# Patient Record
Sex: FEMALE | Race: WHITE | NOT HISPANIC OR LATINO | Employment: FULL TIME | URBAN - METROPOLITAN AREA
[De-identification: names, ages, dates, MRNs, and addresses within clinical notes are randomized per-mention and may not be internally consistent; named-entity substitution may affect disease eponyms.]

---

## 2017-02-01 ENCOUNTER — TRANSFERRED RECORDS (OUTPATIENT)
Dept: HEALTH INFORMATION MANAGEMENT | Facility: CLINIC | Age: 27
End: 2017-02-01

## 2017-02-01 LAB
ALT SERPL-CCNC: 29 U/L (ref 6–29)
CREAT SERPL-MCNC: 0.81 MG/DL (ref 0.5–1.1)
GFR SERPL CREATININE-BSD FRML MDRD: 102 ML/MIN/1.73M2
GLUCOSE SERPL-MCNC: 170 MG/DL (ref 65–99)
HBA1C MFR BLD: 7.3 % (ref 4–6)
POTASSIUM SERPL-SCNC: 4.3 MMOL/L (ref 3.5–5.3)

## 2017-02-13 ENCOUNTER — MYC MEDICAL ADVICE (OUTPATIENT)
Dept: OBGYN | Facility: CLINIC | Age: 27
End: 2017-02-13

## 2017-02-13 NOTE — TELEPHONE ENCOUNTER
Note routed to [facility] to review pt's mychart message below- is this amount enough to possibly transmit HIV?

## 2017-06-21 ENCOUNTER — TRANSFERRED RECORDS (OUTPATIENT)
Dept: HEALTH INFORMATION MANAGEMENT | Facility: CLINIC | Age: 27
End: 2017-06-21

## 2017-06-21 LAB
ALT SERPL-CCNC: 17 U/L (ref 6–29)
CREAT SERPL-MCNC: 0.82 MG/DL (ref 0.5–1.1)
GFR SERPL CREATININE-BSD FRML MDRD: 99 ML/MIN/1.73M2
GLUCOSE SERPL-MCNC: 181 MG/DL (ref 65–99)
HBA1C MFR BLD: 8.1 % (ref 4–6)
POTASSIUM SERPL-SCNC: 4.5 MMOL/L (ref 3.5–5.3)
TSH SERPL-ACNC: 2.81 UIU/ML (ref 0.3–5)

## 2017-07-06 ENCOUNTER — OFFICE VISIT (OUTPATIENT)
Dept: FAMILY MEDICINE | Facility: CLINIC | Age: 27
End: 2017-07-06
Payer: COMMERCIAL

## 2017-07-06 VITALS
HEART RATE: 121 BPM | TEMPERATURE: 98.5 F | DIASTOLIC BLOOD PRESSURE: 64 MMHG | WEIGHT: 152 LBS | BODY MASS INDEX: 24.91 KG/M2 | OXYGEN SATURATION: 98 % | SYSTOLIC BLOOD PRESSURE: 106 MMHG

## 2017-07-06 DIAGNOSIS — H10.33 ACUTE CONJUNCTIVITIS OF BOTH EYES, UNSPECIFIED ACUTE CONJUNCTIVITIS TYPE: Primary | ICD-10-CM

## 2017-07-06 DIAGNOSIS — J06.9 UPPER RESPIRATORY TRACT INFECTION, UNSPECIFIED TYPE: ICD-10-CM

## 2017-07-06 PROCEDURE — 99213 OFFICE O/P EST LOW 20 MIN: CPT | Performed by: PHYSICIAN ASSISTANT

## 2017-07-06 RX ORDER — OFLOXACIN 3 MG/ML
1 SOLUTION/ DROPS OPHTHALMIC 4 TIMES DAILY
Qty: 2 ML | Refills: 0 | Status: SHIPPED | OUTPATIENT
Start: 2017-07-06 | End: 2017-07-13

## 2017-07-06 NOTE — MR AVS SNAPSHOT
After Visit Summary   7/6/2017    Linda Agosto    MRN: [id number]           Patient Information     Date Of Birth          1990        Visit Information        Provider Department      7/6/2017 8:20 AM Alexandra Melo PA-C [facility]        Today's Diagnoses     Acute conjunctivitis of both eyes, unspecified acute conjunctivitis type    -  1      Care Instructions      Conjunctivitis, Bacterial    You have an infection in the membranes covering the white part of the eye. This part of the eye is called the conjunctiva. The infection is called conjunctivitis. The most common symptoms of conjunctivitis include a thick, pus-like discharge from the eye, swollen eyelids, redness, eyelids sticking together upon awakening, and a gritty or scratchy feeling in the eye. Your infection was caused by bacteria. It may be treated with medicine. With treatment, the infection takes about 7 to 10 days to resolve.  Home care    Use prescribed antibiotic eye drops or ointment as directed to treat the infection.    Apply a warm compress (towel soaked in warm water) to the affected eye 3 to 4 times a day. Do this just before applying medicine to the eye.    Use a warm, wet cloth to wipe away crusting of the eyelids in the morning. This is caused by mucus drainage during the night. You may also use saline irrigating solution or artificial tears to rinse away mucus in the eye. Do not put a patch over the eye.    Wash your hands before and after touching the infected eye. This is to prevent spreading the infection to the other eye, and to other people. Do not share your towels or washcloths with others.    You may use acetaminophen or ibuprofen to control pain, unless another medicine was prescribed. (Note: If you have chronic liver or kidney disease or have ever had a stomach ulcer or gastrointestinal bleeding, talk with your doctor before using these medicines.)    Do not wear contact lenses until your  eyes have healed and all symptoms are gone.  Follow-up care  Follow up with your healthcare provider, or as advised.  When to seek medical advice  Call your healthcare provider right away if any of these occur:    Worsening vision    Increasing pain in the eye    Increasing swelling or redness of the eyelid    Redness spreading around the eye  Date Last Reviewed: 6/14/2015 2000-2017 [employer]. [address] [place] [address]. All rights reserved. This information is not intended as a substitute for professional medical care. Always follow your healthcare professional's instructions.                Follow-ups after your visit        Who to contact     If you have questions or need follow up information about today's clinic visit or your schedule please contact [facility] directly at [phone].  Normal or non-critical lab and imaging results will be communicated to you by MyChart, letter or phone within 4 business days after the clinic has received the results. If you do not hear from us within 7 days, please contact the clinic through [employer] or phone. If you have a critical or abnormal lab result, we will notify you by phone as soon as possible.  Submit refill requests through [employer] or call your pharmacy and they will forward the refill request to us. Please allow 3 business days for your refill to be completed.          Additional Information About Your Visit        [employer] Information     [employer] gives you secure access to your electronic health record. If you see a primary care provider, you can also send messages to your care team and make appointments. If you have questions, please call your primary care clinic.  If you do not have a primary care provider, please call [phone] and they will assist you.        Care EveryWhere ID     This is your Care EveryWhere ID. This could be used by other organizations to access your [facility] medical records  [id number]         Your Vitals Were     Pulse Temperature Pulse Oximetry BMI (Body Mass Index)          121 98.5  F (36.9  C) (Oral) 98% 24.91 kg/m2         Blood Pressure from Last 3 Encounters:   07/06/17 106/64   12/16/16 116/72   11/11/16 112/80    Weight from Last 3 Encounters:   07/06/17 152 lb (68.9 kg)   12/16/16 156 lb (70.8 kg)   11/11/16 157 lb (71.2 kg)              Today, you had the following     No orders found for display         Today's Medication Changes          These changes are accurate as of: 7/6/17  8:58 AM.  If you have any questions, ask your nurse or doctor.               Start taking these medicines.        Dose/Directions    ofloxacin 0.3 % ophthalmic solution   Commonly known as:  OCUFLOX   Used for:  Acute conjunctivitis of both eyes, unspecified acute conjunctivitis type   Started by:  Alexandra Melo PA-C        Dose:  1 drop   Place 1 drop into both eyes 4 times daily for 7 days   Quantity:  2 mL   Refills:  0            Where to get your medicines      These medications were sent to [facility] [id number] [place] [address] AT [facility] [address] [address]    Hours:  24-hours Phone:  [phone]     ofloxacin 0.3 % ophthalmic solution                Primary Care Provider Office Phone # Fax #    Nithin Varela [phone]       [address] [address]        Equal Access to Services     [facility] AH: Hadii nader ordaz hadasho Soomaali, waaxda luqadaha, qaybta kaalmada adeegyada, miriam dean. So [facility] [phone].    ATENCIÓN: Si habla español, tiene a rivera disposición servicios gratuitos de asistencia lingüística. Juli al [phone].    We comply with applicable federal civil rights laws and [place] laws. We do not discriminate on the basis of race, color, national origin, age, disability sex, sexual orientation or gender identity.             Thank you!     Thank you for choosing [facility]  [facility] your [facility]. Our goal is always to provide you with excellent care. Hearing back from our patients is one way we can continue to improve our services. Please take a few minutes to complete the written survey that you may receive in the mail after your visit with us. Thank you!             Your Updated Medication List - Protect others around you: Learn how to safely use, store and throw away your medicines at www.disposemymeds.org.          This list is accurate as of: 7/6/17  8:58 AM.  Always use your most recent med list.                   Brand Name Dispense Instructions for use Diagnosis    blood glucose monitoring lancets     2 Box    Patient tests 8 times/day    Type 1 diabetes, HbA1c goal < 7% (H)       blood glucose monitoring test strip    VALE CONTOUR NEXT    250 strip    Patient tests 8 times/day    Type 1 diabetes, HbA1c goal < 7% (H)       insulin aspart 100 UNITS/ML injection    NovoLOG VIAL    30 mL    Uses up to 75 Units/day    Type 1 diabetes, HbA1c goal < 7% (H)       levothyroxine 88 MCG tablet    SYNTHROID/LEVOTHROID    90 tablet    TAKE ONE TABLET BY MOUTH ONCE DAILY    Other specified hypothyroidism       naproxen sodium 550 MG tablet    ANAPROX    30 tablet    Take 1 tablet (550 mg) by mouth 2 times daily (with meals)    Tension headache, Common migraine       norgestimate-ethinyl estradiol 0.25-35 MG-MCG per tablet    ORTHO-CYCLEN, SPRINTEC    84 tablet    Take 1 tablet by mouth daily    DUB (dysfunctional uterine bleeding)       ofloxacin 0.3 % ophthalmic solution    OCUFLOX    2 mL    Place 1 drop into both eyes 4 times daily for 7 days    Acute conjunctivitis of both eyes, unspecified acute conjunctivitis type       simvastatin 40 MG tablet    ZOCOR    90 tablet    Take 1 tablet (40 mg) by mouth At Bedtime    Hyperlipidemia with target LDL less than 70

## 2017-07-06 NOTE — PATIENT INSTRUCTIONS
Conjunctivitis, Bacterial    You have an infection in the membranes covering the white part of the eye. This part of the eye is called the conjunctiva. The infection is called conjunctivitis. The most common symptoms of conjunctivitis include a thick, pus-like discharge from the eye, swollen eyelids, redness, eyelids sticking together upon awakening, and a gritty or scratchy feeling in the eye. Your infection was caused by bacteria. It may be treated with medicine. With treatment, the infection takes about 7 to 10 days to resolve.  Home care    Use prescribed antibiotic eye drops or ointment as directed to treat the infection.    Apply a warm compress (towel soaked in warm water) to the affected eye 3 to 4 times a day. Do this just before applying medicine to the eye.    Use a warm, wet cloth to wipe away crusting of the eyelids in the morning. This is caused by mucus drainage during the night. You may also use saline irrigating solution or artificial tears to rinse away mucus in the eye. Do not put a patch over the eye.    Wash your hands before and after touching the infected eye. This is to prevent spreading the infection to the other eye, and to other people. Do not share your towels or washcloths with others.    You may use acetaminophen or ibuprofen to control pain, unless another medicine was prescribed. (Note: If you have chronic liver or kidney disease or have ever had a stomach ulcer or gastrointestinal bleeding, talk with your doctor before using these medicines.)    Do not wear contact lenses until your eyes have healed and all symptoms are gone.  Follow-up care  Follow up with your healthcare provider, or as advised.  When to seek medical advice  Call your healthcare provider right away if any of these occur:    Worsening vision    Increasing pain in the eye    Increasing swelling or redness of the eyelid    Redness spreading around the eye  Date Last Reviewed: 6/14/2015 2000-2017 [employer]. [address] [place] [address], [place] [address]. All rights reserved. This information is not intended as a substitute for professional medical care. Always follow your healthcare professional's instructions.

## 2017-07-06 NOTE — NURSING NOTE
"Chief Complaint   Patient presents with     Pharyngitis       Initial /64  Pulse 121  Temp 98.5  F (36.9  C) (Oral)  Wt 152 lb (68.9 kg)  SpO2 98%  BMI 24.91 kg/m2 Estimated body mass index is 24.91 kg/(m^2) as calculated from the following:    Height as of 12/16/16: 5' 5.5\" (1.664 m).    Weight as of this encounter: 152 lb (68.9 kg).  Medication Reconciliation: complete     Linda Garcia MA      "

## 2017-07-06 NOTE — PROGRESS NOTES
SUBJECTIVE:      Linda Agosto is a 27 year old female who presents to clinic today for the following health issues:        Acute Illness   Acute illness concerns: sore throat, pink eye  Onset: sore throat few days, pink eye last night    Fever: no    Chills/Sweats: no    Headache (location?): no    Sinus Pressure:no    Conjunctivitis:  YES: mostly left, some right    Ear Pain: YES- pressure    Rhinorrhea: no    Congestion: no    Sore Throat: YES   Cough: YES-productive of yellow sputum    Wheeze: no    Decreased Appetite: YES    Nausea: no    Vomiting: no    Diarrhea:  no    Dysuria/Freq.: no    Fatigue/Achiness: YES - fatigue    Sick/Strep Exposure: YES - people at work sick, colds   Therapies Tried and outcome: ibuprofen     Sore throat since Saturday. Hurts to swallow a little bit.   Cough started shortly after. Worse in the morning and when laying down. Productive in the morning.   Cough seems to come from sore throat.   No noticeable congestion or nasal discharge.   Some PND noticed.   Feels some ear pressure, but no pain or discharge.      Yesterday left eye was goopy and this morning it was crusted and red.   She noticed white discharge yesterday night and this morning.   This morning there was quite a bit of discharge present.   She has had to wipe her eyes a couple times.   She does wear contact lenses.   Eyes are not itchy or sore. No changes in vision.      Taking Ibuprofen for her sore throat. Last dose was yesterday.   Took NyQuil once, but didn't do much.            Problem list and histories reviewed & adjusted, as indicated.  Additional history: as documented     Patient Active Problem List   Diagnosis     Allergic rhinitis     Allergic state     Type 1 diabetes, HbA1c goal < 7% (H)     Other specified hypothyroidism     Anxiety     Hyperlipidemia with target LDL less than 70     Type 1 diabetes mellitus without complication (H)     Common migraine     Past Surgical History:   Procedure Laterality  Date     APPENDECTOMY  8/07    dr deshpande     PE TUBES  1996       Social History   Substance Use Topics     Smoking status: Never Smoker     Smokeless tobacco: Never Used     Alcohol use Yes      Comment: sometimes couple on weekends     Family History   Problem Relation Age of Onset     Family History Negative No family hx of      Genetic Disorder Paternal Grandfather      kidney     Neurologic Disorder Maternal Grandmother      dementia         Current Outpatient Prescriptions   Medication Sig Dispense Refill     ofloxacin (OCUFLOX) 0.3 % ophthalmic solution Place 1 drop into both eyes 4 times daily for 7 days 2 mL 0     norgestimate-ethinyl estradiol (ORTHO-CYCLEN, SPRINTEC) 0.25-35 MG-MCG per tablet Take 1 tablet by mouth daily 84 tablet 8     simvastatin (ZOCOR) 40 MG tablet Take 1 tablet (40 mg) by mouth At Bedtime 90 tablet 1     levothyroxine (SYNTHROID, LEVOTHROID) 88 MCG tablet TAKE ONE TABLET BY MOUTH ONCE DAILY 90 tablet 3     insulin aspart (NOVOLOG VIAL) 100 UNITS/ML VIAL Uses up to 75 Units/day 30 mL 2     naproxen sodium (ANAPROX) 550 MG tablet Take 1 tablet (550 mg) by mouth 2 times daily (with meals) 30 tablet 1     blood glucose (VALE CONTOUR NEXT) test strip Patient tests 8 times/day 250 strip 6     blood glucose monitoring (VALE MICROLET) lancets Patient tests 8 times/day 2 Box 6     Allergies   Allergen Reactions     Penicillins Rash     augmentin & pcn     Sulfa Drugs Hives        Reviewed and updated as needed this visit by clinical staff        Reviewed and updated as needed this visit by Provider           ROS:  Constitutional, HEENT, cardiovascular, pulmonary, gi and gu systems are negative, except as otherwise noted.    OBJECTIVE:     /64  Pulse 121  Temp 98.5  F (36.9  C) (Oral)  Wt 152 lb (68.9 kg)  SpO2 98%  BMI 24.91 kg/m2  Body mass index is 24.91 kg/(m^2).  GENERAL: healthy, alert and no distress  EYES: PERRL, EOMI and conjunctiva/corneas- conjunctival injection OU  and yellow colored discharge present bilateral  HENT: ear canals and TM's normal, nose and mouth without ulcers or lesions  NECK: no adenopathy, no asymmetry, masses, or scars and thyroid normal to palpation  RESP: lungs clear to auscultation - no rales, rhonchi or wheezes  CV: regular rate and rhythm, normal S1 S2, no S3 or S4, no murmur, click or rub, no peripheral edema and peripheral pulses strong    Diagnostic Test Results:  none     ASSESSMENT/PLAN:     1. Acute conjunctivitis of both eyes, unspecified acute conjunctivitis type  White, creamy discharge noted by patient as well as on exam bilaterally. She does typically wear contact lenses. Recommended to avoid wearing contact lenses while treating for conjunctivitis to minimize risk of reinfection. Practice good hand hygiene to prevent spread to others. Contagious until on antibiotics for minimum of 24 hours. Avoid touching tip of eye drop bottle to the eye to minimize reinfection risk.     - ofloxacin (OCUFLOX) 0.3 % ophthalmic solution; Place 1 drop into both eyes 4 times daily for 7 days  Dispense: 2 mL; Refill: 0    2. Upper respiratory tract infection, unspecified type  Cough, sore throat and PND likely due to viral infection. Continue taking OTC medications as needed for symptom control. As symptoms are improving, expect to continue improving over the next week.       Follow up if symptoms worsen or do not improve, or with any vision changes.   See Patient Instructions    Ward GALLO

## 2017-07-06 NOTE — PROGRESS NOTES
"  SUBJECTIVE:                                                    Linda Agosto is a 27 year old female who presents to clinic today for the following health issues:    Acute Illness   Acute illness concerns: sore throat, pink eye  Onset: sore throat few days, pink eye last night    Fever: no    Chills/Sweats: no    Headache (location?): no    Sinus Pressure:no    Conjunctivitis:  YES: mostly left, some right    Ear Pain: YES- pressure    Rhinorrhea: no    Congestion: no    Sore Throat: YES     Cough: YES-productive of yellow sputum    Wheeze: no    Decreased Appetite: YES    Nausea: no    Vomiting: no    Diarrhea:  no    Dysuria/Freq.: no    Fatigue/Achiness: YES - fatigue    Sick/Strep Exposure: YES - people at work sick, colds     Therapies Tried and outcome: ibuprofen    Sore throat for approximately 6 days. Cough came on a little bit later. Sore throat appears to be getting better.  Cough productive in AM only.   No significant nasal congestion. Does feel some postnasal drainage.    Eye symptoms developed yesterday. Left eye was \"goopy.\" Eye crusted shut this morning, and red.  Both eyes affected but left eye worse  Has seen some white thick discharge  Does wear contacts. Wearing glasses today.    Has been taking ibuprofen for her sore throat. Also took a dose of Nyquil.    No recent travel    People at work are sick with cold symptoms.     Problem list and histories reviewed & adjusted, as indicated.  Additional history: as documented    Patient Active Problem List   Diagnosis     Allergic rhinitis     Allergic state     Type 1 diabetes, HbA1c goal < 7% (H)     Other specified hypothyroidism     Anxiety     Hyperlipidemia with target LDL less than 70     Type 1 diabetes mellitus without complication (H)     Common migraine     Past Surgical History:   Procedure Laterality Date     APPENDECTOMY  8/07    dr deshpande     PE TUBES  1996       Social History   Substance Use Topics     Smoking status: Never Smoker "     Smokeless tobacco: Never Used     Alcohol use Yes      Comment: sometimes couple on weekends     Family History   Problem Relation Age of Onset     Family History Negative No family hx of      Genetic Disorder Paternal Grandfather      kidney     Neurologic Disorder Maternal Grandmother      dementia         Current Outpatient Prescriptions   Medication Sig Dispense Refill     ofloxacin (OCUFLOX) 0.3 % ophthalmic solution Place 1 drop into both eyes 4 times daily for 7 days 2 mL 0     norgestimate-ethinyl estradiol (ORTHO-CYCLEN, SPRINTEC) 0.25-35 MG-MCG per tablet Take 1 tablet by mouth daily 84 tablet 8     simvastatin (ZOCOR) 40 MG tablet Take 1 tablet (40 mg) by mouth At Bedtime 90 tablet 1     levothyroxine (SYNTHROID, LEVOTHROID) 88 MCG tablet TAKE ONE TABLET BY MOUTH ONCE DAILY 90 tablet 3     insulin aspart (NOVOLOG VIAL) 100 UNITS/ML VIAL Uses up to 75 Units/day 30 mL 2     naproxen sodium (ANAPROX) 550 MG tablet Take 1 tablet (550 mg) by mouth 2 times daily (with meals) 30 tablet 1     blood glucose (VALE CONTOUR NEXT) test strip Patient tests 8 times/day 250 strip 6     blood glucose monitoring (VALE MICROLET) lancets Patient tests 8 times/day 2 Box 6     Allergies   Allergen Reactions     Penicillins Rash     augmentin & pcn     Sulfa Drugs Hives       Reviewed and updated as needed this visit by clinical staff       Reviewed and updated as needed this visit by Provider         ROS:  Constitutional, HEENT, cardiovascular, pulmonary, gi and gu systems are negative, except as otherwise noted.    OBJECTIVE:     /64  Pulse 121  Temp 98.5  F (36.9  C) (Oral)  Wt 152 lb (68.9 kg)  SpO2 98%  BMI 24.91 kg/m2  Body mass index is 24.91 kg/(m^2).  GENERAL: healthy, alert and no distress  EYES: PERRL, EOMI and conjunctiva/corneas- conjunctival injection OU and white-yellow colored discharge present bilateral  HENT: normal cephalic/atraumatic, ear canals and TM's normal, oral mucous membranes moist  and cobblestoning in oropharynx  NECK: no adenopathy, no asymmetry, masses, or scars and thyroid normal to palpation  RESP: lungs clear to auscultation - no rales, rhonchi or wheezes  CV: regular rate and rhythm, normal S1 S2, no S3 or S4, no murmur, click or rub, no peripheral edema and peripheral pulses strong  SKIN: no suspicious lesions or rashes    Diagnostic Test Results:  none     ASSESSMENT/PLAN:     1. Acute conjunctivitis of both eyes, unspecified acute conjunctivitis type  Is experiencing purulent drainage and is a contact lens wearer, increasing concern for bacterial infection. Will start ofloxacin drops. Follow-up if symptoms worsening despite treatment. Discussed avoidance of contact lenses until treatment completed. Wash hands frequently and avoid touching eyes. Discussed changing cosmetic products.  - ofloxacin (OCUFLOX) 0.3 % ophthalmic solution; Place 1 drop into both eyes 4 times daily for 7 days  Dispense: 2 mL; Refill: 0    2. Upper respiratory tract infection, unspecified type  Consistent with viral URI. Expect improvement over the next week. Can continue to use OTC medications as needed.    See Patient Instructions    I performed a history and physical examination in addition to that performed by the student. The documentation above reflects my personal history and physical findings.    Alexandra Melo PA-C  [facility]

## 2017-08-21 ENCOUNTER — OFFICE VISIT (OUTPATIENT)
Dept: FAMILY MEDICINE | Facility: CLINIC | Age: 27
End: 2017-08-21
Payer: COMMERCIAL

## 2017-08-21 VITALS
WEIGHT: 153 LBS | OXYGEN SATURATION: 100 % | DIASTOLIC BLOOD PRESSURE: 72 MMHG | TEMPERATURE: 98.7 F | BODY MASS INDEX: 24.59 KG/M2 | SYSTOLIC BLOOD PRESSURE: 128 MMHG | HEIGHT: 66 IN | HEART RATE: 98 BPM

## 2017-08-21 DIAGNOSIS — K60.2 ANAL FISSURE: Primary | ICD-10-CM

## 2017-08-21 DIAGNOSIS — F41.8 SITUATIONAL ANXIETY: ICD-10-CM

## 2017-08-21 PROCEDURE — 99213 OFFICE O/P EST LOW 20 MIN: CPT | Performed by: FAMILY MEDICINE

## 2017-08-21 RX ORDER — INSULIN GLARGINE 100 [IU]/ML
INJECTION, SOLUTION SUBCUTANEOUS
Refills: 0 | COMMUNITY
Start: 2017-04-01 | End: 2022-04-12

## 2017-08-21 NOTE — PROGRESS NOTES
"  SUBJECTIVE:   Linda Agosto is a 27 year old female who presents to clinic today for the following health issues:      Concern - Rectal problem   Onset: x2-3 Months     Description:   Patient feels  a tear in rectal area , blood on toilet paper   Has been happening for almost 2-3 months , thought problem was getting better   When stools are hard this is worse and more bleeding occurs - bright red   Small red bump        Intensity: moderate    Progression of Symptoms:  Worsening x1 week     Accompanying Signs & Symptoms: none       Previous history of similar problem:   None     Precipitating factors:   Worsened by: hard stools, constipation     Alleviating factors:  Improved by: none     Therapies Tried and outcome: none   Occasionally has problems with constipation.  Tries to take stool softener and fiber tablets.Havings some pain with BM's. No history of  Diarrhea.  No family history of Crohn's disease.  No  Pain with sitting.    Patient is having some situational anxiety. Has a lot of stressors going on in her life. No depression. Sleeping okay. Does not think she wants to go on medication. NO SI/HI.  Is open to therapy.    Problem list and histories reviewed & adjusted, as indicated.  Additional history: as documented    Reviewed and updated as needed this visit by clinical staffTobacco  Allergies  Meds  Med Hx  Surg Hx  Fam Hx  Soc Hx      Reviewed and updated as needed this visit by Provider         ROS:  Constitutional, HEENT, cardiovascular, pulmonary, gi and gu systems are negative, except as otherwise noted.      OBJECTIVE:   /72  Pulse 98  Temp 98.7  F (37.1  C) (Oral)  Ht 5' 5.5\" (1.664 m)  Wt 153 lb (69.4 kg)  LMP 08/01/2017 (Approximate)  SpO2 100%  BMI 25.07 kg/m2  Body mass index is 25.07 kg/(m^2).  GENERAL: healthy, alert and no distress  EYES: Eyes grossly normal to inspection, PERRL and conjunctivae and sclerae normal  HENT: ear canals and TM's normal, nose and mouth without " ulcers or lesions  NECK: no adenopathy, no asymmetry, masses, or scars and thyroid normal to palpation  RESP: lungs clear to auscultation - no rales, rhonchi or wheezes  CV: regular rate and rhythm, normal S1 S2, no S3 or S4, no murmur, click or rub, no peripheral edema and peripheral pulses strong  ABDOMEN: soft, nontender, no hepatosplenomegaly, no masses and bowel sounds normal  MS: no gross musculoskeletal defects noted, no edema  Rectal-Small fissure noted at 6 o'clock position. No bleeding. Rectal exam-no masses.  Brown stool on glove.      ASSESSMENT/PLAN:     (K60.2) Anal fissure  (primary encounter diagnosis)  Comment: Will try Nifedipine first.   Plan: nifedipine 0.2% in white         petrolatum 0.2 % OINT ointment        If not improving, may consider Colorectal referral.    (F41.8) Situational anxiety  Comment: Will refer for therapy.  Plan: MENTAL HEALTH REFERRAL            Karen Weiler, MD  [facility]

## 2017-08-21 NOTE — MR AVS SNAPSHOT
After Visit Summary   8/21/2017    Linda Agosto    MRN: [id number]           Patient Information     Date Of Birth          1990        Visit Information        Provider Department      8/21/2017 4:00 PM Weiler, Karen, MD [facility] [place]        Today's Diagnoses     Anal fissure    -  1    Situational anxiety           Follow-ups after your visit        Additional Services     MENTAL HEALTH REFERRAL       Your provider has referred you to: [facility]: [facility] - Counseling (Individual/Couples/Family) - [facility] [phone]   [email or web address]   *Patient will be contacted by [facility] scheduling partner[facility]), to schedule an appointment.  Patients may also call [facility] to schedule.    All scheduling is subject to the client's specific insurance plan & benefits, provider/location availability, and provider clinical specialities.  Please arrive 15 minutes early for your first appointment and bring your completed paperwork.    Please be aware that coverage of these services is subject to the terms and limitations of your health insurance plan.  Call member services at your health plan with any benefit or coverage questions.                  Your next 10 appointments already scheduled     Aug 31, 2017  1:30 PM CDT   Office Visit with Sierra Treviño Masters, DO   [facility] ([facility])    [address] [place] [id number] [phone]           Bring a current list of meds and any records pertaining to this visit. For Physicals, please bring immunization records and any forms needing to be filled out. Please arrive 10 minutes early to complete paperwork.              Who to contact     If you have questions or need follow up information about today's clinic visit or your schedule please contact [facility]  "[facility] directly at [phone].  Normal or non-critical lab and imaging results will be communicated to you by MyChart, letter or phone within 4 business days after the clinic has received the results. If you do not hear from us within 7 days, please contact the clinic through Recon Instrumentshart or phone. If you have a critical or abnormal lab result, we will notify you by phone as soon as possible.  Submit refill requests through Anytime DD or call your pharmacy and they will forward the refill request to us. Please allow 3 business days for your refill to be completed.          Additional Information About Your Visit        [employer] Information     Anytime DD gives you secure access to your electronic health record. If you see a primary care provider, you can also send messages to your care team and make appointments. If you have questions, please call your primary care clinic.  If you do not have a primary care provider, please call [phone] and they will assist you.        Care EveryWhere ID     This is your Care EveryWhere ID. This could be used by other organizations to access your [place] medical records  [id number]        Your Vitals Were     Pulse Temperature Height Last Period Pulse Oximetry BMI (Body Mass Index)    98 98.7  F (37.1  C) (Oral) 5' 5.5\" (1.664 m) 08/01/2017 (Approximate) 100% 25.07 kg/m2       Blood Pressure from Last 3 Encounters:   08/21/17 128/72   07/06/17 106/64   12/16/16 116/72    Weight from Last 3 Encounters:   08/21/17 153 lb (69.4 kg)   07/06/17 152 lb (68.9 kg)   12/16/16 156 lb (70.8 kg)              We Performed the Following     MENTAL HEALTH REFERRAL          Today's Medication Changes          These changes are accurate as of: 8/21/17  4:40 PM.  If you have any questions, ask your nurse or doctor.               Start taking these medicines.        Dose/Directions    nifedipine 0.2% in white petrolatum 0.2 % Oint ointment   Used for:  Anal fissure   Started by:  Weiler, Karen, MD     "    Apply topically 2 times daily   Quantity:  100 g   Refills:  0            Where to get your medicines      [facility] [facility] [address] [place] [address] AT [facility] [address] [address], [facility] [address]    Hours:  24-hours Phone:  [phone]     nifedipine 0.2% in white petrolatum 0.2 % Oint ointment                Primary Care Provider Office Phone # Fax #    Nithin Varela [phone]       [address] [facility] [address]        Equal Access to Services     [facility]: Hadii aad ku hadasho Soomaali, waaxda luqadaha, qaybta kaalmada adeegyada, miriam white haytim romo . So [facility] [phone].    ATENCIÓN: Si habla español, tiene a rivera disposición servicios gratuitos de asistencia lingüística. [facility] [phone].    We comply with applicable federal civil rights laws and [place] laws. We do not discriminate on the basis of race, color, national origin, age, disability sex, sexual orientation or gender identity.            Thank you!     Thank you for choosing [facility]  for your care. Our goal is always to provide you with excellent care. Hearing back from our patients is one way we can continue to improve our services. Please take a few minutes to complete the written survey that you may receive in the mail after your visit with us. Thank you!             Your Updated Medication List - Protect others around you: Learn how to safely use, store and throw away your medicines at www.disposemymeds.org.          This list is accurate as of: 8/21/17  4:40 PM.  Always use your most recent med list.                   Brand Name Dispense Instructions for use Diagnosis    blood glucose monitoring lancets     2 Box    Patient tests 8 times/day    Type 1 diabetes, HbA1c goal < 7% (H)       blood glucose monitoring test strip    VALE CONTOUR NEXT    250 strip    Patient tests 8 times/day     Type 1 diabetes, HbA1c goal < 7% (H)       insulin aspart 100 UNITS/ML injection    NovoLOG VIAL    30 mL    Uses up to 75 Units/day    Type 1 diabetes, HbA1c goal < 7% (H)       insulin glargine 100 UNIT/ML injection      INJ 21 UNITS SC D        levothyroxine 88 MCG tablet    SYNTHROID/LEVOTHROID    90 tablet    TAKE ONE TABLET BY MOUTH ONCE DAILY    Other specified hypothyroidism       naproxen sodium 550 MG tablet    ANAPROX    30 tablet    Take 1 tablet (550 mg) by mouth 2 times daily (with meals)    Tension headache, Common migraine       nifedipine 0.2% in white petrolatum 0.2 % Oint ointment     100 g    Apply topically 2 times daily    Anal fissure       norgestimate-ethinyl estradiol 0.25-35 MG-MCG per tablet    ORTHO-CYCLEN, SPRINTEC    84 tablet    Take 1 tablet by mouth daily    DUB (dysfunctional uterine bleeding)       simvastatin 40 MG tablet    ZOCOR    90 tablet    Take 1 tablet (40 mg) by mouth At Bedtime    Hyperlipidemia with target LDL less than 70

## 2017-08-21 NOTE — NURSING NOTE
"Chief Complaint   Patient presents with     Rectal Problem       Initial /72  Pulse 98  Temp 98.7  F (37.1  C) (Oral)  Ht 5' 5.5\" (1.664 m)  Wt 153 lb (69.4 kg)  LMP 08/01/2017 (Approximate)  SpO2 100%  BMI 25.07 kg/m2 Estimated body mass index is 25.07 kg/(m^2) as calculated from the following:    Height as of this encounter: 5' 5.5\" (1.664 m).    Weight as of this encounter: 153 lb (69.4 kg).  Medication Reconciliation: complete   Kaylin Metzger Certified Medical Assistant      "

## 2017-08-22 ENCOUNTER — MYC MEDICAL ADVICE (OUTPATIENT)
Dept: FAMILY MEDICINE | Facility: CLINIC | Age: 27
End: 2017-08-22

## 2017-08-22 NOTE — TELEPHONE ENCOUNTER
Please see octoScope message. Please advise. Thank you.  Silvia Holt RN, BSN  [facility]

## 2017-08-31 ENCOUNTER — OFFICE VISIT (OUTPATIENT)
Dept: OBGYN | Facility: CLINIC | Age: 27
End: 2017-08-31
Payer: COMMERCIAL

## 2017-08-31 VITALS — BODY MASS INDEX: 24.59 KG/M2 | HEIGHT: 66 IN | WEIGHT: 153 LBS

## 2017-08-31 DIAGNOSIS — Z11.8 SCREENING FOR CHLAMYDIAL DISEASE: ICD-10-CM

## 2017-08-31 DIAGNOSIS — Z11.3 SCREEN FOR STD (SEXUALLY TRANSMITTED DISEASE): Primary | ICD-10-CM

## 2017-08-31 PROCEDURE — 86695 HERPES SIMPLEX TYPE 1 TEST: CPT | Performed by: OBSTETRICS & GYNECOLOGY

## 2017-08-31 PROCEDURE — 99213 OFFICE O/P EST LOW 20 MIN: CPT | Performed by: OBSTETRICS & GYNECOLOGY

## 2017-08-31 PROCEDURE — 87591 N.GONORRHOEAE DNA AMP PROB: CPT | Performed by: OBSTETRICS & GYNECOLOGY

## 2017-08-31 PROCEDURE — 86780 TREPONEMA PALLIDUM: CPT | Performed by: OBSTETRICS & GYNECOLOGY

## 2017-08-31 PROCEDURE — 87340 HEPATITIS B SURFACE AG IA: CPT | Performed by: OBSTETRICS & GYNECOLOGY

## 2017-08-31 PROCEDURE — 36415 COLL VENOUS BLD VENIPUNCTURE: CPT | Performed by: OBSTETRICS & GYNECOLOGY

## 2017-08-31 PROCEDURE — 87389 HIV-1 AG W/HIV-1&-2 AB AG IA: CPT | Performed by: OBSTETRICS & GYNECOLOGY

## 2017-08-31 PROCEDURE — 87491 CHLMYD TRACH DNA AMP PROBE: CPT | Performed by: OBSTETRICS & GYNECOLOGY

## 2017-08-31 PROCEDURE — 86803 HEPATITIS C AB TEST: CPT | Performed by: OBSTETRICS & GYNECOLOGY

## 2017-08-31 PROCEDURE — 86696 HERPES SIMPLEX TYPE 2 TEST: CPT | Performed by: OBSTETRICS & GYNECOLOGY

## 2017-08-31 NOTE — PROGRESS NOTES
SUBJECTIVE:                                                   Linda Agosto is a 27 year old female who presents to clinic today for the following health issue(s):  Patient presents with:  STD: Requesting full panel       HPI:  Would like STD testing. No specific concerns or questions.     Is also wondering about breast cancer risk and when screening happens.     Review of PMH, SocHx, SurHx, FHx, medications completed. Epic updated.        Patient's last menstrual period was 2017 (exact date)..   Patient is sexually active, .  Using oral contraceptives for contraception.    reports that she has never smoked. She has never used smokeless tobacco.      STD testing offered?  Accepted    Health maintenance updated:  yes    Today's PHQ-2 Score:   PHQ-2 (  [employer]) 2017   Q1: Little interest or pleasure in doing things 0   Q2: Feeling down, depressed or hopeless 0   PHQ-2 Score 0     Today's PHQ-9 Score:   PHQ-9 SCORE 2016   Total Score -   Total Score 1     Today's TRESA-7 Score:   TRESA-7 SCORE 2016   Total Score -   Total Score 0       Problem list and histories reviewed & adjusted, as indicated.  Additional history: as documented.    Patient Active Problem List   Diagnosis     Allergic rhinitis     Allergic state     Type 1 diabetes, HbA1c goal < 7% (H)     Other specified hypothyroidism     Anxiety     Hyperlipidemia with target LDL less than 70     Type 1 diabetes mellitus without complication (H)     Common migraine     Past Surgical History:   Procedure Laterality Date     APPENDECTOMY      dr deshpande     PE TUBES        Social History   Substance Use Topics     Smoking status: Never Smoker     Smokeless tobacco: Never Used     Alcohol use Yes      Comment: sometimes couple on weekends      Problem (# of Occurrences) Relation (Name,Age of Onset)    Genetic Disorder (1) Paternal Grandfather: kidney    Neurologic Disorder (1) Maternal Grandmother: dementia       Negative  "family history of: Family History Negative            Current Outpatient Prescriptions   Medication Sig     insulin glargine (BASAGLAR KWIKPEN) 100 UNIT/ML injection INJ 21 UNITS SC D     norgestimate-ethinyl estradiol (ORTHO-CYCLEN, SPRINTEC) 0.25-35 MG-MCG per tablet Take 1 tablet by mouth daily     simvastatin (ZOCOR) 40 MG tablet Take 1 tablet (40 mg) by mouth At Bedtime     levothyroxine (SYNTHROID, LEVOTHROID) 88 MCG tablet TAKE ONE TABLET BY MOUTH ONCE DAILY     insulin aspart (NOVOLOG VIAL) 100 UNITS/ML VIAL Uses up to 75 Units/day     blood glucose (VALE CONTOUR NEXT) test strip Patient tests 8 times/day     blood glucose monitoring (VALE MICROLET) lancets Patient tests 8 times/day     naproxen sodium (ANAPROX) 550 MG tablet Take 1 tablet (550 mg) by mouth 2 times daily (with meals) (Patient not taking: Reported on 8/31/2017)     No current facility-administered medications for this visit.      Allergies   Allergen Reactions     Penicillins Rash     augmentin & pcn     Sulfa Drugs Hives       ROS:  12 point review of systems negative other than symptoms noted below.  Psychiatric: Anxiety    OBJECTIVE:     Ht 5' 5.5\" (1.664 m)  Wt 153 lb (69.4 kg)  LMP 08/27/2017 (Exact Date)  BMI 25.07 kg/m2  Body mass index is 25.07 kg/(m^2).    Exam:  Constitutional:  Appearance: Well nourished, well developed alert, in no acute distress  Chest:  Respiratory Effort:  Breathing unlabored  Neurologic/Psychiatric:  Mental Status:  Oriented X3        ASSESSMENT/PLAN:                                                        ICD-10-CM    1. Screen for STD (sexually transmitted disease) Z11.3 NEISSERIA GONORRHOEA PCR     Anti Treponema     Herpes Simplex Virus 1 and 2 IgG     HIV Antigen Antibody Combo     Hepatitis B Surface  Antigen     Hepatitis C Antibody   2. Screening for chlamydial disease Z11.8 CHLAMYDIA TRACHOMATIS PCR       -STI screening ordered. Desires urine screen due to being on period and comfort level. Last " screening Dec. No recent exposure concerns.   -Due for annual in Dec.   -Discussed breast cancer frequency and screening. No risks in her family identified at this time. Discussed lifestyle and diet, sleep.  Questions asnwered.     Sierra Treviño Masters, [facility] [place]

## 2017-08-31 NOTE — MR AVS SNAPSHOT
After Visit Summary   8/31/2017    Linda Agosto    MRN: [id number]           Patient Information     Date Of Birth          1990        Visit Information        Provider Department      8/31/2017 1:30 PM Sierra Santoyo,  [facility]        Today's Diagnoses     Screen for STD (sexually transmitted disease)    -  1    Screening for chlamydial disease           Follow-ups after your visit        Follow-up notes from your care team     Return if symptoms worsen or fail to improve.      Your next 10 appointments already scheduled     Sep 14, 2017 12:00 PM CDT   New Visit with OMEGA Vazquez   [facility] [place] ([facility])    [address]  [facility] [address] [phone]              Who to contact     If you have questions or need follow up information about today's clinic visit or your schedule please contact [facility] directly at [phone].  Normal or non-critical lab and imaging results will be communicated to you by Stack Exchangehart, letter or phone within 4 business days after the clinic has received the results. If you do not hear from us within 7 days, please contact the clinic through PictureMenut or phone. If you have a critical or abnormal lab result, we will notify you by phone as soon as possible.  Submit refill requests through Zadspace or call your pharmacy and they will forward the refill request to us. Please allow 3 business days for your refill to be completed.          Additional Information About Your Visit        MyChart Information     Zadspace gives you secure access to your electronic health record. If you see a primary care provider, you can also send messages to your care team and make appointments. If you have questions, please call your primary care clinic.  If you do not have a primary care provider, please call [phone] and they will assist you.        Care EveryWhere ID   "   This is your Care EveryWhere ID. This could be used by other organizations to access your [place] medical records  [id number]        Your Vitals Were     Height Last Period BMI (Body Mass Index)             5' 5.5\" (1.664 m) 08/27/2017 (Exact Date) 25.07 kg/m2          Blood Pressure from Last 3 Encounters:   08/21/17 128/72   07/06/17 106/64   12/16/16 116/72    Weight from Last 3 Encounters:   08/31/17 153 lb (69.4 kg)   08/21/17 153 lb (69.4 kg)   07/06/17 152 lb (68.9 kg)              We Performed the Following     Anti Treponema     CHLAMYDIA TRACHOMATIS PCR     Hepatitis B Surface  Antigen     Hepatitis C Antibody     Herpes Simplex Virus 1 and 2 IgG     HIV Antigen Antibody Combo     NEISSERIA GONORRHOEA PCR        Primary Care Provider Office Phone # Fax #    Nithin Varela [phone]       [address] [place] [address]        Equal Access to Services     [facility] : Hadii aad ku hadasho Soomaali, waaxda luqadaha, qaybta kaalmada adeegyada, waxay idiin hayluisiton ella rmoo . So [facility] [phone].    ATENCIÓN: Si habla español, tiene a rivera disposición servicios gratuitos de asistencia lingüística. Llame al [phone].    We comply with applicable federal civil rights laws and [place] laws. We do not discriminate on the basis of race, color, national origin, age, disability sex, sexual orientation or gender identity.            Thank you!     Thank you for choosing [facility]  for your care. Our goal is always to provide you with excellent care. Hearing back from our patients is one way we can continue to improve our services. Please take a few minutes to complete the written survey that you may receive in the mail after your visit with us. Thank you!             Your Updated Medication List - Protect others around you: Learn how to safely use, store and throw away your medicines at www.disposemymeds.org.          This list is accurate as of: " 8/31/17  1:52 PM.  Always use your most recent med list.                   Brand Name Dispense Instructions for use Diagnosis    blood glucose monitoring lancets     2 Box    Patient tests 8 times/day    Type 1 diabetes, HbA1c goal < 7% (H)       blood glucose monitoring test strip    VALE CONTOUR NEXT    250 strip    Patient tests 8 times/day    Type 1 diabetes, HbA1c goal < 7% (H)       insulin aspart 100 UNITS/ML injection    NovoLOG VIAL    30 mL    Uses up to 75 Units/day    Type 1 diabetes, HbA1c goal < 7% (H)       insulin glargine 100 UNIT/ML injection      INJ 21 UNITS SC D        levothyroxine 88 MCG tablet    SYNTHROID/LEVOTHROID    90 tablet    TAKE ONE TABLET BY MOUTH ONCE DAILY    Other specified hypothyroidism       naproxen sodium 550 MG tablet    ANAPROX    30 tablet    Take 1 tablet (550 mg) by mouth 2 times daily (with meals)    Tension headache, Common migraine       norgestimate-ethinyl estradiol 0.25-35 MG-MCG per tablet    ORTHO-CYCLEN, SPRINTEC    84 tablet    Take 1 tablet by mouth daily    DUB (dysfunctional uterine bleeding)       simvastatin 40 MG tablet    ZOCOR    90 tablet    Take 1 tablet (40 mg) by mouth At Bedtime    Hyperlipidemia with target LDL less than 70

## 2017-09-01 LAB
C TRACH DNA SPEC QL NAA+PROBE: NEGATIVE
HBV SURFACE AG SERPL QL IA: NONREACTIVE
HCV AB SERPL QL IA: NONREACTIVE
HIV 1+2 AB+HIV1 P24 AG SERPL QL IA: NONREACTIVE
HSV1 IGG SERPL QL IA: <0.2 AI (ref 0–0.8)
HSV2 IGG SERPL QL IA: <0.2 AI (ref 0–0.8)
N GONORRHOEA DNA SPEC QL NAA+PROBE: NEGATIVE
SPECIMEN SOURCE: NORMAL
SPECIMEN SOURCE: NORMAL
T PALLIDUM IGG+IGM SER QL: NEGATIVE

## 2017-09-14 ENCOUNTER — OFFICE VISIT (OUTPATIENT)
Dept: BEHAVIORAL HEALTH | Facility: CLINIC | Age: 27
End: 2017-09-14
Payer: COMMERCIAL

## 2017-09-14 DIAGNOSIS — F41.1 GENERALIZED ANXIETY DISORDER: Primary | ICD-10-CM

## 2017-09-14 PROCEDURE — 90791 PSYCH DIAGNOSTIC EVALUATION: CPT | Performed by: MARRIAGE & FAMILY THERAPIST

## 2017-09-14 ASSESSMENT — ANXIETY QUESTIONNAIRES
GAD7 TOTAL SCORE: 11
7. FEELING AFRAID AS IF SOMETHING AWFUL MIGHT HAPPEN: MORE THAN HALF THE DAYS
2. NOT BEING ABLE TO STOP OR CONTROL WORRYING: MORE THAN HALF THE DAYS
3. WORRYING TOO MUCH ABOUT DIFFERENT THINGS: SEVERAL DAYS
5. BEING SO RESTLESS THAT IT IS HARD TO SIT STILL: SEVERAL DAYS
IF YOU CHECKED OFF ANY PROBLEMS ON THIS QUESTIONNAIRE, HOW DIFFICULT HAVE THESE PROBLEMS MADE IT FOR YOU TO DO YOUR WORK, TAKE CARE OF THINGS AT HOME, OR GET ALONG WITH OTHER PEOPLE: SOMEWHAT DIFFICULT
1. FEELING NERVOUS, ANXIOUS, OR ON EDGE: MORE THAN HALF THE DAYS
6. BECOMING EASILY ANNOYED OR IRRITABLE: MORE THAN HALF THE DAYS

## 2017-09-14 ASSESSMENT — PATIENT HEALTH QUESTIONNAIRE - PHQ9
5. POOR APPETITE OR OVEREATING: SEVERAL DAYS
SUM OF ALL RESPONSES TO PHQ QUESTIONS 1-9: 3

## 2017-09-14 NOTE — Clinical Note
The client was seen for diagnostic assessment with [facility]. A diagnosis of Generalized Anxiety Disorder was given. Client reports questions regarding medication; clinician referred client back to you to discuss. Outpatient therapy recommended and ongoing appointments were scheduled.

## 2017-09-14 NOTE — PROGRESS NOTES
Adult Intake Structured Interview  Standard Diagnostic Assessment      CLIENT'S NAME: Linda Agosto  MRN:   [id number]  :   1990  ACCT. NUMBER: [id number]  DATE OF SERVICE: 17      Identifying Information:  Client is a 27 year old, , single female. Client was referred for counseling by self. Client is currently employed full time. Client attended the session alone.       Client's Statement of Presenting Concern:  Client reports the reason for seeking therapy at this time as support for anxiety. Client reports experiencing worries, as well as catastrophic thinking. Client reports experiencing this a few times a week. Client reports experiencing racing heart when thinking about things often. No history of panic attacks. Client reports an increase in anxiety recently. Client reports stress related to an upcoming move. Client stated that her symptoms have resulted in the following functional impairments: stress related to work, graduate school. Client also reports stress related to being a type I diabetic, and identifies this as anxiety provoking.       History of Presenting Concern:  Client reports that these problem(s) began in childhood (approximately elementary to middle school). Client has attempted to resolve these concerns in the past through distraction, self-talk, previous medication, previous talk-therapy. Client reports that other professional(s) are not involved in providing support / services.       Social History:  Client reported she grew up in [place], [place]. They were the only child. This is an intact family and parents remain . Client reported that her childhood was great. Client described her current relationships with family of origin as very close, speaks with them multiple times a week.    Client reported a  history of zero committed relationships or marriages. Client has been single for a year. Client reported having no children. Client identified extensive stable and meaningful social connections. Client reported that she has not been involved with the legal system. Client's highest education level was graduate school. Client did not identify any learning problems. There are no ethnic, cultural or Hindu factors that may be relevant for therapy. Client identified her preferred language to be English. Client reported she does not need the assistance of an  or other support involved in therapy. Modifications will not be used to assist communication in therapy. Client did not serve in the .     Client reports family history includes Genetic Disorder in her paternal grandfather; Neurologic Disorder in her maternal grandmother. There is no history of Family History Negative.    Mental Health History:  Client reported the following biological family members or relatives with mental health issues: Mother experienced Anxiety and Depression and Maternal Grandmother experienced Anxiety and Depression.  Client previously received the following mental health diagnosis: Anxiety.  Client has received the following mental health services in the past: counseling and psychiatry.  Hospitalizations: None.  Client is not currently receiving any mental health services.      Chemical Health History:  Client reported the following biological family members or relatives with chemical health issues: maternal side significant for alcohol addiction per report of client. . Client has not received chemical dependency treatment in the past. Client is not currently receiving any chemical dependency treatment. Client reports no problems as a result of their drinking / drug use.      Client Reports:  Client reports using alcohol 1 times per week and has 1 mixed drinks at a time. Client first started drinking at age  [age].  Client denies using tobacco.  Client denies using marijuana.  Client reports using caffeine 2 times per day and drinks 1 at a time. Client started using caffeine at age college age.  Client denies using street drugs.  Client denies the non-medical use of prescription or over the counter drugs.    CAGE: None of the patient's responses to the CAGE screening were positive / Negative CAGE score   Based on the negative Cage-Aid score and clinical interview there  are not indications of drug or alcohol abuse.    Discussed the general effects of drugs and alcohol on health and well-being.       Significant Losses / Trauma / Abuse / Neglect Issues:  There are no indications or report of: significant losses, trauma, abuse or neglect.    Issues of possible neglect are not present.      Medical Issues:  Client has had a physical exam to rule out medical causes for current symptoms. Date of last physical exam was within the past year. Client was encouraged to follow up with PCP if symptoms were to develop. The client has a [facility] Provider, who is named Dr. Karen Weiler. The client reports not having a psychiatrist. Client reports the following current medical concerns: type I diabetes, concerns about long term consequences of diabetes. The client denies the presence of chronic or episodic pain. There are not significant nutritional concerns.     Client reports current meds as:   Current Outpatient Prescriptions   Medication Sig     insulin glargine (BASAGLAR KWIKPEN) 100 UNIT/ML injection INJ 21 UNITS SC D     norgestimate-ethinyl estradiol (ORTHO-CYCLEN, SPRINTEC) 0.25-35 MG-MCG per tablet Take 1 tablet by mouth daily     simvastatin (ZOCOR) 40 MG tablet Take 1 tablet (40 mg) by mouth At Bedtime     levothyroxine (SYNTHROID, LEVOTHROID) 88 MCG tablet TAKE ONE TABLET BY MOUTH ONCE DAILY     insulin aspart (NOVOLOG VIAL) 100 UNITS/ML VIAL Uses up to 75 Units/day     naproxen sodium (ANAPROX) 550 MG  tablet Take 1 tablet (550 mg) by mouth 2 times daily (with meals) (Patient not taking: Reported on 8/31/2017)     blood glucose (VALE CONTOUR NEXT) test strip Patient tests 8 times/day     blood glucose monitoring (VALE MICROLET) lancets Patient tests 8 times/day     No current facility-administered medications for this visit.        Client Allergies:  Allergies   Allergen Reactions     Penicillins Rash     augmentin & pcn     Sulfa Drugs Hives     the following allergies to medications: Penicillins, Sulfa Drugs    Medical History:  Past Medical History:   Diagnosis Date     Allergic rhinitis, cause unspecified     h/o AIT     Common migraine     No visual aura.      Hyperlipidemia LDL goal < 70      Hypothyroidism      Infectious mononucleosis 1995     Tuberculosis      Type 1 diabetes, HbA1c goal < 7% (H) 3/04     followed [facility] - peds endocrinology - now Dr Jimenez         Medication Adherence:  N/A - Client does not have prescribed psychiatric medications.    Client was provided recommendation to follow-up with prescribing physician.    Mental Status Assessment:  Appearance:   Appropriate   Eye Contact:   Good   Psychomotor Behavior: Normal   Attitude:   Cooperative   Orientation:   All  Speech   Rate / Production: Normal    Volume:  Normal   Mood:    Anxious  Normal  Affect:    Appropriate   Thought Content:  Clear   Thought Form:  Coherent  Logical   Insight:    Good       Review of Symptoms:  Depression: No symptoms  Bryanna:  No symptoms  Psychosis: No symptoms  Anxiety: Worries Nervousness Usual Describe: catastrophic thinking, worries about physical, ruminations, fear of making mistakes in multiple environments  Triggers: cannot identify   Panic:  Palpitations Shortness of Breath Shakiness  Post Traumatic Stress Disorder: No symptoms  Obsessive Compulsive Disorder: Checking- door locks (two to three times per day), hair tools (making sure they are unplugged); this behavior reportedly occurs a few times a  week  Eating Disorder: No symptoms  Oppositional Defiant Disorder: No symptoms  ADD / ADHD: No symptoms  Conduct Disorder: No symptoms      Safety Assessment:    History of Safety Concerns:   Client denied a history of suicidal ideation.    Client denied a history of suicide attempts.    Client denied a history of homicidal ideation.    Client denied a history of self-injurious ideation and behaviors.    Client denied a history of personal safety concerns.    Client denied a history of assaultive behaviors.        Current Safety Concerns:  Client denies current suicidal ideation.    Client denies current homicidal ideation and behaviors.  Client denies current self-injurious ideation and behaviors.    Client denies current concerns for personal safety.      Client reports there are no firearms in the house.     Plan for Safety and Risk Management:  A safety and risk management plan has not been developed at this time, however client was given the after-hours number / 911 should there be a change in any of these risk factors.    Client's Strengths and Limitations:  Client identified the following strengths or resources that will help her succeed in counseling: care to get better, motivated to decrease anxiety. Client identified the following supports: family and friends. Things that may interfere with the client's success in counseling include: busy schedule; feeling overwhelmed with sessions.      Diagnostic Criteria:  A. Excessive anxiety and worry about a number of events or activities (such as work or school performance).   B. The person finds it difficult to control the worry.  C. Select 3 or more symptoms (required for diagnosis). Only one item is required in children.   - Restlessness or feeling keyed up or on edge.    - Difficulty concentrating or mind going blank.    - Irritability.    - Sleep disturbance (difficulty falling or staying asleep, or restless unsatisfying sleep).   D. The focus of the anxiety  "and worry is not confined to features of an Axis I disorder.  E. The anxiety, worry, or physical symptoms cause clinically significant distress or impairment in social, occupational, or other important areas of functioning.   F. The disturbance is not due to the direct physiological effects of a substance (e.g., a drug of abuse, a medication) or a general medical condition (e.g., hyperthyroidism) and does not occur exclusively during a Mood Disorder, a Psychotic Disorder, or a Pervasive Developmental Disorder.    - The aformentioned symptoms began in childhood; began increasing in college after moving back home ago and occurs 5 days per week and is experienced as moderate.      Functional Status:  Client's symptoms are causing reduced functional status in the following areas: Academics / Education - stress related to completion of homework and school  Occupational / Vocational - increased anxiety related to work; \"not wanting to make mistakes\"  Social / Relational - difficulty explaining/connecting with friends regarding anxiety.      DSM5 Diagnoses: (Sustained by DSM5 Criteria Listed Above)  Diagnoses: 300.02 (F41.1) Generalized Anxiety Disorder  Psychosocial & Contextual Factors: Work/Occupational, Environmental, Peer/Relational, Upcoming move to new apartment  WHODAS 2.0 (12 item)            This questionnaire asks about difficulties due to health conditions. Health conditions  include  disease or illnesses, other health problems that may be short or long lasting,  injuries, mental health or emotional problems, and problems with alcohol or drugs.                     Think back over the past 30 days and answer these questions, thinking about how much  difficulty you had doing the following activities. For each question, please Elem only  one response.    S1 Standing for long periods such as 30 minutes? None =         1   S2 Taking care of household responsibilities? Mild =           2   S3 Learning a new task, " for example, learning how to get to a new place? None =         1   S4 How much of a problem do you have joining community activities (for example, festivals, Catholic or other activities) in the same way as anyone else can? None =         1   S5 How much have you been emotionally affected by your health problems? Moderate =   3     In the past 30 days, how much difficulty did you have in:   S6 Concentrating on doing something for ten minutes? None =         1   S7 Walking a long distance such as a kilometer (or equivalent)? None =         1   S8 Washing your whole body? None =         1   S9 Getting dressed? None =         1   S10 Dealing with people you do not know? None =         1   S11 Maintaining a friendship? None =         1   S12 Your day to day work? Mild =           2     H1 Overall, in the past 30 days, how many days were these difficulties present? Record number of days 0   H2 In the past 30 days, for how many days were you totally unable to carry out your usual activities or work because of any health condition? Record number of days  0   H3 In the past 30 days, not counting the days that you were totally unable, for how many days did you cut back or reduce your usual activities or work because of any health condition? Record number of days 0     Attendance Agreement:  Client has signed Attendance Agreement:Yes      Collaboration:  The client is receiving treatment / structured support from the following professional(s) / service and treatment. Collaboration will be initiated with: primary care physician.      Preliminary Treatment Plan:  The client reports no currently identified Catholic, ethnic or cultural issues relevant to therapy.     services are not indicated.    Modifications to assist communication are not indicated.    The concerns identified by the client will be addressed in therapy.    Initial Treatment will focus on: Anxiety - reduce overall anxiety, identify triggers to  anxiety, increase coping skills related to anxiety, increase distress tolerance..    As a preliminary treatment goal, client will experience a reduction in anxiety, will develop more effective coping skills to manage anxiety symptoms, will develop healthy cognitive patterns and beliefs and will increase ability to function adaptively.    The focus of initial interventions will be to alleviate anxiety, alleviate compulsive behavior(s), teach CBT skills, teach distress tolerance skills, teach emotional regulation, teach mindfulness skills and teach relaxation strategies.    Referral to another professional/service is not indicated at this time.    A Release of Information is not needed at this time.    Report to child / adult protection services was NA.    Client will have access to their [facility]' medical record.    OMEGA Toscano  September 14, 2017

## 2017-09-14 NOTE — MR AVS SNAPSHOT
MRN:[id number]                      After Visit Summary   9/14/2017    Linda Agosto    MRN: [id number]           Visit Information        Provider Department      9/14/2017 12:00 PM Li Durham LMFT [facility] Generic      Your next 10 appointments already scheduled     Oct 04, 2017 12:00 PM CDT   Return Visit with Li Durham FT   [facility] [facility])    [address]  [facility] [address] [phone]            Oct 16, 2017 12:00 PM CDT   Return Visit with OMEGA Vazquez   [facility] ([facility])    [address]  [facility] [address] [phone]              MyChart Information     [employer] gives you secure access to your electronic health record. If you see a primary care provider, you can also send messages to your care team and make appointments. If you have questions, please call your primary care clinic.  If you do not have a primary care provider, please call [phone] and they will assist you.        Care EveryWhere ID     This is your Care EveryWhere ID. This could be used by other organizations to access your [facility] medical records  [id number]        Equal Access to Services     TJ BRAVO : Vance almaguero Socarenali, waaxda luqadaha, qaybta kaalmada adeegyada, miriam dean. So [facility] [phone].    ATENCIÓN: Si habla español, tiene a rivera disposición servicios gratuitos de asistencia lingüística. Llstefanie al [phone].    We comply with applicable federal civil rights laws and [place] laws. We do not discriminate on the basis of race, color, national origin, age, disability sex, sexual orientation or gender identity.

## 2017-09-15 ASSESSMENT — ANXIETY QUESTIONNAIRES: GAD7 TOTAL SCORE: 11

## 2017-09-21 ENCOUNTER — OFFICE VISIT (OUTPATIENT)
Dept: FAMILY MEDICINE | Facility: CLINIC | Age: 27
End: 2017-09-21
Payer: COMMERCIAL

## 2017-09-21 VITALS
HEART RATE: 95 BPM | HEIGHT: 66 IN | BODY MASS INDEX: 24.62 KG/M2 | WEIGHT: 153.2 LBS | SYSTOLIC BLOOD PRESSURE: 125 MMHG | OXYGEN SATURATION: 100 % | DIASTOLIC BLOOD PRESSURE: 82 MMHG | TEMPERATURE: 97.4 F

## 2017-09-21 DIAGNOSIS — F41.9 ANXIETY: Primary | ICD-10-CM

## 2017-09-21 DIAGNOSIS — E10.9 TYPE 1 DIABETES MELLITUS WITHOUT COMPLICATION (H): ICD-10-CM

## 2017-09-21 PROCEDURE — 99213 OFFICE O/P EST LOW 20 MIN: CPT | Performed by: FAMILY MEDICINE

## 2017-09-21 RX ORDER — BUPROPION HYDROCHLORIDE 150 MG/1
150 TABLET ORAL EVERY MORNING
Qty: 30 TABLET | Refills: 1 | Status: SHIPPED | OUTPATIENT
Start: 2017-09-21 | End: 2017-11-13

## 2017-09-21 NOTE — NURSING NOTE
"Chief Complaint   Patient presents with     RECHECK     Located within [facility]      Patient Request     Looking for anti - anxiety medication        Initial /82 (BP Location: Right arm, Patient Position: Chair, Cuff Size: Adult Regular)  Pulse 95  Temp 97.4  F (36.3  C) (Tympanic)  Ht 5' 5.5\" (1.664 m)  Wt 153 lb 3.2 oz (69.5 kg)  LMP 09/14/2017 (Approximate)  SpO2 100%  BMI 25.11 kg/m2 Estimated body mass index is 25.11 kg/(m^2) as calculated from the following:    Height as of this encounter: 5' 5.5\" (1.664 m).    Weight as of this encounter: 153 lb 3.2 oz (69.5 kg).  Medication Reconciliation: complete     Beth Jasso MA     "

## 2017-09-21 NOTE — MR AVS SNAPSHOT
After Visit Summary   9/21/2017    Linda Agosto    MRN: [id number]           Patient Information     Date Of Birth          1990        Visit Information        Provider Department      9/21/2017 11:20 AM Weiler, Karen, MD [facility]        Today's Diagnoses     Anxiety    -  1    Type 1 diabetes mellitus without complication (H)           Follow-ups after your visit        Your next 10 appointments already scheduled     Oct 04, 2017 12:00 PM CDT   Return Visit with Li Durham [facility]    [address] [place]  [facility] [id number] [phone]            Oct 16, 2017 12:00 PM CDT   Return Visit with OMEGA Vazquez   [facility]    [address]  [facility] [id number] [phone]              Who to contact     If you have questions or need follow up information about today's clinic visit or your schedule please contact [facility] directly at [phone].  Normal or non-critical lab and imaging results will be communicated to you by Baydinhart, letter or phone within 4 business days after the clinic has received the results. If you do not hear from us within 7 days, please contact the clinic through Baydinhart or phone. If you have a critical or abnormal lab result, we will notify you by phone as soon as possible.  Submit refill requests through Mindframe or call your pharmacy and they will forward the refill request to us. Please allow 3 business days for your refill to be completed.          Additional Information About Your Visit        Baydinhart Information     Mindframe gives you secure access to your electronic health record. If you see a primary care provider, you can also send messages to your care team and make appointments. If you have questions, please call your primary care clinic.  If you do not have a primary care provider,  "please call [phone] and they will assist you.        Care EveryWhere ID     This is your Care EveryWhere ID. This could be used by other organizations to access your [facility] medical records  [id number]        Your Vitals Were     Pulse Temperature Height Last Period Pulse Oximetry BMI (Body Mass Index)    95 97.4  F (36.3  C) (Tympanic) 5' 5.5\" (1.664 m) 09/14/2017 (Approximate) 100% 25.11 kg/m2       Blood Pressure from Last 3 Encounters:   09/21/17 125/82   08/21/17 128/72   07/06/17 106/64    Weight from Last 3 Encounters:   09/21/17 153 lb 3.2 oz (69.5 kg)   08/31/17 153 lb (69.4 kg)   08/21/17 153 lb (69.4 kg)              Today, you had the following     No orders found for display         Today's Medication Changes          These changes are accurate as of: 9/21/17 11:59 PM.  If you have any questions, ask your nurse or doctor.               Start taking these medicines.        Dose/Directions    buPROPion 150 MG 24 hr tablet   Commonly known as:  WELLBUTRIN XL   Used for:  Anxiety   Started by:  Weiler, Karen, MD        Dose:  150 mg   Take 1 tablet (150 mg) by mouth every morning   Quantity:  30 tablet   Refills:  1            Where to get your medicines      These medications were sent to [facility] [id number] [place] [address] AT [facility] [address] [address]    Hours:  24-hours Phone:  [phone]     buPROPion 150 MG 24 hr tablet                Primary Care Provider Office Phone # Fax #    Nithin Varela [phone]       [address] [place] [id number]        Equal Access to Services     TJ BRAVO AH: Vance Kelly, chelsey morrison, qasmithta kaalgaurav flores, miriam dean. So [facility] [phone].    ATENCIÓN: Si habla español, tiene a rivera disposición servicios gratuitos de asistencia lingüística. Juli tolentino [phone].    We comply with applicable federal " civil rights laws and [place] laws. We do not discriminate on the basis of race, color, national origin, age, disability, sex, sexual orientation, or gender identity.            Thank you!     Thank you for choosing [facility]  for your care. Our goal is always to provide you with excellent care. Hearing back from our patients is one way we can continue to improve our services. Please take a few minutes to complete the written survey that you may receive in the mail after your visit with us. Thank you!             Your Updated Medication List - Protect others around you: Learn how to safely use, store and throw away your medicines at www.disposemymeds.org.          This list is accurate as of: 9/21/17 11:59 PM.  Always use your most recent med list.                   Brand Name Dispense Instructions for use Diagnosis    BASAGLAR 100 UNIT/ML injection      INJ 21 UNITS SC D        blood glucose monitoring lancets     2 Box    Patient tests 8 times/day    Type 1 diabetes, HbA1c goal < 7% (H)       blood glucose monitoring test strip    VALE CONTOUR NEXT    250 strip    Patient tests 8 times/day    Type 1 diabetes, HbA1c goal < 7% (H)       buPROPion 150 MG 24 hr tablet    WELLBUTRIN XL    30 tablet    Take 1 tablet (150 mg) by mouth every morning    Anxiety       insulin aspart 100 UNITS/ML injection    NovoLOG VIAL    30 mL    Uses up to 75 Units/day    Type 1 diabetes, HbA1c goal < 7% (H)       levothyroxine 88 MCG tablet    SYNTHROID/LEVOTHROID    90 tablet    TAKE ONE TABLET BY MOUTH ONCE DAILY    Other specified hypothyroidism       naproxen sodium 550 MG tablet    ANAPROX    30 tablet    Take 1 tablet (550 mg) by mouth 2 times daily (with meals)    Tension headache, Common migraine       norgestimate-ethinyl estradiol 0.25-35 MG-MCG per tablet    ORTHO-CYCLEN, SPRINTEC    84 tablet    Take 1 tablet by mouth daily    DUB (dysfunctional uterine bleeding)       simvastatin 40 MG tablet    ZOCOR     90 tablet    Take 1 tablet (40 mg) by mouth At Bedtime    Hyperlipidemia with target LDL less than 70

## 2017-09-21 NOTE — PROGRESS NOTES
"  SUBJECTIVE:   Linda Agosto is a 27 year old female who presents to clinic today for the following health issues:      Recheck : [facility]   Patient request : Looking for anti - anxiety medication     Anxiety- Pt went to [facility] recently, was recommended to see PCP to get anti-depressant medication, along with continued counseling. Pt is going to do every other week with counseling. She occasionally struggles to follow asleep. 2 years ago pt was on Citalopram, discontinued it after 3 days, made her feel \"flat\". No suicidal/homicidal ideations. She has family hx of anxiety. No hx of seizures.    Type 1 diabetes- Pt sees endocrinologist every 3 months for her type 1 diabetes. No swelling in her feet/legs.      Problem list and histories reviewed & adjusted, as indicated.  Additional history: as documented    Reviewed and updated as needed this visit by clinical staff  Tobacco  Allergies  Meds  Med Hx  Surg Hx  Fam Hx  Soc Hx      Reviewed and updated as needed this visit by Provider       ROS:  Constitutional, HEENT, cardiovascular, pulmonary, gi and gu systems are negative, except as otherwise noted.    This document serves as a record of the services and decisions personally performed and made by Karen Weiler, MD. It was created on her behalf by Trent Melo, a trained medical scribe. The creation of this document is based on the provider's statements to the medical scribe.  Trent Melo 11:52 AM September 21, 2017    OBJECTIVE:   /82 (BP Location: Right arm, Patient Position: Chair, Cuff Size: Adult Regular)  Pulse 95  Temp 97.4  F (36.3  C) (Tympanic)  Ht 5' 5.5\" (1.664 m)  Wt 153 lb 3.2 oz (69.5 kg)  LMP 09/14/2017 (Approximate)  SpO2 100%  BMI 25.11 kg/m2  Body mass index is 25.11 kg/(m^2).  GENERAL: healthy, alert and no distress  EYES: Eyes grossly normal to inspection, PERRL and conjunctivae and sclerae normal  HENT: ear canals and TM's normal, nose " and mouth without ulcers or lesions  NECK: no adenopathy, no asymmetry, masses, or scars and thyroid normal to palpation  RESP: lungs clear to auscultation - no rales, rhonchi or wheezes  CV: regular rate and rhythm, normal S1 S2, no S3 or S4, no murmur, click or rub, no peripheral edema and peripheral pulses strong  ABDOMEN: soft, nontender, no hepatosplenomegaly, no masses and bowel sounds normal  MS: no gross musculoskeletal defects noted, no edema  SKIN: no suspicious lesions or rashes  NEURO: Normal strength and tone, mentation intact and speech normal  PSYCH: mentation appears normal, affect normal/bright    Diagnostic Test Results:  none     ASSESSMENT/PLAN:     (F41.9) Anxiety  (primary encounter diagnosis)  Comment: Pt went to [facility], was told to continue with counseling and follow up with PCP to be prescribed anti-depressant. Pt was on Citalopram in 2015 for her anxiety, did not do well with it, discontinued it after 3 days. Discussed other anti-depressant alternatives, will put pt on Wellbutrin, advised her to take it in the mornings, follow up if she starts having suicidal ideations or sleeplessness, advised her to continue with counseling.  Plan: buPROPion (WELLBUTRIN XL) 150 MG 24 hr tablet        Follow up if symptoms worsen or do not improve with Wellbutrin, can adjust as needed. Follow up in 3-4 weeks    (E10.9) Type 1 diabetes mellitus without complication (H)  Comment: Pt sees endocrinologist every 3 months, will have her continue.  Plan: Follow up if needed.    The information in this document, created by the medical scribe for me, accurately reflects the services I personally performed and the decisions made by me. I have reviewed and approved this document for accuracy prior to leaving the patient care area.  September 21, 2017 11:52 AM    Karen Weiler, MD  [facility]

## 2017-10-04 ENCOUNTER — OFFICE VISIT (OUTPATIENT)
Dept: BEHAVIORAL HEALTH | Facility: CLINIC | Age: 27
End: 2017-10-04
Payer: COMMERCIAL

## 2017-10-04 DIAGNOSIS — F41.1 GENERALIZED ANXIETY DISORDER: Primary | ICD-10-CM

## 2017-10-04 PROCEDURE — 90834 PSYTX W PT 45 MINUTES: CPT | Performed by: MARRIAGE & FAMILY THERAPIST

## 2017-10-04 NOTE — MR AVS SNAPSHOT
MRN:[id number]                      After Visit Summary   10/4/2017    Linda Agosto    MRN: [id number]           Visit Information        Provider Department      10/4/2017 12:00 PM Li Durham LMFT [facility] Generic      Your next 10 appointments already scheduled     Oct 16, 2017 12:00 PM CDT   Return Visit with Li Durham FT   [facility])    [address]  [facility] [address]   [phone]            Nov 08, 2017 12:00 PM CST   Return Visit with OMEGA Vazquez   [facility] [place] [facility])    [address]  [facility] [address] [phone]            Nov 22, 2017 12:00 PM CST   Return Visit with OMEGA Vazquez   [facility] [place] ([facility])    [address]  [facility] [address] [phone]              MyChart Information     [employer] gives you secure access to your electronic health record. If you see a primary care provider, you can also send messages to your care team and make appointments. If you have questions, please call your primary care clinic.  If you do not have a primary care provider, please call [phone] and they will assist you.        Care EveryWhere ID     This is your Care EveryWhere ID. This could be used by other organizations to access your [place] medical records  [id number]        Equal Access to Services     TJ BRAVO : Hadii aad ku hadasho Soomaali, waaxda luqadaha, qaybta kaalmada adeegyada, waxkristal christopher king gailgrant romo . So [facility] [phone].    ATENCIÓN: Si habla español, tiene a rivera disposición servicios gratuitos de asistencia lingüística. Llame al [phone].    We comply with applicable federal civil rights laws and [place] laws. We do not discriminate on the basis of race, color, national origin, age,  disability, sex, sexual orientation, or gender identity.

## 2017-10-05 NOTE — PROGRESS NOTES
"                                             Progress Note    Client Name: Linda Agosto  Date: 10/04/2017         Service Type: Individual      Session Start Time: 12:00pm  Session End Time: 12:45pm      Session Length: 38-52 mins     Session #: 2     Attendees: Client    Treatment Plan Last Reviewed: Began at current session; will continue the following session   PHQ-9 / TRESA-7 : See EPIC for updated scores     DATA      Progress Since Last Session (Related to Symptoms / Goals / Homework):   Symptoms: Stable    Homework: No homework given      Episode of Care Goals: Episode of care goals began at current session      Current / Ongoing Stressors and Concerns:   Client reports ongoing stress related to work/occupational functioning, as well as school. Client reports stress related to managing symptoms of anxiety when \"not keeping myself busy\"; identifies stress related to moving to a new home. Client identifies stress related to medical diagnosis.      Treatment Objective(s) Addressed in This Session:   Treatment objective creation began at current session; will continue at the following session      Intervention:   Clinician utilized systemic interventions to process with client stressors, as well as explore treatment goals.         ASSESSMENT: Current Emotional / Mental Status (status of significant symptoms):   Risk status (Self / Other harm or suicidal ideation)   Client denies current fears or concerns for personal safety.   Client denies current or recent suicidal ideation or behaviors.   Client denies current or recent homicidal ideation or behaviors.   Client denies current or recent self injurious behavior or ideation.   Client denies other safety concerns.   A safety and risk management plan has not been developed at this time, however client was given the after-hours number / 911 should there be a change in any of these risk factors.     Appearance:   Appropriate    Eye Contact:   Good    Psychomotor " Behavior: Normal    Attitude:   Cooperative    Orientation:   All   Speech    Rate / Production: Normal     Volume:  Normal    Mood:    Anxious  Normal   Affect:    Appropriate    Thought Content:  Clear    Thought Form:  Coherent  Logical    Insight:    Good      Medication Review:   No changes to current psychiatric medication(s)     Medication Compliance:   Yes     Changes in Health Issues:   None reported     Chemical Use Review:   Substance Use: Chemical use reviewed, no active concerns identified      Tobacco Use: No current tobacco use.       Collateral Reports Completed:   Not Applicable    PLAN: (Client Tasks / Therapist Tasks / Other)  None        Li Durham, LMFT                                                         ________________________________________________________________________    Treatment Plan    Client's Name: Linda Agosto  YOB: 1990    Date: 10/04/2017    DSM-V Diagnoses: 300.02 (F41.1) Generalized Anxiety Disorder  Psychosocial / Contextual Factors: Environmental Stressors, Work/Occupational Functioning, Complex Medical Diagnoses   WHODAS: See EPIC for updated score    Referral / Collaboration:  Referral to another professional/service is not indicated at this time..    Anticipated number of session or this episode of care: 6-12 months       MeasurableTreatment Goal(s) related to diagnosis / functional impairment(s)  Goal 1: Client will decrease in symptoms of anxiety.       Objective #A (Client Action)    Client will identify at least four triggers for anxiety.  Status: New - Date: 10/04/2017     Intervention(s)  Therapist will utilize systemic interventions, CBT based skills, Mindfulness and relaxation skills.     Objective #B  Client will use cognitive strategies identified in therapy to challenge anxious thoughts.  Status: New - Date: 10/04/2017     Intervention(s)  Therapist will utilize systemic interventions, CBT based skills, Mindfulness and relaxation  skills.     Client has reviewed and agreed to the above plan. Treatment planning will continue at the following session.       Li Durham, LMFT  October 5, 2017

## 2017-10-10 ENCOUNTER — TRANSFERRED RECORDS (OUTPATIENT)
Dept: HEALTH INFORMATION MANAGEMENT | Facility: CLINIC | Age: 27
End: 2017-10-10

## 2017-10-10 LAB
ALT SERPL-CCNC: 15 U/L (ref 6–29)
CREAT SERPL-MCNC: 0.81 MG/DL (ref 0.5–1.1)
GFR SERPL CREATININE-BSD FRML MDRD: 101 ML/MIN/1.73M2
GLUCOSE SERPL-MCNC: 248 MG/DL (ref 65–99)
HBA1C MFR BLD: 7.8 % (ref 4–6)
POTASSIUM SERPL-SCNC: 4.6 MMOL/L (ref 3.5–5.3)
TSH SERPL-ACNC: 1.87 UIU/ML (ref 0.3–5)

## 2017-10-16 ENCOUNTER — MYC MEDICAL ADVICE (OUTPATIENT)
Dept: FAMILY MEDICINE | Facility: CLINIC | Age: 27
End: 2017-10-16

## 2017-10-16 ENCOUNTER — OFFICE VISIT (OUTPATIENT)
Dept: BEHAVIORAL HEALTH | Facility: CLINIC | Age: 27
End: 2017-10-16
Payer: COMMERCIAL

## 2017-10-16 DIAGNOSIS — F41.1 GENERALIZED ANXIETY DISORDER: Primary | ICD-10-CM

## 2017-10-16 PROCEDURE — 90834 PSYTX W PT 45 MINUTES: CPT | Performed by: MARRIAGE & FAMILY THERAPIST

## 2017-10-16 ASSESSMENT — ANXIETY QUESTIONNAIRES
7. FEELING AFRAID AS IF SOMETHING AWFUL MIGHT HAPPEN: MORE THAN HALF THE DAYS
2. NOT BEING ABLE TO STOP OR CONTROL WORRYING: MORE THAN HALF THE DAYS
IF YOU CHECKED OFF ANY PROBLEMS ON THIS QUESTIONNAIRE, HOW DIFFICULT HAVE THESE PROBLEMS MADE IT FOR YOU TO DO YOUR WORK, TAKE CARE OF THINGS AT HOME, OR GET ALONG WITH OTHER PEOPLE: SOMEWHAT DIFFICULT
5. BEING SO RESTLESS THAT IT IS HARD TO SIT STILL: SEVERAL DAYS
1. FEELING NERVOUS, ANXIOUS, OR ON EDGE: MORE THAN HALF THE DAYS
3. WORRYING TOO MUCH ABOUT DIFFERENT THINGS: MORE THAN HALF THE DAYS
GAD7 TOTAL SCORE: 10
6. BECOMING EASILY ANNOYED OR IRRITABLE: NOT AT ALL

## 2017-10-16 ASSESSMENT — PATIENT HEALTH QUESTIONNAIRE - PHQ9: 5. POOR APPETITE OR OVEREATING: SEVERAL DAYS

## 2017-10-16 NOTE — PROGRESS NOTES
"                                             Progress Note    Client Name: Linda Agosto  Date: 10/16/2017         Service Type: Individual      Session Start Time: 12:00pm  Session End Time: 12:45pm      Session Length: 38-52 mins     Session #: 3     Attendees: Client    Treatment Plan Last Reviewed: 10/04/2017  PHQ-9 / TRESA-7 : See EPIC for updated information     DATA      Progress Since Last Session (Related to Symptoms / Goals / Homework):   Symptoms: Stable    Homework: No homework assigned      Episode of Care Goals: Satisfactory progress - PREPARATION (Decided to change - considering how); Intervened by negotiating a change plan and determining options / strategies for behavior change, identifying triggers, exploring social supports, and working towards setting a date to begin behavior change     Current / Ongoing Stressors and Concerns:   Client reports continued stressors related to work and school. Client reports ongoing stress related to anxiety; reports experiencing ruminating and racing thoughts in the afternoons and evenings. Client reports \"wanting to learn how to stop\" feeling anxious.      Treatment Objective(s) Addressed in This Session:   Client will identify at least four triggers for anxiety.  Client will use cognitive strategies identified in therapy to challenge anxious thoughts.     Intervention:   Clinician utilized systemic interventions to process client's experience with stressors; clinician explored with client triggers to anxiety, identified several triggers to anxiety. Client and clinician practiced CBT based skills to manage anxious thoughts; clinician encouraged client to consider the role of shame/guilt in anxiety, as well as challenge anxious thoughts.         ASSESSMENT: Current Emotional / Mental Status (status of significant symptoms):   Risk status (Self / Other harm or suicidal ideation)   Client denies current fears or concerns for personal safety.   Client denies current or " "recent suicidal ideation or behaviors.   Client denies current or recent homicidal ideation or behaviors.   Client denies current or recent self injurious behavior or ideation.   Client denies other safety concerns.   A safety and risk management plan has not been developed at this time, however client was given the after-hours number / 911 should there be a change in any of these risk factors.     Appearance:   Appropriate    Eye Contact:   Good    Psychomotor Behavior: Normal    Attitude:   Cooperative    Orientation:   All   Speech    Rate / Production: Normal     Volume:  Normal    Mood:    Anxious    Affect:    Appropriate    Thought Content:  Clear    Thought Form:  Coherent  Logical    Insight:    Good      Medication Review:   No changes to current psychiatric medication(s)     Medication Compliance:   Yes ; Client reports \"not feeling any difference\" with current medication. Clinicain encouraged client to follow up with her PCP regarding this.      Changes in Health Issues:   None reported     Chemical Use Review:   Substance Use: Chemical use reviewed, no active concerns identified      Tobacco Use: No current tobacco use.       Collateral Reports Completed:   Routed note to PCP    PLAN: (Client Tasks / Therapist Tasks / Other)  Begin to practice CBT based skills to challenge anxious thoughts.         OMEGA Toscano                                                         ________________________________________________________________________    Treatment Plan    Client's Name: Linda Agosto                                           YOB: 1990     Date: 10/04/2017     DSM-V Diagnoses: 300.02 (F41.1) Generalized Anxiety Disorder  Psychosocial / Contextual Factors: Environmental Stressors, Work/Occupational Functioning, Complex Medical Diagnoses   WHODAS: See EPIC for updated score     Referral / Collaboration:  Referral to another professional/service is not indicated at this " time..     Anticipated number of session or this episode of care: 6-12 months         MeasurableTreatment Goal(s) related to diagnosis / functional impairment(s)  Goal 1: Client will decrease in symptoms of anxiety.         Objective #A (Client Action)                                    Client will identify at least four triggers for anxiety.  Status: New - Date: 10/04/2017      Intervention(s)  Therapist will utilize systemic interventions, CBT based skills, Mindfulness and relaxation skills.      Objective #B  Client will use cognitive strategies identified in therapy to challenge anxious thoughts.  Status: New - Date: 10/04/2017      Intervention(s)  Therapist will utilize systemic interventions, CBT based skills, Mindfulness and relaxation skills.      Client has reviewed and agreed to the above plan.       Li Durham, LMFT  October 16, 2017

## 2017-10-16 NOTE — Clinical Note
"Client reports not \"feeling different with medication\"; reports continued anxiety. I encouraged client to follow up regarding this. "

## 2017-10-16 NOTE — MR AVS SNAPSHOT
MRN:[id number]                      After Visit Summary   10/16/2017    Linda Agosto    MRN: [id number]           Visit Information        Provider Department      10/16/2017 12:00 PM Li Durham LMFT [facility] [facility] Generic      Your next 10 appointments already scheduled     Nov 08, 2017 12:00 PM CST   Return Visit with Li Durham FT   [facility])    [address]  [facility] [address]   [phone]            Nov 22, 2017 12:00 PM CST   Return Visit with Li Durham FT   [facility] [place] ([facility])    [address]  [facility] [address] [phone]            Dec 04, 2017 12:00 PM CST   Return Visit with OMEGA Vazquez   [facility])    [address]  [facility] [address] [phone]              MyChart Information     [employer] gives you secure access to your electronic health record. If you see a primary care provider, you can also send messages to your care team and make appointments. If you have questions, please call your primary care clinic.  If you do not have a primary care provider, please call [phone] and they will assist you.        Care EveryWhere ID     This is your Care EveryWhere ID. This could be used by other organizations to access your [place] medical records  [id number]        Equal Access to Services     TJ BRAVO : Hadii aad ku hadasho Soomaali, waaxda luqadaha, qaybta kaalmada adeegyada, waxkristal christopher king gailgrant romo . So [facility] [phone].    ATENCIÓN: Si habla español, tiene a rivera disposición servicios gratuitos de asistencia lingüística. Llame al [phone].    We comply with applicable federal civil rights laws and [place] laws. We do not discriminate on the basis of race, color, national origin, age,  disability, sex, sexual orientation, or gender identity.

## 2017-10-17 ENCOUNTER — MYC MEDICAL ADVICE (OUTPATIENT)
Dept: FAMILY MEDICINE | Facility: CLINIC | Age: 27
End: 2017-10-17

## 2017-10-17 DIAGNOSIS — E78.5 HYPERLIPIDEMIA WITH TARGET LDL LESS THAN 70: ICD-10-CM

## 2017-10-17 DIAGNOSIS — F41.9 ANXIETY: Primary | ICD-10-CM

## 2017-10-17 ASSESSMENT — ANXIETY QUESTIONNAIRES: GAD7 TOTAL SCORE: 10

## 2017-10-17 NOTE — TELEPHONE ENCOUNTER
Please see patient's MyChart response. Please advise. Thank you.  Silvia Holt RN, BSN  [facility]

## 2017-10-18 RX ORDER — BUSPIRONE HYDROCHLORIDE 10 MG/1
10 TABLET ORAL 2 TIMES DAILY
Qty: 60 TABLET | Refills: 0 | Status: SHIPPED | OUTPATIENT
Start: 2017-10-18 | End: 2017-11-21

## 2017-10-19 RX ORDER — SIMVASTATIN 40 MG
TABLET ORAL
Qty: 30 TABLET | Refills: 0 | Status: SHIPPED | OUTPATIENT
Start: 2017-10-19 | End: 2018-02-06

## 2017-10-19 NOTE — TELEPHONE ENCOUNTER
simvastatin     Last Written Prescription Date: 11/18/2016  Last Fill Quantity: 90, # refills: 1  [facility] Office Visit with [facility] or [facility] prescribing provider: 9/21/2017  Next 5 appointments (look out 90 days)     Nov 08, 2017 12:00 PM CST   Return Visit with Li Durham [facility])    [address]  [facility] [address]   [phone]            Nov 22, 2017 12:00 PM CST   Return Visit with Li Durham [facility] [place] ([facility])    [address]  [facility] [address]   [phone]            Dec 04, 2017 12:00 PM CST   Return Visit with Li Durham [facility] [place] ([facility])    [address]  [facility] [address]   [phone]                   Lab Results   Component Value Date    CHOL 215 11/16/2016     Lab Results   Component Value Date    HDL 72 11/16/2016     Lab Results   Component Value Date     11/16/2016     Lab Results   Component Value Date    TRIG 77 11/16/2016     Lab Results   Component Value Date    CHOLHDLRATIO 3.2 06/03/2014     Medication is being filled for 1 time refill only due to:  Patient needs labs fasting.   Shirley Peters, RN  [facility]

## 2017-11-08 ENCOUNTER — OFFICE VISIT (OUTPATIENT)
Dept: BEHAVIORAL HEALTH | Facility: CLINIC | Age: 27
End: 2017-11-08
Payer: COMMERCIAL

## 2017-11-08 DIAGNOSIS — F41.1 GENERALIZED ANXIETY DISORDER: Primary | ICD-10-CM

## 2017-11-08 PROCEDURE — 90834 PSYTX W PT 45 MINUTES: CPT | Performed by: MARRIAGE & FAMILY THERAPIST

## 2017-11-08 NOTE — MR AVS SNAPSHOT
MRN:[id number]                      After Visit Summary   11/8/2017    Linda Agosto    MRN: [id number]           Visit Information        Provider Department      11/8/2017 12:00 PM Li Durham [facility] Generic      Your next 10 appointments already scheduled     Nov 22, 2017 12:00 PM CST   Return Visit with Li Durham [facility])    [address]  [facility] [address]   [phone]            Dec 07, 2017 12:00 PM CST   Return Visit with Li Durham [facility] [place] ([facility])    [address]  [facility] [address]   [phone]            Dec 21, 2017 12:00 PM CST   Return Visit with Li Durham [facility] [place] ([facility])    [address]  [facility] [address]   [phone]            Jan 04, 2018 12:00 PM CST   Return Visit with Li Durham [facility] [place] ([facility])    [address]  [facility] [address]   [phone]              myseekithart Information     [employer] gives you secure access to your electronic health record. If you see a primary care provider, you can also send messages to your care team and make appointments. If you have questions, please call your primary care clinic.  If you do not have a primary care provider, please call [phone] and they will assist you.        Care EveryWhere ID     This is your Care EveryWhere ID. This could be used by other organizations to access your [place] medical records  [id number]        Equal Access to Services     TJ BRAVO : Vance Kelly, waaxda luqadaha, qaybta kaalmada mark, miriam dean. So [facility] [phone].    ATENCIÓN: Si habla español, tiene a rivera disposición servicios gratuitos  de asistencia lingüística. Andreinastefanie al [phone].    We comply with applicable federal civil rights laws and [place] laws. We do not discriminate on the basis of race, color, national origin, age, disability, sex, sexual orientation, or gender identity.

## 2017-11-08 NOTE — PROGRESS NOTES
Progress Note    Client Name: Linda Agosto  Date: 11/08/2017         Service Type: Individual      Session Start Time: 12:00pm  Session End Time: 12:45pm      Session Length: 38-52 mins     Session #: 4     Attendees: Client    Treatment Plan Last Reviewed: 10/04/2017  PHQ-9 / TRESA-7 : See EPIC for updated scores     DATA      Progress Since Last Session (Related to Symptoms / Goals / Homework):   Symptoms: Improved    Homework: Partially completed      Episode of Care Goals: Minimal progress - ACTION (Actively working towards change); Intervened by reinforcing change plan / affirming steps taken     Current / Ongoing Stressors and Concerns:   Client reports increased anxiety related to moving. Client reports anxiety surrounding the apartment she moved into (e.g. Dirty, too many bugs). Client reports a history significant for secondary trauma; identifies this as a trigger for anxiety related to themes of death/loss.     Treatment Objective(s) Addressed in This Session:   Client will identify at least four triggers for anxiety.     Intervention:   Clinician utilized systemic interventions to process client's experience with triggers to anxiety. Clinician explored with client secondary trauma history; validated client's experience with increased fear. Clinician and client explored CBT based skills and grounding techniques to utilize when feeling anxious.         ASSESSMENT: Current Emotional / Mental Status (status of significant symptoms):   Risk status (Self / Other harm or suicidal ideation)   Client denies current fears or concerns for personal safety.   Client denies current or recent suicidal ideation or behaviors.   Client denies current or recent homicidal ideation or behaviors.   Client denies current or recent self injurious behavior or ideation.   Client denies other safety concerns.   A safety and risk management plan has not been developed at this time, however  client was given the after-hours number / 911 should there be a change in any of these risk factors.     Appearance:   Appropriate    Eye Contact:   Good    Psychomotor Behavior: Normal  Restless    Attitude:   Cooperative    Orientation:   All   Speech    Rate / Production: Normal     Volume:  Normal    Mood:    Anxious  Normal   Affect:    Appropriate    Thought Content:  Clear    Thought Form:  Coherent  Logical    Insight:    Good      Medication Review:   Changes to psychiatric medications, see updated Medication List in EPIC.      Medication Compliance:   Yes     Changes in Health Issues:   None reported     Chemical Use Review:   Substance Use: Chemical use reviewed, no active concerns identified      Tobacco Use: No current tobacco use.       Collateral Reports Completed:   Not Applicable    PLAN: (Client Tasks / Therapist Tasks / Other)  Utilize CBT based skills and grounding techniques when feeling anxious. clinician reviewed transition plan with client for upcoming maternity leave.         Li Durham [facility]                                                         ________________________________________________________________________    Treatment Plan    Client's Name: Linda Agosto                                           YOB: 1990      Date: 10/04/2017      DSM-V Diagnoses: 300.02 (F41.1) Generalized Anxiety Disorder  Psychosocial / Contextual Factors: Environmental Stressors, Work/Occupational Functioning, Complex Medical Diagnoses   WHODAS: See EPIC for updated score      Referral / Collaboration:  Referral to another professional/service is not indicated at this time..      Anticipated number of session or this episode of care: 6-12 months           MeasurableTreatment Goal(s) related to diagnosis / functional impairment(s)  Goal 1: Client will decrease in symptoms of anxiety.           Objective #A (Client Action)                                    Client will identify  at least four triggers for anxiety.  Status: New - Date: 10/04/2017       Intervention(s)  Therapist will utilize systemic interventions, CBT based skills, Mindfulness and relaxation skills.       Objective #B  Client will use cognitive strategies identified in therapy to challenge anxious thoughts.  Status: New - Date: 10/04/2017       Intervention(s)  Therapist will utilize systemic interventions, CBT based skills, Mindfulness and relaxation skills.       Client has reviewed and agreed to the above plan.       Li Durham, LMFT  November 8, 2017

## 2017-11-13 DIAGNOSIS — F41.9 ANXIETY: ICD-10-CM

## 2017-11-13 RX ORDER — BUPROPION HYDROCHLORIDE 150 MG/1
TABLET ORAL
Qty: 30 TABLET | Refills: 0 | Status: SHIPPED | OUTPATIENT
Start: 2017-11-13 | End: 2017-11-27

## 2017-11-13 NOTE — TELEPHONE ENCOUNTER
Medication is being filled for 1 time refill only due to:  Patient needs to be seen because was due for follow up in 3 to 4 weeks after starting medication per last OV note from MD SORAYA. Please call patient to assist with scheduling. Thank you.  Silvia Holt RN, BSN  [facility]

## 2017-11-13 NOTE — TELEPHONE ENCOUNTER
Requested Prescriptions   Pending Prescriptions Disp Refills     buPROPion (WELLBUTRIN XL) 150 MG 24 hr tablet [Pharmacy Med Name: BUPROPION XL 150MG TABLETS (24 H)]  Last Written Prescription Date:  9/21/2017  Last Fill Quantity: 30 tablet,  # refills: 1   Last Office Visit with FMCLARITA, [facility] or [facility] prescribing provider:  9/21/2017   Future Office Visit:    Next 5 appointments (look out 90 days)     Nov 22, 2017 12:00 PM CST   [place] [facility]    [address] [facility] [address]   [phone]            Dec 07, 2017 12:00 PM CST   Return Visit with Li Durham LMFT [facility] [place] [place] [address] [place] [address]   [phone]            Dec 21, 2017 12:00 PM CST   Return Visit with Li Durham LMFT [facility] [place] [address] [facility] [address]   [phone]            Jan 04, 2018 12:00 PM CST   [place] Visit [place] Li [place] [facility]    [address] [place] [address]   [phone]                  30 tablet 0     Sig: TAKE 1 TABLET(150 MG) BY MOUTH EVERY MORNING    SSRIs Protocol Failed    11/13/2017  9:11 AM  PHQ-9 SCORE 5/18/2015 12/16/2016 9/14/2017   Total Score 3 - -   Total Score - 1 3     TRESA-7 SCORE 12/16/2016 9/14/2017 10/16/2017   Total Score - - -   Total Score 0 11 10            Failed - Medication is NOT Bupropion    If the medication is Bupropion (Wellbutrin), and the patient is taking for smoking cessation; OK to refill.         Passed - Patient is age 18 or older       Passed - No active pregnancy on record       Passed - No positive pregnancy test in last 12 months

## 2017-11-21 DIAGNOSIS — F41.9 ANXIETY: ICD-10-CM

## 2017-11-21 RX ORDER — BUSPIRONE HYDROCHLORIDE 10 MG/1
TABLET ORAL
Qty: 60 TABLET | Refills: 0 | Status: SHIPPED | OUTPATIENT
Start: 2017-11-21 | End: 2017-11-27

## 2017-11-21 NOTE — TELEPHONE ENCOUNTER
Requested Prescriptions   Pending Prescriptions Disp Refills     busPIRone (BUSPAR) 10 MG tablet [Pharmacy Med Name: BUSPIRONE 10MG TABLETS]  Last Written Prescription Date:  10/18/2017  Last Fill Quantity: 60 tablet,  # refills: 0   Last Office Visit with FMCLARITA, [facility] or [facility] prescribing provider:  9/21/2017   Future Office Visit:    Next 5 appointments (look out 90 days)     Nov 22, 2017 12:00 PM CST   Return Visit with Li Durham [facility])    [address]  [facility] [address]   [phone]            Nov 27, 2017  4:00 PM CST   Office Visit with Yvette Amador PA-C   [facility] ([facility]    [address] [facility] [address] [phone]            Dec 07, 2017 12:00 PM CST   Return Visit with Li Durham [facility]    [address]  [facility] [address]   [phone]            Dec 21, 2017 12:00 PM CST   Return Visit with Li Durham [facility]    [address]  [facility] [address] [phone]            Jan 04, 2018 12:00 PM CST   Return Visit with Li Durham [facility]    [address]  [facility] [address] [phone]                  60 tablet 0     Sig: TAKE 1 TABLET(10 MG) BY MOUTH TWICE DAILY    Atypical Antidepressants Protocol Passed    11/21/2017  4:14 AM       Passed - Patient is age 18 or older       Passed - No active pregnancy on record       Passed - No positive pregnancy test in past 12 mos

## 2017-11-21 NOTE — TELEPHONE ENCOUNTER
Medication is being filled for 1 time refill only due to:  Efraín has upcoming appt Carissa Sands RN- Triage [employer]

## 2017-11-22 ENCOUNTER — OFFICE VISIT (OUTPATIENT)
Dept: BEHAVIORAL HEALTH | Facility: CLINIC | Age: 27
End: 2017-11-22
Payer: COMMERCIAL

## 2017-11-22 DIAGNOSIS — F41.1 GENERALIZED ANXIETY DISORDER: Primary | ICD-10-CM

## 2017-11-22 PROCEDURE — 90834 PSYTX W PT 45 MINUTES: CPT | Performed by: MARRIAGE & FAMILY THERAPIST

## 2017-11-24 NOTE — PROGRESS NOTES
Progress Note    Client Name: Linda Agosto  Date: 11/22/2017         Service Type: Individual      Session Start Time: 12:00pm  Session End Time: 12:45pm      Session Length: 38-52 mins     Session #: 5     Attendees: Client    Treatment Plan Last Reviewed: 10/04/2017  PHQ-9 / TRESA-7 : See EPIC for updated scores     DATA      Progress Since Last Session (Related to Symptoms / Goals / Homework):   Symptoms: Stable    Homework: Achieved / completed to satisfaction      Episode of Care Goals: Satisfactory progress - ACTION (Actively working towards change); Intervened by reinforcing change plan / affirming steps taken     Current / Ongoing Stressors and Concerns:   Client reports ongoing stress related to family relationships. Client identifies experiencing anxiety related to interpersonal conflict with her family; reports increased stress related to managing anxiety and communication skills.      Treatment Objective(s) Addressed in This Session:   Client will identify at least four triggers for anxiety    Clinician utilized systemic interventions to explore triggers to client's anxiety in conflict; processed with client communication skills to address concerns related to family relationships. Client and clinician discussed transition plan for client's upcoming maternity leave.         ASSESSMENT: Current Emotional / Mental Status (status of significant symptoms):   Risk status (Self / Other harm or suicidal ideation)   Client denies current fears or concerns for personal safety.   Client denies current or recent suicidal ideation or behaviors.   Client denies current or recent homicidal ideation or behaviors.   Client denies current or recent self injurious behavior or ideation.   Client denies other safety concerns.   A safety and risk management plan has not been developed at this time, however client was given the after-hours number / 911 should there be a change in any  of these risk factors.     Appearance:   Appropriate    Eye Contact:   Good    Psychomotor Behavior: Normal  Restless    Attitude:   Cooperative    Orientation:   All   Speech    Rate / Production: Normal     Volume:  Normal    Mood:    Anxious    Affect:    Appropriate    Thought Content:  Clear    Thought Form:  Coherent  Logical    Insight:    Good      Medication Review:   No changes to current psychiatric medication(s)     Medication Compliance:   Yes     Changes in Health Issues:   None reported     Chemical Use Review:   Substance Use: Chemical use reviewed, no active concerns identified      Tobacco Use: No current tobacco use.       Collateral Reports Completed:   Not Applicable    PLAN: (Client Tasks / Therapist Tasks / Other)  None Given        Li Durham, LMFT                                                         ________________________________________________________________________    Treatment Plan    Client's Name: Linda Agosto                                           YOB: 1990      Date: 10/04/2017      DSM-V Diagnoses: 300.02 (F41.1) Generalized Anxiety Disorder  Psychosocial / Contextual Factors: Environmental Stressors, Work/Occupational Functioning, Complex Medical Diagnoses   WHODAS: See EPIC for updated score      Referral / Collaboration:  Referral to another professional/service is not indicated at this time..      Anticipated number of session or this episode of care: 6-12 months           MeasurableTreatment Goal(s) related to diagnosis / functional impairment(s)  Goal 1: Client will decrease in symptoms of anxiety.           Objective #A (Client Action)                                    Client will identify at least four triggers for anxiety.  Status: New - Date: 10/04/2017       Intervention(s)  Therapist will utilize systemic interventions, CBT based skills, Mindfulness and relaxation skills.       Objective #B  Client will use cognitive strategies  identified in therapy to challenge anxious thoughts.  Status: New - Date: 10/04/2017       Intervention(s)  Therapist will utilize systemic interventions, CBT based skills, Mindfulness and relaxation skills.       Client has reviewed and agreed to the above plan.       Li Durham, LMFT  November 23, 2017

## 2017-11-27 ENCOUNTER — OFFICE VISIT (OUTPATIENT)
Dept: FAMILY MEDICINE | Facility: CLINIC | Age: 27
End: 2017-11-27
Payer: COMMERCIAL

## 2017-11-27 VITALS
WEIGHT: 152 LBS | HEART RATE: 96 BPM | DIASTOLIC BLOOD PRESSURE: 76 MMHG | SYSTOLIC BLOOD PRESSURE: 110 MMHG | OXYGEN SATURATION: 100 % | HEIGHT: 66 IN | TEMPERATURE: 98.4 F | BODY MASS INDEX: 24.43 KG/M2

## 2017-11-27 DIAGNOSIS — F41.9 ANXIETY: ICD-10-CM

## 2017-11-27 PROCEDURE — 99213 OFFICE O/P EST LOW 20 MIN: CPT | Performed by: PHYSICIAN ASSISTANT

## 2017-11-27 RX ORDER — BUSPIRONE HYDROCHLORIDE 10 MG/1
TABLET ORAL
Qty: 180 TABLET | Refills: 1 | Status: SHIPPED | OUTPATIENT
Start: 2017-11-27 | End: 2018-09-04

## 2017-11-27 RX ORDER — BUPROPION HYDROCHLORIDE 150 MG/1
TABLET ORAL
Qty: 90 TABLET | Refills: 1 | Status: SHIPPED | OUTPATIENT
Start: 2017-11-27 | End: 2018-07-08

## 2017-11-27 ASSESSMENT — ANXIETY QUESTIONNAIRES
7. FEELING AFRAID AS IF SOMETHING AWFUL MIGHT HAPPEN: NOT AT ALL
5. BEING SO RESTLESS THAT IT IS HARD TO SIT STILL: NOT AT ALL
1. FEELING NERVOUS, ANXIOUS, OR ON EDGE: SEVERAL DAYS
GAD7 TOTAL SCORE: 3
2. NOT BEING ABLE TO STOP OR CONTROL WORRYING: SEVERAL DAYS
6. BECOMING EASILY ANNOYED OR IRRITABLE: NOT AT ALL
3. WORRYING TOO MUCH ABOUT DIFFERENT THINGS: SEVERAL DAYS

## 2017-11-27 ASSESSMENT — PATIENT HEALTH QUESTIONNAIRE - PHQ9
SUM OF ALL RESPONSES TO PHQ QUESTIONS 1-9: 0
5. POOR APPETITE OR OVEREATING: NOT AT ALL

## 2017-11-27 NOTE — MR AVS SNAPSHOT
After Visit Summary   11/27/2017    Linda Agosto    MRN: [id number]           Patient Information     Date Of Birth          1990        Visit Information        Provider Department      11/27/2017 4:00 PM Yvette Amador PA-C [facility]        Today's Diagnoses     Anxiety          Care Instructions    So glad you're doing better with addition of buspar.  Continue meds without changes.  Continue care with counseling as planned.  Re-check in 6 months.    Electronically Signed By: Yvette Amador PA-C            Follow-ups after your visit        Your next 10 appointments already scheduled     Dec 07, 2017 12:00 PM CST   Return Visit with Li Durham [facility]    [address]  [facility] [address]   [phone]            Dec 21, 2017 12:00 PM CST   Return Visit with OMEGA Vazquez   [facility])    [address]  [facility] [address]   [phone]            Jan 04, 2018 12:00 PM CST   Return Visit with Li Durham [facility]    [address]  [facility] [address]   [phone]              Who to contact     If you have questions or need follow up information about today's clinic visit or your schedule please contact [facility] directly at [phone].  Normal or non-critical lab and imaging results will be communicated to you by MyChart, letter or phone within 4 business days after the clinic has received the results. If you do not hear from us within 7 days, please contact the clinic through [employer] or phone. If you have a critical or abnormal lab result, we will notify you by phone as soon as possible.  Submit refill requests through [employer] or call your pharmacy and they will forward the refill request to us. Please allow 3  "business days for your refill to be completed.          Additional Information About Your Visit        [employer] Information     [employer] gives you secure access to your electronic health record. If you see a primary care provider, you can also send messages to your care team and make appointments. If you have questions, please call your primary care clinic.  If you do not have a primary care provider, please call [phone] and they will assist you.        Care EveryWhere ID     This is your Care EveryWhere ID. This could be used by other organizations to access your [place] medical records  [id number]        Your Vitals Were     Pulse Temperature Height Pulse Oximetry BMI (Body Mass Index)       96 98.4  F (36.9  C) (Oral) 5' 5.5\" (1.664 m) 100% 24.91 kg/m2        Blood Pressure from Last 3 Encounters:   11/27/17 110/76   09/21/17 125/82   08/21/17 128/72    Weight from Last 3 Encounters:   11/27/17 152 lb (68.9 kg)   09/21/17 153 lb 3.2 oz (69.5 kg)   08/31/17 153 lb (69.4 kg)              Today, you had the following     No orders found for display         Today's Medication Changes          These changes are accurate as of: 11/27/17  4:38 PM.  If you have any questions, ask your nurse or doctor.               These medicines have changed or have updated prescriptions.        Dose/Directions    buPROPion 150 MG 24 hr tablet   Commonly known as:  WELLBUTRIN XL   This may have changed:  See the new instructions.   Used for:  Anxiety   Changed by:  Yvette Amador PA-C        TAKE 1 TABLET(150 MG) BY MOUTH EVERY MORNING   Quantity:  90 tablet   Refills:  1       busPIRone 10 MG tablet   Commonly known as:  BUSPAR   This may have changed:  See the new instructions.   Used for:  Anxiety   Changed by:  Yvette Amador PA-C        TAKE 1 TABLET(10 MG) BY MOUTH TWICE DAILY   Quantity:  180 tablet   Refills:  1            Where to get your medicines      These medications were sent to " [facility] [address] [place] [address] [address] AT [address] [place] [address] & [facility]  [address] [address]    Hours:  24-hours Phone:  [phone]     buPROPion 150 MG 24 hr tablet    busPIRone 10 MG tablet                Primary Care Provider Office Phone # Fax #    Nithin Varela [phone]       [address] [place] [address]        Equal Access to Services     TJ BRAVO : Hadii aad ku hadasho Soomaali, waaxda luqadaha, qaybta kaalmada adeegyada, waxay idiin hayaan adeeg kharash la'tim . So [facility] [phone].    ATENCIÓN: Si habla español, tiene a rivera disposición servicios gratuitos de asistencia lingüística. [facility] [phone].    We comply with applicable federal civil rights laws and [place] laws. We do not discriminate on the basis of race, color, national origin, age, disability, sex, sexual orientation, or gender identity.            Thank you!     Thank you for choosing [facility]  for your care. Our goal is always to provide you with excellent care. Hearing back from our patients is one way we can continue to improve our services. Please take a few minutes to complete the written survey that you may receive in the mail after your visit with us. Thank you!             Your Updated Medication List - Protect others around you: Learn how to safely use, store and throw away your medicines at www.disposemymeds.org.          This list is accurate as of: 11/27/17  4:38 PM.  Always use your most recent med list.                   Brand Name Dispense Instructions for use Diagnosis    BASAGLAR 100 UNIT/ML injection      INJ 21 UNITS SC D        blood glucose monitoring lancets     2 Box    Patient tests 8 times/day    Type 1 diabetes, HbA1c goal < 7% (H)       blood glucose monitoring test strip    VALE CONTOUR NEXT    250 strip    Patient tests 8 times/day    Type 1 diabetes, HbA1c goal < 7% (H)       buPROPion 150 MG 24 hr tablet    WELLBUTRIN XL     90 tablet    TAKE 1 TABLET(150 MG) BY MOUTH EVERY MORNING    Anxiety       busPIRone 10 MG tablet    BUSPAR    180 tablet    TAKE 1 TABLET(10 MG) BY MOUTH TWICE DAILY    Anxiety       insulin aspart 100 UNITS/ML injection    NovoLOG VIAL    30 mL    Uses up to 75 Units/day    Type 1 diabetes, HbA1c goal < 7% (H)       levothyroxine 88 MCG tablet    SYNTHROID/LEVOTHROID    90 tablet    TAKE ONE TABLET BY MOUTH ONCE DAILY    Other specified hypothyroidism       naproxen sodium 550 MG tablet    ANAPROX    30 tablet    Take 1 tablet (550 mg) by mouth 2 times daily (with meals)    Tension headache, Common migraine       norgestimate-ethinyl estradiol 0.25-35 MG-MCG per tablet    ORTHO-CYCLEN, SPRINTEC    84 tablet    Take 1 tablet by mouth daily    DUB (dysfunctional uterine bleeding)       simvastatin 40 MG tablet    ZOCOR    30 tablet    TAKE 1 TABLET BY MOUTH EVERY NIGHT AT BEDTIME    Hyperlipidemia with target LDL less than 70

## 2017-11-27 NOTE — NURSING NOTE
"Chief Complaint   Patient presents with     Anxiety       Initial /76  Pulse 96  Temp 98.4  F (36.9  C) (Oral)  Ht 5' 5.5\" (1.664 m)  Wt 152 lb (68.9 kg)  SpO2 100%  BMI 24.91 kg/m2 Estimated body mass index is 24.91 kg/(m^2) as calculated from the following:    Height as of this encounter: 5' 5.5\" (1.664 m).    Weight as of this encounter: 152 lb (68.9 kg).  Medication Reconciliation: complete    "

## 2017-11-27 NOTE — PATIENT INSTRUCTIONS
So glad you're doing better with addition of buspar.  Continue meds without changes.  Continue care with counseling as planned.  Re-check in 6 months.    Electronically Signed By: Yvette Amador PA-C

## 2017-11-27 NOTE — PROGRESS NOTES
"  SUBJECTIVE:   Linda Agosto is a 27 year old female who presents to clinic today for the following health issues:      Anxiety Follow-Up; previously followed by Dr. Weiler for anxiety.   Started on wellbutrin, but buspar was added as an augment at least visit `1 month ago.  Feels that overall panic symptoms seem better and she is overall \"more calm.\"  States she's always been \"kind of anxious\", but then had worsened with age due to more responsibilities.  Had tried another med in the past which she didn't like because she was \"totally flat.\" EPIC review shows this could be celexa.  Currently enrolled in counseling and is finding this helpful as well.   Hx of type 1 DM followed by endocrinology.      Status since last visit: states that she feels like she is doing much better with the two medications together instead of the just the bupropion on its own    Other associated symptoms:None    Complicating factors:   Significant life event: No   Current substance abuse: None  Depression symptoms: No  TRESA-7 SCORE 12/16/2016 9/14/2017 10/16/2017   Total Score - - -   Total Score 0 11 10       GAD7      Amount of exercise or physical activity:     Problems taking medications regularly: No    Medication side effects: none    Diet: regular (no restrictions)    Problem list and histories reviewed & adjusted, as indicated.  Additional history: as documented    Patient Active Problem List   Diagnosis     Allergic rhinitis     Allergic state     Type 1 diabetes, HbA1c goal < 7% (H)     Other specified hypothyroidism     Anxiety     Hyperlipidemia with target LDL less than 70     Type 1 diabetes mellitus without complication (H)     Common migraine     Past Surgical History:   Procedure Laterality Date     APPENDECTOMY  8/07    dr deshpande     PE TUBES  1996       Social History   Substance Use Topics     Smoking status: Never Smoker     Smokeless tobacco: Never Used     Alcohol use Yes      Comment: sometimes couple on " "weekends     Family History   Problem Relation Age of Onset     Neurologic Disorder Maternal Grandmother      dementia     Genetic Disorder Paternal Grandfather      kidney     Family History Negative No family hx of          Current Outpatient Prescriptions   Medication Sig Dispense Refill     busPIRone (BUSPAR) 10 MG tablet TAKE 1 TABLET(10 MG) BY MOUTH TWICE DAILY 60 tablet 0     buPROPion (WELLBUTRIN XL) 150 MG 24 hr tablet TAKE 1 TABLET(150 MG) BY MOUTH EVERY MORNING 30 tablet 0     simvastatin (ZOCOR) 40 MG tablet TAKE 1 TABLET BY MOUTH EVERY NIGHT AT BEDTIME 30 tablet 0     insulin glargine (BASAGLAR KWIKPEN) 100 UNIT/ML injection INJ 21 UNITS SC D  0     norgestimate-ethinyl estradiol (ORTHO-CYCLEN, SPRINTEC) 0.25-35 MG-MCG per tablet Take 1 tablet by mouth daily 84 tablet 8     levothyroxine (SYNTHROID, LEVOTHROID) 88 MCG tablet TAKE ONE TABLET BY MOUTH ONCE DAILY 90 tablet 3     insulin aspart (NOVOLOG VIAL) 100 UNITS/ML VIAL Uses up to 75 Units/day 30 mL 2     naproxen sodium (ANAPROX) 550 MG tablet Take 1 tablet (550 mg) by mouth 2 times daily (with meals) 30 tablet 1     blood glucose (VALE CONTOUR NEXT) test strip Patient tests 8 times/day 250 strip 6     blood glucose monitoring (VALE MICROLET) lancets Patient tests 8 times/day 2 Box 6     Allergies   Allergen Reactions     Penicillins Rash     augmentin & pcn     Sulfa Drugs Hives       Review[facility] and [facility] as [facility]  M[facility]       Reviewed and updated as needed this visit by Provider         ROS:  Constitutional,  psych systems are negative, except as otherwise noted.      OBJECTIVE:   /76  Pulse 96  Temp 98.4  F (36.9  C) (Oral)  Ht 5' 5.5\" (1.664 m)  Wt 152 lb (68.9 kg)  SpO2 100%  BMI 24.91 kg/m2  Body mass index is 24.91 kg/(m^2).  GENERAL: healthy, alert and no distress  PSYCH: mentation appears normal, affect normal/bright    Diagnostic Test Results:  See Flowsheets for PHQ/TRESA " scores    ASSESSMENT/PLAN:       ICD-10-CM    1. Anxiety F41.9 busPIRone (BUSPAR) 10 MG tablet     buPROPion (WELLBUTRIN XL) 150 MG 24 hr tablet   Pt reports significant improvement after augment with buspar in addition to wellbutrin for anxiety.  meds re-filled.  See Patient Instructions  Patient Instructions   So glad you're doing better with addition of buspar.  Continue meds without changes.  Continue care with counseling as planned.  Re-check in 6 months.    Electronically Signed By: Yvette Amador PA-C

## 2017-11-28 ASSESSMENT — ANXIETY QUESTIONNAIRES: GAD7 TOTAL SCORE: 3

## 2017-12-07 ENCOUNTER — OFFICE VISIT (OUTPATIENT)
Dept: BEHAVIORAL HEALTH | Facility: CLINIC | Age: 27
End: 2017-12-07
Payer: COMMERCIAL

## 2017-12-07 DIAGNOSIS — F41.1 GENERALIZED ANXIETY DISORDER: Primary | ICD-10-CM

## 2017-12-07 PROCEDURE — 90834 PSYTX W PT 45 MINUTES: CPT | Performed by: MARRIAGE & FAMILY THERAPIST

## 2017-12-07 ASSESSMENT — ANXIETY QUESTIONNAIRES
7. FEELING AFRAID AS IF SOMETHING AWFUL MIGHT HAPPEN: SEVERAL DAYS
IF YOU CHECKED OFF ANY PROBLEMS ON THIS QUESTIONNAIRE, HOW DIFFICULT HAVE THESE PROBLEMS MADE IT FOR YOU TO DO YOUR WORK, TAKE CARE OF THINGS AT HOME, OR GET ALONG WITH OTHER PEOPLE: SOMEWHAT DIFFICULT
GAD7 TOTAL SCORE: 6
3. WORRYING TOO MUCH ABOUT DIFFERENT THINGS: SEVERAL DAYS
2. NOT BEING ABLE TO STOP OR CONTROL WORRYING: SEVERAL DAYS
6. BECOMING EASILY ANNOYED OR IRRITABLE: NOT AT ALL
1. FEELING NERVOUS, ANXIOUS, OR ON EDGE: SEVERAL DAYS
5. BEING SO RESTLESS THAT IT IS HARD TO SIT STILL: SEVERAL DAYS

## 2017-12-07 ASSESSMENT — PATIENT HEALTH QUESTIONNAIRE - PHQ9
SUM OF ALL RESPONSES TO PHQ QUESTIONS 1-9: 3
5. POOR APPETITE OR OVEREATING: SEVERAL DAYS

## 2017-12-07 NOTE — MR AVS SNAPSHOT
MRN:[id number]                      After Visit Summary   12/7/2017    Linda Agosto    MRN: [id number]           Visit Information        Provider Department      12/7/2017 12:00 PM Li Durham LMFT [facility] Generic      Your next 10 appointments already scheduled     Dec 18, 2017  4:10 PM CST   MyChart OB-GYN Annual Physical with Sierra Treviño Masters, DO   [facility] ([facility])    [address]  [facility] [id number] [phone]            Dec 21, 2017 12:00 PM CST   Return Visit with OMEGA Vazquez   [facility] [place] ([facility])    [address]  [facility] [address] [phone]              MyChart Information     [employer] gives you secure access to your electronic health record. If you see a primary care provider, you can also send messages to your care team and make appointments. If you have questions, please call your primary care clinic.  If you do not have a primary care provider, please call [phone] and they will assist you.        Care EveryWhere ID     This is your Care EveryWhere ID. This could be used by other organizations to access your [facility] medical records  [id number]        Equal Access to Services     TJ BRAVO AH: Hadii nader almaguero Socarenali, waaxda luqadaha, qaybta kaalmada adeegyada, miriam dean. So [facility] [phone].    ATENCIÓN: Si habla español, tiene a rivera disposición servicios gratuitos de asistencia lingüística. Llame al [phone].    We comply with applicable federal civil rights laws and [place] laws. We do not discriminate on the basis of race, color, national origin, age, disability, sex, sexual orientation, or gender identity.

## 2017-12-07 NOTE — PROGRESS NOTES
"                                             Progress Note    Client Name: Linda Agosto  Date: 12/07/2017         Service Type: Individual      Session Start Time: 12:00pm  Session End Time: 12:40 pm      Session Length: 38-52 mins     Session #: 6     Attendees: Client    Treatment Plan Last Reviewed: 10/04/2017  PHQ-9 / TRESA-7 : See EPIC for updated scores     DATA      Progress Since Last Session (Related to Symptoms / Goals / Homework):   Symptoms: Stable; client reports increased fatigue due to \"alot going on\". Client reports experiencing reduced anxiety, however identifies experiencing racing thoughts when experiencing anxiety.    Homework: Partially completed      Episode of Care Goals: Satisfactory progress - ACTION (Actively working towards change); Intervened by reinforcing change plan / affirming steps taken     Current / Ongoing Stressors and Concerns:   Client reports ongoing stress related to work/occupational functioning, as well as school stress. Client reports continuing to experiencing racing thoughts when anxious; identifies difficulty \"getting out of it\" until she \"finds and answer for the anxiety\".     Treatment Objective(s) Addressed in This Session:   Client will identify at least four triggers for anxiety.  Client will use cognitive strategies identified in therapy to challenge anxious thoughts.     Intervention:  Clinician utilized systemic interventions to process client's experience with increased stressors. Client and clinician explored CBT based skills to address racing thoughts; explored utilizing distraction and mindfulness to regulate when feeling anxious.         ASSESSMENT: Current Emotional / Mental Status (status of significant symptoms):   Risk status (Self / Other harm or suicidal ideation)   Client denies current fears or concerns for personal safety.   Client denies current or recent suicidal ideation or behaviors.   Client denies current or recent homicidal ideation or " behaviors.   Client denies current or recent self injurious behavior or ideation.   Client denies other safety concerns.   A safety and risk management plan has not been developed at this time, however client was given the after-hours number / 911 should there be a change in any of these risk factors.     Appearance:   Appropriate    Eye Contact:   Good    Psychomotor Behavior: Restless    Attitude:   Cooperative    Orientation:   All   Speech    Rate / Production: Normal     Volume:  Normal    Mood:    Anxious    Affect:    Appropriate    Thought Content:  Clear    Thought Form:  Coherent  Flight of Ideas    Insight:    Fair      Medication Review:   No changes to current psychiatric medication(s)     Medication Compliance:   Yes     Changes in Health Issues:   None reported     Chemical Use Review:   Substance Use: Chemical use reviewed, no active concerns identified      Tobacco Use: No current tobacco use.       Collateral Reports Completed:   Not Applicable    PLAN: (Client Tasks / Therapist Tasks / Other)  Client and clinician discussed the following transition plan for clinician's upcoming maternity leave. Client decided to discharge effective upon clinician's maternity leave, and was given instructions to contact [facility]'s intake line for ongoing scheduling if needed if symptoms were to increase.         Li Durham, [facility]                                                         ________________________________________________________________________    Treatment Plan  Client's Name: Linda Agosto                                           YOB: 1990      Date: 10/04/2017      DSM-V Diagnoses: 300.02 (F41.1) Generalized Anxiety Disorder  Psychosocial / Contextual Factors: Environmental Stressors, Work/Occupational Functioning, Complex Medical Diagnoses   WHODAS: See EPIC for updated score      Referral / Collaboration:  Referral to another professional/service is not indicated at this  time..      Anticipated number of session or this episode of care: 6-12 months           MeasurableTreatment Goal(s) related to diagnosis / functional impairment(s)  Goal 1: Client will decrease in symptoms of anxiety.           Objective #A (Client Action)                                    Client will identify at least four triggers for anxiety.  Status: New - Date: 10/04/2017       Intervention(s)  Therapist will utilize systemic interventions, CBT based skills, Mindfulness and relaxation skills.       Objective #B  Client will use cognitive strategies identified in therapy to challenge anxious thoughts.  Status: New - Date: 10/04/2017       Intervention(s)  Therapist will utilize systemic interventions, CBT based skills, Mindfulness and relaxation skills.       Client has reviewed and agreed to the above plan.       Li Durham, LMFT  December 7, 2017

## 2017-12-08 ASSESSMENT — ANXIETY QUESTIONNAIRES: GAD7 TOTAL SCORE: 6

## 2017-12-18 ENCOUNTER — OFFICE VISIT (OUTPATIENT)
Dept: OBGYN | Facility: CLINIC | Age: 27
End: 2017-12-18
Payer: COMMERCIAL

## 2017-12-18 VITALS
HEART RATE: 68 BPM | BODY MASS INDEX: 24.43 KG/M2 | DIASTOLIC BLOOD PRESSURE: 70 MMHG | WEIGHT: 152 LBS | HEIGHT: 66 IN | SYSTOLIC BLOOD PRESSURE: 114 MMHG

## 2017-12-18 DIAGNOSIS — Z11.8 SCREENING FOR CHLAMYDIAL DISEASE: ICD-10-CM

## 2017-12-18 DIAGNOSIS — F41.9 ANXIETY: ICD-10-CM

## 2017-12-18 DIAGNOSIS — Z01.419 ENCOUNTER FOR GYNECOLOGICAL EXAMINATION WITHOUT ABNORMAL FINDING: Primary | ICD-10-CM

## 2017-12-18 DIAGNOSIS — Z11.3 SCREEN FOR STD (SEXUALLY TRANSMITTED DISEASE): ICD-10-CM

## 2017-12-18 DIAGNOSIS — N93.8 DUB (DYSFUNCTIONAL UTERINE BLEEDING): ICD-10-CM

## 2017-12-18 PROCEDURE — 87591 N.GONORRHOEAE DNA AMP PROB: CPT | Performed by: OBSTETRICS & GYNECOLOGY

## 2017-12-18 PROCEDURE — G0145 SCR C/V CYTO,THINLAYER,RESCR: HCPCS | Performed by: OBSTETRICS & GYNECOLOGY

## 2017-12-18 PROCEDURE — 99395 PREV VISIT EST AGE 18-39: CPT | Performed by: OBSTETRICS & GYNECOLOGY

## 2017-12-18 PROCEDURE — 87491 CHLMYD TRACH DNA AMP PROBE: CPT | Performed by: OBSTETRICS & GYNECOLOGY

## 2017-12-18 RX ORDER — NORGESTIMATE AND ETHINYL ESTRADIOL 0.25-0.035
1 KIT ORAL DAILY
Qty: 84 TABLET | Refills: 4 | Status: SHIPPED | OUTPATIENT
Start: 2017-12-18 | End: 2018-09-13

## 2017-12-18 ASSESSMENT — PATIENT HEALTH QUESTIONNAIRE - PHQ9
5. POOR APPETITE OR OVEREATING: NOT AT ALL
SUM OF ALL RESPONSES TO PHQ QUESTIONS 1-9: 0

## 2017-12-18 ASSESSMENT — ANXIETY QUESTIONNAIRES
GAD7 TOTAL SCORE: 3
3. WORRYING TOO MUCH ABOUT DIFFERENT THINGS: SEVERAL DAYS
7. FEELING AFRAID AS IF SOMETHING AWFUL MIGHT HAPPEN: NOT AT ALL
6. BECOMING EASILY ANNOYED OR IRRITABLE: NOT AT ALL
2. NOT BEING ABLE TO STOP OR CONTROL WORRYING: SEVERAL DAYS
1. FEELING NERVOUS, ANXIOUS, OR ON EDGE: SEVERAL DAYS
5. BEING SO RESTLESS THAT IT IS HARD TO SIT STILL: NOT AT ALL
IF YOU CHECKED OFF ANY PROBLEMS ON THIS QUESTIONNAIRE, HOW DIFFICULT HAVE THESE PROBLEMS MADE IT FOR YOU TO DO YOUR WORK, TAKE CARE OF THINGS AT HOME, OR GET ALONG WITH OTHER PEOPLE: SOMEWHAT DIFFICULT

## 2017-12-18 NOTE — PROGRESS NOTES
Linda is a 27 year old  female who presents for annual exam.     Besides routine health maintenance, she has no other health concerns today .    HPI:  The patient's PCP is Nithin Varela MD.  Follows with PCP for statin and cholesterol.   Follows with Endo for DM care and thyroid.     Doing well with OCPs, cycling monthly. Refill.     Is sure had some of the HPV vacc series in HS with Dr. Noe at old office.     Exercising regular. [employer].     Desires STI screening.      GYNECOLOGIC HISTORY:    Patient's last menstrual period was 2017 (approximate).  Her current contraception method is: oral contraceptives.  She  reports that she has never smoked. She has never used smokeless tobacco.      Patient is not sexually active.  STD testing offered?  Accepted  Last PHQ-9 score on record =   PHQ-9 SCORE 2017   Total Score -   Total Score 0     Last GAD7 score on record =   TRESA-7 SCORE 2017   Total Score -   Total Score 3     Alcohol Score = 3    HEALTH MAINTENANCE:  Cholesterol: 16   Total= 215, Triglycerides=77, HDL=72, KKN=057, DUK=526 (17), TSH=10/10/17: 1.87  Mammo: Never, Result: not applicable  Pap:   Lab Results   Component Value Date    PAP NIL 2014      Colonoscopy:  Never, Result: not applicable  Dexa:  Never    Health maintenance updated:  yes    HISTORY:  Obstetric History       T0      L0     SAB0   TAB0   Ectopic0   Multiple0   Live Births0           Patient Active Problem List   Diagnosis     Allergic rhinitis     Allergic state     Type 1 diabetes, HbA1c goal < 7% (H)     Other specified hypothyroidism     Anxiety     Hyperlipidemia with target LDL less than 70     Type 1 diabetes mellitus without complication (H)     Common migraine     Past Surgical History:   Procedure Laterality Date     APPENDECTOMY      dr deshpande     PE TUBES        Social History   Substance Use Topics     Smoking status: Never Smoker     Smokeless tobacco: Never Used  "    Alcohol use Yes      Comment: sometimes couple on weekends      Problem (# of Occurrences) Relation (Name,Age of Onset)    Genetic Disorder (1) Paternal Grandfather: kidney    Neurologic Disorder (1) Maternal Grandmother: dementia       Negative family history of: Family History Negative            Current Outpatient Prescriptions   Medication Sig     norgestimate-ethinyl estradiol (ORTHO-CYCLEN, SPRINTEC) 0.25-35 MG-MCG per tablet Take 1 tablet by mouth daily     busPIRone (BUSPAR) 10 MG tablet TAKE 1 TABLET(10 MG) BY MOUTH TWICE DAILY     buPROPion (WELLBUTRIN XL) 150 MG 24 hr tablet TAKE 1 TABLET(150 MG) BY MOUTH EVERY MORNING     simvastatin (ZOCOR) 40 MG tablet TAKE 1 TABLET BY MOUTH EVERY NIGHT AT BEDTIME     insulin glargine (BASAGLAR KWIKPEN) 100 UNIT/ML injection INJ 21 UNITS SC D     levothyroxine (SYNTHROID, LEVOTHROID) 88 MCG tablet TAKE ONE TABLET BY MOUTH ONCE DAILY     insulin aspart (NOVOLOG VIAL) 100 UNITS/ML VIAL Uses up to 75 Units/day     naproxen sodium (ANAPROX) 550 MG tablet Take 1 tablet (550 mg) by mouth 2 times daily (with meals)     blood glucose (VALE CONTOUR NEXT) test strip Patient tests 8 times/day     blood glucose monitoring (VALE MICROLET) lancets Patient tests 8 times/day     [DISCONTINUED] norgestimate-ethinyl estradiol (ORTHO-CYCLEN, SPRINTEC) 0.25-35 MG-MCG per tablet Take 1 tablet by mouth daily     No current facility-administered medications for this visit.      Allergies   Allergen Reactions     Penicillins Rash     augmentin & pcn     Sulfa Drugs Hives       Past medical, surgical, social and family histories were reviewed and updated in EPIC.    ROS:   12 point review of systems negative other than symptoms noted below.    EXAM:  /70  Pulse 68  Ht 5' 5.5\" (1.664 m)  Wt 152 lb (68.9 kg)  LMP 11/27/2017 (Approximate)  BMI 24.91 kg/m2   BMI: Body mass index is 24.91 kg/(m^2).    PHYSICAL EXAM:  Constitutional:  Appearance: Well nourished, well developed, " alert, in no acute distress  Neck:  Lymph Nodes:  No lymphadenopathy present    Thyroid:  Gland size normal, nontender, no nodules or masses present  on palpation  Chest:  Respiratory Effort:  Breathing unlabored  Cardiovascular:    Heart: Auscultation:  Regular rate, normal rhythm, no murmurs present  Breasts: Inspection of Breasts:  No lymphadenopathy present., Palpation of Breasts and Axillae:  No masses present on palpation, no breast tenderness., Axillary Lymph Nodes:  No lymphadenopathy present. and No nodularity, asymmetry or nipple discharge bilaterally.  Gastrointestinal:   Abdominal Examination:  Abdomen nontender to palpation, tone normal without rigidity or guarding, no masses present, umbilicus without lesions   Liver and Spleen:  No hepatomegaly present, liver nontender to palpation    Hernias:  No hernias present  Lymphatic: Lymph Nodes:  No other lymphadenopathy present  Skin:  General Inspection:  No rashes present, no lesions present, no areas of  discoloration    Genitalia and Groin:  No rashes present, no lesions present, no areas of  discoloration, no masses present  Neurologic/Psychiatric:    Mental Status:  Oriented X3     Pelvic Exam:  External Genitalia:     Normal appearance for age, no discharge present, no tenderness present, no inflammatory lesions present, color normal  Vagina:     Normal vaginal vault without central or paravaginal defects, no discharge present, no inflammatory lesions present, no masses present  Bladder:     Nontender to palpation  Urethra:   Urethral Body:  Urethra palpation normal, urethra structural support normal   Urethral Meatus:  No erythema or lesions present  Cervix:     Appearance healthy, no lesions present, nontender to palpation, no bleeding present  Uterus:     Uterus: firm, normal sized and nontender, midplane in position.   Adnexa:     No adnexal tenderness present, no adnexal masses present  Perineum:     Perineum within normal limits, no evidence of  trauma, no rashes or skin lesions present  Anus:     Anus within normal limits, no hemorrhoids present  Inguinal Lymph Nodes:     No lymphadenopathy present  Pubic Hair:     Normal pubic hair distribution for age  Genitalia and Groin:     No rashes present, no lesions present, no areas of discoloration, no masses present      COUNSELING:   Reviewed preventive health counseling, as reflected in patient instructions       Contraception       Osteoporosis Prevention/Bone Health      BMI: Body mass index is 24.91 kg/(m^2).        ASSESSMENT:  27 year old female with satisfactory annual exam.    ICD-10-CM    1. Encounter for gynecological examination without abnormal finding Z01.419 Pap imaged thin layer screen reflex to HPV if ASCUS - recommended age 25 - 29 years   2. Screen for STD (sexually transmitted disease) Z11.3 NEISSERIA GONORRHOEA PCR   3. Screening for chlamydial disease Z11.8 CHLAMYDIA TRACHOMATIS PCR   4. DUB (dysfunctional uterine bleeding) N93.8 norgestimate-ethinyl estradiol (ORTHO-CYCLEN, SPRINTEC) 0.25-35 MG-MCG per tablet       PLAN:  -Pap obtained for cervical cancer screening. Reviewed guidelines-pap q 3yrs until age 30 when co-testing q 5 years.  -Breast self awareness discussed.   -Osteoporosis prevention discussed.  -PCP and Endo manage comorbidities and labs  -OCP refilled, doing well.  -STI testing desired  -Return one year for next annual exam      Sierra Higignss, DO

## 2017-12-18 NOTE — MR AVS SNAPSHOT
After Visit Summary   12/18/2017    Linda Agosto    MRN: [id number]           Patient Information     Date Of Birth          1990        Visit Information        Provider Department      12/18/2017 4:10 PM Sierra Santoyo DO [facility]        Today's Diagnoses     Encounter for gynecological examination without abnormal finding    -  1    Screen for STD (sexually transmitted disease)        Screening for chlamydial disease        DUB (dysfunctional uterine bleeding)           Follow-ups after your visit        Follow-up notes from your care team     Return in about 1 year (around 12/18/2018).      Your next 10 appointments already scheduled     Dec 21, 2017 12:00 PM CST   Return Visit with Li [facility] ([facility])    [address]  [facility] [address] [phone]              Who to contact     If you have questions or need follow up information about today's clinic visit or your schedule please contact [facility] directly at [phone].  Normal or non-critical lab and imaging results will be communicated to you by [employer], letter or phone within 4 business days after the clinic has received the results. If you do not hear from us within 7 days, please contact the clinic through [employer] or phone. If you have a critical or abnormal lab result, we will notify you by phone as soon as possible.  Submit refill requests through [employer] or call your pharmacy and they will forward the refill request to us. Please allow 3 business days for your refill to be completed.          Additional Information About Your Visit        MyChart Information     [employer] gives you secure access to your electronic health record. If you see a primary care provider, you can also send messages to your care team and make appointments. If you have questions, please call your primary care clinic.  If  "you do not have a primary care provider, please call [phone] and they will assist you.        Care EveryWhere ID     This is your Care EveryWhere ID. This could be used by other organizations to access your [place] medical records  [id number]        Your Vitals Were     Pulse Height Last Period BMI (Body Mass Index)          68 5' 5.5\" (1.664 m) 11/27/2017 (Approximate) 24.91 kg/m2         Blood Pressure from Last 3 Encounters:   12/18/17 114/70   11/27/17 110/76   09/21/17 125/82    Weight from Last 3 Encounters:   12/18/17 152 lb (68.9 kg)   11/27/17 152 lb (68.9 kg)   09/21/17 153 lb 3.2 oz (69.5 kg)              We Performed the Following     CHLAMYDIA TRACHOMATIS PCR     NEISSERIA GONORRHOEA PCR     Pap imaged thin layer screen reflex to HPV if ASCUS - recommended age 25 - 29 years          Where to get your medicines      These medications were sent to [facility] [address] [place] [address] [address] [place] [address] [address] & [address] [address]    Hours:  24-hours Phone:  [phone]     norgestimate-ethinyl estradiol 0.25-35 MG-MCG per tablet          Primary Care Provider Office Phone # Fax #    Nithin Varela [phone]       [address] [place] [address]        Equal Access to Services     MIC BRAVO AH: Vance almaguero Sobrennan, waaxda luqadaha, qaybta kaalmamiriam chamberlain. So [facility] [phone].    ATENCIÓN: Si habla español, tiene a rivera disposición servicios gratuitos de asistencia lingüística. Llstefanie al [phone].    We comply with applicable federal civil rights laws and [place] laws. We do not discriminate on the basis of race, color, national origin, age, disability, sex, sexual orientation, or gender identity.            Thank you!     Thank you for choosing [facility]  for your care. Our goal is always to provide you with excellent care. Hearing back from our " patients is one way we can continue to improve our services. Please take a few minutes to complete the written survey that you may receive in the mail after your visit with us. Thank you!             Your Updated Medication List - Protect others around you: Learn how to safely use, store and throw away your medicines at www.disposemymeds.org.          This list is accurate as of: 12/18/17  5:17 PM.  Always use your most recent med list.                   Brand Name Dispense Instructions for use Diagnosis    BASAGLAR 100 UNIT/ML injection      INJ 21 UNITS SC D        blood glucose monitoring lancets     2 Box    Patient tests 8 times/day    Type 1 diabetes, HbA1c goal < 7% (H)       blood glucose monitoring test strip    VALE CONTOUR NEXT    250 strip    Patient tests 8 times/day    Type 1 diabetes, HbA1c goal < 7% (H)       buPROPion 150 MG 24 hr tablet    WELLBUTRIN XL    90 tablet    TAKE 1 TABLET(150 MG) BY MOUTH EVERY MORNING    Anxiety       busPIRone 10 MG tablet    BUSPAR    180 tablet    TAKE 1 TABLET(10 MG) BY MOUTH TWICE DAILY    Anxiety       insulin aspart 100 UNITS/ML injection    NovoLOG VIAL    30 mL    Uses up to 75 Units/day    Type 1 diabetes, HbA1c goal < 7% (H)       levothyroxine 88 MCG tablet    SYNTHROID/LEVOTHROID    90 tablet    TAKE ONE TABLET BY MOUTH ONCE DAILY    Other specified hypothyroidism       naproxen sodium 550 MG tablet    ANAPROX    30 tablet    Take 1 tablet (550 mg) by mouth 2 times daily (with meals)    Tension headache, Common migraine       norgestimate-ethinyl estradiol 0.25-35 MG-MCG per tablet    ORTHO-CYCLEN, SPRINTEC    84 tablet    Take 1 tablet by mouth daily    DUB (dysfunctional uterine bleeding)       simvastatin 40 MG tablet    ZOCOR    30 tablet    TAKE 1 TABLET BY MOUTH EVERY NIGHT AT BEDTIME    Hyperlipidemia with target LDL less than 70

## 2017-12-19 RX ORDER — BUPROPION HYDROCHLORIDE 150 MG/1
TABLET ORAL
Qty: 30 TABLET | Refills: 0 | OUTPATIENT
Start: 2017-12-19

## 2017-12-19 ASSESSMENT — ANXIETY QUESTIONNAIRES: GAD7 TOTAL SCORE: 3

## 2017-12-19 NOTE — TELEPHONE ENCOUNTER
Patient has refills available through pharmacy.  Silvia Holt RN, BSN  [facility]

## 2017-12-19 NOTE — TELEPHONE ENCOUNTER
Requested Prescriptions   Pending Prescriptions Disp Refills     buPROPion (WELLBUTRIN XL) 150 MG 24 hr tablet [Pharmacy Med Name: BUPROPION XL 150MG TABLETS (24 H)]  Medication may not be due for a refill.  Last Written Prescription Date:  11/27/2017  Last Fill Quantity: 90 tablet,  # refills: 1   Last Office Visit with [facility], P or [facility] prescribing provider:  11/27/2017   Future Office Visit:    Next 5 appointments (look out 90 days)     Dec 21, 2017 12:00 PM CST   Return Visit with Li [facility] ([facility])    [address]  [facility] [id number] [phone]                30 tablet 0     Sig: TAKE 1 TABLET(150 MG) BY MOUTH EVERY MORNING    SSRIs Protocol Failed    12/18/2017  3:07 PM  PHQ-9 SCORE 11/27/2017 12/7/2017 12/18/2017   Total Score - - -   Total Score 0 3 0     TRESA-7 SCORE 11/27/2017 12/7/2017 12/18/2017   Total Score - - -   Total Score 3 6 3          Failed - Medication is NOT Bupropion    If the medication is Bupropion (Wellbutrin), and the patient is taking for smoking cessation; OK to refill.         Passed - Recent or future visit with authorizing provider    Patient had office visit in the last year or has a visit in the next 30 days with authorizing provider.  See chart review.          Passed - Patient is age 18 or older       Passed - No active pregnancy on record       Passed - No positive pregnancy test in last 12 months

## 2017-12-20 LAB
C TRACH DNA SPEC QL NAA+PROBE: NEGATIVE
N GONORRHOEA DNA SPEC QL NAA+PROBE: NEGATIVE
SPECIMEN SOURCE: NORMAL
SPECIMEN SOURCE: NORMAL

## 2017-12-21 ENCOUNTER — OFFICE VISIT (OUTPATIENT)
Dept: BEHAVIORAL HEALTH | Facility: CLINIC | Age: 27
End: 2017-12-21
Payer: COMMERCIAL

## 2017-12-21 DIAGNOSIS — F41.1 GENERALIZED ANXIETY DISORDER: Primary | ICD-10-CM

## 2017-12-21 LAB
COPATH REPORT: NORMAL
PAP: NORMAL

## 2017-12-21 PROCEDURE — 90834 PSYTX W PT 45 MINUTES: CPT | Performed by: MARRIAGE & FAMILY THERAPIST

## 2017-12-21 ASSESSMENT — ANXIETY QUESTIONNAIRES
1. FEELING NERVOUS, ANXIOUS, OR ON EDGE: SEVERAL DAYS
5. BEING SO RESTLESS THAT IT IS HARD TO SIT STILL: NOT AT ALL
7. FEELING AFRAID AS IF SOMETHING AWFUL MIGHT HAPPEN: SEVERAL DAYS
6. BECOMING EASILY ANNOYED OR IRRITABLE: SEVERAL DAYS
3. WORRYING TOO MUCH ABOUT DIFFERENT THINGS: SEVERAL DAYS
GAD7 TOTAL SCORE: 5
IF YOU CHECKED OFF ANY PROBLEMS ON THIS QUESTIONNAIRE, HOW DIFFICULT HAVE THESE PROBLEMS MADE IT FOR YOU TO DO YOUR WORK, TAKE CARE OF THINGS AT HOME, OR GET ALONG WITH OTHER PEOPLE: SOMEWHAT DIFFICULT
2. NOT BEING ABLE TO STOP OR CONTROL WORRYING: SEVERAL DAYS

## 2017-12-21 ASSESSMENT — PATIENT HEALTH QUESTIONNAIRE - PHQ9
5. POOR APPETITE OR OVEREATING: NOT AT ALL
SUM OF ALL RESPONSES TO PHQ QUESTIONS 1-9: 0

## 2017-12-21 NOTE — PROGRESS NOTES
Discharge Summary  Multiple Sessions    Client Name: Linda Agosto MRN#: [id number] YOB: 1990    Discharge Date:   December 21, 2017      Service Type: Individual      Session Start Time: 12:00pm  Session End Time: 12:45pm      Session Length: 45 - 50     Session #: 7     Attendees: Client    Focus of Treatment Objective(s):  Client's presenting concerns included: Identify triggers to anxiety and ruminations; practice cognitive skills to manage symptoms of anxiety.   Stage of Change at time of Discharge: MAINTENANCE (Working to maintain change, with risk of relapse)    Medication Adherence:  Yes    Chemical Use:  No    Assessment: Current Emotional / Mental Status (status of significant symptoms):    Risk status (Self / Other harm or suicidal ideation)  Client denies current fears or concerns for personal safety.  Client denies current or recent suicidal ideation or behaviors.  Client denies current or recent homicidal ideation or behaviors.  Client denies current or recent self injurious behavior or ideation.  Client denies other safety concerns.  A safety and risk management plan has not been developed at this time, however client was given the after-hours number should there be a change in any of these risk factors.    Appearance:   Appropriate   Eye Contact:   Good   Psychomotor Behavior: Normal   Attitude:   Cooperative   Orientation:   All  Speech   Rate / Production: Normal    Volume:  Normal   Mood:    Normal  Affect:    Appropriate   Thought Content:  Clear   Thought Form:  Coherent  Logical   Insight:   Good     DSM5 Diagnoses: (Sustained by DSM5 Criteria Listed Above)  Diagnoses: 300.02 (F41.1) Generalized Anxiety Disorder  Psychosocial & Contextual Factors: Environmental Stressors, Work/Occupational Functioning, Complex Medical Diagnoses   WHODAS 2.0 (12 item) Score: WHODAS 2.0  World Health Organization  Disability Assessment Schedule 2.0                  12-Item  version, self-administered             This questionnaire asks about difficulties due to health conditions. Health conditions include disease or                illnesses, other health problems that may be short or long lasting, injuries, mental health or emotional                   problems, and problems with alcohol or drugs.                     Think back over the past 30 days and answer these questions, thinking about how much difficulty you had doing the following activities. For each question, please [employer] only one response.    S1 Standing for long periods such as 30 minutes? None =         1   S2 Taking care of household responsibilities? None =         1   S3 Learning a new task, for example, learning how to get to a new place? None =         1   S4 How much of a problem do you have joining community activities (for example, festivals, [employer] or other activities) in the same way as anyone else can? Mild =           2   S5 How much have you been emotionally affected by your health problems? Moderate =   3   In the past 30 days, how much difficulty did you have in:   S6 Concentrating on doing something for ten minutes? None =         1   S7 Walking a long distance such as a kilometer (or equivalent)? None =         1   S8 Washing your whole body? None =         1   S9 Getting dressed? None =         1   S10 Dealing with people you do not know? None =         1   S11 Maintaining a friendship? None =         1   S12 Your day to day work? None =         1   Simple Scoring Total: 15     H1 Overall, in the past 30 days, how many days were these difficulties present? Record number of days 5   H2 In the past 30 days, for how many days were you totally unable to carry out your usual activities or work because of any health condition? Record number of days  0   H3 In the past 30 days, not counting the days that you were totally unable, for how many days did you cut back or reduce your usual activities or work  because of any health condition? Record number of days 0       Reason for Discharge:  Client is satisfied with progress and Goals completed. Client requested discharge due to meeting treatment goals and clinician's upcoming maternity leave. Client was referred to contact [facility]'s intake line in the future if ongoing services are ever needed.       Aftercare Plan:  Client may resume counseling services at any time in the future by calling the [facility] Intake Office, [phone].  Therapist coordinated discharge plan with primary care team    OMEGA Toscano

## 2017-12-22 DIAGNOSIS — F41.9 ANXIETY: ICD-10-CM

## 2017-12-22 ASSESSMENT — ANXIETY QUESTIONNAIRES: GAD7 TOTAL SCORE: 5

## 2017-12-22 NOTE — TELEPHONE ENCOUNTER
Requested Prescriptions   Pending Prescriptions Disp Refills     busPIRone (BUSPAR) 10 MG tablet [Pharmacy Med Name: BUSPIRONE 10MG TABLETS]  Medication may not be due for a refill.  Last Written Prescription Date:  11/27/2017  Last Fill Quantity: 180 tablet,  # refills: 1   Last Office Visit with [facility], P or [facility] prescribing provider:  11/27/2017   Future Office Visit:      60 tablet 0     Sig: TAKE 1 TABLET(10 MG) BY MOUTH TWICE DAILY    Atypical Antidepressants Protocol Passed    12/22/2017  3:08 AM       Passed - Recent or future visit with authorizing provider's specialty    Patient had office visit in the last year or has a visit in the next 30 days with authorizing provider.  See chart review.          Passed - Patient is age 18 or older       Passed - No active pregnancy on record       Passed - No positive pregnancy test in past 12 mos

## 2017-12-26 RX ORDER — BUSPIRONE HYDROCHLORIDE 10 MG/1
TABLET ORAL
Qty: 60 TABLET | Refills: 0 | OUTPATIENT
Start: 2017-12-26

## 2017-12-30 DIAGNOSIS — N93.8 DUB (DYSFUNCTIONAL UTERINE BLEEDING): ICD-10-CM

## 2018-01-02 RX ORDER — NORGESTIMATE AND ETHINYL ESTRADIOL
KIT
Qty: 84 TABLET | Refills: 0 | OUTPATIENT
Start: 2018-01-02

## 2018-01-02 NOTE — TELEPHONE ENCOUNTER
MONONESSA 0.25-35      Last Written Prescription Date:  12/18/17  Last Fill Quantity: 84,   # refills: 4  Last Office Visit: 12/18/17  Future Office visit:   NONE    Refill request too soon, Rx denied.

## 2018-01-02 NOTE — TELEPHONE ENCOUNTER
Another fax received requesting refill.   Called pharmacy. They stated they have refills for pt and to disregard the fax. Rx denied.

## 2018-01-10 ENCOUNTER — TELEPHONE (OUTPATIENT)
Dept: FAMILY MEDICINE | Facility: CLINIC | Age: 28
End: 2018-01-10

## 2018-01-10 DIAGNOSIS — E78.5 HYPERLIPIDEMIA WITH TARGET LDL LESS THAN 70: Primary | ICD-10-CM

## 2018-01-10 DIAGNOSIS — E10.9 TYPE 1 DIABETES MELLITUS WITHOUT COMPLICATION (H): ICD-10-CM

## 2018-01-10 NOTE — TELEPHONE ENCOUNTER
Orders signed. Is due for microalbumin so this was ordered as well. Please notify.  Electronically Signed By: Yvette Amador PA-C

## 2018-01-10 NOTE — TELEPHONE ENCOUNTER
Pt scheduled a lab only to have her cholesterol rechecked for renewal of her cholesterol medication.  The note states Dr. Varela prescribes this medication but she has not seen him since 2015.  She last saw SANG MEJIA for depression med check on 11/27/2017.  Had an annual ell exam with OBGYN on 12/18/2017.  Can we enter the lab order or will she need to be seen?  Routing to SANG MEJIA as she was the last  provider to see the patient.  Katherine Ahn

## 2018-02-01 DIAGNOSIS — E10.9 TYPE 1 DIABETES MELLITUS WITHOUT COMPLICATION (H): ICD-10-CM

## 2018-02-01 DIAGNOSIS — E78.5 HYPERLIPIDEMIA WITH TARGET LDL LESS THAN 70: ICD-10-CM

## 2018-02-01 LAB
ALT SERPL W P-5'-P-CCNC: 23 U/L (ref 0–50)
CHOLEST SERPL-MCNC: 223 MG/DL
CREAT UR-MCNC: 158 MG/DL
HDLC SERPL-MCNC: 84 MG/DL
LDLC SERPL CALC-MCNC: 125 MG/DL
MICROALBUMIN UR-MCNC: 18 MG/L
MICROALBUMIN/CREAT UR: 11.27 MG/G CR (ref 0–25)
NONHDLC SERPL-MCNC: 139 MG/DL
TRIGL SERPL-MCNC: 71 MG/DL

## 2018-02-01 PROCEDURE — 84460 ALANINE AMINO (ALT) (SGPT): CPT | Performed by: PHYSICIAN ASSISTANT

## 2018-02-01 PROCEDURE — 36415 COLL VENOUS BLD VENIPUNCTURE: CPT | Performed by: PHYSICIAN ASSISTANT

## 2018-02-01 PROCEDURE — 82043 UR ALBUMIN QUANTITATIVE: CPT | Performed by: PHYSICIAN ASSISTANT

## 2018-02-01 PROCEDURE — 80061 LIPID PANEL: CPT | Performed by: PHYSICIAN ASSISTANT

## 2018-02-01 NOTE — LETTER
February 5, 2018      Linda Agosto  [address]  [facility] [address]        Dear ,    We are writing to inform you of your test results.    -Cholesterol levels are stable from when checked 1 year ago. ADVISE: Continuing your medication, a regular exercise program with at least 30 minutes of aerobic exercise 3-4 days/week ( 45 minutes 4-6 days/week if weight loss needed), and a low saturated fat,/low carbohydrate diet are helpful to maintain this.  Rechecking your fasting cholesterol panel in 12 months is recommended (Lipid w/ LDL reflex).   -Liver test (ALT) is normal.   -Microalbumin (urine protein) test is normal.  ADVISE: recheck annually     Resulted Orders   Lipid panel reflex to direct LDL Fasting   Result Value Ref Range    Cholesterol 223 (H) <200 mg/dL      Comment:      Desirable:       <200 mg/dl    Triglycerides 71 <150 mg/dL    HDL Cholesterol 84 >49 mg/dL    LDL Cholesterol Calculated 125 (H) <100 mg/dL      Comment:      Above desirable:  100-129 mg/dl  Borderline High:  130-159 mg/dL  High:             160-189 mg/dL  Very high:       >189 mg/dl      Non HDL Cholesterol 139 (H) <130 mg/dL      Comment:      Above Desirable:  130-159 mg/dl  Borderline high:  160-189 mg/dl  High:             190-219 mg/dl  Very high:       >219 mg/dl     **ALT FUTURE 2mo   Result Value Ref Range    ALT 23 0 - 50 U/L   Albumin Random Urine Quantitative with Creat Ratio   Result Value Ref Range    Creatinine Urine 158 mg/dL    Albumin Urine mg/L 18 mg/L    Albumin Urine mg/g Cr 11.27 0 - 25 mg/g Cr       If you have any questions or concerns, please call the clinic at the number listed above.       Sincerely,        Yvette Amador PA-C

## 2018-02-03 NOTE — PROGRESS NOTES
Please call or write patient with the following results:    -Cholesterol levels are stable from when checked 1 year ago. ADVISE: Continuing your medication, a regular exercise program with at least 30 minutes of aerobic exercise 3-4 days/week ( 45 minutes 4-6 days/week if weight loss needed), and a low saturated fat,/low carbohydrate diet are helpful to maintain this.  Rechecking your fasting cholesterol panel in 12 months is recommended (Lipid w/ LDL reflex).  -Liver test (ALT) is normal.  -Microalbumin (urine protein) test is normal.  ADVISE: recheck annually    Electronically Signed By: Yvette Amador PA-C

## 2018-02-05 ENCOUNTER — TRANSFERRED RECORDS (OUTPATIENT)
Dept: HEALTH INFORMATION MANAGEMENT | Facility: CLINIC | Age: 28
End: 2018-02-05

## 2018-02-05 LAB
HBA1C MFR BLD: 7.4 % (ref 4–6)
TSH SERPL-ACNC: 2.5 UIU/ML (ref 0.3–5)

## 2018-02-06 ENCOUNTER — MYC MEDICAL ADVICE (OUTPATIENT)
Dept: FAMILY MEDICINE | Facility: CLINIC | Age: 28
End: 2018-02-06

## 2018-02-06 DIAGNOSIS — E78.5 HYPERLIPIDEMIA WITH TARGET LDL LESS THAN 70: ICD-10-CM

## 2018-02-06 RX ORDER — SIMVASTATIN 40 MG
40 TABLET ORAL AT BEDTIME
Qty: 90 TABLET | Refills: 3 | Status: SHIPPED | OUTPATIENT
Start: 2018-02-06 | End: 2021-11-11

## 2018-06-27 ENCOUNTER — TRANSFERRED RECORDS (OUTPATIENT)
Dept: HEALTH INFORMATION MANAGEMENT | Facility: CLINIC | Age: 28
End: 2018-06-27

## 2018-07-08 DIAGNOSIS — F41.9 ANXIETY: ICD-10-CM

## 2018-07-09 NOTE — TELEPHONE ENCOUNTER
"Requested Prescriptions   Pending Prescriptions Disp Refills     buPROPion (WELLBUTRIN XL) 150 MG 24 hr tablet [Pharmacy Med Name: BUPROPION XL 150MG TABLETS (24 H)]  Last Written Prescription Date:  11/27/2017  Last Fill Quantity: 90 tablet,  # refills: 1   Last office visit: 11/27/2017 with prescribing provider:  Marjorie   Future Office Visit:       90 tablet 0     Sig: TAKE 1 TABLET(150 MG) BY MOUTH EVERY MORNING    SSRIs Protocol Passed    7/8/2018 10:58 AM    PHQ-9 SCORE 12/7/2017 12/18/2017 12/21/2017   Total Score - - -   Total Score 3 0 0     TRESA-7 SCORE 12/7/2017 12/18/2017 12/21/2017   Total Score - - -   Total Score 6 3 5            Passed - Recent (12 mo) or future (30 days) visit within the authorizing provider's specialty    Patient had office visit in the last 12 months or has a visit in the next 30 days with authorizing provider or within the authorizing provider's specialty.  See \"Patient Info\" tab in inbasket, or \"Choose Columns\" in Meds & Orders section of the refill encounter.           Passed - Medication is Bupropion    If the medication is Bupropion (Wellbutrin), and the patient is taking for smoking cessation; OK to refill.         Passed - Patient is age 18 or older       Passed - No active pregnancy on record       Passed - No positive pregnancy test in last 12 months          "

## 2018-07-10 RX ORDER — BUPROPION HYDROCHLORIDE 150 MG/1
TABLET ORAL
Qty: 30 TABLET | Refills: 0 | Status: SHIPPED | OUTPATIENT
Start: 2018-07-10 | End: 2018-08-22

## 2018-07-10 NOTE — TELEPHONE ENCOUNTER
Medication is being filled for 1 time refill only due to:  Patient needs to be seen because due for 6 month follow up per last office visit note.   Silvia Holt RN, BSN  [facility]

## 2018-08-22 ENCOUNTER — TELEPHONE (OUTPATIENT)
Dept: FAMILY MEDICINE | Facility: CLINIC | Age: 28
End: 2018-08-22

## 2018-08-22 DIAGNOSIS — F41.9 ANXIETY: ICD-10-CM

## 2018-08-23 NOTE — TELEPHONE ENCOUNTER
Routing refill request to provider for review/approval because:  Aura given x1 and patient did not follow up, please advise  Patient needs to be seen because:  Due for anxiety follow up  Silvia Holt RN, BSN  [facility]

## 2018-08-23 NOTE — TELEPHONE ENCOUNTER
"Requested Prescriptions   Pending Prescriptions Disp Refills     buPROPion (WELLBUTRIN XL) 150 MG 24 hr tablet [Pharmacy Med Name: BUPROPION XL 150MG TABLETS (24 H)]  Last Written Prescription Date:  7/10/2018  Last Fill Quantity: 30 tablet,  # refills: 0   Last office visit: 11/27/2017 with prescribing provider:  Adrianna   Future Office Visit:       30 tablet 0     Sig: TAKE 1 TABLET BY MOUTH EVERY MORNING. MUST BE SEEN FOR FURTHER REFILLS    SSRIs Protocol Passed    8/22/2018  6:45 PM    PHQ-9 SCORE 12/7/2017 12/18/2017 12/21/2017   Total Score - - -   Total Score 3 0 0     TRESA-7 SCORE 12/7/2017 12/18/2017 12/21/2017   Total Score - - -   Total Score 6 3 5            Passed - Recent (12 mo) or future (30 days) visit within the authorizing provider's specialty    Patient had office visit in the last 12 months or has a visit in the next 30 days with authorizing provider or within the authorizing provider's specialty.  See \"Patient Info\" tab in inbasket, or \"Choose Columns\" in Meds & Orders section of the refill encounter.           Passed - Medication is Bupropion    If the medication is Bupropion (Wellbutrin), and the patient is taking for smoking cessation; OK to refill.         Passed - Patient is age 18 or older       Passed - No active pregnancy on record       Passed - No positive pregnancy test in last 12 months          "

## 2018-08-24 RX ORDER — BUPROPION HYDROCHLORIDE 150 MG/1
TABLET ORAL
Qty: 30 TABLET | Refills: 0 | Status: SHIPPED | OUTPATIENT
Start: 2018-08-24 | End: 2018-09-04

## 2018-08-24 NOTE — TELEPHONE ENCOUNTER
Please call pt and notify of temp refill. Ok to do a phone visit if she isn't able to come to clinic.  Electronically Signed By: Yvette Amador PA-C

## 2018-08-24 NOTE — TELEPHONE ENCOUNTER
--Please call Linda with below information and set up appointment as below. Thank you, Listete Day R.N.

## 2018-09-04 ENCOUNTER — VIRTUAL VISIT (OUTPATIENT)
Dept: FAMILY MEDICINE | Facility: CLINIC | Age: 28
End: 2018-09-04
Payer: COMMERCIAL

## 2018-09-04 DIAGNOSIS — F41.9 ANXIETY: ICD-10-CM

## 2018-09-04 PROCEDURE — 98966 PH1 ASSMT&MGMT NQHP 5-10: CPT | Performed by: PHYSICIAN ASSISTANT

## 2018-09-04 RX ORDER — BUPROPION HYDROCHLORIDE 150 MG/1
TABLET ORAL
Qty: 90 TABLET | Refills: 1 | Status: SHIPPED | OUTPATIENT
Start: 2018-09-04 | End: 2019-02-08

## 2018-09-04 RX ORDER — BUSPIRONE HYDROCHLORIDE 10 MG/1
TABLET ORAL
Qty: 180 TABLET | Refills: 1 | Status: SHIPPED | OUTPATIENT
Start: 2018-09-04 | End: 2019-02-08

## 2018-09-04 ASSESSMENT — ANXIETY QUESTIONNAIRES
IF YOU CHECKED OFF ANY PROBLEMS ON THIS QUESTIONNAIRE, HOW DIFFICULT HAVE THESE PROBLEMS MADE IT FOR YOU TO DO YOUR WORK, TAKE CARE OF THINGS AT HOME, OR GET ALONG WITH OTHER PEOPLE: NOT DIFFICULT AT ALL
7. FEELING AFRAID AS IF SOMETHING AWFUL MIGHT HAPPEN: SEVERAL DAYS
1. FEELING NERVOUS, ANXIOUS, OR ON EDGE: SEVERAL DAYS
5. BEING SO RESTLESS THAT IT IS HARD TO SIT STILL: NOT AT ALL
GAD7 TOTAL SCORE: 4
3. WORRYING TOO MUCH ABOUT DIFFERENT THINGS: SEVERAL DAYS
2. NOT BEING ABLE TO STOP OR CONTROL WORRYING: SEVERAL DAYS
6. BECOMING EASILY ANNOYED OR IRRITABLE: NOT AT ALL

## 2018-09-04 ASSESSMENT — PATIENT HEALTH QUESTIONNAIRE - PHQ9: 5. POOR APPETITE OR OVEREATING: NOT AT ALL

## 2018-09-04 NOTE — PROGRESS NOTES
"Linda Agosto is a 28 year old female who is being evaluated via a telephone visit.      The patient has been notified of following:     \"This telephone visit will be conducted via a call between you and your physician/provider. We have found that certain health care needs can be provided without the need for a physical exam.  This service lets us provide the care you need with a short phone conversation.  If a prescription is necessary we can send it directly to your pharmacy.  If lab work is needed we can place an order for that and you can then stop by our lab to have the test done at a later time.    We will bill your insurance company for this service.  Please check with your medical insurance if this type of visit is covered. You may be responsible for the cost of this type of visit if insurance coverage is denied.  The typical cost is $30 (10min), $59 (11-20min) and $85 (21-30min).  Most often these visits are shorter than 10 minutes.    If during the course of the call the physician/provider feels a telephone visit is not appropriate, you will not be charged for this service.\"       Consent has been obtained for this service by care team member: yes.   See the scanned image in the medical record.    Linda Agosto complains of  No chief complaint on file.      I have reviewed and updated the patient's Past Medical History, Social History, Family History and Medication List.    ALLERGIES  Penicillins and Sulfa drugs    Medication check for Wellbutrin - uses it for Anxiety - feels like it is effective - no trouble sleeping.    Ayanna Gayle MA        Additional provider notes:   Continues to do well on wellbutrin and buspar.  Remembers to take consistently.  No negative side effects from meds that she can tell.  No specific concerns currently.   Completed therapy for 4 months and taking a break from that for now as therapist is out on maternity leave.  Was given option to see someone else, but wished to wait " until she returned. Now is not coming back from maternity leave, but does have phone number to schedule should she feel she needs it.     Continues care with endocrinology for type 1 DM - has follow up next week on Tues for routine care.   Encouraged to have records sent from visits and of recent eye exam.  Pt also encouraged to have pneumonia and flu vaccines updated.    Assessment/Plan:    ICD-10-CM    1. Anxiety F41.9 buPROPion (WELLBUTRIN XL) 150 MG 24 hr tablet     busPIRone (BUSPAR) 10 MG tablet   stable and doing well.  Refilled for 6 months.  Advised to alternate visits every 6 months with chronic med visit and routine physical.  Ok to do by phone as stable and has multiple other appt with seeing endocrinology for her diabetes care.    I have reviewed the note as documented above.  This accurately captures the substance of my conversation with the patient,    Total time of call between patient and provider was 10 minutes

## 2018-09-04 NOTE — MR AVS SNAPSHOT
After Visit Summary   9/4/2018    Linda Agosto    MRN: [id number]           Patient Information     Date Of Birth          1990        Visit Information        Provider Department      9/4/2018 4:20 PM Yvette Rodas [facility]-C [facility]        Today's Diagnoses     Anxiety           Follow-ups after your visit        Follow-up notes from your care team     Return in about 6 months (around 3/4/2019) for anxiety.      Your next 10 appointments already scheduled     Dec 03, 2018  4:00 PM CST   Nia OB-GYN Annual Physical with Sierra Treviño Masters, DO   [facility] ([facility])    [address] [place] [id number] [phone]              Who to contact     If you have questions or need follow up information about today's clinic visit or your schedule please contact [facility] [place] directly at [phone].  Normal or non-critical lab and imaging results will be communicated to you by [employer], letter or phone within 4 business days after the clinic has received the results. If you do not hear from us within 7 days, please contact the clinic through [employer] or phone. If you have a critical or abnormal lab result, we will notify you by phone as soon as possible.  Submit refill requests through [employer] or call your pharmacy and they will forward the refill request to us. Please allow 3 business days for your refill to be completed.          Additional Information About Your Visit        [employer] Information     [employer] gives you secure access to your electronic health record. If you see a primary care provider, you can also send messages to your care team and make appointments. If you have questions, please call your primary care clinic.  If you do not have a primary care provider, please call [phone] and they will assist you.        Care EveryWhere ID     This is your Care EveryWhere ID. This could  be used by other organizations to access your [place] medical records  [id number]         Blood Pressure from Last 3 Encounters:   12/18/17 114/70   11/27/17 110/76   09/21/17 125/82    Weight from Last 3 Encounters:   12/18/17 152 lb (68.9 kg)   11/27/17 152 lb (68.9 kg)   09/21/17 153 lb 3.2 oz (69.5 kg)              Today, you had the following     No orders found for display         Today's Medication Changes          These changes are accurate as of 9/4/18  4:40 PM.  If you have any questions, ask your nurse or doctor.               These medicines have changed or have updated prescriptions.        Dose/Directions    buPROPion 150 MG 24 hr tablet   Commonly known as:  WELLBUTRIN XL   This may have changed:  See the new instructions.   Used for:  Anxiety   Changed by:  Yvette Rodas PA-C        TAKE 1 TABLET BY MOUTH EVERY MORNING.   Quantity:  90 tablet   Refills:  1         Stop taking these medicines if you haven't already. Please contact your care team if you have questions.     naproxen sodium 550 MG tablet   Commonly known as:  ANAPROX   Stopped by:  Yvette Rodas PA-C                Where to get your medicines      These medications were sent to [facility] [id number] [place] [address] [address] AT [facility] [address]  [address] [facility] [address]     Phone:  [phone]     buPROPion 150 MG 24 hr tablet    busPIRone 10 MG tablet                Primary Care Provider Office Phone # Fax #    Yvette Rodas PA-C [phone] [address]  [place] [address]        Equal Access to Services     MIC BRAVO AH: Hadii nader almaguero Sobrenann, waaxda luqadaha, qaybta kaalmada adeegyada, miriam dean. So [facility] [phone].    ATENCIÓN: Si habla español, tiene a rivera disposición servicios gratuitos de asistencia lingüística. Llame al [phone].    We comply with applicable federal civil rights laws  and [place] laws. We do not discriminate on the basis of race, color, national origin, age, disability, sex, sexual orientation, or gender identity.            Thank you!     Thank you for choosing [facility] [place]  for your care. Our goal is always to provide you with excellent care. Hearing back from our patients is one way we can continue to improve our services. Please take a few minutes to complete the written survey that you may receive in the mail after your visit with us. Thank you!             Your Updated Medication List - Protect others around you: Learn how to safely use, store and throw away your medicines at www.disposemymeds.org.          This list is accurate as of 9/4/18  4:40 PM.  Always use your most recent med list.                   Brand Name Dispense Instructions for use Diagnosis    BASAGLAR 100 UNIT/ML injection      INJ 21 UNITS SC D        blood glucose monitoring lancets     2 Box    Patient tests 8 times/day    Type 1 diabetes, HbA1c goal < 7% (H)       blood glucose monitoring test strip    VALE CONTOUR NEXT    250 strip    Patient tests 8 times/day    Type 1 diabetes, HbA1c goal < 7% (H)       buPROPion 150 MG 24 hr tablet    WELLBUTRIN XL    90 tablet    TAKE 1 TABLET BY MOUTH EVERY MORNING.    Anxiety       busPIRone 10 MG tablet    BUSPAR    180 tablet    TAKE 1 TABLET(10 MG) BY MOUTH TWICE DAILY    Anxiety       insulin aspart 100 UNITS/ML injection    NovoLOG VIAL    30 mL    Uses up to 75 Units/day    Type 1 diabetes, HbA1c goal < 7% (H)       levothyroxine 88 MCG tablet    SYNTHROID/LEVOTHROID    90 tablet    TAKE ONE TABLET BY MOUTH ONCE DAILY    Other specified hypothyroidism       norgestimate-ethinyl estradiol 0.25-35 MG-MCG per tablet    ORTHO-CYCLEN, SPRINTEC    84 tablet    Take 1 tablet by mouth daily    DUB (dysfunctional uterine bleeding)       simvastatin 40 MG tablet    ZOCOR    90 tablet    Take 1 tablet (40 mg) by mouth At Bedtime    Hyperlipidemia with  target LDL less than 70

## 2018-09-06 ASSESSMENT — ANXIETY QUESTIONNAIRES: GAD7 TOTAL SCORE: 4

## 2018-09-06 ASSESSMENT — PATIENT HEALTH QUESTIONNAIRE - PHQ9: SUM OF ALL RESPONSES TO PHQ QUESTIONS 1-9: 1

## 2018-09-13 ENCOUNTER — OFFICE VISIT (OUTPATIENT)
Dept: OBGYN | Facility: CLINIC | Age: 28
End: 2018-09-13
Payer: COMMERCIAL

## 2018-09-13 VITALS
HEIGHT: 66 IN | HEART RATE: 70 BPM | WEIGHT: 155 LBS | BODY MASS INDEX: 24.91 KG/M2 | DIASTOLIC BLOOD PRESSURE: 70 MMHG | SYSTOLIC BLOOD PRESSURE: 110 MMHG

## 2018-09-13 DIAGNOSIS — Z30.011 VISIT FOR ORAL CONTRACEPTIVE PRESCRIPTION: ICD-10-CM

## 2018-09-13 DIAGNOSIS — N93.8 DUB (DYSFUNCTIONAL UTERINE BLEEDING): Primary | ICD-10-CM

## 2018-09-13 LAB — BETA HCG QUAL IFA URINE: NEGATIVE

## 2018-09-13 PROCEDURE — 84703 CHORIONIC GONADOTROPIN ASSAY: CPT | Performed by: OBSTETRICS & GYNECOLOGY

## 2018-09-13 PROCEDURE — 99214 OFFICE O/P EST MOD 30 MIN: CPT | Performed by: OBSTETRICS & GYNECOLOGY

## 2018-09-13 RX ORDER — NORGESTIMATE AND ETHINYL ESTRADIOL
KIT
Qty: 28 TABLET | Refills: 0 | Status: SHIPPED | OUTPATIENT
Start: 2018-09-13 | End: 2018-10-03

## 2018-09-13 RX ORDER — NORGESTIMATE AND ETHINYL ESTRADIOL 0.25-0.035
1 KIT ORAL DAILY
Qty: 84 TABLET | Refills: 3 | Status: SHIPPED | OUTPATIENT
Start: 2018-09-13 | End: 2019-10-24

## 2018-09-13 NOTE — MR AVS SNAPSHOT
After Visit Summary   9/13/2018    Linda Agosto    MRN: [id number]           Patient Information     Date Of Birth          1990        Visit Information        Provider Department      9/13/2018 9:30 AM Sierra Santoyo DO [facility]        Today's Diagnoses     DUB (dysfunctional uterine bleeding)    -  1    Visit for oral contraceptive prescription           Follow-ups after your visit        Follow-up notes from your care team     Return in about 3 months (around 12/13/2018), or if symptoms worsen or fail to improve.      Your next 10 appointments already scheduled     Dec 03, 2018  4:00 PM CST   Nia OB-GYN Annual Physical with Sierra Santoyo DO   [facility] ([facility])    [address] [place] [id number] [phone]              Who to contact     If you have questions or need follow up information about today's clinic visit or your schedule please contact [facility] directly at [phone].  Normal or non-critical lab and imaging results will be communicated to you by [employer], letter or phone within 4 business days after the clinic has received the results. If you do not hear from us within 7 days, please contact the clinic through [employer] or phone. If you have a critical or abnormal lab result, we will notify you by phone as soon as possible.  Submit refill requests through [employer] or call your pharmacy and they will forward the refill request to us. Please allow 3 business days for your refill to be completed.          Additional Information About Your Visit        [employer] Information     [employer] gives you secure access to your electronic health record. If you see a primary care provider, you can also send messages to your care team and make appointments. If you have questions, please call your primary care clinic.  If you do not have a primary care provider,  "please call [phone] and they will assist you.        Care EveryWhere ID     This is your Care EveryWhere ID. This could be used by other organizations to access your [place] medical records  [id number]        Your Vitals Were     Pulse Height Last Period BMI (Body Mass Index)          70 5' 5.5\" (1.664 m) 09/02/2018 (Exact Date) 25.4 kg/m2         Blood Pressure from Last 3 Encounters:   09/13/18 110/70   12/18/17 114/70   11/27/17 110/76    Weight from Last 3 Encounters:   09/13/18 155 lb (70.3 kg)   12/18/17 152 lb (68.9 kg)   11/27/17 152 lb (68.9 kg)              We Performed the Following     Beta HCG Qual, Urine - [facility] ([id number])          Today's Medication Changes          These changes are accurate as of 9/13/18 10:12 PM.  If you have any questions, ask your nurse or doctor.               Start taking these medicines.        Dose/Directions    * MONONESSA 0.25-35 MG-MCG per tablet   Used for:  DUB (dysfunctional uterine bleeding)   Generic drug:  norgestimate-ethinyl estradiol   Replaces:  norgestimate-ethinyl estradiol 0.25-35 MG-MCG per tablet   Started by:  Sierra Santoyo DO        Take 3 pills by mouth daily for 3 days, then 2 pills daily for 2 days, then one pill per day for remainder of pack   Quantity:  28 tablet   Refills:  0       * norgestimate-ethinyl estradiol 0.25-35 MG-MCG per tablet   Commonly known as:  MONONESSA   Used for:  Visit for oral contraceptive prescription   Started by:  Sierra Santoyo DO        Dose:  1 tablet   Take 1 tablet by mouth daily   Quantity:  84 tablet   Refills:  3       * Notice:  This list has 2 medication(s) that are the same as other medications prescribed for you. Read the directions carefully, and ask your doctor or other care provider to review them with you.      Stop taking these medicines if you haven't already. Please contact your care team if you have questions.     norgestimate-ethinyl estradiol 0.25-35 MG-MCG per " tablet   Commonly known as:  ORTHO-CYCLEN, SPRINTEC   Replaced by:  MONONESSA 0.25-35 MG-MCG per tablet   Stopped by:  sSierra, DO                Where to get your medicines      These medications were sent to [facility] - [place], [place] - [address] AT [facility] [address]  [address], [facility] [address]     Phone:  [phone]     MONONESSA 0.25-35 MG-MCG per tablet    norgestimate-ethinyl estradiol 0.25-35 MG-MCG per tablet                Primary Care Provider Office Phone # Fax #    Yvette Rodas PA-C [phone] [address]  [place] [address]        Equal Access to Services     TJ BRAVO : Hadii aad ku hadasho Soomaali, waaxda luqadaha, qaybta kaalmada adeegyada, miriam white haytim rmoo . So [facility] [phone].    ATENCIÓN: Si habla español, tiene a rivera disposición servicios gratuitos de asistencia lingüística. [facility] [phone].    We comply with applicable federal civil rights laws and [place] laws. We do not discriminate on the basis of race, color, national origin, age, disability, sex, sexual orientation, or gender identity.            Thank you!     Thank you for choosing [facility]  for your care. Our goal is always to provide you with excellent care. Hearing back from our patients is one way we can continue to improve our services. Please take a few minutes to complete the written survey that you may receive in the mail after your visit with us. Thank you!             Your Updated Medication List - Protect others around you: Learn how to safely use, store and throw away your medicines at www.disposemymeds.org.          This list is accurate as of 9/13/18 10:12 PM.  Always use your most recent med list.                   Brand Name Dispense Instructions for use Diagnosis    BASAGLAR 100 UNIT/ML injection      INJ 21 UNITS SC D        blood glucose monitoring lancets     2 Box     Patient tests 8 times/day    Type 1 diabetes, HbA1c goal < 7% (H)       blood glucose monitoring test strip    VALE CONTOUR NEXT    250 strip    Patient tests 8 times/day    Type 1 diabetes, HbA1c goal < 7% (H)       buPROPion 150 MG 24 hr tablet    WELLBUTRIN XL    90 tablet    TAKE 1 TABLET BY MOUTH EVERY MORNING.    Anxiety       busPIRone 10 MG tablet    BUSPAR    180 tablet    TAKE 1 TABLET(10 MG) BY MOUTH TWICE DAILY    Anxiety       insulin aspart 100 UNITS/ML injection    NovoLOG VIAL    30 mL    Uses up to 75 Units/day    Type 1 diabetes, HbA1c goal < 7% (H)       levothyroxine 88 MCG tablet    SYNTHROID/LEVOTHROID    90 tablet    TAKE ONE TABLET BY MOUTH ONCE DAILY    Other specified hypothyroidism       * MONONESSA 0.25-35 MG-MCG per tablet   Generic drug:  norgestimate-ethinyl estradiol     28 tablet    Take 3 pills by mouth daily for 3 days, then 2 pills daily for 2 days, then one pill per day for remainder of pack    DUB (dysfunctional uterine bleeding)       * norgestimate-ethinyl estradiol 0.25-35 MG-MCG per tablet    MONONESSA    84 tablet    Take 1 tablet by mouth daily    Visit for oral contraceptive prescription       simvastatin 40 MG tablet    ZOCOR    90 tablet    Take 1 tablet (40 mg) by mouth At Bedtime    Hyperlipidemia with target LDL less than 70       * Notice:  This list has 2 medication(s) that are the same as other medications prescribed for you. Read the directions carefully, and ask your doctor or other care provider to review them with you.

## 2018-09-13 NOTE — PROGRESS NOTES
SUBJECTIVE:                                                   Linda Agosto is a 28 year old female who presents to clinic today for the following health issue(s):  Patient presents with:  Abnormal Bleeding Problem: c/o recent period coming a week early and has been ongoing since ; inbetween heavy to light spotting      HPI:  Was taking OCPs regularly, not missing, getting periods regularly. Then one day took pill in afternoon instead of the morning. Couple days later had period week early and continued for the last 13days. Bleeding was like full period, now the bleeding is not heavy but needs tampon. Has continued to take OCP throughout  No cramping/pain/n/v.     Pharmacy has been changing manufacterures of her OCPs,about every 2 refills will get different pills.    No changes in DM. No med changes.  Reviewed EPIC, last TSH 2.5 in February.     USes condom in addition to her OCPs.     Review of PMH, SocHx, SurHx, FHx, medications completed. Epic updated.      Patient's last menstrual period was 2018 (exact date)..   Patient is sexually active, .  Using oral contraceptives for contraception.    reports that she has never smoked. She has never used smokeless tobacco.    STD testing offered?  Declined    Health maintenance updated:  yes      Problem list and histories reviewed & adjusted, as indicated.  Additional history: as documented.    Patient Active Problem List   Diagnosis     Allergic rhinitis     Allergic state     Type 1 diabetes, HbA1c goal < 7% (H)     Other specified hypothyroidism     Anxiety     Hyperlipidemia with target LDL less than 70     Type 1 diabetes mellitus without complication (H) - \[facility] [place] [facility]\"" [facility], Dr. Taylor     Common migraine     Past Surgical History:   Procedure Laterality Date     APPENDECTOMY      dr deshpande     PE TUBES        Social History   Substance Use Topics     Smoking status: Never Smoker     Smokeless tobacco: Never Used      "Alcohol use Yes      Comment: sometimes couple on weekends      Problem (# of Occurrences) Relation (Name,Age of Onset)    Genetic Disorder (1) Paternal Grandfather: kidney    Neurologic Disorder (1) Maternal Grandmother: dementia       Negative family history of: Family History Negative            Current Outpatient Prescriptions   Medication Sig     blood glucose (VALE CONTOUR NEXT) test strip Patient tests 8 times/day     blood glucose monitoring (VALE MICROLET) lancets Patient tests 8 times/day     buPROPion (WELLBUTRIN XL) 150 MG 24 hr tablet TAKE 1 TABLET BY MOUTH EVERY MORNING.     busPIRone (BUSPAR) 10 MG tablet TAKE 1 TABLET(10 MG) BY MOUTH TWICE DAILY     insulin aspart (NOVOLOG VIAL) 100 UNITS/ML VIAL Uses up to 75 Units/day     insulin glargine (BASAGLAR KWIKPEN) 100 UNIT/ML injection INJ 21 UNITS SC D     levothyroxine (SYNTHROID, LEVOTHROID) 88 MCG tablet TAKE ONE TABLET BY MOUTH ONCE DAILY     MONONESSA 0.25-35 MG-MCG per tablet Take 3 pills by mouth daily for 3 days, then 2 pills daily for 2 days, then one pill per day for remainder of pack     norgestimate-ethinyl estradiol (MONONESSA) 0.25-35 MG-MCG per tablet Take 1 tablet by mouth daily     simvastatin (ZOCOR) 40 MG tablet Take 1 tablet (40 mg) by mouth At Bedtime     [DISCONTINUED] norgestimate-ethinyl estradiol (ORTHO-CYCLEN, SPRINTEC) 0.25-35 MG-MCG per tablet Take 1 tablet by mouth daily     No current facility-administered medications for this visit.      Allergies   Allergen Reactions     Penicillins Rash     augmentin & pcn     Sulfa Drugs Hives       ROS:  12 point review of systems negative other than symptoms noted below.  Genitourinary: Irregular Menses and Spotting    OBJECTIVE:     /70  Pulse 70  Ht 5' 5.5\" (1.664 m)  Wt 155 lb (70.3 kg)  LMP 09/02/2018 (Exact Date)  BMI 25.4 kg/m2  Body mass index is 25.4 kg/(m^2).    Exam:  Constitutional:  Appearance: Well nourished, well developed alert, in no acute distress  Chest:  " Respiratory Effort:  Breathing unlabored  Neurologic/Psychiatric:  Mental Status:  Oriented X3      In-Clinic Test Results:  Results for orders placed or performed in visit on 09/13/18 (from the past 24 hour(s))   Beta HCG Qual, Urine - [facility] ([id number])   Result Value Ref Range    Beta HCG Qual IFA Urine Negative NEG^Negative          ASSESSMENT/PLAN:                                                        ICD-10-CM    1. DUB (dysfunctional uterine bleeding) N93.8 MONONESSA 0.25-35 MG-MCG per tablet     Beta HCG Qual, Urine - [facility] ([id number])   2. Visit for oral contraceptive prescription Z30.011 norgestimate-ethinyl estradiol (MONONESSA) 0.25-35 MG-MCG per tablet       Will r/o pregnancy, though suspicions low.   Start OCP taper-3pills/d x 3d, 2pills/d x 2d, then 1pill/d for rest of pack. Discussed potential SE of increased OCP use including VTE. Switch to brand that she previously was on and did well.   Questions answered    Sierra Higginss,   [facility] [place]

## 2018-10-03 DIAGNOSIS — N93.8 DUB (DYSFUNCTIONAL UTERINE BLEEDING): ICD-10-CM

## 2018-10-03 RX ORDER — NORGESTIMATE AND ETHINYL ESTRADIOL
1 KIT DAILY
Qty: 28 TABLET | Refills: 0 | OUTPATIENT
Start: 2018-10-03

## 2018-10-03 NOTE — TELEPHONE ENCOUNTER
Pt seen in clinic with AM for DUB and Rx was sent for refll for a full year. Refill request refused and noted sent to pharmacy that an additional rx was sent 9/13/18 for full year.

## 2018-10-03 NOTE — TELEPHONE ENCOUNTER
"Requested Prescriptions   Pending Prescriptions Disp Refills     MONONESSA 0.25-35 MG-MCG per tablet [Pharmacy Med Name: MONONESSA TABLETS 28S] 28 tablet 0     Sig: TAKE 3 PILLS BY MOUTH DAILY FOR 3 DAYS, THEN 2 PILLS DAILY FOR 2 DAYS, THEN 1 PILL PER DAY FOR REMAINDER OF PACK    Contraceptives Protocol Passed    10/3/2018  8:39 AM       Passed - Patient is not a current smoker if age is 35 or older       Passed - Recent (12 mo) or future (30 days) visit within the authorizing provider's specialty    Patient had office visit in the last 12 months or has a visit in the next 30 days with authorizing provider or within the authorizing provider's specialty.  See \"Patient Info\" tab in inbasket, or \"Choose Columns\" in Meds & Orders section of the refill encounter.           Passed - No active pregnancy on record       Passed - No positive pregnancy test in past 12 months        Last Written Prescription Date:  9/13/2018  Last Fill Quantity: 28,  # refills: 0   Last office visit: 9/13/2018 with prescribing provider:  Natanael   Future Office Visit:  None    "

## 2018-10-08 DIAGNOSIS — N93.8 DUB (DYSFUNCTIONAL UTERINE BLEEDING): ICD-10-CM

## 2018-10-08 RX ORDER — NORGESTIMATE AND ETHINYL ESTRADIOL
KIT
Qty: 84 TABLET | Refills: 0 | OUTPATIENT
Start: 2018-10-08

## 2018-10-08 NOTE — TELEPHONE ENCOUNTER
"Refused Refill request as too early and already spoke with pharmacist and refill was being prepared.    Refill request for  Requested Prescriptions   Refused Prescriptions Disp Refills     MONONESSA 0.25-35 MG-MCG per tablet [Pharmacy Med Name: MONONESSA TABLETS 28S] 84 tablet 0     Sig: TAKE 1 TABLET BY MOUTH DAILY    Contraceptives Protocol Passed    10/8/2018 11:48 AM       Passed - Patient is not a current smoker if age is 35 or older       Passed - Recent (12 mo) or future (30 days) visit within the authorizing provider's specialty    Patient had office visit in the last 12 months or has a visit in the next 30 days with authorizing provider or within the authorizing provider's specialty.  See \"Patient Info\" tab in inbasket, or \"Choose Columns\" in Meds & Orders section of the refill encounter.           Passed - No active pregnancy on record       Passed - No positive pregnancy test in past 12 months        Last Written Prescription Date:  9/13/18  Last Fill Quantity: 84,  # refills: 3  Last office visit: 9/13/2018 with prescribing provider:  Natanael   Future Office Visit:      "

## 2018-11-08 ENCOUNTER — ALLIED HEALTH/NURSE VISIT (OUTPATIENT)
Dept: NURSING | Facility: CLINIC | Age: 28
End: 2018-11-08
Payer: COMMERCIAL

## 2018-11-08 DIAGNOSIS — Z23 NEED FOR PROPHYLACTIC VACCINATION AND INOCULATION AGAINST INFLUENZA: Primary | ICD-10-CM

## 2018-11-08 PROCEDURE — 90471 IMMUNIZATION ADMIN: CPT

## 2018-11-08 PROCEDURE — 90686 IIV4 VACC NO PRSV 0.5 ML IM: CPT

## 2018-11-08 NOTE — PROGRESS NOTES

## 2018-11-08 NOTE — MR AVS SNAPSHOT
After Visit Summary   11/8/2018    Linda Agosto    MRN: [id number]           Patient Information     Date Of Birth          1990        Visit Information        Provider Department      11/8/2018 11:30 AM [facility]PSON[facility]        Today's Diagnoses     Need for prophylactic vaccination and inoculation against influenza    -  1       Follow-ups after your visit        Your next 10 appointments already scheduled     Dec 03, 2018  4:00 PM RACHEL Kramer OB-GYN Annual Physical with Sierra Treviño Masters, DO   [facility] ([facility])    [address] [place] [id number] [phone]              Who to contact     If you have questions or need follow up information about today's clinic visit or your schedule please contact [facility] [place] directly at [phone].  Normal or non-critical lab and imaging results will be communicated to you by TidalScalehart, letter or phone within 4 business days after the clinic has received the results. If you do not hear from us within 7 days, please contact the clinic through TidalScalehart or phone. If you have a critical or abnormal lab result, we will notify you by phone as soon as possible.  Submit refill requests through Beatpacking or call your pharmacy and they will forward the refill request to us. Please allow 3 business days for your refill to be completed.          Additional Information About Your Visit        MyChart Information     Beat[employer]ing gives you secure access to your electronic health record. If you see a primary care provider, you can also send messages to your care team and make appointments. If you have questions, please call your primary care clinic.  If you do not have a primary care provider, please call [phone] and they will assist you.        Care EveryWhere ID     This is your Care EveryWhere ID. This could be used by other organizations to access your [place]  medical records  [id number]         Blood Pressure from Last 3 Encounters:   09/13/18 110/70   12/18/17 114/70   11/27/17 110/76    Weight from Last 3 Encounters:   09/13/18 155 lb (70.3 kg)   12/18/17 152 lb (68.9 kg)   11/27/17 152 lb (68.9 kg)              We Performed the Following     FLU VACCINE, SPLIT VIRUS, IM (QUADRIVALENT) [25700]- >3 YRS     Vaccine Administration, Initial [59939]        Primary Care Provider Office Phone # Fax #    Yvette Marjorie Rodas PA-C [phone] [address]  [place] [address]        Equal Access to Services     MIC BRAVO : Hadii nader ku hadasho Soomaali, waaxda luqadaha, qaybta kaalmada adeegyada, miriam romo . So [facility] [phone].    ATENCIÓN: Si habla español, tiene a rivera disposición servicios gratuitos de asistencia lingüística. [facility] [place] [phone].    We comply with applicable federal civil rights laws and [place] laws. We do not discriminate on the basis of race, color, national origin, age, disability, sex, sexual orientation, or gender identity.            Thank you!     Thank you for choosing [facility]  for your care. Our goal is always to provide you with excellent care. Hearing back from our patients is one way we can continue to improve our services. Please take a few minutes to complete the written survey that you may receive in the mail after your visit with us. Thank you!             Your Updated Medication List - Protect others around you: Learn how to safely use, store and throw away your medicines at www.disposemymeds.org.          This list is accurate as of 11/8/18 11:36 AM.  Always use your most recent med list.                   Brand Name Dispense Instructions for use Diagnosis    BASAGLAR 100 UNIT/ML injection      INJ 21 UNITS SC D        blood glucose monitoring lancets     2 Box    Patient tests 8 times/day    Type 1 diabetes, HbA1c goal < 7% (H)       blood glucose monitoring  test strip    VALE CONTOUR NEXT    250 strip    Patient tests 8 times/day    Type 1 diabetes, HbA1c goal < 7% (H)       buPROPion 150 MG 24 hr tablet    WELLBUTRIN XL    90 tablet    TAKE 1 TABLET BY MOUTH EVERY MORNING.    Anxiety       busPIRone 10 MG tablet    BUSPAR    180 tablet    TAKE 1 TABLET(10 MG) BY MOUTH TWICE DAILY    Anxiety       insulin aspart 100 UNITS/ML injection    NovoLOG VIAL    30 mL    Uses up to 75 Units/day    Type 1 diabetes, HbA1c goal < 7% (H)       levothyroxine 88 MCG tablet    SYNTHROID/LEVOTHROID    90 tablet    TAKE ONE TABLET BY MOUTH ONCE DAILY    Other specified hypothyroidism       norgestimate-ethinyl estradiol 0.25-35 MG-MCG per tablet    MONONESSA    84 tablet    Take 1 tablet by mouth daily    Visit for oral contraceptive prescription       simvastatin 40 MG tablet    ZOCOR    90 tablet    Take 1 tablet (40 mg) by mouth At Bedtime    Hyperlipidemia with target LDL less than 70

## 2018-12-18 ENCOUNTER — OFFICE VISIT (OUTPATIENT)
Dept: MIDWIFE SERVICES | Facility: CLINIC | Age: 28
End: 2018-12-18
Payer: COMMERCIAL

## 2018-12-18 VITALS — BODY MASS INDEX: 25.56 KG/M2 | WEIGHT: 156 LBS | DIASTOLIC BLOOD PRESSURE: 80 MMHG | SYSTOLIC BLOOD PRESSURE: 132 MMHG

## 2018-12-18 DIAGNOSIS — R30.0 DYSURIA: Primary | ICD-10-CM

## 2018-12-18 DIAGNOSIS — N76.0 BACTERIAL VAGINOSIS: ICD-10-CM

## 2018-12-18 DIAGNOSIS — B96.89 BACTERIAL VAGINOSIS: ICD-10-CM

## 2018-12-18 LAB
ALBUMIN UR-MCNC: NEGATIVE MG/DL
APPEARANCE UR: CLEAR
BILIRUB UR QL STRIP: NEGATIVE
COLOR UR AUTO: YELLOW
GLUCOSE UR STRIP-MCNC: NEGATIVE MG/DL
HGB UR QL STRIP: NEGATIVE
KETONES UR STRIP-MCNC: NEGATIVE MG/DL
LEUKOCYTE ESTERASE UR QL STRIP: ABNORMAL
NITRATE UR QL: NEGATIVE
NON-SQ EPI CELLS #/AREA URNS LPF: ABNORMAL /LPF
PH UR STRIP: 7.5 PH (ref 5–7)
RBC #/AREA URNS AUTO: ABNORMAL /HPF
SOURCE: ABNORMAL
SP GR UR STRIP: 1.01 (ref 1–1.03)
SPECIMEN SOURCE: ABNORMAL
UROBILINOGEN UR STRIP-ACNC: 0.2 EU/DL (ref 0.2–1)
WBC #/AREA URNS AUTO: ABNORMAL /HPF
WET PREP SPEC: ABNORMAL

## 2018-12-18 PROCEDURE — 81001 URINALYSIS AUTO W/SCOPE: CPT | Performed by: ADVANCED PRACTICE MIDWIFE

## 2018-12-18 PROCEDURE — 87591 N.GONORRHOEAE DNA AMP PROB: CPT | Performed by: ADVANCED PRACTICE MIDWIFE

## 2018-12-18 PROCEDURE — 87086 URINE CULTURE/COLONY COUNT: CPT | Performed by: ADVANCED PRACTICE MIDWIFE

## 2018-12-18 PROCEDURE — 87210 SMEAR WET MOUNT SALINE/INK: CPT | Performed by: ADVANCED PRACTICE MIDWIFE

## 2018-12-18 PROCEDURE — 99213 OFFICE O/P EST LOW 20 MIN: CPT | Performed by: ADVANCED PRACTICE MIDWIFE

## 2018-12-18 PROCEDURE — 87491 CHLMYD TRACH DNA AMP PROBE: CPT | Performed by: ADVANCED PRACTICE MIDWIFE

## 2018-12-18 RX ORDER — NITROFURANTOIN 25; 75 MG/1; MG/1
100 CAPSULE ORAL 2 TIMES DAILY
Qty: 10 CAPSULE | Refills: 0 | Status: CANCELLED | OUTPATIENT
Start: 2018-12-18 | End: 2018-12-23

## 2018-12-18 RX ORDER — METRONIDAZOLE 500 MG/1
500 TABLET ORAL 2 TIMES DAILY
Qty: 14 TABLET | Refills: 0 | Status: SHIPPED | OUTPATIENT
Start: 2018-12-18 | End: 2019-02-08

## 2018-12-18 NOTE — PATIENT INSTRUCTIONS
Vaginitis (Vaginal Irritation/Infection)    Vaginitis is very common!  The most common vaginal infections are bacterial vaginosis or yeast. These infections are not sexually transmitted but can be incredibly uncomfortable. Seek care from your midwife if signs or symptoms arise.     Normal vaginal discharge:      Is white, clear, thick or thin (it may change depending on where you are in your cycle)    Does not have a foul odor    The amount of discharge varies    Abnormal discharge/symptoms:       Itching in and around the vagina    Redness, pain or swelling    Discharge that is foamy, greenish, curd like, or bloody    Foul smelling odor    Pain when urinating or having sex    Fever    Causes of vaginal infections:      Good bacteria from the vagina have been destroyed by bad bacteria    Reaction to something in the vagina such as a tampon or scented/perfumed soaps or bubble bath    STI's    Sensitivities to soaps/detergents/dryer sheets, lubricants, etc.    Hormonal changes    Recent use of antibiotics     Infections can also occur after you've had intercourse with a new partner or if you have had frequent intercourse         Here is a list of suggestions that may help prevent/treat vaginal infections and will help maintain a healthy vaginal environment:      1.  Boosting your immune system so you can heal faster      Make sure you are getting adequate sleep    Drink 2-3 quarts of fluids per day, Cranberries or cranberry juice (unsweetened)    Eat more nuts, grains, raw veggies, yogurt, mikki, grapefruit    Decrease intake of refined sugar, red meat and alcohol    Echinacea - 3 times a day for chronic problem or every 2 hours for acute symptoms; use as directed on bottle          2.  Changing the vaginal environment to a more acid state       Soak in a warm bath tub with one cup of vinegar or lemon juice. Do not use scented soap, bubble bath, or oils.       3.  Increasing the good healthy bacteria      At each  meal drink 1 tsp apple cider vinegar and 1 tsp honey in   cup warm water    Eat garlic daily, capsules or fresh.      Take probiotics 4-8 billion units/day      4.  Preventive measures      Wear cotton underwear, no thongs.  Do not wear tight clothes or pantyhose    Shower soon after working or change out of sweaty clothing     Do not wear underwear to bed.  The vaginal environment needs to breathe    Never douche or use vaginal , the vagina is self-cleaning!    Use white, unscented toilet paper.  Do not use baby wipes.  Wipe from front to back    Use only unscented tampons and pads, buy organic products if desired    Do not use perfumes/oils/lotions near your vagina or take bubble baths    Use only mild, unscented soaps around your vaginal area     Do not use fabric softeners/dryer sheets    Use gentle, unscented detergent, consider buying non-petroleum based detergents    Use only water based lubricants during sexual contact    Abstain from intercourse during times of infection      If your symptoms do not resolve or if you have questions please call:     [facility]   [phone]

## 2018-12-18 NOTE — PROGRESS NOTES
SUBJECTIVE: Linda Agosto is a 28 year old female who presents today with UTI symptoms: Feels like she is getting UTI's after sex. Was treated on the 12/4, felt like symptoms went away, but have now returned. Reports UTI in October, November and December. All visits were by an E-visit     URINARY TRACT SYMPTOMS     Onset: started today    Description:   Painful urination (Dysuria): Yes  Blood in urine (Hematuria): No  Urgency/Frequency: Yes    Progression of Symptoms:  worsening    Accompanying Signs & Symptoms:  Fever/chills: No  Flank pain: No  Nausea and vomiting: No  Any vaginal symptoms: No  Abdominal/Pelvic Pain: No   History:   History of frequent UTI's: Yes  History of kidney stones: No  Sexually Active: Yes One new sexual partner since April.   Possibility of pregnancy: No  Contraceptive type:  condoms and oral contraceptive    Precipitating factors:            Therapies Tried and outcome: Was treated with Macrobid X 5 days on 12/4.   Macrobid x5 days.     Patient Active Problem List   Diagnosis     Allergic rhinitis     Allergic state     Type 1 diabetes, HbA1c goal < 7% (H)     Other specified hypothyroidism     Anxiety     Hyperlipidemia with target LDL less than 70     Type 1 diabetes mellitus without complication (H) - [facility], Dr. Taylor     Common migraine     Past Medical History:   Diagnosis Date     Allergic rhinitis, cause unspecified     h/o AIT     Common migraine     No visual aura.      Hyperlipidemia LDL goal < 70      Hypothyroidism      Infectious mononucleosis 1995     Tuberculosis      Type 1 diabetes, HbA1c goal < 7% (H) 3/04     followed [facility] endocrinology - now Dr Jimenez     Past Surgical History:   Procedure Laterality Date     APPENDECTOMY  8/07    dr deshpande     PE TUBES  1996     Current Outpatient Medications   Medication Sig Dispense Refill     blood glucose (VALE CONTOUR NEXT) test strip Patient tests 8 times/day 250 strip 6     blood glucose  monitoring (VALE MICROLET) lancets Patient tests 8 times/day 2 Box 6     buPROPion (WELLBUTRIN XL) 150 MG 24 hr tablet TAKE 1 TABLET BY MOUTH EVERY MORNING. 90 tablet 1     busPIRone (BUSPAR) 10 MG tablet TAKE 1 TABLET(10 MG) BY MOUTH TWICE DAILY 180 tablet 1     insulin aspart (NOVOLOG VIAL) 100 UNITS/ML VIAL Uses up to 75 Units/day 30 mL 2     insulin glargine (BASAGLAR KWIKPEN) 100 UNIT/ML injection INJ 21 UNITS SC D  0     levothyroxine (SYNTHROID, LEVOTHROID) 88 MCG tablet TAKE ONE TABLET BY MOUTH ONCE DAILY 90 tablet 3     norgestimate-ethinyl estradiol (MONONESSA) 0.25-35 MG-MCG per tablet Take 1 tablet by mouth daily 84 tablet 3     simvastatin (ZOCOR) 40 MG tablet Take 1 tablet (40 mg) by mouth At Bedtime 90 tablet 3     Allergies   Allergen Reactions     Penicillins Rash     augmentin & pcn     Sulfa Drugs Hives       Health maintenance updated:  yes    ROS:  12 point review of systems negative other than symptoms noted below.  Genitourinary: Painful Urination and Urgency    PHYSICAL EXAM:  Vitals: /80   Wt 70.8 kg (156 lb)   LMP 12/01/2018 (Approximate)   Breastfeeding? No   BMI 25.56 kg/m    BMI= Body mass index is 25.56 kg/m .  Patient appears well, afebrile.   ABD: Soft without supra pubic pain or tenderness  BACK: Neg CVAT.     No components found for: URINE        ASSESSMENT/PLAN:      ICD-10-CM    1. Dysuria R30.0 UA with Microscopic     Wet  Prep     Neisseria gonorrhoeae PCR     Chlamydia trachomatis PCR     Urine Culture Aerobic Bacterial       COUNSELING:  Discussed screening for STDs today since having a new sexual partner.   Will call with lab results when available. Discussed UA results in clinic with patient present. Will send for a urine culture.  Briefly discussed post coital treatment in the future. Will await urine cx results to see if she has a Macrobid resistant strain of bacteria.     Push fluids    May use Uristat or other OTC med for dysuria  Handout provided regarding  UTI prevention  RTC with continued symptoms or concerns.    Ellen SERRATO CNM      15 minutes was spent face to face with the patient today discussing her history, diagnosis, and follow-up plan as noted above. Over 50% of the visit was spent in counseling and coordination of care.    Total Visit Time: 15 minutes.

## 2018-12-19 LAB
BACTERIA SPEC CULT: NO GROWTH
C TRACH DNA SPEC QL NAA+PROBE: NEGATIVE
Lab: NORMAL
N GONORRHOEA DNA SPEC QL NAA+PROBE: NEGATIVE
SPECIMEN SOURCE: NORMAL

## 2018-12-23 DIAGNOSIS — N93.8 DUB (DYSFUNCTIONAL UTERINE BLEEDING): ICD-10-CM

## 2018-12-24 RX ORDER — NORGESTIMATE AND ETHINYL ESTRADIOL
KIT
Qty: 84 TABLET | Refills: 0 | Status: SHIPPED | OUTPATIENT
Start: 2018-12-24 | End: 2019-01-17

## 2018-12-24 NOTE — TELEPHONE ENCOUNTER
"Requested Prescriptions   Pending Prescriptions Disp Refills     MONONESSA 0.25-35 MG-MCG tablet [Pharmacy Med Name: MONONESSA TABLETS 28S] 84 tablet 0     Sig: TAKE 1 TABLET BY MOUTH DAILY    Contraceptives Protocol Passed - 12/23/2018  1:55 PM       Passed - Patient is not a current smoker if age is 35 or older       Passed - Recent (12 mo) or future (30 days) visit within the authorizing provider's specialty    Patient had office visit in the last 12 months or has a visit in the next 30 days with authorizing provider or within the authorizing provider's specialty.  See \"Patient Info\" tab in inbasket, or \"Choose Columns\" in Meds & Orders section of the refill encounter.             Passed - No active pregnancy on record       Passed - No positive pregnancy test in past 12 months        Last Written Prescription Date:  9/13/18  Last Fill Quantity: 84,  # refills: 3   Last office visit: 12/17/2018 with prescribing provider:  Sierra Santoyo   Future Office Visit: None    "

## 2019-01-02 ENCOUNTER — TRANSFERRED RECORDS (OUTPATIENT)
Dept: HEALTH INFORMATION MANAGEMENT | Facility: CLINIC | Age: 29
End: 2019-01-02

## 2019-01-04 ENCOUNTER — OFFICE VISIT (OUTPATIENT)
Dept: MIDWIFE SERVICES | Facility: CLINIC | Age: 29
End: 2019-01-04
Payer: COMMERCIAL

## 2019-01-04 VITALS
HEART RATE: 72 BPM | HEIGHT: 66 IN | SYSTOLIC BLOOD PRESSURE: 114 MMHG | WEIGHT: 156 LBS | DIASTOLIC BLOOD PRESSURE: 70 MMHG | BODY MASS INDEX: 25.07 KG/M2

## 2019-01-04 DIAGNOSIS — R82.90 NONSPECIFIC FINDING ON EXAMINATION OF URINE: ICD-10-CM

## 2019-01-04 DIAGNOSIS — B96.89 BACTERIAL VAGINOSIS: ICD-10-CM

## 2019-01-04 DIAGNOSIS — R30.0 DYSURIA: Primary | ICD-10-CM

## 2019-01-04 DIAGNOSIS — N76.0 BACTERIAL VAGINOSIS: ICD-10-CM

## 2019-01-04 LAB
ALBUMIN UR-MCNC: NEGATIVE MG/DL
APPEARANCE UR: CLEAR
BACTERIA #/AREA URNS HPF: ABNORMAL /HPF
BILIRUB UR QL STRIP: NEGATIVE
COLOR UR AUTO: YELLOW
GLUCOSE UR STRIP-MCNC: ABNORMAL MG/DL
HGB UR QL STRIP: ABNORMAL
KETONES UR STRIP-MCNC: NEGATIVE MG/DL
LEUKOCYTE ESTERASE UR QL STRIP: ABNORMAL
NITRATE UR QL: NEGATIVE
NON-SQ EPI CELLS #/AREA URNS LPF: ABNORMAL /LPF
PH UR STRIP: 5.5 PH (ref 5–7)
RBC #/AREA URNS AUTO: ABNORMAL /HPF
SOURCE: ABNORMAL
SP GR UR STRIP: 1.02 (ref 1–1.03)
SPECIMEN SOURCE: ABNORMAL
UROBILINOGEN UR STRIP-ACNC: 0.2 EU/DL (ref 0.2–1)
WBC #/AREA URNS AUTO: ABNORMAL /HPF
WET PREP SPEC: ABNORMAL

## 2019-01-04 PROCEDURE — 87653 STREP B DNA AMP PROBE: CPT | Performed by: ADVANCED PRACTICE MIDWIFE

## 2019-01-04 PROCEDURE — 87210 SMEAR WET MOUNT SALINE/INK: CPT | Performed by: ADVANCED PRACTICE MIDWIFE

## 2019-01-04 PROCEDURE — 87088 URINE BACTERIA CULTURE: CPT | Performed by: ADVANCED PRACTICE MIDWIFE

## 2019-01-04 PROCEDURE — 87186 SC STD MICRODIL/AGAR DIL: CPT | Performed by: ADVANCED PRACTICE MIDWIFE

## 2019-01-04 PROCEDURE — 99214 OFFICE O/P EST MOD 30 MIN: CPT | Performed by: ADVANCED PRACTICE MIDWIFE

## 2019-01-04 PROCEDURE — 87109 MYCOPLASMA: CPT | Performed by: ADVANCED PRACTICE MIDWIFE

## 2019-01-04 PROCEDURE — 81001 URINALYSIS AUTO W/SCOPE: CPT | Performed by: ADVANCED PRACTICE MIDWIFE

## 2019-01-04 PROCEDURE — 87086 URINE CULTURE/COLONY COUNT: CPT | Performed by: ADVANCED PRACTICE MIDWIFE

## 2019-01-04 RX ORDER — TINIDAZOLE 500 MG/1
1000 TABLET ORAL
Qty: 10 TABLET | Refills: 0 | Status: SHIPPED | OUTPATIENT
Start: 2019-01-04 | End: 2019-02-08

## 2019-01-04 ASSESSMENT — MIFFLIN-ST. JEOR: SCORE: 1456.42

## 2019-01-04 NOTE — PROGRESS NOTES
SUBJECTIVE: Linda Agosto is a 28 year old female who presents today with UTI symptoms: dysuria and urgency    URINARY TRACT SYMPTOMS     Onset: Ongoing for a while,worse today, noticed a few days ago, but wasn't very strong, thought symptoms would go away on their own. Was treated for BV on 12/18/18. Feels those symptoms resolved.     Description:   Painful urination (Dysuria): Yes  Blood in urine (Hematuria): No  Urgency/Frequency: Yes    Progression of Symptoms:  same    Accompanying Signs & Symptoms:  Fever/chills: No  Flank pain: No  Nausea and vomiting: No  Any vaginal symptoms: No  Abdominal/Pelvic Pain: No   History:   History of frequent UTI's: Yes  History of kidney stones: No  Sexually Active: Yes  Possibility of pregnancy: No  Contraceptive type:  oral contraceptive    Precipitating factors:            Therapies Tried and outcome: Cranberry pill, just finished an antibiotic for BV 1 week prior. Tries to take AZO regularly, but hasn't recently, wanted to wait until after this appt.         Patient Active Problem List   Diagnosis     Allergic rhinitis     Allergic state     Type 1 diabetes, HbA1c goal < 7% (H)     Other specified hypothyroidism     Anxiety     Hyperlipidemia with target LDL less than 70     Type 1 diabetes mellitus without complication (H) - [facility], Dr. Taylor     Common migraine     Past Medical History:   Diagnosis Date     Allergic rhinitis, cause unspecified     h/o AIT     Common migraine     No visual aura.      Hyperlipidemia LDL goal < 70      Hypothyroidism      Infectious mononucleosis 1995     Tuberculosis      Type 1 diabetes, HbA1c goal < 7% (H) 3/04     followed [facility] endocrinology - now Dr Jimenez     Past Surgical History:   Procedure Laterality Date     APPENDECTOMY  8/07    dr deshpande     PE TUBES  1996     Current Outpatient Medications   Medication Sig Dispense Refill     blood glucose (VALE CONTOUR NEXT) test strip Patient tests 8  "times/day 250 strip 6     blood glucose monitoring (VALE MICROLET) lancets Patient tests 8 times/day 2 Box 6     buPROPion (WELLBUTRIN XL) 150 MG 24 hr tablet TAKE 1 TABLET BY MOUTH EVERY MORNING. 90 tablet 1     busPIRone (BUSPAR) 10 MG tablet TAKE 1 TABLET(10 MG) BY MOUTH TWICE DAILY 180 tablet 1     insulin aspart (NOVOLOG VIAL) 100 UNITS/ML VIAL Uses up to 75 Units/day 30 mL 2     insulin glargine (BASAGLAR KWIKPEN) 100 UNIT/ML injection INJ 21 UNITS SC D  0     levothyroxine (SYNTHROID, LEVOTHROID) 88 MCG tablet TAKE ONE TABLET BY MOUTH ONCE DAILY 90 tablet 3     MONONESSA 0.25-35 MG-MCG tablet TAKE 1 TABLET BY MOUTH DAILY 84 tablet 0     norgestimate-ethinyl estradiol (MONONESSA) 0.25-35 MG-MCG per tablet Take 1 tablet by mouth daily 84 tablet 3     simvastatin (ZOCOR) 40 MG tablet Take 1 tablet (40 mg) by mouth At Bedtime 90 tablet 3     Allergies   Allergen Reactions     Penicillins Rash     augmentin & pcn     Sulfa Drugs Hives       Health maintenance updated:  yes    ROS:  12 point review of systems negative other than symptoms noted below.  Genitourinary: Painful Urination and Urgency  Psychiatric: Anxiety    PHYSICAL EXAM:  Vitals: /70   Pulse 72   Ht 1.664 m (5' 5.5\")   Wt 70.8 kg (156 lb)   LMP 12/25/2018 (Exact Date)   BMI 25.56 kg/m    BMI= Body mass index is 25.56 kg/m .  Patient appears well, afebrile.   ABD: Soft without supra pubic pain or tenderness  BACK: Neg CVAT.     No components found for: URINE      ASSESSMENT/PLAN:      ICD-10-CM    1. Dysuria R30.0 UA with Microscopic     Urine Culture Aerobic Bacterial     Mycoplasma large colony culture     Ureaplasma culture     Group B strep PCR     Wet prep   2. Nonspecific finding on examination of urine R82.90 Urine Culture Aerobic Bacterial     Results for orders placed or performed in visit on 01/04/19   UA with Microscopic   Result Value Ref Range    Color Urine Yellow     Appearance Urine Clear     Glucose Urine 1+ (A) " NEG^Negative mg/dL    Bilirubin Urine Negative NEG^Negative    Ketones Urine Negative NEG^Negative mg/dL    Specific Gravity Urine 1.020 1.003 - 1.035    pH Urine 5.5 5.0 - 7.0 pH    Protein Albumin Urine Negative NEG^Negative mg/dL    Urobilinogen Urine 0.2 0.2 - 1.0 EU/dL    Nitrite Urine Negative NEG^Negative    Blood Urine 3+ (A) NEG^Negative    Leukocyte Esterase Urine 2+ (A) NEG^Negative    Source Midstream Urine     WBC Urine 25-50 (A) OTO5^0 - 5 /HPF    RBC Urine 5-10 (A) OTO2^O - 2 /HPF    Squamous Epithelial /LPF Urine Few FEW^Few /LPF    Bacteria Urine Few (A) NEG^Negative /HPF          Urine was sent for culture.     COUNSELING:  Discussed possibility of recurrent BV. May need alternative medication for treatment.   Suggested referral to Urology or with a MD here at [facility]. Linda is open to that option.   Push fluids    May use Uristat or other OTC med for dysuria  Reinforced the importance of completing this course of antibiotics   Use a probiotic when taking antibiotics  Handout provided regarding UTI prevention  RTC with continued symptoms or concerns.    Ellen SERRATO CNM    20 minutes was spent face to face with the patient today discussing her history, diagnosis, and follow-up plan as noted above. Over 50% of the visit was spent in counseling and coordination of care.    Total Visit Time: 20 minutes.

## 2019-01-04 NOTE — RESULT ENCOUNTER NOTE
Call placed to discuss wet prep results positive for BV. Prescription sent for Tinidazole 1000 mg, PO daily X 5 days. Recommended coming back 2 weeks after treatment to rescreen. May need to discuss alternative options or treatment. Encouraged to abstain from sexual intercourse while being treated for any vaginal infections. Will call when remaining lab results are available.     Ellen SERRATO CNM

## 2019-01-05 LAB
GP B STREP DNA SPEC QL NAA+PROBE: POSITIVE
SPECIMEN SOURCE: ABNORMAL

## 2019-01-07 ENCOUNTER — MYC MEDICAL ADVICE (OUTPATIENT)
Dept: MIDWIFE SERVICES | Facility: CLINIC | Age: 29
End: 2019-01-07

## 2019-01-07 LAB
BACTERIA SPEC CULT: ABNORMAL
BACTERIA SPEC CULT: NORMAL
Lab: ABNORMAL
SPECIMEN SOURCE: ABNORMAL
SPECIMEN SOURCE: NORMAL

## 2019-01-07 NOTE — RESULT ENCOUNTER NOTE
Will await final culture and sensitivity reports before calling Linda to discuss treatment.     Ellen SERRATO CNM

## 2019-01-07 NOTE — TELEPHONE ENCOUNTER
Discussed GBS positive results with client. GBS is normally not problematic in women. There is no reason to treat at this time. She was dx with BV and has been on medication since Friday. We reviewed that the medications should be completed fully. If symptoms do not resolve in 1-2 weeks, then return for follow-up care.     She stated that she talked with CARLOS Hughes about coming in after 2 weeks for a reswab to ensure that BV is gone (since they are treating it with another medication). I told her she can do that if she is comfortable with it, but it symptoms have resolved, I am not concerned with retesting.     Catia Wheatley, KEITH, APRMARICEL, CARLOS

## 2019-01-07 NOTE — RESULT ENCOUNTER NOTE
Pls see note for phone call on 1/7/19. Linda called to discuss GBS positive status.    Catia Wheatley, DNP, APRN, CNM

## 2019-01-08 LAB
BACTERIA SPEC CULT: ABNORMAL
SPECIMEN SOURCE: ABNORMAL

## 2019-01-09 ENCOUNTER — MYC MEDICAL ADVICE (OUTPATIENT)
Dept: MIDWIFE SERVICES | Facility: CLINIC | Age: 29
End: 2019-01-09

## 2019-01-11 LAB
BACTERIA SPEC CULT: NORMAL
Lab: NORMAL
SPECIMEN SOURCE: NORMAL

## 2019-01-15 NOTE — PROGRESS NOTES
SUBJECTIVE:                                                   Linda Agosto is a 28 year old female who presents to clinic today for the following health issue(s):  Patient presents with:  Vaginal Problem: patient was seen on 19, tested positive for BV. states a little over a week ago she started having light brown discharge. no odor or irritation, no urinary issues      HPI:  No missed pills. Having BTB this week. No vaginitis symptoms. No UTI symptoms. Treated for BV and had pos DNA for strep.  PMH: Diabetes    Taking probiotic    Patient's last menstrual period was 2018 (exact date)..   Patient is sexually active, .  Using oral contraceptives for contraception.    reports that  has never smoked. she has never used smokeless tobacco.    STD testing offered?  Declined    Health maintenance updated:  yes    Today's PHQ-2 Score:   PHQ-2 (  [employer]) 2017   Q1: Little interest or pleasure in doing things 0   Q2: Feeling down, depressed or hopeless 0   PHQ-2 Score 0     Today's PHQ-9 Score:   PHQ-9 SCORE 2018   PHQ-9 Total Score -   PHQ-9 Total Score 1     Today's TRESA-7 Score:   TRESA-7 SCORE 2018   Total Score -   Total Score 4       Problem list and histories reviewed & adjusted, as indicated.  Additional history: as documented.    Patient Active Problem List   Diagnosis     Allergic rhinitis     Allergic state     Type 1 diabetes, HbA1c goal < 7% (H)     Other specified hypothyroidism     Anxiety     Hyperlipidemia with target LDL less than 70     Type 1 diabetes mellitus without complication (H) - [facility], Dr. Taylor     Common migraine     Past Surgical History:   Procedure Laterality Date     APPENDECTOMY      dr deshpande     PE TUBES        Social History     Tobacco Use     Smoking status: Never Smoker     Smokeless tobacco: Never Used   Substance Use Topics     Alcohol use: Yes     Comment: sometimes couple on weekends      Problem (# of  "Occurrences) Relation (Name,Age of Onset)    Genetic Disorder (1) Paternal Grandfather: kidney    Neurologic Disorder (1) Maternal Grandmother: dementia       Negative family history of: Family History Negative            Current Outpatient Medications   Medication Sig     blood glucose (VALE CONTOUR NEXT) test strip Patient tests 8 times/day     blood glucose monitoring (VALE MICROLET) lancets Patient tests 8 times/day     buPROPion (WELLBUTRIN XL) 150 MG 24 hr tablet TAKE 1 TABLET BY MOUTH EVERY MORNING.     busPIRone (BUSPAR) 10 MG tablet TAKE 1 TABLET(10 MG) BY MOUTH TWICE DAILY     insulin aspart (NOVOLOG VIAL) 100 UNITS/ML VIAL Uses up to 75 Units/day     insulin glargine (BASAGLAR KWIKPEN) 100 UNIT/ML injection INJ 21 UNITS SC D     levothyroxine (SYNTHROID, LEVOTHROID) 88 MCG tablet TAKE ONE TABLET BY MOUTH ONCE DAILY     norgestimate-ethinyl estradiol (MONONESSA) 0.25-35 MG-MCG per tablet Take 1 tablet by mouth daily     simvastatin (ZOCOR) 40 MG tablet Take 1 tablet (40 mg) by mouth At Bedtime     No current facility-administered medications for this visit.      Allergies   Allergen Reactions     Penicillins Rash     augmentin & pcn     Sulfa Drugs Hives       ROS:  12 point review of systems negative other than symptoms noted below.  Genitourinary: Cramps and Vaginal Discharge    OBJECTIVE:     /70   Ht 1.664 m (5' 5.5\")   Wt 71.8 kg (158 lb 6.4 oz)   LMP 12/25/2018 (Exact Date)   BMI 25.96 kg/m    Body mass index is 25.96 kg/m .    Exam:  Constitutional:  Appearance: Well nourished, well developed alert, in no acute distress  Neurologic/Psychiatric:  Mental Status:  Oriented X3   Pelvic Exam:  External Genitalia:     Normal appearance for age, no discharge present, no tenderness present, no inflammatory lesions present, color normal  Vagina:     Normal vaginal vault without central or paravaginal defects, no discharge present, no inflammatory lesions present, no masses present, OLD " BLOOD  Bladder:     Nontender to palpation  Urethra:   Urethral Body:  Urethra palpation normal, urethra structural support normal   Urethral Meatus:  No erythema or lesions present  Cervix:     Appearance healthy, no lesions present, nontender to palpation, no bleeding present    Perineum:     Perineum within normal limits, no evidence of trauma, no rashes or skin lesions present  Anus:     Anus within normal limits, no hemorrhoids present    Pubic Hair:     Normal pubic hair distribution for age  Genitalia and Groin:     No rashes present, no lesions present, no areas of discoloration, no masses present       In-Clinic Test Results:  WET PREP: NO CLUE, NO TRICH, NO YEAST    ASSESSMENT/PLAN:                                                        ICD-10-CM    1. Vaginal discharge N89.8 Wet prep   2. DUB (dysfunctional uterine bleeding) N93.8        Normal wet prep. Good lactobacillus in background.   Discussed BTB on OCPs.   Will have withdrawal bleed and see if DUB stops. If not will call to Change OCP.    Donovan Ratliff MD  [facility]

## 2019-01-17 ENCOUNTER — OFFICE VISIT (OUTPATIENT)
Dept: OBGYN | Facility: CLINIC | Age: 29
End: 2019-01-17
Payer: COMMERCIAL

## 2019-01-17 VITALS
BODY MASS INDEX: 25.46 KG/M2 | HEIGHT: 66 IN | DIASTOLIC BLOOD PRESSURE: 70 MMHG | WEIGHT: 158.4 LBS | SYSTOLIC BLOOD PRESSURE: 118 MMHG

## 2019-01-17 DIAGNOSIS — N93.8 DUB (DYSFUNCTIONAL UTERINE BLEEDING): ICD-10-CM

## 2019-01-17 DIAGNOSIS — N89.8 VAGINAL DISCHARGE: Primary | ICD-10-CM

## 2019-01-17 LAB
SPECIMEN SOURCE: NORMAL
WET PREP SPEC: NORMAL

## 2019-01-17 PROCEDURE — 99213 OFFICE O/P EST LOW 20 MIN: CPT | Performed by: OBSTETRICS & GYNECOLOGY

## 2019-01-17 PROCEDURE — 87210 SMEAR WET MOUNT SALINE/INK: CPT | Performed by: OBSTETRICS & GYNECOLOGY

## 2019-01-17 ASSESSMENT — MIFFLIN-ST. JEOR: SCORE: 1467.31

## 2019-01-17 NOTE — PROGRESS NOTES
"    SUBJECTIVE:                                                   Linda Agosto is a 28 year old female who presents to clinic today for the following health issue(s):  Patient presents with:  Vaginal Problem      Additional information: ***    HPI:  ***    Patient's last menstrual period was 2018 (exact date)..   Patient {is/is not:748869::\"is\"} sexually active, .  Using {[facility] CONTRACEPTION:081689} for contraception.    reports that  has never smoked. she has never used smokeless tobacco.  {Tobacco Cessation -- Delete if patient is a non-smoker:261071}  STD testing offered?  {PLC GC/CHLAMYDIA:294701}    Health maintenance updated:  {yes no:647705}    Today's PHQ-2 Score:   PHQ-2 (  [employer]) 2017   Q1: Little interest or pleasure in doing things 0   Q2: Feeling down, depressed or hopeless 0   PHQ-2 Score 0     Today's PHQ-9 Score:   PHQ-9 SCORE 2018   PHQ-9 Total Score -   PHQ-9 Total Score 1     Today's TRESA-7 Score:   TRESA-7 SCORE 2018   Total Score -   Total Score 4       Problem list and histories reviewed & adjusted, as indicated.  Additional history: as documented.    Patient Active Problem List   Diagnosis     Allergic rhinitis     Allergic state     Type 1 diabetes, HbA1c goal < 7% (H)     Other specified hypothyroidism     Anxiety     Hyperlipidemia with target LDL less than 70     Type 1 diabetes mellitus without complication (H) - [facility], Dr. Taylor     Common migraine     Past Surgical History:   Procedure Laterality Date     APPENDECTOMY      dr deshpande     PE TUBES        Social History     Tobacco Use     Smoking status: Never Smoker     Smokeless tobacco: Never Used   Substance Use Topics     Alcohol use: Yes     Comment: sometimes couple on weekends      Problem (# of Occurrences) Relation (Name,Age of Onset)    Genetic Disorder (1) Paternal Grandfather: kidney    Neurologic Disorder (1) Maternal Grandmother: dementia       Negative family " "history of: Family History Negative            Current Outpatient Medications   Medication Sig     blood glucose (VALE CONTOUR NEXT) test strip Patient tests 8 times/day     blood glucose monitoring (VALE MICROLET) lancets Patient tests 8 times/day     buPROPion (WELLBUTRIN XL) 150 MG 24 hr tablet TAKE 1 TABLET BY MOUTH EVERY MORNING.     busPIRone (BUSPAR) 10 MG tablet TAKE 1 TABLET(10 MG) BY MOUTH TWICE DAILY     insulin aspart (NOVOLOG VIAL) 100 UNITS/ML VIAL Uses up to 75 Units/day     insulin glargine (BASAGLAR KWIKPEN) 100 UNIT/ML injection INJ 21 UNITS SC D     levothyroxine (SYNTHROID, LEVOTHROID) 88 MCG tablet TAKE ONE TABLET BY MOUTH ONCE DAILY     MONONESSA 0.25-35 MG-MCG tablet TAKE 1 TABLET BY MOUTH DAILY     norgestimate-ethinyl estradiol (MONONESSA) 0.25-35 MG-MCG per tablet Take 1 tablet by mouth daily     simvastatin (ZOCOR) 40 MG tablet Take 1 tablet (40 mg) by mouth At Bedtime     No current facility-administered medications for this visit.      Allergies   Allergen Reactions     Penicillins Rash     augmentin & pcn     Sulfa Drugs Hives       ROS:  {[facility] ROSGYN:932818::\"12 point review of systems negative other than symptoms noted below.\"}    OBJECTIVE:     LMP 12/25/2018 (Exact Date)   There is no height or weight on file to calculate BMI.    Exam:  {[facility] EXAM CHOICES:368669}     In-Clinic Test Results:  No results found for this or any previous visit (from the past 24 hour(s)).    ASSESSMENT/PLAN:                                                      No diagnosis found.    There are no Patient Instructions on file for this visit.    ***    Donovan Ratliff MD  [facility]  "

## 2019-02-08 ENCOUNTER — OFFICE VISIT (OUTPATIENT)
Dept: FAMILY MEDICINE | Facility: CLINIC | Age: 29
End: 2019-02-08
Payer: COMMERCIAL

## 2019-02-08 VITALS
HEART RATE: 105 BPM | BODY MASS INDEX: 25.89 KG/M2 | OXYGEN SATURATION: 100 % | WEIGHT: 158 LBS | TEMPERATURE: 98.2 F | DIASTOLIC BLOOD PRESSURE: 74 MMHG | SYSTOLIC BLOOD PRESSURE: 128 MMHG

## 2019-02-08 DIAGNOSIS — F41.9 ANXIETY: Primary | ICD-10-CM

## 2019-02-08 PROCEDURE — 99213 OFFICE O/P EST LOW 20 MIN: CPT | Performed by: PHYSICIAN ASSISTANT

## 2019-02-08 RX ORDER — FLASH GLUCOSE SENSOR
KIT MISCELLANEOUS
Refills: 11 | COMMUNITY
Start: 2019-01-10 | End: 2020-02-25

## 2019-02-08 ASSESSMENT — ANXIETY QUESTIONNAIRES
GAD7 TOTAL SCORE: 7
IF YOU CHECKED OFF ANY PROBLEMS ON THIS QUESTIONNAIRE, HOW DIFFICULT HAVE THESE PROBLEMS MADE IT FOR YOU TO DO YOUR WORK, TAKE CARE OF THINGS AT HOME, OR GET ALONG WITH OTHER PEOPLE: SOMEWHAT DIFFICULT
6. BECOMING EASILY ANNOYED OR IRRITABLE: SEVERAL DAYS
1. FEELING NERVOUS, ANXIOUS, OR ON EDGE: SEVERAL DAYS
5. BEING SO RESTLESS THAT IT IS HARD TO SIT STILL: SEVERAL DAYS
7. FEELING AFRAID AS IF SOMETHING AWFUL MIGHT HAPPEN: SEVERAL DAYS
3. WORRYING TOO MUCH ABOUT DIFFERENT THINGS: SEVERAL DAYS
2. NOT BEING ABLE TO STOP OR CONTROL WORRYING: SEVERAL DAYS

## 2019-02-08 ASSESSMENT — PATIENT HEALTH QUESTIONNAIRE - PHQ9
5. POOR APPETITE OR OVEREATING: SEVERAL DAYS
SUM OF ALL RESPONSES TO PHQ QUESTIONS 1-9: 3

## 2019-02-08 NOTE — PROGRESS NOTES
"  SUBJECTIVE:                                                    Linda Agosto is a 28 year old female who presents to clinic today for the following health issues:      Anxiety Follow-Up; has dealt with anxiety since childhood and reports it comes \"in waves.\"   Has never had a medication where she really felt it helped that much.  Tried wellbutrin and buspar combo - didn't feel like it really did a whole lot. Felt like she \"tried more in my head to like it.\"  Would like to try something new.  Reports she tried a med a few years back and isn't sure what it was at first, but able to find through Epic review this was celexa and only took for 4 days because just \"made me flat.\"  Went back to school getting her JANAY - graduating in May.  Felt like she couldn't move up in her career without it. Has to stay with current company as  at [employer].  Dating boyfriend - feels things are going well, but feels she has been more irritable towards him since anxiety has worsened.   Therapy completed last a couple of years ago.   Tries to exercise 1-2x per week.       Status since last visit: Worsened - stopped taking medications - didn't feel that meds she was on were working well     Other associated symptoms:None    Complicating factors:   Significant life event: No   Current substance abuse: None  Depression symptoms: No    TRESA-7 SCORE 12/21/2017 9/4/2018 2/8/2019   Total Score - - -   Total Score 5 4 7       TRESA-7    Amount of exercise or physical activity: 2-3 days/week for an average of 30-45 minutes    Problems taking medications regularly: Yes,  Stopped medications - they were not working well     Medication side effects: none    Diet: regular (no restrictions)        Problem list and histories reviewed & adjusted, as indicated.  Additional history: as documented    Patient Active Problem List   Diagnosis     Allergic rhinitis     Allergic state     Type 1 diabetes, HbA1c goal < 7% (H)     Other specified " hypothyroidism     Anxiety     Hyperlipidemia with target LDL less than 70     Type 1 diabetes mellitus without complication (H) - [facility], Dr. Taylor     Common migraine     Past Surgical History:   Procedure Laterality Date     APPENDECTOMY  8/07    dr deshpande     PE TUBES  1996       Social History     Tobacco Use     Smoking status: Never Smoker     Smokeless tobacco: Never Used   Substance Use Topics     Alcohol use: Yes     Comment: sometimes couple on weekends     Family History   Problem Relation Age of Onset     Neurologic Disorder Maternal Grandmother         dementia     Genetic Disorder Paternal Grandfather         kidney     Family History Negative No family hx of          Current Outpatient Medications   Medication Sig Dispense Refill     blood glucose (VALE CONTOUR NEXT) test strip Patient tests 8 times/day 250 strip 6     blood glucose monitoring (VALE MICROLET) lancets Patient tests 8 times/day 2 Box 6     insulin aspart (NOVOLOG VIAL) 100 UNITS/ML VIAL Uses up to 75 Units/day 30 mL 2     insulin glargine (BASAGLAR KWIKPEN) 100 UNIT/ML injection INJ 21 UNITS SC D  0     levothyroxine (SYNTHROID, LEVOTHROID) 88 MCG tablet TAKE ONE TABLET BY MOUTH ONCE DAILY 90 tablet 3     norgestimate-ethinyl estradiol (MONONESSA) 0.25-35 MG-MCG per tablet Take 1 tablet by mouth daily 84 tablet 3     sertraline (ZOLOFT) 50 MG tablet Take 1/2 tab for 1-2 weeks then can increase to full 50mg tab thereafter 90 tablet 0     simvastatin (ZOCOR) 40 MG tablet Take 1 tablet (40 mg) by mouth At Bedtime 90 tablet 3     Continuous Blood Gluc Sensor (FREESTYLE BEKAH 14 DAY SENSOR) MISC USE AS DIRECTED FOR 28 DAYS  11     Allergies   Allergen Reactions     Penicillins Rash     augmentin & pcn     Sulfa Drugs Hives       ROS:  Constitutional, psych, neuro, endocrine systems are negative, except as otherwise noted.    OBJECTIVE:     /74 (BP Location: Right arm, Patient Position: Chair, Cuff Size:  Adult Regular)   Pulse 105   Temp 98.2  F (36.8  C) (Oral)   Wt 71.7 kg (158 lb)   SpO2 100%   BMI 25.89 kg/m    Body mass index is 25.89 kg/m .  GENERAL: healthy, alert and no distress  NEURO: Normal strength and tone, mentation intact and speech normal  PSYCH: mentation appears normal, affect normal/bright    Diagnostic Test Results:  See Flowsheets for PHQ/TRESA scores    ASSESSMENT/PLAN:       ICD-10-CM    1. Anxiety F41.9 sertraline (ZOLOFT) 50 MG tablet     MENTAL HEALTH REFERRAL  - Adult; Outpatient Treatment; Individual/Couples/Family/Group Therapy/Health Psychology; [facility]: [facility] [phone]; We will contact you to schedule the appointment or please call with any questions   Pt never felt that wellbutrin or buspar significantly helped her so would like to try something else.  Only took celexa for 4 days previously and felt it made her feel flat. Reviewed it can take 4-6 weeks to notice maximal improvement on a medication. Discussed sertraline or fluoxetine as potential other options that are good for anxiety. Will try zoloft first as anecdotally I seem to have some really good success with this. Also encouraged to follow-up with counseling as well. Risks of SSRIs reviewed and encouraged to follow-up sooner if experiencing any untoward symptoms. Pt overdue for DM labs via health maintenance, but this was deferred to her endocrinology follow-up with Dr. Jimenez on 2/15 who she used to see at [facility] who is now transitioned to [place] and pt happy that all records will be in 1 health system now.  See Patient Instructions  Pt in agreement with plan.     Patient Instructions   Let's try zoloft instead.  Get set back up with counseling.  Short interval recheck in 4-6 weeks.       Yvette Rodas PA-C  [facility]

## 2019-02-08 NOTE — PATIENT INSTRUCTIONS
Let's try zoloft instead.  Get set back up with counseling.  Short interval recheck in 4-6 weeks.

## 2019-02-09 ASSESSMENT — ANXIETY QUESTIONNAIRES: GAD7 TOTAL SCORE: 7

## 2019-02-15 ENCOUNTER — OFFICE VISIT (OUTPATIENT)
Dept: ENDOCRINOLOGY | Facility: CLINIC | Age: 29
End: 2019-02-15
Payer: COMMERCIAL

## 2019-02-15 VITALS
HEIGHT: 66 IN | HEART RATE: 74 BPM | BODY MASS INDEX: 25.49 KG/M2 | DIASTOLIC BLOOD PRESSURE: 85 MMHG | SYSTOLIC BLOOD PRESSURE: 127 MMHG | WEIGHT: 158.6 LBS

## 2019-02-15 DIAGNOSIS — E03.9 HYPOTHYROIDISM, UNSPECIFIED TYPE: ICD-10-CM

## 2019-02-15 DIAGNOSIS — E10.9 TYPE 1 DIABETES MELLITUS WITHOUT COMPLICATION (H): Primary | ICD-10-CM

## 2019-02-15 DIAGNOSIS — Z96.41 INSULIN PUMP STATUS: ICD-10-CM

## 2019-02-15 PROCEDURE — 99215 OFFICE O/P EST HI 40 MIN: CPT | Performed by: INTERNAL MEDICINE

## 2019-02-15 ASSESSMENT — MIFFLIN-ST. JEOR: SCORE: 1468.21

## 2019-02-15 NOTE — PROGRESS NOTES
Name: Linda Agosto is a 28 year old woman, self-referred for evaluation of diabetes mellitus.  Previously seen by me at my former clinic ([facility]), here to establish care with [facility].    Chief Complaint   Patient presents with     Diabetes       HPI:  Recent issues:  Here for evaluation of diabetes.  Feeling well overall, no new health issues reported  Reviewed medical history from patient and Epic chart record  Vacationing in [place] later this month        2003. Diagnosis of diabetes mellitus, age 13, living in [facility]  Had acute illness requiring hospitalization at [facility]  Began insulin injections, using Novolog and Lantus insulin  [place] carb counting method, gram estimates  On MDI treatment plan for approx 2 years, then changed to pump use  Previous medical evaluations with Dr. Yash Barcenas/[facility] Andreia Callahan  2005. Began use of [place] pump  2012. Changed to Medtronic pump  Subsequently using Medtronic 723 Paradigm pump  2012. Tried wearing CGMS sensor, but uncomfortable    12/8/14. Initial consult with me at my former clinic ([facility])  Continued pump management  Was not interested in CGMS use  Previous [facility] hgbA1c levels include:  Recent Labs   Lab Test 02/05/18 10/10/17 06/21/17 02/01/17 11/16/16  0815   A1C 7.4* 7.8* 8.1* 7.3* 8.2*     1/2018. Upgraded to Medtronic 670G pump   Novolog in pump  Tried Medtronic Guardian sensor, recalls frequent low glucose alarms   Wore sensor in manual mode   Didn't like it, discontinued use    ~11/2018. Wore diagnostic LibrePro CGMS  Subsequently obtained LibrePersonal CGMS   Recent Zhou   Noticing higher pm trends  Current pump settings:      Recent pump Carelink data:    Recent LibrePersonal CGMS data:  None available for progress note record    Recent [facility] labs include:  Lab Results   Component Value Date    A1C 7.4 (A) 02/05/2018     11/16/2016    POTASSIUM 4.6 10/10/2017    CHLORIDE 103  11/16/2016    CO2 28 11/16/2016    ANIONGAP 9 11/16/2016     (A) 10/10/2017    BUN 13 11/16/2016    CR 0.81 10/10/2017    GFRESTIMATED 101 10/10/2017    GFRESTBLACK 117 10/10/2017    MARCIA 9.7 11/16/2016    CHOL 223 (H) 02/01/2018    TRIG 71 02/01/2018    HDL 84 02/01/2018     (H) 02/01/2018    NHDL 139 (H) 02/01/2018    UCRR 158 02/01/2018    MICROL 18 02/01/2018    UMALCR 11.27 02/01/2018    TSH 2.50 02/05/2018    T4 1.46 05/18/2015     Hyperlipidemia:  Takes simvastatin medication  DM Complications:  None known      Thyroid:  2013. Diagnosis of hypothyroidism  Previous [facility] thyroid labs include:    Treatment with levothyroxine medication  Previously on stable dose as levothyroxine 0.088 mg daily  Recent [facility] labs include:  Lab Results   Component Value Date    TSH 2.50 02/05/2018    T4 1.46 05/18/2015     (H) 12/19/2013     Current dose:  Levothyroxine 0.088 mg daily      Single, works at [employer] in [place]  Finishing her JANAY at [employer]  Has previously seen Dr. Ellen Burdick/ [place]  Sees for general medicine evaluations.    PMH/PSH:  Past Medical History:   Diagnosis Date     Allergic rhinitis, cause unspecified     h/o AIT     Common migraine     No visual aura.      Hyperlipidemia LDL goal < 70      Hypothyroidism      Infectious mononucleosis 1995     Tuberculosis      Type 1 diabetes, HbA1c goal < 7% (H) 3/04     followed [facility] end[facility]ology - now Dr Jimenez     Past Surgical History:   Procedure Laterality Date     APPENDECTOMY  8/07    dr deshpande     PE TUBES  1996       Family Hx:  Family History   Problem Relation Age of Onset     Neurologic Disorder Maternal Grandmother         dementia     Genetic Disorder Paternal Grandfather         kidney     Family History Negative No family hx of          Social Hx:  Social History     Socioeconomic History     Marital status: Single     Spouse name: Not on file     Number of children: Not on file     Years of  education: Not on file     Highest education level: Not on file   Social Needs     Financial resource strain: Not on file     Food insecurity - worry: Not on file     Food insecurity - inability: Not on file     Transportation needs - medical: Not on file     Transportation needs - non-medical: Not on file   Occupational History     Not on file   Tobacco Use     Smoking status: Never Smoker     Smokeless tobacco: Never Used   Substance and Sexual Activity     Alcohol use: Yes     Comment: sometimes couple on weekends     Drug use: No     Sexual activity: Yes     Partners: Male     Birth control/protection: Pill   Other Topics Concern      Service Not Asked     Blood Transfusions Not Asked     Caffeine Concern Yes     Comment: rare     Occupational Exposure Not Asked     Hobby Hazards Not Asked     Sleep Concern No     Stress Concern No     Weight Concern Not Asked     Special Diet Not Asked     Back Care Not Asked     Exercise Yes     Bike Helmet Not Asked     Seat Belt Yes     Self-Exams No     Comment: encouraged     Parent/sibling w/ CABG, MI or angioplasty before 65F 55M? No   Social History Narrative    -Working -            MEDICATIONS:  has a current medication list which includes the following prescription(s): freestyle rayne 14 day sensor, insulin aspart, insulin glargine, levothyroxine, norgestimate-ethinyl estradiol, sertraline, simvastatin, blood glucose, and blood glucose monitoring.    ROS:     ROS: 10 point ROS neg other than the symptoms noted above in the HPI.    GENERAL: mild fatigue, wt stable; denies fevers, chills, night sweats.   HEENT: no dysphagia, odonophagia, diplopia, neck pain  THYROID:  no apparent hyper or hypothyroid symptoms  CV: no chest pain, pressure, palpitations  LUNGS: no SOB, LANG, cough, wheezing   ABDOMEN: no diarrhea, constipation, abdominal pain  EXTREMITIES: no rashes, ulcers, edema  NEUROLOGY: no headaches, denies changes in vision, tingling, extremitiy numbness  "  MSK: no muscle aches or pains, weakness  SKIN: no rashes or lesions  : menses regular  PSYCH:  stable mood, no significant anxiety or depression  ENDOCRINE: no heat or cold intolerance    Physical Exam   VS: /85   Pulse 74   Ht 1.664 m (5' 5.5\")   Wt 71.9 kg (158 lb 9.6 oz)   BMI 25.99 kg/m    GENERAL: AXOX3, NAD, well dressed, answering questions appropriately, appears stated age.  THYROID:  normal gland, no apparent nodules or goiter  HEENT: neck non-tender, no exopthalmous, no proptosis, EOMI  CV: RRR, no rubs, gallops, no murmurs  LUNGS: CTAB, no wheezes, rales, or ronchi  ABDOMEN: soft, nontender, nondistended  EXTREMITIES: no edema, no lesions  NEUROLOGY: CN grossly intact, no tremors  MSK: grossly intact  SKIN: no rashes, no lesions    LABS:    All pertinent notes, labs, and images personally reviewed by me.     A/P:  Encounter Diagnoses   Name Primary?     Type 1 diabetes mellitus without complication (H) Yes     Insulin pump status      Hypothyroidism, unspecified type        Comments:  Reviewed health history and diabetes issues.  Recent glucose trends with a lot of high-low-high fluctuations    Plan:  Reviewed the overall T1DM management and insulin pump use.  Discussed optimal BG testing to assess glucose trends.  We reviewed insulin pump settings, basal rate and bolus dosing  Use of automated pump bolus dosing for meal/snack carb & correction dosing  Reviewed the [employer] Carelink report data today  Discussed recent LibrePersonal CGMS trends from her smartphone junior    Recommend:  Continue insulin pump and diabetes management plan.  Pump setting changes:   Basals:  MN 0.675 to 0.625 unit(s)/hr   Bolus:  ISF MN 70 to 80, 7a 50 to 60, 9p 70 to 80  Goal target premeal BG  mg/dl  Continue use of CGMS sensor   Agree with use of the LibrePersonal CGMS   Consider acquiring the Sonavationan3 Enhanced transmitter, restarting use... Manual vs auto mode  No labs ordered for " today  Discussed plan to have backup insulin supplies (or switch to MDI) on trip to [place]   Plan Novolog and Tresiba pen use... Tresiba equivalent 20 units daily, I:C 1:8 ratio  Continue use of the levothyroxine, simvastatin daily dosing  Arrange annual dilated eye exam, fasting lipid panel testing.    Addressed patient's questions today.    Labs ordered today: No orders of the defined types were placed in this encounter.    Radiology/Consults ordered today: None    More than 50% of the time spent with Ms. Agosto on counseling / coordinating her care.  Total appointment time was 45 minutes.    Follow-up:  3 mo.    Santy Jimenez MD  Endocrinology  [place] [facility]  CC: Yvette Rodas

## 2019-02-15 NOTE — Clinical Note
I met Linda again today... Had seen her previously at my old clinic in [place]... Now seeing me at [facility].  ROSCOE Jimenez [facility]

## 2019-02-24 ENCOUNTER — OFFICE VISIT (OUTPATIENT)
Dept: URGENT CARE | Facility: URGENT CARE | Age: 29
End: 2019-02-24
Payer: COMMERCIAL

## 2019-02-24 VITALS
SYSTOLIC BLOOD PRESSURE: 146 MMHG | DIASTOLIC BLOOD PRESSURE: 86 MMHG | BODY MASS INDEX: 25.28 KG/M2 | OXYGEN SATURATION: 97 % | TEMPERATURE: 98.6 F | HEART RATE: 120 BPM | WEIGHT: 154.25 LBS

## 2019-02-24 DIAGNOSIS — N39.0 URINARY TRACT INFECTION WITHOUT HEMATURIA, SITE UNSPECIFIED: Primary | ICD-10-CM

## 2019-02-24 DIAGNOSIS — R30.0 DYSURIA: ICD-10-CM

## 2019-02-24 DIAGNOSIS — R82.90 NONSPECIFIC FINDING ON EXAMINATION OF URINE: ICD-10-CM

## 2019-02-24 LAB
ALBUMIN UR-MCNC: 100 MG/DL
APPEARANCE UR: ABNORMAL
BACTERIA #/AREA URNS HPF: ABNORMAL /HPF
BILIRUB UR QL STRIP: ABNORMAL
COLOR UR AUTO: ABNORMAL
GLUCOSE UR STRIP-MCNC: NEGATIVE MG/DL
HGB UR QL STRIP: ABNORMAL
KETONES UR STRIP-MCNC: NEGATIVE MG/DL
LEUKOCYTE ESTERASE UR QL STRIP: ABNORMAL
NITRATE UR QL: POSITIVE
PH UR STRIP: 6.5 PH (ref 5–7)
RBC #/AREA URNS AUTO: >100 /HPF
SOURCE: ABNORMAL
SP GR UR STRIP: 1.01 (ref 1–1.03)
UROBILINOGEN UR STRIP-ACNC: 1 EU/DL (ref 0.2–1)
WBC #/AREA URNS AUTO: ABNORMAL /HPF

## 2019-02-24 PROCEDURE — 87086 URINE CULTURE/COLONY COUNT: CPT | Performed by: PHYSICIAN ASSISTANT

## 2019-02-24 PROCEDURE — 81001 URINALYSIS AUTO W/SCOPE: CPT | Performed by: PHYSICIAN ASSISTANT

## 2019-02-24 PROCEDURE — 99213 OFFICE O/P EST LOW 20 MIN: CPT | Performed by: PHYSICIAN ASSISTANT

## 2019-02-24 RX ORDER — NITROFURANTOIN 25; 75 MG/1; MG/1
100 CAPSULE ORAL 2 TIMES DAILY
Qty: 10 CAPSULE | Refills: 0 | Status: SHIPPED | OUTPATIENT
Start: 2019-02-24 | End: 2019-02-25

## 2019-02-24 NOTE — PROGRESS NOTES
S: 28-year-old female presents for dysuria, urinary frequency, urgency for 3 days.  Hematuria this morning.  No flank pain.  Mild bladder pressure.  No fever or nausea or vomiting LMP ended 1 week ago and was completely normal.  She is on OCPs.  Normally with urinary tract infection she gets Macrobid.  She has type 1 diabetes under good control.  She states she has never had any decreased kidney function.      Allergies   Allergen Reactions     Penicillins Rash     augmentin & pcn     Sulfa Drugs Hives       Past Medical History:   Diagnosis Date     Allergic rhinitis, cause unspecified     h/o AIT     Common migraine     No visual aura.      Hyperlipidemia LDL goal < 70      Hypothyroidism      Infectious mononucleosis 1995     Tuberculosis      Type 1 diabetes, HbA1c goal < 7% (H) 3/04     followed [facility] - peds endocrinology - now Dr Jimenez         Current Outpatient Medications on File Prior to Visit:  blood glucose (VALE CONTOUR NEXT) test strip Patient tests 8 times/day   blood glucose monitoring (VALE MICROLET) lancets Patient tests 8 times/day   Continuous Blood Gluc Sensor ([employer] Healthcare[employer] BEKAH 14 DAY SENSOR) MISC USE AS DIRECTED FOR 28 DAYS   insulin aspart (NOVOLOG PEN) 100 UNIT/ML pen Inject 3 to 10 units subcutaneous at mealtimes as directed, total daily dose approx 30 units   insulin aspart (NOVOLOG VIAL) 100 UNITS/ML VIAL Uses up to 75 Units/day   insulin glargine (BASAGLAR KWIKPEN) 100 UNIT/ML injection INJ 21 UNITS SC D   insulin pen needle (BD CLEOPATRA U/F) 32G X 4 MM miscellaneous Use 4 pen needles daily or as directed.   levothyroxine (SYNTHROID, LEVOTHROID) 88 MCG tablet TAKE ONE TABLET BY MOUTH ONCE DAILY   norgestimate-ethinyl estradiol (MONONESSA) 0.25-35 MG-MCG per tablet Take 1 tablet by mouth daily   sertraline (ZOLOFT) 50 MG tablet Take 1/2 tab for 1-2 weeks then can increase to full 50mg tab thereafter   simvastatin (ZOCOR) 40 MG tablet Take 1 tablet (40 mg) by mouth At Bedtime     No current  facility-administered medications on file prior to visit.     Social History     Tobacco Use     Smoking status: Never Smoker     Smokeless tobacco: Never Used   Substance Use Topics     Alcohol use: Yes     Comment: sometimes couple on weekends     Drug use: No       ROS:  General: negative for fever  ABD: Denies abd pain  : as above    OBJECTIVE:  /86 (BP Location: Left arm, Patient Position: Chair, Cuff Size: Adult Regular)   Pulse 120   Temp 98.6  F (37  C) (Oral)   Wt 70 kg (154 lb 4 oz)   SpO2 97%   Breastfeeding? No   BMI 25.28 kg/m       General:   awake, alert, and cooperative.  NAD.   Head: Normocephalic, atraumatic.  Eyes: Conjunctiva clear, non icteric.   ABD: soft, no tenderness to palpation , no rigidity, guarding or rebound . No CVAT  Neuro: Alert and oriented - normal speech.   Results for orders placed or performed in visit on 02/24/19   *UA reflex to Microscopic and Culture ([place] [facility] (except [place] and [place])   Result Value Ref [place]    Color Urine Red     Appearance Urine Cloudy     Glucose Urine Negative NEG^Negative mg/dL    Bilirubin Urine Small (A) NEG^Negative    Ketones Urine Negative NEG^Negative mg/dL    Specific Gravity Urine 1.015 1.003 - 1.035    Blood Urine Large (A) NEG^Negative    pH Urine 6.5 5.0 - 7.0 pH    Protein Albumin Urine 100 (A) NEG^Negative mg/dL    Urobilinogen Urine 1.0 0.2 - 1.0 EU/dL    Nitrite Urine Positive (A) NEG^Negative    Leukocyte Esterase Urine Large (A) NEG^Negative    Source Midstream Urine    Urine Microscopic   Result Value Ref [place]    WBC Urine 25-50 (A) OTO5^0 - 5 /HPF    RBC Urine >100 (A) OTO2^O - 2 /HPF    Bacteria Urine Moderate (A) NEG^Negative /HPF       ASSESSMENT:      ICD-10-CM    1. Urinary tract infection without hematuria, site unspecified N39.0 nitroFURantoin macrocrystal-monohydrate (MACROBID) 100 MG capsule   2. Dysuria R30.0 *UA reflex to Microscopic and Culture ([place] [facility] (except  [place] and [place])     Urine Microscopic     nitroFURantoin macrocrystal-monohydrate (MACROBID) 100 MG capsule   3. Nonspecific finding on examination of urine R82.90 Urine Culture Aerobic Bacterial             PLAN:   As per ordered above.  Drink plenty of fluids.  Prevention and treatment of UTI's discussed. Follow up with primary care physician if not improving.  Advised about symptoms which might herald more serious problems.      Lucia Dong PA-C

## 2019-02-25 DIAGNOSIS — R30.0 DYSURIA: ICD-10-CM

## 2019-02-25 DIAGNOSIS — N39.0 URINARY TRACT INFECTION WITHOUT HEMATURIA, SITE UNSPECIFIED: ICD-10-CM

## 2019-02-25 RX ORDER — NITROFURANTOIN 25; 75 MG/1; MG/1
100 CAPSULE ORAL 2 TIMES DAILY
Qty: 10 CAPSULE | Refills: 0 | Status: CANCELLED | OUTPATIENT
Start: 2019-02-25

## 2019-02-25 RX ORDER — FLUCONAZOLE 150 MG/1
150 TABLET ORAL ONCE
Qty: 2 TABLET | Refills: 0 | Status: CANCELLED | OUTPATIENT
Start: 2019-02-25 | End: 2019-02-25

## 2019-02-27 LAB
BACTERIA SPEC CULT: NO GROWTH
SPECIMEN SOURCE: NORMAL

## 2019-03-11 DIAGNOSIS — N93.8 DUB (DYSFUNCTIONAL UTERINE BLEEDING): ICD-10-CM

## 2019-03-11 RX ORDER — NORGESTIMATE AND ETHINYL ESTRADIOL
KIT
Qty: 84 TABLET | Refills: 0 | Status: SHIPPED | OUTPATIENT
Start: 2019-03-11 | End: 2019-05-30

## 2019-03-11 NOTE — TELEPHONE ENCOUNTER
"Requested Prescriptions   Pending Prescriptions Disp Refills     MONONESSA 0.25-35 MG-MCG tablet [Pharmacy Med Name: MONONESSA TABLETS 28S] 84 tablet 0     Sig: TAKE 1 TABLET BY MOUTH DAILY    Contraceptives Protocol Passed - 3/11/2019  2:56 PM       Passed - Patient is not a current smoker if age is 35 or older       Passed - Recent (12 mo) or future (30 days) visit within the authorizing provider's specialty    Patient had office visit in the last 12 months or has a visit in the next 30 days with authorizing provider or within the authorizing provider's specialty.  See \"Patient Info\" tab in inbasket, or \"Choose Columns\" in Meds & Orders section of the refill encounter.             Passed - Medication is active on med list       Passed - No active pregnancy on record       Passed - No positive pregnancy test in past 12 months      Last Written Prescription Date:  9/13/18  Last Fill Quantity: 84,  # refills: 3   Last office visit: 1/17/2019 with prescribing provider:  12/17/18 Annual Masters   Future Office Visit:   Next 5 appointments (look out 90 days)    Mar 13, 2019  4:40 PM CDT  Telephone [facility] Yvette [facility]-C  [facility] [address] [facility] [address] [phone]   May 24, 2019  8:30 AM CDT  [facility] Visit with [facility]y [facility] [address] [address] [phone]       Refill sent.     Start OCP taper-3pills/d x 3d, 2pills/d x 2d, then 1pill/d for rest of pack. Discussed potential SE of increased OCP use including VTE. Switch to brand that she previously was on and did well.   "

## 2019-03-13 ENCOUNTER — VIRTUAL VISIT (OUTPATIENT)
Dept: FAMILY MEDICINE | Facility: CLINIC | Age: 29
End: 2019-03-13
Payer: COMMERCIAL

## 2019-03-13 DIAGNOSIS — F41.9 ANXIETY: ICD-10-CM

## 2019-03-13 PROCEDURE — 99441 ZZC PHYSICIAN TELEPHONE EVALUATION 5-10 MIN: CPT | Performed by: PHYSICIAN ASSISTANT

## 2019-03-13 ASSESSMENT — ANXIETY QUESTIONNAIRES
1. FEELING NERVOUS, ANXIOUS, OR ON EDGE: SEVERAL DAYS
3. WORRYING TOO MUCH ABOUT DIFFERENT THINGS: SEVERAL DAYS
2. NOT BEING ABLE TO STOP OR CONTROL WORRYING: NOT AT ALL
GAD7 TOTAL SCORE: 3
7. FEELING AFRAID AS IF SOMETHING AWFUL MIGHT HAPPEN: SEVERAL DAYS
5. BEING SO RESTLESS THAT IT IS HARD TO SIT STILL: NOT AT ALL
IF YOU CHECKED OFF ANY PROBLEMS ON THIS QUESTIONNAIRE, HOW DIFFICULT HAVE THESE PROBLEMS MADE IT FOR YOU TO DO YOUR WORK, TAKE CARE OF THINGS AT HOME, OR GET ALONG WITH OTHER PEOPLE: SOMEWHAT DIFFICULT
6. BECOMING EASILY ANNOYED OR IRRITABLE: NOT AT ALL

## 2019-03-13 ASSESSMENT — PATIENT HEALTH QUESTIONNAIRE - PHQ9
5. POOR APPETITE OR OVEREATING: NOT AT ALL
SUM OF ALL RESPONSES TO PHQ QUESTIONS 1-9: 0

## 2019-03-13 NOTE — PROGRESS NOTES
"Linda Agosto is a 28 year old female who is being evaluated via a billable telephone visit.      The patient has been notified of following:     \"This telephone visit will be conducted via a call between you and your physician/provider. We have found that certain health care needs can be provided without the need for a physical exam.  This service lets us provide the care you need with a short phone conversation.  If a prescription is necessary we can send it directly to your pharmacy.  If lab work is needed we can place an order for that and you can then stop by our lab to have the test done at a later time.    If during the course of the call the physician/provider feels a telephone visit is not appropriate, you will not be charged for this service.\"     Consent has been obtained for this service by 1 care team member: yes. See the scanned image in the medical record.    Linda Agosto complains of  No chief complaint on file.      I have reviewed and updated the patient's Past Medical History, Social History, Family History and Medication List.    ALLERGIES  Penicillins and Sulfa drugs    Ayanna Gayle MA      Anxiety Follow-Up; reports since starting zoloft \"I like it.\" Did titrate up all the way to 50mg daily.   Not 100%, but overall feels better. Overall anxiety has improved.   Does not make her feel flat as she had in the past like she did with celexa.  Mentions it has always taken her a little bit to fall asleep so not sure if this is worse since being on zoloft. Sometimes will feel she has more night waking and not sure if it's related either.  Taking med in the morning.   Admits when compared to before starting medication that things are not significantly different than baseline regarding her sleep.  Tolerating well otherwise.       Status since last visit: definitely helping - feeling better    Other associated symptoms: none    Complicating factors:   Significant life event: No   Current substance " abuse: None  Depression symptoms: No  TRESA-7 SCORE 12/21/2017 9/4/2018 2/8/2019   Total Score - - -   Total Score 5 4 7       TRESA-7    Assessment/Plan:    ICD-10-CM    1. Anxiety F41.9 sertraline (ZOLOFT) 50 MG tablet   Pt reports improvement and does feel that med is helping.  Does notice some sleep disturbance, but doesn't feel this is different than baseline prior to taking medication. She will continue to monitor this and follow-up if she notices any worsening or new concerns.  Otherwise, given she's doing well she will continue without changes and recheck in 6 months.    I have reviewed the note as documented above.  This accurately captures the substance of my conversation with the patient.      Total time of call between patient and provider was 7 minutes     Yvette Rodas PA-C

## 2019-03-14 ASSESSMENT — ANXIETY QUESTIONNAIRES: GAD7 TOTAL SCORE: 3

## 2019-05-30 ENCOUNTER — MYC MEDICAL ADVICE (OUTPATIENT)
Dept: ENDOCRINOLOGY | Facility: CLINIC | Age: 29
End: 2019-05-30

## 2019-05-30 ENCOUNTER — OFFICE VISIT (OUTPATIENT)
Dept: INTERNAL MEDICINE | Facility: CLINIC | Age: 29
End: 2019-05-30
Payer: COMMERCIAL

## 2019-05-30 VITALS
HEART RATE: 104 BPM | SYSTOLIC BLOOD PRESSURE: 114 MMHG | DIASTOLIC BLOOD PRESSURE: 74 MMHG | TEMPERATURE: 99.7 F | OXYGEN SATURATION: 97 % | BODY MASS INDEX: 26.52 KG/M2 | HEIGHT: 66 IN | RESPIRATION RATE: 14 BRPM | WEIGHT: 165 LBS

## 2019-05-30 DIAGNOSIS — E10.9 TYPE 1 DIABETES MELLITUS WITHOUT COMPLICATION (H): Primary | ICD-10-CM

## 2019-05-30 DIAGNOSIS — Z96.41 INSULIN PUMP STATUS: ICD-10-CM

## 2019-05-30 DIAGNOSIS — E10.9 TYPE 1 DIABETES, HBA1C GOAL < 7% (H): ICD-10-CM

## 2019-05-30 DIAGNOSIS — H57.89 EYE IRRITATION: Primary | ICD-10-CM

## 2019-05-30 PROCEDURE — 99212 OFFICE O/P EST SF 10 MIN: CPT | Performed by: NURSE PRACTITIONER

## 2019-05-30 ASSESSMENT — MIFFLIN-ST. JEOR: SCORE: 1492.25

## 2019-05-30 NOTE — PROGRESS NOTES
".Subjective     Linda Agosto is a 29 year old female who presents to clinic today for the following health issues:  Chief Complaint   Patient presents with     Conjunctivitis     pt c/o rt eye felt scratchy last noc and was swollen this am and now pt states feels fine, just wants to make sure she does not have pink eye     HPI     pt c/o rt eye felt scratchy last noc and was swollen this am and now pt states feels fine, just wants to make sure she does not have pink eye  She has no scratchy feeling anymore and had no discharge from eye and eye was not stuck together in am         Patient Active Problem List   Diagnosis     Allergic rhinitis     Allergic state     Type 1 diabetes, HbA1c goal < 7% (H)     Other specified hypothyroidism     Anxiety     Hyperlipidemia with target LDL less than 70     Type 1 diabetes mellitus without complication (H) - [facility], Dr. Taylor     Common migraine     Past Surgical History:   Procedure Laterality Date     APPENDECTOMY  8/07    dr deshpande     PE TUBES  1996       Social History     Tobacco Use     Smoking status: Never Smoker     Smokeless tobacco: Never Used   Substance Use Topics     Alcohol use: Yes     Comment: sometimes couple on weekends     Family History   Problem Relation Age of Onset     Neurologic Disorder Maternal Grandmother         dementia     Genetic Disorder Paternal Grandfather         kidney     Family History Negative No family hx of              Reviewed and updated as needed this visit by Provider         Review of Systems   ROS COMP: Constitutional, HEENT, cardiovascular, pulmonary, gi and gu systems are negative, except as otherwise noted.      Objective    Ht 1.664 m (5' 5.5\")   BMI 25.28 kg/m    Body mass index is 25.28 kg/m .  Physical Exam   GENERAL: alert and no distress  EYES: Eyes grossly normal to inspection, and conjunctivae and sclerae normal  No discharge   RESP: lungs clear  CV: regular rate and rhythm  PSYCH: " "mentation appears normal, affect normal/bright    Diagnostic Test Results:  Labs reviewed in Epic        Assessment & Plan     1. Eye irritation  Improved with time and saline eye wash   No pink eye        BMI:   Estimated body mass index is 27.04 kg/m  as calculated from the following:    Height as of this encounter: 1.664 m (5' 5.5\").    Weight as of this encounter: 74.8 kg (165 lb).           Patient Instructions   Follow up if any changes, discharge or pain to eye        No follow-ups on file.    AMA [facility]        "

## 2019-05-30 NOTE — NURSING NOTE
"Chief Complaint   Patient presents with     Conjunctivitis     pt c/o rt eye felt scratchy last noc and was swollen this am and now pt states feels fine, just wants to make sure she does not have pink eye     initial /74   Pulse 104   Temp 99.7  F (37.6  C) (Oral)   Resp 14   Ht 1.664 m (5' 5.5\")   Wt 74.8 kg (165 lb)   LMP 05/16/2019 (Approximate)   SpO2 97%   Breastfeeding? No   BMI 27.04 kg/m   Estimated body mass index is 27.04 kg/m  as calculated from the following:    Height as of this encounter: 1.664 m (5' 5.5\").    Weight as of this encounter: 74.8 kg (165 lb)..  bp completed using cuff size regular  DEBRA DE LA GARZA LPN  "

## 2019-06-13 DIAGNOSIS — N93.8 DUB (DYSFUNCTIONAL UTERINE BLEEDING): ICD-10-CM

## 2019-06-13 RX ORDER — NORGESTIMATE AND ETHINYL ESTRADIOL
KIT
Qty: 84 TABLET | Refills: 0 | Status: SHIPPED | OUTPATIENT
Start: 2019-06-13 | End: 2019-07-22

## 2019-06-13 NOTE — TELEPHONE ENCOUNTER
"Requested Prescriptions   Pending Prescriptions Disp Refills     MONONESSA 0.25-35 MG-MCG tablet [Pharmacy Med Name: MONONESSA TABLETS 28S] 84 tablet 0     Sig: TAKE 1 TABLET BY MOUTH DAILY       Contraceptives Protocol Passed - 6/13/2019  9:19 AM        Passed - Patient is not a current smoker if age is 35 or older        Passed - Recent (12 mo) or future (30 days) visit within the authorizing provider's specialty     Patient had office visit in the last 12 months or has a visit in the next 30 days with authorizing provider or within the authorizing provider's specialty.  See \"Patient Info\" tab in inbasket, or \"Choose Columns\" in Meds & Orders section of the refill encounter.              Passed - Medication is active on med list        Passed - No active pregnancy on record        Passed - No positive pregnancy test in past 12 months        Prescription approved per [facility] Refill Protocol.  Jessenia Quintanilla RN on 6/13/2019 at 9:29 AM    "

## 2019-06-18 ENCOUNTER — MYC MEDICAL ADVICE (OUTPATIENT)
Dept: ENDOCRINOLOGY | Facility: CLINIC | Age: 29
End: 2019-06-18

## 2019-06-19 NOTE — TELEPHONE ENCOUNTER
Message noted, called patient back and reviewed her insulin medication questions.  She thanked me for the callback.    GOLDEN Jimenez MD  Endocrinology  [facility]         PROCEDURE:  Right Knee Radiographs.



HISTORY:

Fall, Patella Injury



COMPARISON:

None.



FINDINGS:



BONES:

Normal. No fracture. 



JOINTS:

Small anterior osteophytes are seen arising from the patella. There 

is some soft tissue thickening anterior to the patellar tendon which 

may represent a prepatellar bursa. 



JOINT EFFUSION:

None. 



OTHER FINDINGS:

None.



IMPRESSION:

No evidence of fracture

## 2019-06-19 NOTE — TELEPHONE ENCOUNTER
Message noted, called patient this morning to review her Novolog insulin and Rx issues.    GOLDEN Jimenez MD  Endocrinology   [facility]

## 2019-06-19 NOTE — TELEPHONE ENCOUNTER
Dr. Jimenez,    Pt appears to have more questions. Please call back to address concerns    Thank you,  Grady ROTHMAN RN

## 2019-07-09 ENCOUNTER — TELEPHONE (OUTPATIENT)
Dept: ENDOCRINOLOGY | Facility: CLINIC | Age: 29
End: 2019-07-09

## 2019-07-09 ENCOUNTER — MYC MEDICAL ADVICE (OUTPATIENT)
Dept: ENDOCRINOLOGY | Facility: CLINIC | Age: 29
End: 2019-07-09

## 2019-07-09 DIAGNOSIS — Z96.41 INSULIN PUMP STATUS: ICD-10-CM

## 2019-07-09 DIAGNOSIS — E10.9 TYPE 1 DIABETES MELLITUS WITHOUT COMPLICATION (H): ICD-10-CM

## 2019-07-09 NOTE — TELEPHONE ENCOUNTER
Prior Authorization Retail Medication Request    Medication/Dose: Contour next test strips  ICD code (if different than what is on RX):  E10.9, Z96.41  Previously Tried and Failed:  None  Rationale:  Patient uses Medtronic pump which syncs with Contour next test strips    Insurance Name:  [employer]  Insurance ID:  [id number]      Pharmacy Information (if different than what is on RX)  Name:  [id number]  Phone:  [phone]

## 2019-07-10 ENCOUNTER — TELEPHONE (OUTPATIENT)
Dept: FAMILY MEDICINE | Facility: CLINIC | Age: 29
End: 2019-07-10

## 2019-07-10 NOTE — TELEPHONE ENCOUNTER
Needs of attention regarding:  -Diabetes    Health Maintenance Topics with due status: Overdue       Topic Date Due    DIABETIC FOOT EXAM 1992    MIGRAINE ACTION PLAN 1992    BMP 11/16/2017    A1C 05/05/2018    LIPID 02/01/2019    MICROALBUMIN 02/01/2019    EYE EXAM 06/27/2019       Communication:  See Letter

## 2019-07-10 NOTE — LETTER
[facility]  [address]  [place] [id number] [phone]  July 10, 2019    Linda Agosto  [address]  [facility] [address]    Dear Linda,    I care about your health and have reviewed your health plan. I have reviewed your medical conditions, medication list, and lab results and am making recommendations based on this review, to better manage your health.    You are in particular need of attention regarding:  -Diabetes    I am recommending that you:  -schedule a FOLLOWUP OFFICE APPOINTMENT with me.         Here is a list of Health Maintenance topics that are due now or due soon:  Health Maintenance Due   Topic Date Due     DIABETIC FOOT EXAM  1992     MIGRAINE ACTION PLAN  1992     BMP  11/16/2017     A1C  05/05/2018     LIPID  02/01/2019     MICROALBUMIN  02/01/2019     EYE EXAM  06/27/2019       Please call us at [phone] (or use [employer]) to address the above recommendations.     Thank you for trusting [facility] and we appreciate the opportunity to serve you.  We look forward to supporting your healthcare needs in the future.    Healthy Regards,    Yvette Rodas PA-C

## 2019-07-17 NOTE — TELEPHONE ENCOUNTER
Prior Authorization Approval    Authorization Effective Date: 7/17/2019  Authorization Expiration Date: 7/17/2020  Medication: Contour next test strips-APPROVED  Approved Dose/Quantity:   Reference #:     Insurance Company: [employer] - Phone [phone] Fax [phone]  Expected CoPay:       CoPay Card Available:      Foundation Assistance Needed:    Which Pharmacy is filling the prescription (Not needed for infusion/clinic administered): [facility] [address] [place] [address] [address] AT [facility] OF [address] [facility]  Pharmacy Notified: Yes  Patient Notified: No    Pharmacy will notify patient when medication is ready.

## 2019-07-17 NOTE — TELEPHONE ENCOUNTER
Central Prior Authorization Team   Phone: [phone]      PA Initiation    Medication: Contour next test strips-Initiated  Insurance Company: [employer] - Phone [phone] Fax [phone]  Pharmacy Filling the Rx: [facility] [id number] [place] [address] - [address] AT [facility] OF [address]  Filling Pharmacy Phone: [phone]  Filling Pharmacy Fax:    Start Date: 7/17/2019

## 2019-07-22 ENCOUNTER — MYC REFILL (OUTPATIENT)
Dept: OBGYN | Facility: CLINIC | Age: 29
End: 2019-07-22

## 2019-07-22 DIAGNOSIS — N93.8 DUB (DYSFUNCTIONAL UTERINE BLEEDING): ICD-10-CM

## 2019-07-22 RX ORDER — NORGESTIMATE AND ETHINYL ESTRADIOL 0.25-0.035
1 KIT ORAL DAILY
Qty: 84 TABLET | Refills: 0 | Status: SHIPPED | OUTPATIENT
Start: 2019-07-22 | End: 2019-10-24

## 2019-07-22 NOTE — TELEPHONE ENCOUNTER
"Requested Prescriptions   Pending Prescriptions Disp Refills     norgestimate-ethinyl estradiol (MONONESSA) 0.25-35 MG-MCG tablet 84 tablet 0     Sig: Take 1 tablet by mouth daily       Contraceptives Protocol Passed - 7/22/2019 11:55 AM        Passed - Patient is not a current smoker if age is 35 or older        Passed - Recent (12 mo) or future (30 days) visit within the authorizing provider's specialty     Patient had office visit in the last 12 months or has a visit in the next 30 days with authorizing provider or within the authorizing provider's specialty.  See \"Patient Info\" tab in inbasket, or \"Choose Columns\" in Meds & Orders section of the refill encounter.              Passed - Medication is active on med list        Passed - No active pregnancy on record        Passed - No positive pregnancy test in past 12 months        Last Written Prescription Date:  6/13/19  Last Fill Quantity: 84,  # refills: 0   Last office visit: 9/13//2018 with prescribing provider:  Natanael   Future Office Visit:   Next 5 appointments (look out 90 days)    Aug 13, 2019  8:30 AM CDT  Return Visit with Santy Jimenez MD  [facility] [address] [place] [address] [phone]         Medication is being filled for 1 time refill only due to:  Will require an annual exam for further refills.      "

## 2019-08-05 ENCOUNTER — MYC MEDICAL ADVICE (OUTPATIENT)
Dept: ENDOCRINOLOGY | Facility: CLINIC | Age: 29
End: 2019-08-05

## 2019-08-06 ENCOUNTER — TELEPHONE (OUTPATIENT)
Dept: FAMILY MEDICINE | Facility: CLINIC | Age: 29
End: 2019-08-06

## 2019-08-06 DIAGNOSIS — E10.9 TYPE 1 DIABETES MELLITUS WITHOUT COMPLICATION (H): ICD-10-CM

## 2019-08-06 DIAGNOSIS — E78.5 HYPERLIPIDEMIA WITH TARGET LDL LESS THAN 70: Primary | ICD-10-CM

## 2019-08-06 DIAGNOSIS — E03.8 OTHER SPECIFIED HYPOTHYROIDISM: ICD-10-CM

## 2019-08-06 NOTE — TELEPHONE ENCOUNTER
Pt sent a message via [employer] asking to have labs done.  She is due for her DM follow up and has an appt with Dr. Jimenez on 8/13/2019, but her insurance has changed and she will need to find a new Endo to see as she can no longer see him.  She is asking if we can order her lab tests in the meantime while she finds a new Endo to see.  Of note, she is also scheduled for physical with OBGYN provider.    Diabetic Foot Exam   Bmp   A1c   Lipid   Microalbumin     Can we order labs knowing she will be finding a new Endo to see?  Please advise.  Katherine Ahn

## 2019-08-06 NOTE — TELEPHONE ENCOUNTER
She will be due within the next month to see me for an anxiety recheck. I can order her labs, but then the diabetic foot exam would need to be done in the clinic so I would recommend making an office visit and we can complete all at that time. Also looks like she is overdue for TSH so this was added to her labs if she's ok with that too.  Electronically Signed By: Yvette Rodas PA-C

## 2019-09-17 ENCOUNTER — MYC MEDICAL ADVICE (OUTPATIENT)
Dept: FAMILY MEDICINE | Facility: CLINIC | Age: 29
End: 2019-09-17

## 2019-09-17 ENCOUNTER — TELEPHONE (OUTPATIENT)
Dept: FAMILY MEDICINE | Facility: CLINIC | Age: 29
End: 2019-09-17

## 2019-09-17 NOTE — TELEPHONE ENCOUNTER
Needs of attention regarding:  -Diabetes    Health Maintenance Topics with due status: Overdue       Topic Date Due    DIABETIC FOOT EXAM 1992    MIGRAINE ACTION PLAN 1992    BMP 11/16/2017    A1C 05/05/2018    LIPID 02/01/2019    MICROALBUMIN 02/01/2019    EYE EXAM 06/27/2019    INFLUENZA VACCINE 09/01/2019       Communication:  See Letter

## 2019-09-17 NOTE — LETTER
[facility]  [address]  [place] [id number] [phone]  September 17, 2019    Linda Agosto  [address]  [facility] [address]    Dear Linda,    I care about your health and have reviewed your health plan. I have reviewed your medical conditions, medication list, and lab results and am making recommendations based on this review, to better manage your health.    You are in particular need of attention regarding:  -Diabetes    I am recommending that you:  -schedule a LAB ONLY APPOINTMENT to recheck your: A1c, Lipids (fasting cholesterol - nothing to eat except water and/or meds for 8+ hours), CMP(complete metabolic panel), Microablumin and TSH (thyroid test) tests within the next 1-2 weeks.      Please call us at [phone] (or use [employer]) to address the above recommendations.     Thank you for trusting [facility] and we appreciate the opportunity to serve you.  We look forward to supporting your healthcare needs in the future.    Healthy Regards,    Yvette Rodas PA-C

## 2019-10-02 ENCOUNTER — TRANSFERRED RECORDS (OUTPATIENT)
Dept: HEALTH INFORMATION MANAGEMENT | Facility: CLINIC | Age: 29
End: 2019-10-02

## 2019-10-03 ENCOUNTER — MYC MEDICAL ADVICE (OUTPATIENT)
Dept: FAMILY MEDICINE | Facility: CLINIC | Age: 29
End: 2019-10-03

## 2019-10-03 ENCOUNTER — HEALTH MAINTENANCE LETTER (OUTPATIENT)
Age: 29
End: 2019-10-03

## 2019-10-03 NOTE — TELEPHONE ENCOUNTER
Routing to TC/MA pool to see if health maintenance can be updated. Please see MyChart message from patient. Thank you.  SKYLAR DominguezN, RN  [facility]

## 2019-10-14 ENCOUNTER — OFFICE VISIT (OUTPATIENT)
Dept: FAMILY MEDICINE | Facility: CLINIC | Age: 29
End: 2019-10-14
Payer: COMMERCIAL

## 2019-10-14 VITALS — SYSTOLIC BLOOD PRESSURE: 122 MMHG | DIASTOLIC BLOOD PRESSURE: 78 MMHG

## 2019-10-14 DIAGNOSIS — L30.1 DYSHIDROTIC ECZEMA: Primary | ICD-10-CM

## 2019-10-14 PROCEDURE — 99213 OFFICE O/P EST LOW 20 MIN: CPT | Performed by: FAMILY MEDICINE

## 2019-10-14 RX ORDER — TRIAMCINOLONE ACETONIDE 1 MG/G
CREAM TOPICAL 2 TIMES DAILY
Qty: 80 G | Refills: 1 | Status: SHIPPED | OUTPATIENT
Start: 2019-10-14

## 2019-10-14 NOTE — PROGRESS NOTES
[facility] - PRIMARY CARE SKIN    CC: Eczema  SUBJECTIVE:   Linda Agosto is a(n) 29 year old female who presents to clinic today because of eczema that started 5 years ago but flared in the past few months. She notes itchy, fluid-filled blisters that develop on the fingers.    Areas of involvement: hands (between the fingers).    Symptoms appear to be: intermittent, waxing and waning, worsening over the last 5 months.    Previous history of a similar rash: YES - a chronic issue.    Products used: Eucerin eczema relief used on the hands a couple times a day.    Therapies tried: OTC.    Personal Medical History    Eczema Psoriasis Autoimmune   ?YES NO NO     Family Medical History    Eczema Psoriasis Autoimmune   NO NO NO     Occupation: office work (indoor).    Patient Active Problem List   Diagnosis     Allergic rhinitis     Allergic state     Type 1 diabetes, HbA1c goal < 7% (H)     Other specified hypothyroidism     Anxiety     Hyperlipidemia with target LDL less than 70     Type 1 diabetes mellitus without complication (H) - [facility], Dr. Taylor     Common migraine       Past Medical History:   Diagnosis Date     Allergic rhinitis, cause unspecified     h/o AIT     Common migraine     No visual aura.      Hyperlipidemia LDL goal < 70      Hypothyroidism      Infectious mononucleosis 1995     Tuberculosis      Type 1 diabetes, HbA1c goal < 7% (H) 3/04     followed [facility] endocrinology - now Dr Jimenez    Past Surgical History:   Procedure Laterality Date     APPENDECTOMY  8/07    dr deshpande     PE TUBES  1996      Social History     Tobacco Use     Smoking status: Never Smoker     Smokeless tobacco: Never Used   Substance Use Topics     Alcohol use: Yes     Comment: sometimes couple on weekends     Drug use: No    Family History     Problem (# of Occurrences) Relation (Name,Age of Onset)    Genetic Disorder (1) Paternal Grandfather: kidney    Neurologic Disorder (1) Maternal  Grandmother: dementia       Negative family history of: Family History Negative           Current Outpatient Medications   Medication Sig Dispense Refill     blood glucose (VALE CONTOUR NEXT) test strip Patient tests 5 times/day, Contour Next test strips DAW1 500 strip 3     blood glucose monitoring (VALE MICROLET) lancets Patient tests 8 times/day 2 Box 6     Continuous Blood Gluc Sensor (FREESTYLE BEKAH 14 DAY SENSOR) [facility] USE AS DIRECTED FOR 28 DAYS  11     insulin aspart (NOVOLOG PEN) 100 UNIT/ML pen Inject 3 to 10 units subcutaneous at mealtimes as directed, total daily dose approx 30 units 15 mL 1     insulin aspart (NOVOLOG VIAL) 100 UNITS/ML VIAL Uses up to 75 Units/day 30 mL 2     insulin glargine (BASAGLAR KWIKPEN) 100 UNIT/ML injection INJ 21 UNITS SC D  0     insulin pen needle (BD CLEOPATRA U/F) 32G X 4 MM miscellaneous Use 4 pen needles daily or as directed. 100 each 1     levothyroxine (SYNTHROID, LEVOTHROID) 88 MCG tablet TAKE ONE TABLET BY MOUTH ONCE DAILY 90 tablet 3     norgestimate-ethinyl estradiol (MONONESSA) 0.25-35 MG-MCG per tablet Take 1 tablet by mouth daily 84 tablet 3     norgestimate-ethinyl estradiol (MONONESSA) 0.25-35 MG-MCG tablet Take 1 tablet by mouth daily 84 tablet 0     sertraline (ZOLOFT) 50 MG tablet Take 1 tablet (50 mg) by mouth daily 90 tablet 1     simvastatin (ZOCOR) 40 MG tablet Take 1 tablet (40 mg) by mouth At Bedtime 90 tablet 3     triamcinolone (KENALOG) 0.1 % external cream Apply topically 2 times daily to affected areas on fingers for 10-14 days. Not for use on face nor groin. 80 g 1         Allergies   Allergen Reactions     Penicillins Rash     augmentin & pcn     Sulfa Drugs Hives        INTEGUMENTARY/SKIN: POSITIVE for pruritus and rash  ROS: 14 point review of systems was negative except the symptoms listed above in the HPI.    This document serves as a record of the services and decisions personally performed and made by Lisa Cordova MD and was created  by Caden Peter, a trained medical scribe, based on personal observations and provider statements to the medical scribe.  October 14, 2019 4:18 PM   Caden Peter    OBJECTIVE:   GENERAL: healthy, alert and no distress.  SKIN: Damon Skin Type - II.  Hands and Fingers examined. The dermatoscope was used to help evaluate pigmented lesions.  Skin Pertinent Findings:  Both hands: scattered erythema and vesicular lesions on the sides of the fingers and extending onto the dorsa and palmar surfaces of fingers. Palms are clear.    Diagnostic Test Results:  none     ASSESSMENT:     Encounter Diagnosis   Name Primary?     Dyshidrotic eczema Yes       PLAN:   Patient Instructions   FUTURE APPOINTMENTS  Follow up as needed if symptoms are not controlled with infrequent use of topical steroid.    TOPICAL STEROID INSTRUCTIONS  Triamcinolone 0.1% cream.  Apply two times per day for 10-14 days. Then, use only when needed.  1. Wash hands before applying topical steroid.  2. Apply sparingly (just enough to rub in) onto affected areas of the hands.    This higher strength steroid should never be used on face nor groin.    After the initial treatment, topical steroid may be used as needed for flare-ups but only for short-term treatment. If you are using this for prolonged periods of time to control flare-ups, return to clinic for re-evaluation of treatment.    Keep in mind to also regularly use moisturizer, as this preventative measure can help maintain your skin's natural protective moisture barrier.    DRY SKIN MANAGEMENT INSTRUCTIONS  Routine use of moisturizer is important for healthy, resilient skin not just for soft skin.     Sealing in moisture    Twice daily use of a moisturizer such as over-the-counter (OTC) CeraVe moisturizer cream (in the jar). CeraVe products contain ceramides and filaggrin proteins that can help to maintain the body's moisture layer.    After cleansing or washing, always apply moisturizer immediately  "after drying off (pat dry only) for best effect.    Protection while hydrating    Do not overuse soap. Unless you have been sweating extensively, just apply soap to groin and armpits.    Recommended products for body include: OTC unscented Dove for sensitive skin or OTC Vanicream cleansing bar.    Recommended facial cleansers include: OTC CeraVe hydrating facial cleanser or OTC Cetaphil daily facial cleanser.    Always try to wear rubber gloves when washing dishes or cleaning.    Avoid use of    Scented/perfumed products    Irritating clothing (wool, new jeans, new/unwashed clothing, scratchy synthetics)    Neosporin or triple antibiotic topical products    Products containing aloe, herbs, Vitamin E, or other \"natural ingredients\".    Dryer sheets or fabric softeners (while symptoms are present)    If a topical medication is prescribed, apply topical prescription first, followed by use of moisturizing product.      The patient was counseled to use products free of fragrance, dyes, and plants. The importance of using bland cleansers and the regular use of heavy bland emollient creams was impressed upon the patient.    TT: 20 minutes.  CT: 15 minutes.    The information in this document, created by the medical scribe for me, accurately reflects the services I personally performed and the decisions made by me. I have reviewed and approved this document for accuracy prior to leaving the patient care area.  October 14, 2019 4:18 PM  Lisa Cordova MD  [facility]    "

## 2019-10-14 NOTE — TELEPHONE ENCOUNTER
Needs of attention regarding:  -Diabetes/ Eye Exam    Health Maintenance Topics with due status: Overdue       Topic Date Due    DIABETIC FOOT EXAM 1992    MIGRAINE ACTION PLAN 1992    PNEUMOCOCCAL IMMUNIZATION 19-64 MEDIUM RISK 05/21/2011    BMP 11/16/2017    A1C 05/05/2018    LIPID 02/01/2019    MICROALBUMIN 02/01/2019    EYE EXAM 06/27/2019    INFLUENZA VACCINE 09/01/2019       Communication:  Please abstract the following data from this visit with this patient into the appropriate field in Epic:    Tests that can be patient reported without a hard copy:    Eye exam with ophthalmology on this date: 10/2/19 [facility]    Other Tests found in the patient's chart through Chart Review/Care Everywhere:        Note to Abstraction: If this section is blank, no results were found via Chart Review/Care Everywhere.

## 2019-10-14 NOTE — LETTER
10/14/2019         RE: Linda Agosto  [id number]  [place] [address]        Dear Colleague,    [facility]. Please see a copy of my visit note below.    [facility] - PRIMARY CARE SKIN    CC: Eczema  SUBJECTIVE:   Linda Agosto is a(n) 29 year old female who presents to clinic today because of eczema that started 5 years ago but flared in the past few months. She notes itchy, fluid-filled blisters that develop on the fingers.    Areas of involvement: hands (between the fingers).    Symptoms appear to be: intermittent, waxing and waning, worsening over the last 5 months.    Previous history of a similar rash: YES - a chronic issue.    Products used: Eucerin eczema relief used on the hands a couple times a day.    Therapies tried: OTC.    Personal Medical History    Eczema Psoriasis Autoimmune   ?YES NO NO     Family Medical History    Eczema Psoriasis Autoimmune   NO NO NO     Occupation: office work (indoor).    Patient Active Problem List   Diagnosis     Allergic rhinitis     Allergic state     Type 1 diabetes, HbA1c goal < 7% (H)     Other specified hypothyroidism     Anxiety     Hyperlipidemia with target LDL less than 70     Type 1 diabetes mellitus without complication (H) - [facility], Dr. Taylor     Common migraine       Past Medical History:   Diagnosis Date     Allergic rhinitis, cause unspecified     h/o AIT     Common migraine     No visual aura.      Hyperlipidemia LDL goal < 70      Hypothyroidism      Infectious mononucleosis 1995     Tuberculosis      Type 1 diabetes, HbA1c goal < 7% (H) 3/04     followed [facility] endocrinology - now Dr Jimenez    Past Surgical History:   Procedure Laterality Date     APPENDECTOMY  8/07    dr deshpande     PE TUBES  1996      Social History     Tobacco Use     Smoking status: Never Smoker     Smokeless tobacco: Never Used   Substance Use Topics      Alcohol use: Yes     Comment: sometimes couple on weekends     Drug use: No    Family History     Problem (# of Occurrences) Relation (Name,Age of Onset)    Genetic Disorder (1) Paternal Grandfather: kidney    Neurologic Disorder (1) Maternal Grandmother: dementia       Negative family history of: Family History Negative           Current Outpatient Medications   Medication Sig Dispense Refill     blood glucose (VALE CONTOUR NEXT) test strip Patient tests 5 times/day, Contour Next test strips DAW1 500 strip 3     blood glucose monitoring (VALE MICROLET) lancets Patient tests 8 times/day 2 Box 6     Continuous Blood Gluc Sensor (FREESTYLE BEKAH 14 DAY SENSOR) MISC USE AS DIRECTED FOR 28 DAYS  11     insulin aspart (NOVOLOG PEN) 100 UNIT/ML pen Inject 3 to 10 units subcutaneous at mealtimes as directed, total daily dose approx 30 units 15 mL 1     insulin aspart (NOVOLOG VIAL) 100 UNITS/ML VIAL Uses up to 75 Units/day 30 mL 2     insulin glargine (BASAGLAR KWIKPEN) 100 UNIT/ML injection INJ 21 UNITS SC D  0     insulin pen needle (BD CLEOPATRA U/F) 32G X 4 MM miscellaneous Use 4 pen needles daily or as directed. 100 each 1     levothyroxine (SYNTHROID, LEVOTHROID) 88 MCG tablet TAKE ONE TABLET BY MOUTH ONCE DAILY 90 tablet 3     norgestimate-ethinyl estradiol (MONONESSA) 0.25-35 MG-MCG per tablet Take 1 tablet by mouth daily 84 tablet 3     norgestimate-ethinyl estradiol (MONONESSA) 0.25-35 MG-MCG tablet Take 1 tablet by mouth daily 84 tablet 0     sertraline (ZOLOFT) 50 MG tablet Take 1 tablet (50 mg) by mouth daily 90 tablet 1     simvastatin (ZOCOR) 40 MG tablet Take 1 tablet (40 mg) by mouth At Bedtime 90 tablet 3     triamcinolone (KENALOG) 0.1 % external cream Apply topically 2 times daily to affected areas on fingers for 10-14 days. Not for use on face nor groin. 80 g 1         Allergies   Allergen Reactions     Penicillins Rash     augmentin & pcn     Sulfa Drugs Hives        INTEGUMENTARY/SKIN: POSITIVE for  pruritus and rash  ROS: 14 point review of systems was negative except the symptoms listed above in the HPI.    This document serves as a record of the services and decisions personally performed and made by Lisa Cordova MD and was created by Caden Peter, a trained medical scribe, based on personal observations and provider statements to the medical scribe.  October 14, 2019 4:18 PM   Caden Peter    OBJECTIVE:   GENERAL: healthy, alert and no distress.  SKIN: Damon Skin Type - II.  Hands and Fingers examined. The dermatoscope was used to help evaluate pigmented lesions.  Skin Pertinent Findings:  Both hands: scattered erythema and vesicular lesions on the sides of the fingers and extending onto the dorsa and palmar surfaces of fingers. Palms are clear.    Diagnostic Test Results:  none     ASSESSMENT:     Encounter Diagnosis   Name Primary?     Dyshidrotic eczema Yes       PLAN:   Patient Instructions   FUTURE APPOINTMENTS  Follow up as needed if symptoms are not controlled with infrequent use of topical steroid.    TOPICAL STEROID INSTRUCTIONS  Triamcinolone 0.1% cream.  Apply two times per day for 10-14 days. Then, use only when needed.  1. Wash hands before applying topical steroid.  2. Apply sparingly (just enough to rub in) onto affected areas of the hands.    This higher strength steroid should never be used on face nor groin.    After the initial treatment, topical steroid may be used as needed for flare-ups but only for short-term treatment. If you are using this for prolonged periods of time to control flare-ups, return to clinic for re-evaluation of treatment.    Keep in mind to also regularly use moisturizer, as this preventative measure can help maintain your skin's natural protective moisture barrier.    DRY SKIN MANAGEMENT INSTRUCTIONS  Routine use of moisturizer is important for healthy, resilient skin not just for soft skin.     Sealing in moisture    Twice daily use of a moisturizer such  "as over-the-counter (OTC) CeraVe moisturizer cream (in the jar). CeraVe products contain ceramides and filaggrin proteins that can help to maintain the body's moisture layer.    After cleansing or washing, always apply moisturizer immediately after drying off (pat dry only) for best effect.    Protection while hydrating    Do not overuse soap. Unless you have been sweating extensively, just apply soap to groin and armpits.    Recommended products for body include: OTC unscented Dove for sensitive skin or OTC Vanicream cleansing bar.    Recommended facial cleansers include: OTC CeraVe hydrating facial cleanser or OTC Cetaphil daily facial cleanser.    Always try to wear rubber gloves when washing dishes or cleaning.    Avoid use of    Scented/perfumed products    Irritating clothing (wool, new jeans, new/unwashed clothing, scratchy synthetics)    Neosporin or triple antibiotic topical products    Products containing aloe, herbs, Vitamin E, or other \"natural ingredients\".    Dryer sheets or fabric softeners (while symptoms are present)    If a topical medication is prescribed, apply topical prescription first, followed by use of moisturizing product.      The patient was counseled to use products free of fragrance, dyes, and plants. The importance of using bland cleansers and the regular use of heavy bland emollient creams was impressed upon the patient.    TT: 20 minutes.  CT: 15 minutes.    The information in this document, created by the medical scribe for me, accurately reflects the services I personally performed and the decisions made by me. I have reviewed and approved this document for accuracy prior to leaving the patient care area.  October 14, 2019 4:18 PM  Lisa Cordova MD  [facility]      Again, thank you for allowing me to participate in the care of your patient.        Sincerely,        Lisa Cordova MD    "

## 2019-10-14 NOTE — PATIENT INSTRUCTIONS
FUTURE APPOINTMENTS  Follow up as needed if symptoms are not controlled with infrequent use of topical steroid.    TOPICAL STEROID INSTRUCTIONS  Triamcinolone 0.1% cream.  Apply two times per day for 10-14 days. Then, use only when needed.  1. Wash hands before applying topical steroid.  2. Apply sparingly (just enough to rub in) onto affected areas of the hands.    This higher strength steroid should never be used on face nor groin.    After the initial treatment, topical steroid may be used as needed for flare-ups but only for short-term treatment. If you are using this for prolonged periods of time to control flare-ups, return to clinic for re-evaluation of treatment.    Keep in mind to also regularly use moisturizer, as this preventative measure can help maintain your skin's natural protective moisture barrier.    DRY SKIN MANAGEMENT INSTRUCTIONS  Routine use of moisturizer is important for healthy, resilient skin not just for soft skin.     Sealing in moisture    Twice daily use of a moisturizer such as over-the-counter (OTC) CeraVe moisturizer cream (in the jar). CeraVe products contain ceramides and filaggrin proteins that can help to maintain the body's moisture layer.    After cleansing or washing, always apply moisturizer immediately after drying off (pat dry only) for best effect.    Protection while hydrating    Do not overuse soap. Unless you have been sweating extensively, just apply soap to groin and armpits.    Recommended products for body include: OTC unscented Dove for sensitive skin or OTC Vanicream cleansing bar.    Recommended facial cleansers include: OTC CeraVe hydrating facial cleanser or OTC Cetaphil daily facial cleanser.    Always try to wear rubber gloves when washing dishes or cleaning.    Avoid use of    Scented/perfumed products    Irritating clothing (wool, new jeans, new/unwashed clothing, scratchy synthetics)    Neosporin or triple antibiotic topical products    Products containing  "aloe, herbs, Vitamin E, or other \"natural ingredients\".    Dryer sheets or fabric softeners (while symptoms are present)    If a topical medication is prescribed, apply topical prescription first, followed by use of moisturizing product.    "

## 2019-10-24 DIAGNOSIS — N93.8 DUB (DYSFUNCTIONAL UTERINE BLEEDING): ICD-10-CM

## 2019-10-24 RX ORDER — NORGESTIMATE AND ETHINYL ESTRADIOL 0.25-0.035
1 KIT ORAL DAILY
Qty: 28 TABLET | Refills: 0 | Status: SHIPPED | OUTPATIENT
Start: 2019-10-24 | End: 2019-11-15

## 2019-10-24 NOTE — TELEPHONE ENCOUNTER
"Requested Prescriptions   Pending Prescriptions Disp Refills     norgestimate-ethinyl estradiol (MONONESSA) 0.25-35 MG-MCG tablet 84 tablet 0     Sig: Take 1 tablet by mouth daily       Contraceptives Protocol Passed - 10/24/2019  1:46 PM        Passed - Patient is not a current smoker if age is 35 or older        Passed - Recent (12 mo) or future (30 days) visit within the authorizing provider's specialty     Patient has had an office visit with the authorizing provider or a provider within the authorizing providers department within the previous 12 mos or has a future within next 30 days. See \"Patient Info\" tab in inbasket, or \"Choose Columns\" in Meds & Orders section of the refill encounter.              Passed - Medication is active on med list        Passed - No active pregnancy on record        Passed - No positive pregnancy test in past 12 months        Last Written Prescription Date:  7/22/19  Last Fill Quantity: 84,  # refills: 0   Last office visit: 1/17/2019 with prescribing provider:  Dr. Santoyo     Future Office Visit:  none    "

## 2019-10-24 NOTE — TELEPHONE ENCOUNTER
Last OV with MI 1/17/19 for vaginal issue  Scheduled Annual visit with AM: 11/15/19  Has cancelled last annual 12/17/19 with Masters in past    Medication is being filled for 1 time refill only due to:  Patient needs to be seen because it has been more than one year since last visit. Has annual scheduled.  Shania Schulz RN on 10/24/2019 at 1:56 PM

## 2019-10-25 ENCOUNTER — MYC MEDICAL ADVICE (OUTPATIENT)
Dept: ENDOCRINOLOGY | Facility: CLINIC | Age: 29
End: 2019-10-25

## 2019-10-25 DIAGNOSIS — E10.9 TYPE 1 DIABETES, HBA1C GOAL < 7% (H): ICD-10-CM

## 2019-11-13 ENCOUNTER — TELEPHONE (OUTPATIENT)
Dept: FAMILY MEDICINE | Facility: CLINIC | Age: 29
End: 2019-11-13

## 2019-11-13 DIAGNOSIS — E78.5 HYPERLIPIDEMIA WITH TARGET LDL LESS THAN 70: ICD-10-CM

## 2019-11-13 DIAGNOSIS — E10.9 TYPE 1 DIABETES MELLITUS WITHOUT COMPLICATION (H): Primary | ICD-10-CM

## 2019-11-13 NOTE — TELEPHONE ENCOUNTER
"Message noted.  I agree with the plan to do these lab tests prior to her next appt, but would also check a cholesterol (lipid) panel.  Please relay message to her, and ask her to be fasting when these tests are done with the [place] \"lab appointment\".  Thanks.    ANTHONY Jimenez MD, MS  Endocrinology  [facility]        "

## 2019-11-13 NOTE — TELEPHONE ENCOUNTER
Sent Pt message informing her that orders are placed along with  A lipid profile. Advised Pt she will need to be fasting.

## 2019-11-13 NOTE — TELEPHONE ENCOUNTER
Reason for Call: Request for an order or referral:    Order or referral being requested:   Bmp   A1c   Microalbumin   Preventive Care Visit   Tsh W/Free T4 Reflex       Date needed: at your convenience    Has the patient been seen by the PCP for this problem? YES    Additional comments: Pt is scheduled with Dr. Jimenez in February   And would like to have her labs drawn prior to appointment    Phone number Patient can be reached at:  Home number on file [phone] (home)    Best Time:      Can we leave a detailed message on this number?  Not Applicable    Call taken on 11/13/2019 at 11:16 AM by Korin Medina

## 2019-11-14 NOTE — PROGRESS NOTES
Linda is a 29 year old  female who presents for annual exam.     Besides routine health maintenance, she has no other health concerns today .    HPI:  The patient's PCP is  Yvette Rodas PA-C.      Lately has occ had off brand OCP not the mononessa. Does prefer mononessa.  Otherwise no issues.    Exercising 4x/wk viedos at home. [employer].        GYNECOLOGIC HISTORY:    Patient's last menstrual period was 2019.    Regular menses? yes  Menses every 28 days.  Length of menses: 4-5 days    Her current contraception method is: oral contraceptives.  She  reports that she has never smoked. She has never used smokeless tobacco.    Patient is sexually active.  STD testing offered?  Declined  Last PHQ-9 score on record =   PHQ-9 SCORE 11/15/2019   PHQ-9 Total Score -   PHQ-9 Total Score 0     Last GAD7 score on record =   TRESA-7 SCORE 11/15/2019   Total Score -   Total Score 0     Alcohol Score = 3    HEALTH MAINTENANCE:  Cholesterol: with PCP  Cholesterol   Date Value Ref Range Status   2018 223 (H) <200 mg/dL Final     Comment:     Desirable:       <200 mg/dl   2016 215 (H) <200 mg/dL Final     Comment:     Desirable:       <200 mg/dl      Last Mammo: Not applicable, Result: Not applicable, Next Mammo: Due at age 40   Pap:   Lab Results   Component Value Date    PAP NIL 2017    PAP NIL 2014      Colonoscopy:  never, Result: Not applicable, Next Colonoscopy: age 52.  Dexa:  never    Health maintenance updated:  yes    HISTORY:  OB History    Para Term  AB Living   0 0 0 0 0 0   SAB TAB Ectopic Multiple Live Births   0 0 0 0 0       Patient Active Problem List   Diagnosis     Allergic rhinitis     Allergic state     Type 1 diabetes, HbA1c goal < 7% (H)     Other specified hypothyroidism     Anxiety     Hyperlipidemia with target LDL less than 70     Type 1 diabetes mellitus without complication (H) - [facility], Dr. Taylor     Common migraine     Past  Surgical History:   Procedure Laterality Date     APPENDECTOMY  8/07    dr deshpande     PE TUBES  1996      Social History     Tobacco Use     Smoking status: Never Smoker     Smokeless tobacco: Never Used   Substance Use Topics     Alcohol use: Yes     Comment: sometimes couple on weekends      Problem (# of Occurrences) Relation (Name,Age of Onset)    Genetic Disorder (1) Paternal Grandfather: kidney    Neurologic Disorder (1) Maternal Grandmother: dementia       Negative family history of: Family History Negative            Current Outpatient Medications   Medication Sig     blood glucose (VALE CONTOUR NEXT) test strip Patient tests 5 times/day, Contour Next test strips DAW1     blood glucose monitoring (VALE MICROLET) lancets Patient tests 8 times/day     Continuous Blood Gluc Sensor (FREESTYLE BEKAH 14 DAY SENSOR) MIS USE AS DIRECTED FOR 28 DAYS     insulin aspart (NOVOLOG PEN) 100 UNIT/ML pen Inject 3 to 10 units subcutaneous at mealtimes as directed, total daily dose approx 30 units     insulin aspart (NOVOLOG VIAL) 100 UNITS/ML vial Use with insulin pump management, total daily dose approx 75 units.     insulin glargine (BASAGLAR KWIKPEN) 100 UNIT/ML injection INJ 21 UNITS SC D     insulin pen needle (BD CLEOPATRA U/F) 32G X 4 MM miscellaneous Use 4 pen needles daily or as directed.     levothyroxine (SYNTHROID, LEVOTHROID) 88 MCG tablet TAKE ONE TABLET BY MOUTH ONCE DAILY     MONONESSA 0.25-35 MG-MCG tablet Take 1 tablet by mouth daily     sertraline (ZOLOFT) 50 MG tablet Take 1 tablet (50 mg) by mouth daily     simvastatin (ZOCOR) 40 MG tablet Take 1 tablet (40 mg) by mouth At Bedtime     triamcinolone (KENALOG) 0.1 % external cream Apply topically 2 times daily to affected areas on fingers for 10-14 days. Not for use on face nor groin.     No current facility-administered medications for this visit.      Allergies   Allergen Reactions     Penicillins Rash     augmentin & pcn     Sulfa Drugs Hives  "      Past medical, surgical, social and family histories were reviewed and updated in EPIC.    ROS:   12 point review of systems negative other than symptoms noted below.    EXAM:  /76   Pulse 72   Ht 1.651 m (5' 5\")   Wt 82.1 kg (181 lb)   LMP 11/02/2019   BMI 30.12 kg/m     BMI: Body mass index is 30.12 kg/m .    PHYSICAL EXAM:  Constitutional:   Appearance: Well nourished, well developed, alert, in no acute distress  Neck:  Lymph Nodes:  No lymphadenopathy present    Thyroid:  Gland size normal, nontender, no nodules or masses present  on palpation  Chest:  Respiratory Effort:  Breathing unlabored  Cardiovascular:    Heart: Auscultation:  Regular rate, normal rhythm, no murmurs present  Breasts: Inspection of Breasts:  No lymphadenopathy present., Palpation of Breasts and Axillae:  No masses present on palpation, no breast tenderness., Axillary Lymph Nodes:  No lymphadenopathy present. and No nodularity, asymmetry or nipple discharge bilaterally.  Gastrointestinal:   Abdominal Examination:  Abdomen nontender to palpation, tone normal without rigidity or guarding, no masses present, umbilicus without lesions   Liver and Spleen:  No hepatomegaly present, liver nontender to palpation    Hernias:  No hernias present  Lymphatic: Lymph Nodes:  No other lymphadenopathy present  Skin:  General Inspection:  No rashes present, no lesions present, no areas of  discoloration  Neurologic:    Mental Status:  Oriented X3.  Normal strength and tone, sensory exam                grossly normal, mentation intact and speech normal.    Psychiatric:   Mentation appears normal and affect normal/bright.         Pelvic Exam:  External Genitalia:     Normal appearance for age, no discharge present, no tenderness present, no inflammatory lesions present, color normal  Vagina:     Normal vaginal vault without central or paravaginal defects, no discharge present, no inflammatory lesions present, no masses present  Bladder:  "    Nontender to palpation  Urethra:   Urethral Body:  Urethra palpation normal, urethra structural support normal   Urethral Meatus:  No erythema or lesions present  Cervix:     Appearance healthy, no lesions present, nontender to palpation, no bleeding present  Uterus:     Uterus: firm, normal sized and nontender, retroverted in position.   Adnexa:     No adnexal tenderness present, no adnexal masses present  Perineum:     Perineum within normal limits, no evidence of trauma, no rashes or skin lesions present  Anus:     Anus within normal limits, no hemorrhoids present  Inguinal Lymph Nodes:     No lymphadenopathy present  Pubic Hair:     Normal pubic hair distribution for age  Genitalia and Groin:     No rashes present, no lesions present, no areas of discoloration, no masses present      COUNSELING:   Reviewed preventive health counseling, as reflected in patient instructions       Osteoporosis Prevention/Bone Health    BMI: Body mass index is 30.12 kg/m .  Weight management plan: Discussed healthy diet and exercise guidelines    ASSESSMENT:  29 year old female with satisfactory annual exam.    ICD-10-CM    1. Encounter for gynecological examination without abnormal finding Z01.419    2. DUB (dysfunctional uterine bleeding) N93.8 MONONESSA 0.25-35 MG-MCG tablet       PLAN:  -UTD for cervical cancer screening. Reviewed guidelines-pap q 3yrs until age 30 when co-testing q 5 years.  -Breast self awareness discussed. Age 40 for mammogram.  -Osteoporosis prevention discussed.  -PCP manages labs, follows simvistatin rx  -Refill ocp, doing well. Name brand mononessa ordered.  -Return one year for next annual exam          Sierra Treviño Masters, DO

## 2019-11-15 ENCOUNTER — OFFICE VISIT (OUTPATIENT)
Dept: OBGYN | Facility: CLINIC | Age: 29
End: 2019-11-15
Payer: COMMERCIAL

## 2019-11-15 VITALS
SYSTOLIC BLOOD PRESSURE: 124 MMHG | DIASTOLIC BLOOD PRESSURE: 76 MMHG | BODY MASS INDEX: 30.16 KG/M2 | HEART RATE: 72 BPM | WEIGHT: 181 LBS | HEIGHT: 65 IN

## 2019-11-15 DIAGNOSIS — Z01.419 ENCOUNTER FOR GYNECOLOGICAL EXAMINATION WITHOUT ABNORMAL FINDING: Primary | ICD-10-CM

## 2019-11-15 DIAGNOSIS — N93.8 DUB (DYSFUNCTIONAL UTERINE BLEEDING): ICD-10-CM

## 2019-11-15 PROCEDURE — 99395 PREV VISIT EST AGE 18-39: CPT | Performed by: OBSTETRICS & GYNECOLOGY

## 2019-11-15 RX ORDER — NORGESTIMATE AND ETHINYL ESTRADIOL
1 KIT DAILY
Qty: 84 TABLET | Refills: 6 | Status: SHIPPED | OUTPATIENT
Start: 2019-11-15 | End: 2021-01-25

## 2019-11-15 ASSESSMENT — ANXIETY QUESTIONNAIRES
7. FEELING AFRAID AS IF SOMETHING AWFUL MIGHT HAPPEN: NOT AT ALL
2. NOT BEING ABLE TO STOP OR CONTROL WORRYING: NOT AT ALL
1. FEELING NERVOUS, ANXIOUS, OR ON EDGE: NOT AT ALL
GAD7 TOTAL SCORE: 0
3. WORRYING TOO MUCH ABOUT DIFFERENT THINGS: NOT AT ALL
5. BEING SO RESTLESS THAT IT IS HARD TO SIT STILL: NOT AT ALL
6. BECOMING EASILY ANNOYED OR IRRITABLE: NOT AT ALL

## 2019-11-15 ASSESSMENT — MIFFLIN-ST. JEOR: SCORE: 1556.89

## 2019-11-15 ASSESSMENT — PATIENT HEALTH QUESTIONNAIRE - PHQ9
5. POOR APPETITE OR OVEREATING: NOT AT ALL
SUM OF ALL RESPONSES TO PHQ QUESTIONS 1-9: 0

## 2019-11-15 NOTE — PATIENT INSTRUCTIONS
-Daily total calcium intake (between food/supplements) should be 1000mg which equates to 3-4 servings calcium containing food per day; VItamin D 1000IU.   Foods rich in calcium are: milk, cheese, yogurt, seafood, sardines and canned salmon, leafy green vegetables such as maurisio greens, spinach and kale, beans and lentils, almonds, seeds (poppy, sesame, celery, aubrey), rhubarb, dried fruit such as figs, whey protein, tofu and edamame, amaranth, other foods with added calcium such as orange juice and some cereals.   If adequate amount not taken in diet, then a supplement may be needed.     -I also recommend increasing your dietary fiber by starting Metamucil (powder mixed in glass of water) twice daily

## 2019-11-16 ASSESSMENT — ANXIETY QUESTIONNAIRES: GAD7 TOTAL SCORE: 0

## 2019-11-25 DIAGNOSIS — F41.9 ANXIETY: ICD-10-CM

## 2019-11-25 NOTE — TELEPHONE ENCOUNTER
"Requested Prescriptions   Pending Prescriptions Disp Refills     sertraline (ZOLOFT) 50 MG tablet [Pharmacy Med Name: SERTRALINE 50MG TABLETS]  Last Written Prescription Date:  3/13/2019  Last Fill Quantity: 90 tablet,  # refills: 1   Last office visit: 2/8/2019 with prescribing provider:  Adrianna     Future Office Visit:   Next 5 appointments (look out 90 days)    Dec 05, 2019  7:30 AM CST  Lab visit with CS LAB  [facility] [address] [address]  [phone]   Feb 14, 2020  8:00 AM CST  Return Visit with Santy Jimenez MD  [address] [address]  [phone]            90 tablet 0     Sig: TAKE 1 TABLET(50 MG) BY MOUTH DAILY       SSRIs Protocol Passed - 11/25/2019 10:21 AM    PHQ-9 SCORE 2/8/2019 3/13/2019 11/15/2019   PHQ-9 Total Score - - -   PHQ-9 Total Score 3 0 0     TRESA-7 SCORE 2/8/2019 3/13/2019 11/15/2019   Total Score - - -   Total Score 7 3 0             Passed - Recent (12 mo) or future (30 days) visit within the authorizing provider's specialty     Patient has had an office visit with the authorizing provider or a provider within the authorizing providers department within the previous 12 mos or has a future within next 30 days. See \"Patient Info\" tab in inbasket, or \"Choose Columns\" in Meds & Orders section of the refill encounter.              Passed - Medication is active on med list        Passed - Patient is age 18 or older        Passed - No active pregnancy on record        Passed - No positive pregnancy test in last 12 months        "

## 2019-11-26 NOTE — TELEPHONE ENCOUNTER
Medication is being filled for 1 time refill only due to:  Patient needs to be seen because due for mood follow up.   SKYLAR DominguezN, RN  [facility]

## 2019-12-05 DIAGNOSIS — E10.9 TYPE 1 DIABETES MELLITUS WITHOUT COMPLICATION (H): ICD-10-CM

## 2019-12-05 DIAGNOSIS — E78.5 HYPERLIPIDEMIA WITH TARGET LDL LESS THAN 70: ICD-10-CM

## 2019-12-05 LAB
ANION GAP SERPL CALCULATED.3IONS-SCNC: 7 MMOL/L (ref 3–14)
BUN SERPL-MCNC: 15 MG/DL (ref 7–30)
CALCIUM SERPL-MCNC: 9.1 MG/DL (ref 8.5–10.1)
CHLORIDE SERPL-SCNC: 105 MMOL/L (ref 94–109)
CO2 SERPL-SCNC: 25 MMOL/L (ref 20–32)
CREAT SERPL-MCNC: 0.72 MG/DL (ref 0.52–1.04)
CREAT UR-MCNC: 36 MG/DL
GFR SERPL CREATININE-BSD FRML MDRD: >90 ML/MIN/{1.73_M2}
GLUCOSE SERPL-MCNC: 172 MG/DL (ref 70–99)
HBA1C MFR BLD: 7.9 % (ref 0–5.6)
MICROALBUMIN UR-MCNC: <5 MG/L
MICROALBUMIN/CREAT UR: NORMAL MG/G CR (ref 0–25)
POTASSIUM SERPL-SCNC: 4.3 MMOL/L (ref 3.4–5.3)
SODIUM SERPL-SCNC: 137 MMOL/L (ref 133–144)
TSH SERPL DL<=0.005 MIU/L-ACNC: 6.48 MU/L (ref 0.4–4)

## 2019-12-05 PROCEDURE — 84443 ASSAY THYROID STIM HORMONE: CPT | Performed by: INTERNAL MEDICINE

## 2019-12-05 PROCEDURE — 82043 UR ALBUMIN QUANTITATIVE: CPT | Performed by: INTERNAL MEDICINE

## 2019-12-05 PROCEDURE — 80048 BASIC METABOLIC PNL TOTAL CA: CPT | Performed by: INTERNAL MEDICINE

## 2019-12-05 PROCEDURE — 83036 HEMOGLOBIN GLYCOSYLATED A1C: CPT | Performed by: INTERNAL MEDICINE

## 2019-12-05 PROCEDURE — 36415 COLL VENOUS BLD VENIPUNCTURE: CPT | Performed by: INTERNAL MEDICINE

## 2019-12-08 ENCOUNTER — MYC MEDICAL ADVICE (OUTPATIENT)
Dept: ENDOCRINOLOGY | Facility: CLINIC | Age: 29
End: 2019-12-08

## 2019-12-08 DIAGNOSIS — E03.9 HYPOTHYROIDISM, UNSPECIFIED TYPE: Primary | ICD-10-CM

## 2019-12-08 DIAGNOSIS — E03.8 OTHER SPECIFIED HYPOTHYROIDISM: ICD-10-CM

## 2019-12-09 RX ORDER — LEVOTHYROXINE SODIUM 100 UG/1
100 TABLET ORAL DAILY
Qty: 90 TABLET | Refills: 1 | Status: SHIPPED | OUTPATIENT
Start: 2019-12-09 | End: 2020-02-14

## 2020-01-08 ENCOUNTER — MYC MEDICAL ADVICE (OUTPATIENT)
Dept: FAMILY MEDICINE | Facility: CLINIC | Age: 30
End: 2020-01-08

## 2020-01-08 NOTE — TELEPHONE ENCOUNTER
PHQ-9 SCORE 2/8/2019 3/13/2019 11/15/2019   PHQ-9 Total Score - - -   PHQ-9 Total Score 3 0 0   generalized anxiety disorder  TRESA-7 SCORE 2/8/2019 3/13/2019 11/15/2019   Total Score - - -   Total Score 7 3 0       Centicet message sent to Linda, prescription has been refilled by Yvette Rodas PA-C. .  Lisette Day R.N.

## 2020-02-06 DIAGNOSIS — E10.9 TYPE 1 DIABETES, HBA1C GOAL < 7% (H): ICD-10-CM

## 2020-02-06 NOTE — TELEPHONE ENCOUNTER
Filled per [facility] protocol. Patient has appointment scheduled with endocrinology on 2/14/2020.    SKYLAR PedersonN, RN  [facility] [employer] [facility]

## 2020-02-06 NOTE — TELEPHONE ENCOUNTER
Last Written Prescription Date:  10/25/19  Last Fill Quantity: 30 mL,  # refills: 1   Last office visit: 2/15/2019 with prescribing provider:  Barbara   Future Office Visit:   Next 5 appointments (look out 90 days)    Feb 14, 2020  8:00 AM CST  [facility] [address] [address] [phone]         Requested Prescriptions   Pending Prescriptions Disp Refills     insulin aspart (NOVOLOG VIAL) 100 UNITS/ML vial [Pharmacy Med Name: NOVOLOG U-100 INSULIN VL10ML(ORANG)] 30 mL 1     Sig: USE WITH INSULIN PUMP FOR TOTAL DAILY DOSE OF 75 UNITS       Short Acting Insulin Protocol Failed - 2/6/2020 12:41 PM        Failed - LDL on file in past 12 months     Recent Labs   Lab Test 02/01/18  0820   *             Passed - Blood pressure less than 140/90 in past 6 months     BP Readings from Last 3 Encounters:   11/15/19 124/76   10/14/19 122/78   05/30/19 114/74                 Passed - Microalbumin on file in past 12 months     Recent Labs   Lab Test 12/05/19  0730  12/08/14   MICROALB  --   --  0.2   MICROL <5   < >  --    UMALCR Unable to calculate due to low value   < > 2    < > = values in this interval not displayed.             Passed - Serum creatinine on file in past 12 months     Recent Labs   Lab Test 12/05/19  0730   CR 0.72             Passed - HgbA1C in past 3 or 6 months     If HgbA1C is 8 or greater, it needs to be on file within the past 3 months.  If less than 8, must be on file within the past 6 months.     Recent Labs   Lab Test 12/05/19  0730   A1C 7.9*             Passed - Medication is active on med list        Passed - Patient is age 18 or older        Passed - Recent (6 mo) or future (30 days) visit within the authorizing provider's specialty     Patient had office visit in the last 6 months or has a visit in the next 30 days with authorizing provider or within the authorizing provider's  "specialty.  See \"Patient Info\" tab in inbasket, or \"Choose Columns\" in Meds & Orders section of the refill encounter.              "

## 2020-02-14 ENCOUNTER — OFFICE VISIT (OUTPATIENT)
Dept: ENDOCRINOLOGY | Facility: CLINIC | Age: 30
End: 2020-02-14
Payer: COMMERCIAL

## 2020-02-14 VITALS
DIASTOLIC BLOOD PRESSURE: 79 MMHG | SYSTOLIC BLOOD PRESSURE: 126 MMHG | WEIGHT: 182 LBS | HEART RATE: 90 BPM | BODY MASS INDEX: 30.32 KG/M2 | HEIGHT: 65 IN

## 2020-02-14 DIAGNOSIS — E10.9 TYPE 1 DIABETES MELLITUS WITHOUT COMPLICATION (H): Primary | ICD-10-CM

## 2020-02-14 DIAGNOSIS — E03.9 HYPOTHYROIDISM, UNSPECIFIED TYPE: ICD-10-CM

## 2020-02-14 DIAGNOSIS — Z96.41 INSULIN PUMP STATUS: ICD-10-CM

## 2020-02-14 LAB
HBA1C MFR BLD: 8.2 % (ref 0–5.6)
T4 FREE SERPL-MCNC: 1.12 NG/DL (ref 0.76–1.46)
TSH SERPL DL<=0.005 MIU/L-ACNC: 6.01 MU/L (ref 0.4–4)

## 2020-02-14 PROCEDURE — 84443 ASSAY THYROID STIM HORMONE: CPT | Performed by: INTERNAL MEDICINE

## 2020-02-14 PROCEDURE — 36415 COLL VENOUS BLD VENIPUNCTURE: CPT | Performed by: INTERNAL MEDICINE

## 2020-02-14 PROCEDURE — 99215 OFFICE O/P EST HI 40 MIN: CPT | Performed by: INTERNAL MEDICINE

## 2020-02-14 PROCEDURE — 83036 HEMOGLOBIN GLYCOSYLATED A1C: CPT | Performed by: INTERNAL MEDICINE

## 2020-02-14 PROCEDURE — 84439 ASSAY OF FREE THYROXINE: CPT | Performed by: INTERNAL MEDICINE

## 2020-02-14 RX ORDER — PROCHLORPERAZINE 25 MG/1
1 SUPPOSITORY RECTAL CONTINUOUS PRN
Qty: 3 EACH | Refills: 4 | Status: SHIPPED | OUTPATIENT
Start: 2020-02-14 | End: 2020-02-25

## 2020-02-14 RX ORDER — LEVOTHYROXINE SODIUM 112 UG/1
112 TABLET ORAL DAILY
Qty: 90 TABLET | Refills: 3 | Status: SHIPPED | OUTPATIENT
Start: 2020-02-14 | End: 2020-06-22

## 2020-02-14 RX ORDER — PROCHLORPERAZINE 25 MG/1
1 SUPPOSITORY RECTAL CONTINUOUS PRN
Qty: 1 EACH | Refills: 4 | Status: SHIPPED | OUTPATIENT
Start: 2020-02-14 | End: 2020-02-25

## 2020-02-14 ASSESSMENT — MIFFLIN-ST. JEOR: SCORE: 1561.43

## 2020-02-14 NOTE — PROGRESS NOTES
Name: Linda Agosto      Chief Complaint   Patient presents with     Diabetes       HPI:  Recent issues:  Here f/u diabetes evaluation  Now works for [employer] in , had insurance change  Thyroid med dose increase in 12/2020.  Feeling well overall, no other new health issues reported          2003. Diagnosis of diabetes mellitus, age 13, living in [facility]  Had acute illness requiring hospitalization at [facility]  Began insulin injections, using Novolog and Lantus insulin  [place] carb counting method, gram estimates  On MDI treatment plan for approx 2 years, then changed to pump use  Previous medical evaluations with Dr. Yash Barcenas/[facility] Andreia Callahan  2005. Began use of Eugene pump  2012. Changed to Medtronic pump  Subsequently using Medtronic 723 Paradigm pump  2012. Tried wearing CGMS sensor, but uncomfortable    12/8/14. Initial consult with me at my former clinic ([facility])  Continued pump management  Was not interested in CGMS use    Previous [facility] hgbA1c levels include:     Lab Test 02/05/18 10/10/17 06/21/17 02/01/17 11/16/16  0815   A1C 7.4* 7.8* 8.1* 7.3* 8.2*     1/2018. Upgraded to Medtronic 670G pump   Novolog in pump    Tried Medtronic Guardian sensor, recalls frequent low glucose alarms   Wore sensor in manual mode   Didn't like it, discontinued use    ~11/2018. Wore diagnostic LibrePro CGMS  Subsequently obtained LibrePersonal CGMS, not wearing past year    Current pump settings:      Recent pump Carelink data:       Has [employer] phone junior, but not using Zhou past year    Recent [facility] labs include:  Lab Results   Component Value Date    A1C 7.9 (H) 12/05/2019     12/05/2019    POTASSIUM 4.3 12/05/2019    CHLORIDE 105 12/05/2019    CO2 25 12/05/2019    ANIONGAP 7 12/05/2019     (H) 12/05/2019    BUN 15 12/05/2019    CR 0.72 12/05/2019    GFRESTIMATED >90 12/05/2019    GFRESTBLACK >90 12/05/2019    MARCIA 9.1 12/05/2019    CHOL 223 (H) 02/01/2018    TRIG 71 02/01/2018    HDL 84 02/01/2018      (H) 02/01/2018    NHDL 139 (H) 02/01/2018    UCRR 36 12/05/2019    MICROL <5 12/05/2019    UMALCR Unable to calculate due to low value 12/05/2019    TSH 6.48 (H) 12/05/2019    T4 1.46 05/18/2015     Hyperlipidemia:  Takes simvastatin medication  DM Complications:  None known      Thyroid:  2013. Diagnosis of hypothyroidism  Previous [facility] thyroid labs include:    Treatment with levothyroxine medication  Previously on stable dose as levothyroxine 0.088 mg daily  Recent [facility] labs include:  Lab Results   Component Value Date    TSH 6.48 (H) 12/05/2019    T4 1.46 05/18/2015     (H) 12/19/2013     Current dose:  Levothyroxine 0.1 mg daily      Single, works at [employer] with , in [place]  Finishing her JANAY at [employer]  Has previously seen Dr. Ellen Burdick/ [facility]  Sees for general medicine evaluations.    PMH/PSH:  Past Medical History:   Diagnosis Date     Allergic rhinitis, cause unspecified     h/o AIT     Common migraine     No visual aura.      Hyperlipidemia LDL goal < 70      Hypothyroidism      Infectious mononucleosis 1995     Tuberculosis      Type 1 diabetes, HbA1c goal < 7% (H) 3/04     followed [facility] - peds endocrinology - now Dr Jimenez     Past Surgical History:   Procedure Laterality Date     APPENDECTOMY  8/07    dr deshpande     PE TUBES  1996       Family Hx:  Family History   Problem Relation Age of Onset     Neurologic Disorder Maternal Grandmother         dementia     Genetic Disorder Paternal Grandfather         kidney     Family History Negative No family hx of          Social Hx:  Social History     Socioeconomic History     Marital status: Single     Spouse name: Not on file     Number of children: Not on file     Years of education: Not on file     Highest education level: Not on file   Occupational History     Not on file   Social Needs     Financial resource strain: Not on file     Food insecurity:     Worry: Not on file     Inability: Not on file      Transportation needs:     Medical: Not on file     Non-medical: Not on file   Tobacco Use     Smoking status: Never Smoker     Smokeless tobacco: Never Used   Substance and Sexual Activity     Alcohol use: Yes     Comment: sometimes couple on weekends     Drug use: No     Sexual activity: Yes     Partners: Male     Birth control/protection: Pill   Lifestyle     Physical activity:     Days per week: Not on file     Minutes per session: Not on file     Stress: Not on file   Relationships     Social connections:     Talks on phone: Not on file     Gets together: Not on file     Attends Congregational service: Not on file     Active member of club or organization: Not on file     Attends meetings of clubs or organizations: Not on file     Relationship status: Not on file     Intimate partner violence:     Fear of current or ex partner: Not on file     Emotionally abused: Not on file     Physically abused: Not on file     Forced sexual activity: Not on file   Other Topics Concern      Service Not Asked     Blood Transfusions Not Asked     Caffeine Concern Yes     Comment: rare     Occupational Exposure Not Asked     Hobby Hazards Not Asked     Sleep Concern No     Stress Concern No     Weight Concern Not Asked     Special Diet Not Asked     Back Care Not Asked     Exercise Yes     Bike Helmet Not Asked     Seat Belt Yes     Self-Exams No     Comment: encouraged     Parent/sibling w/ CABG, MI or angioplasty before 65F 55M? No   Social History Narrative    -Working -            MEDICATIONS:  has a current medication list which includes the following prescription(s): blood glucose, blood glucose monitoring, dexcom g6 sensor, freestyle rayne 14 day sensor, dexcom g6 transmitter, insulin aspart, insulin aspart, insulin glargine, insulin pen needle, levothyroxine, mononessa, sertraline, simvastatin, and triamcinolone.    ROS:     ROS: 10 point ROS neg other than the symptoms noted above in the HPI.    GENERAL: mild fatigue,  "wt gain; denies fevers, chills, night sweats.   HEENT: no dysphagia, odonophagia, diplopia, neck pain  THYROID:  no apparent hyper or hypothyroid symptoms  CV: no chest pain, pressure, palpitations  LUNGS: no SOB, LANG, cough, wheezing   ABDOMEN: no diarrhea, constipation, abdominal pain  EXTREMITIES: no rashes, ulcers, edema  NEUROLOGY: no headaches, denies changes in vision, tingling, extremitiy numbness   MSK: no muscle aches or pains, weakness  SKIN: no rashes or lesions  : menses regular  PSYCH:  stable mood, no significant anxiety or depression  ENDOCRINE: no heat or cold intolerance    Physical Exam   VS: /79 (BP Location: Right arm, Cuff Size: Adult Large)   Pulse 90   Ht 1.651 m (5' 5\")   Wt 82.6 kg (182 lb)   Breastfeeding No   BMI 30.29 kg/m    GENERAL: AXOX3, NAD, well dressed, answering questions appropriately, appears stated age.  THYROID:  normal gland, no apparent nodules or goiter  HEENT: neck non-tender, no exopthalmous, no proptosis, EOMI  CV: RRR, no rubs, gallops, no murmurs  LUNGS: CTAB, no wheezes, rales, or ronchi  ABDOMEN: mildly obese, soft, nontender, nondistended  EXTREMITIES: no edema  NEUROLOGY: CN grossly intact, no tremors  MSK: grossly intact  SKIN: pale skin complexion; no rashes, no lesions    LABS:    All pertinent notes, labs, and images personally reviewed by me.     A/P:  Encounter Diagnoses   Name Primary?     Type 1 diabetes mellitus without complication (H) Yes     Insulin pump status      Hypothyroidism, unspecified type        Comments:  Reviewed health history and diabetes issues.  Not doing much mild hyperglycemia correction bolusing, needs to restart a CGMS sensor device    Plan:  Reviewed the overall T1DM management and insulin pump use.  Discussed optimal BG testing to assess glucose trends.  We reviewed insulin pump settings, basal rate and bolus dosing  Use of automated pump bolus dosing for meal/snack carb & correction dosing  Reviewed the [employer] " Carelink report data today    Recommend:  Continue insulin pump and diabetes management plan.  Pump setting changes:   Basals:  MN 0.75 to 0.8 and 9p 0.975 to 1.0 unit(s)/hr  Check labs today  Goal target premeal BG  mg/dl  Discussed CGMS options and questions   Gave her free sample Zhou sensor   Reviewed DexcomG6 option, pamphlet given   She wishes to proceed with Dexcom sensor use, new Rx sent to  Specialty Pharmacy  Continue use of the levothyroxine, simvastatin daily dosing  Discussed general issues with T1DM management if pregnancy, also pregnancy planning/avoidance  Arrange annual dilated eye exam, fasting lipid panel testing.  Contact me if questions/concerns about diabetes and thyroid management    Addressed patient's questions today.    Labs ordered today:   Orders Placed This Encounter   Procedures     TSH     T4 free     Hemoglobin A1c     Radiology/Consults ordered today: None    More than 50% of the time spent with Ms. Agosto on counseling / coordinating her care.  Total appointment time was 40 minutes.    Follow-up:  3 mo.    ANTHONY Jimenez MD, MS  Endocrinology  [facility]    CC: Yvette Rodas

## 2020-02-24 ENCOUNTER — MYC MEDICAL ADVICE (OUTPATIENT)
Dept: ENDOCRINOLOGY | Facility: CLINIC | Age: 30
End: 2020-02-24

## 2020-02-25 ENCOUNTER — MYC MEDICAL ADVICE (OUTPATIENT)
Dept: ENDOCRINOLOGY | Facility: CLINIC | Age: 30
End: 2020-02-25

## 2020-02-25 DIAGNOSIS — E10.9 TYPE 1 DIABETES MELLITUS WITHOUT COMPLICATION (H): Primary | ICD-10-CM

## 2020-02-25 RX ORDER — FLASH GLUCOSE SENSOR
KIT MISCELLANEOUS
Qty: 2 EACH | Refills: 11 | Status: SHIPPED | OUTPATIENT
Start: 2020-02-25 | End: 2021-04-02

## 2020-05-12 ENCOUNTER — MYC MEDICAL ADVICE (OUTPATIENT)
Dept: ENDOCRINOLOGY | Facility: CLINIC | Age: 30
End: 2020-05-12

## 2020-05-18 NOTE — TELEPHONE ENCOUNTER
Patient scheduled new appointment for June to follow up with Dr. Jimenez.    Taras Espinosa CMA on 5/18/2020 at 10:48 AM

## 2020-05-21 ENCOUNTER — VIRTUAL VISIT (OUTPATIENT)
Dept: FAMILY MEDICINE | Facility: CLINIC | Age: 30
End: 2020-05-21
Payer: COMMERCIAL

## 2020-05-21 DIAGNOSIS — F41.9 ANXIETY: Primary | ICD-10-CM

## 2020-05-21 DIAGNOSIS — G43.009 MIGRAINE WITHOUT AURA AND WITHOUT STATUS MIGRAINOSUS, NOT INTRACTABLE: ICD-10-CM

## 2020-05-21 PROCEDURE — 96127 BRIEF EMOTIONAL/BEHAV ASSMT: CPT | Mod: TEL | Performed by: NURSE PRACTITIONER

## 2020-05-21 PROCEDURE — 99214 OFFICE O/P EST MOD 30 MIN: CPT | Mod: TEL | Performed by: NURSE PRACTITIONER

## 2020-05-21 RX ORDER — SUMATRIPTAN 50 MG/1
50 TABLET, FILM COATED ORAL
Qty: 10 TABLET | Refills: 2 | Status: SHIPPED | OUTPATIENT
Start: 2020-05-21 | End: 2021-10-19

## 2020-05-21 ASSESSMENT — ANXIETY QUESTIONNAIRES
1. FEELING NERVOUS, ANXIOUS, OR ON EDGE: NOT AT ALL
6. BECOMING EASILY ANNOYED OR IRRITABLE: NOT AT ALL
3. WORRYING TOO MUCH ABOUT DIFFERENT THINGS: NOT AT ALL
IF YOU CHECKED OFF ANY PROBLEMS ON THIS QUESTIONNAIRE, HOW DIFFICULT HAVE THESE PROBLEMS MADE IT FOR YOU TO DO YOUR WORK, TAKE CARE OF THINGS AT HOME, OR GET ALONG WITH OTHER PEOPLE: NOT DIFFICULT AT ALL
2. NOT BEING ABLE TO STOP OR CONTROL WORRYING: NOT AT ALL
GAD7 TOTAL SCORE: 0
5. BEING SO RESTLESS THAT IT IS HARD TO SIT STILL: NOT AT ALL
7. FEELING AFRAID AS IF SOMETHING AWFUL MIGHT HAPPEN: NOT AT ALL

## 2020-05-21 ASSESSMENT — PATIENT HEALTH QUESTIONNAIRE - PHQ9: 5. POOR APPETITE OR OVEREATING: NOT AT ALL

## 2020-05-21 NOTE — PROGRESS NOTES
"Linda Agosto is a 30 year old female who is being evaluated via a billable telephone visit.      The patient has been notified of following:     \"This telephone visit will be conducted via a call between you and your physician/provider. We have found that certain health care needs can be provided without the need for a physical exam.  This service lets us provide the care you need with a short phone conversation.  If a prescription is necessary we can send it directly to your pharmacy.  If lab work is needed we can place an order for that and you can then stop by our lab to have the test done at a later time.    Telephone visits are billed at different rates depending on your insurance coverage. During this emergency period, for some insurers they may be billed the same as an in-person visit.  Please reach out to your insurance provider with any questions.    If during the course of the call the physician/provider feels a telephone visit is not appropriate, you will not be charged for this service.\"    Patient has given verbal consent for Telephone visit?  Yes    What phone number would you like to be contacted at?     How would you like to obtain your AVS? Nia Chatman     Linda Agosto is a 30 year old female who presents via phone visit today for the following health issues:    HPI  Anxiety Follow-Up      How are you doing with your anxiety since your last visit? Improved Sertraline has helped    Are you having other symptoms that might be associated with anxiety? No    Have you had a significant life event? No     Are you feeling depressed? No    Do you have any concerns with your use of alcohol or other drugs? No    Social History     Tobacco Use     Smoking status: Never Smoker     Smokeless tobacco: Never Used   Substance Use Topics     Alcohol use: Yes     Comment: sometimes couple on weekends     Drug use: No     TRESA-7 SCORE 3/13/2019 11/15/2019 5/21/2020   Total Score - - -   Total Score 3 0 0 "     PHQ 2/8/2019 3/13/2019 11/15/2019   PHQ-9 Total Score 3 0 0   Q9: Thoughts of better off dead/self-harm past 2 weeks Not at all Not at all Not at all     Has been taking things day by day and is doing ok.    Is currently furloughed.  Was previously working from home.       Migraine   Onset of migraines during college years.    Will get yawns before migraines. No visual auras.  History of 1 optical migraine.    Left eye region. Light and noise sensitivity.  Typically headaches will last until falling asleep.  Will linger into the next day.    +Nausea.      Since your last clinic visit, how have your headaches changed?  Worsened    How often are you getting headaches or migraines? Once or twice a month- seems to be more frequent than before      Are you able to do normal daily activities when you have a migraine? No    Are you taking rescue/relief medications? (Select all that apply) ibuprofen (Advil, Motrin) and Excedrin - she says she was prescribed a medication in the past but its been years since she's had it and can't remember what it is.     How helpful is your rescue/relief medication?  I get only a small amount of relief    Are you taking any medications to prevent migraines? (Select all that apply)  No    In the past 4 weeks, how often have you gone to urgent care or the emergency room because of your headaches?  0      How many servings of fruits and vegetables do you eat daily?  2-3    On average, how many sweetened beverages do you drink each day (Examples: soda, juice, sweet tea, etc.  Do NOT count diet or artificially sweetened beverages)?   0    How many days per week do you exercise enough to make your heart beat faster? 5    How many minutes a day do you exercise enough to make your heart beat faster? 30 - 60    How many days per week do you miss taking your medication? 0      Patient Active Problem List   Diagnosis     Allergic rhinitis     Allergic state     Type 1 diabetes, HbA1c goal < 7% (H)      Other specified hypothyroidism     Anxiety     Hyperlipidemia with target LDL less than 70     Type 1 diabetes mellitus without complication (H) - [facility], Dr. Taylor     Common migraine     Past Surgical History:   Procedure Laterality Date     APPENDECTOMY  8/07    dr deshpande     PE TUBES  1996       Social History     Tobacco Use     Smoking status: Never Smoker     Smokeless tobacco: Never Used   Substance Use Topics     Alcohol use: Yes     Comment: sometimes couple on weekends     Family History   Problem Relation Age of Onset     Neurologic Disorder Maternal Grandmother         dementia     Genetic Disorder Paternal Grandfather         kidney     Family History Negative No family hx of          Current Outpatient Medications   Medication Sig Dispense Refill     sertraline (ZOLOFT) 50 MG tablet Take 1 tablet (50 mg) by mouth daily 90 tablet 1     SUMAtriptan (IMITREX) 50 MG tablet Take 1 tablet (50 mg) by mouth at onset of headache for migraine May repeat in 2 hours. Max 4 tablets/24 hours. 10 tablet 2     blood glucose (VALE CONTOUR NEXT) test strip Patient tests 5 times/day, Contour Next test strips DAW1 500 strip 3     blood glucose monitoring (VALE MICROLET) lancets Patient tests 8 times/day 2 Box 6     continuous blood glucose monitoring (FREESTYLE BEKAH) sensor For use with Freestyle Bekah Flash  for continuous monitioring of blood glucose levels. Replace sensor every 14 days. 2 each 11     insulin aspart (NOVOLOG PEN) 100 UNIT/ML pen Inject 3 to 10 units subcutaneous at mealtimes as directed, total daily dose approx 30 units 15 mL 1     insulin aspart (NOVOLOG VIAL) 100 UNITS/ML vial USE WITH INSULIN PUMP FOR TOTAL DAILY DOSE OF 75 UNITS 30 mL 11     insulin glargine (BASAGLAR KWIKPEN) 100 UNIT/ML injection INJ 21 UNITS SC D  0     insulin pen needle (BD CLEOPATRA U/F) 32G X 4 MM miscellaneous Use 4 pen needles daily or as directed. 100 each 1     levothyroxine  (SYNTHROID/LEVOTHROID) 112 MCG tablet Take 1 tablet (112 mcg) by mouth daily 90 tablet 3     MONONESSA 0.25-35 MG-MCG tablet Take 1 tablet by mouth daily 84 tablet 6     simvastatin (ZOCOR) 40 MG tablet Take 1 tablet (40 mg) by mouth At Bedtime 90 tablet 3     triamcinolone (KENALOG) 0.1 % external cream Apply topically 2 times daily to affected areas on fingers for 10-14 days. Not for use on face nor groin. 80 g 1     Allergies   Allergen Reactions     Penicillins Rash     augmentin & pcn     Sulfa Drugs Hives       Reviewed and updated as needed this visit by Provider         Review of Systems   Constitutional, HEENT, cardiovascular, pulmonary, gi and gu systems are negative, except as otherwise noted.       Objective   Reported vitals:  There were no vitals taken for this visit.   General:  healthy, alert and no distress  PSYCH: Alert and oriented times 3; coherent speech, normal   rate and volume, able to articulate logical thoughts, able   to abstract reason, no tangential thoughts, no hallucinations   or delusions  Her affect is normal  RESP: No cough, no audible wheezing, able to talk in full sentences  Remainder of exam unable to be completed due to telephone visits            Assessment/Plan:    Linda was seen today for anxiety and headache.    Diagnoses and all orders for this visit:    Anxiety  TRESA-7 SCORE 3/13/2019 11/15/2019 5/21/2020   Total Score - - -   Total Score 3 0 0     PHQ 2/8/2019 3/13/2019 11/15/2019   PHQ-9 Total Score 3 0 0   Q9: Thoughts of better off dead/self-harm past 2 weeks Not at all Not at all Not at all     Stable, currently well controlled on Sertraline 50 mg daily.  Refill completed.      -     sertraline (ZOLOFT) 50 MG tablet; Take 1 tablet (50 mg) by mouth daily    Migraine without aura and without status migrainosus, not intractable  Recent worsening of migraines without improvement with OTC medications.  Will start triptan for migraine relief.    Discussed preventative  strategies for migraines.  May start trial of magnesium or Riboflavin suppmentation.    -     SUMAtriptan (IMITREX) 50 MG tablet; Take 1 tablet (50 mg) by mouth at onset of headache for migraine May repeat in 2 hours. Max 4 tablets/24 hours.          Return in about 1 month (around 6/21/2020) for No improvement with migraines or sooner with worsening symptoms.      Phone call duration:  9 minutes  9:35 a.m.  To 9:44 p.m.     AMA Sims CNP

## 2020-05-22 ASSESSMENT — ANXIETY QUESTIONNAIRES: GAD7 TOTAL SCORE: 0

## 2020-06-10 ENCOUNTER — MEDICAL CORRESPONDENCE (OUTPATIENT)
Dept: HEALTH INFORMATION MANAGEMENT | Facility: CLINIC | Age: 30
End: 2020-06-10

## 2020-06-22 ENCOUNTER — VIRTUAL VISIT (OUTPATIENT)
Dept: ENDOCRINOLOGY | Facility: CLINIC | Age: 30
End: 2020-06-22
Payer: COMMERCIAL

## 2020-06-22 VITALS — WEIGHT: 178 LBS | BODY MASS INDEX: 28.61 KG/M2 | HEIGHT: 66 IN

## 2020-06-22 DIAGNOSIS — Z96.41 INSULIN PUMP STATUS: ICD-10-CM

## 2020-06-22 DIAGNOSIS — E10.9 TYPE 1 DIABETES MELLITUS WITHOUT COMPLICATION (H): Primary | ICD-10-CM

## 2020-06-22 DIAGNOSIS — E06.3 HYPOTHYROIDISM DUE TO HASHIMOTO'S THYROIDITIS: ICD-10-CM

## 2020-06-22 PROCEDURE — 95251 CONT GLUC MNTR ANALYSIS I&R: CPT | Performed by: INTERNAL MEDICINE

## 2020-06-22 PROCEDURE — 99214 OFFICE O/P EST MOD 30 MIN: CPT | Mod: GT | Performed by: INTERNAL MEDICINE

## 2020-06-22 RX ORDER — LEVOTHYROXINE SODIUM 112 UG/1
112 TABLET ORAL DAILY
Qty: 90 TABLET | Refills: 3 | Status: SHIPPED | OUTPATIENT
Start: 2020-06-22 | End: 2021-07-14

## 2020-06-22 ASSESSMENT — MIFFLIN-ST. JEOR: SCORE: 1546.21

## 2020-06-22 NOTE — LETTER
"    6/22/2020         RE: Linda Agosto  [address]  [facility] [address]        Dear Colleague,    Thank you for referring your patient, Linda Agosto, to the [facility]. Please see a copy of my visit note below.    Linda Agosto is a 30 year old female who is being evaluated via a billable video visit.      The patient has been notified of following:     \"This video visit will be conducted via a call between you and your physician/provider. We have found that certain health care needs can be provided without the need for an in-person physical exam.  This service lets us provide the care you need with a video conversation.  If a prescription is necessary we can send it directly to your pharmacy.  If lab work is needed we can place an order for that and you can then stop by our lab to have the test done at a later time.    Video visits are billed at different rates depending on your insurance coverage.  Please reach out to your insurance provider with any questions.    If during the course of the call the physician/provider feels a video visit is not appropriate, you will not be charged for this service.\"    Patient has given verbal consent for Video visit? Yes    Will anyone else be joining your video visit? No      Recent issues:  Diabetes followup  Currently on furlough from job [employer] in , had insurance change  She did not increase the levothyroxine dose (to 0.112 mg), states her [facility] pharmacy did not receive our eRx  Feeling well overall, no other new health issues reported           2003. Diagnosis of diabetes mellitus, age 13, living in [facility]  Had acute illness requiring hospitalization at [facility]  Began insulin injections, using Novolog and Lantus insulin  [place] carb counting method, gram estimates  On MDI treatment plan for approx 2 years, then changed to pump use  Previous medical evaluations with Dr. Yash Barcenas/[facility]  2005. Began use of [place] " pump  2012. Changed to Medtronic pump  Subsequently using Medtronic 723 Paradigm pump  2012. Tried wearing CGMS sensor, but uncomfortable     12/8/14. Initial consult with me at my former clinic ([facility])  Continued pump management  Was not interested in CGMS use     Previous [facility] hgbA1c levels include:              Lab Test 02/05/18 10/10/17 06/21/17 02/01/17 11/16/16  0815   A1C 7.4* 7.8* 8.1* 7.3* 8.2*      1/2018. Upgraded to Medtronic 670G pump              Novolog in pump     Tried Medtronic Guardian sensor, recalls frequent low glucose alarms              Wore sensor in manual mode              Didn't like it, discontinued use     ~11/2018. Wore diagnostic LibrePro CGMS  Subsequently obtained LibrePersonal CGMS, not wearing past year     Previous pump settings:       Recent pump Carelink data:  Info not available today    Recent Zhou CGMS data:           Recent [facility] labs include:  Lab Results   Component Value Date    A1C 8.2 (H) 02/14/2020     12/05/2019    POTASSIUM 4.3 12/05/2019    CHLORIDE 105 12/05/2019    CO2 25 12/05/2019    ANIONGAP 7 12/05/2019     (H) 12/05/2019    BUN 15 12/05/2019    CR 0.72 12/05/2019    GFRESTIMATED >90 12/05/2019    GFRESTBLACK >90 12/05/2019    MARCIA 9.1 12/05/2019    CHOL 223 (H) 02/01/2018    TRIG 71 02/01/2018    HDL 84 02/01/2018     (H) 02/01/2018    NHDL 139 (H) 02/01/2018    UCRR 36 12/05/2019    MICROL <5 12/05/2019    UMALCR Unable to calculate due to low value 12/05/2019     Hyperlipidemia:  Takes simvastatin medication  DM Complications:  None known        Thyroid:  2013. Diagnosis of hypothyroidism  Previous [facility] thyroid labs include:    Treatment with levothyroxine medication  Previously on stable dose as levothyroxine 0.088 mg daily  Recent [facility] labs include:  Lab Results   Component Value Date    TSH 6.01 (H) 02/14/2020    T4 1.12 02/14/2020     (H) 12/19/2013     Current dose:  Levothyroxine 0.1 mg daily        Single, works at [employer] with  , in [place]  Finishing her JANAY at [facility]  Has previously seen Dr. Ellen Burdick/[facility]      ROS: 10 point ROS neg other than the symptoms noted above in the HPI.     GENERAL: mild fatigue, wt gain; denies fevers, chills, night sweats.   HEENT: no dysphagia, odonophagia, diplopia, neck pain  THYROID:  no apparent hyper or hypothyroid symptoms  CV: no chest pain, pressure, palpitations  LUNGS: no SOB, LNAG, cough, wheezing   ABDOMEN: no diarrhea, constipation, abdominal pain  EXTREMITIES: no rashes, ulcers, edema  NEUROLOGY: no headaches, denies changes in vision, tingling, extremitiy numbness   MSK: no muscle aches or pains, weakness  SKIN: no rashes or lesions  : menses regular  PSYCH:  stable mood, no significant anxiety or depression  ENDOCRINE: no heat or cold intolerance     Physical Exam (visual exam)  VS:  no vital signs taken for video visit  GENERAL: healthy, alert and NAD, well dressed, answering questions appropriately  EYES: eyes grossly normal to inspection, conjunctivae and sclerae normal, no exophthalmos or proptosis  THYROID:  no apparent nodules or goiter  LUNGS: no audible wheeze, cough or visible cyanosis, no visible retractions or increased work of breathing  ABDOMEN: abdomen not evaluated  EXTREMITIES: no hand tremors, limited exam  NEUROLOGY: CN grossly intact, mentation intact and speech normal   PSYCH: mentation appears normal, affect normal/bright, judgement and insight intact, normal speech and appearance well groomed       LABS:     All pertinent notes, labs, and images personally reviewed by me.      A/P:       Encounter Diagnoses   Name      Type 1 diabetes mellitus without complication (H)      Insulin pump status      Hypothyroidism due to Hashimoto's thyroiditis         Comments:  Reviewed health history, diabetes and thyroid issues.       Plan:  Reviewed the overall T1DM management and insulin pump use.  Discussed optimal BG testing to assess  glucose trends.  We reviewed insulin pump settings, basal rate and bolus dosing  Use of automated pump bolus dosing for meal/snack carb & correction dosing  Reviewed option of uploading pump to [employer] website  Reviewed recent Freestyle Zhou CGMS glucose trends, in detail     Recommend:  Continue insulin pump and diabetes management plan.  Pump setting changes:               Basals:  MN 0.8 to 0.75 unit(s)/hr    Bolus ICR 11a 7 to 6.3  No labs ordered at this time  Goal target premeal BG  mg/dl  Discussed CGMS options.  She wishes to continue use of the Freestyle Zhou since Dexcom too expensive    Plan to see one of our ENOCH Dyer CDE's, referral placed   Review pump settings, see if able to upload her Medtronic pump   Access the GI Track CGMS trends   Advised she call [phone] to make the Diab Ed appt soon.    Increased thyroid med as levothyroxine 0.112 mg daily, as previously planned  Continue current simvastatin daily dosing  Arrange annual dilated eye exam, fasting lipid panel testing.  Contact me if questions/concerns about diabetes and thyroid management     Discussed her questions about returning to work, COVID-19 virus precautions  Addressed patient's questions today.       More than 50% of the time spent with Ms. Agosto on counseling / coordinating her care.  Total appointment time was 25 minutes.     Follow-up:  9/2020.     ANTHONY Jimenez MD, MS  Endocrinology  [facility]      Video-Visit Details    Type of service:  Video Visit    Video Start Time: 1:15 pm  Video End Time: 2:35 pm    Originating Location (pt. Location): [place]    Distant Location (provider location):  [facility]/[place]     Platform used for Video Visit: Scooby            Again, thank you for allowing me to participate in the care of your patient.        Sincerely,        Santy Jimenez MD

## 2020-06-22 NOTE — PROGRESS NOTES
"Linda Agosto is a 30 year old female who is being evaluated via a billable video visit.      The patient has been notified of following:     \"This video visit will be conducted via a call between you and your physician/provider. We have found that certain health care needs can be provided without the need for an in-person physical exam.  This service lets us provide the care you need with a video conversation.  If a prescription is necessary we can send it directly to your pharmacy.  If lab work is needed we can place an order for that and you can then stop by our lab to have the test done at a later time.    Video visits are billed at different rates depending on your insurance coverage.  Please reach out to your insurance provider with any questions.    If during the course of the call the physician/provider feels a video visit is not appropriate, you will not be charged for this service.\"    Patient has given verbal consent for Video visit? Yes    Will anyone else be joining your video visit? No      Recent issues:  Diabetes followup  Currently on furlough from job [employer] in , had insurance change  She did not increase the levothyroxine dose (to 0.112 mg), states her  pharmacy did not receive our eRx  Feeling well overall, no other new health issues reported           2003. Diagnosis of diabetes mellitus, age 13, living in [facility]  Had acute illness requiring hospitalization at [facility]  Began insulin injections, using Novolog and Lantus insulin  [place] carb counting method, gram estimates  On MDI treatment plan for approx 2 years, then changed to pump use  Previous medical evaluations with Dr. Yash Barcenas/[facility] Andreia Callahan  2005. Began use of [place] pump  2012. Changed to Medtronic pump  Subsequently using Medtronic 723 Paradigm pump  2012. Tried wearing CGMS sensor, but uncomfortable     12/8/14. Initial consult with me at my former clinic ([facility])  Continued pump management  Was not interested in " CGMS use     Previous [facility] hgbA1c levels include:              Lab Test 02/05/18 10/10/17 06/21/17 02/01/17 11/16/16  0815   A1C 7.4* 7.8* 8.1* 7.3* 8.2*      1/2018. Upgraded to Medtronic 670G pump              Novolog in pump     Tried Medtronic Guardian sensor, recalls frequent low glucose alarms              Wore sensor in manual mode              Didn't like it, discontinued use     ~11/2018. Wore diagnostic LibrePro CGMS  Subsequently obtained LibrePersonal CGMS, not wearing past year     Previous pump settings:       Recent pump Carelink data:  Info not available today    Recent Zhou CGMS data:           Recent [facility] labs include:  Lab Results   Component Value Date    A1C 8.2 (H) 02/14/2020     12/05/2019    POTASSIUM 4.3 12/05/2019    CHLORIDE 105 12/05/2019    CO2 25 12/05/2019    ANIONGAP 7 12/05/2019     (H) 12/05/2019    BUN 15 12/05/2019    CR 0.72 12/05/2019    GFRESTIMATED >90 12/05/2019    GFRESTBLACK >90 12/05/2019    MARCIA 9.1 12/05/2019    CHOL 223 (H) 02/01/2018    TRIG 71 02/01/2018    HDL 84 02/01/2018     (H) 02/01/2018    NHDL 139 (H) 02/01/2018    UCRR 36 12/05/2019    MICROL <5 12/05/2019    UMALCR Unable to calculate due to low value 12/05/2019     Hyperlipidemia:  Takes simvastatin medication  DM Complications:  None known        Thyroid:  2013. Diagnosis of hypothyroidism  Previous [facility] thyroid labs include:    Treatment with levothyroxine medication  Previously on stable dose as levothyroxine 0.088 mg daily  Recent [facility] labs include:  Lab Results   Component Value Date    TSH 6.01 (H) 02/14/2020    T4 1.12 02/14/2020     (H) 12/19/2013     Current dose:  Levothyroxine 0.1 mg daily        Single, works at [employer] with , in [place]  Finishing her JANAY at [employer]  Has previously seen Dr. Ellen Burdick/ [place]      ROS: 10 point ROS neg other than the symptoms noted above in the HPI.     GENERAL: mild fatigue, wt gain; denies fevers, chills,  night sweats.   HEENT: no dysphagia, odonophagia, diplopia, neck pain  THYROID:  no apparent hyper or hypothyroid symptoms  CV: no chest pain, pressure, palpitations  LUNGS: no SOB, LANG, cough, wheezing   ABDOMEN: no diarrhea, constipation, abdominal pain  EXTREMITIES: no rashes, ulcers, edema  NEUROLOGY: no headaches, denies changes in vision, tingling, extremitiy numbness   MSK: no muscle aches or pains, weakness  SKIN: no rashes or lesions  : menses regular  PSYCH:  stable mood, no significant anxiety or depression  ENDOCRINE: no heat or cold intolerance     Physical Exam (visual exam)  VS:  no vital signs taken for video visit  GENERAL: healthy, alert and NAD, well dressed, answering questions appropriately  EYES: eyes grossly normal to inspection, conjunctivae and sclerae normal, no exophthalmos or proptosis  THYROID:  no apparent nodules or goiter  LUNGS: no audible wheeze, cough or visible cyanosis, no visible retractions or increased work of breathing  ABDOMEN: abdomen not evaluated  EXTREMITIES: no hand tremors, limited exam  NEUROLOGY: CN grossly intact, mentation intact and speech normal   PSYCH: mentation appears normal, affect normal/bright, judgement and insight intact, normal speech and appearance well groomed       LABS:     All pertinent notes, labs, and images personally reviewed by me.      A/P:       Encounter Diagnoses   Name      Type 1 diabetes mellitus without complication (H)      Insulin pump status      Hypothyroidism due to Hashimoto's thyroiditis         Comments:  Reviewed health history, diabetes and thyroid issues.       Plan:  Reviewed the overall T1DM management and insulin pump use.  Discussed optimal BG testing to assess glucose trends.  We reviewed insulin pump settings, basal rate and bolus dosing  Use of automated pump bolus dosing for meal/snack carb & correction dosing  Reviewed option of uploading pump to [employer] website  Reviewed recent Freestyle Zhou CGMS  glucose trends, in detail     Recommend:  Continue insulin pump and diabetes management plan.  Pump setting changes:               Basals:  MN 0.8 to 0.75 unit(s)/hr    Bolus ICR 11a 7 to 6.3  No labs ordered at this time  Goal target premeal BG  mg/dl  Discussed CGMS options.  She wishes to continue use of the Freestyle Zhou since Dexcom too expensive    Plan to see one of our ENOCH Dyer CDE's, referral placed   Review pump settings, see if able to upload her Medtronic pump   Access the [place] CGMS trends   Advised she call [phone] to make the Diab Ed appt soon.    Increased thyroid med as levothyroxine 0.112 mg daily, as previously planned  Continue current simvastatin daily dosing  Arrange annual dilated eye exam, fasting lipid panel testing.  Contact me if questions/concerns about diabetes and thyroid management     Discussed her questions about returning to work, COVID-19 virus precautions  Addressed patient's questions today.       More than 50% of the time spent with Ms. Agosto on counseling / coordinating her care.  Total appointment time was 25 minutes.     Follow-up:  9/2020.     ANTHONY Jimenez MD, MS  Endocrinology  [facility]      Video-Visit Details    Type of service:  Video Visit    Video Start Time: 1:15 pm  Video End Time: 2:35 pm    Originating Location (pt. Location): home    Distant Location (provider location):  [facility]/[place]     Platform used for Video Visit: WinWeb

## 2020-08-04 ENCOUNTER — PATIENT OUTREACH (OUTPATIENT)
Dept: EDUCATION SERVICES | Facility: CLINIC | Age: 30
End: 2020-08-04
Payer: COMMERCIAL

## 2020-08-27 ENCOUNTER — MYC MEDICAL ADVICE (OUTPATIENT)
Dept: EDUCATION SERVICES | Facility: CLINIC | Age: 30
End: 2020-08-27

## 2020-08-27 DIAGNOSIS — E10.9 TYPE 1 DIABETES, HBA1C GOAL < 7% (H): Primary | ICD-10-CM

## 2020-08-27 DIAGNOSIS — E10.9 TYPE 1 DIABETES MELLITUS WITHOUT COMPLICATION (H): ICD-10-CM

## 2020-09-16 NOTE — TELEPHONE ENCOUNTER
Linda Mcarthur is interested in switching to the Freestyle Zhou 2 system. Rx's have been pended if you agree with plan.    Thanks so much!    Virgen Bailey RN, [facility]

## 2020-09-17 ENCOUNTER — TRANSFERRED RECORDS (OUTPATIENT)
Dept: HEALTH INFORMATION MANAGEMENT | Facility: CLINIC | Age: 30
End: 2020-09-17

## 2020-09-17 LAB — RETINOPATHY: POSITIVE

## 2020-09-28 ENCOUNTER — HOSPITAL ENCOUNTER (EMERGENCY)
Facility: CLINIC | Age: 30
Discharge: HOME | End: 2020-09-28
Attending: EMERGENCY MEDICINE | Admitting: EMERGENCY MEDICINE
Payer: COMMERCIAL

## 2020-09-28 ENCOUNTER — APPOINTMENT (OUTPATIENT)
Dept: GENERAL RADIOLOGY | Facility: CLINIC | Age: 30
End: 2020-09-28
Attending: EMERGENCY MEDICINE
Payer: COMMERCIAL

## 2020-09-28 ENCOUNTER — APPOINTMENT (OUTPATIENT)
Dept: CT IMAGING | Facility: CLINIC | Age: 30
End: 2020-09-28
Attending: EMERGENCY MEDICINE
Payer: COMMERCIAL

## 2020-09-28 ENCOUNTER — VIRTUAL VISIT (OUTPATIENT)
Dept: PSYCHOLOGY | Facility: CLINIC | Age: 30
End: 2020-09-28
Payer: COMMERCIAL

## 2020-09-28 VITALS
WEIGHT: 180 LBS | OXYGEN SATURATION: 97 % | RESPIRATION RATE: 18 BRPM | SYSTOLIC BLOOD PRESSURE: 120 MMHG | HEART RATE: 86 BPM | BODY MASS INDEX: 29.5 KG/M2 | TEMPERATURE: 98.5 F | DIASTOLIC BLOOD PRESSURE: 70 MMHG

## 2020-09-28 DIAGNOSIS — F43.22 ADJUSTMENT DISORDER WITH ANXIETY: Primary | ICD-10-CM

## 2020-09-28 DIAGNOSIS — I45.6 WOLFF-PARKINSON-WHITE (WPW) PATTERN: ICD-10-CM

## 2020-09-28 DIAGNOSIS — R07.9 CHEST PAIN, UNSPECIFIED TYPE: ICD-10-CM

## 2020-09-28 DIAGNOSIS — E10.9 TYPE 1 DIABETES MELLITUS WITHOUT COMPLICATION (H): ICD-10-CM

## 2020-09-28 LAB
ALBUMIN SERPL-MCNC: 3.2 G/DL (ref 3.4–5)
ALP SERPL-CCNC: 106 U/L (ref 40–150)
ALT SERPL W P-5'-P-CCNC: 27 U/L (ref 0–50)
ANION GAP SERPL CALCULATED.3IONS-SCNC: 4 MMOL/L (ref 3–14)
AST SERPL W P-5'-P-CCNC: 19 U/L (ref 0–45)
BASE EXCESS BLDV CALC-SCNC: 2 MMOL/L
BASOPHILS # BLD AUTO: 0 10E9/L (ref 0–0.2)
BASOPHILS NFR BLD AUTO: 0.3 %
BILIRUB SERPL-MCNC: 0.3 MG/DL (ref 0.2–1.3)
BUN SERPL-MCNC: 16 MG/DL (ref 7–30)
CALCIUM SERPL-MCNC: 8.6 MG/DL (ref 8.5–10.1)
CHLORIDE SERPL-SCNC: 104 MMOL/L (ref 94–109)
CO2 SERPL-SCNC: 28 MMOL/L (ref 20–32)
CREAT SERPL-MCNC: 0.72 MG/DL (ref 0.52–1.04)
D DIMER PPP FEU-MCNC: 1.3 UG/ML FEU (ref 0–0.5)
DIFFERENTIAL METHOD BLD: NORMAL
EOSINOPHIL # BLD AUTO: 0.3 10E9/L (ref 0–0.7)
EOSINOPHIL NFR BLD AUTO: 4 %
ERYTHROCYTE [DISTWIDTH] IN BLOOD BY AUTOMATED COUNT: 12.3 % (ref 10–15)
GFR SERPL CREATININE-BSD FRML MDRD: >90 ML/MIN/{1.73_M2}
GLUCOSE SERPL-MCNC: 230 MG/DL (ref 70–99)
HCG SERPL QL: NEGATIVE
HCO3 BLDV-SCNC: 29 MMOL/L (ref 21–28)
HCT VFR BLD AUTO: 42.8 % (ref 35–47)
HGB BLD-MCNC: 14.6 G/DL (ref 11.7–15.7)
IMM GRANULOCYTES # BLD: 0 10E9/L (ref 0–0.4)
IMM GRANULOCYTES NFR BLD: 0.4 %
INTERPRETATION ECG - MUSE: NORMAL
INTERPRETATION ECG - MUSE: NORMAL
KETONES BLD-SCNC: 0.2 MMOL/L (ref 0–0.6)
LIPASE SERPL-CCNC: 79 U/L (ref 73–393)
LYMPHOCYTES # BLD AUTO: 2.9 10E9/L (ref 0.8–5.3)
LYMPHOCYTES NFR BLD AUTO: 39.8 %
MAGNESIUM SERPL-MCNC: 2 MG/DL (ref 1.6–2.3)
MCH RBC QN AUTO: 31.9 PG (ref 26.5–33)
MCHC RBC AUTO-ENTMCNC: 34.1 G/DL (ref 31.5–36.5)
MCV RBC AUTO: 94 FL (ref 78–100)
MONOCYTES # BLD AUTO: 0.9 10E9/L (ref 0–1.3)
MONOCYTES NFR BLD AUTO: 12 %
NEUTROPHILS # BLD AUTO: 3.2 10E9/L (ref 1.6–8.3)
NEUTROPHILS NFR BLD AUTO: 43.5 %
NRBC # BLD AUTO: 0 10*3/UL
NRBC BLD AUTO-RTO: 0 /100
O2/TOTAL GAS SETTING VFR VENT: ABNORMAL %
OXYHGB MFR BLDV: 52 %
PCO2 BLDV: 50 MM HG (ref 40–50)
PH BLDV: 7.36 PH (ref 7.32–7.43)
PLATELET # BLD AUTO: 245 10E9/L (ref 150–450)
PO2 BLDV: 29 MM HG (ref 25–47)
POTASSIUM SERPL-SCNC: 4.1 MMOL/L (ref 3.4–5.3)
PROT SERPL-MCNC: 7.3 G/DL (ref 6.8–8.8)
RBC # BLD AUTO: 4.57 10E12/L (ref 3.8–5.2)
SODIUM SERPL-SCNC: 136 MMOL/L (ref 133–144)
TROPONIN I SERPL-MCNC: <0.015 UG/L (ref 0–0.04)
TROPONIN I SERPL-MCNC: <0.015 UG/L (ref 0–0.04)
WBC # BLD AUTO: 7.3 10E9/L (ref 4–11)

## 2020-09-28 PROCEDURE — 82010 KETONE BODYS QUAN: CPT | Performed by: EMERGENCY MEDICINE

## 2020-09-28 PROCEDURE — 93005 ELECTROCARDIOGRAM TRACING: CPT

## 2020-09-28 PROCEDURE — 25000132 ZZH RX MED GY IP 250 OP 250 PS 637: Performed by: EMERGENCY MEDICINE

## 2020-09-28 PROCEDURE — 84703 CHORIONIC GONADOTROPIN ASSAY: CPT | Performed by: EMERGENCY MEDICINE

## 2020-09-28 PROCEDURE — 83690 ASSAY OF LIPASE: CPT | Performed by: EMERGENCY MEDICINE

## 2020-09-28 PROCEDURE — 93005 ELECTROCARDIOGRAM TRACING: CPT | Mod: 76

## 2020-09-28 PROCEDURE — 90791 PSYCH DIAGNOSTIC EVALUATION: CPT | Mod: 95 | Performed by: SOCIAL WORKER

## 2020-09-28 PROCEDURE — 84484 ASSAY OF TROPONIN QUANT: CPT | Performed by: EMERGENCY MEDICINE

## 2020-09-28 PROCEDURE — 80053 COMPREHEN METABOLIC PANEL: CPT | Performed by: EMERGENCY MEDICINE

## 2020-09-28 PROCEDURE — 83735 ASSAY OF MAGNESIUM: CPT | Performed by: EMERGENCY MEDICINE

## 2020-09-28 PROCEDURE — 82805 BLOOD GASES W/O2 SATURATION: CPT | Performed by: EMERGENCY MEDICINE

## 2020-09-28 PROCEDURE — 71275 CT ANGIOGRAPHY CHEST: CPT

## 2020-09-28 PROCEDURE — 25000128 H RX IP 250 OP 636: Performed by: EMERGENCY MEDICINE

## 2020-09-28 PROCEDURE — 99285 EMERGENCY DEPT VISIT HI MDM: CPT | Mod: 25

## 2020-09-28 PROCEDURE — 71046 X-RAY EXAM CHEST 2 VIEWS: CPT

## 2020-09-28 PROCEDURE — 25000125 ZZHC RX 250: Performed by: EMERGENCY MEDICINE

## 2020-09-28 PROCEDURE — 85379 FIBRIN DEGRADATION QUANT: CPT | Performed by: EMERGENCY MEDICINE

## 2020-09-28 PROCEDURE — 85025 COMPLETE CBC W/AUTO DIFF WBC: CPT | Performed by: EMERGENCY MEDICINE

## 2020-09-28 RX ORDER — ASPIRIN 81 MG/1
324 TABLET, CHEWABLE ORAL ONCE
Status: COMPLETED | OUTPATIENT
Start: 2020-09-28 | End: 2020-09-28

## 2020-09-28 RX ORDER — IOPAMIDOL 755 MG/ML
68 INJECTION, SOLUTION INTRAVASCULAR ONCE
Status: COMPLETED | OUTPATIENT
Start: 2020-09-28 | End: 2020-09-28

## 2020-09-28 RX ADMIN — SODIUM CHLORIDE 92 ML: 9 INJECTION, SOLUTION INTRAVENOUS at 06:07

## 2020-09-28 RX ADMIN — IOPAMIDOL 68 ML: 755 INJECTION, SOLUTION INTRAVENOUS at 06:07

## 2020-09-28 RX ADMIN — ASPIRIN 324 MG: 81 TABLET, CHEWABLE ORAL at 04:52

## 2020-09-28 ASSESSMENT — ANXIETY QUESTIONNAIRES
GAD7 TOTAL SCORE: 4
3. WORRYING TOO MUCH ABOUT DIFFERENT THINGS: SEVERAL DAYS
7. FEELING AFRAID AS IF SOMETHING AWFUL MIGHT HAPPEN: SEVERAL DAYS
1. FEELING NERVOUS, ANXIOUS, OR ON EDGE: SEVERAL DAYS
2. NOT BEING ABLE TO STOP OR CONTROL WORRYING: SEVERAL DAYS
6. BECOMING EASILY ANNOYED OR IRRITABLE: NOT AT ALL
5. BEING SO RESTLESS THAT IT IS HARD TO SIT STILL: NOT AT ALL
GAD7 TOTAL SCORE: 4
GAD7 TOTAL SCORE: 4
7. FEELING AFRAID AS IF SOMETHING AWFUL MIGHT HAPPEN: SEVERAL DAYS
4. TROUBLE RELAXING: NOT AT ALL

## 2020-09-28 ASSESSMENT — COLUMBIA-SUICIDE SEVERITY RATING SCALE - C-SSRS
5. HAVE YOU STARTED TO WORK OUT OR WORKED OUT THE DETAILS OF HOW TO KILL YOURSELF? DO YOU INTEND TO CARRY OUT THIS PLAN?: NO
2. HAVE YOU ACTUALLY HAD ANY THOUGHTS OF KILLING YOURSELF?: NO
6. HAVE YOU EVER DONE ANYTHING, STARTED TO DO ANYTHING, OR PREPARED TO DO ANYTHING TO END YOUR LIFE?: NO
TOTAL  NUMBER OF ABORTED OR SELF INTERRUPTED ATTEMPTS PAST 3 MONTHS: NO
TOTAL  NUMBER OF INTERRUPTED ATTEMPTS PAST 3 MONTHS: NO
3. HAVE YOU BEEN THINKING ABOUT HOW YOU MIGHT KILL YOURSELF?: NO
6. HAVE YOU EVER DONE ANYTHING, STARTED TO DO ANYTHING, OR PREPARED TO DO ANYTHING TO END YOUR LIFE?: NO
4. HAVE YOU HAD THESE THOUGHTS AND HAD SOME INTENTION OF ACTING ON THEM?: NO
4. HAVE YOU HAD THESE THOUGHTS AND HAD SOME INTENTION OF ACTING ON THEM?: NO
ATTEMPT LIFETIME: NO
TOTAL  NUMBER OF INTERRUPTED ATTEMPTS LIFETIME: NO
TOTAL  NUMBER OF ABORTED OR SELF INTERRUPTED ATTEMPTS PAST LIFETIME: NO
1. IN THE PAST MONTH, HAVE YOU WISHED YOU WERE DEAD OR WISHED YOU COULD GO TO SLEEP AND NOT WAKE UP?: NO
5. HAVE YOU STARTED TO WORK OUT OR WORKED OUT THE DETAILS OF HOW TO KILL YOURSELF? DO YOU INTEND TO CARRY OUT THIS PLAN?: NO
ATTEMPT PAST THREE MONTHS: NO
1. IN THE PAST MONTH, HAVE YOU WISHED YOU WERE DEAD OR WISHED YOU COULD GO TO SLEEP AND NOT WAKE UP?: NO
2. HAVE YOU ACTUALLY HAD ANY THOUGHTS OF KILLING YOURSELF LIFETIME?: NO

## 2020-09-28 ASSESSMENT — PATIENT HEALTH QUESTIONNAIRE - PHQ9
10. IF YOU CHECKED OFF ANY PROBLEMS, HOW DIFFICULT HAVE THESE PROBLEMS MADE IT FOR YOU TO DO YOUR WORK, TAKE CARE OF THINGS AT HOME, OR GET ALONG WITH OTHER PEOPLE: SOMEWHAT DIFFICULT
SUM OF ALL RESPONSES TO PHQ QUESTIONS 1-9: 2
SUM OF ALL RESPONSES TO PHQ QUESTIONS 1-9: 2

## 2020-09-28 NOTE — ED AVS SNAPSHOT
Emergency Department  [address] [facility] [address]  Phone:  [phone]  Fax:  [phone]                                    Linda Agosto   MRN: [id number]    Department:   Emergency Department   Date of Visit:  9/28/2020           After Visit Summary Signature Page    I have received my discharge instructions, and my questions have been answered. I have discussed any challenges I see with this plan with the nurse or doctor.    ..........................................................................................................................................  Patient/Patient Representative Signature      ..........................................................................................................................................  Patient Representative Print Name and Relationship to Patient    ..................................................               ................................................  Date                                   Time    ..........................................................................................................................................  Reviewed by Signature/Title    ...................................................              ..............................................  Date                                               Time          22EPIC Rev 08/18

## 2020-09-28 NOTE — ED TRIAGE NOTES
Patient here with chest pain with pain radiating to her right shoulder since yesterday. She is type 1 diabetic

## 2020-09-28 NOTE — ED PROVIDER NOTES
This patient was signed out by Dr. Greenberg pending CT pulmonary angiogram and delta troponin.  Both of the studies are unremarkable.  Per the prior physicians extensive work-up, there is no clear evidence of a life-threatening cause of her chest pain today and she is safe for discharge home.  She will follow-up per the recommendations of the prior physician.     Trierweiler, Chad A, MD  09/28/20 0812

## 2020-09-28 NOTE — PROGRESS NOTES
"[facility] Answers for HPI/ROS submitted by the patient on 2020   If you checked off any problems, how difficult have these problems made it for you to do your work, take care of things at home, or get along with other people?: Somewhat difficult  PHQ9 TOTAL SCORE: 2  TERSA 7 TOTAL SCORE: 4    Evaluator Name:  Rosi Loazno     Credentials:  [facility]    PATIENT'S NAME: Linda Agosto  PREFERRED NAME: Linda  PRONOUNS:     she/herself/her  MRN:   [id number]  :   1990   ACCT. NUMBER: [id number]  DATE OF SERVICE: 20  START TIME: 1235  END TIME: 1330  PREFERRED PHONE: [phone]  May we leave a program related message: Yes  SERVICE MODALITY:  Video Visit:    Telemedicine Visit: The patient's condition can be safely assessed and treated via synchronous audio and visual telemedicine encounter.      Reason for Telemedicine Visit: Services only offered telehealth    Originating Site (Patient Location): Patient's home    Distant Site (Provider Location): Provider Remote Setting    Consent:  The patient/guardian has verbally consented to: the potential risks and benefits of telemedicine (video visit) versus in person care; bill my insurance or make self-payment for services provided; and responsibility for payment of non-covered services.     Patient would like the video invitation sent by: Send to e-mail at: [email or web address]    Mode of Communication:  Video Conference via [facility]    As the provider I attest to compliance with applicable laws and regulations related to telemedicine.    UNIVERSAL ADULT DIAGNOSTIC ASSESSMENT      Identifying Information:  Patient is a 30 year old, .  The pronoun use throughout this assessment reflects the patient's chosen pronoun.  Patient was referred for an assessment by self.  Patient attended the session alone.     Chief Complaint:   The reason for seeking services at this time is: \" increase in anxiety \"   The problem(s) began started tx at 24 years of " age, but noticed always feeling anxious. Patient has attempted to resolve these concerns in the past through therapy and medication managment .    Social/Family History:  Patient reported they grew up in [place], [place].  They were raised by biological parents.  Parents stayed . Patient reported that their childhood was good and stable.  Patient described their current relationships with family of origin as good would like to discuss family in future appointments.      The patient describes their cultural background as 30 year old  with no cultural bias.  Patient identified their preferred language to be English. Patient reported they does not need the assistance of an  or other support involved in therapy.     Patient reported had no significant delays in developmental tasks.   Patient's highest education level was college graduate. Patient identified the following learning problems: none reported.  Modifications will not be used to assist communication in therapy.   Patient reports they are  able to understand written materials.    Patient reported the following relationship history.  Patient's current relationship status is in a relationship  for 2 1/2 years.   Patient identified their sexual orientation as heterosexual.  Patient reported having zero child(indra). Patient identified partner, parents and friends as part of their support system.  Patient identified the quality of these relationships as good.      Patient's current living/housing situation involves staying in own home/apartment.  They live with partner and they report that housing is stable.     Patient is currently employed full time and reports they are able to function appropriately at work..  Patient reports their finances are obtained through employment and partner.  Patient does not identify finances as a current stressor.      Patient reported that they have not been involved with the legal system.   Patient denies  being on probation / parole / under the jurisdiction of the court.    Patient's Strengths and Limitations:  Patient identified the following strengths or resources that will help them succeed in treatment: friends / good social support, family support, insight and intelligence. Things that may interfere with the patient's success in treatment include: none identified.     Personal and Family Medical History:   Patient does report a family history of mental health concerns.  Patient reports family history includes Genetic Disorder in her paternal grandfather; Neurologic Disorder in her maternal grandmother..     Patient does report Mental Health Diagnosis and/or Treatment.  Patient Patient reported the following previous diagnoses which include(s): an Anxiety Disorder.  Patient reported symptoms began 22's.   Patient has received mental health services in the past: therapy with two other providers.  Psychiatric Hospitalizations: None.  Patient denies a history of civil commitment.  Currently, patient is not receiving other mental health services.  These include none.           Patient has had a physical exam to rule out medical causes for current symptoms.  Date of last physical exam was within the past year. Client was encouraged to follow up with PCP if symptoms were to develop. The patient has a [place] Primary Care Provider, who is named Yvette Rodas..  Patient reports no current medical concerns.  There are not significant appetite / nutritional concerns / weight changes.   Patient does not report a history of head injury / trauma / cognitive impairment.      Patient reports current meds as: sertraline       Medication Adherence:  Patient reports taking prescribed medications as prescribed.    Patient Allergies:    Allergies   Allergen Reactions     Penicillins Rash     augmentin & pcn     Sulfa Drugs Hives       Medical History:    Past Medical History:   Diagnosis Date     Allergic rhinitis,  cause unspecified     h/o AIT     Common migraine     No visual aura.      Hyperlipidemia LDL goal < 70      Hypothyroidism      Infectious mononucleosis 1995     Tuberculosis      Type 1 diabetes, HbA1c goal < 7% (H) 3/04     followed [facility] - now Dr Jimenez         Current Mental Status Exam:   Appearance:  Appropriate    Eye Contact:  Fair   Psychomotor:  Restless       Gait / station:  no problem  Attitude / Demeanor: Interested Friendly Pleasant  Speech      Rate / Production: Emotional Talkative      Volume:  Normal  volume      Language:  intact  Mood:   Anxious   Affect:   Worrisome    Thought Content: Clear   Thought Process: Coherent       Associations: No loosening of associations  Insight:   Fair   Judgment:  Intact   Orientation:  All  Attention/concentration: Fair    Rating Scales:    PHQ9:    PHQ-9 SCORE 3/13/2019 11/15/2019 9/28/2020   PHQ-9 Total Score - - -   PHQ-9 Total Score MyChart - - 2 (Minimal depression)   PHQ-9 Total Score 0 0 2   ;    GAD7:    TRESA-7 SCORE 11/15/2019 5/21/2020 9/28/2020   Total Score - - -   Total Score - - 4 (minimal anxiety)   Total Score 0 0 4     CGI:     First:Considering your total clinical experience with this particular patient population, how severe are the patient's symptoms at this time?: 4 (9/28/2020  1:00 PM)  ;    Most recentCompared to the patient's condition at the START of treatment, this patient's condition is: 4 (9/28/2020  1:00 PM)      Substance Use:  Patient did not report a family history of substance use concerns; see medical history section for details.  Patient has not received chemical dependency treatment in the past.  Patient has not ever been to detox.      Patient is not currently receiving any chemical dependency treatment. Patient reported the following problems as a result of their substance use: none.    Patient reports using alcohol 1 times per week and has 3 glasses of wine and mixed drinks at a time. Patient first  started drinking at age 22.  Patient reported date of last use was last week.  Patient reports heaviest use was have not.  Patient denies using tobacco.  Patient denies using marijuana.  Patient reports using caffeine 2 times per day and drinks 2 at a time. Patient started using caffeine at age 22.  Patient reports using/abusing the following substance(s). Patient reported no other substance use.     CAGE- AID:    CAGE-AID Total Score 9/28/2020   Total Score 1       Substance Use: No symptoms    Based on the negative CAGE score and clinical interview there  are not indications of drug or alcohol abuse.      Significant Losses / Trauma / Abuse / Neglect Issues:   Patient did not serve in the .  There are indications or report of significant loss, trauma, abuse or neglect issues related to: are no indications and client denies any losses, trauma, abuse, or neglect concerns.  Concerns for possible neglect are not present.     Safety Assessment:   Current Safety Concerns:  [place] Suicide Severity Rating Scale (Lifetime/Recent)  [place] Suicide Severity Rating (Lifetime/Recent) 9/28/2020   1. Wish to be Dead (Lifetime) No   1. Wish to be Dead (Recent) No   2. Non-Specific Active Suicidal Thoughts (Lifetime) No   2. Non-Specific Active Suicidal Thoughts (Recent) No   3. Active Suicidal Ideation with any Methods (Not Plan) Without Intent to Act (Lifetime) No   3. Active Sucidal Ideation with any Methods (Not Plan) Without Intent to Act (Recent) No   4. Active Suicidal Ideation with Some Intent to Act, Without Specific Plan (Lifetime) No   4. Active Suicidal Ideation with Some Intent to Act, Without Specific Plan (Recent) No   5. Active Suicidal Ideation with Specific Plan and Intent (Lifetime) No   5. Active Suicidal Ideation with Specific Plan and Intent (Recent) No   Most Severe Ideation Rating (Past Month) NA   Most Severe Ideation Description (Past Month) n   Frequency (Past Month) NA   Duration (Past Month)  NA   Controllability (Past Month) NA   Protective Factors (Past Month) NA   Reasons for Ideation (Past Month) NA   Actual Attempt (Lifetime) No   Actual Attempt (Past 3 Months) No   Has subject engaged in non-suicidal self-injurious behavior? (Lifetime) No   Has subject engaged in non-suicidal self-injurious behavior? (Past 3 Months) No   Interrupted Attempts (Lifetime) No   Interrupted Attempts (Past 3 Months) No   Aborted or Self-Interrupted Attempt (Lifetime) No   Aborted or Self-Interrupted Attempt (Past 3 Months) No   Preparatory Acts or Behavior (Lifetime) No   Preparatory Acts or Behavior (Past 3 Months) No     Patient denies current homicidal ideation and behaviors.  Patient denies current self-injurious ideation and behaviors.    Patient denied risk behaviors associated with substance use.  Patient denies any high risk behaviors associated with mental health symptoms.  Patient reports the following current concerns for their personal safety: None.  Patient reports there are not  firearms in the house. no firearms.     History of Safety Concerns:  Patient denied a history of homicidal ideation.     Patient denied a history of personal safety concerns.    Patient denied a history of assaultive behaviors.    Patient denied a history of sexual assault behaviors.     Patient denied a history of risk behaviors associated with substance use.  Patient denies any history of high risk behaviors associated with mental health symptoms.  Patient reports the following protective factors: positive relationships positive social network and positive family connections and forward/future oriented thinking    Risk Plan:  See Recommendations for Safety and Risk Management Plan    Review of Symptoms per patient report:  Depression: Change in sleep and Lack of interest  Bryanna:  No Symptoms  Psychosis: No Symptoms  Anxiety: Excessive worry, Nervousness, Physical complaints, such as headaches, stomachaches, muscle tension and  Sleep disturbance  Panic:  No symptoms  Post Traumatic Stress Disorder:  No Symptoms   Eating Disorder: No Symptoms  ADD / ADHD:  No symptoms  Conduct Disorder: No symptoms  Autism Spectrum Disorder: No symptoms  Obsessive Compulsive Disorder: No Symptoms    Patient reports the following compulsive behaviors and treatment history: none.      Diagnostic Criteria:   A. The development of emotional or behavioral symptoms in response to an identifiable stressor(s) occurring within 3 months of the onset of the stressor(s)  B. These symptoms or behaviors are clinically significant, as evidenced by one or both of the following:       - Marked distress that is out of proportion to the severity/intensity of the stressor (with consideration for external context & culture)       - Significant impairment in social, occupational, or other important areas of functioning  C. The stress-related disturbance does not meet criteria for another disorder & is not not an exacerbation of another mental disorder  D. The symptoms do not represent normal bereavement  E. Once the stressor or its consequences have terminated, the symptoms do not persist for more than an additional 6 months       * Adjustment Disorder with Anxiety: The predominant manfestations are symptoms such as nervousness, worry, or jitteriness, or, in children separation anxiety from major attachment figures    Functional Status:  Patient reports the following functional impairments: organization, self-care and work / vocational responsibilities.     WHODAS:   WHODAS 2.0 Total Score 9/28/2020   Total Score 17       Clinical Summary:  1. Reason for assessment: increase in anxiety  .  2. Psychosocial, Cultural and Contextual Factors: COVID-19, furloughed, difficult unknown  .  3. Principal DSM5 Diagnoses  (Sustained by DSM5 Criteria Listed Above):   Adjustment Disorders  309.24 (F43.22) With anxiety.    6. Prognosis: Return to Normal Functioning, Expect Improvement, Relieve  Acute Symptoms and Maintain Current Status / Prevent Deterioration.  7. Likely consequences of symptoms if not treated: increase in symptoms.  8. Client strengths include:  caring, creative, educated, employed, goal-focused, good listener and has a previous history of therapy .     Recommendations:     1. Plan for Safety and Risk Management:   Recommended that patient call 911 or go to the local ED should there be a change in any of these risk factors.         Report to child / adult protection services was NA.     2. Patient's identified patient reported none.     3. Initial Treatment will focus on:    Anxiety - managing anxiety in the moment.     4. Resources/Service Plan:    services are not indicated.   Modifications to assist communication are not indicated.   Additional disability accommodations are not indicated.      5. Collaboration:   Collaboration / coordination of treatment will be initiated with the following  support professionals: primary care physician.      6.  Referrals:   The following referral(s) will be initiated: Outpatient Mental Billy Therapy. Next Scheduled Appointment: 10/2020.     A Release of Information has been obtained for the following: none.    7. EFFIE:    EFFIE:  Discussed the general effects of drugs and alcohol on health and well-being.   8. Records:    were reviewed at time of assessment.   Information in this assessment was obtained from the medical record and  provided by patient who is a good historian.    Patient will have open access to their mental health medical record.      Eval type:  Mental Health    Provider Name/ Credentials:  JAIME Gustafson  September 28, 2020

## 2020-09-28 NOTE — ED PROVIDER NOTES
History     Chief Complaint:  Chest Pain     HPI  Linda Agosto is a 30 year old female with a history of diabetes mellitus type 1 who presents for evaluation of chest pain described as discomfort rather than pain.  Started last night sometime after dinner but did not seem related to eating.  Thought it was possibly heartburn.  Then woke up with discomfort in the right shoulder as well.  No pain in the neck, back, or abdomen.  No associated diaphoresis, nausea, shortness of breath.  Notes an issue with her insulin pump last night where it malfunctioned.  Had a blood sugar of 590 which normalized at home.  Is not on estrogen.  LMP ~3 weeks ago and normal.  No PMHx or FHx DVT or PE.    Allergies:  Penicillins  Sulfa Drugs      Medications:   Novolog   Insulin glargine  Synthroid   Zoloft   Zocor   Imitrex  Mononessa      Medical History:   Migraine   Hypothyroidism   diabetes mellitus type 1   Allergic rhinitis   Hyperlipidemia    Anxiety     Surgical History   Appendectomy   PE tubes    Family History:   Family history reviewed. No pertinent family history.     Social History:  Patient was not accompanied to the ED.  Smoking Status: Negative  Smokeless Tobacco: Negative   Alcohol Use: Positive   Drug Use: Negative   Primary Physician: Yvette Rodas     Review of Systems  Ten system ROS reviewed and is negative except as above    Physical Exam     Patient Vitals for the past 24 hrs:   BP Temp Temp src Pulse Resp SpO2 Weight   09/28/20 0422 135/74 98.5  F (36.9  C) Oral 98 20 100 % 81.6 kg (180 lb)          Physical Exam  Eyes:  Sclera white; Pupils are equal and round  ENT:    External ears and nares normal  CV:  Rate as above with regular rhythm , no BLE edema or calf tenderness  Resp:  Breath sounds clear and equal bilaterally    Non-labored, no retractions or accessory muscle use  GI:  Abdomen is soft, non-tender, non-distended, negative Singletary's    No rebound tenderness or peritoneal  features  MS:  Moves all extremities.  Full ROM, no tenderness or swelling R shoulder  Skin:  Warm and dry  Neuro:  Speech is normal and fluent. No apparent deficit.        Emergency Department Course     ECG:  ECG taken at 0422, ECG read at 0431  sinus rhythm with fusion complexes  Nonspecific ST and T wave abnormality   ST depression + TWI new vs 09/23/14  Previous   Prolonged QT  Abnormal ECG  Rate 100 bpm. CA interval 112 ms. QRS duration 110 ms. QT/QTc 374/482 ms. P-R-T axes 40 1 77.     Repeat ECG:  ECG taken at 0549, ECG read at 0550  Normal sinus rhythm  Nadia-Parkinson-White  Fusion complexes resolved from earlier ECG  Abnormal ECG  Rate 81 bpm. CA interval 120. QRS duration 110. QT/QTc 420/487. P-R-T axis 47 -4 54.     Imaging:  Radiology results were communicated with the patient who voiced understanding of the findings.    XR Chest 2 Views   Final Result   IMPRESSION: Negative chest.      CT Chest Pulmonary Embolism w Contrast    (Results Pending)     Reading per radiology     Laboratory:  Laboratory findings were communicated with the patient who voiced understanding of the findings.    Labs Ordered and Resulted from Time of ED Arrival Up to the Time of Departure from the ED   COMPREHENSIVE METABOLIC PANEL - Abnormal; Notable for the following components:       Result Value    Glucose 230 (*)     Albumin 3.2 (*)     All other components within normal limits   BLOOD GAS VENOUS AND OXYHGB - Abnormal; Notable for the following components:    Bicarbonate Venous 29 (*)     All other components within normal limits   D DIMER QUANTITATIVE - Abnormal; Notable for the following components:    D Dimer 1.3 (*)     All other components within normal limits   CBC WITH PLATELETS DIFFERENTIAL   LIPASE   TROPONIN I   MAGNESIUM   KETONE BETA-HYDROXYBUTYRATE QUANTITATIVE   HCG QUALITATIVE   PERIPHERAL IV CATHETER     Interventions:   Medications   aspirin (ASA) chewable tablet 324 mg (324 mg Oral Given 9/28/20  0452)   iopamidol (ISOVUE-370) solution 68 mL (68 mLs Intravenous Given 9/28/20 0607)   CT scan flush (92 mLs Intravenous Given 9/28/20 0607)     Emergency Department Course:    0426 Nursing notes and vitals reviewed.    0430 I performed an exam of the patient as documented above.     IV was inserted and blood was drawn for laboratory testing, results above.    Lab results including elevated d-dimer discussed.  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    Impression & Plan     Medical Decision Making:  Linda Agosto presents for chest pain with radiation to the right shoulder.  This distribution is classic for referred pain from biliary pathology but there is no tenderness in the RUQ and no elevation in LFTs or lipase.  Biliary pathology no longer suspected.  Initial vitals and HR in room normal.  PERC negative.  EKG w/ST depression and TWI that was not present on comparison from 2014.  Unclear if this is an acute change as no interval EKGs are present.  Given the possibility of acute changes but few risk factors, second troponin is appropriate 3 hours after the first.  She has received aspirin.  EKG  which is a positive criteria for PERC.  D-dimer added on.  No signs of DKA associated with her high blood sugar at home.  Second EKG obtained given that first was so different than prior but not specifically diagnostic of a cardiac process.  This looks different from the first and has WPW without the same ST depression and TWI.  No longer w/fusion beats and variable morphology.  Discussed need for close cardiology f/u given the potential for extremely fast tachy-dysrhythmias and frequent use of ablation for accessory pathway.  Signed out w/CT PE and 2nd troponin outstanding.    Diagnosis:     ICD-10-CM    1. Chest pain, unspecified type  R07.9    2. Type 1 diabetes mellitus without complication (H)  E10.9         Scribe Disclosure:  IMaged, am serving as a scribe at 4:17 AM on  9/28/2020 to document services personally performed by Citlali Greenberg MD based on my observations and the provider's statements to me.     [place] Citlali Jackson MD  09/28/20 0637

## 2020-09-29 ASSESSMENT — PATIENT HEALTH QUESTIONNAIRE - PHQ9: SUM OF ALL RESPONSES TO PHQ QUESTIONS 1-9: 2

## 2020-09-29 ASSESSMENT — ANXIETY QUESTIONNAIRES: GAD7 TOTAL SCORE: 4

## 2020-10-19 ENCOUNTER — VIRTUAL VISIT (OUTPATIENT)
Dept: PSYCHOLOGY | Facility: CLINIC | Age: 30
End: 2020-10-19
Payer: COMMERCIAL

## 2020-10-19 DIAGNOSIS — F43.22 ADJUSTMENT DISORDER WITH ANXIETY: Primary | ICD-10-CM

## 2020-10-19 PROCEDURE — 90834 PSYTX W PT 45 MINUTES: CPT | Mod: 95 | Performed by: SOCIAL WORKER

## 2020-10-19 ASSESSMENT — ANXIETY QUESTIONNAIRES
4. TROUBLE RELAXING: SEVERAL DAYS
1. FEELING NERVOUS, ANXIOUS, OR ON EDGE: MORE THAN HALF THE DAYS
3. WORRYING TOO MUCH ABOUT DIFFERENT THINGS: SEVERAL DAYS
5. BEING SO RESTLESS THAT IT IS HARD TO SIT STILL: NOT AT ALL
2. NOT BEING ABLE TO STOP OR CONTROL WORRYING: SEVERAL DAYS
7. FEELING AFRAID AS IF SOMETHING AWFUL MIGHT HAPPEN: SEVERAL DAYS
6. BECOMING EASILY ANNOYED OR IRRITABLE: NOT AT ALL
GAD7 TOTAL SCORE: 6

## 2020-10-19 ASSESSMENT — PATIENT HEALTH QUESTIONNAIRE - PHQ9: SUM OF ALL RESPONSES TO PHQ QUESTIONS 1-9: 3

## 2020-10-19 NOTE — PROGRESS NOTES
Progress Note    Patient Name: Linda Agosto  Date: 10/19/2020         Service Type: Individual      Session Start Time: 1335  Session End Time: 1421     Session Length: 46  Session #: 1    Attendees: Client    Service Modality:  Video Visit:    Telemedicine Visit: The patient's condition can be safely assessed and treated via synchronous audio and visual telemedicine encounter.      Reason for Telemedicine Visit: Services only offered telehealth    Originating Site (Patient Location): Patient's home    Distant Site (Provider Location): Provider Remote Setting    Consent:  The patient/guardian has verbally consented to: the potential risks and benefits of telemedicine (video visit) versus in person care; bill my insurance or make self-payment for services provided; and responsibility for payment of non-covered services.     Patient would like the video invitation sent by: Send to e-mail at: [email or web address]    Mode of Communication:  Video Conference via [employer]    As the provider I attest to compliance with applicable laws and regulations related to telemedicine.     Treatment Plan Last Reviewed: 10/19/2020  PHQ-9 / TRESA-7 : 3/6    DATA  Interactive Complexity: No  Crisis: No       Progress Since Last Session (Related to Symptoms / Goals / Homework):   Symptoms: Worsening increase TRESA-7    Homework: Completed in session      Episode of Care Goals: No improvement - CONTEMPLATION (Considering change and yet undecided); Intervened by assessing the negative and positive thinking (ambivalence) about behavior change     Current / Ongoing Stressors and Concerns:   COVID-19, furloughed April to August, difficult unknown, family conflict/communications     Treatment Objective(s) Addressed in This Session:     Patient will demonstrate/report improved family dynamics that can be safely managed in a lower level of care. Absence of persistent conflict in family relationships and  reduction in family conflict to level of comfort and satisfaction of family members as measured by client report for a period of at least 30 days within 6 months as clinically observed and by patient self-report.  Patient will demonstrate and report a level of anxiety that can be managed at a lower level of care.  Absence of persistent anxious mood and report of reduced frequency and intensity of worry and absence of persistent anxious mood to acceptable levels, no panic attacks, report subjective comfort with rumination for a period of 90 days, within 6 months as clinically observed and by patient self-report.     Intervention:   Motivational Interviewing    MI Intervention: Co-Developed Goal: anxiety and family conflict/communications, Expressed Empathy/Understanding, Supported Autonomy, Collaboration, Evocation, Permission to raise concern or advise, Open-ended questions and Reflections: simple and complex     Change Talk Expressed by the Patient: Desire to change Ability to change Reasons to change Need to change    Provider Response to Change Talk: E - Evoked more info from patient about behavior change, A - Affirmed patient's thoughts, decisions, or attempts at behavior change, R - Reflected patient's change talk and S - Summarized patient's change talk statements          ASSESSMENT: Current Emotional / Mental Status (status of significant symptoms):   Risk status (Self / Other harm or suicidal ideation)   Patient denies current fears or concerns for personal safety.   Patient denies current or recent suicidal ideation or behaviors.   Patient denies current or recent homicidal ideation or behaviors.   Patient denies current or recent self injurious behavior or ideation.   Patient denies other safety concerns.   Patient reports there has been no change in risk factors since their last session.     Patient reports there has been no change in protective factors since their last session.     Recommended that  patient call 911 or go to the local ED should there be a change in any of these risk factors.     Appearance:   Appropriate    Eye Contact:   Fair    Psychomotor Behavior: Restless    Attitude:   Cooperative  Interested Friendly Pleasant   Orientation:   All   Speech    Rate / Production: Emotional Talkative Normal     Volume:  Normal    Mood:    Anxious  Sad    Affect:    Worrisome    Thought Content:  Clear    Thought Form:  Coherent    Insight:    Fair      Medication Review:   No changes to current psychiatric medication(s)     Medication Compliance:   Yes     Changes in Health Issues:   None reported     Chemical Use Review:   Substance Use: Chemical use reviewed, no active concerns identified      Tobacco Use: No current tobacco use.      Diagnosis:  1. Adjustment disorder with anxiety        Collateral Reports Completed:   Not Applicable    PLAN: (Patient Tasks / Therapist Tasks / Other)  Manage emotions   Set boundaries with mom        Rosi ROMERO Marta, [facility]  10/19/2020                                                         ______________________________________________________________________    Treatment Plan    Patient's Name: Linda Agosot  YOB: 1990    Date: 10/19/2020    DSM5 Diagnoses: Adjustment Disorders  309.24 (F43.22) With anxiety  Psychosocial / Contextual Factors: COVID-19, furloughed, difficult unknown, family conflict/communications    WHODAS: 17    Referral / Collaboration:  Referral to another professional/service is not indicated at this time.    Anticipated number of session or this episode of care: 20      MeasurableTreatment Goal(s) related to diagnosis / functional impairment(s)  Goal 1: Patient will absence of persistent anxiety mood and report of reduced frequency and intensity of worry and absence of persistent anxious mood to acceptable levels, no panic attacks, report subjective comfort with rumination for a period of 90 days. Within 6 months as clinically observed  and by patient self-report.    I will know I've met my goal when I'm faced with triggers I have the proper goals to manage those feelings.      Objective #A (Patient Action)    Patient will demonstrate and report a level of anxiety that can be managed at a lower level of care.  Absence of persistent anxious mood and report of reduced frequency and intensity of worry and absence of persistent anxious mood to acceptable levels, no panic attacks, report subjective comfort with rumination for a period of 90 days, within 6 months as clinically observed and by patient self-report.  Status: New - Date: 10/19/2020     Intervention(s)  Therapist will provide individual therapy to identify triggers to anxiety, gain feedback on helpful coping tools and thought-reframing techniques, and identify preferred way of being. Tx to include discuss current stressors and interpersonal conflicts and how to cope with these, coaching, diagnostic testing , referral for medication as indicated, use prescribed medication, cognitive restructuring, interpersonal,   family therapy, supportive therapy services.        Goal 2: Patient will absence of persistent conflict in family relationships and reduction in family conflict to level of comfort and satisfaction of family members as measured by client report for a period of at least 30 days within 6 months as clinically observed and by patient self-report    I will know I've met my goal when can communicate my boundary without worrying about their feelings.      Objective #A (Patient Action)    Status: New - Date: 10/19/2020     Patient will demonstrate/report improved family dynamics that can be safely managed in a lower level of care. Absence of persistent conflict in family relationships and reduction in family conflict to level of comfort and satisfaction of family members as measured by client report for a period of at least 30 days within 6 months as clinically observed and by patient  self-report.    Intervention(s)  Therapist will provide individual therapy to process stressors within family dynamics, identify areas within her control, and identify preferred way of being within family relationships.  Tx to discuss current stressors and interpersonal conflicts and how to cope with these, coaching, diagnostic testing, referral for medication as indicated, use prescribed medication, cognitive restructuring, interpersonal family therapy, supportive therapy services.          Patient has reviewed and agreed to the above plan.      Rosi Lozano, [facility]  October 19, 2020

## 2020-10-20 ENCOUNTER — OFFICE VISIT (OUTPATIENT)
Dept: CARDIOLOGY | Facility: CLINIC | Age: 30
End: 2020-10-20
Attending: EMERGENCY MEDICINE
Payer: COMMERCIAL

## 2020-10-20 VITALS
DIASTOLIC BLOOD PRESSURE: 76 MMHG | HEART RATE: 78 BPM | HEIGHT: 65 IN | SYSTOLIC BLOOD PRESSURE: 115 MMHG | WEIGHT: 196 LBS | BODY MASS INDEX: 32.65 KG/M2

## 2020-10-20 DIAGNOSIS — I45.6 WOLFF-PARKINSON-WHITE (WPW) PATTERN: ICD-10-CM

## 2020-10-20 PROCEDURE — 99203 OFFICE O/P NEW LOW 30 MIN: CPT | Performed by: INTERNAL MEDICINE

## 2020-10-20 ASSESSMENT — MIFFLIN-ST. JEOR: SCORE: 1619.93

## 2020-10-20 ASSESSMENT — ANXIETY QUESTIONNAIRES: GAD7 TOTAL SCORE: 6

## 2020-10-20 NOTE — LETTER
10/20/2020    Yvette Rodas PA-C  [address]  [place] [address]    RE: Linda Agosto       Dear Colleague,    I had the pleasure of seeing Linda Agosto in the [facility].    HPI and Plan:   Today I had the pleasure of seeing Linda Agosto at [facility]. She is a pleasant 30 year old patient with a past medical history of type 1 diabetes, hypothyroidism, migraines and depression presents to the clinic in consultation for abnormal EKG.  The patient recently presented to the emergency department with 1 episode of nonexertional, sharp, right shoulder pain.  She was ruled out for ACS and an EKG performed showed WPW pattern for which she was asked to see cardiology.  She is in here for that consult.  She reports feeling well and does not have any complaints other than one episode of right shoulder pain that she presented to the ED for.  She denies palpitations, breathlessness, syncope could be a presyncope.  She has never had racing heartbeat in the past.  She has no family history of premature heart disease in first-degree relatives.    Assessment and plan  1.  WPW  2.  Single episode of right shoulder pain-noncardiac  3.  Type 1 diabetes  4.  Hypothyroidism  5.  Migraine  6.  Depression/adjustment disorder    EKG revealed WPW pattern.  Patient has no history of SVTs or A. fib in the past.  We will perform a stress treadmill test to see if this is a pathway conducts at higher heart rates.  She comes in mentally prepared to undergo definitive therapy and says she has a lot of other health issues and does not want any additional medical condition to worry about for rest of her life.   We will also have her see electrophysiology for possible ablation.    1.  Stress treadmill EKG  2.  Transthoracic echocardiogram  3.  Follow-up with electrophysiology for possible ablation of WPW pathway    Thank you for allowing me to participate in the care of Linda Agosto    This  note was completed in part using Dragon voice recognition software. Although reviewed after completion, some word and grammatical errors may occur.    Anam Hutchinson MD  Cardiology    Orders Placed This Encounter   Procedures     Follow-Up with Electrophysiologist     Exercise Stress Test - Adult     Echocardiogram Complete       No orders of the defined types were placed in this encounter.      There are no discontinued medications.    Encounter Diagnosis   Name Primary?     Nadia-Parkinson-White (WPW) pattern        CURRENT MEDICATIONS:  Current Outpatient Medications   Medication Sig Dispense Refill     insulin aspart (NOVOLOG VIAL) 100 UNITS/ML vial USE WITH INSULIN PUMP FOR TOTAL DAILY DOSE OF 75 UNITS 30 mL 11     insulin glargine (BASAGLAR KWIKPEN) 100 UNIT/ML injection INJ 21 UNITS SC D  0     levothyroxine (SYNTHROID/LEVOTHROID) 112 MCG tablet Take 1 tablet (112 mcg) by mouth daily 90 tablet 3     sertraline (ZOLOFT) 50 MG tablet Take 1 tablet (50 mg) by mouth daily 90 tablet 1     simvastatin (ZOCOR) 40 MG tablet Take 1 tablet (40 mg) by mouth At Bedtime 90 tablet 3     SUMAtriptan (IMITREX) 50 MG tablet Take 1 tablet (50 mg) by mouth at onset of headache for migraine May repeat in 2 hours. Max 4 tablets/24 hours. 10 tablet 2     triamcinolone (KENALOG) 0.1 % external cream Apply topically 2 times daily to affected areas on fingers for 10-14 days. Not for use on face nor groin. 80 g 1     blood glucose (VALE CONTOUR NEXT) test strip Patient tests 5 times/day, Contour Next test strips DAW1 500 strip 3     blood glucose monitoring (VALE MICROLET) lancets Patient tests 8 times/day 2 Box 6     Continuous Blood Gluc  (FREESTYLE ZHOU 2 READER SYSTM) CICI 1 each once for 1 dose Use with Freestyle Zhou 2 sensors to monitor blood sugars. 1 each 1     Continuous Blood Gluc Sensor (FREESTYLE ZHOU 2 SENSOR SYSTM) MISC 1 each every 14 days Use with Freestyle Zhou 2 reader to monitor blood sugars. 2 each  11     continuous blood glucose monitoring (FREESTYLE BEKAH) sensor For use with Freestyle Bekah Flash  for continuous monitioring of blood glucose levels. Replace sensor every 14 days. 2 each 11     insulin aspart (NOVOLOG PEN) 100 UNIT/ML pen Inject 3 to 10 units subcutaneous at mealtimes as directed, total daily dose approx 30 units 15 mL 1     insulin pen needle (BD CLEOPATRA U/F) 32G X 4 MM miscellaneous Use 4 pen needles daily or as directed. 100 each 1     MONONESSA 0.25-35 MG-MCG tablet Take 1 tablet by mouth daily (Patient not taking: Reported on 10/20/2020) 84 tablet 6       ALLERGIES     Allergies   Allergen Reactions     Penicillins Rash     augmentin & pcn     Sulfa Drugs Hives       PAST MEDICAL HISTORY:  Past Medical History:   Diagnosis Date     Adjustment disorder with anxious mood      Allergic rhinitis, cause unspecified     h/o AIT     Chest discomfort      Common migraine     No visual aura.      Hyperlipidemia LDL goal < 70      Hypothyroidism      Infectious mononucleosis 01/01/1995     Tuberculosis      Type 1 diabetes, HbA1c goal < 7% (H) 03/01/2004     followed [facility] endocrinology - now Dr Jimenez       PAST SURGICAL HISTORY:  Past Surgical History:   Procedure Laterality Date     APPENDECTOMY  8/07    dr deshpande     PE TUBES  1996       FAMILY HISTORY:  Family History   Problem Relation Age of Onset     Neurologic Disorder Maternal Grandmother         dementia     Genetic Disorder Paternal Grandfather         kidney     Family History Negative No family hx of        SOCIAL HISTORY:  Social History     Socioeconomic History     Marital status: Single     Spouse name: None     Number of children: None     Years of education: None     Highest education level: None   Occupational History     None   Social Needs     Financial resource strain: None     Food insecurity     Worry: None     Inability: None     Transportation needs     Medical: None     Non-medical: None   Tobacco Use      "Smoking status: Never Smoker     Smokeless tobacco: Never Used   Substance and Sexual Activity     Alcohol use: Yes     Comment: sometimes couple on weekends     Drug use: No     Sexual activity: Yes     Partners: Male     Birth control/protection: Pill   Lifestyle     Physical activity     Days per week: None     Minutes per session: None     Stress: None   Relationships     Social connections     Talks on phone: None     Gets together: None     Attends Gnosticism service: None     Active member of club or organization: None     Attends meetings of clubs or organizations: None     Relationship status: None     Intimate partner violence     Fear of current or ex partner: None     Emotionally abused: None     Physically abused: None     Forced sexual activity: None   Other Topics Concern      Service Not Asked     Blood Transfusions Not Asked     Caffeine Concern Yes     Comment: rare     Occupational Exposure Not Asked     Hobby Hazards Not Asked     Sleep Concern No     Stress Concern No     Weight Concern Not Asked     Special Diet Not Asked     Back Care Not Asked     Exercise Yes     Bike Helmet Not Asked     Seat Belt Yes     Self-Exams No     Comment: encouraged     Parent/sibling w/ CABG, MI or angioplasty before 65F 55M? No   Social History Narrative    -Working -         Review of Systems:  Skin:  Negative       Eyes:  Negative      ENT:  Negative      Respiratory:  Negative       Cardiovascular:    Positive for;dizziness    Gastroenterology: Negative      Genitourinary:  not assessed      Musculoskeletal:  Negative      Neurologic:  Positive for migraine headaches maybe once a month  Psychiatric:  Negative      Heme/Lymph/Imm:  Negative      Endocrine:  Positive for thyroid disorder      Physical Exam:  Vitals: /76   Pulse 78   Ht 1.651 m (5' 5\")   Wt 88.9 kg (196 lb)   BMI 32.62 kg/m    Constitutional: awake, alert, no distress, obese  Skin: Warm and dry to touch  Head: Normocephalic, " atraumatic  Eyes: Conjunctivae and lids unremarkable, sclera white  ENT: No pallor or cyanosis  Respiratory: Normal breath sounds, clear to auscultation  Cardiac: Regular rate and rhythm, S1-S2 normal.  No murmurs gallops or rubs.   No pedal edema.   Extremities and musculoskeletal: No gross motor deficit  Neurological.  Affect normal  Psych: Alert and oriented x3    Recent Lab Results:  LIPID RESULTS:  Lab Results   Component Value Date    CHOL 223 (H) 02/01/2018    HDL 84 02/01/2018     (H) 02/01/2018    TRIG 71 02/01/2018    CHOLHDLRATIO 3.2 06/03/2014       LIVER ENZYME RESULTS:  Lab Results   Component Value Date    AST 19 09/28/2020    ALT 27 09/28/2020       CBC RESULTS:  Lab Results   Component Value Date    WBC 7.3 09/28/2020    RBC 4.57 09/28/2020    HGB 14.6 09/28/2020    HCT 42.8 09/28/2020    MCV 94 09/28/2020    MCH 31.9 09/28/2020    MCHC 34.1 09/28/2020    RDW 12.3 09/28/2020     09/28/2020       BMP RESULTS:  Lab Results   Component Value Date     09/28/2020    POTASSIUM 4.1 09/28/2020    CHLORIDE 104 09/28/2020    CO2 28 09/28/2020    ANIONGAP 4 09/28/2020     (H) 09/28/2020    BUN 16 09/28/2020    CR 0.72 09/28/2020    GFRESTIMATED >90 09/28/2020    GFRESTBLACK >90 09/28/2020    MARCIA 8.6 09/28/2020        A1C RESULTS:  Lab Results   Component Value Date    A1C 8.2 (H) 02/14/2020       INR RESULTS:  No results found for: INR      All medical records were reviewed in detail and discussed with the patient. Greater than 30 mins were spent with the patient, 50% of this time was spent on counseling and coordination of care.  After visit summary was printed and given to the patient.      Thank you for allowing me to participate in the care of your patient.    Sincerely,     Anam Hutchinson MD     [facility]    cc:   Citlali Greenberg MD  [facility]  [address] [place] [address]

## 2020-10-20 NOTE — LETTER
10/20/2020    Yvette Rodas PA-C  [address]  [place] [address]    RE: Linda Agosto       Dear Colleague,    I had the pleasure of seeing Linda Agosto in the [facility].    HPI and Plan:   Today I had the pleasure of seeing Linda Agosto at [facility]. She is a pleasant 30 year old patient with a past medical history of type 1 diabetes, hypothyroidism, migraines and depression presents to the clinic in consultation for abnormal EKG.  The patient recently presented to the emergency department with 1 episode of nonexertional, sharp, right shoulder pain.  She was ruled out for ACS and an EKG performed showed WPW pattern for which she was asked to see cardiology.  She is in here for that consult.  She reports feeling well and does not have any complaints other than one episode of right shoulder pain that she presented to the ED for.  She denies palpitations, breathlessness, syncope could be a presyncope.  She has never had racing heartbeat in the past.  She has no family history of premature heart disease in first-degree relatives.    Assessment and plan  1.  WPW  2.  Single episode of right shoulder pain-noncardiac  3.  Type 1 diabetes  4.  Hypothyroidism  5.  Migraine  6.  Depression/adjustment disorder    EKG revealed WPW pattern.  Patient has no history of SVTs or A. fib in the past.  We will perform a stress treadmill test to see if this is a pathway conducts at higher heart rates.  She comes in mentally prepared to undergo definitive therapy and says she has a lot of other health issues and does not want any additional medical condition to worry about for rest of her life.   We will also have her see electrophysiology for possible ablation.    1.  Stress treadmill EKG  2.  Transthoracic echocardiogram  3.  Follow-up with electrophysiology for possible ablation of WPW pathway    Thank you for allowing me to participate in the care of Linda Agosto    This  note was completed in part using Dragon voice recognition software. Although reviewed after completion, some word and grammatical errors may occur.    Anam Hutchinson MD  Cardiology    Orders Placed This Encounter   Procedures     Follow-Up with Electrophysiologist     Exercise Stress Test - Adult     Echocardiogram Complete       No orders of the defined types were placed in this encounter.      There are no discontinued medications.    Encounter Diagnosis   Name Primary?     Nadia-Parkinson-White (WPW) pattern        CURRENT MEDICATIONS:  Current Outpatient Medications   Medication Sig Dispense Refill     insulin aspart (NOVOLOG VIAL) 100 UNITS/ML vial USE WITH INSULIN PUMP FOR TOTAL DAILY DOSE OF 75 UNITS 30 mL 11     insulin glargine (BASAGLAR KWIKPEN) 100 UNIT/ML injection INJ 21 UNITS SC D  0     levothyroxine (SYNTHROID/LEVOTHROID) 112 MCG tablet Take 1 tablet (112 mcg) by mouth daily 90 tablet 3     sertraline (ZOLOFT) 50 MG tablet Take 1 tablet (50 mg) by mouth daily 90 tablet 1     simvastatin (ZOCOR) 40 MG tablet Take 1 tablet (40 mg) by mouth At Bedtime 90 tablet 3     SUMAtriptan (IMITREX) 50 MG tablet Take 1 tablet (50 mg) by mouth at onset of headache for migraine May repeat in 2 hours. Max 4 tablets/24 hours. 10 tablet 2     triamcinolone (KENALOG) 0.1 % external cream Apply topically 2 times daily to affected areas on fingers for 10-14 days. Not for use on face nor groin. 80 g 1     blood glucose (VALE CONTOUR NEXT) test strip Patient tests 5 times/day, Contour Next test strips DAW1 500 strip 3     blood glucose monitoring (VALE MICROLET) lancets Patient tests 8 times/day 2 Box 6     Continuous Blood Gluc  (FREESTYLE ZHOU 2 READER SYSTM) CICI 1 each once for 1 dose Use with Freestyle Zhou 2 sensors to monitor blood sugars. 1 each 1     Continuous Blood Gluc Sensor (FREESTYLE ZHOU 2 SENSOR SYSTM) MISC 1 each every 14 days Use with Freestyle Zhou 2 reader to monitor blood sugars. 2 each  11     continuous blood glucose monitoring (FREESTYLE BEKAH) sensor For use with Freestyle Bekah Flash  for continuous monitioring of blood glucose levels. Replace sensor every 14 days. 2 each 11     insulin aspart (NOVOLOG PEN) 100 UNIT/ML pen Inject 3 to 10 units subcutaneous at mealtimes as directed, total daily dose approx 30 units 15 mL 1     insulin pen needle (BD CLEOPATRA U/F) 32G X 4 MM miscellaneous Use 4 pen needles daily or as directed. 100 each 1     MONONESSA 0.25-35 MG-MCG tablet Take 1 tablet by mouth daily (Patient not taking: Reported on 10/20/2020) 84 tablet 6       ALLERGIES     Allergies   Allergen Reactions     Penicillins Rash     augmentin & pcn     Sulfa Drugs Hives       PAST MEDICAL HISTORY:  Past Medical History:   Diagnosis Date     Adjustment disorder with anxious mood      Allergic rhinitis, cause unspecified     h/o AIT     Chest discomfort      Common migraine     No visual aura.      Hyperlipidemia LDL goal < 70      Hypothyroidism      Infectious mononucleosis 01/01/1995     Tuberculosis      Type 1 diabetes, HbA1c goal < 7% (H) 03/01/2004     followed [facility] endocrinology - now Dr Jimenez       PAST SURGICAL HISTORY:  Past Surgical History:   Procedure Laterality Date     APPENDECTOMY  8/07    dr deshpande     PE TUBES  1996       FAMILY HISTORY:  Family History   Problem Relation Age of Onset     Neurologic Disorder Maternal Grandmother         dementia     Genetic Disorder Paternal Grandfather         kidney     Family History Negative No family hx of        SOCIAL HISTORY:  Social History     Socioeconomic History     Marital status: Single     Spouse name: None     Number of children: None     Years of education: None     Highest education level: None   Occupational History     None   Social Needs     Financial resource strain: None     Food insecurity     Worry: None     Inability: None     Transportation needs     Medical: None     Non-medical: None   Tobacco Use      "Smoking status: Never Smoker     Smokeless tobacco: Never Used   Substance and Sexual Activity     Alcohol use: Yes     Comment: sometimes couple on weekends     Drug use: No     Sexual activity: Yes     Partners: Male     Birth control/protection: Pill   Lifestyle     Physical activity     Days per week: None     Minutes per session: None     Stress: None   Relationships     Social connections     Talks on phone: None     Gets together: None     Attends Baptism service: None     Active member of club or organization: None     Attends meetings of clubs or organizations: None     Relationship status: None     Intimate partner violence     Fear of current or ex partner: None     Emotionally abused: None     Physically abused: None     Forced sexual activity: None   Other Topics Concern      Service Not Asked     Blood Transfusions Not Asked     Caffeine Concern Yes     Comment: rare     Occupational Exposure Not Asked     Hobby Hazards Not Asked     Sleep Concern No     Stress Concern No     Weight Concern Not Asked     Special Diet Not Asked     Back Care Not Asked     Exercise Yes     Bike Helmet Not Asked     Seat Belt Yes     Self-Exams No     Comment: encouraged     Parent/sibling w/ CABG, MI or angioplasty before 65F 55M? No   Social History Narrative    -Working -         Review of Systems:  Skin:  Negative       Eyes:  Negative      ENT:  Negative      Respiratory:  Negative       Cardiovascular:    Positive for;dizziness    Gastroenterology: Negative      Genitourinary:  not assessed      Musculoskeletal:  Negative      Neurologic:  Positive for migraine headaches maybe once a month  Psychiatric:  Negative      Heme/Lymph/Imm:  Negative      Endocrine:  Positive for thyroid disorder      Physical Exam:  Vitals: /76   Pulse 78   Ht 1.651 m (5' 5\")   Wt 88.9 kg (196 lb)   BMI 32.62 kg/m    Constitutional: awake, alert, no distress, obese  Skin: Warm and dry to touch  Head: Normocephalic, " atraumatic  Eyes: Conjunctivae and lids unremarkable, sclera white  ENT: No pallor or cyanosis  Respiratory: Normal breath sounds, clear to auscultation  Cardiac: Regular rate and rhythm, S1-S2 normal.  No murmurs gallops or rubs.   No pedal edema.   Extremities and musculoskeletal: No gross motor deficit  Neurological.  Affect normal  Psych: Alert and oriented x3    Recent Lab Results:  LIPID RESULTS:  Lab Results   Component Value Date    CHOL 223 (H) 02/01/2018    HDL 84 02/01/2018     (H) 02/01/2018    TRIG 71 02/01/2018    CHOLHDLRATIO 3.2 06/03/2014       LIVER ENZYME RESULTS:  Lab Results   Component Value Date    AST 19 09/28/2020    ALT 27 09/28/2020       CBC RESULTS:  Lab Results   Component Value Date    WBC 7.3 09/28/2020    RBC 4.57 09/28/2020    HGB 14.6 09/28/2020    HCT 42.8 09/28/2020    MCV 94 09/28/2020    MCH 31.9 09/28/2020    MCHC 34.1 09/28/2020    RDW 12.3 09/28/2020     09/28/2020       BMP RESULTS:  Lab Results   Component Value Date     09/28/2020    POTASSIUM 4.1 09/28/2020    CHLORIDE 104 09/28/2020    CO2 28 09/28/2020    ANIONGAP 4 09/28/2020     (H) 09/28/2020    BUN 16 09/28/2020    CR 0.72 09/28/2020    GFRESTIMATED >90 09/28/2020    GFRESTBLACK >90 09/28/2020    MARCIA 8.6 09/28/2020        A1C RESULTS:  Lab Results   Component Value Date    A1C 8.2 (H) 02/14/2020       INR RESULTS:  No results found for: INR    CC  Citlali Greenberg MD  [facility]  [address] [place] [address]    All medical records were reviewed in detail and discussed with the patient. Greater than 30 mins were spent with the patient, 50% of this time was spent on counseling and coordination of care.  After visit summary was printed and given to the patient.        Thank you for allowing me to participate in the care of your patient.      Sincerely,     Anam Hutchinson MD     [facility]    cc:   Citlali Greenberg MD  [facility]  [address] [place] [address]

## 2020-10-20 NOTE — PROGRESS NOTES
HPI and Plan:   Today I had the pleasure of seeing Linad Agosto at [facility]. She is a pleasant 30 year old patient with a past medical history of type 1 diabetes, hypothyroidism, migraines and depression presents to the clinic in consultation for abnormal EKG.  The patient recently presented to the emergency department with 1 episode of nonexertional, sharp, right shoulder pain.  She was ruled out for ACS and an EKG performed showed WPW pattern for which she was asked to see cardiology.  She is in here for that consult.  She reports feeling well and does not have any complaints other than one episode of right shoulder pain that she presented to the ED for.  She denies palpitations, breathlessness, syncope could be a presyncope.  She has never had racing heartbeat in the past.  She has no family history of premature heart disease in first-degree relatives.    Assessment and plan  1.  WPW  2.  Single episode of right shoulder pain-noncardiac  3.  Type 1 diabetes  4.  Hypothyroidism  5.  Migraine  6.  Depression/adjustment disorder    EKG revealed WPW pattern.  Patient has no history of SVTs or A. fib in the past.  We will perform a stress treadmill test to see if this is a pathway conducts at higher heart rates.  She comes in mentally prepared to undergo definitive therapy and says she has a lot of other health issues and does not want any additional medical condition to worry about for rest of her life.   We will also have her see electrophysiology for possible ablation.    1.  Stress treadmill EKG  2.  Transthoracic echocardiogram  3.  Follow-up with electrophysiology for possible ablation of WPW pathway    Thank you for allowing me to participate in the care of Linda Agosto    This note was completed in part using Dragon voice recognition software. Although reviewed after completion, some word and grammatical errors may occur.    Anam Hutchinson MD  Cardiology    Orders Placed This  Encounter   Procedures     Follow-Up with Electrophysiologist     Exercise Stress Test - Adult     Echocardiogram Complete       No orders of the defined types were placed in this encounter.      There are no discontinued medications.    Encounter Diagnosis   Name Primary?     Nadia-Parkinson-White (WPW) pattern        CURRENT MEDICATIONS:  Current Outpatient Medications   Medication Sig Dispense Refill     insulin aspart (NOVOLOG VIAL) 100 UNITS/ML vial USE WITH INSULIN PUMP FOR TOTAL DAILY DOSE OF 75 UNITS 30 mL 11     insulin glargine (BASAGLAR KWIKPEN) 100 UNIT/ML injection INJ 21 UNITS SC D  0     levothyroxine (SYNTHROID/LEVOTHROID) 112 MCG tablet Take 1 tablet (112 mcg) by mouth daily 90 tablet 3     sertraline (ZOLOFT) 50 MG tablet Take 1 tablet (50 mg) by mouth daily 90 tablet 1     simvastatin (ZOCOR) 40 MG tablet Take 1 tablet (40 mg) by mouth At Bedtime 90 tablet 3     SUMAtriptan (IMITREX) 50 MG tablet Take 1 tablet (50 mg) by mouth at onset of headache for migraine May repeat in 2 hours. Max 4 tablets/24 hours. 10 tablet 2     triamcinolone (KENALOG) 0.1 % external cream Apply topically 2 times daily to affected areas on fingers for 10-14 days. Not for use on face nor groin. 80 g 1     blood glucose (VALE CONTOUR NEXT) test strip Patient tests 5 times/day, Contour Next test strips DAW1 500 strip 3     blood glucose monitoring (VALE MICROLET) lancets Patient tests 8 times/day 2 Box 6     Continuous Blood Gluc  (FREESTYLE ZHOU 2 READER SYSTM) CICI 1 each once for 1 dose Use with Freestyle Zhou 2 sensors to monitor blood sugars. 1 each 1     Continuous Blood Gluc Sensor (FREESTYLE ZHOU 2 SENSOR SYSTM) MISC 1 each every 14 days Use with Freestyle Zhou 2 reader to monitor blood sugars. 2 each 11     continuous blood glucose monitoring (FREESTYLE ZHOU) sensor For use with Freestyle Zhou Flash  for continuous monitioring of blood glucose levels. Replace sensor every 14 days. 2 each 11      insulin aspart (NOVOLOG PEN) 100 UNIT/ML pen Inject 3 to 10 units subcutaneous at mealtimes as directed, total daily dose approx 30 units 15 mL 1     insulin pen needle (BD CLEOPATRA U/F) 32G X 4 MM miscellaneous Use 4 pen needles daily or as directed. 100 each 1     MONONESSA 0.25-35 MG-MCG tablet Take 1 tablet by mouth daily (Patient not taking: Reported on 10/20/2020) 84 tablet 6       ALLERGIES     Allergies   Allergen Reactions     Penicillins Rash     augmentin & pcn     Sulfa Drugs Hives       PAST MEDICAL HISTORY:  Past Medical History:   Diagnosis Date     Adjustment disorder with anxious mood      Allergic rhinitis, cause unspecified     h/o AIT     Chest discomfort      Common migraine     No visual aura.      Hyperlipidemia LDL goal < 70      Hypothyroidism      Infectious mononucleosis 01/01/1995     Tuberculosis      Type 1 diabetes, HbA1c goal < 7% (H) 03/01/2004     followed [facility] - peds endocrinology - now Dr Jimenez       PAST SURGICAL HISTORY:  Past Surgical History:   Procedure Laterality Date     APPENDECTOMY  8/07    dr deshpande     PE TUBES  1996       FAMILY HISTORY:  Family History   Problem Relation Age of Onset     Neurologic Disorder Maternal Grandmother         dementia     Genetic Disorder Paternal Grandfather         kidney     Family History Negative No family hx of        SOCIAL HISTORY:  Social History     Socioeconomic History     Marital status: Single     Spouse name: None     Number of children: None     Years of education: None     Highest education level: None   Occupational History     None   Social Needs     Financial resource strain: None     Food insecurity     Worry: None     Inability: None     Transportation needs     Medical: None     Non-medical: None   Tobacco Use     Smoking status: Never Smoker     Smokeless tobacco: Never Used   Substance and Sexual Activity     Alcohol use: Yes     Comment: sometimes couple on weekends     Drug use: No     Sexual activity: Yes      "Partners: Male     Birth control/protection: Pill   Lifestyle     Physical activity     Days per week: None     Minutes per session: None     Stress: None   Relationships     Social connections     Talks on phone: None     Gets together: None     Attends Taoism service: None     Active member of club or organization: None     Attends meetings of clubs or organizations: None     Relationship status: None     Intimate partner violence     Fear of current or ex partner: None     Emotionally abused: None     Physically abused: None     Forced sexual activity: None   Other Topics Concern      Service Not Asked     Blood Transfusions Not Asked     Caffeine Concern Yes     Comment: rare     Occupational Exposure Not Asked     Hobby Hazards Not Asked     Sleep Concern No     Stress Concern No     Weight Concern Not Asked     Special Diet Not Asked     Back Care Not Asked     Exercise Yes     Bike Helmet Not Asked     Seat Belt Yes     Self-Exams No     Comment: encouraged     Parent/sibling w/ CABG, MI or angioplasty before 65F 55M? No   Social History Narrative    -Working -         Review of Systems:  Skin:  Negative       Eyes:  Negative      ENT:  Negative      Respiratory:  Negative       Cardiovascular:    Positive for;dizziness    Gastroenterology: Negative      Genitourinary:  not assessed      Musculoskeletal:  Negative      Neurologic:  Positive for migraine headaches maybe once a month  Psychiatric:  Negative      Heme/Lymph/Imm:  Negative      Endocrine:  Positive for thyroid disorder      Physical Exam:  Vitals: /76   Pulse 78   Ht 1.651 m (5' 5\")   Wt 88.9 kg (196 lb)   BMI 32.62 kg/m    Constitutional: awake, alert, no distress, obese  Skin: Warm and dry to touch  Head: Normocephalic, atraumatic  Eyes: Conjunctivae and lids unremarkable, sclera white  ENT: No pallor or cyanosis  Respiratory: Normal breath sounds, clear to auscultation  Cardiac: Regular rate and rhythm, S1-S2 normal.  No " murmurs gallops or rubs.   No pedal edema.   Extremities and musculoskeletal: No gross motor deficit  Neurological.  Affect normal  Psych: Alert and oriented x3    Recent Lab Results:  LIPID RESULTS:  Lab Results   Component Value Date    CHOL 223 (H) 02/01/2018    HDL 84 02/01/2018     (H) 02/01/2018    TRIG 71 02/01/2018    CHOLHDLRATIO 3.2 06/03/2014       LIVER ENZYME RESULTS:  Lab Results   Component Value Date    AST 19 09/28/2020    ALT 27 09/28/2020       CBC RESULTS:  Lab Results   Component Value Date    WBC 7.3 09/28/2020    RBC 4.57 09/28/2020    HGB 14.6 09/28/2020    HCT 42.8 09/28/2020    MCV 94 09/28/2020    MCH 31.9 09/28/2020    MCHC 34.1 09/28/2020    RDW 12.3 09/28/2020     09/28/2020       BMP RESULTS:  Lab Results   Component Value Date     09/28/2020    POTASSIUM 4.1 09/28/2020    CHLORIDE 104 09/28/2020    CO2 28 09/28/2020    ANIONGAP 4 09/28/2020     (H) 09/28/2020    BUN 16 09/28/2020    CR 0.72 09/28/2020    GFRESTIMATED >90 09/28/2020    GFRESTBLACK >90 09/28/2020    MARCIA 8.6 09/28/2020        A1C RESULTS:  Lab Results   Component Value Date    A1C 8.2 (H) 02/14/2020       INR RESULTS:  No results found for: INR    CC  Citlali Greenberg MD  [facility]  [address] [place] [address]    All medical records were reviewed in detail and discussed with the patient. Greater than 30 mins were spent with the patient, 50% of this time was spent on counseling and coordination of care.  After visit summary was printed and given to the patient.

## 2020-11-02 ENCOUNTER — VIRTUAL VISIT (OUTPATIENT)
Dept: PSYCHOLOGY | Facility: CLINIC | Age: 30
End: 2020-11-02
Payer: COMMERCIAL

## 2020-11-02 DIAGNOSIS — F43.22 ADJUSTMENT DISORDER WITH ANXIETY: Primary | ICD-10-CM

## 2020-11-02 PROCEDURE — 90834 PSYTX W PT 45 MINUTES: CPT | Mod: 95 | Performed by: SOCIAL WORKER

## 2020-11-02 ASSESSMENT — ANXIETY QUESTIONNAIRES
7. FEELING AFRAID AS IF SOMETHING AWFUL MIGHT HAPPEN: SEVERAL DAYS
GAD7 TOTAL SCORE: 5
3. WORRYING TOO MUCH ABOUT DIFFERENT THINGS: SEVERAL DAYS
4. TROUBLE RELAXING: NOT AT ALL
5. BEING SO RESTLESS THAT IT IS HARD TO SIT STILL: NOT AT ALL
2. NOT BEING ABLE TO STOP OR CONTROL WORRYING: SEVERAL DAYS
6. BECOMING EASILY ANNOYED OR IRRITABLE: SEVERAL DAYS
1. FEELING NERVOUS, ANXIOUS, OR ON EDGE: SEVERAL DAYS

## 2020-11-02 ASSESSMENT — PATIENT HEALTH QUESTIONNAIRE - PHQ9: SUM OF ALL RESPONSES TO PHQ QUESTIONS 1-9: 3

## 2020-11-02 NOTE — PROGRESS NOTES
Progress Note    Patient Name: Linda Agosto  Date: 11/2/2020         Service Type: Individual      Session Start Time: 1536  Session End Time: 1617     Session Length: 41  Session #: 3    Attendees: Client    Service Modality:  Video Visit:    Telemedicine Visit: The patient's condition can be safely assessed and treated via synchronous audio and visual telemedicine encounter.      Reason for Telemedicine Visit: Services only offered telehealth    Originating Site (Patient Location): Patient's home    Distant Site (Provider Location): Provider Remote Setting    Consent:  The patient/guardian has verbally consented to: the potential risks and benefits of telemedicine (video visit) versus in person care; bill my insurance or make self-payment for services provided; and responsibility for payment of non-covered services.     Patient would like the video invitation sent by: Send to e-mail at: [email or web address]    Mode of Communication:  Video Conference via [employer]    As the provider I attest to compliance with applicable laws and regulations related to telemedicine.     Treatment Plan Last Reviewed: 10/19/2020 due 1/19/2021  PHQ-9 / TRESA-7 : 3/5     DATA  Interactive Complexity: No  Crisis: No       Progress Since Last Session (Related to Symptoms / Goals / Homework):   Symptoms: Improving decrease TRESA-7    Homework: Partially completed      Episode of Care Goals: Minimal progress - PREPARATION (Decided to change - considering how); Intervened by negotiating a change plan and determining options / strategies for behavior change, identifying triggers, exploring social supports, and working towards setting a date to begin behavior change     Current / Ongoing Stressors and Concerns:   COVID-19, furloughed April to August, difficult unknown, family conflict/communications     Treatment Objective(s) Addressed in This Session:     Patient will demonstrate/report improved family  dynamics that can be safely managed in a lower level of care. Absence of persistent conflict in family relationships and reduction in family conflict to level of comfort and satisfaction of family members as measured by client report for a period of at least 30 days within 6 months as clinically observed and by patient self-report.  Patient will demonstrate and report a level of anxiety that can be managed at a lower level of care.  Absence of persistent anxious mood and report of reduced frequency and intensity of worry and absence of persistent anxious mood to acceptable levels, no panic attacks, report subjective comfort with rumination for a period of 90 days, within 6 months as clinically observed and by patient self-report.     Intervention:  Therapist met with patient to review goals and interventions. Therapist utilized reflected listening as patient gave brief reflection of week. Patient processed a situation with her partner. Therapist supported patient as she processed and validated patient. Therapist coached patient in effective communication skills and encouraged patient to sit and communicate her feelings with her partner, establishing healthy boundaries.   Patient presented with an anxious affect. Patient was engaged in session and open to feedback. Patient reported no safety concerns.           ASSESSMENT: Current Emotional / Mental Status (status of significant symptoms):   Risk status (Self / Other harm or suicidal ideation)   Patient denies current fears or concerns for personal safety.   Patient denies current or recent suicidal ideation or behaviors.   Patient denies current or recent homicidal ideation or behaviors.   Patient denies current or recent self injurious behavior or ideation.   Patient denies other safety concerns.   Patient reports there has been no change in risk factors since their last session.     Patient reports there has been no change in protective factors since their last  session.     Recommended that patient call 911 or go to the local ED should there be a change in any of these risk factors.     Appearance:   Appropriate    Eye Contact:   Fair    Psychomotor Behavior: Restless    Attitude:   Cooperative  Interested Friendly Pleasant   Orientation:   All   Speech    Rate / Production: Emotional Talkative    Volume:  Normal    Mood:    Anxious  Sad    Affect:    Worrisome    Thought Content:  Clear    Thought Form:  Coherent    Insight:    Fair      Medication Review:   No changes to current psychiatric medication(s)     Medication Compliance:   Yes     Changes in Health Issues:   None reported     Chemical Use Review:   Substance Use: Chemical use reviewed, no active concerns identified      Tobacco Use: No current tobacco use.      Diagnosis:  1. Adjustment disorder with anxiety        Collateral Reports Completed:   Not Applicable    PLAN: (Patient Tasks / Therapist Tasks / Other)  Manage emotions   Set boundaries with mom  Communicate emotions to partner      Rosi Lozano, [facility]  11/2/2020                                                         ______________________________________________________________________    Treatment Plan    Patient's Name: Linda Agosto  YOB: 1990    Date: 10/19/2020    DSM5 Diagnoses: Adjustment Disorders  309.24 (F43.22) With anxiety  Psychosocial / Contextual Factors: COVID-19, furloughed, difficult unknown, family conflict/communications    WHODAS: 17    Referral / Collaboration:  Referral to another professional/service is not indicated at this time.    Anticipated number of session or this episode of care: 20      MeasurableTreatment Goal(s) related to diagnosis / functional impairment(s)  Goal 1: Patient will absence of persistent anxiety mood and report of reduced frequency and intensity of worry and absence of persistent anxious mood to acceptable levels, no panic attacks, report subjective comfort with rumination for a  period of 90 days. Within 6 months as clinically observed and by patient self-report.    I will know I've met my goal when I'm faced with triggers I have the proper goals to manage those feelings.      Objective #A (Patient Action)    Patient will demonstrate and report a level of anxiety that can be managed at a lower level of care.  Absence of persistent anxious mood and report of reduced frequency and intensity of worry and absence of persistent anxious mood to acceptable levels, no panic attacks, report subjective comfort with rumination for a period of 90 days, within 6 months as clinically observed and by patient self-report.  Status: New - Date: 10/19/2020     Intervention(s)  Therapist will provide individual therapy to identify triggers to anxiety, gain feedback on helpful coping tools and thought-reframing techniques, and identify preferred way of being. Tx to include discuss current stressors and interpersonal conflicts and how to cope with these, coaching, diagnostic testing , referral for medication as indicated, use prescribed medication, cognitive restructuring, interpersonal,   family therapy, supportive therapy services.        Goal 2: Patient will absence of persistent conflict in family relationships and reduction in family conflict to level of comfort and satisfaction of family members as measured by client report for a period of at least 30 days within 6 months as clinically observed and by patient self-report    I will know I've met my goal when can communicate my boundary without worrying about their feelings.      Objective #A (Patient Action)    Status: New - Date: 10/19/2020     Patient will demonstrate/report improved family dynamics that can be safely managed in a lower level of care. Absence of persistent conflict in family relationships and reduction in family conflict to level of comfort and satisfaction of family members as measured by client report for a period of at least 30 days  within 6 months as clinically observed and by patient self-report.    Intervention(s)  Therapist will provide individual therapy to process stressors within family dynamics, identify areas within her control, and identify preferred way of being within family relationships.  Tx to discuss current stressors and interpersonal conflicts and how to cope with these, coaching, diagnostic testing, referral for medication as indicated, use prescribed medication, cognitive restructuring, interpersonal family therapy, supportive therapy services.          Patient has reviewed and agreed to the above plan.      Rosi Lozano, [facility]  October 19, 2020

## 2020-11-03 ASSESSMENT — ANXIETY QUESTIONNAIRES: GAD7 TOTAL SCORE: 5

## 2020-11-06 ENCOUNTER — HOSPITAL ENCOUNTER (OUTPATIENT)
Dept: CARDIOLOGY | Facility: CLINIC | Age: 30
Discharge: HOME | End: 2020-11-06
Attending: INTERNAL MEDICINE | Admitting: INTERNAL MEDICINE
Payer: COMMERCIAL

## 2020-11-06 DIAGNOSIS — I45.6 WOLFF-PARKINSON-WHITE (WPW) PATTERN: ICD-10-CM

## 2020-11-06 PROCEDURE — 93306 TTE W/DOPPLER COMPLETE: CPT

## 2020-11-06 PROCEDURE — 93018 CV STRESS TEST I&R ONLY: CPT | Performed by: INTERNAL MEDICINE

## 2020-11-06 PROCEDURE — 93017 CV STRESS TEST TRACING ONLY: CPT

## 2020-11-06 PROCEDURE — 93016 CV STRESS TEST SUPVJ ONLY: CPT | Performed by: INTERNAL MEDICINE

## 2020-11-06 PROCEDURE — 255N000002 HC RX 255 OP 636: Performed by: INTERNAL MEDICINE

## 2020-11-06 PROCEDURE — 93306 TTE W/DOPPLER COMPLETE: CPT | Mod: 26 | Performed by: INTERNAL MEDICINE

## 2020-11-06 RX ADMIN — HUMAN ALBUMIN MICROSPHERES AND PERFLUTREN 9 ML: 10; .22 INJECTION, SOLUTION INTRAVENOUS at 08:30

## 2020-11-07 ENCOUNTER — HEALTH MAINTENANCE LETTER (OUTPATIENT)
Age: 30
End: 2020-11-07

## 2020-11-10 ENCOUNTER — OFFICE VISIT (OUTPATIENT)
Dept: CARDIOLOGY | Facility: CLINIC | Age: 30
End: 2020-11-10
Attending: INTERNAL MEDICINE
Payer: COMMERCIAL

## 2020-11-10 VITALS
SYSTOLIC BLOOD PRESSURE: 121 MMHG | HEIGHT: 65 IN | BODY MASS INDEX: 32.65 KG/M2 | WEIGHT: 196 LBS | DIASTOLIC BLOOD PRESSURE: 85 MMHG | HEART RATE: 94 BPM

## 2020-11-10 DIAGNOSIS — I45.6 WOLFF-PARKINSON-WHITE (WPW) PATTERN: Primary | ICD-10-CM

## 2020-11-10 PROCEDURE — 93000 ELECTROCARDIOGRAM COMPLETE: CPT | Performed by: INTERNAL MEDICINE

## 2020-11-10 PROCEDURE — 99205 OFFICE O/P NEW HI 60 MIN: CPT | Mod: 25 | Performed by: INTERNAL MEDICINE

## 2020-11-10 ASSESSMENT — MIFFLIN-ST. JEOR: SCORE: 1619.93

## 2020-11-10 NOTE — LETTER
11/10/2020    Yvette Rodas PA-C  [address]  [facility] [address]    RE: Linda FELICITAS Agosto       Dear Colleague,    I had the pleasure of seeing Linda Martina in the [facility].    Electrophysiology/ Clinic Note         H&P and Plan:     REASON FOR VISIT: Electrophysiology evaluation.      HISTORY OF PRESENT ILLNESS: Patient is a pleasant 30-year-old lady with a history of diabetes, hypothyroidism, migraine and anxiety, who is here for evaluation of abnormal EKG.    Patient presented to the ED on 9/28/2020 with chest discomfort.  At the time, ACS was ruled out and EKG showed WPW pattern.  She was later evaluated by Dr. Murillo and underwent went an extensive cardiovascular work-up (details below).  She was referred here for evaluation.    At the moment, she is doing well.  She denies any symptoms such as chest pain, shortness of breath, lightheadedness, near-syncope or syncope.  She denies any sustained episode of tachycardia.    Baseline EKG showed normal sinus rhythm with discrete signs of preexcitation.  Based on EKG she most likely has a septal pathway.  Stress test EKG showed poor antegrade conduction through the pathway.  Pathway disappear when the heart rate reached around 136 bpm.    Previous studies:  -Echocardiogram (11/6/2020): Normal LV function.  EF estimated 60-65%.  There was no significant valve disease.  -Stress EKG (11/6/2020): Negative for ischemia. Stress test EKG showed poor antegrade conduction through the pathway.  Pathway disappear when the heart rate reached around 136 bpm.        ASSESSMENT AND PLAN:   1.    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  Patient does not have any symptoms suggestive of WPW  syndrome.  Stress test confirmed poor antegrade conduction through the pathway which suggest a benign accessory pathway.    Today, we discussed options including continue conservative approach or pursue catheter ablation.  We also discussed possibly obtaining a Zio patch  "monitor to reevaluate SVT burden.  Patient is asymptomatic and would like to continue conservative approach.    She will follow-up advise on as-needed basis.     Jr Rivera MD    Physical Exam:  Vitals: /85   Pulse 94   Ht 1.651 m (5' 5\")   Wt 88.9 kg (196 lb)   BMI 32.62 kg/m      Constitutional:  AAO x3.  Pt is in NAD.  HEAD: normocephalic.  SKIN: Skin normal color, texture and turgor with no lesions or eruptions.  Eyes: PERRL, EOMI.  ENT:  Supple, normal JVP. No lymphadenopathy or thyroid enlargement.  Chest:  CTAB.  Cardiac:   RRR, normal  S1 and S2.  No murmurs rubs or gallop.    Abdomen:  Normal BS.  Soft, non-tender and non-distended.  No rebound or guarding.    Extremities:  Pedious pulses palpable B/L.  No LE edema noticed.   Neurological: Strength and sensation grossly symmetric and intact throughout.       CURRENT MEDICATIONS:  Current Outpatient Medications   Medication Sig Dispense Refill     blood glucose ([employer] CONTOUR NEXT) test strip Patient tests 5 times/day, Contour Next test strips DAW1 500 strip 3     blood glucose monitoring (VALE MICROLET) lancets Patient tests 8 times/day 2 Box 6     Continuous Blood Gluc Sensor (FREESTYLE ZHOU 2 SENSOR SYSTM) MISC 1 each every 14 days Use with Freestyle Zhou 2 reader to monitor blood sugars. 2 each 11     continuous blood glucose monitoring (FREESTYLE ZHOU) sensor For use with Freestyle Zhou Flash  for continuous monitioring of blood glucose levels. Replace sensor every 14 days. 2 each 11     insulin aspart (NOVOLOG PEN) 100 UNIT/ML pen Inject 3 to 10 units subcutaneous at mealtimes as directed, total daily dose approx 30 units 15 mL 1     insulin aspart (NOVOLOG VIAL) 100 UNITS/ML vial USE WITH INSULIN PUMP FOR TOTAL DAILY DOSE OF 75 UNITS 30 mL 11     insulin glargine (BASAGLAR KWIKPEN) 100 UNIT/ML injection INJ 21 UNITS SC D  0     insulin pen needle (BD CLEOPATRA U/F) 32G X 4 MM miscellaneous Use 4 pen needles daily or as directed. " 100 each 1     levothyroxine (SYNTHROID/LEVOTHROID) 112 MCG tablet Take 1 tablet (112 mcg) by mouth daily 90 tablet 3     MONONESSA 0.25-35 MG-MCG tablet Take 1 tablet by mouth daily 84 tablet 6     sertraline (ZOLOFT) 50 MG tablet Take 1 tablet (50 mg) by mouth daily 90 tablet 1     simvastatin (ZOCOR) 40 MG tablet Take 1 tablet (40 mg) by mouth At Bedtime 90 tablet 3     SUMAtriptan (IMITREX) 50 MG tablet Take 1 tablet (50 mg) by mouth at onset of headache for migraine May repeat in 2 hours. Max 4 tablets/24 hours. 10 tablet 2     triamcinolone (KENALOG) 0.1 % external cream Apply topically 2 times daily to affected areas on fingers for 10-14 days. Not for use on face nor groin. 80 g 1     Continuous Blood Gluc  (DanceOnSTYLE BEKAH 2 READER SYSTM) CICI 1 each once for 1 dose Use with Freestyle Bekah 2 sensors to monitor blood sugars. 1 each 1       ALLERGIES     Allergies   Allergen Reactions     Penicillins Rash     augmentin & pcn     Sulfa Drugs Hives       PAST MEDICAL HISTORY:  Past Medical History:   Diagnosis Date     Adjustment disorder with anxious mood      Allergic rhinitis, cause unspecified     h/o AIT     Chest discomfort      Common migraine     No visual aura.      Hyperlipidemia LDL goal < 70      Hypothyroidism      Infectious mononucleosis 01/01/1995     Tuberculosis      Type 1 diabetes, HbA1c goal < 7% (H) 03/01/2004     followed [facility] - peds endocrinology - now Dr Jimenez       PAST SURGICAL HISTORY:  Past Surgical History:   Procedure Laterality Date     APPENDECTOMY  8/07    dr deshpande     PE TUBES  1996       FAMILY HISTORY:  Family History   Problem Relation Age of Onset     Neurologic Disorder Maternal Grandmother         dementia     Genetic Disorder Paternal Grandfather         kidney     Family History Negative No family hx of        SOCIAL HISTORY:  Social History     Socioeconomic History     Marital status: Single     Spouse name: None     Number of children: None     Years  of education: None     Highest education level: None   Occupational History     None   Social Needs     Financial resource strain: None     Food insecurity     Worry: None     Inability: None     Transportation needs     Medical: None     Non-medical: None   Tobacco Use     Smoking status: Never Smoker     Smokeless tobacco: Never Used   Substance and Sexual Activity     Alcohol use: Yes     Comment: sometimes couple on weekends     Drug use: No     Sexual activity: Yes     Partners: Male     Birth control/protection: Pill   Lifestyle     Physical activity     Days per week: None     Minutes per session: None     Stress: None   Relationships     Social connections     Talks on phone: None     Gets together: None     Attends Caodaism service: None     Active member of club or organization: None     Attends meetings of clubs or organizations: None     Relationship status: None     Intimate partner violence     Fear of current or ex partner: None     Emotionally abused: None     Physically abused: None     Forced sexual activity: None   Other Topics Concern      Service Not Asked     Blood Transfusions Not Asked     Caffeine Concern Yes     Comment: rare     Occupational Exposure Not Asked     Hobby Hazards Not Asked     Sleep Concern No     Stress Concern No     Weight Concern Not Asked     Special Diet Not Asked     Back Care Not Asked     Exercise Yes     Bike Helmet Not Asked     Seat Belt Yes     Self-Exams No     Comment: encouraged     Parent/sibling w/ CABG, MI or angioplasty before 65F 55M? No   Social History Narrative    -Working -         Review of Systems:  Skin:  Negative     Eyes:  Negative    ENT:  Negative    Respiratory:  Negative    Cardiovascular:    Positive for;dizziness  Gastroenterology: Negative    Genitourinary:  not assessed    Musculoskeletal:  Negative    Neurologic:  Positive for migraine headaches  Psychiatric:  Negative    Heme/Lymph/Imm:  Negative    Endocrine:  Positive for  thyroid disorder      Recent Lab Results:  LIPID RESULTS:  Lab Results   Component Value Date    CHOL 223 (H) 02/01/2018    HDL 84 02/01/2018     (H) 02/01/2018    TRIG 71 02/01/2018    CHOLHDLRATIO 3.2 06/03/2014       LIVER ENZYME RESULTS:  Lab Results   Component Value Date    AST 19 09/28/2020    ALT 27 09/28/2020       CBC RESULTS:  Lab Results   Component Value Date    WBC 7.3 09/28/2020    RBC 4.57 09/28/2020    HGB 14.6 09/28/2020    HCT 42.8 09/28/2020    MCV 94 09/28/2020    MCH 31.9 09/28/2020    MCHC 34.1 09/28/2020    RDW 12.3 09/28/2020     09/28/2020       BMP RESULTS:  Lab Results   Component Value Date     09/28/2020    POTASSIUM 4.1 09/28/2020    CHLORIDE 104 09/28/2020    CO2 28 09/28/2020    ANIONGAP 4 09/28/2020     (H) 09/28/2020    BUN 16 09/28/2020    CR 0.72 09/28/2020    GFRESTIMATED >90 09/28/2020    GFRESTBLACK >90 09/28/2020    MARCIA 8.6 09/28/2020        A1C RESULTS:  Lab Results   Component Value Date    A1C 8.2 (H) 02/14/2020       INR RESULTS:  No results found for: INR      ECHOCARDIOGRAM  No results found for this or any previous visit (from the past 8760 hour(s)).      Orders Placed This Encounter   Procedures     EKG 12-lead complete w/read - Clinics (future- to be scheduled)     No orders of the defined types were placed in this encounter.    There are no discontinued medications.      Encounter Diagnosis   Name Primary?     Nadia-Parkinson-White (WPW) pattern Yes       Thank you for allowing me to participate in the care of your patient.    Sincerely,     Jr Rivera MD     [facility]

## 2020-11-10 NOTE — PROGRESS NOTES
Electrophysiology/ Clinic Note         H&P and Plan:     REASON FOR VISIT: Electrophysiology evaluation.      HISTORY OF PRESENT ILLNESS: Patient is a pleasant 30-year-old lady with a history of diabetes, hypothyroidism, migraine and anxiety, who is here for evaluation of abnormal EKG.    Patient presented to the ED on 9/28/2020 with chest discomfort.  At the time, ACS was ruled out and EKG showed WPW pattern.  She was later evaluated by Dr. Murillo and underwent went an extensive cardiovascular work-up (details below).  She was referred here for evaluation.    At the moment, she is doing well.  She denies any symptoms such as chest pain, shortness of breath, lightheadedness, near-syncope or syncope.  She denies any sustained episode of tachycardia.    Baseline EKG showed normal sinus rhythm with discrete signs of preexcitation.  Based on EKG she most likely has a septal pathway.  Stress test EKG showed poor antegrade conduction through the pathway.  Pathway disappear when the heart rate reached around 136 bpm.    Previous studies:  -Echocardiogram (11/6/2020): Normal LV function.  EF estimated 60-65%.  There was no significant valve disease.  -Stress EKG (11/6/2020): Negative for ischemia. Stress test EKG showed poor antegrade conduction through the pathway.  Pathway disappear when the heart rate reached around 136 bpm.        ASSESSMENT AND PLAN:   1.    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  Patient does not have any symptoms suggestive of WPW  syndrome.  Stress test confirmed poor antegrade conduction through the pathway which suggest a benign accessory pathway.    Today, we discussed options including continue conservative approach or pursue catheter ablation.  We also discussed possibly obtaining a Zio patch monitor to reevaluate SVT burden.  Patient is asymptomatic and would like to continue conservative approach.    She will follow-up advise on as-needed basis.     Jr Rivera MD    Physical Exam:  Vitals: BP  "121/85   Pulse 94   Ht 1.651 m (5' 5\")   Wt 88.9 kg (196 lb)   BMI 32.62 kg/m      Constitutional:  AAO x3.  Pt is in NAD.  HEAD: normocephalic.  SKIN: Skin normal color, texture and turgor with no lesions or eruptions.  Eyes: PERRL, EOMI.  ENT:  Supple, normal JVP. No lymphadenopathy or thyroid enlargement.  Chest:  CTAB.  Cardiac:   RRR, normal  S1 and S2.  No murmurs rubs or gallop.    Abdomen:  Normal BS.  Soft, non-tender and non-distended.  No rebound or guarding.    Extremities:  Pedious pulses palpable B/L.  No LE edema noticed.   Neurological: Strength and sensation grossly symmetric and intact throughout.       CURRENT MEDICATIONS:  Current Outpatient Medications   Medication Sig Dispense Refill     blood glucose ([employer] CONTOUR NEXT) test strip Patient tests 5 times/day, Contour Next test strips DAW1 500 strip 3     blood glucose monitoring ([employer] MICROLET) lancets Patient tests 8 times/day 2 Box 6     Continuous Blood Gluc Sensor (FREESTYLE BEKAH 2 SENSOR SYSTM) MISC 1 each every 14 days Use with Freestyle Bekah 2 reader to monitor blood sugars. 2 each 11     continuous blood glucose monitoring (FREESTYLE BEKAH) sensor For use with Freestyle Bekah Flash  for continuous monitioring of blood glucose levels. Replace sensor every 14 days. 2 each 11     insulin aspart (NOVOLOG PEN) 100 UNIT/ML pen Inject 3 to 10 units subcutaneous at mealtimes as directed, total daily dose approx 30 units 15 mL 1     insulin aspart (NOVOLOG VIAL) 100 UNITS/ML vial USE WITH INSULIN PUMP FOR TOTAL DAILY DOSE OF 75 UNITS 30 mL 11     insulin glargine (BASAGLAR KWIKPEN) 100 UNIT/ML injection INJ 21 UNITS SC D  0     insulin pen needle (BD CLEOPATRA U/F) 32G X 4 MM miscellaneous Use 4 pen needles daily or as directed. 100 each 1     levothyroxine (SYNTHROID/LEVOTHROID) 112 MCG tablet Take 1 tablet (112 mcg) by mouth daily 90 tablet 3     MONONESSA 0.25-35 MG-MCG tablet Take 1 tablet by mouth daily 84 tablet 6     sertraline " (ZOLOFT) 50 MG tablet Take 1 tablet (50 mg) by mouth daily 90 tablet 1     simvastatin (ZOCOR) 40 MG tablet Take 1 tablet (40 mg) by mouth At Bedtime 90 tablet 3     SUMAtriptan (IMITREX) 50 MG tablet Take 1 tablet (50 mg) by mouth at onset of headache for migraine May repeat in 2 hours. Max 4 tablets/24 hours. 10 tablet 2     triamcinolone (KENALOG) 0.1 % external cream Apply topically 2 times daily to affected areas on fingers for 10-14 days. Not for use on face nor groin. 80 g 1     Continuous Blood Gluc  (Common CurriculumSTYLE BEKAH 2 READER SYSTM) CICI 1 each once for 1 dose Use with Freestyle Bekah 2 sensors to monitor blood sugars. 1 each 1       ALLERGIES     Allergies   Allergen Reactions     Penicillins Rash     augmentin & pcn     Sulfa Drugs Hives       PAST MEDICAL HISTORY:  Past Medical History:   Diagnosis Date     Adjustment disorder with anxious mood      Allergic rhinitis, cause unspecified     h/o AIT     Chest discomfort      Common migraine     No visual aura.      Hyperlipidemia LDL goal < 70      Hypothyroidism      Infectious mononucleosis 01/01/1995     Tuberculosis      Type 1 diabetes, HbA1c goal < 7% (H) 03/01/2004     followed N - peds endocrinology - now Dr Jimenez       PAST SURGICAL HISTORY:  Past Surgical History:   Procedure Laterality Date     APPENDECTOMY  8/07    dr deshpande     PE TUBES  1996       FAMILY HISTORY:  Family History   Problem Relation Age of Onset     Neurologic Disorder Maternal Grandmother         dementia     Genetic Disorder Paternal Grandfather         kidney     Family History Negative No family hx of        SOCIAL HISTORY:  Social History     Socioeconomic History     Marital status: Single     Spouse name: None     Number of children: None     Years of education: None     Highest education level: None   Occupational History     None   Social Needs     Financial resource strain: None     Food insecurity     Worry: None     Inability: None     Transportation  needs     Medical: None     Non-medical: None   Tobacco Use     Smoking status: Never Smoker     Smokeless tobacco: Never Used   Substance and Sexual Activity     Alcohol use: Yes     Comment: sometimes couple on weekends     Drug use: No     Sexual activity: Yes     Partners: Male     Birth control/protection: Pill   Lifestyle     Physical activity     Days per week: None     Minutes per session: None     Stress: None   Relationships     Social connections     Talks on phone: None     Gets together: None     Attends Worship service: None     Active member of club or organization: None     Attends meetings of clubs or organizations: None     Relationship status: None     Intimate partner violence     Fear of current or ex partner: None     Emotionally abused: None     Physically abused: None     Forced sexual activity: None   Other Topics Concern      Service Not Asked     Blood Transfusions Not Asked     Caffeine Concern Yes     Comment: rare     Occupational Exposure Not Asked     Hobby Hazards Not Asked     Sleep Concern No     Stress Concern No     Weight Concern Not Asked     Special Diet Not Asked     Back Care Not Asked     Exercise Yes     Bike Helmet Not Asked     Seat Belt Yes     Self-Exams No     Comment: encouraged     Parent/sibling w/ CABG, MI or angioplasty before 65F 55M? No   Social History Narrative    -Working -         Review of Systems:  Skin:  Negative     Eyes:  Negative    ENT:  Negative    Respiratory:  Negative    Cardiovascular:    Positive for;dizziness  Gastroenterology: Negative    Genitourinary:  not assessed    Musculoskeletal:  Negative    Neurologic:  Positive for migraine headaches  Psychiatric:  Negative    Heme/Lymph/Imm:  Negative    Endocrine:  Positive for thyroid disorder      Recent Lab Results:  LIPID RESULTS:  Lab Results   Component Value Date    CHOL 223 (H) 02/01/2018    HDL 84 02/01/2018     (H) 02/01/2018    TRIG 71 02/01/2018    CHOLHDLRATIO 3.2  06/03/2014       LIVER ENZYME RESULTS:  Lab Results   Component Value Date    AST 19 09/28/2020    ALT 27 09/28/2020       CBC RESULTS:  Lab Results   Component Value Date    WBC 7.3 09/28/2020    RBC 4.57 09/28/2020    HGB 14.6 09/28/2020    HCT 42.8 09/28/2020    MCV 94 09/28/2020    MCH 31.9 09/28/2020    MCHC 34.1 09/28/2020    RDW 12.3 09/28/2020     09/28/2020       BMP RESULTS:  Lab Results   Component Value Date     09/28/2020    POTASSIUM 4.1 09/28/2020    CHLORIDE 104 09/28/2020    CO2 28 09/28/2020    ANIONGAP 4 09/28/2020     (H) 09/28/2020    BUN 16 09/28/2020    CR 0.72 09/28/2020    GFRESTIMATED >90 09/28/2020    GFRESTBLACK >90 09/28/2020    MARCIA 8.6 09/28/2020        A1C RESULTS:  Lab Results   Component Value Date    A1C 8.2 (H) 02/14/2020       INR RESULTS:  No results found for: INR      ECHOCARDIOGRAM  No results found for this or any previous visit (from the past 8760 hour(s)).      Orders Placed This Encounter   Procedures     EKG 12-lead complete w/read - Clinics (future- to be scheduled)     No orders of the defined types were placed in this encounter.    There are no discontinued medications.      Encounter Diagnosis   Name Primary?     Nadia-Parkinson-White (WPW) pattern Yes         CC  Anam Hutchinson MD  [id number]  [place] [id number]

## 2020-11-12 ENCOUNTER — VIRTUAL VISIT (OUTPATIENT)
Dept: PSYCHOLOGY | Facility: CLINIC | Age: 30
End: 2020-11-12
Payer: COMMERCIAL

## 2020-11-12 DIAGNOSIS — F43.22 ADJUSTMENT DISORDER WITH ANXIETY: Primary | ICD-10-CM

## 2020-11-12 PROCEDURE — 90834 PSYTX W PT 45 MINUTES: CPT | Mod: 95 | Performed by: SOCIAL WORKER

## 2020-11-12 ASSESSMENT — ANXIETY QUESTIONNAIRES
6. BECOMING EASILY ANNOYED OR IRRITABLE: NOT AT ALL
2. NOT BEING ABLE TO STOP OR CONTROL WORRYING: SEVERAL DAYS
3. WORRYING TOO MUCH ABOUT DIFFERENT THINGS: SEVERAL DAYS
7. FEELING AFRAID AS IF SOMETHING AWFUL MIGHT HAPPEN: SEVERAL DAYS
5. BEING SO RESTLESS THAT IT IS HARD TO SIT STILL: NOT AT ALL
GAD7 TOTAL SCORE: 4
4. TROUBLE RELAXING: NOT AT ALL
1. FEELING NERVOUS, ANXIOUS, OR ON EDGE: SEVERAL DAYS

## 2020-11-12 ASSESSMENT — PATIENT HEALTH QUESTIONNAIRE - PHQ9: SUM OF ALL RESPONSES TO PHQ QUESTIONS 1-9: 1

## 2020-11-12 NOTE — PROGRESS NOTES
Progress Note    Patient Name: Linda Agosto  Date: 11/12/2020         Service Type: Individual      Session Start Time: 0809  Session End Time: 0859     Session Length: 50  Session #: 4    Attendees: Client    Service Modality:  Video Visit:    Telemedicine Visit: The patient's condition can be safely assessed and treated via synchronous audio and visual telemedicine encounter.      Reason for Telemedicine Visit: Services only offered telehealth    Originating Site (Patient Location): Patient's home    Distant Site (Provider Location): Provider Remote Setting    Consent:  The patient/guardian has verbally consented to: the potential risks and benefits of telemedicine (video visit) versus in person care; bill my insurance or make self-payment for services provided; and responsibility for payment of non-covered services.     Patient would like the video invitation sent by: Send to e-mail at: [email or web address]    Mode of Communication:  Video Conference via [employer]    As the provider I attest to compliance with applicable laws and regulations related to telemedicine.     Treatment Plan Last Reviewed: 10/19/2020 due 1/19/2021  PHQ-9 / TRESA-7 : 1/4     DATA  Interactive Complexity: No  Crisis: No       Progress Since Last Session (Related to Symptoms / Goals / Homework):   Symptoms: Improving decrease TRESA-7 PHQ-9    Homework: Achieved / completed to satisfaction      Episode of Care Goals: Minimal progress - ACTION (Actively working towards change); Intervened by reinforcing change plan / affirming steps taken     Current / Ongoing Stressors and Concerns:   COVID-19, furloughed April to August, difficult unknown, family conflict/communications     Treatment Objective(s) Addressed in This Session:     Patient will demonstrate/report improved family dynamics that can be safely managed in a lower level of care. Absence of persistent conflict in family relationships and reduction in  family conflict to level of comfort and satisfaction of family members as measured by client report for a period of at least 30 days within 6 months as clinically observed and by patient self-report.  Patient will demonstrate and report a level of anxiety that can be managed at a lower level of care.  Absence of persistent anxious mood and report of reduced frequency and intensity of worry and absence of persistent anxious mood to acceptable levels, no panic attacks, report subjective comfort with rumination for a period of 90 days, within 6 months as clinically observed and by patient self-report.     Intervention:  Therapist met with patient to review goals and interventions. Therapist utilized reflected listening as patient gave brief reflection of week. Patient processed feeling less anxious and speaking to her partner about her emotions. Patient processed aging and goals in relationship. Therapist supported patient as she processed and validated patient. Therapist educated patient on anxiety brain (emotional brain) thoughts and the importance of balancing logic with emotion. Therapist coached patient.   Patient presented with an anxious affect. Patient was engaged in session and open to feedback. Patient reported no safety concerns.           ASSESSMENT: Current Emotional / Mental Status (status of significant symptoms):   Risk status (Self / Other harm or suicidal ideation)   Patient denies current fears or concerns for personal safety.   Patient denies current or recent suicidal ideation or behaviors.   Patient denies current or recent homicidal ideation or behaviors.   Patient denies current or recent self injurious behavior or ideation.   Patient denies other safety concerns.   Patient reports there has been no change in risk factors since their last session.     Patient reports there has been no change in protective factors since their last session.     Recommended that patient call 911 or go to the local  ED should there be a change in any of these risk factors.     Appearance:   Appropriate    Eye Contact:   Fair    Psychomotor Behavior: Restless    Attitude:   Cooperative  Interested Friendly Pleasant   Orientation:   All   Speech    Rate / Production: Emotional Talkative    Volume:  Normal    Mood:    Anxious  Sad    Affect:    Worrisome    Thought Content:  Clear    Thought Form:  Coherent    Insight:    Fair      Medication Review:   No changes to current psychiatric medication(s)     Medication Compliance:   Yes     Changes in Health Issues:   None reported     Chemical Use Review:   Substance Use: Chemical use reviewed, no active concerns identified      Tobacco Use: No current tobacco use.      Diagnosis:  1. Adjustment disorder with anxiety        Collateral Reports Completed:   Not Applicable    PLAN: (Patient Tasks / Therapist Tasks / Other)  Manage emotions   Set boundaries with mom  Balance emotion with logic    Rosi Lozano, [facility]  11/12/2020                                                         ______________________________________________________________________    Treatment Plan    Patient's Name: Linda Agosto  YOB: 1990    Date: 10/19/2020    DSM5 Diagnoses: Adjustment Disorders  309.24 (F43.22) With anxiety  Psychosocial / Contextual Factors: COVID-19, furloughed, difficult unknown, family conflict/communications    WHODAS: 17    Referral / Collaboration:  Referral to another professional/service is not indicated at this time.    Anticipated number of session or this episode of care: 20      MeasurableTreatment Goal(s) related to diagnosis / functional impairment(s)  Goal 1: Patient will absence of persistent anxiety mood and report of reduced frequency and intensity of worry and absence of persistent anxious mood to acceptable levels, no panic attacks, report subjective comfort with rumination for a period of 90 days. Within 6 months as clinically observed and by patient  self-report.    I will know I've met my goal when I'm faced with triggers I have the proper goals to manage those feelings.      Objective #A (Patient Action)    Patient will demonstrate and report a level of anxiety that can be managed at a lower level of care.  Absence of persistent anxious mood and report of reduced frequency and intensity of worry and absence of persistent anxious mood to acceptable levels, no panic attacks, report subjective comfort with rumination for a period of 90 days, within 6 months as clinically observed and by patient self-report.  Status: New - Date: 10/19/2020     Intervention(s)  Therapist will provide individual therapy to identify triggers to anxiety, gain feedback on helpful coping tools and thought-reframing techniques, and identify preferred way of being. Tx to include discuss current stressors and interpersonal conflicts and how to cope with these, coaching, diagnostic testing , referral for medication as indicated, use prescribed medication, cognitive restructuring, interpersonal,   family therapy, supportive therapy services.        Goal 2: Patient will absence of persistent conflict in family relationships and reduction in family conflict to level of comfort and satisfaction of family members as measured by client report for a period of at least 30 days within 6 months as clinically observed and by patient self-report    I will know I've met my goal when can communicate my boundary without worrying about their feelings.      Objective #A (Patient Action)    Status: New - Date: 10/19/2020     Patient will demonstrate/report improved family dynamics that can be safely managed in a lower level of care. Absence of persistent conflict in family relationships and reduction in family conflict to level of comfort and satisfaction of family members as measured by client report for a period of at least 30 days within 6 months as clinically observed and by patient  self-report.    Intervention(s)  Therapist will provide individual therapy to process stressors within family dynamics, identify areas within her control, and identify preferred way of being within family relationships.  Tx to discuss current stressors and interpersonal conflicts and how to cope with these, coaching, diagnostic testing, referral for medication as indicated, use prescribed medication, cognitive restructuring, interpersonal family therapy, supportive therapy services.          Patient has reviewed and agreed to the above plan.      Rosi Lozano, [facility]  October 19, 2020

## 2020-11-13 ASSESSMENT — ANXIETY QUESTIONNAIRES: GAD7 TOTAL SCORE: 4

## 2020-12-23 ENCOUNTER — VIRTUAL VISIT (OUTPATIENT)
Dept: PSYCHOLOGY | Facility: CLINIC | Age: 30
End: 2020-12-23
Payer: COMMERCIAL

## 2020-12-23 DIAGNOSIS — F43.22 ADJUSTMENT DISORDER WITH ANXIETY: Primary | ICD-10-CM

## 2020-12-23 PROCEDURE — 90834 PSYTX W PT 45 MINUTES: CPT | Mod: 95 | Performed by: SOCIAL WORKER

## 2020-12-23 ASSESSMENT — ANXIETY QUESTIONNAIRES
5. BEING SO RESTLESS THAT IT IS HARD TO SIT STILL: NOT AT ALL
7. FEELING AFRAID AS IF SOMETHING AWFUL MIGHT HAPPEN: SEVERAL DAYS
6. BECOMING EASILY ANNOYED OR IRRITABLE: NOT AT ALL
1. FEELING NERVOUS, ANXIOUS, OR ON EDGE: SEVERAL DAYS
GAD7 TOTAL SCORE: 4
4. TROUBLE RELAXING: SEVERAL DAYS
3. WORRYING TOO MUCH ABOUT DIFFERENT THINGS: NOT AT ALL
2. NOT BEING ABLE TO STOP OR CONTROL WORRYING: SEVERAL DAYS

## 2020-12-23 ASSESSMENT — PATIENT HEALTH QUESTIONNAIRE - PHQ9: SUM OF ALL RESPONSES TO PHQ QUESTIONS 1-9: 2

## 2020-12-23 NOTE — PROGRESS NOTES
Progress Note    Patient Name: Linda Agosto  Date: 12/23/2020         Service Type: Individual      Session Start Time: 0903  Session End Time: 0953     Session Length: 50  Session #: 5    Attendees: Client    Service Modality:  Video Visit:    Telemedicine Visit: The patient's condition can be safely assessed and treated via synchronous audio and visual telemedicine encounter.      Reason for Telemedicine Visit: Services only offered telehealth    Originating Site (Patient Location): Patient's home    Distant Site (Provider Location): Provider Remote Setting    Consent:  The patient/guardian has verbally consented to: the potential risks and benefits of telemedicine (video visit) versus in person care; bill my insurance or make self-payment for services provided; and responsibility for payment of non-covered services.     Patient would like the video invitation sent by: Send to e-mail at: [email or web address]    Mode of Communication:  Video Conference via [employer]    As the provider I attest to compliance with applicable laws and regulations related to telemedicine.     Treatment Plan Last Reviewed: 10/19/2020 due 1/19/2021  PHQ-9 / TRESA-7 : 4/4     DATA  Interactive Complexity: No  Crisis: No       Progress Since Last Session (Related to Symptoms / Goals / Homework):   Symptoms: Worsening increase PHQ-9    Homework: Achieved / completed to satisfaction      Episode of Care Goals: Minimal progress - ACTION (Actively working towards change); Intervened by reinforcing change plan / affirming steps taken     Current / Ongoing Stressors and Concerns:   COVID-19, furloughed April to August, difficult unknown, family conflict/communications     Treatment Objective(s) Addressed in This Session:     Patient will demonstrate/report improved family dynamics that can be safely managed in a lower level of care. Absence of persistent conflict in family relationships and reduction in family  conflict to level of comfort and satisfaction of family members as measured by client report for a period of at least 30 days within 6 months as clinically observed and by patient self-report.  Patient will demonstrate and report a level of anxiety that can be managed at a lower level of care.  Absence of persistent anxious mood and report of reduced frequency and intensity of worry and absence of persistent anxious mood to acceptable levels, no panic attacks, report subjective comfort with rumination for a period of 90 days, within 6 months as clinically observed and by patient self-report.     Intervention:  Therapist met with patient to review goals and interventions. Therapist utilized reflected listening as patient gave brief reflection of week. Patient processed trauma in March. Therapist supported patient as she processed. Therapist educated patient on trauma and reviewed different techniques of working through trauma. Therapist encouraged patient to write letter to parents on her experience. Patient presented with an anxious affect. Patient was engaged in session and open to feedback. Patient reported no safety concerns.           ASSESSMENT: Current Emotional / Mental Status (status of significant symptoms):   Risk status (Self / Other harm or suicidal ideation)   Patient denies current fears or concerns for personal safety.   Patient denies current or recent suicidal ideation or behaviors.   Patient denies current or recent homicidal ideation or behaviors.   Patient denies current or recent self injurious behavior or ideation.   Patient denies other safety concerns.   Patient reports there has been no change in risk factors since their last session.     Patient reports there has been no change in protective factors since their last session.     Recommended that patient call 911 or go to the local ED should there be a change in any of these risk factors.     Appearance:   Appropriate    Eye Contact:   Fair     Psychomotor Behavior: Restless    Attitude:   Cooperative  Interested Friendly Pleasant   Orientation:   All   Speech    Rate / Production: Emotional Talkative    Volume:  Normal    Mood:    Anxious  Sad    Affect:    Worrisome    Thought Content:  Clear    Thought Form:  Coherent    Insight:    Fair      Medication Review:   No changes to current psychiatric medication(s)     Medication Compliance:   Yes     Changes in Health Issues:   None reported     Chemical Use Review:   Substance Use: Chemical use reviewed, no active concerns identified      Tobacco Use: No current tobacco use.      Diagnosis:  1. Adjustment disorder with anxiety        Collateral Reports Completed:   Not Applicable    PLAN: (Patient Tasks / Therapist Tasks / Other)   write letter to parents on her experience.      Rosi Lozano, [facility]  12/23/2020                                                         ______________________________________________________________________    Treatment Plan    Patient's Name: Linda Agosto  YOB: 1990    Date: 10/19/2020    DSM5 Diagnoses: Adjustment Disorders  309.24 (F43.22) With anxiety  Psychosocial / Contextual Factors: COVID-19, furloughed, difficult unknown, family conflict/communications    WHODAS: 17    Referral / Collaboration:  Referral to another professional/service is not indicated at this time.    Anticipated number of session or this episode of care: 20      MeasurableTreatment Goal(s) related to diagnosis / functional impairment(s)  Goal 1: Patient will absence of persistent anxiety mood and report of reduced frequency and intensity of worry and absence of persistent anxious mood to acceptable levels, no panic attacks, report subjective comfort with rumination for a period of 90 days. Within 6 months as clinically observed and by patient self-report.    I will know I've met my goal when I'm faced with triggers I have the proper goals to manage those feelings.      Objective  #A (Patient Action)    Patient will demonstrate and report a level of anxiety that can be managed at a lower level of care.  Absence of persistent anxious mood and report of reduced frequency and intensity of worry and absence of persistent anxious mood to acceptable levels, no panic attacks, report subjective comfort with rumination for a period of 90 days, within 6 months as clinically observed and by patient self-report.  Status: New - Date: 10/19/2020     Intervention(s)  Therapist will provide individual therapy to identify triggers to anxiety, gain feedback on helpful coping tools and thought-reframing techniques, and identify preferred way of being. Tx to include discuss current stressors and interpersonal conflicts and how to cope with these, coaching, diagnostic testing , referral for medication as indicated, use prescribed medication, cognitive restructuring, interpersonal,   family therapy, supportive therapy services.        Goal 2: Patient will absence of persistent conflict in family relationships and reduction in family conflict to level of comfort and satisfaction of family members as measured by client report for a period of at least 30 days within 6 months as clinically observed and by patient self-report    I will know I've met my goal when can communicate my boundary without worrying about their feelings.      Objective #A (Patient Action)    Status: New - Date: 10/19/2020     Patient will demonstrate/report improved family dynamics that can be safely managed in a lower level of care. Absence of persistent conflict in family relationships and reduction in family conflict to level of comfort and satisfaction of family members as measured by client report for a period of at least 30 days within 6 months as clinically observed and by patient self-report.    Intervention(s)  Therapist will provide individual therapy to process stressors within family dynamics, identify areas within her control, and  identify preferred way of being within family relationships.  Tx to discuss current stressors and interpersonal conflicts and how to cope with these, coaching, diagnostic testing, referral for medication as indicated, use prescribed medication, cognitive restructuring, interpersonal family therapy, supportive therapy services.          Patient has reviewed and agreed to the above plan.      Rosi Lozano, [facility]  October 19, 2020

## 2020-12-24 ASSESSMENT — ANXIETY QUESTIONNAIRES: GAD7 TOTAL SCORE: 4

## 2021-01-04 DIAGNOSIS — F41.9 ANXIETY: ICD-10-CM

## 2021-01-15 ENCOUNTER — HEALTH MAINTENANCE LETTER (OUTPATIENT)
Age: 31
End: 2021-01-15

## 2021-01-18 NOTE — PROGRESS NOTES
Linda is a 30 year old  female who presents for annual exam.     Besides routine health maintenance, she has no other health concerns today .    HPI:  The patient's PCP is Yvette Rodas PA-C.      Off OCPs x 6mo. Doing well overall.     Has recently developed rashes on b/l breasts. Had moved and water changed Hx of eczema. Using aquaphor      Considering plans for family. Not ready. Has BF. Nervous about type 1 DM. Working with Endo        GYNECOLOGIC HISTORY:    Patient's last menstrual period was 2021 (exact date).    Regular menses? yes  Menses every 30 days.  Length of menses: 4 days    Her current contraception method is: condoms.  She  reports that she has never smoked. She has never used smokeless tobacco.    Patient is sexually active.  STD testing offered?  Declined  Last PHQ-9 score on record =   PHQ-9 SCORE 2021   PHQ-9 Total Score -   PHQ-9 Total Score MyChart -   PHQ-9 Total Score 1     Last GAD7 score on record =   TRESA-7 SCORE 2021   Total Score -   Total Score -   Total Score 3     Alcohol Score = 3    HEALTH MAINTENANCE:  Cholesterol:    Recent Labs   Lab Test 18  0820 16  0815 14  1445 14  1445 13  1009   CHOL 223* 215*   < > 154 149   HDL 84 72   < > 49* 59   * 128*   < > 96 76   TRIG 71 77   < > 44 70   CHOLHDLRATIO  --   --   --  3.2 3.0    < > = values in this interval not displayed.     Last Mammo: Not applicable, Result: Not applicable, Next Mammo: Due at age 42   Pap:   Lab Results   Component Value Date    PAP NIL 2017    PAP NIL 2014     Colonoscopy:  NA, Result: Not applicable, Next Colonoscopy: Due age 47  Dexa:  NA    Health maintenance updated:  yes    HISTORY:  OB History    Para Term  AB Living   0 0 0 0 0 0   SAB TAB Ectopic Multiple Live Births   0 0 0 0 0       Patient Active Problem List   Diagnosis     Allergic rhinitis     Allergic state     Type 1 diabetes, HbA1c goal < 7% (H)      Other specified hypothyroidism     Anxiety     Hyperlipidemia with target LDL less than 70     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 (H)     Common migraine     Past Surgical History:   Procedure Laterality Date     APPENDECTOMY  8/07    dr deshpande     PE TUBES  1996      Social History     Tobacco Use     Smoking status: Never Smoker     Smokeless tobacco: Never Used   Substance Use Topics     Alcohol use: Yes     Comment: sometimes couple on weekends      Problem (# of Occurrences) Relation (Name,Age of Onset)    Genetic Disorder (1) Paternal Grandfather: kidney    Neurologic Disorder (1) Maternal Grandmother: dementia       Negative family history of: Family History Negative            Current Outpatient Medications   Medication Sig     blood glucose (VALE CONTOUR NEXT) test strip Patient tests 5 times/day, Contour Next test strips DAW1     blood glucose monitoring (VALE MICROLET) lancets Patient tests 8 times/day     Continuous Blood Gluc Sensor (FREESTYLE BEKAH 2 SENSOR SYSTM) MISC 1 each every 14 days Use with Freestyle Bekah 2 reader to monitor blood sugars.     continuous blood glucose monitoring (Xenon ArcSTYLE BEKAH) sensor For use with Freestyle Bekah Flash  for continuous monitioring of blood glucose levels. Replace sensor every 14 days.     insulin aspart (NOVOLOG PEN) 100 UNIT/ML pen Inject 3 to 10 units subcutaneous at mealtimes as directed, total daily dose approx 30 units     insulin aspart (NOVOLOG VIAL) 100 UNITS/ML vial USE WITH INSULIN PUMP FOR TOTAL DAILY DOSE OF 75 UNITS     insulin glargine (BASAGLAR KWIKPEN) 100 UNIT/ML injection INJ 21 UNITS SC D     insulin pen needle (BD CLEOPATRA U/F) 32G X 4 MM miscellaneous Use 4 pen needles daily or as directed.     levothyroxine (SYNTHROID/LEVOTHROID) 112 MCG tablet Take 1 tablet (112 mcg) by mouth daily     sertraline (ZOLOFT) 50 MG tablet Take 1 tablet (50 mg) by mouth daily      "simvastatin (ZOCOR) 40 MG tablet Take 1 tablet (40 mg) by mouth At Bedtime     SUMAtriptan (IMITREX) 50 MG tablet Take 1 tablet (50 mg) by mouth at onset of headache for migraine May repeat in 2 hours. Max 4 tablets/24 hours.     triamcinolone (KENALOG) 0.1 % external cream Apply topically 2 times daily to affected areas on fingers for 10-14 days. Not for use on face nor groin.     No current facility-administered medications for this visit.      Allergies   Allergen Reactions     Penicillins Rash     augmentin & pcn     Sulfa Drugs Hives       Past medical, surgical, social and family histories were reviewed and updated in EPIC.    ROS:   12 point review of systems negative other than symptoms noted below or in the HPI.  No urinary frequency or dysuria, bladder or kidney problems    EXAM:  /78   Pulse 80   Ht 1.676 m (5' 6\")   Wt 90.8 kg (200 lb 3.2 oz)   LMP 01/20/2021 (Exact Date)   BMI 32.31 kg/m     BMI: Body mass index is 32.31 kg/m .    PHYSICAL EXAM:  Constitutional:   Appearance: Well nourished, well developed, alert, in no acute distress  Neck:  Lymph Nodes:  No lymphadenopathy present    Thyroid:  Gland size normal, nontender, no nodules or masses present  on palpation  Chest:  Respiratory Effort:  Breathing unlabored  Cardiovascular:    Heart: Auscultation:  Regular rate, normal rhythm, no murmurs present  Breasts: Inspection of Breasts:  No lymphadenopathy present., Palpation of Breasts and Axillae:  No masses present on palpation, no breast tenderness., Axillary Lymph Nodes:  No lymphadenopathy present., No nodularity, asymmetry or nipple discharge bilaterally. and flaky slighty erythematous flat rash of b/l breasts, R>L  Gastrointestinal:   Abdominal Examination:  Abdomen nontender to palpation, tone normal without rigidity or guarding, no masses present, umbilicus without lesions   Liver and Spleen:  No hepatomegaly present, liver nontender to palpation    Hernias:  No hernias " present  Lymphatic: Lymph Nodes:  No other lymphadenopathy present  Skin:  General Inspection:  No rashes present, no lesions present, no areas of  discoloration  Neurologic:    Mental Status:  Oriented X3.  Normal strength and tone, sensory exam                grossly normal, mentation intact and speech normal.    Psychiatric:   Mentation appears normal and affect normal/bright.         Pelvic Exam:  External Genitalia:     Normal appearance for age, no discharge present, no tenderness present, no inflammatory lesions present, color normal  Vagina:     Normal vaginal vault without central or paravaginal defects, no discharge present, no inflammatory lesions present, no masses present  Bladder:     Nontender to palpation  Urethra:   Urethral Body:  Urethra palpation normal, urethra structural support normal   Urethral Meatus:  No erythema or lesions present  Cervix:     Appearance healthy, no lesions present, nontender to palpation, no bleeding present  Uterus:     Uterus: firm, normal sized and nontender, midplane in position.   Adnexa:     No adnexal tenderness present, no adnexal masses present  Perineum:     Perineum within normal limits, no evidence of trauma, no rashes or skin lesions present  Anus:     Anus within normal limits, no hemorrhoids present  Inguinal Lymph Nodes:     No lymphadenopathy present  Pubic Hair:     Normal pubic hair distribution for age  Genitalia and Groin:     No rashes present, no lesions present, no areas of discoloration, no masses present      COUNSELING:   Reviewed preventive health counseling, as reflected in patient instructions    BMI: Body mass index is 32.31 kg/m .  Weight management plan: Discussed healthy diet and exercise guidelines    ASSESSMENT:  30 year old female with satisfactory annual exam.    ICD-10-CM    1. Encounter for gynecological examination without abnormal finding  Z01.419 Pap imaged thin layer screen reflex to HPV if ASCUS - recommended age 25 - 29 years    2. 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 Reviewed guidelines-pap q 3yrs until age 30 when co-testing q 5 years.  -Breast self awareness discussed. Age 40 for mammogram.  -Discussed exercise-making plan, strength training. Nutrition encouraged.  -Colonoscopy age 45  -Osteoporosis prevention discussed.  Rec PNV  -Discussed pregnancy planning-namely risks of Type 1 Dm in pregnancy and need to ensure low/controlled A1c and how risks in pregnancy managed.   Questions answered.   -Breast rash. Discussed topical hydrocortisone bid, aquaphor. Discussed lowering environmental factors/irritants. F/U if not improving. May need stronger steriod vs biopsy.  -Return one year for next annual exam          Sierra Treviño Masters, DO

## 2021-01-25 ENCOUNTER — OFFICE VISIT (OUTPATIENT)
Dept: OBGYN | Facility: CLINIC | Age: 31
End: 2021-01-25
Payer: COMMERCIAL

## 2021-01-25 VITALS
HEIGHT: 66 IN | DIASTOLIC BLOOD PRESSURE: 78 MMHG | SYSTOLIC BLOOD PRESSURE: 118 MMHG | WEIGHT: 200.2 LBS | BODY MASS INDEX: 32.17 KG/M2 | HEART RATE: 80 BPM

## 2021-01-25 DIAGNOSIS — E10.3292 TYPE 1 DIABETES MELLITUS WITH MILD NONPROLIFERATIVE RETINOPATHY OF LEFT EYE WITHOUT MACULAR EDEMA (H): ICD-10-CM

## 2021-01-25 DIAGNOSIS — Z01.419 ENCOUNTER FOR GYNECOLOGICAL EXAMINATION WITHOUT ABNORMAL FINDING: Primary | ICD-10-CM

## 2021-01-25 PROCEDURE — 99395 PREV VISIT EST AGE 18-39: CPT | Performed by: OBSTETRICS & GYNECOLOGY

## 2021-01-25 PROCEDURE — G0145 SCR C/V CYTO,THINLAYER,RESCR: HCPCS | Performed by: OBSTETRICS & GYNECOLOGY

## 2021-01-25 ASSESSMENT — ANXIETY QUESTIONNAIRES
2. NOT BEING ABLE TO STOP OR CONTROL WORRYING: NOT AT ALL
3. WORRYING TOO MUCH ABOUT DIFFERENT THINGS: SEVERAL DAYS
5. BEING SO RESTLESS THAT IT IS HARD TO SIT STILL: NOT AT ALL
GAD7 TOTAL SCORE: 3
1. FEELING NERVOUS, ANXIOUS, OR ON EDGE: SEVERAL DAYS
7. FEELING AFRAID AS IF SOMETHING AWFUL MIGHT HAPPEN: SEVERAL DAYS
IF YOU CHECKED OFF ANY PROBLEMS ON THIS QUESTIONNAIRE, HOW DIFFICULT HAVE THESE PROBLEMS MADE IT FOR YOU TO DO YOUR WORK, TAKE CARE OF THINGS AT HOME, OR GET ALONG WITH OTHER PEOPLE: NOT DIFFICULT AT ALL
6. BECOMING EASILY ANNOYED OR IRRITABLE: NOT AT ALL

## 2021-01-25 ASSESSMENT — PATIENT HEALTH QUESTIONNAIRE - PHQ9
SUM OF ALL RESPONSES TO PHQ QUESTIONS 1-9: 1
5. POOR APPETITE OR OVEREATING: NOT AT ALL

## 2021-01-25 ASSESSMENT — MIFFLIN-ST. JEOR: SCORE: 1654.85

## 2021-01-25 NOTE — PATIENT INSTRUCTIONS
Folate or folic acid 400mcg (minimum) daily    -Daily total calcium intake (between food/supplements) should be 1000mg which equates to 3-4 servings calcium containing food per day; VItamin D 1000IU.   Foods rich in calcium are: milk, cheese, yogurt, seafood, sardines and canned salmon, leafy green vegetables such as maurisio greens, spinach and kale, beans and lentils, almonds, seeds (poppy, sesame, celery, aubrey), rhubarb, dried fruit such as figs, whey protein, tofu and edamame, amaranth, other foods with added calcium such as orange juice and some cereals.   If adequate amount not taken in diet, then a supplement may be needed.     -I also recommend increasing your dietary fiber by starting Metamucil (powder mixed in glass of water) twice daily

## 2021-01-26 ASSESSMENT — ANXIETY QUESTIONNAIRES: GAD7 TOTAL SCORE: 3

## 2021-02-01 LAB
COPATH REPORT: NORMAL
PAP: NORMAL

## 2021-02-17 ENCOUNTER — MYC MEDICAL ADVICE (OUTPATIENT)
Dept: ENDOCRINOLOGY | Facility: CLINIC | Age: 31
End: 2021-02-17

## 2021-02-17 DIAGNOSIS — E10.9 TYPE 1 DIABETES MELLITUS WITHOUT COMPLICATION (H): Primary | ICD-10-CM

## 2021-02-17 RX ORDER — PROCHLORPERAZINE 25 MG/1
1 SUPPOSITORY RECTAL CONTINUOUS PRN
Qty: 3 EACH | Refills: 5 | Status: SHIPPED | OUTPATIENT
Start: 2021-02-17 | End: 2022-03-10

## 2021-02-17 RX ORDER — PROCHLORPERAZINE 25 MG/1
1 SUPPOSITORY RECTAL CONTINUOUS PRN
Qty: 1 EACH | Refills: 3 | Status: SHIPPED | OUTPATIENT
Start: 2021-02-17 | End: 2021-05-18

## 2021-02-22 ENCOUNTER — MYC MEDICAL ADVICE (OUTPATIENT)
Dept: ENDOCRINOLOGY | Facility: CLINIC | Age: 31
End: 2021-02-22

## 2021-03-21 ENCOUNTER — HEALTH MAINTENANCE LETTER (OUTPATIENT)
Age: 31
End: 2021-03-21

## 2021-04-02 ENCOUNTER — VIRTUAL VISIT (OUTPATIENT)
Dept: ENDOCRINOLOGY | Facility: CLINIC | Age: 31
End: 2021-04-02
Payer: COMMERCIAL

## 2021-04-02 DIAGNOSIS — E06.3 HYPOTHYROIDISM DUE TO HASHIMOTO'S THYROIDITIS: ICD-10-CM

## 2021-04-02 DIAGNOSIS — E10.9 TYPE 1 DIABETES MELLITUS WITHOUT COMPLICATION (H): Primary | ICD-10-CM

## 2021-04-02 DIAGNOSIS — E10.9 TYPE 1 DIABETES MELLITUS WITHOUT COMPLICATION (H): ICD-10-CM

## 2021-04-02 DIAGNOSIS — Z96.41 INSULIN PUMP STATUS: ICD-10-CM

## 2021-04-02 PROCEDURE — 95251 CONT GLUC MNTR ANALYSIS I&R: CPT | Performed by: INTERNAL MEDICINE

## 2021-04-02 PROCEDURE — 99214 OFFICE O/P EST MOD 30 MIN: CPT | Mod: 95 | Performed by: INTERNAL MEDICINE

## 2021-04-02 NOTE — LETTER
4/2/2021         RE: Linda Agosto  [address] [address]        Dear Colleague,    Thank you for referring your patient, Linda Agosto, to the [facility]. Please see a copy of my visit note below.    Linda is a 30 year old who is being evaluated via a billable video visit.      How would you like to obtain your AVS? Verbally Reviewed  If the video visit is dropped, the invitation should be resent by: Cellphone  Will anyone else be joining your video visit? No      Video Start Time: 1:35 pm      Recent issues:  Diabetes follow-up evaluation  Now using the DexcomG6 sensor since 2/2021, likes it  Noticed higher nighttime SG, changed her basal rates ~2 weeks ago  Feeling well overall, no other new health issues reported           2003. Diagnosis of diabetes mellitus, age 13, living in [facility]  Had acute illness requiring hospitalization at [facility]  Began insulin injections, using Novolog and Lantus insulin  [place] carb counting method, gram estimates  On MDI treatment plan for approx 2 years, then changed to pump use  Previous medical evaluations with Dr. Yash Barcenas/[facility] Andreia Callahan  2005. Began use of [place] pump  2012. Changed to Medtronic pump  Subsequently using Medtronic 723 Paradigm pump  2012. Tried wearing CGMS sensor, but uncomfortable     12/8/14. Initial consult with me at my former clinic ([facility])  Continued pump management  Was not interested in CGMS use     Previous FV hgbA1c levels include:              Lab Test 02/05/18 10/10/17 06/21/17 02/01/17 11/16/16  0815   A1C 7.4* 7.8* 8.1* 7.3* 8.2*      1/2018. Upgraded to Medtronic 670G pump              Novolog in pump     Tried Medtronic Guardian sensor, recalls frequent low glucose alarms              Wore sensor in manual mode              Didn't like it, discontinued use     ~11/2018. Wore diagnostic LibrePro CGMS  Subsequently obtained LibrePersonal CGMS, not wearing past year     Previous pump settings:       Recent  pump Carelink data:  Info not available today    Recent Zhou CGMS data:         Recent FV labs include:  Lab Results   Component Value Date    A1C 8.2 (H) 02/14/2020     09/28/2020    POTASSIUM 4.1 09/28/2020    CHLORIDE 104 09/28/2020    CO2 28 09/28/2020    ANIONGAP 4 09/28/2020     (H) 09/28/2020    BUN 16 09/28/2020    CR 0.72 09/28/2020    GFRESTIMATED >90 09/28/2020    GFRESTBLACK >90 09/28/2020    MARCIA 8.6 09/28/2020    CHOL 223 (H) 02/01/2018    TRIG 71 02/01/2018    HDL 84 02/01/2018     (H) 02/01/2018    NHDL 139 (H) 02/01/2018    UCRR 36 12/05/2019    MICROL <5 12/05/2019    UMALCR Unable to calculate due to low value 12/05/2019     Hyperlipidemia:  Takes simvastatin medication  DM Complications:  None known        Thyroid:  2013. Diagnosis of hypothyroidism  Previous FV thyroid labs include:    Treatment with levothyroxine medication  Previously on stable dose as levothyroxine 0.088 mg daily  Recent FV labs include:  Lab Results   Component Value Date    TSH 6.01 (H) 02/14/2020    T4 1.12 02/14/2020     (H) 12/19/2013     Current dose:  Levothyroxine 0.112 mg daily        Single, works at [employer] with , in [place]  Finishing her JANAY at [employer]  Has previously seen Dr. Ellen Burdick/ [place]      ROS: 10 point ROS neg other than the symptoms noted above in the HPI.     GENERAL: mild fatigue, wt gain; denies fevers, chills, night sweats.   HEENT: no dysphagia, odonophagia, diplopia, neck pain  THYROID:  no apparent hyper or hypothyroid symptoms  CV: no chest pain, pressure, palpitations  LUNGS: no SOB, LANG, cough, wheezing   ABDOMEN: no diarrhea, constipation, abdominal pain  EXTREMITIES: no rashes, ulcers, edema  NEUROLOGY: no headaches, denies changes in vision, tingling, extremitiy numbness   MSK: no muscle aches or pains, weakness  SKIN: no rashes or lesions  : menses regular  PSYCH:  stable mood, no significant anxiety or  depression  ENDOCRINE: no heat or cold intolerance     Physical Exam (visual exam)  VS:  no vital signs taken for video visit  CONSTITUTIONAL: healthy, alert and NAD, well dressed, answering questions appropriately  ENT: no nose swelling or nasal discharge, mouth redness or gum changes.  EYES: eyes grossly normal to inspection, conjunctivae and sclerae normal, no exophthalmos or proptosis  THYROID:  no apparent nodules or goiter  LUNGS: no audible wheeze, cough or visible cyanosis, no visible retractions or increased work of breathing  ABDOMEN: abdomen not evaluated  EXTREMITIES: no hand tremors, limited exam  NEUROLOGY: CN grossly intact, mentation intact and speech normal   SKIN:  no apparent skin lesions, rash, or edema with visualized skin appearance  PSYCH: mentation appears normal, affect normal/bright, judgement and insight intact,   normal speech and appearance well groomed     LABS:     All pertinent notes, labs, and images personally reviewed by me.      A/P:       Encounter Diagnoses   Name      Type 1 diabetes mellitus without complication (H)      Insulin pump status      Hypothyroidism due to Hashimoto's thyroiditis         Comments:  Reviewed health history, diabetes and thyroid issues  Reviewed and interpreted tests that I previously ordered.   Ordered appropriate tests for the endocrinology disease management.    Management options discussed and implemented after shared medical decision making with the patient.       Plan:  Reviewed the overall T1DM management and insulin pump use.  Discussed optimal BG testing to assess glucose trends.  We reviewed insulin pump settings, basal rate and bolus dosing  Use of automated pump bolus dosing for meal/snack carb & correction dosing  Reviewed option of uploading pump to [employer] website  Reviewed recent DexcomG6 CGMS glucose trends, in detail     Recommend:  Continue insulin pump and diabetes management plan.  Pump setting changes:   Bolus ICR: 6a  6.5 to 6, 5p 5.5 to 5/2    Continue use of the DexcomG6 CGM sensor   Use SG value with the Medtronic pump bolus wizard dosing   Will update her DexcomG6 Rx's to the  Specialty Pharmacy   Created a PA letter today, to see if her insurance will now cover this device  Goal target premeal BG  mg/dl  Discussed future option of changing from the Medtronic pump to the Tandem TSlim with Control IQ   She has a friend using the Tandem system   Will review the Tandem pump application process when her Medtronic warranty expires... 12/2021?  Continue the current  levothyroxine 0.112 mg daily  Continue current simvastatin daily dosing  Arrange annual dilated eye exam, fasting lipid panel testing.  Contact me if questions/concerns about diabetes and thyroid management     Addressed patient's questions today.    Total time spent in with the patient evaluation:  25 min  Additional time spent reviewing pertinent lab tests and chart notes, and documentation:  5 min    Follow-up:  7/2021    ANTHONY Jimenez MD, MS  Endocrinology  [facility]          Video-Visit Details    Type of service:  Video Visit    Video End Time:  2:00 pm    Originating Location (pt. Location): home    Distant Location (provider location):  [facility]/[place]     Platform used for Video Visit: Well                Again, thank you for allowing me to participate in the care of your patient.        Sincerely,        Santy Jimenez MD

## 2021-04-02 NOTE — LETTER
2021      Linda Agosto  [address] [address]        To Whom It May Concern,     I have previously seen Ms Linda Agosto ( 89) for evaluation of Type 1 diabetes mellitus.  She uses a Medtronic insulin pump and recently added the DexcomG6 continuous glucose sensor to her management plan.    The DexcomG6 sensor and transmitter system measures the sensor glucose level every 5 minutes and provides glucose levels and also trend analysis.  I has high and low glucose alarms to alert her to problems with extreme glucose levels.  The DexcomG6 device is extremely helpful and helps her achieve more ideal glucose control and minimize risk or hyper/hypoglycemia, also diabetes complications.    The DexcomG6 system is medically necessary.  Please approve its use with her insurance plan.  Contact me if questions.      ANTHONY Jimenez MD, MS  [facility]

## 2021-04-02 NOTE — PROGRESS NOTES
Linda is a 30 year old who is being evaluated via a billable video visit.      How would you like to obtain your AVS? Verbally Reviewed  If the video visit is dropped, the invitation should be resent by: Cellphone  Will anyone else be joining your video visit? No      Video Start Time: 1:35 pm      Recent issues:  Diabetes follow-up evaluation  Now using the DexcomG6 sensor since 2/2021, likes it  Noticed higher nighttime SG, changed her basal rates ~2 weeks ago  Feeling well overall, no other new health issues reported           2003. Diagnosis of diabetes mellitus, age 13, living in [facility]  Had acute illness requiring hospitalization at [facility]  Began insulin injections, using Novolog and Lantus insulin  [place] carb counting method, gram estimates  On MDI treatment plan for approx 2 years, then changed to pump use  Previous medical evaluations with Dr. Yash Barcenas/[facility] Andreia Callahan  2005. Began use of [place] pump  2012. Changed to Medtronic pump  Subsequently using Medtronic 723 Paradigm pump  2012. Tried wearing CGMS sensor, but uncomfortable     12/8/14. Initial consult with me at my former clinic ([facility])  Continued pump management  Was not interested in CGMS use     Previous FV hgbA1c levels include:              Lab Test 02/05/18 10/10/17 06/21/17 02/01/17 11/16/16  0815   A1C 7.4* 7.8* 8.1* 7.3* 8.2*      1/2018. Upgraded to Medtronic 670G pump              Novolog in pump     Tried Medtronic Guardian sensor, recalls frequent low glucose alarms              Wore sensor in manual mode              Didn't like it, discontinued use     ~11/2018. Wore diagnostic LibrePro CGMS  Subsequently obtained LibrePersonal CGMS, not wearing past year     Previous pump settings:       Recent pump Carelink data:  Info not available today    Recent Zhou CGMS data:         Recent FV labs include:  Lab Results   Component Value Date    A1C 8.2 (H) 02/14/2020     09/28/2020    POTASSIUM 4.1 09/28/2020    CHLORIDE 104  09/28/2020    CO2 28 09/28/2020    ANIONGAP 4 09/28/2020     (H) 09/28/2020    BUN 16 09/28/2020    CR 0.72 09/28/2020    GFRESTIMATED >90 09/28/2020    GFRESTBLACK >90 09/28/2020    MARCIA 8.6 09/28/2020    CHOL 223 (H) 02/01/2018    TRIG 71 02/01/2018    HDL 84 02/01/2018     (H) 02/01/2018    NHDL 139 (H) 02/01/2018    UCRR 36 12/05/2019    MICROL <5 12/05/2019    UMALCR Unable to calculate due to low value 12/05/2019     Hyperlipidemia:  Takes simvastatin medication  DM Complications:  None known        Thyroid:  2013. Diagnosis of hypothyroidism  Previous FV thyroid labs include:    Treatment with levothyroxine medication  Previously on stable dose as levothyroxine 0.088 mg daily  Recent FV labs include:  Lab Results   Component Value Date    TSH 6.01 (H) 02/14/2020    T4 1.12 02/14/2020     (H) 12/19/2013     Current dose:  Levothyroxine 0.112 mg daily        Single, works at [employer] with , in [place]  Finishing her JANAY at [employer]  [place] [facility] [place] Ellen Burdick[facility] [place]      ROS: 10 point ROS neg other than the symptoms noted above in the HPI.     GENERAL: mild fatigue, wt gain; denies fevers, chills, night sweats.   HEENT: no dysphagia, odonophagia, diplopia, neck pain  THYROID:  no apparent hyper or hypothyroid symptoms  CV: no chest pain, pressure, palpitations  LUNGS: no SOB, LANG, cough, wheezing   ABDOMEN: no diarrhea, constipation, abdominal pain  EXTREMITIES: no rashes, ulcers, edema  NEUROLOGY: no headaches, denies changes in vision, tingling, extremitiy numbness   MSK: no muscle aches or pains, weakness  SKIN: no rashes or lesions  : menses regular  PSYCH:  stable mood, no significant anxiety or depression  ENDOCRINE: no heat or cold intolerance     Physical Exam (visual exam)  VS:  no vital signs taken for video visit  CONSTITUTIONAL: healthy, alert and NAD, well dressed, answering questions appropriately  ENT: no nose swelling or nasal  discharge, mouth redness or gum changes.  EYES: eyes grossly normal to inspection, conjunctivae and sclerae normal, no exophthalmos or proptosis  THYROID:  no apparent nodules or goiter  LUNGS: no audible wheeze, cough or visible cyanosis, no visible retractions or increased work of breathing  ABDOMEN: abdomen not evaluated  EXTREMITIES: no hand tremors, limited exam  NEUROLOGY: CN grossly intact, mentation intact and speech normal   SKIN:  no apparent skin lesions, rash, or edema with visualized skin appearance  PSYCH: mentation appears normal, affect normal/bright, judgement and insight intact,   normal speech and appearance well groomed     LABS:     All pertinent notes, labs, and images personally reviewed by me.      A/P:       Encounter Diagnoses   Name      Type 1 diabetes mellitus without complication (H)      Insulin pump status      Hypothyroidism due to Hashimoto's thyroiditis         Comments:  Reviewed health history, diabetes and thyroid issues  Reviewed and interpreted tests that I previously ordered.   Ordered appropriate tests for the endocrinology disease management.    Management options discussed and implemented after shared medical decision making with the patient.       Plan:  Reviewed the overall T1DM management and insulin pump use.  Discussed optimal BG testing to assess glucose trends.  We reviewed insulin pump settings, basal rate and bolus dosing  Use of automated pump bolus dosing for meal/snack carb & correction dosing  Reviewed option of uploading pump to [employer] website  Reviewed recent DexcomG6 CGMS glucose trends, in detail     Recommend:  Continue insulin pump and diabetes management plan.  Pump setting changes:   Bolus ICR: 6a 6.5 to 6, 5p 5.5 to 5/2    Continue use of the DexcomG6 CGM sensor   Use SG value with the Medtronic pump bolus wizard dosing   Will update her DexcomG6 Rx's to the  Specialty Pharmacy   Created a PA letter today, to see if her insurance will  now cover this device  Goal target premeal BG  mg/dl  Discussed future option of changing from the Medtronic pump to the Tandem TSlim with Control IQ   She has a friend using the Tandem system   Will review the Tandem pump application process when her Medtronic warranty expires... 12/2021?  Continue the current  levothyroxine 0.112 mg daily  Continue current simvastatin daily dosing  Arrange annual dilated eye exam, fasting lipid panel testing.  Contact me if questions/concerns about diabetes and thyroid management     Addressed patient's questions today.    Total time spent in with the patient evaluation:  25 min  Additional time spent reviewing pertinent lab tests and chart notes, and documentation:  5 min    Follow-up:  7/2021    ANTHONY Jimenez MD, MS  Endocrinology  [facility]          Video-Visit Details    Type of service:  Video Visit    Video End Time:  2:00 pm    Originating Location (pt. Location): home    Distant Location (provider location):  [facility]/[place]     Platform used for Video Visit: [employer]

## 2021-04-05 ENCOUNTER — DOCUMENTATION ONLY (OUTPATIENT)
Dept: LAB | Facility: CLINIC | Age: 31
End: 2021-04-05

## 2021-04-05 DIAGNOSIS — E10.9 TYPE 1 DIABETES, HBA1C GOAL < 7% (H): Primary | ICD-10-CM

## 2021-04-05 DIAGNOSIS — E78.5 HYPERLIPIDEMIA LDL GOAL <100: ICD-10-CM

## 2021-04-05 NOTE — PROGRESS NOTES
Linda Agosto has an upcoming lab appointment:    Future Appointments   Date Time Provider Department Center   4/6/2021 10:30 AM Rosi Lozano [facility]IC[facility] [place]   4/7/2021  3:50 PM EA LAB [facility] EA   4/20/2021 10:30 AM Rosi Lozano [facility]ICSW [facility] [place]   5/4/2021  4:30 [facility]      Please review Health Maintenance and sign order: Review of Health Maintenance Protocol Orders (HMPO) to authorize patient's due Health Maintenance labs to be drawn.    Health Maintenance Due   Topic     ANNUAL REVIEW OF HM ORDERS      LIPID      A1C      MICROALBUMIN      Hui Forrester

## 2021-04-05 NOTE — LETTER
[facility] [place] [facility]  [address]  [address]  [place] [address] [phone]       April 12, 2021    Linda Agosto  [address]  [place] [address]    Linda:    We have been calling you regarding a recent refill request we received for lab work.  Unfortunately, we were unable to reach you.  Yvette Rodas is recommending a follow up with her as she has not seen you since 2019.  You can schedule this appointment via [employer] or by calling the clinic at [phone].  Also, the [place] [facility] has closed and the providers, including Yvette, have all moved to the [facility] at [address] [place] [address] in [place].         Sincerely,  Yvette Rodas PA-C  [facility]

## 2021-04-05 NOTE — PROGRESS NOTES
Additional orders added for lipid and ALT.  orders updated. Already has other orders from routine management with endocrinology for her DM. Would advise follow-up with me though as I haven't seen her since 2019.  Electronically Signed By: Yvette Rodas PA-C

## 2021-04-06 ENCOUNTER — VIRTUAL VISIT (OUTPATIENT)
Dept: PSYCHOLOGY | Facility: CLINIC | Age: 31
End: 2021-04-06
Payer: COMMERCIAL

## 2021-04-06 ENCOUNTER — MYC MEDICAL ADVICE (OUTPATIENT)
Dept: ENDOCRINOLOGY | Facility: CLINIC | Age: 31
End: 2021-04-06

## 2021-04-06 DIAGNOSIS — F43.22 ADJUSTMENT DISORDER WITH ANXIETY: Primary | ICD-10-CM

## 2021-04-06 PROCEDURE — 90834 PSYTX W PT 45 MINUTES: CPT | Mod: 95 | Performed by: SOCIAL WORKER

## 2021-04-06 ASSESSMENT — ANXIETY QUESTIONNAIRES
GAD7 TOTAL SCORE: 2
2. NOT BEING ABLE TO STOP OR CONTROL WORRYING: NOT AT ALL
7. FEELING AFRAID AS IF SOMETHING AWFUL MIGHT HAPPEN: NOT AT ALL
5. BEING SO RESTLESS THAT IT IS HARD TO SIT STILL: NOT AT ALL
6. BECOMING EASILY ANNOYED OR IRRITABLE: NOT AT ALL
1. FEELING NERVOUS, ANXIOUS, OR ON EDGE: SEVERAL DAYS
3. WORRYING TOO MUCH ABOUT DIFFERENT THINGS: SEVERAL DAYS
4. TROUBLE RELAXING: NOT AT ALL

## 2021-04-06 ASSESSMENT — PATIENT HEALTH QUESTIONNAIRE - PHQ9: SUM OF ALL RESPONSES TO PHQ QUESTIONS 1-9: 2

## 2021-04-06 NOTE — PROGRESS NOTES
Called patient and left a non-detailed message to schedule follow up appointment with provider.      Treva SHELDON

## 2021-04-06 NOTE — PROGRESS NOTES
Progress Note    Patient Name: Linda Agosto  Date: 4/6/2021         Service Type: Individual      Session Start Time: 1035  Session End Time: 1113     Session Length: 38  Session #: 6    Attendees: Client    Service Modality:  Video Visit:    Telemedicine Visit: The patient's condition can be safely assessed and treated via synchronous audio and visual telemedicine encounter.      Reason for Telemedicine Visit: Services only offered telehealth    Originating Site (Patient Location): Patient's home    Distant Site (Provider Location): Provider Remote Setting    Consent:  The patient/guardian has verbally consented to: the potential risks and benefits of telemedicine (video visit) versus in person care; bill my insurance or make self-payment for services provided; and responsibility for payment of non-covered services.     Patient would like the video invitation sent by: Send to e-mail at: [email or web address]    Mode of Communication:  Video Conference via Amwell    As the provider I attest to compliance with applicable laws and regulations related to telemedicine.     Treatment Plan Last Reviewed: 4/6/2021 due 7/6/2021  PHQ-9 / TRESA-7 : 2/2     DATA  Interactive Complexity: No  Crisis: No       Progress Since Last Session (Related to Symptoms / Goals / Homework):   Symptoms: Improving phq-9 tresa-7    Homework: Achieved / completed to satisfaction      Episode of Care Goals: Minimal progress - ACTION (Actively working towards change); Intervened by reinforcing change plan / affirming steps taken     Current / Ongoing Stressors and Concerns:   COVID-19, furloughed April to August, difficult unknown, family conflict/communications     Treatment Objective(s) Addressed in This Session:     Patient will demonstrate/report improved family dynamics that can be safely managed in a lower level of care. Absence of persistent conflict in family relationships and reduction in family  conflict to level of comfort and satisfaction of family members as measured by client report for a period of at least 30 days within 6 months as clinically observed and by patient self-report.  Patient will demonstrate and report a level of anxiety that can be managed at a lower level of care.  Absence of persistent anxious mood and report of reduced frequency and intensity of worry and absence of persistent anxious mood to acceptable levels, no panic attacks, report subjective comfort with rumination for a period of 90 days, within 6 months as clinically observed and by patient self-report.     Intervention:  Therapist met with patient to review goals and interventions. Therapist utilized reflected listening as patient gave brief reflection of week. Patient reported doing well and processed the last couple of months. Patient reported she hadn't written letter to parents and therapist encouraged patient to name reasoning. Therapist supported patient and educated patient on the purpose of unpacking without filter and encouraged aptient to follow through. Patient processed self-esteem and therapist encouraged patient to see nutritionist. Patient presented with an anxious affect. Patient was engaged in session and open to feedback. Patient reported no safety concerns.           ASSESSMENT: Current Emotional / Mental Status (status of significant symptoms):   Risk status (Self / Other harm or suicidal ideation)   Patient denies current fears or concerns for personal safety.   Patient denies current or recent suicidal ideation or behaviors.   Patient denies current or recent homicidal ideation or behaviors.   Patient denies current or recent self injurious behavior or ideation.   Patient denies other safety concerns.   Patient reports there has been no change in risk factors since their last session.     Patient reports there has been no change in protective factors since their last session.     Recommended that patient  call 911 or go to the local ED should there be a change in any of these risk factors.     Appearance:   Appropriate    Eye Contact:   Fair    Psychomotor Behavior: Restless    Attitude:   Cooperative  Interested Friendly Pleasant   Orientation:   All   Speech    Rate / Production: Emotional Talkative    Volume:  Normal    Mood:    Anxious    Affect:    Worrisome    Thought Content:  Clear    Thought Form:  Coherent    Insight:    Fair      Medication Review:   No changes to current psychiatric medication(s)     Medication Compliance:   Yes     Changes in Health Issues:   None reported     Chemical Use Review:   Substance Use: Chemical use reviewed, no active concerns identified      Tobacco Use: No current tobacco use.      Diagnosis:  1. Adjustment disorder with anxiety        Collateral Reports Completed:   Not Applicable    PLAN: (Patient Tasks / Therapist Tasks / Other)   write letter to parents on her experience.    See nutritionist   Rosi Lozano, LICSW  4/6/2021                                                         ______________________________________________________________________    Treatment Plan    Patient's Name: Linda Agosto  YOB: 1990    Date: 10/19/2020    DSM5 Diagnoses: Adjustment Disorders  309.24 (F43.22) With anxiety  Psychosocial / Contextual Factors: COVID-19, furloughed, difficult unknown, family conflict/communications    WHODAS: 17    Referral / Collaboration:  Referral to another professional/service is not indicated at this time.    Anticipated number of session or this episode of care: 20      MeasurableTreatment Goal(s) related to diagnosis / functional impairment(s)  Goal 1: Patient will absence of persistent anxiety mood and report of reduced frequency and intensity of worry and absence of persistent anxious mood to acceptable levels, no panic attacks, report subjective comfort with rumination for a period of 90 days. Within 6 months as clinically observed and  by patient self-report.    I will know I've met my goal when I'm faced with triggers I have the proper goals to manage those feelings.      Objective #A (Patient Action)    Patient will demonstrate and report a level of anxiety that can be managed at a lower level of care.  Absence of persistent anxious mood and report of reduced frequency and intensity of worry and absence of persistent anxious mood to acceptable levels, no panic attacks, report subjective comfort with rumination for a period of 90 days, within 6 months as clinically observed and by patient self-report.  Status: New - Date: 10/19/2020     Intervention(s)  Therapist will provide individual therapy to identify triggers to anxiety, gain feedback on helpful coping tools and thought-reframing techniques, and identify preferred way of being. Tx to include discuss current stressors and interpersonal conflicts and how to cope with these, coaching, diagnostic testing , referral for medication as indicated, use prescribed medication, cognitive restructuring, interpersonal,   family therapy, supportive therapy services.        Goal 2: Patient will absence of persistent conflict in family relationships and reduction in family conflict to level of comfort and satisfaction of family members as measured by client report for a period of at least 30 days within 6 months as clinically observed and by patient self-report    I will know I've met my goal when can communicate my boundary without worrying about their feelings.      Objective #A (Patient Action)    Status: New - Date: 10/19/2020     Patient will demonstrate/report improved family dynamics that can be safely managed in a lower level of care. Absence of persistent conflict in family relationships and reduction in family conflict to level of comfort and satisfaction of family members as measured by client report for a period of at least 30 days within 6 months as clinically observed and by patient  self-report.    Intervention(s)  Therapist will provide individual therapy to process stressors within family dynamics, identify areas within her control, and identify preferred way of being within family relationships.  Tx to discuss current stressors and interpersonal conflicts and how to cope with these, coaching, diagnostic testing, referral for medication as indicated, use prescribed medication, cognitive restructuring, interpersonal family therapy, supportive therapy services.          Patient has reviewed and agreed to the above plan.      Rosi Lozano, [facility] East Community HospitalSW  October 19, 2020

## 2021-04-07 DIAGNOSIS — E10.9 TYPE 1 DIABETES MELLITUS WITHOUT COMPLICATION (H): ICD-10-CM

## 2021-04-07 DIAGNOSIS — E78.5 HYPERLIPIDEMIA LDL GOAL <100: ICD-10-CM

## 2021-04-07 DIAGNOSIS — E10.9 TYPE 1 DIABETES, HBA1C GOAL < 7% (H): ICD-10-CM

## 2021-04-07 DIAGNOSIS — E06.3 HYPOTHYROIDISM DUE TO HASHIMOTO'S THYROIDITIS: ICD-10-CM

## 2021-04-07 LAB — HBA1C MFR BLD: 8.3 % (ref 0–5.6)

## 2021-04-07 PROCEDURE — 36415 COLL VENOUS BLD VENIPUNCTURE: CPT | Performed by: PHYSICIAN ASSISTANT

## 2021-04-07 PROCEDURE — 84443 ASSAY THYROID STIM HORMONE: CPT | Performed by: PHYSICIAN ASSISTANT

## 2021-04-07 PROCEDURE — 83036 HEMOGLOBIN GLYCOSYLATED A1C: CPT | Performed by: PHYSICIAN ASSISTANT

## 2021-04-07 PROCEDURE — 80061 LIPID PANEL: CPT | Performed by: PHYSICIAN ASSISTANT

## 2021-04-07 PROCEDURE — 80048 BASIC METABOLIC PNL TOTAL CA: CPT | Performed by: PHYSICIAN ASSISTANT

## 2021-04-07 PROCEDURE — 84460 ALANINE AMINO (ALT) (SGPT): CPT | Performed by: PHYSICIAN ASSISTANT

## 2021-04-07 PROCEDURE — 84439 ASSAY OF FREE THYROXINE: CPT | Performed by: PHYSICIAN ASSISTANT

## 2021-04-07 PROCEDURE — 82043 UR ALBUMIN QUANTITATIVE: CPT | Performed by: INTERNAL MEDICINE

## 2021-04-07 ASSESSMENT — ANXIETY QUESTIONNAIRES: GAD7 TOTAL SCORE: 2

## 2021-04-08 DIAGNOSIS — E10.9 TYPE 1 DIABETES MELLITUS WITHOUT COMPLICATION (H): Primary | ICD-10-CM

## 2021-04-08 LAB
ALT SERPL W P-5'-P-CCNC: 23 U/L (ref 0–50)
ANION GAP SERPL CALCULATED.3IONS-SCNC: 7 MMOL/L (ref 3–14)
BUN SERPL-MCNC: 18 MG/DL (ref 7–30)
CALCIUM SERPL-MCNC: 9.2 MG/DL (ref 8.5–10.1)
CHLORIDE SERPL-SCNC: 103 MMOL/L (ref 94–109)
CHOLEST SERPL-MCNC: 218 MG/DL
CO2 SERPL-SCNC: 26 MMOL/L (ref 20–32)
CREAT SERPL-MCNC: 0.76 MG/DL (ref 0.52–1.04)
CREAT UR-MCNC: 83 MG/DL
GFR SERPL CREATININE-BSD FRML MDRD: >90 ML/MIN/{1.73_M2}
GLUCOSE SERPL-MCNC: 242 MG/DL (ref 70–99)
HDLC SERPL-MCNC: 59 MG/DL
LDLC SERPL CALC-MCNC: 131 MG/DL
MICROALBUMIN UR-MCNC: 8 MG/L
MICROALBUMIN/CREAT UR: 9.04 MG/G CR (ref 0–25)
NONHDLC SERPL-MCNC: 158 MG/DL
POTASSIUM SERPL-SCNC: 4.2 MMOL/L (ref 3.4–5.3)
SODIUM SERPL-SCNC: 136 MMOL/L (ref 133–144)
T4 FREE SERPL-MCNC: 1.11 NG/DL (ref 0.76–1.46)
TRIGL SERPL-MCNC: 135 MG/DL
TSH SERPL DL<=0.005 MIU/L-ACNC: 3.92 MU/L (ref 0.4–4)

## 2021-04-12 NOTE — PROGRESS NOTES
Left non-detailed message for patient to call back.  Please schedule follow up when patient calls back.  (see previous notes for details)      I have been unable to reach this patient by phone.  A letter is being sent to the last known home address.    Thanks Raquel

## 2021-04-12 NOTE — RESULT ENCOUNTER NOTE
Dear Linda,      Your recent test results are noted below:    -LDL(bad) cholesterol level is elevated which can increase your heart disease risk.  A diet high in fat and simple carbohydrates, genetics and being overweight can contribute to this. ADVISE: exercising 150 minutes of aerobic exercise per week (30 minutes for 5 days per week or 50 minutes for 3 days per week are options) and eating a low saturated fat/low carbohydrate diet are helpful to improve this. Are you still taking your cholesterol medication, simvastatin? It doesn't look like we have refilled this since 2018. Is endocrinology prescribing this now? If you could let us know, that would be great!  -Liver test (ALT) is normal.    For additional lab test information, [email or web address] is an excellent reference. Please contact the clinic at [phone] with any further questions or concerns.    Sincerely,      Yvette Rodas PA-C  [facility]

## 2021-04-13 ENCOUNTER — NURSE TRIAGE (OUTPATIENT)
Dept: FAMILY MEDICINE | Facility: CLINIC | Age: 31
End: 2021-04-13

## 2021-04-13 ENCOUNTER — HOSPITAL ENCOUNTER (EMERGENCY)
Facility: CLINIC | Age: 31
Discharge: HOME | End: 2021-04-13
Attending: EMERGENCY MEDICINE | Admitting: EMERGENCY MEDICINE
Payer: COMMERCIAL

## 2021-04-13 ENCOUNTER — APPOINTMENT (OUTPATIENT)
Dept: GENERAL RADIOLOGY | Facility: CLINIC | Age: 31
End: 2021-04-13
Attending: EMERGENCY MEDICINE
Payer: COMMERCIAL

## 2021-04-13 VITALS
SYSTOLIC BLOOD PRESSURE: 124 MMHG | WEIGHT: 190 LBS | BODY MASS INDEX: 30.53 KG/M2 | HEIGHT: 66 IN | HEART RATE: 99 BPM | DIASTOLIC BLOOD PRESSURE: 81 MMHG | TEMPERATURE: 97.5 F | OXYGEN SATURATION: 99 % | RESPIRATION RATE: 16 BRPM

## 2021-04-13 DIAGNOSIS — R73.9 HYPERGLYCEMIA: ICD-10-CM

## 2021-04-13 DIAGNOSIS — J06.9 VIRAL URI: ICD-10-CM

## 2021-04-13 LAB
ALBUMIN UR-MCNC: NEGATIVE MG/DL
ANION GAP SERPL CALCULATED.3IONS-SCNC: 7 MMOL/L (ref 3–14)
APPEARANCE UR: CLEAR
BACTERIA #/AREA URNS HPF: ABNORMAL /HPF
BASE EXCESS BLDV CALC-SCNC: 2.3 MMOL/L
BASOPHILS # BLD AUTO: 0.1 10E9/L (ref 0–0.2)
BASOPHILS NFR BLD AUTO: 0.3 %
BILIRUB UR QL STRIP: NEGATIVE
BUN SERPL-MCNC: 11 MG/DL (ref 7–30)
CALCIUM SERPL-MCNC: 9.3 MG/DL (ref 8.5–10.1)
CHLORIDE SERPL-SCNC: 102 MMOL/L (ref 94–109)
CO2 SERPL-SCNC: 25 MMOL/L (ref 20–32)
COLOR UR AUTO: ABNORMAL
CREAT SERPL-MCNC: 0.6 MG/DL (ref 0.52–1.04)
DIFFERENTIAL METHOD BLD: ABNORMAL
EOSINOPHIL # BLD AUTO: 0.2 10E9/L (ref 0–0.7)
EOSINOPHIL NFR BLD AUTO: 1.5 %
ERYTHROCYTE [DISTWIDTH] IN BLOOD BY AUTOMATED COUNT: 12.3 % (ref 10–15)
FLUAV RNA RESP QL NAA+PROBE: NEGATIVE
FLUBV RNA RESP QL NAA+PROBE: NEGATIVE
GFR SERPL CREATININE-BSD FRML MDRD: >90 ML/MIN/{1.73_M2}
GLUCOSE BLDC GLUCOMTR-MCNC: 313 MG/DL (ref 70–99)
GLUCOSE SERPL-MCNC: 317 MG/DL (ref 70–99)
GLUCOSE UR STRIP-MCNC: >1000 MG/DL
HCO3 BLDV-SCNC: 28 MMOL/L (ref 21–28)
HCT VFR BLD AUTO: 44.6 % (ref 35–47)
HGB BLD-MCNC: 14.7 G/DL (ref 11.7–15.7)
HGB UR QL STRIP: NEGATIVE
IMM GRANULOCYTES # BLD: 0.1 10E9/L (ref 0–0.4)
IMM GRANULOCYTES NFR BLD: 0.3 %
KETONES BLD-SCNC: 0.2 MMOL/L (ref 0–0.6)
KETONES UR STRIP-MCNC: 10 MG/DL
LABORATORY COMMENT REPORT: NORMAL
LEUKOCYTE ESTERASE UR QL STRIP: NEGATIVE
LYMPHOCYTES # BLD AUTO: 1.8 10E9/L (ref 0.8–5.3)
LYMPHOCYTES NFR BLD AUTO: 11.3 %
MCH RBC QN AUTO: 30.9 PG (ref 26.5–33)
MCHC RBC AUTO-ENTMCNC: 33 G/DL (ref 31.5–36.5)
MCV RBC AUTO: 94 FL (ref 78–100)
MONOCYTES # BLD AUTO: 1.2 10E9/L (ref 0–1.3)
MONOCYTES NFR BLD AUTO: 7.9 %
NEUTROPHILS # BLD AUTO: 12.2 10E9/L (ref 1.6–8.3)
NEUTROPHILS NFR BLD AUTO: 78.7 %
NITRATE UR QL: NEGATIVE
NRBC # BLD AUTO: 0 10*3/UL
NRBC BLD AUTO-RTO: 0 /100
O2/TOTAL GAS SETTING VFR VENT: NORMAL %
OXYHGB MFR BLDV: 61 %
PCO2 BLDV: 47 MM HG (ref 40–50)
PH BLDV: 7.39 PH (ref 7.32–7.43)
PH UR STRIP: 6 PH (ref 5–7)
PLATELET # BLD AUTO: 232 10E9/L (ref 150–450)
PO2 BLDV: 32 MM HG (ref 25–47)
POTASSIUM SERPL-SCNC: 4.2 MMOL/L (ref 3.4–5.3)
RBC # BLD AUTO: 4.75 10E12/L (ref 3.8–5.2)
RBC #/AREA URNS AUTO: 1 /HPF (ref 0–2)
RSV RNA SPEC QL NAA+PROBE: NORMAL
SARS-COV-2 RNA RESP QL NAA+PROBE: NEGATIVE
SODIUM SERPL-SCNC: 134 MMOL/L (ref 133–144)
SOURCE: ABNORMAL
SP GR UR STRIP: 1.02 (ref 1–1.03)
SPECIMEN SOURCE: NORMAL
SQUAMOUS #/AREA URNS AUTO: <1 /HPF (ref 0–1)
UROBILINOGEN UR STRIP-MCNC: NORMAL MG/DL (ref 0–2)
WBC # BLD AUTO: 15.5 10E9/L (ref 4–11)
WBC #/AREA URNS AUTO: <1 /HPF (ref 0–5)

## 2021-04-13 PROCEDURE — C9803 HOPD COVID-19 SPEC COLLECT: HCPCS

## 2021-04-13 PROCEDURE — 85025 COMPLETE CBC W/AUTO DIFF WBC: CPT | Performed by: EMERGENCY MEDICINE

## 2021-04-13 PROCEDURE — 80048 BASIC METABOLIC PNL TOTAL CA: CPT | Performed by: EMERGENCY MEDICINE

## 2021-04-13 PROCEDURE — 96374 THER/PROPH/DIAG INJ IV PUSH: CPT

## 2021-04-13 PROCEDURE — 96361 HYDRATE IV INFUSION ADD-ON: CPT

## 2021-04-13 PROCEDURE — 250N000011 HC RX IP 250 OP 636: Performed by: EMERGENCY MEDICINE

## 2021-04-13 PROCEDURE — 999N001017 HC STATISTIC GLUCOSE BY METER IP

## 2021-04-13 PROCEDURE — 82805 BLOOD GASES W/O2 SATURATION: CPT | Performed by: EMERGENCY MEDICINE

## 2021-04-13 PROCEDURE — 82010 KETONE BODYS QUAN: CPT | Performed by: EMERGENCY MEDICINE

## 2021-04-13 PROCEDURE — 258N000003 HC RX IP 258 OP 636: Performed by: EMERGENCY MEDICINE

## 2021-04-13 PROCEDURE — 87636 SARSCOV2 & INF A&B AMP PRB: CPT | Performed by: EMERGENCY MEDICINE

## 2021-04-13 PROCEDURE — 71045 X-RAY EXAM CHEST 1 VIEW: CPT

## 2021-04-13 PROCEDURE — 99285 EMERGENCY DEPT VISIT HI MDM: CPT | Mod: 25

## 2021-04-13 PROCEDURE — 81001 URINALYSIS AUTO W/SCOPE: CPT | Performed by: EMERGENCY MEDICINE

## 2021-04-13 RX ORDER — LORAZEPAM 2 MG/ML
0.5 INJECTION INTRAMUSCULAR ONCE
Status: COMPLETED | OUTPATIENT
Start: 2021-04-13 | End: 2021-04-13

## 2021-04-13 RX ORDER — ONDANSETRON 4 MG/1
4 TABLET, ORALLY DISINTEGRATING ORAL EVERY 6 HOURS PRN
Qty: 12 TABLET | Refills: 0 | Status: SHIPPED | OUTPATIENT
Start: 2021-04-13 | End: 2021-04-16

## 2021-04-13 RX ADMIN — SODIUM CHLORIDE 1000 ML: 9 INJECTION, SOLUTION INTRAVENOUS at 13:40

## 2021-04-13 RX ADMIN — SODIUM CHLORIDE 1000 ML: 9 INJECTION, SOLUTION INTRAVENOUS at 14:16

## 2021-04-13 RX ADMIN — LORAZEPAM 0.5 MG: 2 INJECTION INTRAMUSCULAR; INTRAVENOUS at 14:16

## 2021-04-13 ASSESSMENT — MIFFLIN-ST. JEOR: SCORE: 1608.58

## 2021-04-13 ASSESSMENT — ENCOUNTER SYMPTOMS
VOMITING: 1
FEVER: 0
NAUSEA: 1
COUGH: 1

## 2021-04-13 NOTE — DISCHARGE INSTRUCTIONS

## 2021-04-13 NOTE — TELEPHONE ENCOUNTER
Triage message and plan noted, agreed.    ANTHONY Jimenez MD, MS  Endocrinology  [facility]

## 2021-04-13 NOTE — LETTER
April 13, 2021      To Whom It May Concern:      Linda Agosto was seen in our Emergency Department today, 04/13/21.  I expect her condition to improve over the next 1-3 days.  She may return to work when improved.    Sincerely,        Russell Thompson MD

## 2021-04-13 NOTE — ED TRIAGE NOTES
Pt arrives from home with cold symptoms starting yesterday and vomiting last night. Type 1 DM and BG in 300s. Urine ketone test was moderate at home so came in.

## 2021-04-13 NOTE — ED PROVIDER NOTES
"  History   Chief Complaint:  Hyperglycemia     HPI   iLnda Agosto is a 30 year old female with history of type 1 DM who presents with hyperglycemia. She reports the onset of cold symptoms including cough, congestion, nausea, and vomiting last night. Today her BG was in the 300s and her urine ketone test at home returned moderate so she contacted her nurse line who recommended she present to the emergency department for evaluation for suspected DKA.  Patient notes that she has been giving herself correction doses, but her blood sugar remains above 300.  She also notes that she changed her pump site to ensure that it was working.    Review of Systems   Constitutional: Negative for fever.   HENT: Positive for congestion.    Respiratory: Positive for cough.    Gastrointestinal: Positive for nausea and vomiting.   All other systems reviewed and are negative.      Allergies:  Penicillins  Sulfa Drugs    Medications:  Novolog and Basaglar pen  Synthroid  Zoloft  Zocor  Imitrex     Past Medical History:    Adjustment disorder and anxious mood  Migraine  Hyperlipidemia  Hypothyroidism  TB  Type 2 DM  Anxiety     Past Surgical History:    Appendectomy  PE tubes     Social History:  Patient presents alone.  Followed by Dr. Jimenez, Endocrinology    Physical Exam     Patient Vitals for the past 24 hrs:   BP Temp Temp src Pulse Resp SpO2 Height Weight   04/13/21 1500 129/85 -- -- 107 16 98 % -- --   04/13/21 1445 115/79 -- -- 111 19 98 % -- --   04/13/21 1430 122/63 -- -- 105 11 99 % -- --   04/13/21 1415 127/76 -- -- 100 19 98 % -- --   04/13/21 1400 -- -- -- 103 17 97 % -- --   04/13/21 1345 114/79 -- -- 97 16 97 % -- --   04/13/21 1330 (!) 139/95 -- -- 113 17 100 % -- --   04/13/21 1315 132/80 -- -- 96 15 99 % -- --   04/13/21 1300 129/87 -- -- -- -- -- -- --   04/13/21 1156 -- -- -- -- 22 -- -- --   04/13/21 1155 (!) 151/92 97.5  F (36.4  C) Temporal 120 -- 93 % 1.676 m (5' 6\") 86.2 kg (190 lb)       Physical Exam  Nursing " note and vitals reviewed.  HENT:   Mouth/Throat: Moist mucous membranes.   Eyes: EOMI, nonicteric sclera  Cardiovascular: mild tachycardia, regular rhythm, no murmurs, rubs, or gallops  Pulmonary/Chest: Effort normal and breath sounds normal. No respiratory distress. No wheezes. No rales.   Abdominal: Soft. Nontender, nondistended, no guarding or rigidity.   Musculoskeletal: Normal range of motion.   Neurological: Alert. Moves all extremities spontaneously.   Skin: Skin is warm and dry. No rash noted.   Psychiatric: Normal mood and affect.     Emergency Department Course     Imaging:  XR Chest Port 1 View:  No acute disease, as per radiology.   Imaging independently reviewed and agree with radiologist interpretation.     Laboratory:    CBC: WBC: 15.5 (H), HGB: 14.7, PLT: 232  BMP: Glucose 317 (H), o/w WNL (Creatinine: 0.60)    VBG and oxyhgb: WNL  Glucose by meter (1208): 313 (H)  Ketone Beta-Hydroxybutyrate: 0.2    UA:  GLC >1000, Urineketon 10, Few bacteria, o/w WNL     Symptomatic Influenza A/B & COVID PCR: Negative     Emergency Department Course:    Reviewed:  I reviewed the patient's nursing notes, vitals, past medical records.     Assessments:  1331    I evaluated the patient and performed an exam as above.     Interventions:  1340 NS, 1 L, IV  1416 NS, 1 L, IV  1416 Ativan, 0.5 mg, IV     Disposition:  The patient was discharged to home.      Impression & Plan         Medical Decision Making:  Patient presents with nausea, vomiting, urine ketones in the context of type 1 diabetes.  Fortunately, there is no sign of DKA at this time.  Patient does present with hyperglycemia that did return to 194 with IV fluids only.  Her nausea was improved after dose of Ativan.  We elected to use Ativan as patient appeared quite anxious here and Ativan was used to treat both her anxiety as well as her nausea with good effectiveness.  Patient appears to have a viral upper respiratory infection.  Suspect vomiting could be due  to copious mucus production versus hyperglycemia.  Will prescribe Zofran for home.  Covid test x2 has returned negative.  Would not initiate antibiotics at this time given data available.  Discussed with patient that symptoms should resolve in the coming days, but that she should follow-up closely with her primary care physician/endocrinologist for ongoing high blood sugars as they may wish to make adjustments to her regimen.  Work note provided.  Patient discharged in stable condition.  All questions answered and she is in agreement with the plan.  Reasons to return discussed.    Covid-19  Linda Agosto was evaluated during a global COVID-19 pandemic, which necessitated consideration that the patient might be at risk for infection with the SARS-CoV-2 virus that causes COVID-19.   Applicable protocols for evaluation were followed during the patient's care.   COVID-19 was considered as part of the patient's evaluation. The plan for testing is:  a test was obtained during this visit.    Diagnosis:    ICD-10-CM    1. Hyperglycemia  R73.9    2. Viral URI  J06.9        Scribe Disclosure:  An LAMBERT, am serving as a scribe at 1:11 PM on 4/13/2021 to document services personally performed by Russell Thompson MD based on my observations and the provider's statements to me.      Russell Thompson MD  04/13/21 0363

## 2021-04-13 NOTE — TELEPHONE ENCOUNTER
Dr. Jimenez,  Routing as LETHA, Just sent pt to ER:     Pt called re Elevated blood glucose symptoms:  Sugars running 300+ cannot get them down.  Reports Keytones are spilling at Moderate Level   Reports vomiting, nausea.     Has a pump, switched pump location last night, does have insulin at home, has given several correction doses, and has not come down.   Unable to get details of correction doses, or insulin on hand at this point because pt sounds very shaken up.     Has also been sick over the past week with more sinus concerns.  Discussed this can cause elevated glucose, however even if that is reason for elevation, with keytones, vomiting on top of elevated glucose after corrections needs ER.    Pt in agreement. Mom is with her and will drive to ER.     Anabela Bermudez RN  [facility] RN Triage Team        Reason for Disposition    Vomiting and signs of dehydration (e.g., very dry mouth, lightheaded, dark urine)    Additional Information    Blood glucose > 240 mg/dL (13.3 mmol/L) AND urine ketones moderate-large (or more than 1+)    Protocols used: DIABETES - HIGH BLOOD SUGAR-A-OH

## 2021-04-14 ENCOUNTER — MYC MEDICAL ADVICE (OUTPATIENT)
Dept: FAMILY MEDICINE | Facility: CLINIC | Age: 31
End: 2021-04-14

## 2021-04-14 NOTE — TELEPHONE ENCOUNTER
My chart message sent     Estee Willoughby RN, BSN  [facility]

## 2021-04-20 DIAGNOSIS — E10.9 TYPE 1 DIABETES, HBA1C GOAL < 7% (H): ICD-10-CM

## 2021-04-20 NOTE — TELEPHONE ENCOUNTER
LOV 4-2-2021 TL    Next 5 appointments (look out 90 days)    Apr 30, 2021  1:00 PM  MyChart Physical Adult with Yvette Rodas PA-C  [facility] ([facility] ) [address] [place] [id number]  [phone]   Jul 19, 2021  9:30 AM  Return Visit with Santy Jimenez MD  [facility] ([facility] ) [address] [place] [id number]  [phone]          Sharmila Gant RT (R)

## 2021-05-04 ENCOUNTER — VIRTUAL VISIT (OUTPATIENT)
Dept: PSYCHOLOGY | Facility: CLINIC | Age: 31
End: 2021-05-04
Payer: COMMERCIAL

## 2021-05-04 DIAGNOSIS — F43.22 ADJUSTMENT DISORDER WITH ANXIETY: Primary | ICD-10-CM

## 2021-05-04 PROCEDURE — 90834 PSYTX W PT 45 MINUTES: CPT | Mod: 95 | Performed by: SOCIAL WORKER

## 2021-05-04 ASSESSMENT — ANXIETY QUESTIONNAIRES
6. BECOMING EASILY ANNOYED OR IRRITABLE: NOT AT ALL
3. WORRYING TOO MUCH ABOUT DIFFERENT THINGS: SEVERAL DAYS
4. TROUBLE RELAXING: NOT AT ALL
GAD7 TOTAL SCORE: 3
1. FEELING NERVOUS, ANXIOUS, OR ON EDGE: SEVERAL DAYS
7. FEELING AFRAID AS IF SOMETHING AWFUL MIGHT HAPPEN: SEVERAL DAYS
2. NOT BEING ABLE TO STOP OR CONTROL WORRYING: NOT AT ALL
5. BEING SO RESTLESS THAT IT IS HARD TO SIT STILL: NOT AT ALL

## 2021-05-04 ASSESSMENT — PATIENT HEALTH QUESTIONNAIRE - PHQ9: SUM OF ALL RESPONSES TO PHQ QUESTIONS 1-9: 2

## 2021-05-04 NOTE — PROGRESS NOTES
Progress Note    Patient Name: Linda Agosto  Date: 5/4/2021         Service Type: Individual      Session Start Time: 1636  Session End Time: 1719     Session Length: 38-52    Session #: 7    Attendees: Client    Service Modality:  Video Visit:    Telemedicine Visit: The patient's condition can be safely assessed and treated via synchronous audio and visual telemedicine encounter.      Reason for Telemedicine Visit: Services only offered telehealth    Originating Site (Patient Location): Patient's home    Distant Site (Provider Location): Provider Remote Setting    Consent:  The patient/guardian has verbally consented to: the potential risks and benefits of telemedicine (video visit) versus in person care; bill my insurance or make self-payment for services provided; and responsibility for payment of non-covered services.     Patient would like the video invitation sent by: Send to e-mail at: [email or web address]    Mode of Communication:  Video Conference via [employer]    As the provider I attest to compliance with applicable laws and regulations related to telemedicine.     Treatment Plan Last Reviewed: 4/6/2021 due 7/6/2021  PHQ-9 / TRESA-7 : 2/3    DATA  Interactive Complexity: No  Crisis: No       Progress Since Last Session (Related to Symptoms / Goals / Homework):   Symptoms: Worsening tresa-7    Homework: Achieved / completed to satisfaction      Episode of Care Goals: Minimal progress - ACTION (Actively working towards change); Intervened by reinforcing change plan / affirming steps taken     Current / Ongoing Stressors and Concerns:   COVID-19, furloughed April to August, difficult unknown, family conflict/communications     Treatment Objective(s) Addressed in This Session:     Patient will demonstrate/report improved family dynamics that can be safely managed in a lower level of care. Absence of persistent conflict in family relationships and reduction in family conflict  to level of comfort and satisfaction of family members as measured by client report for a period of at least 30 days within 6 months as clinically observed and by patient self-report.  Patient will demonstrate and report a level of anxiety that can be managed at a lower level of care.  Absence of persistent anxious mood and report of reduced frequency and intensity of worry and absence of persistent anxious mood to acceptable levels, no panic attacks, report subjective comfort with rumination for a period of 90 days, within 6 months as clinically observed and by patient self-report.     Intervention:  Therapist met with patient to review goals and interventions. Therapist utilized reflected listening as patient gave brief reflection of week. Patient processed starting her letters and emotions.Therapist supported patient, validated patient and asked patient what she would like to do next with it. Patient processed burning it or shredding it. Patient processed potential of buying a house and weight gain. Therapist coached patient through solution focused therapy. Patient presented with an anxious affect. Patient was engaged in session and open to feedback. Patient reported no safety concerns.           ASSESSMENT: Current Emotional / Mental Status (status of significant symptoms):   Risk status (Self / Other harm or suicidal ideation)   Patient denies current fears or concerns for personal safety.   Patient denies current or recent suicidal ideation or behaviors.   Patient denies current or recent homicidal ideation or behaviors.   Patient denies current or recent self injurious behavior or ideation.   Patient denies other safety concerns.   Patient reports there has been no change in risk factors since their last session.     Patient reports there has been no change in protective factors since their last session.     Recommended that patient call 911 or go to the local ED should there be a change in any of these risk  factors.     Appearance:   Appropriate    Eye Contact:   Fair    Psychomotor Behavior: Restless    Attitude:   Cooperative  Interested Friendly Pleasant   Orientation:   All   Speech    Rate / Production: Emotional Talkative    Volume:  Normal    Mood:    Anxious    Affect:    Worrisome    Thought Content:  Clear    Thought Form:  Coherent    Insight:    Fair      Medication Review:   No changes to current psychiatric medication(s)     Medication Compliance:   Yes     Changes in Health Issues:   None reported     Chemical Use Review:   Substance Use: Chemical use reviewed, no active concerns identified      Tobacco Use: No current tobacco use.      Diagnosis:  1. Adjustment disorder with anxiety        Collateral Reports Completed:   Not Applicable    PLAN: (Patient Tasks / Therapist Tasks / Other)   write letter to parents on her experience.    See nutritionist     Make solution focused plan for routine     Rosi Lozano, [facility]  5/4/2021                                                         ______________________________________________________________________    Treatment Plan    Patient's Name: Linda Agosto  YOB: 1990    Date:  4/6/2021    DSM5 Diagnoses: Adjustment Disorders  309.24 (F43.22) With anxiety  Psychosocial / Contextual Factors: COVID-19, furloughed, difficult unknown, family conflict/communications    WHODAS: 17    Referral / Collaboration:  Referral to another professional/service is not indicated at this time.    Anticipated number of session or this episode of care: 20      MeasurableTreatment Goal(s) related to diagnosis / functional impairment(s)  Goal 1: Patient will absence of persistent anxiety mood and report of reduced frequency and intensity of worry and absence of persistent anxious mood to acceptable levels, no panic attacks, report subjective comfort with rumination for a period of 90 days. Within 6 months as clinically observed and by patient self-report.    I will  know I've met my goal when I'm faced with triggers I have the proper goals to manage those feelings.      Objective #A (Patient Action)    Patient will demonstrate and report a level of anxiety that can be managed at a lower level of care.  Absence of persistent anxious mood and report of reduced frequency and intensity of worry and absence of persistent anxious mood to acceptable levels, no panic attacks, report subjective comfort with rumination for a period of 90 days, within 6 months as clinically observed and by patient self-report.  Status: Continued - Date(s):  4/6/2021     Intervention(s)  Therapist will provide individual therapy to identify triggers to anxiety, gain feedback on helpful coping tools and thought-reframing techniques, and identify preferred way of being. Tx to include discuss current stressors and interpersonal conflicts and how to cope with these, coaching, diagnostic testing , referral for medication as indicated, use prescribed medication, cognitive restructuring, interpersonal,   family therapy, supportive therapy services.        Goal 2: Patient will absence of persistent conflict in family relationships and reduction in family conflict to level of comfort and satisfaction of family members as measured by client report for a period of at least 30 days within 6 months as clinically observed and by patient self-report    I will know I've met my goal when can communicate my boundary without worrying about their feelings.      Objective #A (Patient Action)    Status: Continued - Date(s):  4/6/2021     Patient will demonstrate/report improved family dynamics that can be safely managed in a lower level of care. Absence of persistent conflict in family relationships and reduction in family conflict to level of comfort and satisfaction of family members as measured by client report for a period of at least 30 days within 6 months as clinically observed and by patient  self-report.    Intervention(s)  Therapist will provide individual therapy to process stressors within family dynamics, identify areas within her control, and identify preferred way of being within family relationships.  Tx to discuss current stressors and interpersonal conflicts and how to cope with these, coaching, diagnostic testing, referral for medication as indicated, use prescribed medication, cognitive restructuring, interpersonal family therapy, supportive therapy services.          Patient has reviewed and agreed to the above plan.      Rosi Lozano, [facility]   4/6/2021

## 2021-05-05 ASSESSMENT — ANXIETY QUESTIONNAIRES: GAD7 TOTAL SCORE: 3

## 2021-06-21 ENCOUNTER — VIRTUAL VISIT (OUTPATIENT)
Dept: PSYCHOLOGY | Facility: CLINIC | Age: 31
End: 2021-06-21
Payer: COMMERCIAL

## 2021-06-21 DIAGNOSIS — F43.22 ADJUSTMENT DISORDER WITH ANXIETY: Primary | ICD-10-CM

## 2021-06-21 PROCEDURE — 90834 PSYTX W PT 45 MINUTES: CPT | Mod: GT | Performed by: SOCIAL WORKER

## 2021-06-21 ASSESSMENT — ANXIETY QUESTIONNAIRES
GAD7 TOTAL SCORE: 2
1. FEELING NERVOUS, ANXIOUS, OR ON EDGE: SEVERAL DAYS
3. WORRYING TOO MUCH ABOUT DIFFERENT THINGS: SEVERAL DAYS
4. TROUBLE RELAXING: NOT AT ALL
7. FEELING AFRAID AS IF SOMETHING AWFUL MIGHT HAPPEN: NOT AT ALL
6. BECOMING EASILY ANNOYED OR IRRITABLE: NOT AT ALL
2. NOT BEING ABLE TO STOP OR CONTROL WORRYING: NOT AT ALL
5. BEING SO RESTLESS THAT IT IS HARD TO SIT STILL: NOT AT ALL

## 2021-06-21 ASSESSMENT — PATIENT HEALTH QUESTIONNAIRE - PHQ9: SUM OF ALL RESPONSES TO PHQ QUESTIONS 1-9: 1

## 2021-06-21 NOTE — PROGRESS NOTES
Progress Note    Patient Name: Linda Agosto  Date: 6/21/2021         Service Type: Individual      Session Start Time: 1504  Session End Time: 1548     Session Length: 38-52    Session #: 8    Attendees: Client    Service Modality:  Video Visit:    Telemedicine Visit: The patient's condition can be safely assessed and treated via synchronous audio and visual telemedicine encounter.      Reason for Telemedicine Visit: Services only offered telehealth    Originating Site (Patient Location): Patient's home    Distant Site (Provider Location): Provider Remote Setting    Consent:  The patient/guardian has verbally consented to: the potential risks and benefits of telemedicine (video visit) versus in person care; bill my insurance or make self-payment for services provided; and responsibility for payment of non-covered services.     Patient would like the video invitation sent by: Send to e-mail at: [email or web address]    Mode of Communication:  Video Conference via [employer]well    As the provider I attest to compliance with applicable laws and regulations related to telemedicine.     Treatment Plan Last Reviewed: 4/6/2021 due 7/6/2021  PHQ-9 / TRESA-7 : 1/2    DATA  Interactive Complexity: No  Crisis: No       Progress Since Last Session (Related to Symptoms / Goals / Homework):   Symptoms: Improving tresa-7 phq-9    Homework: Achieved / completed to satisfaction      Episode of Care Goals: Minimal progress - ACTION (Actively working towards change); Intervened by reinforcing change plan / affirming steps taken     Current / Ongoing Stressors and Concerns:   Moving, engagement,  Difficult with the unknown, family conflict/communications     Treatment Objective(s) Addressed in This Session:     Patient will demonstrate/report improved family dynamics that can be safely managed in a lower level of care. Absence of persistent conflict in family relationships and reduction in family conflict  to level of comfort and satisfaction of family members as measured by client report for a period of at least 30 days within 6 months as clinically observed and by patient self-report.  Patient will demonstrate and report a level of anxiety that can be managed at a lower level of care.  Absence of persistent anxious mood and report of reduced frequency and intensity of worry and absence of persistent anxious mood to acceptable levels, no panic attacks, report subjective comfort with rumination for a period of 90 days, within 6 months as clinically observed and by patient self-report.     Intervention:  Therapist met with patient to review goals and interventions. Therapist utilized reflected listening as patient gave brief reflection of week. Patient processed lots of transitions and her mother's accident. Therapist supported patient as she processed and assessed for trauma/triggers. Therapist coached patient in effective communication, I statements and communicating her expectations.  Patient presented with an anxious affect. Patient was engaged in session and open to feedback. Patient reported no safety concerns.           ASSESSMENT: Current Emotional / Mental Status (status of significant symptoms):   Risk status (Self / Other harm or suicidal ideation)   Patient denies current fears or concerns for personal safety.   Patient denies current or recent suicidal ideation or behaviors.   Patient denies current or recent homicidal ideation or behaviors.   Patient denies current or recent self injurious behavior or ideation.   Patient denies other safety concerns.   Patient reports there has been no change in risk factors since their last session.     Patient reports there has been no change in protective factors since their last session.     Recommended that patient call 911 or go to the local ED should there be a change in any of these risk factors.     Appearance:   Appropriate    Eye Contact:   Fair    Psychomotor  Behavior: Restless    Attitude:   Cooperative  Interested Friendly Pleasant   Orientation:   All   Speech    Rate / Production: Emotional Talkative    Volume:  Normal    Mood:    Anxious    Affect:    Worrisome    Thought Content:  Clear    Thought Form:  Coherent    Insight:    Fair      Medication Review:   No changes to current psychiatric medication(s)     Medication Compliance:   Yes     Changes in Health Issues:   None reported     Chemical Use Review:   Substance Use: Chemical use reviewed, no active concerns identified      Tobacco Use: No current tobacco use.      Diagnosis:  1. Adjustment disorder with anxiety        Collateral Reports Completed:   Not Applicable    PLAN: (Patient Tasks / Therapist Tasks / Other)   write letter to parents on her experience.    See nutritionist     Therapist coached patient in effective communication, I statements and communicating her expectations.     Rosi Lozano, [facility] 6/21/2021                                                         ______________________________________________________________________    Treatment Plan    Patient's Name: Linda Agosto  YOB: 1990    Date:  4/6/2021    DSM5 Diagnoses: Adjustment Disorders  309.24 (F43.22) With anxiety  Psychosocial / Contextual Factors: Moving, engagement,  Difficult with the unknown, family conflict/communications  WHODAS: 17    Referral / Collaboration:  Referral to another professional/service is not indicated at this time.    Anticipated number of session or this episode of care: 20      MeasurableTreatment Goal(s) related to diagnosis / functional impairment(s)  Goal 1: Patient will absence of persistent anxiety mood and report of reduced frequency and intensity of worry and absence of persistent anxious mood to acceptable levels, no panic attacks, report subjective comfort with rumination for a period of 90 days. Within 6 months as clinically observed and by patient self-report.    I will know  I've met my goal when I'm faced with triggers I have the proper goals to manage those feelings.      Objective #A (Patient Action)    Patient will demonstrate and report a level of anxiety that can be managed at a lower level of care.  Absence of persistent anxious mood and report of reduced frequency and intensity of worry and absence of persistent anxious mood to acceptable levels, no panic attacks, report subjective comfort with rumination for a period of 90 days, within 6 months as clinically observed and by patient self-report.  Status: Continued - Date(s):  4/6/2021     Intervention(s)  Therapist will provide individual therapy to identify triggers to anxiety, gain feedback on helpful coping tools and thought-reframing techniques, and identify preferred way of being. Tx to include discuss current stressors and interpersonal conflicts and how to cope with these, coaching, diagnostic testing , referral for medication as indicated, use prescribed medication, cognitive restructuring, interpersonal,   family therapy, supportive therapy services.        Goal 2: Patient will absence of persistent conflict in family relationships and reduction in family conflict to level of comfort and satisfaction of family members as measured by client report for a period of at least 30 days within 6 months as clinically observed and by patient self-report    I will know I've met my goal when can communicate my boundary without worrying about their feelings.      Objective #A (Patient Action)    Status: Continued - Date(s):  4/6/2021     Patient will demonstrate/report improved family dynamics that can be safely managed in a lower level of care. Absence of persistent conflict in family relationships and reduction in family conflict to level of comfort and satisfaction of family members as measured by client report for a period of at least 30 days within 6 months as clinically observed and by patient  self-report.    Intervention(s)  Therapist will provide individual therapy to process stressors within family dynamics, identify areas within her control, and identify preferred way of being within family relationships.  Tx to discuss current stressors and interpersonal conflicts and how to cope with these, coaching, diagnostic testing, referral for medication as indicated, use prescribed medication, cognitive restructuring, interpersonal family therapy, supportive therapy services.          Patient has reviewed and agreed to the above plan.      Rosi Lozano, [facility]   4/6/2021

## 2021-06-22 ASSESSMENT — ANXIETY QUESTIONNAIRES: GAD7 TOTAL SCORE: 2

## 2021-07-11 ENCOUNTER — HEALTH MAINTENANCE LETTER (OUTPATIENT)
Age: 31
End: 2021-07-11

## 2021-07-13 DIAGNOSIS — F41.9 ANXIETY: ICD-10-CM

## 2021-07-13 NOTE — TELEPHONE ENCOUNTER
Routing refill request to provider for review/approval because:  Patient needs to be seen because:  Due for OV     Estee Willoughby RN, BSN  [facility]

## 2021-07-14 DIAGNOSIS — E06.3 HYPOTHYROIDISM DUE TO HASHIMOTO'S THYROIDITIS: ICD-10-CM

## 2021-07-14 RX ORDER — LEVOTHYROXINE SODIUM 112 UG/1
112 TABLET ORAL DAILY
Qty: 90 TABLET | Refills: 0 | Status: SHIPPED | OUTPATIENT
Start: 2021-07-14 | End: 2021-11-10

## 2021-07-14 NOTE — TELEPHONE ENCOUNTER
Pt has not been seen in office for >1 yr. Needs appt. Temp refill only. Please notify and assist in scheduling.  Electronically Signed By: Yvette Rodas PA-C

## 2021-07-14 NOTE — TELEPHONE ENCOUNTER
Refill sent for levothyroxine for 90 day supply, no refills. Follow-up visit scheduled 10/19/21 w/ Dr. Jimenez. (was due 7/2021).   Nara RN, BSN  07/14/21 10:38 AM

## 2021-07-14 NOTE — TELEPHONE ENCOUNTER
Last Written Prescription Date:  6/22/2020  Last Fill Quantity: 90 Tablets,  # refills: 3   Last office visit: Visit date not found with prescribing provider:  04/02/2021   Future Office Visit:      Requested Prescriptions   Pending Prescriptions Disp Refills     levothyroxine (SYNTHROID/LEVOTHROID) 112 MCG tablet 90 tablet 3     Sig: Take 1 tablet (112 mcg) by mouth daily       There is no refill protocol information for this order

## 2021-09-05 ENCOUNTER — HEALTH MAINTENANCE LETTER (OUTPATIENT)
Age: 31
End: 2021-09-05

## 2021-10-19 ENCOUNTER — OFFICE VISIT (OUTPATIENT)
Dept: ENDOCRINOLOGY | Facility: CLINIC | Age: 31
End: 2021-10-19
Payer: COMMERCIAL

## 2021-10-19 VITALS
HEART RATE: 97 BPM | WEIGHT: 203.6 LBS | BODY MASS INDEX: 32.86 KG/M2 | SYSTOLIC BLOOD PRESSURE: 127 MMHG | DIASTOLIC BLOOD PRESSURE: 89 MMHG

## 2021-10-19 DIAGNOSIS — Z96.41 INSULIN PUMP STATUS: ICD-10-CM

## 2021-10-19 DIAGNOSIS — E06.3 HYPOTHYROIDISM DUE TO HASHIMOTO'S THYROIDITIS: ICD-10-CM

## 2021-10-19 DIAGNOSIS — E10.9 TYPE 1 DIABETES MELLITUS WITHOUT COMPLICATION (H): Primary | ICD-10-CM

## 2021-10-19 PROCEDURE — 95251 CONT GLUC MNTR ANALYSIS I&R: CPT | Performed by: INTERNAL MEDICINE

## 2021-10-19 PROCEDURE — 99214 OFFICE O/P EST MOD 30 MIN: CPT | Performed by: INTERNAL MEDICINE

## 2021-10-19 NOTE — LETTER
10/19/2021         RE: Linda Agosto  [address]  [facility] [address]        Dear Colleague,    Thank you for referring your patient, Linda Agosto, to the [facility] [place]. Please see a copy of my visit note below.      Recent issues:  Diabetes follow-up evaluation  Changed from Freestyle Zhou to the DexcomG6 sensor  Feeling well overall, no other new health issues reported           2003. Diagnosis of diabetes mellitus, age 13, living in [facility]  Had acute illness requiring hospitalization at [facility]  Began insulin injections, using Novolog and Lantus insulin  [place] carb counting method, gram estimates  On MDI treatment plan for approx 2 years, then changed to pump use  Previous medical evaluations with Dr. Yash Barcenas/[facility] Andreia Callahan  2005. Began use of [place] pump  2012. Changed to Medtronic pump  Subsequently using Medtronic 723 Paradigm pump  2012. Tried wearing CGMS sensor, but uncomfortable     12/8/14. Initial consult with me at my former clinic ([facility])  Continued pump management  Was not interested in CGMS use     Previous FV hgbA1c levels include:              Lab Test 02/05/18 10/10/17 06/21/17 02/01/17 11/16/16  0815   A1C 7.4* 7.8* 8.1* 7.3* 8.2*      ~12/2017. Upgraded to Medtronic 670G pump              Novolog in pump     Tried Medtronic Guardian sensor, recalls frequent low glucose alarms              Wore sensor in manual mode              Didn't like it, discontinued use     ~11/2018. Wore diagnostic LibrePro CGMS  Subsequently obtained LibrePersonal CGMS, not wearing past year     Current pump settings:       Recent pump Carelink data:        Recent DexcomG6 data:         Recent FV labs include:  Lab Results   Component Value Date    A1C 8.3 (H) 04/07/2021     04/13/2021    POTASSIUM 4.2 04/13/2021    CHLORIDE 102 04/13/2021    CO2 25 04/13/2021    ANIONGAP 7 04/13/2021     (H) 04/13/2021    BUN 11 04/13/2021    CR 0.60 04/13/2021    GFRESTIMATED  >90 04/13/2021    GFRESTBLACK >90 04/13/2021    MARCIA 9.3 04/13/2021    CHOL 218 (H) 04/07/2021    TRIG 135 04/07/2021    HDL 59 04/07/2021     (H) 04/07/2021    NHDL 158 (H) 04/07/2021    UCRR 83 04/07/2021    MICROL 8 04/07/2021    UMALCR 9.04 04/07/2021     Hyperlipidemia:  Takes simvastatin medication  DM Complications:  None known        Thyroid:  2013. Diagnosis of hypothyroidism  Previous FV thyroid labs include:    Treatment with levothyroxine medication  Previously on stable dose as levothyroxine 0.088 mg daily  Recent FV labs include:  Lab Results   Component Value Date    TSH 3.92 04/07/2021    T4 1.11 04/07/2021     (H) 12/19/2013     Current dose:  Levothyroxine 0.112 mg daily        Single, works at [employer] with , in [place]  Finishing her JANAY at [employer]  Has previously seen Dr. Ellen Burdick/ [place]      ROS: 10 point ROS neg other than the symptoms noted above in the HPI.     GENERAL: mild fatigue, wt gain; denies fevers, chills, night sweats.   HEENT: no dysphagia, odonophagia, diplopia, neck pain  THYROID:  no apparent hyper or hypothyroid symptoms  CV: no chest pain, pressure, palpitations  LUNGS: no SOB, LANG, cough, wheezing   ABDOMEN: no diarrhea, constipation, abdominal pain  EXTREMITIES: no rashes, ulcers, edema  NEUROLOGY: no headaches, denies changes in vision, tingling, extremitiy numbness   MSK: no muscle aches or pains, weakness  SKIN: no rashes or lesions  : menses regular  PSYCH:  stable mood, no significant anxiety or depression  ENDOCRINE: no heat or cold intolerance       Physical Exam   VS: /89   Pulse 97   Wt 92.4 kg (203 lb 9.6 oz)   BMI 32.86 kg/m    GENERAL: AXOX3, NAD, well dressed, answering questions appropriately, appears stated age.  ENT: no nose swelling or nasal discharge, mouth redness or gum changes.  EYES: eyes grossly normal to inspection, conjunctivae and sclerae normal, no exophthalmos or proptosis  THYROID:   no apparent nodules or goiter  LUNGS: no audible wheeze, cough or visible cyanosis, or increased work of breathing  ABDOMEN: abdomen obese size  EXTREMITIES: no edema noted  NEUROLOGY: CN grossly intact, no tremors  MSK: grossly intact  SKIN:  no apparent skin lesions, rash, or edema with visualized skin appearance  PSYCH: mentation appears normal, affect normal/bright, judgement and insight intact,   normal speech and appearance well groomed       LABS:     All pertinent notes, labs, and images personally reviewed by me.      A/P:  Encounter Diagnoses   Name Primary?     Type 1 diabetes mellitus without complication (H) Yes     Insulin pump status      Hypothyroidism due to Hashimoto's thyroiditis      Comments:  Reviewed health history, diabetes and thyroid issues  Reviewed and interpreted tests that I previously ordered.   Ordered appropriate tests for the endocrinology disease management.    Management options discussed and implemented after shared medical decision making with the patient.  Diabetes problem is chronic with exacerbation progression, hyperglycemia     Plan:  Reviewed the overall T1DM management and insulin pump use.  Discussed optimal BG testing to assess glucose trends.  We reviewed insulin pump settings, basal rate and bolus dosing  Use of automated pump bolus dosing for meal/snack carb & correction dosing  Reviewed option of uploading pump to [employer] website  Reviewed recent DexcomG6 CGM glucose trends, in detail     Recommend:  Continue insulin pump and diabetes management plan.  Pump setting changes:    Basals:  [place] 0.95 to 0.9 unit(s)/hr    Bolus ICR: 11a 6.3 to 6.1, 5p 5.2 to 5.0    Continue use of the DexcomG6 CGM sensor    Use SG value with the Medtronic pump bolus wizard dosing  Goal target premeal BG  mg/dl  Completed her MNDMV form, copy given to her today    Discussed future option of changing from the Medtronic pump to the Tandem TSlim with Control IQ    I agree with  plan to change to Tandem pump    She has a friend using the Tandem system    Plan to see one of our [facility] CDE's, Diab Ed Referral placed  Continue the current  levothyroxine 0.112 mg daily  Continue current simvastatin daily dosing  Arrange annual dilated eye exam, fasting lipid panel testing.  Contact me if questions/concerns about diabetes and thyroid management     Addressed patient's questions today.    Patient Instructions     Diabetes treatment plan:  Continue use of the Medtronic pump and DexcomG6 sensor  Look at smartphone Dexcom junior before meals and bedtime   Use sensor glucose value for imput to pump bolus wizard   Use pump for mealtime (carb) and correction bolusing    Plan fasting lab appointment soon at [facility] clinci   Call [phone] for appointment   Lab orders done  See one of our [facility] Diabetes Educators (Cora or Kat)   Call [phone] to make the Diab Ed appt soon.    Continue current levothyroxine daily dose   Arrange a follow-up clinic appointment with me in 1/2022 or 2/2022  My office located at the [facility]  Address:  [address] [place] [address], [address], [place], [place]  Endocrinology appointments on Mon, Tues, Wed, and Thursdays.  Appointment types:  Face-to-face clinic or virtual visit appointments- mornings,   Virtual appointments- afternoons     Office phone:    [phone]  Scheduling number:   [phone]      Future labs ordered today:   Orders Placed This Encounter   Procedures     GLUCOSE MONITOR, 72 HOUR, PHYS INTERP     TSH     T4 free     Basic metabolic panel     Hemoglobin A1c     Lipid panel reflex to direct LDL Fasting     AMB Adult Diabetes Educator Referral     Radiology/Consults ordered today: AMBULATORY ADULT DIABETES EDUCATOR REFERRAL    Total time spent in with the patient evaluation:  25 min  Additional time spent reviewing pertinent lab tests and chart notes, and documentation:  5 min    Follow-up:  2/2022    ANTHONY Jimenez MD,  MS  Endocrinology  [facility]                          Again, thank you for allowing me to participate in the care of your patient.        Sincerely,        Santy Jimenez MD

## 2021-10-19 NOTE — PROGRESS NOTES
Recent issues:  Diabetes follow-up evaluation  Changed from Freestyle Zhou to the DexcomG6 sensor  Feeling well overall, no other new health issues reported           2003. Diagnosis of diabetes mellitus, age 13, living in [facility]  Had acute illness requiring hospitalization at [facility]  Began insulin injections, using Novolog and Lantus insulin  [place] carb counting method, gram estimates  On MDI treatment plan for approx 2 years, then changed to pump use  Previous medical evaluations with Dr. Yash Barcenas/[facility] Andreia Callahan  2005. Began use of [place] pump  2012. Changed to Medtronic pump  Subsequently using Medtronic 723 Paradigm pump  2012. Tried wearing CGMS sensor, but uncomfortable     12/8/14. Initial consult with me at my former clinic ([facility])  Continued pump management  Was not interested in CGMS use     Previous FV hgbA1c levels include:              Lab Test 02/05/18 10/10/17 06/21/17 02/01/17 11/16/16  0815   A1C 7.4* 7.8* 8.1* 7.3* 8.2*      ~12/2017. Upgraded to Medtronic 670G pump              Novolog in pump     Tried Medtronic Guardian sensor, recalls frequent low glucose alarms              Wore sensor in manual mode              Didn't like it, discontinued use     ~11/2018. Wore diagnostic LibrePro CGMS  Subsequently obtained LibrePersonal CGMS, not wearing past year     Current pump settings:       Recent pump Carelink data:        Recent DexcomG6 data:         Recent FV labs include:  Lab Results   Component Value Date    A1C 8.3 (H) 04/07/2021     04/13/2021    POTASSIUM 4.2 04/13/2021    CHLORIDE 102 04/13/2021    CO2 25 04/13/2021    ANIONGAP 7 04/13/2021     (H) 04/13/2021    BUN 11 04/13/2021    CR 0.60 04/13/2021    GFRESTIMATED >90 04/13/2021    GFRESTBLACK >90 04/13/2021    MARCIA 9.3 04/13/2021    CHOL 218 (H) 04/07/2021    TRIG 135 04/07/2021    HDL 59 04/07/2021     (H) 04/07/2021    NHDL 158 (H) 04/07/2021    UCRR 83 04/07/2021    MICROL 8 04/07/2021    [facility] 04/[facility]     Hyperlipidemia:  Takes simvastatin medication  DM Complications:  None known        Thyroid:  2013. Diagnosis of hypothyroidism  Previous FV thyroid labs include:    Treatment with levothyroxine medication  Previously on stable dose as levothyroxine 0.088 mg daily  Recent FV labs include:  Lab Results   Component Value Date    TSH 3.92 04/07/2021    T4 1.11 04/07/2021     (H) 12/19/2013     Current dose:  Levothyroxine 0.112 mg daily        Single, works at [employer] with , in [place]  Finishing her JANAY at [employer]  Has previously seen Dr. Ellen Burdick/[facility]      ROS: 10 point ROS neg other than the symptoms noted above in the HPI.     GENERAL: mild fatigue, wt gain; denies fevers, chills, night sweats.   HEENT: no dysphagia, odonophagia, diplopia, neck pain  THYROID:  no apparent hyper or hypothyroid symptoms  CV: no chest pain, pressure, palpitations  LUNGS: no SOB, LANG, cough, wheezing   ABDOMEN: no diarrhea, constipation, abdominal pain  EXTREMITIES: no rashes, ulcers, edema  NEUROLOGY: no headaches, denies changes in vision, tingling, extremitiy numbness   MSK: no muscle aches or pains, weakness  SKIN: no rashes or lesions  : menses regular  PSYCH:  stable mood, no significant anxiety or depression  ENDOCRINE: no heat or cold intolerance       Physical Exam   VS: /89   Pulse 97   Wt 92.4 kg (203 lb 9.6 oz)   BMI 32.86 kg/m    GENERAL: AXOX3, NAD, well dressed, answering questions appropriately, appears stated age.  ENT: no nose swelling or nasal discharge, mouth redness or gum changes.  EYES: eyes grossly normal to inspection, conjunctivae and sclerae normal, no exophthalmos or proptosis  THYROID:  no apparent nodules or goiter  LUNGS: no audible wheeze, cough or visible cyanosis, or increased work of breathing  ABDOMEN: abdomen obese size  EXTREMITIES: no edema noted  NEUROLOGY: CN grossly intact, no tremors  MSK: grossly intact  SKIN:  no  apparent skin lesions, rash, or edema with visualized skin appearance  PSYCH: mentation appears normal, affect normal/bright, judgement and insight intact,   normal speech and appearance well groomed       LABS:     All pertinent notes, labs, and images personally reviewed by me.      A/P:  Encounter Diagnoses   Name Primary?     Type 1 diabetes mellitus without complication (H) Yes     Insulin pump status      Hypothyroidism due to Hashimoto's thyroiditis      Comments:  Reviewed health history, diabetes and thyroid issues  Reviewed and interpreted tests that I previously ordered.   Ordered appropriate tests for the endocrinology disease management.    Management options discussed and implemented after shared medical decision making with the patient.  Diabetes problem is chronic with exacerbation progression, hyperglycemia     Plan:  Reviewed the overall T1DM management and insulin pump use.  Discussed optimal BG testing to assess glucose trends.  We reviewed insulin pump settings, basal rate and bolus dosing  Use of automated pump bolus dosing for meal/snack carb & correction dosing  Reviewed option of uploading pump to [employer] website  Reviewed recent DexcomG6 CGM glucose trends, in detail     Recommend:  Continue insulin pump and diabetes management plan.  Pump setting changes:    Basals:  [place] 0.95 to 0.9 unit(s)/hr    Bolus ICR: 11a 6.3 to 6.1, 5p 5.2 to 5.0    Continue use of the DexcomG6 CGM sensor    Use SG value with the Medtronic pump bolus wizard dosing  Goal target premeal BG  mg/dl  Completed her MNDMV form, copy given to her today    Discussed future option of changing from the Medtronic pump to the Tandem TSlim with Control IQ    I agree with plan to change to Tandem pump    She has a friend using the Tandem system    Plan to see one of our [facility] CDE's, Diab Ed Referral placed  Continue the current  levothyroxine 0.112 mg daily  Continue current simvastatin daily dosing  Arrange annual  dilated eye exam, fasting lipid panel testing.  Contact me if questions/concerns about diabetes and thyroid management     Addressed patient's questions today.    Patient Instructions     Diabetes treatment plan:  Continue use of the Medtronic pump and DexcomG6 sensor  Look at smartphone Dexcom junior before meals and bedtime   Use sensor glucose value for imput to pump bolus wizard   Use pump for mealtime (carb) and correction bolusing    Plan fasting lab appointment soon at [facility] cl[facility]i   Call [phone] for appointment   Lab orders done  See one of our [facility] Diabetes Educators (Cora or Kat)   Call [phone] to make the Diab Ed appt soon.    Continue current levothyroxine daily dose   Arrange a follow-up clinic appointment with me in 1/2022 or 2/2022  [facility]  Address:  [address] [place] [address], [address], [place], [place]  Endocrinology appointments on Mon, Tues, Wed, and Thursdays.  Appointment types:  Face-to-face clinic or virtual visit appointments- mornings,   Virtual appointments- afternoons     Office phone:    [phone]  Scheduling number:   [phone]      Future labs ordered today:   Orders Placed This Encounter   Procedures     GLUCOSE MONITOR, 72 HOUR, PHYS INTERP     TSH     T4 free     Basic metabolic panel     Hemoglobin A1c     Lipid panel reflex to direct LDL Fasting     AMB Adult Diabetes Educator Referral     Radiology/Consults ordered today: AMBULATORY ADULT DIABETES EDUCATOR REFERRAL    Total time spent in with the patient evaluation:  25 min  Additional time spent reviewing pertinent lab tests and chart notes, and documentation:  5 min    Follow-up:  2/2022    ANTHONY Jimenez MD, MS  Endocrinology  [facility]

## 2021-10-19 NOTE — PATIENT INSTRUCTIONS
Diabetes treatment plan:  Continue use of the Medtronic pump and DexcomG6 sensor  Look at smartphone Dexcom junior before meals and bedtime   Use sensor glucose value for imput to pump bolus wizard   Use pump for mealtime (carb) and correction bolusing    Plan fasting lab appointment soon at [facility] cl[facility]i   Call [phone] for appointment   Lab orders done  See one of our [facility] Diabetes Educators (Cora or Kat)   Call [phone] to make the Diab Ed appt soon.    Continue current levothyroxine daily dose   Arrange a follow-up clinic appointment with me in 1/2022 or 2/2022  My office located at the [facility]  Address:  [address], [address], [place], [place]  Endocrinology appointments on Mon, Tues, Wed, and Thursdays.  Appointment types:  Face-to-face clinic or virtual visit appointments- mornings,   Virtual appointments- afternoons     Office phone:    [phone]  Scheduling number:   [phone]

## 2021-10-22 DIAGNOSIS — E10.9 TYPE 1 DIABETES, HBA1C GOAL < 7% (H): ICD-10-CM

## 2021-10-22 NOTE — TELEPHONE ENCOUNTER
LOV 10- TL, needs fasting lab work no appointment scheduled    Next OV:  12-2-2021 diabetic educator  1- TL    #30mL R0 pended  SIG/pharm note - fasting lab only visit needed  MyChart sent - fasting lab only visit needed    Sharmila Gant RT (R)

## 2021-10-25 NOTE — TELEPHONE ENCOUNTER
Prescription approved per [facility] Refill Protocol. Will await to see if fasting appt made.Karla Parsons RN

## 2021-10-28 ENCOUNTER — TRANSFERRED RECORDS (OUTPATIENT)
Dept: HEALTH INFORMATION MANAGEMENT | Facility: CLINIC | Age: 31
End: 2021-10-28

## 2021-10-28 LAB — RETINOPATHY: NEGATIVE

## 2021-10-31 ENCOUNTER — HEALTH MAINTENANCE LETTER (OUTPATIENT)
Age: 31
End: 2021-10-31

## 2021-11-05 ENCOUNTER — LAB (OUTPATIENT)
Dept: LAB | Facility: CLINIC | Age: 31
End: 2021-11-05
Payer: COMMERCIAL

## 2021-11-05 DIAGNOSIS — E10.9 TYPE 1 DIABETES MELLITUS WITHOUT COMPLICATION (H): ICD-10-CM

## 2021-11-05 DIAGNOSIS — E06.3 HYPOTHYROIDISM DUE TO HASHIMOTO'S THYROIDITIS: ICD-10-CM

## 2021-11-05 LAB
ANION GAP SERPL CALCULATED.3IONS-SCNC: 6 MMOL/L (ref 3–14)
BUN SERPL-MCNC: 16 MG/DL (ref 7–30)
CALCIUM SERPL-MCNC: 8.9 MG/DL (ref 8.5–10.1)
CHLORIDE BLD-SCNC: 104 MMOL/L (ref 94–109)
CHOLEST SERPL-MCNC: 236 MG/DL
CO2 SERPL-SCNC: 25 MMOL/L (ref 20–32)
CREAT SERPL-MCNC: 0.78 MG/DL (ref 0.52–1.04)
FASTING STATUS PATIENT QL REPORTED: YES
GFR SERPL CREATININE-BSD FRML MDRD: >90 ML/MIN/1.73M2
GLUCOSE BLD-MCNC: 150 MG/DL (ref 70–99)
HBA1C MFR BLD: 8.8 % (ref 0–5.6)
HDLC SERPL-MCNC: 68 MG/DL
LDLC SERPL CALC-MCNC: 154 MG/DL
NONHDLC SERPL-MCNC: 168 MG/DL
POTASSIUM BLD-SCNC: 4.4 MMOL/L (ref 3.4–5.3)
SODIUM SERPL-SCNC: 135 MMOL/L (ref 133–144)
T4 FREE SERPL-MCNC: 0.93 NG/DL (ref 0.76–1.46)
TRIGL SERPL-MCNC: 72 MG/DL
TSH SERPL DL<=0.005 MIU/L-ACNC: 6.37 MU/L (ref 0.4–4)

## 2021-11-05 PROCEDURE — 84443 ASSAY THYROID STIM HORMONE: CPT

## 2021-11-05 PROCEDURE — 83036 HEMOGLOBIN GLYCOSYLATED A1C: CPT

## 2021-11-05 PROCEDURE — 84439 ASSAY OF FREE THYROXINE: CPT

## 2021-11-05 PROCEDURE — 80048 BASIC METABOLIC PNL TOTAL CA: CPT

## 2021-11-05 PROCEDURE — 36415 COLL VENOUS BLD VENIPUNCTURE: CPT

## 2021-11-05 PROCEDURE — 80061 LIPID PANEL: CPT

## 2021-11-10 DIAGNOSIS — E06.3 HYPOTHYROIDISM DUE TO HASHIMOTO'S THYROIDITIS: ICD-10-CM

## 2021-11-10 RX ORDER — LEVOTHYROXINE SODIUM 125 UG/1
125 TABLET ORAL DAILY
Qty: 90 TABLET | Refills: 3 | Status: SHIPPED | OUTPATIENT
Start: 2021-11-10 | End: 2022-11-11

## 2021-11-11 ENCOUNTER — MYC MEDICAL ADVICE (OUTPATIENT)
Dept: ENDOCRINOLOGY | Facility: CLINIC | Age: 31
End: 2021-11-11
Payer: COMMERCIAL

## 2021-11-11 DIAGNOSIS — E78.5 HYPERLIPIDEMIA WITH TARGET LDL LESS THAN 70: ICD-10-CM

## 2021-11-11 RX ORDER — SIMVASTATIN 40 MG
40 TABLET ORAL AT BEDTIME
Qty: 90 TABLET | Refills: 3 | Status: SHIPPED | OUTPATIENT
Start: 2021-11-11 | End: 2023-03-07

## 2021-11-24 ENCOUNTER — VIRTUAL VISIT (OUTPATIENT)
Dept: PSYCHOLOGY | Facility: CLINIC | Age: 31
End: 2021-11-24
Payer: COMMERCIAL

## 2021-11-24 DIAGNOSIS — F43.22 ADJUSTMENT DISORDER WITH ANXIETY: Primary | ICD-10-CM

## 2021-11-24 PROCEDURE — 90834 PSYTX W PT 45 MINUTES: CPT | Mod: GT | Performed by: SOCIAL WORKER

## 2021-11-24 ASSESSMENT — ANXIETY QUESTIONNAIRES
5. BEING SO RESTLESS THAT IT IS HARD TO SIT STILL: NOT AT ALL
GAD7 TOTAL SCORE: 8
2. NOT BEING ABLE TO STOP OR CONTROL WORRYING: MORE THAN HALF THE DAYS
4. TROUBLE RELAXING: SEVERAL DAYS
7. FEELING AFRAID AS IF SOMETHING AWFUL MIGHT HAPPEN: MORE THAN HALF THE DAYS
3. WORRYING TOO MUCH ABOUT DIFFERENT THINGS: SEVERAL DAYS
6. BECOMING EASILY ANNOYED OR IRRITABLE: NOT AT ALL
1. FEELING NERVOUS, ANXIOUS, OR ON EDGE: MORE THAN HALF THE DAYS

## 2021-11-24 NOTE — PROGRESS NOTES
Progress Note    Patient Name: Linda Agosto  Date: 11/24/2021         Service Type: Individual      Session Start Time: 1405  Session End Time: 1445     Session Length: 38-52    Session #: 9    Attendees: Client    Service Modality:  Video Visit:    Telemedicine Visit: The patient's condition can be safely assessed and treated via synchronous audio and visual telemedicine encounter.      Reason for Telemedicine Visit: Services only offered telehealth    Originating Site (Patient Location): Patient's home    Distant Site (Provider Location): Provider Remote Setting    Consent:  The patient/guardian has verbally consented to: the potential risks and benefits of telemedicine (video visit) versus in person care; bill my insurance or make self-payment for services provided; and responsibility for payment of non-covered services.     Patient would like the video invitation sent by: Send to e-mail at: [email or web address]    Mode of Communication:  Video Conference via [employer]well    As the provider I attest to compliance with applicable laws and regulations related to telemedicine.     Treatment Plan Last Reviewed: 11/24/2021 due 2/24/2022  PHQ-9 / TRESA-7 : 5/8    DATA  Interactive Complexity: No  Crisis: No       Progress Since Last Session (Related to Symptoms / Goals / Homework):   Symptoms: Worsening phq-9 tresa-7    Homework: Achieved / completed to satisfaction      Episode of Care Goals: Satisfactory progress - ACTION (Actively working towards change); Intervened by reinforcing change plan / affirming steps taken     Current / Ongoing Stressors and Concerns:    engagement,  Difficult with the unknown, family conflict/communications     Treatment Objective(s) Addressed in This Session:     Patient will demonstrate/report improved family dynamics that can be safely managed in a lower level of care. Absence of persistent conflict in family relationships and reduction in family conflict  to level of comfort and satisfaction of family members as measured by client report for a period of at least 30 days within 6 months as clinically observed and by patient self-report.  Patient will demonstrate and report a level of anxiety that can be managed at a lower level of care.  Absence of persistent anxious mood and report of reduced frequency and intensity of worry and absence of persistent anxious mood to acceptable levels, no panic attacks, report subjective comfort with rumination for a period of 90 days, within 6 months as clinically observed and by patient self-report.     Intervention:  Therapist met with patient to review goals and interventions. Therapist utilized reflected listening as patient gave brief reflection of week. Patient processed trauma response to her mothers mood changing. Therapist supported patient as she processed and validated patient. Therapist educated patient in trauma response and coached patient through disengaging, shelving/unpacking, grounding/managing and then returning with balance and logic.   Patient presented with an anxious affect. Patient was engaged in session and open to feedback. Patient reported no safety concerns.           ASSESSMENT: Current Emotional / Mental Status (status of significant symptoms):   Risk status (Self / Other harm or suicidal ideation)   Patient denies current fears or concerns for personal safety.   Patient denies current or recent suicidal ideation or behaviors.   Patient denies current or recent homicidal ideation or behaviors.   Patient denies current or recent self injurious behavior or ideation.   Patient denies other safety concerns.   Patient reports there has been no change in risk factors since their last session.     Patient reports there has been no change in protective factors since their last session.     Recommended that patient call 911 or go to the local ED should there be a change in any of these risk  factors.     Appearance:   Appropriate    Eye Contact:   Fair    Psychomotor Behavior: Restless    Attitude:   Cooperative  Interested Friendly Pleasant   Orientation:   All   Speech    Rate / Production: Emotional Talkative    Volume:  Normal    Mood:    Anxious    Affect:    Worrisome    Thought Content:  Clear    Thought Form:  Coherent    Insight:    Fair      Medication Review:   No changes to current psychiatric medication(s)     Medication Compliance:   Yes     Changes in Health Issues:   None reported     Chemical Use Review:   Substance Use: Chemical use reviewed, no active concerns identified      Tobacco Use: No current tobacco use.      Diagnosis:  1. Adjustment disorder with anxiety        Collateral Reports Completed:   Not Applicable    PLAN: (Patient Tasks / Therapist Tasks / Other)          Therapist educated patient in trauma response and coached patient through disengaging, shelving/unpacking, grounding/managing and then returning with balance and logic.       Rosi Lozano, [facility] 11/24/2021                                                         ______________________________________________________________________    Treatment Plan    Patient's Name: Linda Agosto  YOB: 1990    Date:  11/24/2021    DSM5 Diagnoses: Adjustment Disorders  309.24 (F43.22) With anxiety  Psychosocial / Contextual Factors: Moving, engagement,  Difficult with the unknown, family conflict/communications  WHODAS: 15    Referral / Collaboration:  Referral to another professional/service is not indicated at this time.    Anticipated number of session or this episode of care: 4      MeasurableTreatment Goal(s) related to diagnosis / functional impairment(s)  Goal 1: Patient will absence of persistent anxiety mood and report of reduced frequency and intensity of worry and absence of persistent anxious mood to acceptable levels, no panic attacks, report subjective comfort with rumination for a period of 90  days. Within 6 months as clinically observed and by patient self-report.    I will know I've met my goal when I'm faced with triggers I have the proper goals to manage those feelings.      Objective #A (Patient Action)    Patient will demonstrate and report a level of anxiety that can be managed at a lower level of care.  Absence of persistent anxious mood and report of reduced frequency and intensity of worry and absence of persistent anxious mood to acceptable levels, no panic attacks, report subjective comfort with rumination for a period of 90 days, within 6 months as clinically observed and by patient self-report.  Status: Continued - Date(s): 11/24/2021    Intervention(s)  Therapist will provide individual therapy to identify triggers to anxiety, gain feedback on helpful coping tools and thought-reframing techniques, and identify preferred way of being. Tx to include discuss current stressors and interpersonal conflicts and how to cope with these, coaching, diagnostic testing , referral for medication as indicated, use prescribed medication, cognitive restructuring, interpersonal,   family therapy, supportive therapy services.        Goal 2: Patient will absence of persistent conflict in family relationships and reduction in family conflict to level of comfort and satisfaction of family members as measured by client report for a period of at least 30 days within 6 months as clinically observed and by patient self-report    I will know I've met my goal when can communicate my boundary without worrying about their feelings.      Objective #A (Patient Action)    Status: Continued - Date(s):  11/24/2021    Patient will demonstrate/report improved family dynamics that can be safely managed in a lower level of care. Absence of persistent conflict in family relationships and reduction in family conflict to level of comfort and satisfaction of family members as measured by client report for a period of at least 30  days within 6 months as clinically observed and by patient self-report.    Intervention(s)  Therapist will provide individual therapy to process stressors within family dynamics, identify areas within her control, and identify preferred way of being within family relationships.  Tx to discuss current stressors and interpersonal conflicts and how to cope with these, coaching, diagnostic testing, referral for medication as indicated, use prescribed medication, cognitive restructuring, interpersonal family therapy, supportive therapy services.          Patient has reviewed and agreed to the above plan.      Rosi Lozano, [facility]   11/24/2021

## 2021-11-25 ASSESSMENT — PATIENT HEALTH QUESTIONNAIRE - PHQ9: SUM OF ALL RESPONSES TO PHQ QUESTIONS 1-9: 5

## 2021-11-25 ASSESSMENT — ANXIETY QUESTIONNAIRES: GAD7 TOTAL SCORE: 8

## 2021-12-11 DIAGNOSIS — E10.9 TYPE 1 DIABETES, HBA1C GOAL < 7% (H): ICD-10-CM

## 2021-12-20 DIAGNOSIS — E10.9 TYPE 1 DIABETES, HBA1C GOAL < 7% (H): Primary | ICD-10-CM

## 2021-12-20 RX ORDER — PROCHLORPERAZINE 25 MG/1
SUPPOSITORY RECTAL
Qty: 1 EACH | Refills: 3 | Status: SHIPPED | OUTPATIENT
Start: 2021-12-20 | End: 2022-01-03

## 2021-12-20 NOTE — TELEPHONE ENCOUNTER
Prescription approved per [facility] Refill Protocol. Pt has sensors so must now need a transmitter .Karla Parsons RN

## 2022-01-20 ENCOUNTER — TELEPHONE (OUTPATIENT)
Dept: ENDOCRINOLOGY | Facility: CLINIC | Age: 32
End: 2022-01-20
Payer: COMMERCIAL

## 2022-01-20 NOTE — TELEPHONE ENCOUNTER
[facility] Call Center    Phone Message    May a detailed message be left on voicemail: no     Reason for Call: Other: Per Noé with [employer] they have faxed to the clinic a request for refill of pt's diabetic supplies multiple times, but not heard anything back. Writer unable to confirm if clinic received faxes, but writer did confirm that [employer] had been using the right fax number. Please review, and call [employer] back. Phone: [phone] and select option 2     Action Taken: Message routed to:  Other: endocrine    Travel Screening: Not Applicable

## 2022-01-20 NOTE — TELEPHONE ENCOUNTER
Message noted.  I haven't seen any forms and wonder which office (or which fax number) [employer] has sent forms to.  Please call them to confirm, thanks.    ANTOHNY Jimenez MD, MS  Endocrinology  [facility]

## 2022-01-20 NOTE — PROGRESS NOTES
Linda is a 31 year old  female who presents for annual exam.     Besides routine health maintenance, she has no other health concerns today .    HPI:  The patient's PCP is  Yvette Rodas PA-C.      Getting  in April. Wants to discuss pregnancy planning. Knows she needs to get her A1c down, was 8.8 in Nov.  Also TSH 6.3   Sees Dr. Azevedo, next appt next mo. Has insulin pump.   Gets Dm eye exam yearly.           GYNECOLOGIC HISTORY:    Patient's last menstrual period was 2022.    Regular menses? yes  Menses every 30 days.  Length of menses: 5 days    Her current contraception method is: none.  She  reports that she has never smoked. She has never used smokeless tobacco.    Patient is sexually active.  STD testing offered?  Declined  Last PHQ-9 score on record =   PHQ-9 SCORE 2022   PHQ-9 Total Score -   PHQ-9 Total Score MyChart -   PHQ-9 Total Score 0     Last GAD7 score on record =   TRESA-7 SCORE 2022   Total Score -   Total Score -   Total Score 4     Alcohol Score = 2    HEALTH MAINTENANCE:  Cholesterol:   Recent Labs   Lab Test 21  0901 21  1553 04/09/15  0000 14  1445 13  1009   CHOL 236* 218*   < > 154 149   HDL 68 59   < > 49* 59   * 131*   < > 96 76   TRIG 72 135   < > 44 70   CHOLHDLRATIO  --   --   --  3.2 3.0        Last Mammo: Not applicable, Result: Not applicable, Next Mammo: Due at age 42   Pap: (  Lab Results   Component Value Date    PAP NIL  2021    PAP NIL 2017    PAP NIL 2014      Colonoscopy:  never, Result: Not applicable, Next Colonoscopy: due at age 45-50 years.  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 (H)     Common migraine     Past Surgical History:   Procedure Laterality Date     APPENDECTOMY  8/07    dr deshpande     PE TUBES  1996      Social History     Tobacco Use     Smoking status: Never Smoker     Smokeless tobacco: Never Used   Substance Use Topics     Alcohol use: Yes     Alcohol/week: 2.0 standard drinks     Types: 2 Standard drinks or equivalent per week     Comment: sometimes couple on weekends      Problem (# of Occurrences) Relation (Name,Age of Onset)    Genetic Disorder (1) Paternal Grandfather: kidney    Neurologic Disorder (1) Maternal Grandmother: dementia       Negative family history of: Family History Negative            Current Outpatient Medications   Medication Sig     blood glucose (VALE CONTOUR NEXT) test strip Patient tests 5 times/day, Contour Next test strips DAW1     blood glucose monitoring (VALE MICROLET) lancets Patient tests 8 times/day     Continuous Blood Gluc Transmit (DEXCOM G6 TRANSMITTER) MISC CHANGE EVERY 90 DAYS     insulin aspart (NOVOLOG PEN) 100 UNIT/ML pen Inject 3 to 10 units subcutaneous at mealtimes as directed, total daily dose approx 30 units     insulin aspart (NOVOLOG VIAL) 100 UNITS/ML vial USE WITH INSULIN PUMP FOR A TOTAL OF 75 UNITS DAILY     insulin glargine (BASAGLAR KWIKPEN) 100 UNIT/ML injection INJ 21 UNITS SC D     insulin pen needle (BD CLEOPATRA U/F) 32G X 4 MM miscellaneous Use 4 pen needles daily or as directed.     levothyroxine (SYNTHROID/LEVOTHROID) 125 MCG tablet Take 1 tablet (125 mcg) by mouth daily     sertraline (ZOLOFT) 50 MG tablet Take 1 tablet (50 mg) by mouth daily     simvastatin (ZOCOR) 40 MG tablet Take 1 tablet (40 mg) by mouth At Bedtime     triamcinolone (KENALOG) 0.1 % external cream Apply topically 2 times daily to affected areas on fingers for 10-14 days. Not for use on face nor groin.      "Continuous Blood Gluc Sensor (DEXCOM G6 SENSOR) MISC 1 Device continuous prn (Use for continuous glucose testing, change every 10-days) Dispense 3 box (of 3 sensors) per 3 months     No current facility-administered medications for this visit.     Allergies   Allergen Reactions     Penicillins Rash     augmentin & pcn     Sulfa Drugs Hives       Past medical, surgical, social and family histories were reviewed and updated in EPIC.    ROS:   12 point review of systems negative other than symptoms noted below or in the HPI.  No urinary frequency or dysuria, bladder or kidney problems    EXAM:  /66   Pulse 64   Ht 1.676 m (5' 6\")   Wt 89.8 kg (198 lb)   LMP 01/13/2022   BMI 31.96 kg/m     BMI: Body mass index is 31.96 kg/m .    PHYSICAL EXAM:  Constitutional:   Appearance: Well nourished, well developed, alert, in no acute distress  Neck:  Lymph Nodes:  No lymphadenopathy present    Thyroid:  Gland size normal, nontender, no nodules or masses present  on palpation  Chest:  Respiratory Effort:  Breathing unlabored  Cardiovascular:    Heart: Auscultation:  Regular rate, normal rhythm, no murmurs present  Breasts: Inspection of Breasts:  No lymphadenopathy present., Palpation of Breasts and Axillae:  No masses present on palpation, no breast tenderness., Axillary Lymph Nodes:  No lymphadenopathy present. and No nodularity, asymmetry or nipple discharge bilaterally.  Gastrointestinal:   Abdominal Examination:  Abdomen nontender to palpation, tone normal without rigidity or guarding, no masses present, umbilicus without lesions   Liver and Spleen:  No hepatomegaly present, liver nontender to palpation    Hernias:  No hernias present  Lymphatic: Lymph Nodes:  No other lymphadenopathy present  Skin:  General Inspection:  No rashes present, no lesions present, no areas of  discoloration  Neurologic:    Mental Status:  Oriented X3.  Normal strength and tone, sensory exam                grossly normal, mentation " intact and speech normal.    Psychiatric:   Mentation appears normal and affect normal/bright.         Pelvic Exam:  External Genitalia:     Normal appearance for age, no discharge present, no tenderness present, no inflammatory lesions present, color normal  Vagina:     Normal vaginal vault without central or paravaginal defects, no discharge present, no inflammatory lesions present, no masses present  Bladder:     Nontender to palpation  Urethra:   Urethral Body:  Urethra palpation normal, urethra structural support normal   Urethral Meatus:  No erythema or lesions present  Cervix:     Appearance healthy, no lesions present, nontender to palpation, no bleeding present  Uterus:     Uterus: firm, normal sized and nontender, anteverted in position.   Adnexa:     No adnexal tenderness present, no adnexal masses present  Perineum:     Perineum within normal limits, no evidence of trauma, no rashes or skin lesions present  Anus:     Anus within normal limits, no hemorrhoids present  Inguinal Lymph Nodes:     No lymphadenopathy present  Pubic Hair:     Normal pubic hair distribution for age  Genitalia and Groin:     No rashes present, no lesions present, no areas of discoloration, no masses present      COUNSELING:   Reviewed preventive health counseling, as reflected in patient instructions       Folic Acid    BMI: Body mass index is 31.96 kg/m .  Weight management plan: Discussed healthy diet and exercise guidelines    ASSESSMENT:  31 year old female with satisfactory annual exam.    ICD-10-CM    1. Encounter for gynecological examination without abnormal finding  Z01.419    2. Type 1 diabetes, HbA1c goal < 7% (H)  E10.9 Mat Fetal [facility] Referral - Preconception       PLAN:  -UTD for cervical cancer screening. Reviewed guidelines-pap q 3yrs until age 30 when co-testing q 5 years.  -Breast self awareness discussed. Age 40 for mammogram.  -Discussed exercise-making plan, strength training. Nutrition encouraged.  -Rec  start PNV  -Plan MFM preconceptual counseling for Type 1 Dm in pregnancy  -F/U with Endo regarding Ty1DM and subclinical hypothyroidism. Goal TSH 2.5 or less. Discussed importance of normal A1c for proper fetal development.   -Return one year for next annual exam          Sierra Treviño Masters, DO

## 2022-01-28 ENCOUNTER — OFFICE VISIT (OUTPATIENT)
Dept: OBGYN | Facility: CLINIC | Age: 32
End: 2022-01-28
Payer: COMMERCIAL

## 2022-01-28 VITALS
WEIGHT: 198 LBS | DIASTOLIC BLOOD PRESSURE: 66 MMHG | HEIGHT: 66 IN | HEART RATE: 64 BPM | BODY MASS INDEX: 31.82 KG/M2 | SYSTOLIC BLOOD PRESSURE: 112 MMHG

## 2022-01-28 DIAGNOSIS — E10.9 TYPE 1 DIABETES, HBA1C GOAL < 7% (H): ICD-10-CM

## 2022-01-28 DIAGNOSIS — Z01.419 ENCOUNTER FOR GYNECOLOGICAL EXAMINATION WITHOUT ABNORMAL FINDING: Primary | ICD-10-CM

## 2022-01-28 PROCEDURE — 99395 PREV VISIT EST AGE 18-39: CPT | Performed by: OBSTETRICS & GYNECOLOGY

## 2022-01-28 ASSESSMENT — ANXIETY QUESTIONNAIRES
2. NOT BEING ABLE TO STOP OR CONTROL WORRYING: SEVERAL DAYS
5. BEING SO RESTLESS THAT IT IS HARD TO SIT STILL: NOT AT ALL
IF YOU CHECKED OFF ANY PROBLEMS ON THIS QUESTIONNAIRE, HOW DIFFICULT HAVE THESE PROBLEMS MADE IT FOR YOU TO DO YOUR WORK, TAKE CARE OF THINGS AT HOME, OR GET ALONG WITH OTHER PEOPLE: SOMEWHAT DIFFICULT
3. WORRYING TOO MUCH ABOUT DIFFERENT THINGS: SEVERAL DAYS
1. FEELING NERVOUS, ANXIOUS, OR ON EDGE: SEVERAL DAYS
GAD7 TOTAL SCORE: 4
6. BECOMING EASILY ANNOYED OR IRRITABLE: NOT AT ALL
7. FEELING AFRAID AS IF SOMETHING AWFUL MIGHT HAPPEN: SEVERAL DAYS

## 2022-01-28 ASSESSMENT — MIFFLIN-ST. JEOR: SCORE: 1639.87

## 2022-01-28 ASSESSMENT — PATIENT HEALTH QUESTIONNAIRE - PHQ9
5. POOR APPETITE OR OVEREATING: NOT AT ALL
SUM OF ALL RESPONSES TO PHQ QUESTIONS 1-9: 0

## 2022-01-28 NOTE — TELEPHONE ENCOUNTER
Received form(s) from [employer] - Rx pump supplies  Placed form(s) in/on TL's incoming basket.  Forms need to be filled out and signed and faxed to number listed on form.    AWAITING COMPLETION      Copy needs to be sent for scanning after completion: Yes     Kim Carrion MA

## 2022-01-29 ASSESSMENT — ANXIETY QUESTIONNAIRES: GAD7 TOTAL SCORE: 4

## 2022-01-31 ENCOUNTER — TRANSCRIBE ORDERS (OUTPATIENT)
Dept: MATERNAL FETAL MEDICINE | Facility: CLINIC | Age: 32
End: 2022-01-31

## 2022-01-31 DIAGNOSIS — O26.90 PREGNANCY RELATED CONDITION, ANTEPARTUM: Primary | ICD-10-CM

## 2022-01-31 NOTE — TELEPHONE ENCOUNTER
Form(s) have been faxed.  Faxed or mailed to: number listed on form.  Form(s) in accordion file for 1 month then to scan.    Kim Carrion MA

## 2022-02-07 DIAGNOSIS — E10.9 TYPE 1 DIABETES, HBA1C GOAL < 7% (H): ICD-10-CM

## 2022-02-07 NOTE — TELEPHONE ENCOUNTER
Medication is being filled for 1 time refill only due to:  Patient needs labs and an appt which is scheduled in March .

## 2022-02-08 ENCOUNTER — VIRTUAL VISIT (OUTPATIENT)
Dept: EDUCATION SERVICES | Facility: CLINIC | Age: 32
End: 2022-02-08
Payer: COMMERCIAL

## 2022-02-08 DIAGNOSIS — E10.9 TYPE 1 DIABETES, HBA1C GOAL < 7% (H): Primary | ICD-10-CM

## 2022-02-08 DIAGNOSIS — E10.9 TYPE 1 DIABETES MELLITUS WITHOUT COMPLICATION (H): ICD-10-CM

## 2022-02-08 PROCEDURE — G0108 DIAB MANAGE TRN  PER INDIV: HCPCS | Mod: 95 | Performed by: DIETITIAN, REGISTERED

## 2022-02-08 RX ORDER — GLUCAGON 3 MG/1
1 POWDER NASAL
Qty: 1 EACH | Refills: 0 | Status: SHIPPED | OUTPATIENT
Start: 2022-02-08

## 2022-02-08 NOTE — PROGRESS NOTES
"    Type of service:  Video Visit    If the video visit is dropped, the video visit invitation should be resent by: Send to e-mail at: [email or web address]    Originating Location (pt. Location): Home  Distant Location (provider location): Home  Mode of Communication:  Video Conference via [employer]    Video Start Time: 8:05am  Video End Time (time video stopped): 8:50am    How would patient like to obtain AVS? [facility] [place]     Diabetes Self-Management Education & Support    Presents for: Insulin Pump and CGM Review    SUBJECTIVE/OBJECTIVE:  Presents for: Insulin Pump and CGM Review  Accompanied by: Self  Diabetes education in the past 24mo: No  Focus of Visit: Insulin Pump,CGM  Type of Pump visit: Pump Review  Diabetes type: Type 1  Disease course: Stable  How confident are you filling out medical forms by yourself:: Extremely  Diabetes management related comments/concerns: Interested in the Tandem pump. OOW with Medtronic.  Friend has Tandem pump. Knows it links to Dexcom.   Difficulty affording diabetes medication?: No  Difficulty affording diabetes testing supplies?: No  Other concerns:: None  Cultural Influences/Ethnic Background:  Not  or     Diabetes Symptoms & Complications:  Weight trend: Stable  Complications assessed today?: Yes  Autonomic neuropathy: No  CVA: No  Heart disease: No  Nephropathy: No  Peripheral neuropathy: No  Peripheral Vascular Disease: No  Retinopathy: No    Patient Problem List and Family Medical History reviewed for relevant medical history, current medical status, and diabetes risk factors.    Vitals:  [facility] 01/13/2022   Estimated body mass index is 31.96 kg/m  as calculated from the following:    Height as of 1/28/22: 1.676 m (5' 6\").    Weight as of 1/28/22: 89.8 kg (198 lb).   Last 3 BP:   BP Readings from Last 3 Encounters:   01/28/22 112/66   10/19/21 127/89   04/13/21 124/81       History   Smoking Status     Never Smoker   Smokeless Tobacco     Never Used "       Labs:  Lab Results   Component Value Date    A1C 8.8 11/05/2021    A1C 8.3 04/07/2021     Lab Results   Component Value Date     11/05/2021     04/13/2021     Lab Results   Component Value Date     11/05/2021     04/07/2021     HDL Cholesterol   Date Value Ref Range Status   04/07/2021 59 >49 mg/dL Final     Direct Measure HDL   Date Value Ref Range Status   11/05/2021 68 >=50 mg/dL Final   ]  GFR Estimate   Date Value Ref Range Status   11/05/2021 >90 >60 mL/min/1.73m2 Final     Comment:     As of July 11, 2021, eGFR is calculated by the CKD-EPI creatinine equation, without race adjustment. eGFR can be influenced by muscle mass, exercise, and diet. The reported eGFR is an estimation only and is only applicable if the renal function is stable.   04/13/2021 >90 >60 mL/min/[1.73_m2] Final     Comment:     Non  GFR Calc  Starting 12/18/2018, serum creatinine based estimated GFR (eGFR) will be   calculated using the Chronic Kidney Disease Epidemiology Collaboration   (CKD-EPI) equation.       GFR Estimate If Black   Date Value Ref Range Status   04/13/2021 >90 >60 mL/min/[1.73_m2] Final     Comment:      GFR Calc  Starting 12/18/2018, serum creatinine based estimated GFR (eGFR) will be   calculated using the Chronic Kidney Disease Epidemiology Collaboration   (CKD-EPI) equation.       Lab Results   Component Value Date    CR 0.78 11/05/2021    CR 0.60 04/13/2021     No results found for: MICROALBUMIN    Healthy Eating:  Healthy Eating Assessed Today: No    Being Active:  Being Active Assessed Today: No    Monitoring:  Monitoring Assessed Today: Yes  Blood Glucose Meter: CGM  Blood glucose trend: Fluctuating    Taking Medications:  Diabetes Medication(s)     Diabetic Other       Glucagon (BAQSIMI TWO PACK) 3 MG/DOSE POWD    Spray 1 Device in nostril once as needed (for severe hypoglycemia)    Insulin       insulin aspart (NOVOLOG PEN) 100 UNIT/ML pen     Inject 3 to 10 units subcutaneous at mealtimes as directed, total daily dose approx 30 units     insulin aspart (NOVOLOG VIAL) 100 UNITS/ML vial    USE WITH INSULIN PUMP FOR A TOTAL OF 75 UNITS DAILY     insulin glargine (BASAGLAR KWIKPEN) 100 UNIT/ML injection    INJ 21 UNITS SC D          Taking Medication Assessed Today: Yes  Current Treatments: Insulin Injections  Dose schedule: Pre-breakfast,Pre-lunch,Pre-dinner  Given by: Patient  Problems taking diabetes medications regularly?: No  Diabetes medication side effects?: No    Problem Solving:  Problem Solving Assessed Today: Yes  Is the patient at risk for hypoglycemia?: Yes  Hypoglycemia Frequency: Rarely  Does patient have glucagon emergency kit?: No  Is the patient at risk for DKA?: Yes  Does patient have ketone test strips?: Yes      Reducing Risks:  Reducing Risks Assessed Today: No    Healthy Coping:  Healthy Coping Assessed Today: Yes  Emotional response to diabetes: Ready to learn,Concern for health and well-being  Informal Support system:: Significant other  Stage of change: ACTION (Actively working towards change)  Support resources: Websites  Patient Activation Measure Survey Score:  GEOVANNA Score (Last Two) 8/7/2012   GEOVANNA Raw Score 52   Activation Score 100   GEOVANNA Level 4       Diabetes knowledge and skills assessment:   Patient is knowledgeable in diabetes management concepts related to: Healthy Eating, Being Active, Taking Medication, Problem Solving, Reducing Risks and Healthy Coping    Patient needs further education on the following diabetes management concepts: Monitoring    Based on learning assessment above, most appropriate setting for further diabetes education would be: Individual setting.      INTERVENTIONS:    REPORTS:                      Pump settings as of last endo visit:    Endo changes to pump settings in Oct:  Pump setting changes:                  Basals:  MN 0.95 to 0.9 unit(s)/hr                  Bolus ICR: 11a 6.3 to 6.1, 5p 5.2 to  5.0    Insulin Pump Information   Medtronic 670G with Dexcom G6 sensor    Quickset infusion set    Education provided today on:  AADE Self-Care Behaviors:  Monitoring: individual blood glucose targets in pregnancy. Educated that in pregnancy we aim for a goal of 95 or less before meals, 140 or less 1 hr after meals, and 120 or less 2 hours after meals with diabetes. Explained that in regards to time in range we aim for > 80%  mg/dL.   Problem Solving: low blood glucose - causes, signs/symptoms, treatment and prevention and carrying a carbohydrate source at all times    Education specific to insulin pump provided today on:   how to use the bolus calculator appropriately, glucagon use and pregnancy  Educated on the Tandem insulin pump and control IQ feature of it.  Explained how it will adjust insulin based off Dexcom readings. Educated that they have a free simulator junior she can try to see the screens on the pump and practice button pressing.     Opportunities for ongoing education and support in diabetes-self management were discussed.    Pt verbalized understanding of concepts discussed and recommendations provided today.       Education Materials Provided:  No new materials provided today    Pump Education Materials: directed her to the Tandem website    ASSESSMENT  Linda is now OOW with Medtronic and would like to change to Tandem. She is already on the Dexcom sensor and does well with this.     She reports she thinks her active insulin time is too short. She tries to take corrections sometimes and it says it is too soon. Her active insulin time is 3 hours so would not shorten it anymore.  However, her ISF is pretty weak compared to what her TDD is for insulin (rule of 1800/TDD of 50 unit(s) = 36 for ISF). Would benefit from changing her ISF to suggest more insulin for her corrections.  She would also benefit from entering her sensor glucose into her pump more often for corrections. Will keep an eye on her  basal rates to see if we should lower these next time.    There is no recent pump upload to review so just reviewed her dex results today.     She is getting  in 2 months and then wants to think about family planning and pregnancy. Her current time in range is 37% and her last A1c was 8.8% (3 months ago). For pregnancy planning, would recommend working to increase this TIR to > 80% as able and getting A1c as close to 6% as we can prior to pregnancy. Educated on BG goals in pregnancy as well and explained that we do weekly follow ups (combination of calls and mychart follow ups usually) during pregnancy to continue to make insulin setting changes as needed.     Glucose Patterns & Trends:  daytime and nightime highs    Patient would benefit from strengthening her correction/sensitivity.    Changes made to pump settings:  correction/sensitivity: 12 am 80 --> 65, 7 am 60 --> 50, 9 pm 80 --> 65  Will keep an eye on her basal rates to see if we should lower these next time.    Changes made to sensor settings:   none    PLAN  See Patient Instructions for co-developed, patient-stated behavior change goals.  Follow up with endo 3/2.   Follow up with CDE 3/17.   Recommend regular follow up to help improve her BG's in anticipation of pregnancy.   Pt to go online to get started with Tandem and then use their  to get started on the pump.   Due for A1c to be rechecked. Will order and update her via [email or web address].  AVS printed and provided to patient today. See Follow-Up section for recommended follow-up.    TAURUS Damian CDE    Time Spent: 45 minutes  Encounter Type: Individual    Any diabetes medication dose changes were made via the CDE Protocol and Collaborative Practice Agreement with the patient's endocrinology provider. A copy of this encounter was shared with the provider.

## 2022-02-08 NOTE — Clinical Note
LETHA, due for A1c so ordered this and glucagon (hers is ).  Changed her ISF at night from 80 to 65 and from 60 to 50 during the day. She is getting  in April and then is thinking of pregnancy/family planning. Educated on goals for BG in pregnancy and weekly follow ups to review her pump data. She is now OOW with Medtronic and would like Tandem so we focused on this today as well and educated on how control IQ works. She will get started online with them.  Told her to use their  to get started and then you have a follow up 3 with her and I have one 3/17.  Thanks!  Cora Lawrence, TAURUS LD CDE

## 2022-02-08 NOTE — LETTER
2/8/2022         RE: Linda Agosto  [address]  [facility] [address]        Dear Colleague,    Thank you for referring your patient, Linda Agosto, to the [facility] [place]. Please see a copy of my visit note below.        Type of service:  Video Visit    If the video visit is dropped, the video visit invitation should be resent by: Send to e-mail at: [email or web address]    Originating Location (pt. Location): Home  Distant Location (provider location): Home  Mode of Communication:  Video Conference via [employer]    Video Start Time: 8:05am  Video End Time (time video stopped): 8:50am    How would patient like to obtain AVS? [facility] [place]     Diabetes Self-Management Education & Support    Presents for: Insulin Pump and CGM Review    SUBJECTIVE/OBJECTIVE:  Presents for: Insulin Pump and CGM Review  Accompanied by: Self  Diabetes education in the past 24mo: No  Focus of Visit: Insulin Pump,CGM  Type of Pump visit: Pump Review  Diabetes type: Type 1  Disease course: Stable  How confident are you filling out medical forms by yourself:: Extremely  Diabetes management related comments/concerns: Interested in the Tandem pump. OOW with [employer].  Friend has Tandem pump. Knows it links to Dexcom.   Difficulty affording diabetes medication?: No  Difficulty affording diabetes testing supplies?: No  Other concerns:: None  Cultural Influences/Ethnic Background:  Not  or     Diabetes Symptoms & Complications:  Weight trend: Stable  Complications assessed today?: Yes  Autonomic neuropathy: No  CVA: No  Heart disease: No  Nephropathy: No  Peripheral neuropathy: No  Peripheral Vascular Disease: No  Retinopathy: No    Patient Problem List and Family Medical History reviewed for relevant medical history, current medical status, and diabetes risk factors.    Vitals:  LMP 01/13/2022   Estimated body mass index is 31.96 kg/m  as calculated from the following:    Height as of 1/28/22: 1.676 m (5'  "6\").    Weight as of 1/28/22: 89.8 kg (198 lb).   Last 3 BP:   BP Readings from Last 3 Encounters:   01/28/22 112/66   10/19/21 127/89   04/13/21 124/81       History   Smoking Status     Never Smoker   Smokeless Tobacco     Never Used       Labs:  Lab Results   Component Value Date    A1C 8.8 11/05/2021    A1C 8.3 04/07/2021     Lab Results   Component Value Date     11/05/2021     04/13/2021     Lab Results   Component Value Date     11/05/2021     04/07/2021     HDL Cholesterol   Date Value Ref Range Status   04/07/2021 59 >49 mg/dL Final     Direct Measure HDL   Date Value Ref Range Status   11/05/2021 68 >=50 mg/dL Final   ]  GFR Estimate   Date Value Ref Range Status   11/05/2021 >90 >60 mL/min/1.73m2 Final     Comment:     As of July 11, 2021, eGFR is calculated by the CKD-EPI creatinine equation, without race adjustment. eGFR can be influenced by muscle mass, exercise, and diet. The reported eGFR is an estimation only and is only applicable if the renal function is stable.   04/13/2021 >90 >60 mL/min/[1.73_m2] Final     Comment:     Non  GFR Calc  Starting 12/18/2018, serum creatinine based estimated GFR (eGFR) will be   calculated using the Chronic Kidney Disease Epidemiology Collaboration   (CKD-EPI) equation.       GFR Estimate If Black   Date Value Ref Range Status   04/13/2021 >90 >60 mL/min/[1.73_m2] Final     Comment:      GFR Calc  Starting 12/18/2018, serum creatinine based estimated GFR (eGFR) will be   calculated using the Chronic Kidney Disease Epidemiology Collaboration   (CKD-EPI) equation.       Lab Results   Component Value Date    CR 0.78 11/05/2021    CR 0.60 04/13/2021     No results found for: MICROALBUMIN    Healthy Eating:  Healthy Eating Assessed Today: No    Being Active:  Being Active Assessed Today: No    Monitoring:  Monitoring Assessed Today: Yes  Blood Glucose Meter: CGM  Blood glucose trend: Fluctuating    Taking " Medications:  Diabetes Medication(s)     Diabetic Other       Glucagon (BAQSIMI TWO PACK) 3 MG/DOSE POWD    Spray 1 Device in nostril once as needed (for severe hypoglycemia)    Insulin       insulin aspart (NOVOLOG PEN) 100 UNIT/ML pen    Inject 3 to 10 units subcutaneous at mealtimes as directed, total daily dose approx 30 units     insulin aspart (NOVOLOG VIAL) 100 UNITS/ML vial    USE WITH INSULIN PUMP FOR A TOTAL OF 75 UNITS DAILY     insulin glargine (BASAGLAR KWIKPEN) 100 UNIT/ML injection    INJ 21 UNITS SC D          Taking Medication Assessed Today: Yes  Current Treatments: Insulin Injections  Dose schedule: Pre-breakfast,Pre-lunch,Pre-dinner  Given by: Patient  Problems taking diabetes medications regularly?: No  Diabetes medication side effects?: No    Problem Solving:  Problem Solving Assessed Today: Yes  Is the patient at risk for hypoglycemia?: Yes  Hypoglycemia Frequency: Rarely  Does patient have glucagon emergency kit?: No  Is the patient at risk for DKA?: Yes  Does patient have ketone test strips?: Yes      Reducing Risks:  Reducing Risks Assessed Today: No    Healthy Coping:  Healthy Coping Assessed Today: Yes  Emotional response to diabetes: Ready to learn,Concern for health and well-being  Informal Support system:: Significant other  Stage of change: ACTION (Actively working towards change)  Support resources: Websites  Patient Activation Measure Survey Score:  GEOVANNA Score (Last Two) 8/7/2012   GEOVANNA Raw Score 52   Activation Score 100   GEOVANNA Level 4       Diabetes knowledge and skills assessment:   Patient is knowledgeable in diabetes management concepts related to: Healthy Eating, Being Active, Taking Medication, Problem Solving, Reducing Risks and Healthy Coping    Patient needs further education on the following diabetes management concepts: Monitoring    Based on learning assessment above, most appropriate setting for further diabetes education would be: Individual  setting.      INTERVENTIONS:    REPORTS:                      Pump settings as of last endo visit:    Endo changes to pump settings in Oct:  Pump setting changes:                  Basals:  MN 0.95 to 0.9 unit(s)/hr                  Bolus ICR: 11a 6.3 to 6.1, 5p 5.2 to 5.0    Insulin Pump Information   Medtronic 670G with Dexcom G6 sensor    Quickset infusion set    Education provided today on:  AADE Self-Care Behaviors:  Monitoring: individual blood glucose targets in pregnancy. Educated that in pregnancy we aim for a goal of 95 or less before meals, 140 or less 1 hr after meals, and 120 or less 2 hours after meals with diabetes. Explained that in regards to time in range we aim for > 80%  mg/dL.   Problem Solving: low blood glucose - causes, signs/symptoms, treatment and prevention and carrying a carbohydrate source at all times    Education specific to insulin pump provided today on:   how to use the bolus calculator appropriately, glucagon use and pregnancy  Educated on the Tandem insulin pump and control IQ feature of it.  Explained how it will adjust insulin based off Dexcom readings. Educated that they have a free simulator junior she can try to see the screens on the pump and practice button pressing.     Opportunities for ongoing education and support in diabetes-self management were discussed.    Pt verbalized understanding of concepts discussed and recommendations provided today.       Education Materials Provided:  No new materials provided today    Pump Education Materials: directed her to the Tandem website    ASSESSMENT  Linda is now OOW with Medtronic and would like to change to Tandem. She is already on the Dexcom sensor and does well with this.     She reports she thinks her active insulin time is too short. She tries to take corrections sometimes and it says it is too soon. Her active insulin time is 3 hours so would not shorten it anymore.  However, her ISF is pretty weak compared to what her  TDD is for insulin (rule of 1800/TDD of 50 unit(s) = 36 for ISF). Would benefit from changing her ISF to suggest more insulin for her corrections.  She would also benefit from entering her sensor glucose into her pump more often for corrections. Will keep an eye on her basal rates to see if we should lower these next time.    There is no recent pump upload to review so just reviewed her dex results today.     She is getting  in 2 months and then wants to think about family planning and pregnancy. Her current time in range is 37% and her last A1c was 8.8% (3 months ago). For pregnancy planning, would recommend working to increase this TIR to > 80% as able and getting A1c as close to 6% as we can prior to pregnancy. Educated on BG goals in pregnancy as well and explained that we do weekly follow ups (combination of calls and mychart follow ups usually) during pregnancy to continue to make insulin setting changes as needed.     Glucose Patterns & Trends:  daytime and nightime highs    Patient would benefit from strengthening her correction/sensitivity.    Changes made to pump settings:  correction/sensitivity: 12 am 80 --> 65, 7 am 60 --> 50, 9 pm 80 --> 65  Will keep an eye on her basal rates to see if we should lower these next time.    Changes made to sensor settings:   none    PLAN  See Patient Instructions for co-developed, patient-stated behavior change goals.  Follow up with endo 3/2.   Follow up with CDE 3/17.   Recommend regular follow up to help improve her BG's in anticipation of pregnancy.   Pt to go online to get started with Tandem and then use their  to get started on the pump.   Due for A1c to be rechecked. Will order and update her via [employer].  AVS printed and provided to patient today. See Follow-Up section for recommended follow-up.    TAURUS Damian CDE    Time Spent: 45 minutes  Encounter Type: Individual    Any diabetes medication dose changes were made via the CDE Protocol  and Collaborative Practice Agreement with the patient's endocrinology provider. A copy of this encounter was shared with the provider.

## 2022-02-09 ENCOUNTER — MEDICAL CORRESPONDENCE (OUTPATIENT)
Dept: HEALTH INFORMATION MANAGEMENT | Facility: CLINIC | Age: 32
End: 2022-02-09
Payer: COMMERCIAL

## 2022-02-20 ENCOUNTER — HEALTH MAINTENANCE LETTER (OUTPATIENT)
Age: 32
End: 2022-02-20

## 2022-03-03 ENCOUNTER — MEDICAL CORRESPONDENCE (OUTPATIENT)
Dept: HEALTH INFORMATION MANAGEMENT | Facility: CLINIC | Age: 32
End: 2022-03-03
Payer: COMMERCIAL

## 2022-03-10 ENCOUNTER — MEDICAL CORRESPONDENCE (OUTPATIENT)
Dept: HEALTH INFORMATION MANAGEMENT | Facility: CLINIC | Age: 32
End: 2022-03-10
Payer: COMMERCIAL

## 2022-03-10 ENCOUNTER — MYC MEDICAL ADVICE (OUTPATIENT)
Dept: ENDOCRINOLOGY | Facility: CLINIC | Age: 32
End: 2022-03-10
Payer: COMMERCIAL

## 2022-03-10 DIAGNOSIS — E10.9 TYPE 1 DIABETES MELLITUS WITHOUT COMPLICATION (H): ICD-10-CM

## 2022-03-10 RX ORDER — PROCHLORPERAZINE 25 MG/1
1 SUPPOSITORY RECTAL CONTINUOUS PRN
Qty: 3 EACH | Refills: 5 | Status: SHIPPED | OUTPATIENT
Start: 2022-03-10 | End: 2022-08-29

## 2022-03-25 ENCOUNTER — MEDICAL CORRESPONDENCE (OUTPATIENT)
Dept: HEALTH INFORMATION MANAGEMENT | Facility: CLINIC | Age: 32
End: 2022-03-25
Payer: COMMERCIAL

## 2022-04-04 ENCOUNTER — MYC MEDICAL ADVICE (OUTPATIENT)
Dept: OBGYN | Facility: CLINIC | Age: 32
End: 2022-04-04
Payer: COMMERCIAL

## 2022-04-05 ENCOUNTER — E-VISIT (OUTPATIENT)
Dept: OBGYN | Facility: CLINIC | Age: 32
End: 2022-04-05
Payer: COMMERCIAL

## 2022-04-05 DIAGNOSIS — Z30.09 GENERAL COUNSELING AND ADVICE ON CONTRACEPTIVE MANAGEMENT: Primary | ICD-10-CM

## 2022-04-05 PROCEDURE — 99422 OL DIG E/M SVC 11-20 MIN: CPT | Performed by: OBSTETRICS & GYNECOLOGY

## 2022-04-06 ENCOUNTER — VIRTUAL VISIT (OUTPATIENT)
Dept: EDUCATION SERVICES | Facility: CLINIC | Age: 32
End: 2022-04-06
Payer: COMMERCIAL

## 2022-04-06 DIAGNOSIS — E10.9 TYPE 1 DIABETES MELLITUS WITHOUT COMPLICATION (H): ICD-10-CM

## 2022-04-06 DIAGNOSIS — E10.9 TYPE 1 DIABETES, HBA1C GOAL < 7% (H): Primary | ICD-10-CM

## 2022-04-06 PROCEDURE — G0108 DIAB MANAGE TRN  PER INDIV: HCPCS | Mod: 95 | Performed by: DIETITIAN, REGISTERED

## 2022-04-06 NOTE — LETTER
4/6/2022         RE: Linda Agosto  [address]  [facility] [address]        Dear Colleague,    Thank you for referring your patient, Linda Agosto, to the [facility]. Please see a copy of my visit note below.      Type of service:  Video Visit    If the video visit is dropped, the video visit invitation should be resent by: Send to e-mail at: [email or web address]    Originating Location (pt. Location): Home  Distant Location (provider location): Home  Mode of Communication:  Video Conference via [employer]    Video Start Time: 2:30 pm  Video End Time (time video stopped): 3:00 pm    How would patient [facility] to obtain AVS? [facility]     Diabetes Self-Management Education & Support    Presents for: Insulin Pump and CGM Review    SUBJECTIVE/OBJECTIVE:  Presents for: Insulin Pump and CGM Review  Accompanied by: Self  Diabetes education in the past 24mo: Yes  Focus of Visit: Insulin Pump, CGM  Type of Pump visit: Pump Review  Type of CGM visit: Personal CGM Follow-up  Diabetes type: Type 1  Disease course: Improving  How confident are you filling out medical forms by yourself:: Extremely  Diabetes management related comments/concerns: Started the Tandem insulin pump now. Really liking it. Did training in person and 2 weeks ago. No issues with her pump or infusion sets. Please she made the decision to go with Tandem.   Difficulty affording diabetes medication?: No  Difficulty affording diabetes testing supplies?: No  Other concerns:: None  Cultural Influences/Ethnic Background:  Not  or     Diabetes Symptoms & Complications:  Weight trend: Stable  Complications assessed today?: Yes  Autonomic neuropathy: No  CVA: No  Heart disease: No  Nephropathy: No  Peripheral neuropathy: No  Peripheral Vascular Disease: No  Retinopathy: No    Patient Problem List and Family Medical History reviewed for relevant medical history, current medical status, and diabetes risk  "factors.    Vitals:  There were no vitals taken for this visit.  Estimated body mass index is 31.96 kg/m  as calculated from the following:    Height as of 1/28/22: 1.676 m (5' 6\").    Weight as of 1/28/22: 89.8 kg (198 lb).     Last 3 BP:   BP Readings from Last 3 Encounters:   01/28/22 112/66   10/19/21 127/89   04/13/21 124/81       History   Smoking Status     Never Smoker   Smokeless Tobacco     Never Used       Labs:  Lab Results   Component Value Date    A1C 8.8 11/05/2021    A1C 8.3 04/07/2021     Lab Results   Component Value Date     11/05/2021     04/13/2021     Lab Results   Component Value Date     11/05/2021     04/07/2021     HDL Cholesterol   Date Value Ref Range Status   04/07/2021 59 >49 mg/dL Final     Direct Measure HDL   Date Value Ref Range Status   11/05/2021 68 >=50 mg/dL Final   ]  GFR Estimate   Date Value Ref Range Status   11/05/2021 >90 >60 mL/min/1.73m2 Final     Comment:     As of July 11, 2021, eGFR is calculated by the CKD-EPI creatinine equation, without race adjustment. eGFR can be influenced by muscle mass, exercise, and diet. The reported eGFR is an estimation only and is only applicable if the renal function is stable.   04/13/2021 >90 >60 mL/min/[1.73_m2] Final     Comment:     Non  GFR Calc  Starting 12/18/2018, serum creatinine based estimated GFR (eGFR) will be   calculated using the Chronic Kidney Disease Epidemiology Collaboration   (CKD-EPI) equation.       GFR Estimate If Black   Date Value Ref Range Status   04/13/2021 >90 >60 mL/min/[1.73_m2] Final     Comment:      GFR Calc  Starting 12/18/2018, serum creatinine based estimated GFR (eGFR) will be   calculated using the Chronic Kidney Disease Epidemiology Collaboration   (CKD-EPI) equation.       Lab Results   Component Value Date    CR 0.78 11/05/2021    CR 0.60 04/13/2021     No results found for: MICROALBUMIN    Healthy Eating:  Healthy Eating Assessed " Today: Yes  Meal planning/habits: Carb counting  Meals include: Breakfast, Lunch, Dinner  Sometimes enters in less carbs because she is nervous she will go too low. Might enter 10-15 gm less for meals.     Being Active:  Being Active Assessed Today: Yes  Exercise:: Yes  How intense was your typical exercise? : Light (like stretching or slow walking)  Barrier to exercise: None    Monitoring:  Monitoring Assessed Today: Yes  Did patient bring glucose meter to appointment? : Yes  Blood Glucose Meter: CGM  Blood glucose trend: Decreasing    See below.     Taking Medications:  Diabetes Medication(s)     Diabetic Other       Glucagon (BAQSIMI TWO PACK) 3 MG/DOSE POWD    Spray 1 Device in nostril once as needed (for severe hypoglycemia)    Insulin       insulin aspart (NOVOLOG PEN) 100 UNIT/ML pen    Inject 3 to 10 units subcutaneous at mealtimes as directed, total daily dose approx 30 units     insulin aspart (NOVOLOG VIAL) 100 UNITS/ML vial    USE WITH INSULIN PUMP FOR A TOTAL OF 75 UNITS DAILY     insulin glargine (BASAGLAR KWIKPEN) 100 UNIT/ML injection    INJ 21 UNITS SC D          Taking Medication Assessed Today: Yes  Current Treatments: Insulin Pump  Dose schedule: Pre-breakfast, Pre-lunch, Pre-dinner  Given by: Patient  Problems taking diabetes medications regularly?: No  Diabetes medication side effects?: No    Problem Solving:  Problem Solving Assessed Today: Yes  Is the patient at risk for hypoglycemia?: Yes  Hypoglycemia Frequency: Rarely  Does patient have glucagon emergency kit?: No  Is the patient at risk for DKA?: Yes  Does patient have ketone test strips?: Yes    Reducing Risks:  Reducing Risks Assessed Today: No    Healthy Coping:  Healthy Coping Assessed Today: Yes  Emotional response to diabetes: Ready to learn, Concern for health and well-being  Informal Support system:: Significant other  Stage of change: MAINTENANCE (Working to maintain change, with risk of relapse)  Support resources:  Websites  Patient Activation Measure Survey Score:  GEOVANNA Score (Last Two) 8/7/2012   GEOVANNA Raw Score 52   Activation Score 100   GEOVANNA Level 4       Diabetes knowledge and skills assessment:   Patient is knowledgeable in diabetes management concepts related to: Healthy Eating, Being Active, Monitoring, Taking Medication, Problem Solving, Reducing Risks and Healthy Coping    Patient needs further education on the following diabetes management concepts: Insulin Pump Concepts exercise activity    Based on learning assessment above, most appropriate setting for further diabetes education would be: Individual setting.      INTERVENTIONS:    REPORTS:            Pump Settings:          Insulin Pump Information  Insulin Pump Brand: Tandem with control IQ  Infusion Set: Tandem    Education provided today on:  AADE Self-Care Behaviors:  Monitoring: log and interpret results    Education specific to insulin pump provided today on:   importance of bolusing before meals, importance of counting carbohydrates accurately and exercise recommendations    Opportunities for ongoing education and support in diabetes-self management were discussed.    Pt verbalized understanding of concepts discussed and recommendations provided today.       Education Materials Provided:  No new materials provided today    Pump Education Materials: none    ASSESSMENT  Linda is doing very well on her new Tandem insulin pump. Her TIR increased from 37% to 69% with just starting her pump.     Since her numbers are so much better, she does worry about lows and is entering in a few less carbs at her meals usually.  Discussed that we could weaken her ICR a bit and then have her enter ALL her carbs into the pump so we can better assess how that setting is working. She is on board to try this.     Appears she also needs multiple corrections to bring her BG down. She is open to strengthening her ISF today as well.      She has no questions or concerns today. Praised  her on her overall improvement!    Glucose Patterns & Trends:  post meal hyperglycemia but she is not entering all her carbs she eats    Patient would benefit from strengthening her correction/sensitivity and weakening her carbohydrate ratio (but to enter all her carbs then into the pump).    Changes made to pump settings:  carb ratio: 6/7 am 6 --> 6.5, 11 am/12 pm 6.1 --> 6.6, 5/6/9 pm 5 --> 5.5  correction/sensitivity: night 65 --> 60, day 50 --> 45 mg/dL    Changes made to sensor settings:   none    PLAN  See Patient Instructions for co-developed, patient-stated behavior change goals.  Follow up 5/18 scheduled  Pt to call and schedule A1c check (order placed already).   Pt will schedule endo follow up.   AVS printed and provided to patient today. See Follow-Up section for recommended follow-up.    TAURUS Damian CDE    Time Spent: 30 minutes  Encounter Type: Individual    Any diabetes medication dose changes were made via the CDE Protocol and Collaborative Practice Agreement with the patient's endocrinology provider. A copy of this encounter was shared with the provider.

## 2022-04-08 DIAGNOSIS — F41.9 ANXIETY: ICD-10-CM

## 2022-04-08 NOTE — TELEPHONE ENCOUNTER
Medication is being filled for 1 time refill only due to:  Patient needs to be seen because it has been more than one year since last visit.    Routing to MA/TC pool. The Pt is due for a visit with PCP  Srikanth GALLO RN, BSN

## 2022-04-11 ENCOUNTER — LAB (OUTPATIENT)
Dept: LAB | Facility: CLINIC | Age: 32
End: 2022-04-11
Payer: COMMERCIAL

## 2022-04-11 DIAGNOSIS — E10.9 TYPE 1 DIABETES, HBA1C GOAL < 7% (H): ICD-10-CM

## 2022-04-11 DIAGNOSIS — E10.9 TYPE 1 DIABETES MELLITUS WITHOUT COMPLICATION (H): ICD-10-CM

## 2022-04-11 LAB — HBA1C MFR BLD: 8.1 % (ref 0–5.6)

## 2022-04-11 PROCEDURE — 83036 HEMOGLOBIN GLYCOSYLATED A1C: CPT

## 2022-04-11 PROCEDURE — 36415 COLL VENOUS BLD VENIPUNCTURE: CPT

## 2022-04-11 NOTE — TELEPHONE ENCOUNTER
Patient scheduled with a new provider as they are closer to home in [place] for 5/9/22.     Jay Nieto

## 2022-05-05 ENCOUNTER — MYC MEDICAL ADVICE (OUTPATIENT)
Dept: EDUCATION SERVICES | Facility: CLINIC | Age: 32
End: 2022-05-05
Payer: COMMERCIAL

## 2022-05-05 NOTE — TELEPHONE ENCOUNTER
Diabetes Education Follow-up    Subjective/Objective:    Linda Agosto uploaded her pump for review. Last date of communication was: 4/6/22.    Diabetes is being managed with Tandem insulin pump.    Diabetes Medications   Diabetes Medication(s)     Diabetic Other       Glucagon (BAQSIMI TWO PACK) 3 MG/DOSE POWD    Spray 1 Device in nostril once as needed (for severe hypoglycemia)    Insulin       insulin aspart (NOVOLOG PEN) 100 UNIT/ML pen    Inject 3 to 10 units subcutaneous at mealtimes as directed, total daily dose approx 30 units     insulin glargine (LANTUS PEN) 100 UNIT/ML pen    Inject 22 Units Subcutaneous At Bedtime     NOVOLOG VIAL 100 UNIT/ML soln    USE WITH INSULIN PUMP FOR A TOTAL OF 75 UNITS DAILY          Pump report:                   Assessment:    Linda had been using MDI while on her honeymoon and resumed her pump on 5/2. Reports higher BG's and that her control IQ is on (double checked with her since it is hard to tell on her pump report today).     Would benefit from a stronger ICR at lunch and dinner and stronger ISF during the day.  Might also benefit from a stronger ICR at breakfast but would like to see how her trend continues the next 1-2 weeks.     Plan/Response:  Suggested pump changes:   Change lunch ICR 11 am and 12 pm 6.6 --> 6.3 and dinner ICR (5/6/9 pm) 5.5 --> 5.2  Change ISF 45 --> 40 mg/dL during the day.  Follow up 5/18 scheduled but will see if she would like to post pone a couple weeks since she has an endo follow up 5/18 as well.     Cora Lawrence RD LD CDCES      Any diabetes medication dose changes were made via the CDE Protocol and Collaborative Practice Agreement with the patient's endocrinology provider. A copy of this encounter was shared with the provider.

## 2022-05-06 ASSESSMENT — ANXIETY QUESTIONNAIRES
5. BEING SO RESTLESS THAT IT IS HARD TO SIT STILL: NOT AT ALL
3. WORRYING TOO MUCH ABOUT DIFFERENT THINGS: SEVERAL DAYS
2. NOT BEING ABLE TO STOP OR CONTROL WORRYING: SEVERAL DAYS
7. FEELING AFRAID AS IF SOMETHING AWFUL MIGHT HAPPEN: SEVERAL DAYS
4. TROUBLE RELAXING: NOT AT ALL
6. BECOMING EASILY ANNOYED OR IRRITABLE: NOT AT ALL
GAD7 TOTAL SCORE: 4
1. FEELING NERVOUS, ANXIOUS, OR ON EDGE: SEVERAL DAYS

## 2022-05-18 ENCOUNTER — VIRTUAL VISIT (OUTPATIENT)
Dept: ENDOCRINOLOGY | Facility: CLINIC | Age: 32
End: 2022-05-18
Payer: COMMERCIAL

## 2022-05-18 DIAGNOSIS — E06.3 HYPOTHYROIDISM DUE TO HASHIMOTO'S THYROIDITIS: ICD-10-CM

## 2022-05-18 DIAGNOSIS — Z96.41 INSULIN PUMP STATUS: ICD-10-CM

## 2022-05-18 DIAGNOSIS — E10.9 TYPE 1 DIABETES, HBA1C GOAL < 7% (H): ICD-10-CM

## 2022-05-18 DIAGNOSIS — E10.9 TYPE 1 DIABETES MELLITUS WITHOUT COMPLICATION (H): Primary | ICD-10-CM

## 2022-05-18 PROCEDURE — 95251 CONT GLUC MNTR ANALYSIS I&R: CPT | Performed by: INTERNAL MEDICINE

## 2022-05-18 PROCEDURE — 99214 OFFICE O/P EST MOD 30 MIN: CPT | Mod: 95 | Performed by: INTERNAL MEDICINE

## 2022-05-18 RX ORDER — INSULIN ASPART 100 [IU]/ML
INJECTION, SOLUTION INTRAVENOUS; SUBCUTANEOUS
Qty: 30 ML | Refills: 11 | Status: SHIPPED | OUTPATIENT
Start: 2022-05-18 | End: 2022-12-07

## 2022-05-18 NOTE — LETTER
5/18/2022         RE: Linda Agosto  [address]  [facility] [address]        Dear Colleague,    Thank you for referring your patient, Linda Agosto, to the [facility]. Please see a copy of my visit note below.    Patient is being evaluated via a billable video visit.      How would you like to obtain your AVS? Reviewed verbally  If the video visit is dropped, the invitation should be resent by cell phone  Will anyone else be joining your video visit? no      Video Start Time: 12:55 pm     Video-Visit Details    Type of service:  Video Visit    Video End Time:  1:15 pm    Originating Location (pt. Location): home    Distant Location (provider location):  [facility]/home    Platform used for Video Visit:  [employer]        Recent issues:  Diabetes follow-up evaluation, last appt 10/19/21  Changed from Medtronic pump + Dexcom to the Tandem pump + Dexcom  Late 4/2022, stopped pump for 1 week during honeymoon vacation  Feeling well overall, no other new health issues reported           2003. Diagnosis of diabetes mellitus, age 13, living in [facility]  Had acute illness requiring hospitalization at [facility]  Began insulin injections, using Novolog and Lantus insulin  [place] carb counting method, gram estimates  On MDI treatment plan for approx 2 years, then changed to pump use  Previous medical evaluations with Dr. Yash Barcenas/[facility]  2005. Began use of [place] pump  2012. Changed to Medtronic pump  Subsequently using Medtronic 723 Paradigm pump  2012. Tried wearing CGMS sensor, but uncomfortable     12/8/14. Initial consult with me at my former clinic ([facility])  Continued pump management  Was not interested in CGMS use     Previous [facility] hgbA1c levels include:              Lab Test 02/05/18 10/10/17 06/21/17 02/01/17 11/16/16  0815   A1C 7.4* 7.8* 8.1* 7.3* 8.2*      ~12/2017. Upgraded to Medtronic 670G pump    Tried Medtronic Guardian sensor, recalls  frequent low glucose alarms              Wore sensor in manual mode              Didn't like it, discontinued use     ~11/2018. Wore diagnostic LibrePro CGMS  Subsequently obtained LibrePersonal CGMS, not wearing past year     .3/2022. Changed to Tandem TSlim insulin pump    Novolog in pump  Current Tandem pump settings:       Recent Tandem pump data:          Recent DexcomG6 data:         Recent [facility] labs include:  Lab Results   Component Value Date    A1C 8.1 (H) 04/11/2022     11/05/2021    POTASSIUM 4.4 11/05/2021    CHLORIDE 104 11/05/2021    CO2 25 11/05/2021    ANIONGAP 6 11/05/2021     (H) 11/05/2021    BUN 16 11/05/2021    CR 0.78 11/05/2021    GFRESTIMATED >90 11/05/2021    GFRESTBLACK >90 04/13/2021    MARCIA 8.9 11/05/2021    CHOL 236 (H) 11/05/2021    TRIG 72 11/05/2021    HDL 68 11/05/2021     (H) 11/05/2021    NHDL 168 (H) 11/05/2021    UCRR 83 04/07/2021    MICROL 8 04/07/2021    UMALCR 9.04 04/07/2021     Hyperlipidemia:  Takes simvastatin medication  DM Complications:  None known        Thyroid:  2013. 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 Ellen Burdick/ [place]      PMH/PSH:  Past Medical History:   Diagnosis Date     Adjustment disorder with anxious mood      Allergic rhinitis, cause unspecified     h/o AIT     Chest discomfort      Common migraine     No visual aura.      Hyperlipidemia LDL goal < 70      Hypothyroidism      Infectious mononucleosis 01/01/1995     Tuberculosis      Type 1 diabetes, HbA1c goal < 7% (H)  03/01/2004     followed [facility] - peds endocrinology - now Dr Jimenez     Past Surgical History:   Procedure Laterality Date     APPENDECTOMY  8/07    dr deshpande     PE TUBES  1996       Family Hx:  Family History   Problem Relation Age of Onset     Neurologic Disorder Maternal Grandmother         dementia     Genetic Disorder Paternal Grandfather         kidney     Family History Negative No family hx of          Social Hx:  Social History     Socioeconomic History     Marital status:      Spouse name: Not on file     Number of children: Not on file     Years of education: Not on file     Highest education level: Not on file   Occupational History     Not on file   Tobacco Use     Smoking status: Never Smoker     Smokeless tobacco: Never Used   Substance and Sexual Activity     Alcohol use: Yes     Alcohol/week: 2.0 standard drinks     Types: 2 Standard drinks or equivalent per week     Comment: sometimes couple on weekends     Drug use: No     Sexual activity: Yes     Partners: Male     Birth control/protection: Condom   Other Topics Concern      Service Not Asked     Blood Transfusions Not Asked     Caffeine Concern Yes     Comment: rare     Occupational Exposure Not Asked     Hobby Hazards Not Asked     Sleep Concern No     Stress Concern No     Weight Concern Not Asked     Special Diet Not Asked     Back Care Not Asked     Exercise Yes     Bike Helmet Not Asked     Seat Belt Yes     Self-Exams No     Comment: encouraged     Parent/sibling w/ CABG, MI or angioplasty before 65F 55M? No   Social History Narrative    -Working -       Social Determinants of Health     Financial Resource Strain: Not on file   Food Insecurity: Not on file   Transportation Needs: Not on file   Physical Activity: Not on file   Stress: Not on file   Social Connections: Not on file   Intimate Partner Violence: Not on file   Housing Stability: Not on file          MEDICATIONS:  has a current medication list which includes the  following prescription(s): dexcom g6 sensor, dexcom g6 transmitter, insulin aspart, insulin aspart, levothyroxine, sertraline, simvastatin, triamcinolone, blood glucose, blood glucose monitoring, baqsimi two pack, insulin glargine, and insulin pen needle.      ROS: 10 point ROS neg other than the symptoms noted above in the HPI.     GENERAL: mild fatigue, wt stable?; denies fevers, chills, night sweats.   HEENT: no dysphagia, odonophagia, diplopia, neck pain  THYROID:  no apparent hyper or hypothyroid symptoms  CV: no chest pain, pressure, palpitations  LUNGS: no SOB, LANG, cough, wheezing   ABDOMEN: no diarrhea, constipation, abdominal pain  EXTREMITIES: no rashes, ulcers, edema  NEUROLOGY: no headaches, denies changes in vision, tingling, extremitiy numbness   MSK: no muscle aches or pains, weakness  SKIN: no rashes or lesions  : menses regular  PSYCH:  stable mood, no significant anxiety or depression  ENDOCRINE: no heat or cold intolerance     Physical Exam (visual exam)  VS:  no vital signs taken for video visit  CONSTITUTIONAL: healthy, alert and NAD, well dressed, answering questions appropriately  ENT: no nose swelling or nasal discharge, mouth redness or gum changes.  EYES: eyes grossly normal to inspection, conjunctivae and sclerae normal, no exophthalmos or proptosis  THYROID:  no apparent nodules or goiter  LUNGS: no audible wheeze, cough or visible cyanosis, no visible retractions or increased work of breathing  ABDOMEN: abdomen not evaluated  EXTREMITIES: no hand tremors, limited exam  NEUROLOGY: CN grossly intact, mentation intact and speech normal   SKIN:  no apparent skin lesions, rash, or edema with visualized skin appearance  PSYCH: mentation appears normal, affect normal/bright, judgement and insight intact,   normal speech and appearance well groomed       LABS:     All pertinent notes, labs, and images personally reviewed by me.      A/P:  Encounter Diagnoses   Name Primary?     Type 1  diabetes mellitus without complication (H) Yes     Insulin pump status      Hypothyroidism due to Hashimoto's thyroiditis      Type 1 diabetes, HbA1c goal < 7% (H)        Comments:  Reviewed health history, diabetes and thyroid issues  Recent SG trends good overall, but higher post suppertime  Reviewed and interpreted tests that I previously ordered.   Ordered appropriate tests for the endocrinology disease management.    Management options discussed and implemented after shared medical decision making with the patient.  Diabetes problem is chronic with exacerbation progression, hyperglycemia     Plan:  Reviewed the overall T1DM management and insulin pump use.  Discussed optimal BG testing to assess glucose trends.  We reviewed insulin pump settings, basal rate and bolus dosing  Use of automated pump bolus dosing for meal/snack carb & correction dosing  Reviewed recent Tandem pump report information  Reviewed recent DexcomG6 CGM glucose trends, in detail     Recommend:  Continue the Tandem insulin pump and diabetes management plan.  Pump setting changes:    Bolus ICR: 5p and 6p 5.2 to 4.8    Discussed use of the exercise and sleep modes with pump use  Reviewed general issues with T1DM and pregnancy management  Continue use of the DexcomG6 CGM sensor  Consider f/u evaluation with one of our [facility] Diab Educators  Continue the current  levothyroxine 0.125 mg daily  Plan repeat nonfasting lab tests soon    Lab orders placed  Continue current simvastatin daily dosing, but discontinue if/when pregnant  Arrange annual dilated eye exam, fasting lipid panel testing.  Contact me if questions/concerns about diabetes and thyroid management     Addressed patient's questions today.    There are no Patient Instructions on file for this visit.    Future labs ordered today:   Orders Placed This Encounter   Procedures     GLUCOSE MONITOR, 72 HOUR, PHYS INTERP     Hemoglobin A1c     Basic metabolic panel     Albumin Random Urine  Quantitative with Creat Ratio     TSH     T4 free     Radiology/Consults ordered today: None    Total time spent in with the patient evaluation:  20 min  Additional time spent reviewing pertinent lab tests and chart notes, and documentation:  12 min    Follow-up:  8/2022, Reggie Jimenez MD, MS  Endocrinology  [facility]    CC:  NAREN Rodas and s A                          Again, thank you for allowing me to participate in the care of your patient.        Sincerely,        Santy Jimenez MD

## 2022-05-18 NOTE — PROGRESS NOTES
Patient is being evaluated via a billable video visit.      How would you like to obtain your AVS? Reviewed verbally  If the video visit is dropped, the invitation should be resent by cell phone  Will anyone else be joining your video visit? no      Video Start Time: 12:55 pm     Video-Visit Details    Type of service:  Video Visit    Video End Time:  1:15 pm    Originating Location (pt. Location): home    Distant Location (provider location):  [facility]    Platform used for Video Visit:  [employer]        Recent issues:  Diabetes follow-up evaluation, last appt 10/19/21  Changed from Medtronic pump + Dexcom to the Tandem pump + Dexcom  Late 4/2022, stopped pump for 1 week during honeymoon vacation  Feeling well overall, no other new health issues reported           2003. Diagnosis of diabetes mellitus, age 13, living in [facility]  Had acute illness requiring hospitalization at [facility]  Began insulin injections, using Novolog and Lantus insulin  [place] carb counting method, gram estimates  On MDI treatment plan for approx 2 years, then changed to pump use  Previous medical evaluations with Dr. Yash Barcenas/[facility] Andreia Callahan  2005. Began use of [place] pump  2012. Changed to Medtronic pump  Subsequently using Medtronic 723 Paradigm pump  2012. Tried wearing CGMS sensor, but uncomfortable     12/8/14. Initial consult with me at my former clinic ([facility])  Continued pump management  Was not interested in CGMS use     Previous [facility] hgbA1c levels include:              Lab Test 02/05/18 10/10/17 06/21/17 02/01/17 11/16/16  0815   A1C 7.4* 7.8* 8.1* 7.3* 8.2*      ~12/2017. Upgraded to Medtronic 670G pump    Tried Medtronic Guardian sensor, recalls frequent low glucose alarms              Wore sensor in manual mode              Didn't like it, discontinued use     ~11/2018. Wore diagnostic LibrePro CGMS  Subsequently obtained LibrePersonal CGMS, not wearing past year     .3/2022. Changed to Tandem  TSlim insulin pump    Novolog in pump  Current Tandem pump settings:       Recent Tandem pump data:          Recent DexcomG6 data:         Recent [facility] labs include:  Lab Results   Component Value Date    A1C 8.1 (H) 04/11/2022     11/05/2021    POTASSIUM 4.4 11/05/2021    CHLORIDE 104 11/05/2021    CO2 25 11/05/2021    ANIONGAP 6 11/05/2021     (H) 11/05/2021    BUN 16 11/05/2021    CR 0.78 11/05/2021    GFRESTIMATED >90 11/05/2021    GFRESTBLACK >90 04/13/2021    MARCIA 8.9 11/05/2021    CHOL 236 (H) 11/05/2021    TRIG 72 11/05/2021    HDL 68 11/05/2021     (H) 11/05/2021    NHDL 168 (H) 11/05/2021    UCRR 83 04/07/2021    MICROL 8 04/07/2021    UMALCR 9.04 04/07/2021     Hyperlipidemia:  Takes simvastatin medication  DM Complications:  None known        Thyroid:  2013. 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 Ellen Burdick/[facility] [place]      PMH/PSH:  Past Medical History:   Diagnosis Date     Adjustment disorder with anxious mood      Allergic rhinitis, cause unspecified     h/o AIT     Chest discomfort      Common migraine     No visual aura.      Hyperlipidemia LDL goal < 70      Hypothyroidism      Infectious mononucleosis 01/01/1995     Tuberculosis      Type 1 diabetes, HbA1c goal < 7% (H) 03/01/2004     followed N - peds endocrinology - now Dr Jimenez     Past Surgical History:   Procedure Laterality Date     APPENDECTOMY  8/07    dr deshpande     PE TUBES  1996       Family Hx:  Family History   Problem Relation Age of Onset      Neurologic Disorder Maternal Grandmother         dementia     Genetic Disorder Paternal Grandfather         kidney     Family History Negative No family hx of          Social Hx:  Social History     Socioeconomic History     Marital status:      Spouse name: Not on file     Number of children: Not on file     Years of education: Not on file     Highest education level: Not on file   Occupational History     Not on file   Tobacco Use     Smoking status: Never Smoker     Smokeless tobacco: Never Used   Substance and Sexual Activity     Alcohol use: Yes     Alcohol/week: 2.0 standard drinks     Types: 2 Standard drinks or equivalent per week     Comment: sometimes couple on weekends     Drug use: No     Sexual activity: Yes     Partners: Male     Birth control/protection: Condom   Other Topics Concern      Service Not Asked     Blood Transfusions Not Asked     Caffeine Concern Yes     Comment: rare     Occupational Exposure Not Asked     Hobby Hazards Not Asked     Sleep Concern No     Stress Concern No     Weight Concern Not Asked     Special Diet Not Asked     Back Care Not Asked     Exercise Yes     Bike Helmet Not Asked     Seat Belt Yes     Self-Exams No     Comment: encouraged     Parent/sibling w/ CABG, MI or angioplasty before 65F 55M? No   Social History Narrative    -Working -       Social Determinants of Health     Financial Resource Strain: Not on file   Food Insecurity: Not on file   Transportation Needs: Not on file   Physical Activity: Not on file   Stress: Not on file   Social Connections: Not on file   Intimate Partner Violence: Not on file   Housing Stability: Not on file          MEDICATIONS:  has a current medication list which includes the following prescription(s): dexcom g6 sensor, dexcom g6 transmitter, insulin aspart, insulin aspart, levothyroxine, sertraline, simvastatin, triamcinolone, blood glucose, blood glucose monitoring, baqsimi two pack, insulin glargine, and insulin pen  needle.      ROS: 10 point ROS neg other than the symptoms noted above in the HPI.     GENERAL: mild fatigue, wt stable?; denies fevers, chills, night sweats.   HEENT: no dysphagia, odonophagia, diplopia, neck pain  THYROID:  no apparent hyper or hypothyroid symptoms  CV: no chest pain, pressure, palpitations  LUNGS: no SOB, LANG, cough, wheezing   ABDOMEN: no diarrhea, constipation, abdominal pain  EXTREMITIES: no rashes, ulcers, edema  NEUROLOGY: no headaches, denies changes in vision, tingling, extremitiy numbness   MSK: no muscle aches or pains, weakness  SKIN: no rashes or lesions  : menses regular  PSYCH:  stable mood, no significant anxiety or depression  ENDOCRINE: no heat or cold intolerance     Physical Exam (visual exam)  VS:  no vital signs taken for video visit  CONSTITUTIONAL: healthy, alert and NAD, well dressed, answering questions appropriately  ENT: no nose swelling or nasal discharge, mouth redness or gum changes.  EYES: eyes grossly normal to inspection, conjunctivae and sclerae normal, no exophthalmos or proptosis  THYROID:  no apparent nodules or goiter  LUNGS: no audible wheeze, cough or visible cyanosis, no visible retractions or increased work of breathing  ABDOMEN: abdomen not evaluated  EXTREMITIES: no hand tremors, limited exam  NEUROLOGY: CN grossly intact, mentation intact and speech normal   SKIN:  no apparent skin lesions, rash, or edema with visualized skin appearance  PSYCH: mentation appears normal, affect normal/bright, judgement and insight intact,   normal speech and appearance well groomed       LABS:     All pertinent notes, labs, and images personally reviewed by me.      A/P:  Encounter Diagnoses   Name Primary?     Type 1 diabetes mellitus without complication (H) Yes     Insulin pump status      Hypothyroidism due to Hashimoto's thyroiditis      Type 1 diabetes, HbA1c goal < 7% (H)        Comments:  Reviewed health history, diabetes and thyroid issues  Recent SG trends  good overall, but higher post suppertime  Reviewed and interpreted tests that I previously ordered.   Ordered appropriate tests for the endocrinology disease management.    Management options discussed and implemented after shared medical decision making with the patient.  Diabetes problem is chronic with exacerbation progression, hyperglycemia     Plan:  Reviewed the overall T1DM management and insulin pump use.  Discussed optimal BG testing to assess glucose trends.  We reviewed insulin pump settings, basal rate and bolus dosing  Use of automated pump bolus dosing for meal/snack carb & correction dosing  Reviewed recent Tandem pump report information  Reviewed recent DexcomG6 CGM glucose trends, in detail     Recommend:  Continue the Tandem insulin pump and diabetes management plan.  Pump setting changes:    Bolus ICR: 5p and 6p 5.2 to 4.8    Discussed use of the exercise and sleep modes with pump use  Reviewed general issues with T1DM and pregnancy management  Continue use of the DexcomG6 CGM sensor  Consider f/u evaluation with one of our [facility] Diab Educators  Continue the current  levothyroxine 0.125 mg daily  Plan repeat nonfasting lab tests soon    Lab orders placed  Continue current simvastatin daily dosing, but discontinue if/when pregnant  Arrange annual dilated eye exam, fasting lipid panel testing.  Contact me if questions/concerns about diabetes and thyroid management     Addressed patient's questions today.    There are no Patient Instructions on file for this visit.    Future labs ordered today:   Orders Placed This Encounter   Procedures     GLUCOSE MONITOR, 72 HOUR, PHYS INTERP     Hemoglobin A1c     Basic metabolic panel     Albumin Random Urine Quantitative with Creat Ratio     TSH     T4 free     Radiology/Consults ordered today: None    Total time spent in with the patient evaluation:  20 min  Additional time spent reviewing pertinent lab tests and chart notes, and documentation:  12  min    Follow-up:  8/2022, Reggie Jimenez MD, MS  Endocrinology  [facility]    CC:  NAREN Rodas and s, A

## 2022-05-19 ENCOUNTER — TELEPHONE (OUTPATIENT)
Dept: ENDOCRINOLOGY | Facility: CLINIC | Age: 32
End: 2022-05-19
Payer: COMMERCIAL

## 2022-05-19 ASSESSMENT — ANXIETY QUESTIONNAIRES
4. TROUBLE RELAXING: NOT AT ALL
GAD7 TOTAL SCORE: 4
GAD7 TOTAL SCORE: 4
7. FEELING AFRAID AS IF SOMETHING AWFUL MIGHT HAPPEN: SEVERAL DAYS
1. FEELING NERVOUS, ANXIOUS, OR ON EDGE: SEVERAL DAYS
3. WORRYING TOO MUCH ABOUT DIFFERENT THINGS: SEVERAL DAYS
5. BEING SO RESTLESS THAT IT IS HARD TO SIT STILL: NOT AT ALL
6. BECOMING EASILY ANNOYED OR IRRITABLE: NOT AT ALL
GAD7 TOTAL SCORE: 4
2. NOT BEING ABLE TO STOP OR CONTROL WORRYING: SEVERAL DAYS
8. IF YOU CHECKED OFF ANY PROBLEMS, HOW DIFFICULT HAVE THESE MADE IT FOR YOU TO DO YOUR WORK, TAKE CARE OF THINGS AT HOME, OR GET ALONG WITH OTHER PEOPLE?: SOMEWHAT DIFFICULT
7. FEELING AFRAID AS IF SOMETHING AWFUL MIGHT HAPPEN: SEVERAL DAYS

## 2022-05-19 NOTE — TELEPHONE ENCOUNTER
[facility] for patient to call  to schedule a virtual and labs with Dr Jimenez around 8/18

## 2022-05-23 ENCOUNTER — VIRTUAL VISIT (OUTPATIENT)
Dept: FAMILY MEDICINE | Facility: CLINIC | Age: 32
End: 2022-05-23
Payer: COMMERCIAL

## 2022-05-23 DIAGNOSIS — E03.8 OTHER SPECIFIED HYPOTHYROIDISM: ICD-10-CM

## 2022-05-23 DIAGNOSIS — E10.3292 TYPE 1 DIABETES MELLITUS WITH MILD NONPROLIFERATIVE RETINOPATHY OF LEFT EYE WITHOUT MACULAR EDEMA (H): Primary | ICD-10-CM

## 2022-05-23 DIAGNOSIS — E78.5 HYPERLIPIDEMIA LDL GOAL <100: ICD-10-CM

## 2022-05-23 DIAGNOSIS — G43.009 MIGRAINE WITHOUT AURA AND WITHOUT STATUS MIGRAINOSUS, NOT INTRACTABLE: ICD-10-CM

## 2022-05-23 DIAGNOSIS — F41.1 GAD (GENERALIZED ANXIETY DISORDER): ICD-10-CM

## 2022-05-23 PROCEDURE — 99203 OFFICE O/P NEW LOW 30 MIN: CPT | Mod: 95 | Performed by: INTERNAL MEDICINE

## 2022-05-23 ASSESSMENT — ANXIETY QUESTIONNAIRES: GAD7 TOTAL SCORE: 4

## 2022-06-01 ENCOUNTER — VIRTUAL VISIT (OUTPATIENT)
Dept: EDUCATION SERVICES | Facility: CLINIC | Age: 32
End: 2022-06-01
Payer: COMMERCIAL

## 2022-06-01 DIAGNOSIS — E10.9 TYPE 1 DIABETES MELLITUS WITHOUT COMPLICATION (H): ICD-10-CM

## 2022-06-01 DIAGNOSIS — E10.9 TYPE 1 DIABETES, HBA1C GOAL < 7% (H): Primary | ICD-10-CM

## 2022-06-01 PROCEDURE — 99207 PR NONPHYSICIAN TELEPHONE ASSESSMENT 11-20 MIN: CPT | Performed by: DIETITIAN, REGISTERED

## 2022-06-01 NOTE — Clinical Note
LETHA, Changes made to pump settings: correction/sensitivity: 12/6 am 50 --> 40, 7 am 45 --> 40, 11 am/12pm/5 pm/6 pm 40 --> 35, 9 pm 60 --> 50 mg/dL  Will follow up in 6 weeks.  Thanks! Cora Lawrence, [facility]

## 2022-06-01 NOTE — PATIENT INSTRUCTIONS
-- For back up plan (in case of pump failure or without a pump for more then 1-2 days) with long acting, take 28 unit(s) per day and then wait 24 hours from last injection before resuming insulin pump.     -- for meals with injections, take 1 unit(s) novolog/humalog per 6 grams of carb for breakfast and lunch and 1 unit(s) per 5 grams for dinner.     -- for correction with injections, take 1 unit(s) per 40 mg/dL > 140 mg/dL. Then wait at least 4 hours before turning control IQ on with your pump.

## 2022-06-01 NOTE — PROGRESS NOTES
"Diabetes Self-Management Education & Support    Presents for: Insulin Pump and CGM Review    SUBJECTIVE/OBJECTIVE:  Presents for: Insulin Pump and CGM Review  Accompanied by: Self  Diabetes education in the past 24mo: Yes  Focus of Visit: Insulin Pump, CGM  Type of Pump visit: Pump Review  Type of CGM visit: Personal CGM Follow-up  Diabetes type: Type 1  Disease course: Improving  How confident are you filling out medical forms by yourself:: Extremely  Diabetes management related comments/concerns: Still likes her Tandem pump. Feels her corrections are a little light.   Difficulty affording diabetes medication?: No  Difficulty affording diabetes testing supplies?: No  Other concerns:: None  Cultural Influences/Ethnic Background:  Not  or     Diabetes Symptoms & Complications:  Weight trend: Stable  Complications assessed today?: Yes  Autonomic neuropathy: No  CVA: No  Heart disease: No  Nephropathy: No  Peripheral neuropathy: No  Peripheral Vascular Disease: No  Retinopathy: No    Patient Problem List and Family Medical History reviewed for relevant medical history, current medical status, and diabetes risk factors.    Vitals:  There were no vitals taken for this visit.  Estimated body mass index is 31.96 kg/m  as calculated from the following:    Height as of 1/28/22: 1.676 m (5' 6\").    Weight as of 1/28/22: 89.8 kg (198 lb).     Last 3 BP:   BP Readings from Last 3 Encounters:   01/28/22 112/66   10/19/21 127/89   04/13/21 124/81       History   Smoking Status     Never Smoker   Smokeless Tobacco     Never Used       Labs:  Lab Results   Component Value Date    A1C 8.1 04/11/2022    A1C 8.3 04/07/2021     Lab Results   Component Value Date     11/05/2021     04/13/2021     Lab Results   Component Value Date     11/05/2021     04/07/2021     HDL Cholesterol   Date Value Ref Range Status   04/07/2021 59 >49 mg/dL Final     Direct Measure HDL   Date Value Ref Range Status "   11/05/2021 68 >=50 mg/dL Final   ]  GFR Estimate   Date Value Ref Range Status   11/05/2021 >90 >60 mL/min/1.73m2 Final     Comment:     As of July 11, 2021, eGFR is calculated by the CKD-EPI creatinine equation, without race adjustment. eGFR can be influenced by muscle mass, exercise, and diet. The reported eGFR is an estimation only and is only applicable if the renal function is stable.   04/13/2021 >90 >60 mL/min/[1.73_m2] Final     Comment:     Non  GFR Calc  Starting 12/18/2018, serum creatinine based estimated GFR (eGFR) will be   calculated using the Chronic Kidney Disease Epidemiology Collaboration   (CKD-EPI) equation.       GFR Estimate If Black   Date Value Ref Range Status   04/13/2021 >90 >60 mL/min/[1.73_m2] Final     Comment:      GFR Calc  Starting 12/18/2018, serum creatinine based estimated GFR (eGFR) will be   calculated using the Chronic Kidney Disease Epidemiology Collaboration   (CKD-EPI) equation.       Lab Results   Component Value Date    CR 0.78 11/05/2021    CR 0.60 04/13/2021     No results found for: MICROALBUMIN    Healthy Eating:  Healthy Eating Assessed Today: Yes  Meal planning/habits: Carb counting  Meals include: Breakfast, Lunch, Dinner    Being Active:  Being Active Assessed Today: Yes  Exercise:: Yes  How intense was your typical exercise? : Light (like stretching or slow walking)  Barrier to exercise: None    Monitoring:  Monitoring Assessed Today: Yes  Did patient bring glucose meter to appointment? : Yes  Blood Glucose Meter: CGM  Blood glucose trend: No change    See below.     Taking Medications:  Diabetes Medication(s)     Diabetic Other       Glucagon (BAQSIMI TWO PACK) 3 MG/DOSE POWD    Spray 1 Device in nostril once as needed (for severe hypoglycemia)    Insulin       insulin aspart (NOVOLOG PEN) 100 UNIT/ML pen    Inject 3 to 10 units subcutaneous at mealtimes as directed, total daily dose approx 30 units     insulin aspart (NOVOLOG  VIAL) 100 UNITS/ML vial    Use with insulin pump, total daily dose approx 75 units     insulin glargine (LANTUS PEN) 100 UNIT/ML pen    Inject 22 Units Subcutaneous At Bedtime          Taking Medication Assessed Today: Yes  Current Treatments: Insulin Pump  Dose schedule: Pre-breakfast, Pre-lunch, Pre-dinner  Given by: Patient  Problems taking diabetes medications regularly?: No  Diabetes medication side effects?: No    Problem Solving:  Problem Solving Assessed Today: Yes  Is the patient at risk for hypoglycemia?: Yes  Hypoglycemia Frequency: Weekly  Does patient have glucagon emergency kit?: No  Is the patient at risk for DKA?: Yes  Does patient have ketone test strips?: Yes    Reducing Risks:  Reducing Risks Assessed Today: No    Healthy Coping:  Healthy Coping Assessed Today: Yes  Emotional response to diabetes: Ready to learn, Concern for health and well-being  Informal Support system:: Significant other  Stage of change: MAINTENANCE (Working to maintain change, with risk of relapse)  Support resources: Websites  Patient Activation Measure Survey Score:  GEOVANNA Score (Last Two) 8/7/2012   GEOVANNA Raw Score 52   Activation Score 100   GEOVANNA Level 4       Diabetes knowledge and skills assessment:   Patient is knowledgeable in diabetes management concepts related to: Healthy Eating, Being Active, Monitoring, Problem Solving, Reducing Risks and Healthy Coping    Patient needs further education on the following diabetes management concepts: Taking Medication    Based on learning assessment above, most appropriate setting for further diabetes education would be: Group class or Individual setting.      INTERVENTIONS:    REPORTS:                  Insulin Pump Information  Insulin Pump Brand: Tandem  Infusion Set: Tandem  Does patient have an insulin multiple daily injection back-up plan?: Yes    Education provided today on:  AADE Self-Care Behaviors:  Healthy Eating: reports she is entering the correct number of carbs for her  meals now (was previously entering less to get less insulin and then we weakened them all a bit).   Monitoring: log and interpret results  Taking Medication: Back up plan for pump failure    Education specific to insulin pump provided today on:   importance of bolusing before meals, importance of counting carbohydrates accurately and multiple daily injection back-up plan in case of pump failure    Opportunities for ongoing education and support in diabetes-self management were discussed.    Pt verbalized understanding of concepts discussed and recommendations provided today.       Education Materials Provided:  No new materials provided today    Pump Education Materials: none    ASSESSMENT  Reports her BG's were higher when on her honeymoon and she was off her pump (on the beach and swimming most the time).  Was taking 22 unit(s) long acting per day.  Would benefit from 28 unit(s) per day for long acting if needed in the future which more closely reflects what control IQ is giving her with her pump.  With her current pump changes today, also educated on a correction of 1:40 > 140 mg/dL for highs if on MDI and using a carb ratio of 6 for breakfast and lunch and then 5 for dinner. Educated to wait 24 hours before resuming her pump from her last long acting injection and waiting at least 4 hours before turning control IQ on from her last correction bolus.     Overall, her last 3 days have a TIR of 66% which is improved. She notes a kinked cannula on Saturday to explain her highs that day and had her period last week which always raises her BG's a bit.      Usually she needs multiple corrections to try to bring her down and also has a higher TDD lately (60 unit(s) recently).      Glucose Patterns & Trends:  corrections are not bringing her down enough    Patient would benefit from strengthening her ISF today    Changes made to pump settings:  correction/sensitivity: 12/6 am 50 --> 40, 7 am 45 --> 40, 11 am/12pm/5 pm/6  pm 40 --> 35, 9 pm 60 --> 50 mg/dL    Changes made to sensor settings:   none    PLAN  See Patient Instructions for co-developed, patient-stated behavior change goals.  Follow up in 6 weeks scheduled. Pt reports regular check ins are helpful.   Has lab appointment coming up to recheck her A1c.   For MDI back up plan, take 28 unit(s) Lantus per day. Use correction of 1:40 > 140 mg/dL, and ICR of 6 for breakfast/lunch and 5 for dinner.   AVS printed and provided to patient today. See Follow-Up section for recommended follow-up.    TAURUS Damian [facility]    Time Spent: 20 minutes  Encounter Type: Individual    Any diabetes medication dose changes were made via the CDE Protocol and Collaborative Practice Agreement with the patient's endocrinology provider. A copy of this encounter was shared with the provider.

## 2022-06-01 NOTE — LETTER
"    6/1/2022         RE: Linda Agosto  [address]  [place] [address]        Dear Colleague,    Thank you for referring your patient, Linda Agosto, to the [facility]. Please see a copy of my visit note below.    Diabetes Self-Management Education & Support    Presents for: Insulin Pump and CGM Review    SUBJECTIVE/OBJECTIVE:  Presents for: Insulin Pump and CGM Review  Accompanied by: Self  Diabetes education in the past 24mo: Yes  Focus of Visit: Insulin Pump, CGM  Type of Pump visit: Pump Review  Type of CGM visit: Personal CGM Follow-up  Diabetes type: Type 1  Disease course: Improving  How confident are you filling out medical forms by yourself:: Extremely  Diabetes management related comments/concerns: Still likes her Tandem pump. Feels her corrections are a little light.   Difficulty affording diabetes medication?: No  Difficulty affording diabetes testing supplies?: No  Other concerns:: None  Cultural Influences/Ethnic Background:  Not  or     Diabetes Symptoms & Complications:  Weight trend: Stable  Complications assessed today?: Yes  Autonomic neuropathy: No  CVA: No  Heart disease: No  Nephropathy: No  Peripheral neuropathy: No  Peripheral Vascular Disease: No  Retinopathy: No    Patient Problem List and Family Medical History reviewed for relevant medical history, current medical status, and diabetes risk factors.    Vitals:  There were no vitals taken for this visit.  Estimated body mass index is 31.96 kg/m  as calculated from the following:    Height as of 1/28/22: 1.676 m (5' 6\").    Weight as of 1/28/22: 89.8 kg (198 lb).     Last 3 BP:   BP Readings from Last 3 Encounters:   01/28/22 112/66   10/19/21 127/89   04/13/21 124/81       History   Smoking Status     Never Smoker   Smokeless Tobacco     Never Used       Labs:  Lab Results   Component Value Date    A1C 8.1 04/11/2022    A1C 8.3 04/07/2021     Lab Results   Component Value Date     " 11/05/2021     04/13/2021     Lab Results   Component Value Date     11/05/2021     04/07/2021     HDL Cholesterol   Date Value Ref Range Status   04/07/2021 59 >49 mg/dL Final     Direct Measure HDL   Date Value Ref Range Status   11/05/2021 68 >=50 mg/dL Final   ]  GFR Estimate   Date Value Ref Range Status   11/05/2021 >90 >60 mL/min/1.73m2 Final     Comment:     As of July 11, 2021, eGFR is calculated by the CKD-EPI creatinine equation, without race adjustment. eGFR can be influenced by muscle mass, exercise, and diet. The reported eGFR is an estimation only and is only applicable if the renal function is stable.   04/13/2021 >90 >60 mL/min/[1.73_m2] Final     Comment:     Non  GFR Calc  Starting 12/18/2018, serum creatinine based estimated GFR (eGFR) will be   calculated using the Chronic Kidney Disease Epidemiology Collaboration   (CKD-EPI) equation.       GFR Estimate If Black   Date Value Ref Range Status   04/13/2021 >90 >60 mL/min/[1.73_m2] Final     Comment:      GFR Calc  Starting 12/18/2018, serum creatinine based estimated GFR (eGFR) will be   calculated using the Chronic Kidney Disease Epidemiology Collaboration   (CKD-EPI) equation.       Lab Results   Component Value Date    CR 0.78 11/05/2021    CR 0.60 04/13/2021     No results found for: MICROALBUMIN    Healthy Eating:  Healthy Eating Assessed Today: Yes  Meal planning/habits: Carb counting  Meals include: Breakfast, Lunch, Dinner    Being Active:  Being Active Assessed Today: Yes  Exercise:: Yes  How intense was your typical exercise? : Light (like stretching or slow walking)  Barrier to exercise: None    Monitoring:  Monitoring Assessed Today: Yes  Did patient bring glucose meter to appointment? : Yes  Blood Glucose Meter: CGM  Blood glucose trend: No change    See below.     Taking Medications:  Diabetes Medication(s)     Diabetic Other       Glucagon (BAQSIMI TWO PACK) 3 MG/DOSE POWD     Spray 1 Device in nostril once as needed (for severe hypoglycemia)    Insulin       insulin aspart (NOVOLOG PEN) 100 UNIT/ML pen    Inject 3 to 10 units subcutaneous at mealtimes as directed, total daily dose approx 30 units     insulin aspart (NOVOLOG VIAL) 100 UNITS/ML vial    Use with insulin pump, total daily dose approx 75 units     insulin glargine (LANTUS PEN) 100 UNIT/ML pen    Inject 22 Units Subcutaneous At Bedtime          Taking Medication Assessed Today: Yes  Current Treatments: Insulin Pump  Dose schedule: Pre-breakfast, Pre-lunch, Pre-dinner  Given by: Patient  Problems taking diabetes medications regularly?: No  Diabetes medication side effects?: No    Problem Solving:  Problem Solving Assessed Today: Yes  Is the patient at risk for hypoglycemia?: Yes  Hypoglycemia Frequency: Weekly  Does patient have glucagon emergency kit?: No  Is the patient at risk for DKA?: Yes  Does patient have ketone test strips?: Yes    Reducing Risks:  Reducing Risks Assessed Today: No    Healthy Coping:  Healthy Coping Assessed Today: Yes  Emotional response to diabetes: Ready to learn, Concern for health and well-being  Informal Support system:: Significant other  Stage of change: MAINTENANCE (Working to maintain change, with risk of relapse)  Support resources: Websites  Patient Activation Measure Survey Score:  GEOVANNA Score (Last Two) 8/7/2012   GEOVANNA Raw Score 52   Activation Score 100   GEOVANNA Level 4       Diabetes knowledge and skills assessment:   Patient is knowledgeable in diabetes management concepts related to: Healthy Eating, Being Active, Monitoring, Problem Solving, Reducing Risks and Healthy Coping    Patient needs further education on the following diabetes management concepts: Taking Medication    Based on learning assessment above, most appropriate setting for further diabetes education would be: Group class or Individual setting.      INTERVENTIONS:    REPORTS:                  Insulin Pump Information  Insulin  Pump Brand: Tandem  Infusion Set: Tandem  Does patient have an insulin multiple daily injection back-up plan?: Yes    Education provided today on:  ANUJAE Self-Care Behaviors:  Healthy Eating: reports she is entering the correct number of carbs for her meals now (was previously entering less to get less insulin and then we weakened them all a bit).   Monitoring: log and interpret results  Taking Medication: Back up plan for pump failure    Education specific to insulin pump provided today on:   importance of bolusing before meals, importance of counting carbohydrates accurately and multiple daily injection back-up plan in case of pump failure    Opportunities for ongoing education and support in diabetes-self management were discussed.    Pt verbalized understanding of concepts discussed and recommendations provided today.       Education Materials Provided:  No new materials provided today    Pump Education Materials: none    ASSESSMENT  Reports her BG's were higher when on her honeymoon and she was off her pump (on the beach and swimming most the time).  Was taking 22 unit(s) long acting per day.  Would benefit from 28 unit(s) per day for long acting if needed in the future which more closely reflects what control IQ is giving her with her pump.  With her current pump changes today, also educated on a correction of 1:40 > 140 mg/dL for highs if on MDI and using a carb ratio of 6 for breakfast and lunch and then 5 for dinner. Educated to wait 24 hours before resuming her pump from her last long acting injection and waiting at least 4 hours before turning control IQ on from her last correction bolus.     Overall, her last 3 days have a TIR of 66% which is improved. She notes a kinked cannula on Saturday to explain her highs that day and had her period last week which always raises her BG's a bit.      Usually she needs multiple corrections to try to bring her down and also has a higher TDD lately (60 unit(s)  recently).      Glucose Patterns & Trends:  corrections are not bringing her down enough    Patient would benefit from strengthening her ISF today    Changes made to pump settings:  correction/sensitivity: 12/6 am 50 --> 40, 7 am 45 --> 40, 11 am/12pm/5 pm/6 pm 40 --> 35, 9 pm 60 --> 50 mg/dL    Changes made to sensor settings:   none    PLAN  See Patient Instructions for co-developed, patient-stated behavior change goals.  Follow up in 6 weeks scheduled. Pt reports regular check ins are helpful.   Has lab appointment coming up to recheck her A1c.   For MDI back up plan, take 28 unit(s) Lantus per day. Use correction of 1:40 > 140 mg/dL, and ICR of 6 for breakfast/lunch and 5 for dinner.   AVS printed and provided to patient today. See Follow-Up section for recommended follow-up.    TAURUS DamianES    Time Spent: 20 minutes  Encounter Type: Individual    Any diabetes medication dose changes were made via the CDE Protocol and Collaborative Practice Agreement with the patient's endocrinology provider. A copy of this encounter was shared with the provider.

## 2022-06-06 ENCOUNTER — PRE VISIT (OUTPATIENT)
Dept: MATERNAL FETAL MEDICINE | Facility: CLINIC | Age: 32
End: 2022-06-06
Payer: COMMERCIAL

## 2022-06-08 ENCOUNTER — LAB (OUTPATIENT)
Dept: LAB | Facility: CLINIC | Age: 32
End: 2022-06-08
Payer: COMMERCIAL

## 2022-06-08 DIAGNOSIS — E10.9 TYPE 1 DIABETES MELLITUS WITHOUT COMPLICATION (H): ICD-10-CM

## 2022-06-08 DIAGNOSIS — E06.3 HYPOTHYROIDISM DUE TO HASHIMOTO'S THYROIDITIS: ICD-10-CM

## 2022-06-08 LAB — HBA1C MFR BLD: 7.5 % (ref 0–5.6)

## 2022-06-08 PROCEDURE — 82043 UR ALBUMIN QUANTITATIVE: CPT

## 2022-06-08 PROCEDURE — 83036 HEMOGLOBIN GLYCOSYLATED A1C: CPT

## 2022-06-08 PROCEDURE — 80048 BASIC METABOLIC PNL TOTAL CA: CPT

## 2022-06-08 PROCEDURE — 84439 ASSAY OF FREE THYROXINE: CPT

## 2022-06-08 PROCEDURE — 36415 COLL VENOUS BLD VENIPUNCTURE: CPT

## 2022-06-08 PROCEDURE — 84443 ASSAY THYROID STIM HORMONE: CPT

## 2022-06-09 ENCOUNTER — OFFICE VISIT (OUTPATIENT)
Dept: MATERNAL FETAL MEDICINE | Facility: CLINIC | Age: 32
End: 2022-06-09
Attending: OBSTETRICS & GYNECOLOGY
Payer: COMMERCIAL

## 2022-06-09 DIAGNOSIS — Z31.69 ENCOUNTER FOR PRECONCEPTION CONSULTATION: ICD-10-CM

## 2022-06-09 LAB
ANION GAP SERPL CALCULATED.3IONS-SCNC: 4 MMOL/L (ref 3–14)
BUN SERPL-MCNC: 13 MG/DL (ref 7–30)
CALCIUM SERPL-MCNC: 9.1 MG/DL (ref 8.5–10.1)
CHLORIDE BLD-SCNC: 104 MMOL/L (ref 94–109)
CO2 SERPL-SCNC: 27 MMOL/L (ref 20–32)
CREAT SERPL-MCNC: 0.66 MG/DL (ref 0.52–1.04)
CREAT UR-MCNC: 64 MG/DL
GFR SERPL CREATININE-BSD FRML MDRD: >90 ML/MIN/1.73M2
GLUCOSE BLD-MCNC: 263 MG/DL (ref 70–99)
MICROALBUMIN UR-MCNC: 5 MG/L
MICROALBUMIN/CREAT UR: 7.81 MG/G CR (ref 0–25)
POTASSIUM BLD-SCNC: 4.5 MMOL/L (ref 3.4–5.3)
SODIUM SERPL-SCNC: 135 MMOL/L (ref 133–144)
T4 FREE SERPL-MCNC: 1.21 NG/DL (ref 0.76–1.46)
TSH SERPL DL<=0.005 MIU/L-ACNC: 1.64 MU/L (ref 0.4–4)

## 2022-06-09 PROCEDURE — 99202 OFFICE O/P NEW SF 15 MIN: CPT | Mod: GC | Performed by: OBSTETRICS & GYNECOLOGY

## 2022-06-09 NOTE — NURSING NOTE
Pt here for preconception consult d/t DM1, f/w endo closely. Has CGM/pump, A1c drawn yesterday. G0, considering pregnancy in the future, recently . Seen by Dr. Dunham.

## 2022-06-09 NOTE — PROGRESS NOTES
RE: Linda Agosto  : 1990  MRUN: [id number]    2022    Dear Dr. Santoyo,    Thank you for referring your patient Linda Agosto for a Maternal-Fetal Medicine consultation today.  As you know, Linda is a 32 year old G0 who presents to clinic today for preconception counseling in the setting of maternal type 1 diabetes and hypothyroidism on levothyroxine. Her chart notes mild diabetic retinopathy in her left eye. She has an insulin pump and CGM. Medical history also notable for hyperlipidemia on a statin, anxiety on zoloft, and migraine hx.    Today Linda feels well. She is here with her partner.  She has questions regarding pregnancy and her diabetes.  She notes she works very closely with her endocrinologist and diabetes educator to adjust her insulin pump.  She is now using a tandem pump, which she reports has already decreased her A1c by significant amount in just a few months.  She is hoping for pregnancy on the sooner side.  Regarding her chart diagnosis of diabetic retinopathy, she reports on 1 ophthalmology exam a few years ago the provider had noted a small spot in her left eye.  However on subsequent exams, that spot had disappeared, so she is uncertain if she actually has this.  If so it seems like it has resolved.  Regarding her hypothyroidism, she reports she never really has any symptoms even if her TSH is slightly elevated.  She does take her levothyroxine for this, current dose is 125 mcg.    Obstetrical History:    OB History    Para Term  AB Living   0 0 0 0 0 0   SAB IAB Ectopic Multiple Live Births   0 0 0 0 0     Gynecological History:    Regular cycles, every 30-35 days, tracking them    Denies a history of frequent urinary tract infections, vaginal infections or sexually transmitted infections.    Denies a history of abnormal pap smears and cervical procedures/surgeries.    No known history of myomas or uterine abnormalities.    Medical History:   Past Medical History:    Diagnosis Date     Adjustment disorder with anxious mood      Allergic rhinitis, cause unspecified     h/o AIT     Chest discomfort      Common migraine     No visual aura.      Hyperlipidemia LDL goal < 70      Hypothyroidism      Infectious mononucleosis 01/01/1995     Tuberculosis      Type 1 diabetes, HbA1c goal < 7% (H) 03/01/2004     followed [facility] - peds endocrinology - now Dr Jimenez     Surgical History:   Past Surgical History:   Procedure Laterality Date     APPENDECTOMY  8/07    dr deshpande     PE TUBES  1996     Medications:     Current Outpatient Medications:      blood glucose (VALE CONTOUR NEXT) test strip, Patient tests 5 times/day, Contour Next test strips DAW1, Disp: 500 strip, Rfl: 3     blood glucose monitoring (VALE MICROLET) lancets, Patient tests 8 times/day, Disp: 2 Box, Rfl: 6     Continuous Blood Gluc Sensor (DEXCOM G6 SENSOR) MISC, 1 Device continuous prn (Use for continuous glucose testing, change every 10-days) Dispense 3 box (of 3 sensors) per 3 months, Disp: 3 each, Rfl: 5     Continuous Blood Gluc Transmit (DEXCOM G6 TRANSMITTER) MISC, CHANGE EVERY 90 DAYS, Disp: 1 each, Rfl: 3     Glucagon (BAQSIMI TWO PACK) 3 MG/DOSE POWD, Spray 1 Device in nostril once as needed (for severe hypoglycemia), Disp: 1 each, Rfl: 0     insulin aspart (NOVOLOG PEN) 100 UNIT/ML pen, Inject 3 to 10 units subcutaneous at mealtimes as directed, total daily dose approx 30 units, Disp: 15 mL, Rfl: 1     insulin aspart (NOVOLOG VIAL) 100 UNITS/ML vial, Use with insulin pump, total daily dose approx 75 units, Disp: 30 mL, Rfl: 11     insulin glargine (LANTUS PEN) 100 UNIT/ML pen, Inject 22 Units Subcutaneous At Bedtime, Disp: 15 mL, Rfl: 1     insulin pen needle (BD CLEOPATRA U/F) 32G X 4 MM miscellaneous, Use 4 pen needles daily or as directed., Disp: 100 each, Rfl: 1     levothyroxine (SYNTHROID/LEVOTHROID) 125 MCG tablet, Take 1 tablet (125 mcg) by mouth daily, Disp: 90 tablet, Rfl: 3     sertraline (ZOLOFT)  50 MG tablet, Take 1 tablet (50 mg) by mouth daily, Disp: 90 tablet, Rfl: 1     simvastatin (ZOCOR) 40 MG tablet, Take 1 tablet (40 mg) by mouth At Bedtime, Disp: 90 tablet, Rfl: 3     triamcinolone (KENALOG) 0.1 % external cream, Apply topically 2 times daily to affected areas on fingers for 10-14 days. Not for use on face nor groin., Disp: 80 g, Rfl: 1     Allergies:   Allergies   Allergen Reactions     Penicillins Rash     augmentin & pcn     Sulfa Drugs Hives     Social History:    She  reports that she has never smoked. She has never used smokeless tobacco. She reports current alcohol use of about 2.0 standard drinks of alcohol per week. She reports that she does not use drugs.    Family History:   Family History   Problem Relation Age of Onset     Neurologic Disorder Maternal Grandmother         dementia     Genetic Disorder Paternal Grandfather         kidney     Family History Negative No family hx of      Review of Systems:    10 point review of systems negative except as noted in the HPI    Data Reviewed:  Component 4/11/2022 6/8/2022   Sodium  135   Potassium  4.5   Chloride  104   Carbon Dioxide  27   Anion Gap  4   Urea Nitrogen  13   Creatinine  0.66   Calcium  9.1   Glucose  263 (H)   GFR Estimate  >90   Creatinine Urine  64   Albumin Urine mg/L  5   Albumin Urine mg/g Cr  7.81   Hemoglobin A1C 8.1 (H) 7.5 (H)   TSH  1.64   T4 Free  1.21     Physical examination was deferred at this time.    Assessment/Plan:  In light of the patient s history as listed above our discussion and recommendations can be summarized briefly as follows:    Type 1 Diabetes  We discussed the implications of T1DM in pregnancy. While pregnancy does not alter the course of diabetes in the setting of normal renal function, it does have significant implications on pregnancy outcomes. Pregnancy is broadly characterized by increased insulin resistance due to placental-produced hormones. This physiologic change can increase insulin  requirements and worsen glycemic control in women with pregestational diabetes. On average, insulin needs increase from a range of 0.7-0.8 units/kg actual body weight/day in the first trimester, to 0.8-1 units/kg/day in the second trimester, to 0.9-1.2 units/kg/day in the third trimester. The goal of therapy is to achieve euglycemia in pregnancy without significant hypoglycemia. Glycemic goals generally include fasting and premeal glucose values of 95 mg/dL or less and either 1-hour postprandial levels of 140 mg/dL or less or 2-hour postprandial values of 120 mg/dL or less. During the night, glucose levels should not decrease to less than 60 mg/dL. Even with meticulous monitoring, hypoglycemia is more frequent in pregnancy than at other times, particularly in women with type 1 pregestational diabetes mellitus. Patients should be questioned to determine if they can recognize when their glucose levels decrease to less than 60 mg/dL. Patients and their families should be taught how to respond quickly and appropriately to hypoglycemia. The best approach is to have glucose tablets available at all times.     Mean capillary glucose levels should be maintained at an average of 100 mg/d  with a goal HbA1C less than 6% to minimize fetal risk and complications of pregnancy. Importantly, because of the association of elevated glucose values and congenital anomalies, aggressive approaches to glycemic control early in the first trimester before or during embryogenesis may reduce the risk of fetal anomalies. The approximate risks of malformation: 1) baseline risk 2-3%, 2) A1c 7-9: 10%, 3) A1c 9-11: 23%, 4) A1c 11 and above: >25-30%. Complex cardiac defects, central nervous system anomalies, such as anencephaly and spina bifida, and skeletal malformations, including sacral agenesis, are most common. Other fetal risks of pregnancy in the setting of preexisting diabetes include fetal growth restriction, fetal macrosomia, and  stillbirth. Women who have macrosomic fetuses have an increased risk for shoulder dystocia and permanent brachial plexus injury.  complications include hypoglycemia, a higher rate of respiratory distress syndrome, polycythemia, organomegaly, electrolyte disturbances, and hyperbilirubinemia. Long-term outcomes for offspring of women with type 1 diabetes mellitus include obesity and carbohydrate intolerance.     Pregnancy has been associated with exacerbation of diabetes-related complications, particularly retinopathy and nephropathy. Poorly controlled pregestational diabetes mellitus leads to serious end-organ damage that may eventually become life threatening. Pregnancy in the setting of diabetes, especially in the presence of nephropathy,  carries a high risk of developing hypertension and preeclampsia. There is also an increased risk of acute myocardial infarction. There is a 3-5 fold increase in the risk of infection. Additionally, macrosomic infants increase the risk of maternal obstetrical trauma at time of delivery. Finally, we discussed that strict glycemic control can help reduce many of these maternal and fetal risks.      We would recommend the following management during pregnancy:  1. Workup for end organ damage   - Baseline preeclampsia labs, urine protein to creatinine ratio  - Baseline EKG  - Baseline ophthalmology exam; if abnormal, repeat every trimester and postpartum.  2. Continue to follow with Endocrinology, diabetes educator and nutrition team. Recommend A1c every trimester and at admission for delivery   3. Initiate low dose aspirin 81mg daily from 12 weeks until delivery for preeclampsia prevention    4. Comprehensive anatomic survey at 18-20 weeks and fetal echocardiogram 22-24 weeks  5. Growth scans every 4 weeks and twice weekly  monitoring starting at 32 weeks.  6. Delivery at 39 weeks or sooner if clinically indicated    Hypothyroidism  Thyroid requirements increase in  pregnancy due to increases in metabolism and thyroid binding globulin.  Thyroid function tests, in particular TSH, should be followed closely to ensure adequate treatment.  Hypothyroidism has been associated with higher pregnancy complication rates including miscarriage, preeclampsia, abruption, low birth weight and stillbirth.  Untreated hypothyroidism has also been associated with adverse fetal neurological development, but well treated hypothyroidism (ie euthyroid state) carries an excellent prognosis for mother and baby.      Recommendations:   - Maintain euthyroidism via treatment with levothyroxine, aimed at keeping the TSH at the desired range by trimester; dose requirements may increase by as much as 30-50%.     - TSH should be checked q4-6 weeks in the first half of pregnancy and adjusted to trimester specific norms; it can be checked less often in the second half of pregnancy as long as dose adjustments are not necessary.    - First trimester: 0.1 to 2.5 mIU/L  - Second trimester: 0.2 to 3 mIU/L  - Third trimester: 0.3 to 3 mIU/L  - Continued follow-up with her endocrinologist throughout pregnancy.  - After delivery levothyroxine can be reduced to pre-pregnancy levels, but serum TSH should be monitored 4-6 weeks later to confirm appropriate dosing     At the end of our discussion, Linda indicated that her questions were answered and she seemed satisfied with our discussion.  Thank you for the opportunity to participate in your patient s care.  If I can be of any further assistance, please do not hesitate to contact me.    Patient seen with Dr. Charla Ramirez MD  ObGyn, PGY-2  06/09/22 1:16 PM     MFM Attending Attestation    I have seen and evaluated the patient myself with the resident. I spent a total of 25 minutes on today's encounter including counseling, coordination of care, review of records and documentation. See note for details; I have made the necessary edits/additions.    Sandy Dunham,  MD  Maternal Fetal Medicine

## 2022-07-07 ENCOUNTER — MYC MEDICAL ADVICE (OUTPATIENT)
Dept: OBGYN | Facility: CLINIC | Age: 32
End: 2022-07-07

## 2022-07-07 NOTE — TELEPHONE ENCOUNTER
Encounter for preconception consultation [Z31.69] for her MFM preconception consultation.    Routing pt mychart message to provider to advise.  Any input on this?  This isn't necessarily in the preventative care category is this?    Shania Schulz RN on 7/7/2022 at 12:53 PM

## 2022-08-18 ENCOUNTER — VIRTUAL VISIT (OUTPATIENT)
Dept: ENDOCRINOLOGY | Facility: CLINIC | Age: 32
End: 2022-08-18
Payer: COMMERCIAL

## 2022-08-18 DIAGNOSIS — E10.9 TYPE 1 DIABETES MELLITUS WITHOUT COMPLICATION (H): Primary | ICD-10-CM

## 2022-08-18 DIAGNOSIS — E06.3 HYPOTHYROIDISM DUE TO HASHIMOTO'S THYROIDITIS: ICD-10-CM

## 2022-08-18 DIAGNOSIS — Z96.41 INSULIN PUMP STATUS: ICD-10-CM

## 2022-08-18 PROCEDURE — 99214 OFFICE O/P EST MOD 30 MIN: CPT | Mod: 95 | Performed by: INTERNAL MEDICINE

## 2022-08-18 PROCEDURE — 95251 CONT GLUC MNTR ANALYSIS I&R: CPT | Performed by: INTERNAL MEDICINE

## 2022-08-18 NOTE — LETTER
8/18/2022         RE: Linda Agosto  [address]  [facility] [address]        Dear Colleague,    Thank you for referring your patient, Linda Agosto, to the [facility]. Please see a copy of my visit note below.    Patient is being evaluated via a billable video visit.      How would you like to obtain your AVS? Reviewed verbally  If the video visit is dropped, the invitation should be resent by cell phone  Will anyone else be joining your video visit? no      Video Start Time:  1:05 pm    Video-Visit Details    Type of service:  Video Visit    Video End Time: 1:25 pm    Originating Location (pt. Location): home    Distant Location (provider location):  [facility]/home    Platform used for Video Visit:  [employer]        Recent issues:  Diabetes and thyroid follow-up evaluation  Changed from Medtronic pump + Dexcom to the Tandem pump + DexcomG6   4/23/22, plans to change name as Linda Chavez....  Feeling well overall, no other new health issues reported.  Hopeful for Pg.           2003. Diagnosis of diabetes mellitus, age 13, living in [facility]  Had acute illness requiring hospitalization at [facility]  Began insulin injections, using Novolog and Lantus insulin  [place] carb counting method, gram estimates  On MDI treatment plan for approx 2 years, then changed to pump use  Previous medical evaluations with Dr. Yash Barcenas/[facility] Andreia Callahan  2005. Began use of [place] pump  2012. Changed to Medtronic pump  Subsequently using Medtronic 723 Paradigm pump  2012. Tried wearing CGMS sensor, but uncomfortable     12/8/14. Initial consult with me at my former clinic ([facility])  Continued pump management  Was not interested in CGMS use     Previous FV hgbA1c levels include:              Lab Test 02/05/18 10/10/17 06/21/17 02/01/17 11/16/16  0815   A1C 7.4* 7.8* 8.1* 7.3* 8.2*      ~12/2017. Upgraded to Medtronic 670G pump    Tried Medtronic Guardian sensor, recalls  frequent low glucose alarms              Wore sensor in manual mode              Didn't like it, discontinued use     ~11/2018. Wore diagnostic LibrePro CGMS  Subsequently obtained LibrePersonal CGMS, not wearing past year     .3/2022. Changed to Tandem TSlim insulin pump    Novolog in pump    Current Tandem pump settings:       Recent Tandem pump data:          Recent DexcomG6 data:         Recent FV labs include:  Lab Results   Component Value Date    A1C 7.5 (H) 06/08/2022     06/08/2022    POTASSIUM 4.5 06/08/2022    CHLORIDE 104 06/08/2022    CO2 27 06/08/2022    ANIONGAP 4 06/08/2022     (H) 06/08/2022    BUN 13 06/08/2022    CR 0.66 06/08/2022    GFRESTIMATED >90 06/08/2022    GFRESTBLACK >90 04/13/2021    MARCIA 9.1 06/08/2022    CHOL 236 (H) 11/05/2021    TRIG 72 11/05/2021    HDL 68 11/05/2021     (H) 11/05/2021    NHDL 168 (H) 11/05/2021    UCRR 64 06/08/2022    MICROL 5 06/08/2022    UMALCR 7.81 06/08/2022     Hyperlipidemia:  Takes simvastatin medication  DM Complications:  None known        Thyroid:  2013. Diagnosis of hypothyroidism  Previous FV thyroid labs include:    Treatment with levothyroxine medication  Previously on stable dose as levothyroxine 0.088 mg daily  Recent FV labs include:  Lab Results   Component Value Date    TSH 1.64 06/08/2022    T4 1.21 06/08/2022     (H) 12/19/2013     Current dose:  Levothyroxine 0.125 mg daily        , lives in 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 Ellen Burdick/[facility] [place]      PMH/PSH:  Past Medical History:   Diagnosis Date     Adjustment disorder with anxious mood      Allergic rhinitis, cause unspecified     h/o AIT     Chest discomfort      Common migraine     No visual aura.      Hyperlipidemia LDL goal < 70      Hypothyroidism      Infectious mononucleosis 01/01/1995     Tuberculosis      Type 1 diabetes, HbA1c goal < 7% (H)  03/01/2004     followed [facility] - peds endocrinology - now Dr Jimenez     Past Surgical History:   Procedure Laterality Date     APPENDECTOMY  8/07    dr deshpande     PE TUBES  1996       Family Hx:  Family History   Problem Relation Age of Onset     Neurologic Disorder Maternal Grandmother         dementia     Genetic Disorder Paternal Grandfather         kidney     Family History Negative No family hx of          Social Hx:  Social History     Socioeconomic History     Marital status:      Spouse name: Not on file     Number of children: Not on file     Years of education: Not on file     Highest education level: Not on file   Occupational History     Occupation: Works in supply chain   Tobacco Use     Smoking status: Never Smoker     Smokeless tobacco: Never Used   Vaping Use     Vaping Use: Never used   Substance and Sexual Activity     Alcohol use: Yes     Alcohol/week: 2.0 standard drinks     Types: 2 Standard drinks or equivalent per week     Comment: 2-5 drinks per week     Drug use: No     Sexual activity: Yes     Partners: Male     Birth control/protection: Condom   Other Topics Concern      Service Not Asked     Blood Transfusions Not Asked     Caffeine Concern Yes     Comment: rare     Occupational Exposure Not Asked     Hobby Hazards Not Asked     Sleep Concern No     Stress Concern No     Weight Concern Not Asked     Special Diet Not Asked     Back Care Not Asked     Exercise Yes     Bike Helmet Not Asked     Seat Belt Yes     Self-Exams No     Comment: encouraged     Parent/sibling w/ CABG, MI or angioplasty before 65F 55M? No   Social History Narrative    -Working -       Social Determinants of Health     Financial Resource Strain: Not on file   Food Insecurity: Not on file   Transportation Needs: Not on file   Physical Activity: Not on file   Stress: Not on file   Social Connections: Not on file   Intimate Partner Violence: Not on file   Housing Stability: Not on file           MEDICATIONS:  has a current medication list which includes the following prescription(s): blood glucose, blood glucose monitoring, dexcom g6 sensor, dexcom g6 transmitter, baqsimi two pack, insulin aspart, insulin aspart, insulin glargine, insulin pen needle, levothyroxine, sertraline, simvastatin, and triamcinolone.      ROS: 10 point ROS neg other than the symptoms noted above in the HPI.     GENERAL: mild fatigue, wt stable?; denies fevers, chills, night sweats.   HEENT: no dysphagia, odonophagia, diplopia, neck pain  THYROID:  no apparent hyper or hypothyroid symptoms  CV: no chest pain, pressure, palpitations  LUNGS: no SOB, LANG, cough, wheezing   ABDOMEN: no diarrhea, constipation, abdominal pain  EXTREMITIES: no rashes, ulcers, edema  NEUROLOGY: no headaches, denies changes in vision, tingling, extremitiy numbness   MSK: no muscle aches or pains, weakness  SKIN: no rashes or lesions  : menses regular  PSYCH:  stable mood, no significant anxiety or depression  ENDOCRINE: no heat or cold intolerance     Physical Exam (visual exam)  VS:  no vital signs taken for video visit  CONSTITUTIONAL: healthy, alert and NAD, well dressed, answering questions appropriately  ENT: no nose swelling or nasal discharge, mouth redness or gum changes.  EYES: eyes grossly normal to inspection, conjunctivae and sclerae normal, no exophthalmos or proptosis  THYROID:  no apparent nodules or goiter  LUNGS: no audible wheeze, cough or visible cyanosis, no visible retractions or increased work of breathing  ABDOMEN: abdomen not evaluated  EXTREMITIES: no hand tremors, limited exam  NEUROLOGY: CN grossly intact, mentation intact and speech normal   SKIN:  no apparent skin lesions, rash, or edema with visualized skin appearance  PSYCH: mentation appears normal, affect normal/bright, judgement and insight intact,   normal speech and appearance well groomed       LABS:     All pertinent notes, labs, and images personally reviewed by me.       A/P:  Encounter Diagnoses   Name Primary?     Type 1 diabetes mellitus without complication (H) Yes     Insulin pump status      Hypothyroidism due to Hashimoto's thyroiditis        Comments:  Reviewed health history, diabetes and thyroid issues  Recent SG trends good overall, but higher post suppertime  Reviewed and interpreted tests that I previously ordered.   Ordered appropriate tests for the endocrinology disease management.    Management options discussed and implemented after shared medical decision making with the patient.  Diabetes problem is chronic with exacerbation progression, hyperglycemia     Plan:  Reviewed the overall T1DM management and insulin pump use.  Discussed optimal BG testing to assess glucose trends.  We reviewed insulin pump settings, basal rate and bolus dosing  Use of automated pump bolus dosing for meal/snack carb & correction dosing  Reviewed recent Tandem pump report information  Reviewed recent DexcomG6 CGM glucose trends, in detail     Recommend:  Continue the Tandem insulin pump and diabetes management plan.  Pump setting changes:    Bolus ICR:  7a 6.5 to 6.2, 5p 4.8 to 4.6, 6p 4.8 to 4.6    We have discussed use of the exercise and sleep modes with pump use  Continue use of the DexcomG6 CGM sensor  Reviewed general issues with T1DM and pregnancy management    Reviewed prePg and Pg glycemic control, f/u CDE and physician appointments    Plan an appt with [facility] Diab Educator, at our [facility] [place]    Referral placed  Continue the current  levothyroxine 0.125 mg daily  Plan repeat fasting lab tests in 9/2022    Lab orders placed  Continue current simvastatin daily dosing, but discontinue if/when pregnant  Arrange annual dilated eye exam, fasting lipid panel testing.  Contact me if questions/concerns about diabetes and thyroid management     Addressed patient's questions today.    There are no Patient Instructions on file for this visit.    Future labs ordered today:    Orders Placed This Encounter   Procedures     GLUCOSE MONITOR, 72 HOUR, PHYS INTERP     Hemoglobin A1c     Basic metabolic panel     TSH     T4 free     Lipid panel reflex to direct LDL Fasting     AMB Adult Diabetes Educator Referral     Radiology/Consults ordered today: AMBULATORY ADULT DIABETES EDUCATOR REFERRAL    Total time spent in with the patient evaluation:  20 min  Additional time spent reviewing pertinent lab tests and chart notes, and documentation:  7 min    Follow-up:  11/2022, TBD    ANTHONY Jimenez MD, MS  Endocrinology  [facility]    CC:  RUBY Mustafa and Masters, A                          Again, thank you for allowing me to participate in the care of your patient.        Sincerely,        Santy Jimenez MD

## 2022-08-18 NOTE — PROGRESS NOTES
Patient is being evaluated via a billable video visit.      How would you like to obtain your AVS? Reviewed verbally  If the video visit is dropped, the invitation should be resent by cell phone  Will anyone else be joining your video visit? no      Video Start Time:  1:05 pm    Video-Visit Details    Type of service:  Video Visit    Video End Time: 1:25 pm    Originating Location (pt. Location): home    Distant Location (provider location):  [facility] [place]/home    Platform used for Video Visit:  [employer]        Recent issues:  Diabetes and thyroid follow-up evaluation  Changed from Medtronic pump + Dexcom to the Tandem pump + DexcomG6   4/23/22, plans to change name as Linda Chavez....  Feeling well overall, no other new health issues reported.  Hopeful for Pg.           2003. Diagnosis of diabetes mellitus, age 13, living in [facility]  Had acute illness requiring hospitalization at [facility]  Began insulin injections, using Novolog and Lantus insulin  [place] carb counting method, gram estimates  On MDI treatment plan for approx 2 years, then changed to pump use  Previous medical evaluations with Dr. Yash Barcenas/[facility] Andreia Callahan  2005. Began use of [place] pump  2012. Changed to Medtronic pump  Subsequently using Medtronic 723 Paradigm pump  2012. Tried wearing CGMS sensor, but uncomfortable     12/8/14. Initial consult with me at my former clinic ([facility])  Continued pump management  Was not interested in CGMS use     Previous FV hgbA1c levels include:              Lab Test 02/05/18 10/10/17 06/21/17 02/01/17 11/16/16  0815   A1C 7.4* 7.8* 8.1* 7.3* 8.2*      ~12/2017. Upgraded to Medtronic 670G pump    Tried Medtronic Guardian sensor, recalls frequent low glucose alarms              Wore sensor in manual mode              Didn't like it, discontinued use     ~11/2018. Wore diagnostic LibrePro CGMS  Subsequently obtained LibrePersonal CGMS, not wearing past year     .3/2022. Changed to  Tandem TSlim insulin pump    Novolog in pump    Current Tandem pump settings:       Recent Tandem pump data:          Recent DexcomG6 data:         Recent FV labs include:  Lab Results   Component Value Date    A1C 7.5 (H) 06/08/2022     06/08/2022    POTASSIUM 4.5 06/08/2022    CHLORIDE 104 06/08/2022    CO2 27 06/08/2022    ANIONGAP 4 06/08/2022     (H) 06/08/2022    BUN 13 06/08/2022    CR 0.66 06/08/2022    GFRESTIMATED >90 06/08/2022    GFRESTBLACK >90 04/13/2021    MARCIA 9.1 06/08/2022    CHOL 236 (H) 11/05/2021    TRIG 72 11/05/2021    HDL 68 11/05/2021     (H) 11/05/2021    NHDL 168 (H) 11/05/2021    UCRR 64 06/08/2022    MICROL 5 06/08/2022    UMALCR 7.81 06/08/2022     Hyperlipidemia:  Takes simvastatin medication  DM Complications:  None known        Thyroid:  2013. Diagnosis of hypothyroidism  Previous FV thyroid labs include:    Treatment with levothyroxine medication  Previously on stable dose as levothyroxine 0.088 mg daily  Recent FV labs include:  Lab Results   Component Value Date    TSH 1.64 06/08/2022    T4 1.21 06/08/2022     (H) 12/19/2013     Current dose:  Levothyroxine 0.125 mg daily        , lives in [facility]; works at [employer] with , in [place]  Finishing her JANAY at [employer]  Has previously seen Dr. Ellen Burdick/[facility]      PMH/PSH:  Past Medical History:   Diagnosis Date     Adjustment disorder with anxious mood      Allergic rhinitis, cause unspecified     h/o AIT     Chest discomfort      Common migraine     No visual aura.      Hyperlipidemia LDL goal < 70      Hypothyroidism      Infectious mononucleosis 01/01/1995     Tuberculosis      Type 1 diabetes, HbA1c goal < 7% (H) 03/01/2004     followed [facility] - peds endocrinology - now Dr Jimenez     Past Surgical History:   Procedure Laterality Date     APPENDECTOMY  8/07    dr deshpande     PE TUBES  1996       Family Hx:  Family History   Problem Relation Age of Onset      Neurologic Disorder Maternal Grandmother         dementia     Genetic Disorder Paternal Grandfather         kidney     Family History Negative No family hx of          Social Hx:  Social History     Socioeconomic History     Marital status:      Spouse name: Not on file     Number of children: Not on file     Years of education: Not on file     Highest education level: Not on file   Occupational History     Occupation: Works in supply chain   Tobacco Use     Smoking status: Never Smoker     Smokeless tobacco: Never Used   Vaping Use     Vaping Use: Never used   Substance and Sexual Activity     Alcohol use: Yes     Alcohol/week: 2.0 standard drinks     Types: 2 Standard drinks or equivalent per week     Comment: 2-5 drinks per week     Drug use: No     Sexual activity: Yes     Partners: Male     Birth control/protection: Condom   Other Topics Concern      Service Not Asked     Blood Transfusions Not Asked     Caffeine Concern Yes     Comment: rare     Occupational Exposure Not Asked     Hobby Hazards Not Asked     Sleep Concern No     Stress Concern No     Weight Concern Not Asked     Special Diet Not Asked     Back Care Not Asked     Exercise Yes     Bike Helmet Not Asked     Seat Belt Yes     Self-Exams No     Comment: encouraged     Parent/sibling w/ CABG, MI or angioplasty before 65F 55M? No   Social History Narrative    -Working -       Social Determinants of Health     Financial Resource Strain: Not on file   Food Insecurity: Not on file   Transportation Needs: Not on file   Physical Activity: Not on file   Stress: Not on file   Social Connections: Not on file   Intimate Partner Violence: Not on file   Housing Stability: Not on file          MEDICATIONS:  has a current medication list which includes the following prescription(s): blood glucose, blood glucose monitoring, dexcom g6 sensor, dexcom g6 transmitter, baqsimi two pack, insulin aspart, insulin aspart, insulin glargine, insulin pen  needle, levothyroxine, sertraline, simvastatin, and triamcinolone.      ROS: 10 point ROS neg other than the symptoms noted above in the HPI.     GENERAL: mild fatigue, wt stable?; denies fevers, chills, night sweats.   HEENT: no dysphagia, odonophagia, diplopia, neck pain  THYROID:  no apparent hyper or hypothyroid symptoms  CV: no chest pain, pressure, palpitations  LUNGS: no SOB, LANG, cough, wheezing   ABDOMEN: no diarrhea, constipation, abdominal pain  EXTREMITIES: no rashes, ulcers, edema  NEUROLOGY: no headaches, denies changes in vision, tingling, extremitiy numbness   MSK: no muscle aches or pains, weakness  SKIN: no rashes or lesions  : menses regular  PSYCH:  stable mood, no significant anxiety or depression  ENDOCRINE: no heat or cold intolerance     Physical Exam (visual exam)  VS:  no vital signs taken for video visit  CONSTITUTIONAL: healthy, alert and NAD, well dressed, answering questions appropriately  ENT: no nose swelling or nasal discharge, mouth redness or gum changes.  EYES: eyes grossly normal to inspection, conjunctivae and sclerae normal, no exophthalmos or proptosis  THYROID:  no apparent nodules or goiter  LUNGS: no audible wheeze, cough or visible cyanosis, no visible retractions or increased work of breathing  ABDOMEN: abdomen not evaluated  EXTREMITIES: no hand tremors, limited exam  NEUROLOGY: CN grossly intact, mentation intact and speech normal   SKIN:  no apparent skin lesions, rash, or edema with visualized skin appearance  PSYCH: mentation appears normal, affect normal/bright, judgement and insight intact,   normal speech and appearance well groomed       LABS:     All pertinent notes, labs, and images personally reviewed by me.      A/P:  Encounter Diagnoses   Name Primary?     Type 1 diabetes mellitus without complication (H) Yes     Insulin pump status      Hypothyroidism due to Hashimoto's thyroiditis        Comments:  Reviewed health history, diabetes and thyroid  issues  Recent SG trends good overall, but higher post suppertime  Reviewed and interpreted tests that I previously ordered.   Ordered appropriate tests for the endocrinology disease management.    Management options discussed and implemented after shared medical decision making with the patient.  Diabetes problem is chronic with exacerbation progression, hyperglycemia     Plan:  Reviewed the overall T1DM management and insulin pump use.  Discussed optimal BG testing to assess glucose trends.  We reviewed insulin pump settings, basal rate and bolus dosing  Use of automated pump bolus dosing for meal/snack carb & correction dosing  Reviewed recent Tandem pump report information  Reviewed recent DexcomG6 CGM glucose trends, in detail     Recommend:  Continue the Tandem insulin pump and diabetes management plan.  Pump setting changes:    Bolus ICR:  7a 6.5 to 6.2, 5p 4.8 to 4.6, 6p 4.8 to 4.6    We have discussed use of the exercise and sleep modes with pump use  Continue use of the DexcomG6 CGM sensor  Reviewed general issues with T1DM and pregnancy management    Reviewed prePg and Pg glycemic control, f/u CDE and physician appointments    Plan an appt with [facility] Diab Educator, at our [facility]    Referral placed  Continue the current  levothyroxine 0.125 mg daily  Plan repeat fasting lab tests in 9/2022    Lab orders placed  Continue current simvastatin daily dosing, but discontinue if/when pregnant  Arrange annual dilated eye exam, fasting lipid panel testing.  Contact me if questions/concerns about diabetes and thyroid management     Addressed patient's questions today.    There are no Patient Instructions on file for this visit.    Future labs ordered today:   Orders Placed This Encounter   Procedures     GLUCOSE MONITOR, 72 HOUR, PHYS INTERP     Hemoglobin A1c     Basic metabolic panel     TSH     T4 free     Lipid panel reflex to direct LDL Fasting     AMB Adult Diabetes Educator Referral      Radiology/Consults ordered today: AMBULATORY ADULT DIABETES EDUCATOR REFERRAL    Total time spent in with the patient evaluation:  20 min  Additional time spent reviewing pertinent lab tests and chart notes, and documentation:  7 min    Follow-up:  11/2022, JITENDRA Jimenez MD, MS  Endocrinology  [facility]    CC:  RUBY Mustafa and s A

## 2022-08-18 NOTE — NURSING NOTE
Chief Complaint   Patient presents with     Diabetes       There were no vitals filed for this visit.    There is no height or weight on file to calculate BMI.    Coleen Melo, [facility]

## 2022-08-22 ENCOUNTER — TELEPHONE (OUTPATIENT)
Dept: ENDOCRINOLOGY | Facility: CLINIC | Age: 32
End: 2022-08-22

## 2022-08-22 NOTE — TELEPHONE ENCOUNTER
Diabetes Education Scheduling Outreach #1:    Call to patient to schedule. Left message with phone number to call to schedule.    Also sent [employer] message for second attempt. Requested patient to call to schedule.    Cindy Rowland OnCall  Diabetes and Nutrition Scheduling

## 2022-08-26 DIAGNOSIS — E10.9 TYPE 1 DIABETES MELLITUS WITHOUT COMPLICATION (H): ICD-10-CM

## 2022-08-29 RX ORDER — PROCHLORPERAZINE 25 MG/1
1 SUPPOSITORY RECTAL CONTINUOUS PRN
Qty: 3 EACH | Refills: 5 | Status: SHIPPED | OUTPATIENT
Start: 2022-08-29 | End: 2023-03-07

## 2022-08-29 NOTE — TELEPHONE ENCOUNTER
Last Written Prescription Date:  3/10/22  Last Fill Quantity: 3,  # refills: 5   Last office visit: 8/18/22 with prescribing provider:  Dr. Jimenez   Future Office Visit: 11/17/22    Next 5 appointments (look out 90 days)    Sep 14, 2022  8:15 AM  Lab visit with [facility]  [facility] ([facility] ) [address] [place] [id number] [phone]           Refilled per protocol  Tana Olivares RN

## 2022-10-06 ENCOUNTER — LAB (OUTPATIENT)
Dept: LAB | Facility: CLINIC | Age: 32
End: 2022-10-06
Payer: COMMERCIAL

## 2022-10-06 DIAGNOSIS — E10.9 TYPE 1 DIABETES MELLITUS WITHOUT COMPLICATION (H): ICD-10-CM

## 2022-10-06 DIAGNOSIS — E06.3 HYPOTHYROIDISM DUE TO HASHIMOTO'S THYROIDITIS: ICD-10-CM

## 2022-10-06 LAB
ANION GAP SERPL CALCULATED.3IONS-SCNC: 6 MMOL/L (ref 3–14)
BUN SERPL-MCNC: 14 MG/DL (ref 7–30)
CALCIUM SERPL-MCNC: 9.2 MG/DL (ref 8.5–10.1)
CHLORIDE BLD-SCNC: 107 MMOL/L (ref 94–109)
CHOLEST SERPL-MCNC: 241 MG/DL
CO2 SERPL-SCNC: 26 MMOL/L (ref 20–32)
CREAT SERPL-MCNC: 0.67 MG/DL (ref 0.52–1.04)
FASTING STATUS PATIENT QL REPORTED: YES
GFR SERPL CREATININE-BSD FRML MDRD: >90 ML/MIN/1.73M2
GLUCOSE BLD-MCNC: 160 MG/DL (ref 70–99)
HBA1C MFR BLD: 8 % (ref 0–5.6)
HDLC SERPL-MCNC: 67 MG/DL
LDLC SERPL CALC-MCNC: 159 MG/DL
NONHDLC SERPL-MCNC: 174 MG/DL
POTASSIUM BLD-SCNC: 4.4 MMOL/L (ref 3.4–5.3)
SODIUM SERPL-SCNC: 139 MMOL/L (ref 133–144)
T4 FREE SERPL-MCNC: 1.09 NG/DL (ref 0.76–1.46)
TRIGL SERPL-MCNC: 75 MG/DL
TSH SERPL DL<=0.005 MIU/L-ACNC: 3.56 MU/L (ref 0.4–4)

## 2022-10-06 PROCEDURE — 80061 LIPID PANEL: CPT

## 2022-10-06 PROCEDURE — 80048 BASIC METABOLIC PNL TOTAL CA: CPT

## 2022-10-06 PROCEDURE — 84443 ASSAY THYROID STIM HORMONE: CPT

## 2022-10-06 PROCEDURE — 83036 HEMOGLOBIN GLYCOSYLATED A1C: CPT

## 2022-10-06 PROCEDURE — 36415 COLL VENOUS BLD VENIPUNCTURE: CPT

## 2022-10-06 PROCEDURE — 84439 ASSAY OF FREE THYROXINE: CPT

## 2022-10-23 ENCOUNTER — HEALTH MAINTENANCE LETTER (OUTPATIENT)
Age: 32
End: 2022-10-23

## 2022-11-03 ENCOUNTER — TRANSFERRED RECORDS (OUTPATIENT)
Dept: HEALTH INFORMATION MANAGEMENT | Facility: CLINIC | Age: 32
End: 2022-11-03

## 2022-11-03 LAB — RETINOPATHY: NEGATIVE

## 2022-11-11 DIAGNOSIS — E06.3 HYPOTHYROIDISM DUE TO HASHIMOTO'S THYROIDITIS: ICD-10-CM

## 2022-11-11 RX ORDER — LEVOTHYROXINE SODIUM 125 UG/1
125 TABLET ORAL DAILY
Qty: 90 TABLET | Refills: 3 | Status: SHIPPED | OUTPATIENT
Start: 2022-11-11 | End: 2022-12-07

## 2022-11-11 NOTE — TELEPHONE ENCOUNTER
"Last Written Prescription Date:  11/10/21  Last Fill Quantity: 90,  # refills: 3   Last office visit: 8/18/22 with prescribing provider:  Dr. Jimenez   Future Office Visit: 11/17/22      Next 5 appointments (look out 90 days)    Dec 29, 2022 10:30 AM  Office Visit with Sierra Higginss,   [facility] ([facility] ) [address] [address] [phone]               Requested Prescriptions   Pending Prescriptions Disp Refills     levothyroxine (SYNTHROID/LEVOTHROID) 125 MCG tablet 90 tablet 3     Sig: Take 1 tablet (125 mcg) by mouth daily       Thyroid Protocol Passed - 11/11/2022 10:18 AM        Passed - Patient is 12 years or older        Passed - Recent (12 mo) or future (30 days) visit within the authorizing provider's specialty     Patient has had an office visit with the authorizing provider or a provider within the authorizing providers department within the previous 12 mos or has a future within next 30 days. See \"Patient Info\" tab in inbasket, or \"Choose Columns\" in Meds & Orders section of the refill encounter.              Passed - Medication is active on med list        Passed - Normal TSH on file in past 12 months     Recent Labs   Lab Test 10/06/22  0852   TSH 3.56              Passed - No active pregnancy on record     If patient is pregnant or has had a positive pregnancy test, please check TSH.          Passed - No positive pregnancy test in past 12 months     If patient is pregnant or has had a positive pregnancy test, please check TSH.             Refilled per protocol  Tana Olivares RN    "

## 2022-12-07 ENCOUNTER — VIRTUAL VISIT (OUTPATIENT)
Dept: ENDOCRINOLOGY | Facility: CLINIC | Age: 32
End: 2022-12-07
Payer: COMMERCIAL

## 2022-12-07 DIAGNOSIS — E06.3 HYPOTHYROIDISM DUE TO HASHIMOTO'S THYROIDITIS: ICD-10-CM

## 2022-12-07 DIAGNOSIS — E10.9 TYPE 1 DIABETES MELLITUS WITHOUT COMPLICATION (H): Primary | ICD-10-CM

## 2022-12-07 DIAGNOSIS — Z96.41 INSULIN PUMP STATUS: ICD-10-CM

## 2022-12-07 PROCEDURE — 99214 OFFICE O/P EST MOD 30 MIN: CPT | Mod: 95 | Performed by: INTERNAL MEDICINE

## 2022-12-07 PROCEDURE — 95251 CONT GLUC MNTR ANALYSIS I&R: CPT | Performed by: INTERNAL MEDICINE

## 2022-12-07 RX ORDER — INSULIN ASPART 100 [IU]/ML
INJECTION, SOLUTION INTRAVENOUS; SUBCUTANEOUS
Qty: 30 ML | Refills: 11 | Status: SHIPPED | OUTPATIENT
Start: 2022-12-07 | End: 2023-10-25

## 2022-12-07 RX ORDER — LEVOTHYROXINE SODIUM 125 UG/1
TABLET ORAL
Qty: 48 TABLET | Refills: 3 | Status: SHIPPED | OUTPATIENT
Start: 2022-12-07 | End: 2023-09-02

## 2022-12-07 RX ORDER — LEVOTHYROXINE SODIUM 137 UG/1
TABLET ORAL
Qty: 36 TABLET | Refills: 3 | Status: SHIPPED | OUTPATIENT
Start: 2022-12-07 | End: 2023-09-02

## 2022-12-07 NOTE — PROGRESS NOTES
Patient is being evaluated via a billable video visit.      How would you like to obtain your AVS? Verbally Reviewed  If the video visit is dropped, the invitation should be resent by: Cellphone  Will anyone else be joining your video visit? No        Video-Visit Details    Video Start Time: 3:13 pm    Type of service:  Video Visit    Video End Time:  3:35 pm    Originating Location (pt. Location): Home    PROVIDER LOCATION On-site vs Off-site    Distant Location (provider location):  Home    Platform used for Video Visit: Edwin          Recent issues:  Diabetes and thyroid follow-up evaluation   4/23/22, name change as Linda Chavez  Changed from Medtronic pump + Dexcom to the Tandem pump + DexcomG6  Feeling well overall, no other new health issues reported.  Hopeful for future Pg.           2003. Diagnosis of diabetes mellitus, age 13, living in [facility]  Had acute illness requiring hospitalization at [facility]  Began insulin injections, using Novolog and Lantus insulin  Cerritos carb counting method, gram estimates  On MDI treatment plan for approx 2 years, then changed to pump use  Previous medical evaluations with Dr. Yash Barcenas/[facility]  2005. Began use of Mingus pump  2012. Changed to Medtronic pump  Subsequently using Medtronic 723 Paradigm pump  2012. Tried wearing CGMS sensor, but uncomfortable     12/8/14. Initial consult with me at my former clinic ([facility])  Continued pump management  Was not interested in CGMS use     Previous FV hgbA1c levels include:              Lab Test 02/05/18 10/10/17 06/21/17 02/01/17 11/16/16  0815   A1C 7.4* 7.8* 8.1* 7.3* 8.2*      ~12/2017. Upgraded to Medtronic 670G pump    Tried Medtronic Guardian sensor, recalls frequent low glucose alarms              Wore sensor in manual mode              Didn't like it, discontinued use     ~11/2018. Wore diagnostic LibrePro CGMS  Subsequently obtained LibrePersonal CGMS, not wearing past year  3/2022. Changed to Tandem  TSlim insulin pump    Novolog in pump    Current Tandem pump settings:       Recent Tandem pump data:          Recent DexcomG6 data:         Recent FV labs include:  Lab Results   Component Value Date    A1C 8.0 (H) 10/06/2022     10/06/2022    POTASSIUM 4.4 10/06/2022    CHLORIDE 107 10/06/2022    CO2 26 10/06/2022    ANIONGAP 6 10/06/2022     (H) 10/06/2022    BUN 14 10/06/2022    CR 0.67 10/06/2022    GFRESTIMATED >90 10/06/2022    GFRESTBLACK >90 04/13/2021    MARCIA 9.2 10/06/2022    CHOL 241 (H) 10/06/2022    TRIG 75 10/06/2022    HDL 67 10/06/2022     (H) 10/06/2022    NHDL 174 (H) 10/06/2022    UCRR 64 06/08/2022    MICROL 5 06/08/2022    UMALCR 7.81 06/08/2022     Hyperlipidemia:  Takes simvastatin medication  DM Complications:  None known        Thyroid:  2013. Diagnosis of hypothyroidism  Previous FV thyroid labs include:    Treatment with levothyroxine medication  Previously on stable dose as levothyroxine 0.088 mg daily  Recent FV labs include:  Lab Results   Component Value Date    TSH 3.56 10/06/2022    T4 1.09 10/06/2022     (H) 12/19/2013     Current dose:  Levothyroxine 0.125 mg daily        , lives in [facility]; works at [employer] with , in [place]  Has previously seen Dr. lElen Burdick/[facility]  Also sees Dr. Esquivel Masters/[facility]      PMH/PSH:  Past Medical History:   Diagnosis Date     Adjustment disorder with anxious mood      Allergic rhinitis, cause unspecified     h/o AIT     Chest discomfort      Common migraine     No visual aura.      Hyperlipidemia LDL goal < 70      Hypothyroidism      Infectious mononucleosis 01/01/1995     Tuberculosis      Type 1 diabetes, HbA1c goal < 7% (H) 03/01/2004     followed N - peds endocrinology - now Dr Jimenez     Past Surgical History:   Procedure Laterality Date     APPENDECTOMY  8/07    dr deshpande     PE TUBES  1996       Family Hx:  Family History   Problem Relation Age of  Onset     Neurologic Disorder Maternal Grandmother         dementia     Genetic Disorder Paternal Grandfather         kidney     Family History Negative No family hx of          Social Hx:  Social History     Socioeconomic History     Marital status:      Spouse name: Not on file     Number of children: Not on file     Years of education: Not on file     Highest education level: Not on file   Occupational History     Occupation: Works in supply chain   Tobacco Use     Smoking status: Never     Smokeless tobacco: Never   Vaping Use     Vaping Use: Never used   Substance and Sexual Activity     Alcohol use: Yes     Alcohol/week: 2.0 standard drinks     Types: 2 Standard drinks or equivalent per week     Comment: 2-5 drinks per week     Drug use: No     Sexual activity: Yes     Partners: Male     Birth control/protection: Condom   Other Topics Concern      Service Not Asked     Blood Transfusions Not Asked     Caffeine Concern Yes     Comment: rare     Occupational Exposure Not Asked     Hobby Hazards Not Asked     Sleep Concern No     Stress Concern No     Weight Concern Not Asked     Special Diet Not Asked     Back Care Not Asked     Exercise Yes     Bike Helmet Not Asked     Seat Belt Yes     Self-Exams No     Comment: encouraged     Parent/sibling w/ CABG, MI or angioplasty before 65F 55M? No   Social History Narrative    -Working -       Social Determinants of Health     Financial Resource Strain: Not on file   Food Insecurity: Not on file   Transportation Needs: Not on file   Physical Activity: Not on file   Stress: Not on file   Social Connections: Not on file   Intimate Partner Violence: Not on file   Housing Stability: Not on file          MEDICATIONS:  has a current medication list which includes the following prescription(s): blood glucose, blood glucose monitoring, dexcom g6 sensor, dexcom g6 transmitter, baqsimi two pack, insulin aspart, insulin aspart, insulin glargine, insulin pen needle,  levothyroxine, levothyroxine, sertraline, simvastatin, and triamcinolone.      ROS: 10 point ROS neg other than the symptoms noted above in the HPI.     GENERAL: mild fatigue, wt stable?; denies fevers, chills, night sweats.   HEENT: no dysphagia, odonophagia, diplopia, neck pain  THYROID:  no apparent hyper or hypothyroid symptoms  CV: no chest pain, pressure, palpitations  LUNGS: no SOB, LANG, cough, wheezing   ABDOMEN: no diarrhea, constipation, abdominal pain  EXTREMITIES: no rashes, ulcers, edema  NEUROLOGY: no headaches, denies changes in vision, tingling, extremitiy numbness   MSK: no muscle aches or pains, weakness  SKIN: no rashes or lesions  : menses regular  PSYCH:  stable mood, no significant anxiety or depression  ENDOCRINE: no heat or cold intolerance     Physical Exam (visual exam)  VS:  no vital signs taken for video visit  CONSTITUTIONAL: healthy, alert and NAD, well dressed, answering questions appropriately  ENT: no nose swelling or nasal discharge, mouth redness or gum changes.  EYES: eyes grossly normal to inspection, conjunctivae and sclerae normal, no exophthalmos or proptosis  THYROID:  no apparent nodules or goiter  LUNGS: no audible wheeze, cough or visible cyanosis, no visible retractions or increased work of breathing  ABDOMEN: abdomen not evaluated  EXTREMITIES: no hand tremors, limited exam  NEUROLOGY: CN grossly intact, mentation intact and speech normal   SKIN:  no apparent skin lesions, rash, or edema with visualized skin appearance  PSYCH: mentation appears normal, affect normal/bright, judgement and insight intact,   normal speech and appearance well groomed       LABS:     All pertinent notes, labs, and images personally reviewed by me.      A/P:  Encounter Diagnoses   Name Primary?     Type 1 diabetes mellitus without complication (H) Yes     Insulin pump status      Hypothyroidism due to Hashimoto's thyroiditis        Comments:  Reviewed health history, diabetes and thyroid  issues  Recent SG trends good overall, but higher postmeal  Reviewed and interpreted tests that I previously ordered.   Ordered appropriate tests for the endocrinology disease management.    Management options discussed and implemented after shared medical decision making with the patient.  T1DM and hypothyroidism problems are chronic-stable    Plan:  Reviewed the overall T1DM management and insulin pump use.  Discussed optimal BG testing to assess glucose trends.  We reviewed insulin pump settings, basal rate and bolus dosing  Use of automated pump bolus dosing for meal/snack carb & correction dosing  Reviewed recent Tandem pump report information  Reviewed recent DexcomG6 CGM glucose trends, in detail    Reviewed general issues with the hypothyroidism diagnosis   Discussed lab tests used to assess patient thyroid hormone levels  Reviewed treatment options including levothyroxine medication, ideal dosing    Recommend:  Continue the Tandem insulin pump and diabetes management plan.  Pump setting changes:    Bolus ICR: 7a 6.2 to 5.9, 12p 6.3 to 6.0, 6p 4.6 to 4.3     ISF: 7a 40 to 35, 12p 35 to 30, 6p 35 to 30    Reviewed her higher utilization of Novolog vial insulin  We have discussed use of the exercise and sleep modes with pump use  Continue use of the DexcomG6 CGM sensor  We have reviewed general issues with T1DM and pregnancy management    Reviewed prePg and Pg glycemic control, f/u CDE and physician appointments  Reviewed recent thyroid labs and optimal thyroid med dosing    Change to levothyroxine 0.137 mg M/W/F and 0.125 mg Tu/Th/Sat/Sun    She uses pill-box to organize doses    Updated Rx's sent to [facility]  Plan repeat nonfasting labs in late 2/2023    Check hgbA1c and thyroid tests    Lab orders placed  Continue current simvastatin daily dosing, but discontinue if/when pregnant  Arrange annual dilated eye exam, fasting lipid panel testing.  Contact me if questions/concerns about diabetes and  thyroid management     Addressed patient's questions today.    There are no Patient Instructions on file for this visit.    Future labs ordered today:   Orders Placed This Encounter   Procedures     GLUCOSE MONITOR, 72 HOUR, PHYS INTERP     TSH     T4 free     Hemoglobin A1c     Radiology/Consults ordered today: None    Total time spent on day of encounter:  38 min    Follow-up:  3/7/23 at 3pm, Reggie Jimenez MD, MS  Endocrinology  [facility]    CC:  RUBY Mustafa and Masters, A

## 2022-12-07 NOTE — LETTER
12/7/2022         RE: Linda Agosto  [address]  [place] [address]        Dear Colleague,    Thank you for referring your patient, Linda Agosto, to the [facility] [place]. Please see a copy of my visit note below.    Patient is being evaluated via a billable video visit.      How would you like to obtain your AVS? Verbally Reviewed  If the video visit is dropped, the invitation should be resent by: Cellphone  Will anyone else be joining your video visit? No        Video-Visit Details    Video Start Time: 3:13 pm    Type of service:  Video Visit    Video End Time:  3:35 pm    Originating Location (pt. Location): Home    PROVIDER LOCATION On-site vs Off-site    Distant Location (provider location):  Home    Platform used for Video Visit: Edwin          Recent issues:  Diabetes and thyroid follow-up evaluation   4/23/22, name change as Linda Chavez  Changed from Medtronic pump + Dexcom to the Tandem pump + DexcomG6  Feeling well overall, no other new health issues reported.  Hopeful for future Pg.           2003. Diagnosis of diabetes mellitus, age 13, living in [facility]  Had acute illness requiring hospitalization at [facility]  Began insulin injections, using Novolog and Lantus insulin  [place] carb counting method, gram estimates  On MDI treatment plan for approx 2 years, then changed to pump use  Previous medical evaluations with Dr. Yash Barcenas/[facility]  2005. Began use of [place] pump  2012. Changed to Medtronic pump  Subsequently using Medtronic 723 Paradigm pump  2012. Tried wearing CGMS sensor, but uncomfortable     12/8/14. Initial consult with me at my former clinic ([facility])  Continued pump management  Was not interested in CGMS use     Previous FV hgbA1c levels include:              Lab Test 02/05/18 10/10/17 06/21/17 02/01/17 11/16/16  0815   A1C 7.4* 7.8* 8.1* 7.3* 8.2*      ~12/2017. Upgraded to Medtronic 670G pump    Tried Medtronic Guardian sensor, recalls  frequent low glucose alarms              Wore sensor in manual mode              Didn't like it, discontinued use     ~11/2018. Wore diagnostic LibrePro CGMS  Subsequently obtained LibrePersonal CGMS, not wearing past year  3/2022. Changed to Tandem TSlim insulin pump    Novolog in pump    Current Tandem pump settings:       Recent Tandem pump data:          Recent DexcomG6 data:         Recent FV labs include:  Lab Results   Component Value Date    A1C 8.0 (H) 10/06/2022     10/06/2022    POTASSIUM 4.4 10/06/2022    CHLORIDE 107 10/06/2022    CO2 26 10/06/2022    ANIONGAP 6 10/06/2022     (H) 10/06/2022    BUN 14 10/06/2022    CR 0.67 10/06/2022    GFRESTIMATED >90 10/06/2022    GFRESTBLACK >90 04/13/2021    MARCIA 9.2 10/06/2022    CHOL 241 (H) 10/06/2022    TRIG 75 10/06/2022    HDL 67 10/06/2022     (H) 10/06/2022    NHDL 174 (H) 10/06/2022    UCRR 64 06/08/2022    MICROL 5 06/08/2022    UMALCR 7.81 06/08/2022     Hyperlipidemia:  Takes simvastatin medication  DM Complications:  None known        Thyroid:  2013. Diagnosis of hypothyroidism  Previous FV thyroid labs include:    Treatment with levothyroxine medication  Previously on stable dose as levothyroxine 0.088 mg daily  Recent [facility]V labs include:  Lab Results   Component Value Date    TSH 3.56 10/06/2022    T4 1.09 10/06/2022     (H) 12/19/2013     Current dose:  Levothyroxine 0.125 mg daily        , lives in [facility]; works at [employer] with , in [place]  Has previously seen Dr. Ellen Burdick/[facility]  Also sees Dr. Sierra Higginss/[facility]      PMH/PSH:  Past Medical History:   Diagnosis Date     Adjustment disorder with anxious mood      Allergic rhinitis, cause unspecified     h/o AIT     Chest discomfort      Common migraine     No visual aura.      Hyperlipidemia LDL goal < 70      Hypothyroidism      Infectious mononucleosis 01/01/1995     Tuberculosis      Type 1 diabetes, HbA1c goal  < 7% (H) 03/01/2004     followed [facility] - peds endocrinology - now Dr Jimenez     Past Surgical History:   Procedure Laterality Date     APPENDECTOMY  8/07    dr deshpande     PE TUBES  1996       Family Hx:  Family History   Problem Relation Age of Onset     Neurologic Disorder Maternal Grandmother         dementia     Genetic Disorder Paternal Grandfather         kidney     Family History Negative No family hx of          Social Hx:  Social History     Socioeconomic History     Marital status:      Spouse name: Not on file     Number of children: Not on file     Years of education: Not on file     Highest education level: Not on file   Occupational History     Occupation: Works in supply chain   Tobacco Use     Smoking status: Never     Smokeless tobacco: Never   Vaping Use     Vaping Use: Never used   Substance and Sexual Activity     Alcohol use: Yes     Alcohol/week: 2.0 standard drinks     Types: 2 Standard drinks or equivalent per week     Comment: 2-5 drinks per week     Drug use: No     Sexual activity: Yes     Partners: Male     Birth control/protection: Condom   Other Topics Concern      Service Not Asked     Blood Transfusions Not Asked     Caffeine Concern Yes     Comment: rare     Occupational Exposure Not Asked     Hobby Hazards Not Asked     Sleep Concern No     Stress Concern No     Weight Concern Not Asked     Special Diet Not Asked     Back Care Not Asked     Exercise Yes     Bike Helmet Not Asked     Seat Belt Yes     Self-Exams No     Comment: encouraged     Parent/sibling w/ CABG, MI or angioplasty before 65F 55M? No   Social History Narrative    -Working -       Social Determinants of Health     Financial Resource Strain: Not on file   Food Insecurity: Not on file   Transportation Needs: Not on file   Physical Activity: Not on file   Stress: Not on file   Social Connections: Not on file   Intimate Partner Violence: Not on file   Housing Stability: Not on file           MEDICATIONS:  has a current medication list which includes the following prescription(s): blood glucose, blood glucose monitoring, dexcom g6 sensor, dexcom g6 transmitter, baqsimi two pack, insulin aspart, insulin aspart, insulin glargine, insulin pen needle, levothyroxine, levothyroxine, sertraline, simvastatin, and triamcinolone.      ROS: 10 point ROS neg other than the symptoms noted above in the HPI.     GENERAL: mild fatigue, wt stable?; denies fevers, chills, night sweats.   HEENT: no dysphagia, odonophagia, diplopia, neck pain  THYROID:  no apparent hyper or hypothyroid symptoms  CV: no chest pain, pressure, palpitations  LUNGS: no SOB, LANG, cough, wheezing   ABDOMEN: no diarrhea, constipation, abdominal pain  EXTREMITIES: no rashes, ulcers, edema  NEUROLOGY: no headaches, denies changes in vision, tingling, extremitiy numbness   MSK: no muscle aches or pains, weakness  SKIN: no rashes or lesions  : menses regular  PSYCH:  stable mood, no significant anxiety or depression  ENDOCRINE: no heat or cold intolerance     Physical Exam (visual exam)  VS:  no vital signs taken for video visit  CONSTITUTIONAL: healthy, alert and NAD, well dressed, answering questions appropriately  ENT: no nose swelling or nasal discharge, mouth redness or gum changes.  EYES: eyes grossly normal to inspection, conjunctivae and sclerae normal, no exophthalmos or proptosis  THYROID:  no apparent nodules or goiter  LUNGS: no audible wheeze, cough or visible cyanosis, no visible retractions or increased work of breathing  ABDOMEN: abdomen not evaluated  EXTREMITIES: no hand tremors, limited exam  NEUROLOGY: CN grossly intact, mentation intact and speech normal   SKIN:  no apparent skin lesions, rash, or edema with visualized skin appearance  PSYCH: mentation appears normal, affect normal/bright, judgement and insight intact,   normal speech and appearance well groomed       LABS:     All pertinent notes, labs, and images personally  reviewed by me.      A/P:  Encounter Diagnoses   Name Primary?     Type 1 diabetes mellitus without complication (H) Yes     Insulin pump status      Hypothyroidism due to Hashimoto's thyroiditis        Comments:  Reviewed health history, diabetes and thyroid issues  Recent SG trends good overall, but higher postmeal  Reviewed and interpreted tests that I previously ordered.   Ordered appropriate tests for the endocrinology disease management.    Management options discussed and implemented after shared medical decision making with the patient.  T1DM and hypothyroidism problems are chronic-stable    Plan:  Reviewed the overall T1DM management and insulin pump use.  Discussed optimal BG testing to assess glucose trends.  We reviewed insulin pump settings, basal rate and bolus dosing  Use of automated pump bolus dosing for meal/snack carb & correction dosing  Reviewed recent Tandem pump report information  Reviewed recent DexcomG6 CGM glucose trends, in detail    Reviewed general issues with the hypothyroidism diagnosis   Discussed lab tests used to assess patient thyroid hormone levels  Reviewed treatment options including levothyroxine medication, ideal dosing    Recommend:  Continue the Tandem insulin pump and diabetes management plan.  Pump setting changes:    Bolus ICR: 7a 6.2 to 5.9, 12p 6.3 to 6.0, 6p 4.6 to 4.3     ISF: 7a 40 to 35, 12p 35 to 30, 6p 35 to 30    Reviewed her higher utilization of Novolog vial insulin  We have discussed use of the exercise and sleep modes with pump use  Continue use of the DexcomG6 CGM sensor  We have reviewed general issues with T1DM and pregnancy management    Reviewed prePg and Pg glycemic control, f/u CDE and physician appointments  Reviewed recent thyroid labs and optimal thyroid med dosing    Change to levothyroxine 0.137 mg M/W/F and 0.125 mg Tu/Th/Sat/Sun    She uses pill-box to organize doses    Updated Rx's sent to [facility] Jose  Plan repeat nonfasting labs in  late 2/2023    Check hgbA1c and thyroid tests    Lab orders placed  Continue current simvastatin daily dosing, but discontinue if/when pregnant  Arrange annual dilated eye exam, fasting lipid panel testing.  Contact me if questions/concerns about diabetes and thyroid management     Addressed patient's questions today.    There are no Patient Instructions on file for this visit.    Future labs ordered today:   Orders Placed This Encounter   Procedures     GLUCOSE MONITOR, 72 HOUR, PHYS INTERP     TSH     T4 free     Hemoglobin A1c     Radiology/Consults ordered today: None    Total time spent on day of encounter:  38 min    Follow-up:  3/7/23 at 3pm, Reggie Jimenez MD, MS  Endocrinology  [facility]    CC:  RUBY Mustafa and Masters, A                          Again, thank you for allowing me to participate in the care of your patient.        Sincerely,        Santy Jimenez MD

## 2022-12-22 RX ORDER — NARATRIPTAN 1 MG/1
TABLET ORAL
COMMUNITY
Start: 2021-01-01

## 2022-12-22 RX ORDER — INSULIN GLARGINE-YFGN 100 [IU]/ML
INJECTION, SOLUTION SUBCUTANEOUS
COMMUNITY
Start: 2022-06-30 | End: 2024-02-09

## 2022-12-22 NOTE — PROGRESS NOTES
SUBJECTIVE:                                                   Linda Agosto is a 32 year old female who presents to clinic today for the following health issue(s):  Patient presents with:  Fertility      Additional information:Pt is here today to discuss potentially fertility concerms  HPI:  Did genetic counseling in .     TTC x 6mo. Using junior and Ovulation test strips. Cycles are regular each mo are 30-31d q mo. Last mo usually ovulates day 17-20.   Did modern fertility lab testing from home. Did day 3 labs: 22 TSH 3.9, AMH 3, FSH 5.4, E2 26, Lh 5.8, PRL 6.9  Basing sex on junior fertile window every other day. This is earlier than her strips are telling her.   Wants to know next steps  No excess hair growth.     Has gained 30lbs in last year    Seeing Endocrinology. Just changed levothyroxine dose, alternating days-but just did this couple weeks ago. TSH in Oct 3.5,  1.6, 2021 6.3. Repeating labs in February, about a week before her next appt in March.   Hgb A1c 8 in Oct, in  was 7.5, April 8.1%    Partner: no hx genital surgeries,no rads/chem exposures.         Patient's last menstrual period was 2022 (exact date).. Day 30 today.     Patient is sexually active, .  Using none for contraception.    reports that she has never smoked. She has never used smokeless tobacco.    STD testing offered?  Declined    Health maintenance updated:  Yes, Immunizations reviewed    Today's PHQ-2 Score:   PHQ-2 (  [employer]) 2022   Q1: Little interest or pleasure in doing things 0   Q2: Feeling down, depressed or hopeless 0   PHQ-2 Score 0   PHQ-2 Total Score (12-17 Years)- Positive if 3 or more points; Administer PHQ-A if positive -   Q1: Little interest or pleasure in doing things Not at all   Q2: Feeling down, depressed or hopeless Not at all   PHQ-2 Score 0     Today's PHQ-9 Score:   PHQ-9 SCORE 2022   PHQ-9 Total Score -   PHQ-9 Total Score MyChart -   PHQ-9 Total Score 0     Today's  TRESA-7 Score:   TRESA-7 SCORE 5/19/2022   Total Score -   Total Score 4 (minimal anxiety)   Total Score 4       Problem list and histories reviewed & adjusted, as indicated.  Additional history: as documented.    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)     Common migraine     Morbid obesity (H)     Past Surgical History:   Procedure Laterality Date     APPENDECTOMY  8/07    dr deshpande     PE TUBES  1996      Social History     Tobacco Use     Smoking status: Never     Smokeless tobacco: Never   Substance Use Topics     Alcohol use: Yes     Alcohol/week: 2.0 standard drinks     Types: 2 Standard drinks or equivalent per week     Comment: 2-5 drinks per week      Problem (# of Occurrences) Relation (Name,Age of Onset)    Genetic Disorder (1) Paternal Grandfather: kidney    Neurologic Disorder (1) Maternal Grandmother: dementia       Negative family history of: Family History Negative            Current Outpatient Medications   Medication Sig     blood glucose (VALE CONTOUR NEXT) test strip Patient tests 5 times/day, Contour Next test strips DAW1     blood glucose monitoring (VALE MICROLET) lancets Patient tests 8 times/day     Continuous Blood Gluc Sensor (DEXCOM G6 SENSOR) MISC 1 Device continuous prn (Use for continuous glucose testing, change every 10-days) Dispense 3 box (of 3 sensors) per 3 months     Continuous Blood Gluc Transmit (DEXCOM G6 TRANSMITTER) MISC CHANGE EVERY 90 DAYS     Glucagon (BAQSIMI TWO PACK) 3 MG/DOSE POWD Spray 1 Device in nostril once as needed (for severe hypoglycemia)     insulin aspart (NOVOLOG PEN) 100 UNIT/ML pen Inject 3 to 10 units subcutaneous at mealtimes as directed, total daily dose approx 30 units     insulin  "aspart (NOVOLOG VIAL) 100 UNITS/ML vial Use with insulin pump, total daily dose approx 90 units     insulin glargine (LANTUS PEN) 100 UNIT/ML pen Inject 22 Units Subcutaneous At Bedtime     insulin pen needle (BD CLEOPATRA U/F) 32G X 4 MM miscellaneous Use 4 pen needles daily or as directed.     levothyroxine (SYNTHROID/LEVOTHROID) 125 MCG tablet Take 1-tablet by mouth daily on Tues/Thurs/Sat/Sundays, as directed     levothyroxine (SYNTHROID/LEVOTHROID) 137 MCG tablet Take 1-tablet by mouth on Mon/Wed/Fridays as directed     naratriptan (AMERGE) 1 MG tablet      SEMGLEE, YFGN, 100 UNIT/ML SOPN ADMINISTER 22 UNITS UNDER THE SKIN AT BEDTIME SUBSTITUTE FOR LANTUS     sertraline (ZOLOFT) 50 MG tablet Take 1 tablet (50 mg) by mouth daily     simvastatin (ZOCOR) 40 MG tablet Take 1 tablet (40 mg) by mouth At Bedtime     triamcinolone (KENALOG) 0.1 % external cream Apply topically 2 times daily to affected areas on fingers for 10-14 days. Not for use on face nor groin.     No current facility-administered medications for this visit.     Allergies   Allergen Reactions     Penicillins Rash     augmentin & pcn     Sulfa Drugs Hives       ROS:  12 point review of systems negative other than symptoms noted below or in the HPI.        OBJECTIVE:     /64   Ht 1.676 m (5' 6\")   Wt 103.1 kg (227 lb 3.2 oz)   LMP 11/30/2022 (Exact Date)   BMI 36.67 kg/m    Body mass index is 36.67 kg/m .    Exam:  Constitutional:  Appearance: Well nourished, well developed alert, in no acute distress  Neurologic:  Mental Status:  Oriented X3.  Normal strength and tone, sensory exam grossly normal, mentation intact and speech normal.    Psychiatric:  Mentation appears normal and affect normal/bright.         ASSESSMENT/PLAN:                                                        ICD-10-CM    1. Family planning  Z30.09 Testosterone, total     XR Hysterosalpingogram     Testosterone, total      2. Morbid obesity (H)  E66.01       3. Type 1 " diabetes, HbA1c goal < 7% (H)  E10.9       4. Acquired hypothyroidism  E03.9           -Am somewhat suspicious of the conclusions of her tracking of her cycles and OPK's.  Her cycles are on the longer side, she ovulates late per her OPK.  Has been timing her intercourse based on the OPK.  She may be missing her fertility window.  Discussed timed intercourse and intercourse 3 times per week having similar outcomes.  As she is already feeling anxious about the past fertility efforts, recommend she transition to 3 times per week every week of sex, to attempt to decrease the pressure of her fertility efforts.  Recommend weight loss, as little as 5% may have impact on fertility.  This is drawn from literature for those with PCOS, diagnosis which the patient does not carry.  SA and HSG recommended..  We will check testosterone.  -Discussed how elevated A1c can affect fetal development. Encouraged tight glucose control   F/U with Endo in March.  -Discussed how TSH>2.5 may cause sub fertility. Recently had change in syntrhoid, f/u in feb/mar with next TSH  Pt  given opportunity to ask questions, these were answered to her satisfaction, she verbalized understanding to and agreement with the plan.       (34 minutes was spent on the date of the encounter doing chart review,  history, documentation, patient counseling.)      Sierra Treviño Masters, DO  [facility] [place]

## 2022-12-29 ENCOUNTER — OFFICE VISIT (OUTPATIENT)
Dept: OBGYN | Facility: CLINIC | Age: 32
End: 2022-12-29
Payer: COMMERCIAL

## 2022-12-29 VITALS
BODY MASS INDEX: 36.52 KG/M2 | SYSTOLIC BLOOD PRESSURE: 118 MMHG | HEIGHT: 66 IN | DIASTOLIC BLOOD PRESSURE: 64 MMHG | WEIGHT: 227.2 LBS

## 2022-12-29 DIAGNOSIS — E03.9 ACQUIRED HYPOTHYROIDISM: ICD-10-CM

## 2022-12-29 DIAGNOSIS — E10.9 TYPE 1 DIABETES, HBA1C GOAL < 7% (H): ICD-10-CM

## 2022-12-29 DIAGNOSIS — E66.01 MORBID OBESITY (H): ICD-10-CM

## 2022-12-29 DIAGNOSIS — Z30.09 FAMILY PLANNING: Primary | ICD-10-CM

## 2022-12-29 PROCEDURE — 99214 OFFICE O/P EST MOD 30 MIN: CPT | Performed by: OBSTETRICS & GYNECOLOGY

## 2022-12-29 PROCEDURE — 84403 ASSAY OF TOTAL TESTOSTERONE: CPT | Performed by: OBSTETRICS & GYNECOLOGY

## 2022-12-29 PROCEDURE — 36415 COLL VENOUS BLD VENIPUNCTURE: CPT | Performed by: OBSTETRICS & GYNECOLOGY

## 2022-12-31 PROBLEM — E66.01 MORBID OBESITY (H): Status: ACTIVE | Noted: 2022-12-31

## 2023-01-01 LAB — TESTOST SERPL-MCNC: 31 NG/DL (ref 8–60)

## 2023-01-04 ENCOUNTER — MYC MEDICAL ADVICE (OUTPATIENT)
Dept: OBGYN | Facility: CLINIC | Age: 33
End: 2023-01-04

## 2023-01-09 ENCOUNTER — TELEPHONE (OUTPATIENT)
Dept: OBGYN | Facility: CLINIC | Age: 33
End: 2023-01-09

## 2023-01-09 ENCOUNTER — MEDICAL CORRESPONDENCE (OUTPATIENT)
Dept: HEALTH INFORMATION MANAGEMENT | Facility: CLINIC | Age: 33
End: 2023-01-09

## 2023-01-09 NOTE — TELEPHONE ENCOUNTER
12/29/22 OV:TTC x 6mo. Using junior and Ovulation test strips. Cycles are regular each mo are 30-31d q mo. Last mo usually ovulates day 17-20.   -Am somewhat suspicious of the conclusions of her tracking of her cycles and OPK's.  Her cycles are on the longer side, she ovulates late per her OPK.  Has been timing her intercourse based on the OPK.  She may be missing her fertility window.  Discussed timed intercourse and intercourse 3 times per week having similar outcomes.  As she is already feeling anxious about the past fertility efforts, recommend she transition to 3 times per week every week of sex, to attempt to decrease the pressure of her fertility efforts.  Recommend weight loss, as little as 5% may have impact on fertility.  This is drawn from literature for those with PCOS, diagnosis which the patient does not carry.  SA and HSG recommended..  We will check testosterone.  -Discussed how elevated A1c can affect fetal development. Encouraged tight glucose control   F/U with Endo in March.  -Discussed how TSH>2.5 may cause sub fertility. Recently had change in syntrhoid, f/u in feb/mar with next TSH  Pt  given opportunity to ask questions, these were answered to her satisfaction, she verbalized understanding to and agreement with the plan.     See [place] message.  Continue to monitor d/t longer cycles or labs?    Coleen Wynne RN    
Please schedule HSG for PROVIDER TO PERFORM    Location for surgery to performed ([facility] / [facility]ER) :   [facility]    DIAGNOSIS: family planning. anovulation    LMP DATE 1/6/23    Schedule based on cycle days 5 - 11     SPECIAL INSTRUCTIONS can be done by radiologist    
[employer]

## 2023-01-13 ENCOUNTER — HOSPITAL ENCOUNTER (OUTPATIENT)
Dept: GENERAL RADIOLOGY | Facility: CLINIC | Age: 33
Discharge: HOME | End: 2023-01-13
Attending: OBSTETRICS & GYNECOLOGY | Admitting: OBSTETRICS & GYNECOLOGY
Payer: COMMERCIAL

## 2023-01-13 DIAGNOSIS — Z30.09 FAMILY PLANNING: ICD-10-CM

## 2023-01-13 PROCEDURE — 74740 X-RAY FEMALE GENITAL TRACT: CPT

## 2023-02-01 ENCOUNTER — MYC MEDICAL ADVICE (OUTPATIENT)
Dept: ENDOCRINOLOGY | Facility: CLINIC | Age: 33
End: 2023-02-01
Payer: COMMERCIAL

## 2023-02-01 DIAGNOSIS — E10.9 TYPE 1 DIABETES MELLITUS WITHOUT COMPLICATION (H): Primary | ICD-10-CM

## 2023-02-01 DIAGNOSIS — Z96.41 INSULIN PUMP STATUS: ICD-10-CM

## 2023-02-15 ENCOUNTER — VIRTUAL VISIT (OUTPATIENT)
Dept: OBGYN | Facility: CLINIC | Age: 33
End: 2023-02-15
Payer: COMMERCIAL

## 2023-02-15 DIAGNOSIS — E66.01 MORBID OBESITY (H): ICD-10-CM

## 2023-02-15 DIAGNOSIS — E10.9 TYPE 1 DIABETES, HBA1C GOAL < 7% (H): ICD-10-CM

## 2023-02-15 DIAGNOSIS — R94.6 THYROID FUNCTION STUDY ABNORMALITY: ICD-10-CM

## 2023-02-15 DIAGNOSIS — Z30.09 FAMILY PLANNING: Primary | ICD-10-CM

## 2023-02-15 PROCEDURE — 99213 OFFICE O/P EST LOW 20 MIN: CPT | Mod: TEL | Performed by: OBSTETRICS & GYNECOLOGY

## 2023-02-15 ASSESSMENT — ANXIETY QUESTIONNAIRES
5. BEING SO RESTLESS THAT IT IS HARD TO SIT STILL: NOT AT ALL
1. FEELING NERVOUS, ANXIOUS, OR ON EDGE: NOT AT ALL
3. WORRYING TOO MUCH ABOUT DIFFERENT THINGS: NOT AT ALL
GAD7 TOTAL SCORE: 0
GAD7 TOTAL SCORE: 0
2. NOT BEING ABLE TO STOP OR CONTROL WORRYING: NOT AT ALL
7. FEELING AFRAID AS IF SOMETHING AWFUL MIGHT HAPPEN: NOT AT ALL
6. BECOMING EASILY ANNOYED OR IRRITABLE: NOT AT ALL
IF YOU CHECKED OFF ANY PROBLEMS ON THIS QUESTIONNAIRE, HOW DIFFICULT HAVE THESE PROBLEMS MADE IT FOR YOU TO DO YOUR WORK, TAKE CARE OF THINGS AT HOME, OR GET ALONG WITH OTHER PEOPLE: NOT DIFFICULT AT ALL

## 2023-02-15 ASSESSMENT — PATIENT HEALTH QUESTIONNAIRE - PHQ9
5. POOR APPETITE OR OVEREATING: NOT AT ALL
SUM OF ALL RESPONSES TO PHQ QUESTIONS 1-9: 0

## 2023-02-15 NOTE — PATIENT INSTRUCTIONS
[facility], [facility]: [phone]   [email or web address]    [facility] [place] [facility]: [phone]  [email or web address]    [facility]: [phone]  [email or web address]

## 2023-02-15 NOTE — PROGRESS NOTES
Linda Chavez is a 32 year old female who is being evaluated via a billable telephone visit.      What phone number would you like to be contacted at? [phone]  How would you like to obtain your AVS? Brandonharyvonne      Originating Location (pt. Location): Home       Distant Location (provider location):  On-site      SUBJECTIVE:                                                   Linda Chavez is a 32 year old female who presents for virtual visit today for the following health issue(s):  Patient presents with:  Follow Up      Additional information: P/t c/o follow up for family planning     HPI:  Has been following with Endo for thyroid. Next labs early March.   Started a walking program, to work on wt loss.     LMP: 23  Last two cycles are 38 days apart.     Are interested in meeting with [facility].         Patient is sexually active, .  Using none for contraception.    reports that she has never smoked. She has never used smokeless tobacco.      Health maintenance updated:  no  Overdue  Never   Done DIABETIC FOOT EXAM (Yearly)   Never   Done ADVANCE CARE PLANNING (Every 5 Years)   Never   Done MIGRAINE ACTION PLAN (Once)   Never   Done Pneumococcal Vaccine: Pediatrics (0 to 5 Years) and At-Risk Patients (6 to 64 Years) (1 - PCV)   SEP 6   2010 HEPATITIS B IMMUNIZATION (2 of 3 - Hep B Twinrix 3-dose series)  Last completed: Aug 9, 2010   LUCAS 11   2022 COVID-19 Vaccine (4 - Booster for Moderna series)  Last completed:  PHQ-2 (once per calendar year) (Yearly, January to December)  Last completed: May 23, 2022   LUCAS 6   2023 A1C (Every 3 Months)   Last ordered: Dec 7, 2022   LUCAS 28   2023 YEARLY PREVENTIVE VISIT (Yearly)  Last completed: 2022        Upcoming  2023 MICROALBUMIN (Yearly)  Last completed: 2022   OCT 6   2023 BMP (Yearly)  Last completed: Oct 6, 2022   OCT 6   2023 LIPID (Yearly)  Last completed: Oct 6, 2022   NOV 3   2023 EYE EXAM (Yearly)  Last  completed: Nov 3, 2022   LUCAS 25   2024 PAP (Every 3 Years)  Last completed: Jan 25, 2021 JUN 22 2026 DTAP/TDAP/TD IMMUNIZATION (10 - Td or Tdap)  Last completed: Jun 22, 2016       Today's PHQ-2 Score:   PHQ-2 ( 1999 Pf[employer]) 2/15/2023   Q1: Little interest or pleasure in doing things 0   Q2: Feeling down, depressed or hopeless 0   PHQ-2 Score 0   PHQ-2 Total Score (12-17 Years)- Positive if 3 or more points; Administer PHQ-A if positive -   Q1: Little interest or pleasure in doing things -   Q2: Feeling down, depressed or hopeless -   PHQ-2 Score -     Today's PHQ-9 Score:   PHQ-9 SCORE 2/15/2023   PHQ-9 Total Score -   PHQ-9 Total Score MyChart -   PHQ-9 Total Score 0     Today's TRESA-7 Score:   TRESA-7 SCORE 2/15/2023   Total Score -   Total Score -   Total Score 0       Problem list and histories reviewed & adjusted, as indicated.  Additional history: as documented.    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)     Common migraine     Morbid obesity (H)     Past Surgical History:   Procedure Laterality Date     APPENDECTOMY  8/07    dr deshpande     PE TUBES  1996      Social History     Tobacco Use     Smoking status: Never     Smokeless tobacco: Never   Substance Use Topics     Alcohol use: Yes     Alcohol/week: 2.0 standard drinks     Types: 2 Standard drinks or equivalent per week     Comment: 2-5 drinks per week      Problem (# of Occurrences) Relation (Name,Age of Onset)    Genetic Disorder (1) Paternal Grandfather: kidney    Neurologic Disorder (1) Maternal Grandmother: dementia       Negative family history of: Family History Negative            Current Outpatient Medications   Medication Sig     blood glucose (VALE CONTOUR NEXT) test strip  Patient tests 5 times/day, Contour Next test strips DAW1     blood glucose monitoring (VALE MICROLET) lancets Patient tests 8 times/day     Continuous Blood Gluc Sensor (DEXCOM G6 SENSOR) MISC 1 Device continuous prn (Use for continuous glucose testing, change every 10-days) Dispense 3 box (of 3 sensors) per 3 months     Continuous Blood Gluc Transmit (DEXCOM G6 TRANSMITTER) MISC CHANGE EVERY 90 DAYS     Glucagon (BAQSIMI TWO PACK) 3 MG/DOSE POWD Spray 1 Device in nostril once as needed (for severe hypoglycemia)     insulin aspart (NOVOLOG PEN) 100 UNIT/ML pen Inject 3 to 10 units subcutaneous at mealtimes as directed, total daily dose approx 30 units     insulin aspart (NOVOLOG VIAL) 100 UNITS/ML vial Use with insulin pump, total daily dose approx 90 units     insulin glargine (LANTUS PEN) 100 UNIT/ML pen Inject 22 Units Subcutaneous At Bedtime     insulin pen needle (BD CLEOPATRA U/F) 32G X 4 MM miscellaneous Use 4 pen needles daily or as directed.     levothyroxine (SYNTHROID/LEVOTHROID) 125 MCG tablet Take 1-tablet by mouth daily on Tues/Thurs/Sat/Sundays, as directed     levothyroxine (SYNTHROID/LEVOTHROID) 137 MCG tablet Take 1-tablet by mouth on Mon/Wed/Fridays as directed     naratriptan (AMERGE) 1 MG tablet      SEMGLEE, YFGN, 100 UNIT/ML SOPN ADMINISTER 22 UNITS UNDER THE SKIN AT BEDTIME SUBSTITUTE FOR LANTUS     sertraline (ZOLOFT) 50 MG tablet Take 1 tablet (50 mg) by mouth daily     simvastatin (ZOCOR) 40 MG tablet Take 1 tablet (40 mg) by mouth At Bedtime     triamcinolone (KENALOG) 0.1 % external cream Apply topically 2 times daily to affected areas on fingers for 10-14 days. Not for use on face nor groin.     No current facility-administered medications for this visit.     Allergies   Allergen Reactions     Penicillins Rash     augmentin & pcn     Sulfa Drugs Hives         OBJECTIVE:     No vitals were obtained today due to virtual visit.        ASSESSMENT/PLAN:                                                       Phone call duration: 15:30 minutes, additional 7 min spent on [place] date in chart review, documentation      ICD-10-CM    1. Family planning  Z30.09       2. Type 1 diabetes, HbA1c goal < 7% (H)  E10.9       3. Thyroid function study abnormality  R94.6       4. Morbid obesity (H)  E66.01           Patient Instructions   [facility], [facility]: [phone]   [email or web address]    [facility], [facility]: [phone]  [email or web address]    [facility]: [phone]  [email or web address]      Recommend [facility] consultation for ICSI due to low morphology 0%.   Answered questions regarding next steps and [facility] process.   Reviewed her hx, suspect irregular ovulation. Is working on wt loss with increased exercise.  Cont to work with Endo for DM control and thyroid sub fertility. Labs next in March.     Sierra Treviño Masters, [facility] [place]

## 2023-02-28 ENCOUNTER — VIRTUAL VISIT (OUTPATIENT)
Dept: EDUCATION SERVICES | Facility: CLINIC | Age: 33
End: 2023-02-28
Payer: COMMERCIAL

## 2023-02-28 DIAGNOSIS — E10.9 TYPE 1 DIABETES MELLITUS WITHOUT COMPLICATION (H): ICD-10-CM

## 2023-02-28 DIAGNOSIS — Z96.41 INSULIN PUMP STATUS: ICD-10-CM

## 2023-02-28 PROCEDURE — G0108 DIAB MANAGE TRN  PER INDIV: HCPCS | Mod: 95 | Performed by: DIETITIAN, REGISTERED

## 2023-02-28 NOTE — LETTER
"    2/28/2023         RE: Linda Chavez  [address]  [facility] [address]        Dear Colleague,    Thank you for referring your patient, Linda Chavez, to the [facility]. Please see a copy of my visit note below.    Diabetes Self-Management Education & Support    Presents for: Insulin Pump and CGM Review    Type of Service: Telephone Visit    Originating Location (Patient Location): Home  Distant Location (Provider Location): [facility]  Mode of Communication:  Telephone    Telephone Visit Start Time: 2:03p  Telephone Visit End Time (telephone visit stop time): 2:35p    How would patient like to obtain AVS? MyChart    Assessment Type:   REPORTS:              Insulin Pump Information  Insulin Pump Brand: Tandem  Infusion Set: Tandem  Does patient have an insulin multiple daily injection back-up plan?: Yes    Education specific to insulin pump provided today on:   importance of bolusing before meals and exercise recommendations    Opportunities for ongoing education and support in diabetes-self management were discussed.    Pt verbalized understanding of concepts discussed and recommendations provided today.       Continue education with the following diabetes management concepts: Monitoring, Taking Medication, Problem Solving, Reducing Risks and Healthy Coping    Pump Education Materials: none    ASSESSMENT  Linda is looking for help with preconception counseling, help with exercise & blood sugar management while following new \"Profile\" diet plan. She would like to take more walks to support a healthy lifestyle but really struggles with drops in blood sugars when she is exercising. She is working with Endocrinology, MFM, OBGYN on preconception counseling - has good knowledge of risks, follow up plan. Discussed importance of team approach in managing diabetes during pregnancy and she is agreeable.     Glucose Patterns & Trends:  Hypo 2/2 exercise, " post-prandial hyperglycemia     Patient would benefit from bolusing before meals and entering exercise mode 30-60 minutes prior to exercise & continuing 60 minutes after exercise.    Changes made to pump settings:  None - patient would like to work on Exercise mode & pre-exercise snack p/t adjusting pump settings     Changes made to sensor settings:   none    PLAN    Enter Exercise mode 30-60 minutes prior to exercise & continuing for 60 minutes after exercise  Have a snack p/t exercise based on Exercise in Type 1 guidelines   Bolus 5-10 minutes prior to meals     Topics to cover at upcoming visits: Taking Medication, Problem Solving, Reducing Risks and Healthy Coping    Follow-up: 3/30    See Care Plan for co-developed, patient-state behavior change goals.  AVS provided for patient today.    Education Materials Provided:  Exercise in Type 1 Diabetes & JDRF Pregnancy Toolkit handouts      SUBJECTIVE/OBJECTIVE:  Presents for: Insulin Pump and CGM Review  Accompanied by: Self  Diabetes education in the past 24mo: Yes  Focus of Visit: Insulin Pump, CGM  Type of Pump visit: Pump Review  Type of CGM visit: Personal CGM Follow-up  Diabetes type: Type 1  Disease course: Improving  How confident are you filling out medical forms by yourself:: Extremely  Diabetes management related comments/concerns: Trying to conceive, possibly will utilize fertility treatments, starting a diet plan - Profile plan  Difficulty affording diabetes medication?: No  Difficulty affording diabetes testing supplies?: No  Other concerns:: None  Cultural Influences/Ethnic Background:  Not  or       Diabetes Symptoms & Complications:  Weight trend: Stable  Complications assessed today?: Yes  Autonomic neuropathy: No  CVA: No  Heart disease: No  Nephropathy: No  Peripheral neuropathy: No  Peripheral Vascular Disease: No  Retinopathy: No    Patient Problem List and Family Medical History reviewed for relevant medical history, current  "medical status, and diabetes risk factors.    Vitals:  There were no vitals taken for this visit.  Estimated body mass index is 36.67 kg/m  as calculated from the following:    Height as of 12/29/22: 1.676 m (5' 6\").    Weight as of 12/29/22: 103.1 kg (227 lb 3.2 oz).   Last 3 BP:   BP Readings from Last 3 Encounters:   12/29/22 118/64   01/28/22 112/66   10/19/21 127/89       History   Smoking Status     Never   Smokeless Tobacco     Never       Labs:  Lab Results   Component Value Date    A1C 8.0 10/06/2022    A1C 8.3 04/07/2021    HEMOGLOBINA1 8.3 09/29/2011     Lab Results   Component Value Date     10/06/2022     04/13/2021     Lab Results   Component Value Date     10/06/2022     04/07/2021     HDL Cholesterol   Date Value Ref Range Status   04/07/2021 59 >49 mg/dL Final     Direct Measure HDL   Date Value Ref Range Status   10/06/2022 67 >=50 mg/dL Final   ]  GFR Estimate   Date Value Ref Range Status   10/06/2022 >90 >60 mL/min/1.73m2 Final     Comment:     Effective December 21, 2021 eGFRcr in adults is calculated using the 2021 CKD-EPI creatinine equation which includes age and gender (Nithya ortez al., NEJ, DOI: 10.1056/LCNWpq2439552)   04/13/2021 >90 >60 mL/min/[1.73_m2] Final     Comment:     Non  GFR Calc  Starting 12/18/2018, serum creatinine based estimated GFR (eGFR) will be   calculated using the Chronic Kidney Disease Epidemiology Collaboration   (CKD-EPI) equation.       GFR Estimate If Black   Date Value Ref Range Status   04/13/2021 >90 >60 mL/min/[1.73_m2] Final     Comment:      GFR Calc  Starting 12/18/2018, serum creatinine based estimated GFR (eGFR) will be   calculated using the Chronic Kidney Disease Epidemiology Collaboration   (CKD-EPI) equation.       Lab Results   Component Value Date    CR 0.67 10/06/2022    CR 0.60 04/13/2021     No results found for: MICROALBUMIN    Healthy Eating:  Healthy Eating Assessed Today: " "Yes  Cultural/Yarsani diet restrictions?: No  Meal planning/habits: Carb counting, Low carb  Meals include: Breakfast, Lunch, Dinner  Breakfast: protein shake, 2 servings fruit, 1 serving starch, 1 serving fat  Lunch: 4 oz lean protein, 2 servings vegetable, 1 serving starch, 1 fruit  Dinner: 3 oz lean protein, 2 servings fat, 2 servings veg, 2 servings starch  Snacks: AM snk - protein bar (15-20 grams carb); PM snk - 2 servings fat (almonds), \"fiber drink\", HS - protein shake  Other: \"Profile\" diet plan   - eating every 2.5-3 hrs  Has patient met with a dietitian in the past?: Yes    Being Active:  Being Active Assessed Today: Yes  Exercise:: Yes  How intense was your typical exercise? : Moderate (like brisk walking)  Barrier to exercise: Physical limitation, Safety    Monitoring:  Monitoring Assessed Today: Yes  Did patient bring glucose meter to appointment? : Yes  Blood Glucose Meter: CGM  Times checking blood sugar at home (number): 5+  Times checking blood sugar at home (per): Day  Blood glucose trend: No change    See above.     Taking Medications:  Diabetes Medication(s)     Diabetic Other       Glucagon (BAQSIMI TWO PACK) 3 MG/DOSE POWD    Spray 1 Device in nostril once as needed (for severe hypoglycemia)    Insulin       insulin aspart (NOVOLOG PEN) 100 UNIT/ML pen    Inject 3 to 10 units subcutaneous at mealtimes as directed, total daily dose approx 30 units     insulin aspart (NOVOLOG VIAL) 100 UNITS/ML vial    Use with insulin pump, total daily dose approx 90 units     insulin glargine (LANTUS PEN) 100 UNIT/ML pen    Inject 22 Units Subcutaneous At Bedtime     SEMGLEE, YFGN, 100 UNIT/ML SOPN    ADMINISTER 22 UNITS UNDER THE SKIN AT BEDTIME SUBSTITUTE FOR LANTUS          Taking Medication Assessed Today: Yes  Current Treatments: Insulin Pump  Dose schedule: Pre-breakfast, Pre-lunch, Pre-dinner  Given by: Patient  Problems taking diabetes medications regularly?: No  Diabetes medication side effects?: " No    Problem Solving:  Problem Solving Assessed Today: Yes  Is the patient at risk for hypoglycemia?: Yes  Hypoglycemia Frequency: Daily  Hypoglycemia Treatment: Glucose (tablets or gel)  Does patient have glucagon emergency kit?: No  Is the patient at risk for DKA?: Yes  Does patient have ketone test strips?: Yes              Reducing Risks:  Reducing Risks Assessed Today: No    Healthy Coping:  Healthy Coping Assessed Today: Yes  Emotional response to diabetes: Ready to learn, Concern for health and well-being  Informal Support system:: Significant other  Stage of change: MAINTENANCE (Working to maintain change, with risk of relapse)  Support resources: Websites  Patient Activation Measure Survey Score:  GEOVANNA Score (Last Two) 8/7/2012   GEOVANNA Raw Score 52   Activation Score 100   GEOVANNA Level 4         Care Plan and Education Provided:  Care Plan: Diabetes   Updates made by Kat Lima RD since 2/28/2023 12:00 AM      Problem: HbA1C Not In Goal       Goal: Establish Regular Follow-Ups with PCP       Task: Discuss with PCP the recommended timing for patient's next follow up visit(s)    Responsible User: Kat Lima RD      Task: Discuss schedule for PCP visits with patient    Responsible User: Kat Lima RD      Goal: Get HbA1C Level in Goal       Task: Educate patient on diabetes education self-management topics    Responsible User: Kat Lima RD      Task: Educate patient on benefits of regular glucose monitoring    Responsible User: Kat Lima RD      Task: Refer patient to appropriate extended care team member, as needed (Medication Therapy Management, Behavioral Health, Physical Therapy, etc.)    Responsible User: Kat Lima RD      Task: Discuss diabetes treatment plan with patient    Responsible User: Kat Lima RD      Problem: Diabetes Self-Management Education Needed to Optimize Self-Care Behaviors       Goal: Understand diabetes pathophysiology and disease progression       Task:  Provide education on diabetes pathophysiology and disease progression specfic to patient's diabetes type    Responsible User: Kat Lima RD      Goal: Healthy Eating - follow a healthy eating pattern for diabetes       Task: Provide education on portion control and consistency in amount, composition and timing of food intake    Responsible User: Kat Lima RD      Task: Provide education on managing carbohydrate intake (carbohydrate counting, plate planning method, etc.)    Responsible User: Kat Lima RD      Task: Provide education on weight management    Responsible User: Kat Lima RD      Task: Provide education on heart healthy eating    Responsible User: Kat Lima RD      Task: Provide education on eating out    Responsible User: Kat Lima RD      Task: Develop individualized healthy eating plan with patient    Responsible User: Kat Lima RD      Goal: Being Active - get regular physical activity, working up to at least 150 minutes per week    This Visit's Progress: 0%   Note:    I will enter Exercise mode 30+ minutes p/t exercise & continue for 60 minutes after exercise.      Task: Provide education on relationship of activity to glucose and precautions to take if at risk for low glucose Completed 2/28/2023   Responsible User: Kat Lima RD      Task: Discuss barriers to physical activity with patient    Responsible User: Kat Lima RD      Task: Develop physical activity plan with patient Completed 2/28/2023   Responsible User: Kat Lima RD      Task: Explore community resources including walking groups, assistance programs, and home videos    Responsible User: Kat Lima RD      Goal: Monitoring - monitor glucose and ketones as directed       Task: Provide education on blood glucose monitoring (purpose, proper technique, frequency, glucose targets, interpreting results, when to use glucose control solution, sharps disposal)    Responsible User: Kat Lima  [facility]      Task: Provide education on continuous glucose monitoring (sensor placement, use of junior or /reader, understanding glucose trends, alerts and alarms, differences between sensor glucose and blood glucose)    Responsible User: Kat Lima RD      Task: Provide education on ketone monitoring (when to monitor, frequency, etc.)    Responsible User: Kat Lima RD      Goal: Taking Medication - patient is consistently taking medications as directed       Task: Provide education on action of prescribed medication, including when to take and possible side effects Completed 2/28/2023   Responsible User: Kat Lima RD      Task: Provide education on insulin and injectable diabetes medications, including administration, storage, site selection and rotation for injection sites    Responsible User: Kat Lima RD      Task: Discuss barriers to medication adherence with patient and provide management technique ideas as appropriate    Responsible User: Kat Lima RD      Task: Provide education on frequency and refill details of medications    Responsible User: Kat Lima RD      Goal: Problem Solving - know how to prevent and manage short-term diabetes complications       Task: Provide education on high blood glucose - causes, signs/symptoms, prevention and treatment    Responsible User: Kat Lima RD      Task: Provide education on low blood glucose - causes, signs/symptoms, prevention, treatment, carrying a carbohydrate source at all times, and medical identification    Responsible User: Kat Lima RD      Task: Provide education on safe travel with diabetes    Responsible User: Kat Lima RD      Task: Provide education on how to care for diabetes on sick days    Responsible User: Kat Lima RD      Task: Provide education on when to call a health care provider    Responsible User: Kat Lima RD      Goal: Reducing Risks - know how to prevent and treat long-term diabetes  complications       Task: Provide education on major complications of diabetes, prevention, early diagnostic measures and treatment of complications    Responsible User: Kat Lima RD      Task: Provide education on recommended care for dental, eye and foot health    Responsible User: Kat Lima RD      Task: Provide education on Hemoglobin A1c - goals and relationship to blood glucose levels    Responsible User: Kat Lima RD      Task: Provide education on recommendations for heart health - lipid levels and goals, blood pressure and goals, and aspirin therapy, if indicated    Responsible User: Kat Lima RD      Task: Provide education on tobacco cessation    Responsible User: Kat Lima RD      Goal: Healthy Coping - use available resources to cope with the challenges of managing diabetes       Task: Discuss recognizing feelings about having diabetes    Responsible User: Kat Lima RD      Task: Provide education on the benefits of making appropriate lifestyle changes    Responsible User: Kat Lima RD      Task: Provide education on benefits of utilizing support systems    Responsible User: Kat Lima RD      Task: Discuss methods for coping with stress    Responsible User: Kat Lima RD      Task: Provide education on when to seek professional counseling    Responsible User: Kat Lima RD Elise Graham, RD, LD, [place]     Time Spent: 30 minutes  Encounter Type: Individual    Any diabetes medication dose changes were made via the CDE Protocol per the patient's endocrinology provider. A copy of this encounter was shared with the provider.

## 2023-02-28 NOTE — PROGRESS NOTES
"Diabetes Self-Management Education & Support    Presents for: Insulin Pump and CGM Review    Type of Service: Telephone Visit    Originating Location (Patient Location): Home  Distant Location (Provider Location): [facility] [place]  Mode of Communication:  Telephone    Telephone Visit Start Time: 2:03p  Telephone Visit End Time (telephone visit stop time): 2:35p    How would patient like to obtain AVS? [place]hart    Assessment Type:   REPORTS:              Insulin Pump Information  Insulin Pump Brand: Tandem  Infusion Set: Tandem  Does patient have an insulin multiple daily injection back-up plan?: Yes    Education specific to insulin pump provided today on:   importance of bolusing before meals and exercise recommendations    Opportunities for ongoing education and support in diabetes-self management were discussed.    Pt verbalized understanding of concepts discussed and recommendations provided today.       Continue education with the following diabetes management concepts: Monitoring, Taking Medication, Problem Solving, Reducing Risks and Healthy Coping    Pump Education Materials: none    ASSESSMENT  Linda is looking for help with preconception counseling, help with exercise & blood sugar management while following new \"Profile\" diet plan. She would like to take more walks to support a healthy lifestyle but really struggles with drops in blood sugars when she is exercising. She is working with Endocrinology, MFM, OBGYN on preconception counseling - has good knowledge of risks, follow up plan. Discussed importance of team approach in managing diabetes during pregnancy and she is agreeable.     Glucose Patterns & Trends:  Hypo 2/2 exercise, post-prandial hyperglycemia     Patient would benefit from bolusing before meals and entering exercise mode 30-60 minutes prior to exercise & continuing 60 minutes after exercise.    Changes made to pump settings:  None - patient would like to work on " "Exercise mode & pre-exercise snack p/t adjusting pump settings     Changes made to sensor settings:   none    PLAN    Enter Exercise mode 30-60 minutes prior to exercise & continuing for 60 minutes after exercise  Have a snack p/t exercise based on Exercise in Type 1 guidelines   Bolus 5-10 minutes prior to meals     Topics to cover at upcoming visits: Taking Medication, Problem Solving, Reducing Risks and Healthy Coping    Follow-up: 3/30    See Care Plan for co-developed, patient-state behavior change goals.  AVS provided for patient today.    Education Materials Provided:  Exercise in Type 1 Diabetes & JDRF Pregnancy Toolkit handouts      SUBJECTIVE/OBJECTIVE:  Presents for: Insulin Pump and CGM Review  Accompanied by: Self  Diabetes education in the past 24mo: Yes  Focus of Visit: Insulin Pump, CGM  Type of Pump visit: Pump Review  Type of CGM visit: Personal CGM Follow-up  Diabetes type: Type 1  Disease course: Improving  How confident are you filling out medical forms by yourself:: Extremely  Diabetes management related comments/concerns: Trying to conceive, possibly will utilize fertility treatments, starting a diet plan - Profile plan  Difficulty affording diabetes medication?: No  Difficulty affording diabetes testing supplies?: No  Other concerns:: None  Cultural Influences/Ethnic Background:  Not  or       Diabetes Symptoms & Complications:  Weight trend: Stable  Complications assessed today?: Yes  Autonomic neuropathy: No  CVA: No  Heart disease: No  Nephropathy: No  Peripheral neuropathy: No  Peripheral Vascular Disease: No  Retinopathy: No    Patient Problem List and Family Medical History reviewed for relevant medical history, current medical status, and diabetes risk factors.    Vitals:  There were no vitals taken for this visit.  Estimated body mass index is 36.67 kg/m  as calculated from the following:    Height as of 12/29/22: 1.676 m (5' 6\").    Weight as of 12/29/22: 103.1 kg (227 " lb 3.2 oz).   Last 3 BP:   BP Readings from Last 3 Encounters:   12/29/22 118/64   01/28/22 112/66   10/19/21 127/89       History   Smoking Status     Never   Smokeless Tobacco     Never       Labs:  Lab Results   Component Value Date    A1C 8.0 10/06/2022    A1C 8.3 04/07/2021    HEMOGLOBINA1 8.3 09/29/2011     Lab Results   Component Value Date     10/06/2022     04/13/2021     Lab Results   Component Value Date     10/06/2022     04/07/2021     HDL Cholesterol   Date Value Ref Range Status   04/07/2021 59 >49 mg/dL Final     Direct Measure HDL   Date Value Ref Range Status   10/06/2022 67 >=50 mg/dL Final   ]  GFR Estimate   Date Value Ref Range Status   10/06/2022 >90 >60 mL/min/1.73m2 Final     Comment:     Effective December 21, 2021 eGFRcr in adults is calculated using the 2021 CKD-EPI creatinine equation which includes age and gender (Nithya et al., [place], DOI: 10.1056/NRTJan2932081)   04/13/2021 >90 >60 mL/min/[1.73_m2] Final     Comment:     Non  GFR Calc  Starting 12/18/2018, serum creatinine based estimated GFR (eGFR) will be   calculated using the Chronic Kidney Disease Epidemiology Collaboration   (CKD-EPI) equation.       GFR Estimate If Black   Date Value Ref Range Status   04/13/2021 >90 >60 mL/min/[1.73_m2] Final     Comment:      GFR Calc  Starting 12/18/2018, serum creatinine based estimated GFR (eGFR) will be   calculated using the Chronic Kidney Disease Epidemiology Collaboration   (CKD-EPI) equation.       Lab Results   Component Value Date    CR 0.67 10/06/2022    CR 0.60 04/13/2021     No results found for: MICROALBUMIN    Healthy Eating:  Healthy Eating Assessed Today: Yes  Cultural/Mandaen diet restrictions?: No  Meal planning/habits: Carb counting, Low carb  Meals include: Breakfast, Lunch, Dinner  Breakfast: protein shake, 2 servings fruit, 1 serving starch, 1 serving fat  Lunch: 4 oz lean protein, 2 servings vegetable, 1  "serving starch, 1 fruit  Dinner: 3 oz lean protein, 2 servings fat, 2 servings veg, 2 servings starch  Snacks: AM snk - protein bar (15-20 grams carb); PM snk - 2 servings fat (almonds), \"fiber drink\", HS - protein shake  Other: \"Profile\" diet plan   - eating every 2.5-3 hrs  Has patient met with a dietitian in the past?: Yes    Being Active:  Being Active Assessed Today: Yes  Exercise:: Yes  How intense was your typical exercise? : Moderate (like brisk walking)  Barrier to exercise: Physical limitation, Safety    Monitoring:  Monitoring Assessed Today: Yes  Did patient bring glucose meter to appointment? : Yes  Blood Glucose Meter: CGM  Times checking blood sugar at home (number): 5+  Times checking blood sugar at home (per): Day  Blood glucose trend: No change    See above.     Taking Medications:  Diabetes Medication(s)     Diabetic Other       Glucagon (BAQSIMI TWO PACK) 3 MG/DOSE POWD    Spray 1 Device in nostril once as needed (for severe hypoglycemia)    Insulin       insulin aspart (NOVOLOG PEN) 100 UNIT/ML pen    Inject 3 to 10 units subcutaneous at mealtimes as directed, total daily dose approx 30 units     insulin aspart (NOVOLOG VIAL) 100 UNITS/ML vial    Use with insulin pump, total daily dose approx 90 units     insulin glargine (LANTUS PEN) 100 UNIT/ML pen    Inject 22 Units Subcutaneous At Bedtime     SEMGLLAST GARCÍAGN, 100 UNIT/ML SOPN    ADMINISTER 22 UNITS UNDER THE SKIN AT BEDTIME SUBSTITUTE FOR LANTUS          Taking Medication Assessed Today: Yes  Current Treatments: Insulin Pump  Dose schedule: Pre-breakfast, Pre-lunch, Pre-dinner  Given by: Patient  Problems taking diabetes medications regularly?: No  Diabetes medication side effects?: No    Problem Solving:  Problem Solving Assessed Today: Yes  Is the patient at risk for hypoglycemia?: Yes  Hypoglycemia Frequency: Daily  Hypoglycemia Treatment: Glucose (tablets or gel)  Does patient have glucagon emergency kit?: No  Is the patient at risk for " DKA?: Yes  Does patient have ketone test strips?: Yes              Reducing Risks:  Reducing Risks Assessed Today: No    Healthy Coping:  Healthy Coping Assessed Today: Yes  Emotional response to diabetes: Ready to learn, Concern for health and well-being  Informal Support system:: Significant other  Stage of change: MAINTENANCE (Working to maintain change, with risk of relapse)  Support resources: [employer]  Patient Activation Measure Survey Score:  GEOVANNA Score (Last Two) 8/7/2012   GEOVANNA Raw Score 52   Activation Score 100   GEOVANNA Level 4         Care Plan and Education Provided:  Care Plan: Diabetes   Updates made by Kat Lima RD since 2/28/2023 12:00 AM      Problem: HbA1C Not In Goal       Goal: Establish Regular Follow-Ups with PCP       Task: Discuss with PCP the recommended timing for patient's next follow up visit(s)    Responsible User: Kat Lima RD      Task: Discuss schedule for PCP visits with patient    Responsible User: Kat Lima RD      Goal: Get HbA1C Level in Goal       Task: Educate patient on diabetes education self-management topics    Responsible User: Kat Lima RD      Task: Educate patient on benefits of regular glucose monitoring    Responsible User: Kat Lima RD      Task: Refer patient to appropriate extended care team member, as needed (Medication Therapy Management, Behavioral Health, Physical Therapy, etc.)    Responsible User: Kat Lima RD      Task: Discuss diabetes treatment plan with patient    Responsible User: Kat Lima RD      Problem: Diabetes Self-Management Education Needed to Optimize Self-Care Behaviors       Goal: Understand diabetes pathophysiology and disease progression       Task: Provide education on diabetes pathophysiology and disease progression specfic to patient's diabetes type    Responsible User: Kat Lima RD      Goal: Healthy Eating - follow a healthy eating pattern for diabetes       Task: Provide education on portion control  and consistency in amount, composition and timing of food intake    Responsible User: Kat Lima RD      Task: Provide education on managing carbohydrate intake (carbohydrate counting, plate planning method, etc.)    Responsible User: Kat Lima RD      Task: Provide education on weight management    Responsible User: Kat Lima RD      Task: Provide education on heart healthy eating    Responsible User: Kat Lima RD      Task: Provide education on eating out    Responsible User: Kat Lima RD      Task: Develop individualized healthy eating plan with patient    Responsible User: Kat Lima RD      Goal: Being Active - get regular physical activity, working up to at least 150 minutes per week    This Visit's Progress: 0%   Note:    I will enter Exercise mode 30+ minutes p/t exercise & continue for 60 minutes after exercise.      Task: Provide education on relationship of activity to glucose and precautions to take if at risk for low glucose Completed 2/28/2023   Responsible User: Kat Lima RD      Task: Discuss barriers to physical activity with patient    Responsible User: Kat Lima RD      Task: Develop physical activity plan with patient Completed 2/28/2023   Responsible User: Kat Lima RD      Task: Explore community resources including walking groups, assistance programs, and home videos    Responsible User: Kat Lima RD      Goal: Monitoring - monitor glucose and ketones as directed       Task: Provide education on blood glucose monitoring (purpose, proper technique, frequency, glucose targets, interpreting results, when to use glucose control solution, sharps disposal)    Responsible User: Kat Lima RD      Task: Provide education on continuous glucose monitoring (sensor placement, use of junior or /reader, understanding glucose trends, alerts and alarms, differences between sensor glucose and blood glucose)    Responsible User: Kat Lima RD       Task: Provide education on ketone monitoring (when to monitor, frequency, etc.)    Responsible User: Kat Lima RD      Goal: Taking Medication - patient is consistently taking medications as directed       Task: Provide education on action of prescribed medication, including when to take and possible side effects Completed 2/28/2023   Responsible User: Kat Lima RD      Task: Provide education on insulin and injectable diabetes medications, including administration, storage, site selection and rotation for injection sites    Responsible User: Kat Lima RD      Task: Discuss barriers to medication adherence with patient and provide management technique ideas as appropriate    Responsible User: Kat Lima RD      Task: Provide education on frequency and refill details of medications    Responsible User: Kat Lima RD      Goal: Problem Solving - know how to prevent and manage short-term diabetes complications       Task: Provide education on high blood glucose - causes, signs/symptoms, prevention and treatment    Responsible User: Kat Lima RD      Task: Provide education on low blood glucose - causes, signs/symptoms, prevention, treatment, carrying a carbohydrate source at all times, and medical identification    Responsible User: Kat Lima RD      Task: Provide education on safe travel with diabetes    Responsible User: Kat Lima RD      Task: Provide education on how to care for diabetes on sick days    Responsible User: Kat Lima RD      Task: Provide education on when to call a health care provider    Responsible User: Kat Lima RD      Goal: Reducing Risks - know how to prevent and treat long-term diabetes complications       Task: Provide education on major complications of diabetes, prevention, early diagnostic measures and treatment of complications    Responsible User: Kat Lima RD      Task: Provide education on recommended care for dental, eye and foot  health    Responsible User: Kat Lima RD      Task: Provide education on Hemoglobin A1c - goals and relationship to blood glucose levels    Responsible User: Kat Lima RD      Task: Provide education on recommendations for heart health - lipid levels and goals, blood pressure and goals, and aspirin therapy, if indicated    Responsible User: Kat Lima RD      Task: Provide education on tobacco cessation    Responsible User: Kat Lima RD      Goal: Healthy Coping - use available resources to cope with the challenges of managing diabetes       Task: Discuss recognizing feelings about having diabetes    Responsible User: Kat Lima RD      Task: Provide education on the benefits of making appropriate lifestyle changes    Responsible User: Kat Lima RD      Task: Provide education on benefits of utilizing support systems    Responsible User: Kat Lima RD      Task: Discuss methods for coping with stress    Responsible User: Kat Lima RD      Task: Provide education on when to seek professional counseling    Responsible User: Kat Lima RD Elise Graham, RD, LD, [facility]     Time Spent: 30 minutes  Encounter Type: Individual    Any diabetes medication dose changes were made via the CDE Protocol per the patient's endocrinology provider. A copy of this encounter was shared with the provider.

## 2023-02-28 NOTE — PATIENT INSTRUCTIONS
Enter Exercise mode 30-60 minutes prior to exercise & continuing for 60 minutes after exercise  Have a snack prior to exercising, based on guidelines sent via email   Bolus 5 to 10 minutes before meals     Call or send a Qoniac message with any questions or concerns    Kat Lima RD, LD, [facility]   Diabetes Education Triage Line: [phone]  Diabetes Education Appointment [phone]

## 2023-03-01 ENCOUNTER — LAB (OUTPATIENT)
Dept: LAB | Facility: CLINIC | Age: 33
End: 2023-03-01
Payer: COMMERCIAL

## 2023-03-01 DIAGNOSIS — E06.3 HYPOTHYROIDISM DUE TO HASHIMOTO'S THYROIDITIS: ICD-10-CM

## 2023-03-01 DIAGNOSIS — Z96.41 INSULIN PUMP STATUS: ICD-10-CM

## 2023-03-01 DIAGNOSIS — E10.9 TYPE 1 DIABETES MELLITUS WITHOUT COMPLICATION (H): ICD-10-CM

## 2023-03-01 LAB
HBA1C MFR BLD: 7.8 % (ref 0–5.6)
T4 FREE SERPL-MCNC: 1.45 NG/DL (ref 0.9–1.7)
TSH SERPL DL<=0.005 MIU/L-ACNC: 1.98 UIU/ML (ref 0.3–4.2)

## 2023-03-01 PROCEDURE — 84443 ASSAY THYROID STIM HORMONE: CPT

## 2023-03-01 PROCEDURE — 83036 HEMOGLOBIN GLYCOSYLATED A1C: CPT

## 2023-03-01 PROCEDURE — 84439 ASSAY OF FREE THYROXINE: CPT

## 2023-03-01 PROCEDURE — 36415 COLL VENOUS BLD VENIPUNCTURE: CPT

## 2023-03-06 ENCOUNTER — MYC MEDICAL ADVICE (OUTPATIENT)
Dept: ENDOCRINOLOGY | Facility: CLINIC | Age: 33
End: 2023-03-06
Payer: COMMERCIAL

## 2023-03-06 DIAGNOSIS — E10.9 TYPE 1 DIABETES MELLITUS WITHOUT COMPLICATION (H): ICD-10-CM

## 2023-03-06 NOTE — TELEPHONE ENCOUNTER
Received refill request for Dexcom 6 sensor. Pt has appt tomorrow with Dr Jimenez. Called pt, no answer. Sent VTMt message if okay to wait unitl tomorrow for refill after visit.     Vicente Castro RN on 3/6/2023 at 2:22 PM

## 2023-03-07 ENCOUNTER — VIRTUAL VISIT (OUTPATIENT)
Dept: ENDOCRINOLOGY | Facility: CLINIC | Age: 33
End: 2023-03-07
Payer: COMMERCIAL

## 2023-03-07 DIAGNOSIS — E10.9 TYPE 1 DIABETES MELLITUS WITHOUT COMPLICATION (H): Primary | ICD-10-CM

## 2023-03-07 DIAGNOSIS — Z96.41 INSULIN PUMP STATUS: ICD-10-CM

## 2023-03-07 DIAGNOSIS — E06.3 HYPOTHYROIDISM DUE TO HASHIMOTO'S THYROIDITIS: ICD-10-CM

## 2023-03-07 PROCEDURE — 99214 OFFICE O/P EST MOD 30 MIN: CPT | Mod: VID | Performed by: INTERNAL MEDICINE

## 2023-03-07 PROCEDURE — 95251 CONT GLUC MNTR ANALYSIS I&R: CPT | Performed by: INTERNAL MEDICINE

## 2023-03-07 RX ORDER — PROCHLORPERAZINE 25 MG/1
SUPPOSITORY RECTAL
Qty: 3 EACH | Refills: 5 | Status: SHIPPED | OUTPATIENT
Start: 2023-03-07 | End: 2023-08-22

## 2023-03-07 NOTE — TELEPHONE ENCOUNTER
Requested Prescriptions   Pending Prescriptions Disp Refills     Continuous Blood Gluc Sensor (DEXCOM G6 SENSOR) MISC [Pharmacy Med Name: DEXCOM G6 SENSOR  MISC]  5     Sig: CHANGE SENSOR EVERY 10 DAYS       There is no refill protocol information for this order        Last Written Prescription Date:  8/29/22  Last Fill Quantity: 3,  # refills: 5   Last office visit: 10/19/2021 with prescribing provider:  Dr. Melendrez   Future Office Visit: 03/07/23  Silvia Monreal RN on 3/7/2023 at 12:55 PM

## 2023-03-07 NOTE — PROGRESS NOTES
Video-Visit Details    Type of service:  Video Visit    Video Start Time (time video started): 3:02 pm    Video End Time (time video stopped): 3:25 pm    Originating Location (pt. Location): Home        Distant Location (provider location): Off-site    Mode of Communication:  Video Conference via [employer]          Recent issues:  Diabetes and thyroid follow-up evaluation  Using the Tandem pump + DexcomG6  Feeling well overall, no other new health issues reported.   Starting the Profile by [place] diet plan soon   Patient hopeful for future Pg, meeting with infertility specialist at [facility] in 3/2023           2003. Diagnosis of diabetes mellitus, age 13, living in [facility]  Had acute illness requiring hospitalization at [facility]  Began insulin injections, using Novolog and Lantus insulin  [place] carb counting method, gram estimates  On MDI treatment plan for approx 2 years, then changed to pump use  Previous medical evaluations with Dr. Yash Bacrenas/[facility] Callahan  2005. Began use of [place] pump  2012. Changed to Medtronic pump  Subsequently using Medtronic 723 Paradigm pump  2012. Tried wearing CGMS sensor, but uncomfortable     12/8/14. Initial consult with me at my former clinic ([facility])  Continued pump management  Was not interested in CGMS use     Previous FV hgbA1c levels include:              Lab Test 02/05/18 10/10/17 06/21/17 02/01/17 11/16/16  0815   A1C 7.4* 7.8* 8.1* 7.3* 8.2*      ~12/2017. Upgraded to Medtronic 670G pump    Tried Medtronic Guardian sensor, recalls frequent low glucose alarms              Wore sensor in manual mode              Didn't like it, discontinued use     ~11/2018. Wore diagnostic LibrePro CGM  Subsequently obtained LibrePersonal CGM, not wearing past year  3/2022. Changed to Tandem TSlim insulin pump    Novolog in pump    Current Tandem pump settings:       Recent Tandem pump data:          Recent DexcomG6 data:         Recent FV labs include:  Lab Results   Component Value  Date    A1C 7.8 (H) 03/01/2023     10/06/2022    POTASSIUM 4.4 10/06/2022    CHLORIDE 107 10/06/2022    CO2 26 10/06/2022    ANIONGAP 6 10/06/2022     (H) 10/06/2022    BUN 14 10/06/2022    CR 0.67 10/06/2022    GFRESTIMATED >90 10/06/2022    GFRESTBLACK >90 04/13/2021    MARCIA 9.2 10/06/2022    CHOL 241 (H) 10/06/2022    TRIG 75 10/06/2022    HDL 67 10/06/2022     (H) 10/06/2022    NHDL 174 (H) 10/06/2022    UCRR 64 06/08/2022    MICROL 5 06/08/2022    UMALCR 7.81 06/08/2022     Hyperlipidemia:  Takes simvastatin medication  DM Complications:  None known        Thyroid:  2013. Diagnosis of hypothyroidism  Previous FV thyroid labs include:    Treatment with levothyroxine medication  Previously on stable dose as levothyroxine 0.088 mg daily  Recent FV labs include:  Lab Results   Component Value Date    TSH 1.98 03/01/2023    T4 1.45 03/01/2023     (H) 12/19/2013     Current dose:  Levothyroxine 0.125 mg Tu/Th/Sat/Sun and 0.137 mg M/W/F        , lives in [facility]; works at [employer] with , in [place]  Has previously seen Dr. Ellen Burdick/[facility]  Also sees Dr. Sierra Santoyo/[facility]      PMH/PSH:  Past Medical History:   Diagnosis Date     Adjustment disorder with anxious mood      Allergic rhinitis, cause unspecified     h/o AIT     Chest discomfort      Common migraine     No visual aura.      Hyperlipidemia LDL goal < 70      Hypothyroidism      Infectious mononucleosis 01/01/1995     Tuberculosis      Type 1 diabetes, HbA1c goal < 7% (H) 03/01/2004     followed N - peds endocrinology - now Dr Jimenez     Past Surgical History:   Procedure Laterality Date     APPENDECTOMY  8/07    dr deshpande     PE TUBES  1996       Family Hx:  Family History   Problem Relation Age of Onset     Neurologic Disorder Maternal Grandmother         dementia     Genetic Disorder Paternal Grandfather         kidney     Family History Negative No family hx of           Social Hx:  Social History     Socioeconomic History     Marital status:      Spouse name: Not on file     Number of children: Not on file     Years of education: Not on file     Highest education level: Not on file   Occupational History     Occupation: Works in supply chain   Tobacco Use     Smoking status: Never     Smokeless tobacco: Never   Vaping Use     Vaping Use: Never used   Substance and Sexual Activity     Alcohol use: Yes     Alcohol/week: 2.0 standard drinks     Types: 2 Standard drinks or equivalent per week     Comment: 2-5 drinks per week     Drug use: No     Sexual activity: Yes     Partners: Male     Birth control/protection: Condom   Other Topics Concern      Service Not Asked     Blood Transfusions Not Asked     Caffeine Concern Yes     Comment: rare     Occupational Exposure Not Asked     Hobby Hazards Not Asked     Sleep Concern No     Stress Concern No     Weight Concern Not Asked     Special Diet Not Asked     Back Care Not Asked     Exercise Yes     Bike Helmet Not Asked     Seat Belt Yes     Self-Exams No     Comment: encouraged     Parent/sibling w/ CABG, MI or angioplasty before 65F 55M? No   Social History Narrative    -Working -       Social Determinants of Health     Financial Resource Strain: Not on file   Food Insecurity: Not on file   Transportation Needs: Not on file   Physical Activity: Not on file   Stress: Not on file   Social Connections: Not on file   Intimate Partner Violence: Not on file   Housing Stability: Not on file          MEDICATIONS:  has a current medication list which includes the following prescription(s): dexcom g6 sensor, dexcom g6 transmitter, insulin aspart, levothyroxine, levothyroxine, naratriptan, semglee (yfgn), sertraline, triamcinolone, blood glucose, blood glucose monitoring, baqsimi two pack, insulin aspart, insulin glargine, and insulin pen needle.      ROS: 10 point ROS neg other than the symptoms noted above in the  HPI.     GENERAL: mild fatigue, wt stable?; denies fevers, chills, night sweats.   HEENT: no dysphagia, odonophagia, diplopia, neck pain  THYROID:  no apparent hyper or hypothyroid symptoms  CV: no chest pain, pressure, palpitations  LUNGS: no SOB, LANG, cough, wheezing   ABDOMEN: no diarrhea, constipation, abdominal pain  EXTREMITIES: no rashes, ulcers, edema  NEUROLOGY: no headaches, denies changes in vision, tingling, extremitiy numbness   MSK: no muscle aches or pains, weakness  SKIN: no rashes or lesions  : menses regular  PSYCH:  stable mood, no significant anxiety or depression  ENDOCRINE: no heat or cold intolerance     Physical Exam (visual exam)  VS:  no vital signs taken for video visit  CONSTITUTIONAL: healthy, alert and NAD, well dressed, answering questions appropriately  ENT: no nose swelling or nasal discharge, mouth redness or gum changes.  EYES: eyes grossly normal to inspection, conjunctivae and sclerae normal, no exophthalmos or proptosis  THYROID:  no apparent nodules or goiter  LUNGS: no audible wheeze, cough or visible cyanosis, no visible retractions or increased work of breathing  ABDOMEN: abdomen not evaluated  EXTREMITIES: no hand tremors, limited exam  NEUROLOGY: CN grossly intact, mentation intact and speech normal   SKIN:  no apparent skin lesions, rash, or edema with visualized skin appearance  PSYCH: mentation appears normal, affect normal/bright, judgement and insight intact,   normal speech and appearance well groomed       LABS:     All pertinent notes, labs, and images personally reviewed by me.      A/P:  Encounter Diagnoses   Name Primary?     Type 1 diabetes mellitus without complication (H) Yes     Hypothyroidism due to Hashimoto's thyroiditis      Insulin pump status        Comments:  Reviewed health history, diabetes and thyroid issues  Recent SG trends good overall, but higher post breakfast  Reviewed and interpreted tests that I previously ordered.   Ordered appropriate  tests for the endocrinology disease management.    Management options discussed and implemented after shared medical decision making with the patient.  T1DM and hypothyroidism problems are chronic-stable    Plan:  Reviewed the overall T1DM management and insulin pump use.  Discussed optimal BG testing to assess glucose trends.  We reviewed insulin pump settings, basal rate and bolus dosing  Use of automated pump bolus dosing for meal/snack carb & correction dosing  Reviewed recent Tandem pump report information  Reviewed recent DexcomG6 CGM glucose trends, in detail    Reviewed general issues with the hypothyroidism diagnosis   Discussed lab tests used to assess patient thyroid hormone levels  Reviewed treatment options including levothyroxine medication, ideal dosing    Recommend:  Continue the Tandem insulin pump and diabetes management plan.  Pump setting changes:    Bolus ICR 7a 5.9 to 5.7    Plan mealtime boluses premeal, not postmeal  We have discussed use of the exercise and sleep modes with pump use  Continue use of the DexcomG6 CGM sensor  We have reviewed general issues with T1DM and pregnancy management    Reviewed prePg and Pg glycemic control, f/u CDE and physician appointments    She will contact me if she needs records for the [facility] appt  Continue the current levothyroxine 0.137 mg M/W/F and 0.125 mg Tu/Th/Sat/Sun  Plan repeat nonfasting labs in late 5/2023    Check hgbA1c and thyroid tests    Lab orders placed  Would not restart the simvastatin med at this time, since Pg planning  Arrange annual dilated eye exam, fasting lipid panel testing.  Contact me if questions/concerns about diabetes and thyroid management     Addressed patient's questions today.    There are no Patient Instructions on file for this visit.    Future labs ordered today:   Orders Placed This Encounter   Procedures     GLUCOSE MONITOR, 72 HOUR, PHYS INTERP     TSH with free T4 reflex     Hemoglobin A1c     Radiology/Consults  ordered today: None    Total time spent on day of encounter: 33 min    Follow-up:  5/30/23 at 3pm, Reggie Jimenez MD, MS  Endocrinology  [facility]    CC:  RUBY Mustafa and Masters, A

## 2023-03-07 NOTE — Clinical Note
3/7/2023         RE: Linda Chavez  [address]  [place] [address]        Dear Colleague,    Thank you for referring your patient, Linda Chavez, to the [facility] [place]. Please see a copy of my visit note below.    Video-Visit Details    Type of service:  Video Visit    Video Start Time (time video started): 3:02 pm    Video End Time (time video stopped): ***    Originating Location (pt. Location): Home    {PROVIDER LOCATION On-site should be selected for visits conducted from your clinic location or adjoining [facility] hospital, academic office, or other nearby [facility] building. Off-site should be selected for all other provider locations, including home:[id number]}    Distant Location (provider location): Off-site    Mode of Communication:  Video Conference via [employer]          Recent issues:  Diabetes and thyroid follow-up evaluation  Using the Tandem pump + DexcomG6  Feeling well overall, no other new health issues reported.   Starting the Profile by [place] diet plan soon   Patient hopeful for future Pg, meeting with infertility specialist at [facility] in 3/2023           2003. Diagnosis of diabetes mellitus, age 13, living in [facility]  Had acute illness requiring hospitalization at [facility]  Began insulin injections, using Novolog and Lantus insulin  [place] carb counting method, gram estimates  On MDI treatment plan for approx 2 years, then changed to pump use  Previous medical evaluations with Dr. Yash Barcenas/[facility] Andreia Callahan  2005. Began use of [place] pump  2012. Changed to Medtronic pump  Subsequently using Medtronic 723 Paradigm pump  2012. Tried wearing CGMS sensor, but uncomfortable     12/8/14. Initial consult with me at my former clinic ([facility])  Continued pump management  Was not interested in CGMS use     Previous FV hgbA1c levels include:              Lab Test 02/05/18 10/10/17 06/21/17 02/01/17 11/16/16  0815   A1C 7.4* 7.8* 8.1* 7.3* 8.2*      ~12/2017. Upgraded to Medtronic  670G pump    Tried Medtronic Guardian sensor, recalls frequent low glucose alarms              Wore sensor in manual mode              Didn't like it, discontinued use     ~11/2018. Wore diagnostic LibrePro CGM  Subsequently obtained LibrePersonal CGM, not wearing past year  3/2022. Changed to Tandem TSlim insulin pump    Novolog in pump    Current Tandem pump settings:       Recent Tandem pump data:          Recent DexcomG6 data:         Recent FV labs include:  Lab Results   Component Value Date    A1C 7.8 (H) 03/01/2023     10/06/2022    POTASSIUM 4.4 10/06/2022    CHLORIDE 107 10/06/2022    CO2 26 10/06/2022    ANIONGAP 6 10/06/2022     (H) 10/06/2022    BUN 14 10/06/2022    CR 0.67 10/06/2022    GFRESTIMATED >90 10/06/2022    GFRESTBLACK >90 04/13/2021    MARCIA 9.2 10/06/2022    CHOL 241 (H) 10/06/2022    TRIG 75 10/06/2022    HDL 67 10/06/2022     (H) 10/06/2022    NHDL 174 (H) 10/06/2022    UCRR 64 06/08/2022    MICROL 5 06/08/2022    UMALCR 7.81 06/08/2022     Hyperlipidemia:  Takes simvastatin medication  DM Complications:  None known        Thyroid:  2013. Diagnosis of hypothyroidism  Previous FV thyroid labs include:    Treatment with levothyroxine medication  Previously on stable dose as levothyroxine 0.088 mg daily  Recent FV labs include:  Lab Results   Component Value Date    TSH 1.98 03/01/2023    T4 1.45 03/01/2023     (H) 12/19/2013     Current dose:  Levothyroxine 0.125 mg Tu/Th/Sat/Sun and 0.137 mg M/W/F        , [place] [facility]; works at [employer] with , in [place]  Has previously seen Dr. Ellen Burdick/[facility]  Also sees Dr. Sierra Santoyo/[facility]      PMH/PSH:  Past Medical History:   Diagnosis Date     Adjustment disorder with anxious mood      Allergic rhinitis, cause unspecified     h/o AIT     Chest discomfort      Common migraine     No visual aura.      Hyperlipidemia LDL goal < 70      Hypothyroidism       Infectious mononucleosis 01/01/1995     Tuberculosis      Type 1 diabetes, HbA1c goal < 7% (H) 03/01/2004     followed [facility] - peds endocrinology - now Dr Jimenez     Past Surgical History:   Procedure Laterality Date     APPENDECTOMY  8/07    dr deshpande     PE TUBES  1996       Family Hx:  Family History   Problem Relation Age of Onset     Neurologic Disorder Maternal Grandmother         dementia     Genetic Disorder Paternal Grandfather         kidney     Family History Negative No family hx of          Social Hx:  Social History     Socioeconomic History     Marital status:      Spouse name: Not on file     Number of children: Not on file     Years of education: Not on file     Highest education level: Not on file   Occupational History     Occupation: Works in supply chain   Tobacco Use     Smoking status: Never     Smokeless tobacco: Never   Vaping Use     Vaping Use: Never used   Substance and Sexual Activity     Alcohol use: Yes     Alcohol/week: 2.0 standard drinks     Types: 2 Standard drinks or equivalent per week     Comment: 2-5 drinks per week     Drug use: No     Sexual activity: Yes     Partners: Male     Birth control/protection: Condom   Other Topics Concern      Service Not Asked     Blood Transfusions Not Asked     Caffeine Concern Yes     Comment: rare     Occupational Exposure Not Asked     Hobby Hazards Not Asked     Sleep Concern No     Stress Concern No     Weight Concern Not Asked     Special Diet Not Asked     Back Care Not Asked     Exercise Yes     Bike Helmet Not Asked     Seat Belt Yes     Self-Exams No     Comment: encouraged     Parent/sibling w/ CABG, MI or angioplasty before 65F 55M? No   Social History Narrative    -Working -       Social Determinants of Health     Financial Resource Strain: Not on file   Food Insecurity: Not on file   Transportation Needs: Not on file   Physical Activity: Not on file   Stress: Not on file   Social Connections: Not on file    Intimate Partner Violence: Not on file   Housing Stability: Not on file          MEDICATIONS:  has a current medication list which includes the following prescription(s): dexcom g6 sensor, dexcom g6 transmitter, insulin aspart, levothyroxine, levothyroxine, naratriptan, semglee (yfgn), sertraline, triamcinolone, blood glucose, blood glucose monitoring, baqsimi two pack, insulin aspart, insulin glargine, and insulin pen needle.      ROS: 10 point ROS neg other than the symptoms noted above in the HPI.     GENERAL: mild fatigue, wt stable?; denies fevers, chills, night sweats.   HEENT: no dysphagia, odonophagia, diplopia, neck pain  THYROID:  no apparent hyper or hypothyroid symptoms  CV: no chest pain, pressure, palpitations  LUNGS: no SOB, LANG, cough, wheezing   ABDOMEN: no diarrhea, constipation, abdominal pain  EXTREMITIES: no rashes, ulcers, edema  NEUROLOGY: no headaches, denies changes in vision, tingling, extremitiy numbness   MSK: no muscle aches or pains, weakness  SKIN: no rashes or lesions  : menses regular  PSYCH:  stable mood, no significant anxiety or depression  ENDOCRINE: no heat or cold intolerance     Physical Exam (visual exam)  VS:  no vital signs taken for video visit  CONSTITUTIONAL: healthy, alert and NAD, well dressed, answering questions appropriately  ENT: no nose swelling or nasal discharge, mouth redness or gum changes.  EYES: eyes grossly normal to inspection, conjunctivae and sclerae normal, no exophthalmos or proptosis  THYROID:  no apparent nodules or goiter  LUNGS: no audible wheeze, cough or visible cyanosis, no visible retractions or increased work of breathing  ABDOMEN: abdomen not evaluated  EXTREMITIES: no hand tremors, limited exam  NEUROLOGY: CN grossly intact, mentation intact and speech normal   SKIN:  no apparent skin lesions, rash, or edema with visualized skin appearance  PSYCH: mentation appears normal, affect normal/bright, judgement and insight intact,   normal  speech and appearance well groomed       LABS:     All pertinent notes, labs, and images personally reviewed by me.      A/P:  Encounter Diagnoses   Name Primary?     Type 1 diabetes mellitus without complication (H) Yes     Hypothyroidism due to Hashimoto's thyroiditis      Insulin pump status        Comments:  Reviewed health history, diabetes and thyroid issues  Recent SG trends good overall, but higher post breakfast  Reviewed and interpreted tests that I previously ordered.   Ordered appropriate tests for the endocrinology disease management.    Management options discussed and implemented after shared medical decision making with the patient.  T1DM and hypothyroidism problems are chronic-stable    Plan:  Reviewed the overall T1DM management and insulin pump use.  Discussed optimal BG testing to assess glucose trends.  We reviewed insulin pump settings, basal rate and bolus dosing  Use of automated pump bolus dosing for meal/snack carb & correction dosing  Reviewed recent Tandem pump report information  Reviewed recent DexcomG6 CGM glucose trends, in detail    Reviewed general issues with the hypothyroidism diagnosis   Discussed lab tests used to assess patient thyroid hormone levels  Reviewed treatment options including levothyroxine medication, ideal dosing    Recommend:  Continue the Tandem insulin pump and diabetes management plan.  Pump setting changes:    Bolus ICR 7a 5.9 to 5.7    Plan mealtime boluses premeal, not postmeal  We have discussed use of the exercise and sleep modes with pump use  Continue use of the DexcomG6 CGM sensor  We have reviewed general issues with T1DM and pregnancy management    Reviewed prePg and Pg glycemic control, f/u CDE and physician appointments    She will contact me if she needs records for the [facility] appt  Continue the current levothyroxine 0.137 mg M/W/F and 0.125 mg Tu/Th/Sat/Sun  Plan repeat nonfasting labs in late 5/2023    Check hgbA1c and thyroid tests    Lab orders  placed  Would not restart the simvastatin med at this time, since Pg planning  Arrange annual dilated eye exam, fasting lipid panel testing.  Contact me if questions/concerns about diabetes and thyroid management     Addressed patient's questions today.    There are no Patient Instructions on file for this visit.    Future labs ordered today:   Orders Placed This Encounter   Procedures     GLUCOSE MONITOR, 72 HOUR, PHYS INTERP     TSH with free T4 reflex     Hemoglobin A1c     Radiology/Consults ordered today: None    Total time spent on day of encounter: 33 min    Follow-up:  5/30/23 at 3pm, Reggie Jimenez MD, MS  Endocrinology  [facility]    CC:  RUBY Mustafa and Masters, A                          Again, thank you for allowing me to participate in the care of your patient.        Sincerely,        Santy Jimenez MD

## 2023-03-21 ENCOUNTER — LAB (OUTPATIENT)
Dept: LAB | Facility: CLINIC | Age: 33
End: 2023-03-21
Payer: COMMERCIAL

## 2023-03-21 ENCOUNTER — VIRTUAL VISIT (OUTPATIENT)
Dept: EDUCATION SERVICES | Facility: CLINIC | Age: 33
End: 2023-03-21
Payer: COMMERCIAL

## 2023-03-21 DIAGNOSIS — Z32.01 POSITIVE URINE PREGNANCY TEST: ICD-10-CM

## 2023-03-21 DIAGNOSIS — E10.9 TYPE 1 DIABETES, HBA1C GOAL < 7% (H): Primary | ICD-10-CM

## 2023-03-21 LAB — HCG INTACT+B SERPL-ACNC: 1981 MIU/ML

## 2023-03-21 PROCEDURE — 84702 CHORIONIC GONADOTROPIN TEST: CPT

## 2023-03-21 PROCEDURE — 36415 COLL VENOUS BLD VENIPUNCTURE: CPT

## 2023-03-21 PROCEDURE — 98967 PH1 ASSMT&MGMT NQHP 11-20: CPT | Mod: 93 | Performed by: DIETITIAN, REGISTERED

## 2023-03-21 NOTE — PROGRESS NOTES
"Diabetes Education Follow-up    Type of Service: Telephone Visit    Originating Location (Patient Location): Home  Distant Location (Provider Location): [facility]  Mode of Communication:  Telephone    Telephone Visit Start Time: 1:00p  Telephone Visit End Time (telephone visit stop time): 1:20p    How would patient like to obtain AVS? Nia    Subjective/Objective:    Linda Chavez sent in blood pump reports for review via T:connect. Last date of communication was: 2/28/23.    Diabetes is being managed with   Lifestyle (diet/activity), Diabetes Medications   Diabetes Medication(s)     Diabetic Other       Glucagon (BAQSIMI TWO PACK) 3 MG/DOSE POWD    Spray 1 Device in nostril once as needed (for severe hypoglycemia)    Insulin       insulin aspart (NOVOLOG PEN) 100 UNIT/ML pen    Inject 3 to 10 units subcutaneous at mealtimes as directed, total daily dose approx 30 units     insulin aspart (NOVOLOG VIAL) 100 UNITS/ML vial    Use with insulin pump, total daily dose approx 90 units     insulin glargine (LANTUS PEN) 100 UNIT/ML pen    Inject 22 Units Subcutaneous At Bedtime     SEMGLEE, YFGN, 100 UNIT/ML SOPN    ADMINISTER 22 UNITS UNDER THE SKIN AT BEDTIME SUBSTITUTE FOR LANTUS          BG/Food Log:                 Assessment:    Linda recently found out she's pregnant, only about 5 weeks along in this pregnancy. She was very proactive in reaching out get help with blood sugar control. She feels her blood sugars are significantly higher, possibly due to higher progesterone with pregnancy. Discussed importance of frequent modifications in pump settings to get blood sugars down. She is frequently entering \"phantom\" carbs to counteract high blood sugars. Discussed need to increase basal settings x 24 hrs, increase correction factor. She is already using her more aggressive settings profile in her pump and ICR is already quite aggressive. She is trying to eat more low carb and is also " experiencing more nausea today.     Plan/Response:  Pump settings changes:     Start time Basal rate  Correction Factor Carb Ratio  Midnight 1.1 --> 1.3 1u:50   1u:7.5g   6:00am 1.1 --> 1.3 1u:50   1u:6.2g  7:00am 0.8 --> 1.0 1u:35 --> 1u:30 1u:5.7g  11:00am 1.0 --> 1.1 1u:35 --> 1u:30 1u:6.0g  12:00pm 1.2 --> 1.4 1u:30 --> 1u:25 1u:5.8g  5:00pm 1.3 --> 1.4 1u:30 --> 1u:25 1u:4.3g  6:00pm 1.2 --> 1.3 1u:30 --> 1u:25 1u:4.3g  9:00pm 1.0 --> 1.1 1u:30 --> 1u:25 1u:5.2g    Follow up scheduled in 9 days, will adjust ICR as needed at that time  Discussed increased risk for hypoglycemia in 1st trimester of pregnancy, patient to monitor closely and reach out as needed for further help in adjusting   Reviewed small, more frequent meals for nausea, accurate carb counting, & premeal bolusing to help meet blood sugar targets  Using ADA guidelines for pregnancy with CMG, per Endocrinology  - Time in range >70% with range of  mg/dL     Kat Lima RD, TABITHA, [facility]     Any diabetes medication dose changes were made via the CDE Protocol and Collaborative Practice Agreement with the patient's endocrinology provider. A copy of this encounter was shared with the provider.

## 2023-03-21 NOTE — LETTER
"    3/21/2023         RE: Linda Chavez  [address]  [place] [address]        Dear Colleague,    Thank you for referring your patient, Linda Chavez, to the [facility]. Please see a copy of my visit note below.    Diabetes Education Follow-up    Type of Service: Telephone Visit    Originating Location (Patient Location): Home  Distant Location (Provider Location): [facility]  Mode of Communication:  Telephone    Telephone Visit Start Time: 1:00p  Telephone Visit End Time (telephone visit stop time): 1:20p    How would patient like to obtain AVS? MyChart    Subjective/Objective:    Linda Chavez sent in blood pump reports for review via T:connect. Last date of communication was: 2/28/23.    Diabetes is being managed with   Lifestyle (diet/activity), Diabetes Medications   Diabetes Medication(s)     Diabetic Other       Glucagon (BAQSIMI TWO PACK) 3 MG/DOSE POWD    Spray 1 Device in nostril once as needed (for severe hypoglycemia)    Insulin       insulin aspart (NOVOLOG PEN) 100 UNIT/ML pen    Inject 3 to 10 units subcutaneous at mealtimes as directed, total daily dose approx 30 units     insulin aspart (NOVOLOG VIAL) 100 UNITS/ML vial    Use with insulin pump, total daily dose approx 90 units     insulin glargine (LANTUS PEN) 100 UNIT/ML pen    Inject 22 Units Subcutaneous At Bedtime     SEMGLEE, YFGN, 100 UNIT/ML SOPN    ADMINISTER 22 UNITS UNDER THE SKIN AT BEDTIME SUBSTITUTE FOR LANTUS          BG/Food Log:                 Assessment:    Linda recently found out she's pregnant, only about 5 weeks along in this pregnancy. She was very proactive in reaching out get help with blood sugar control. She feels her blood sugars are significantly higher, possibly due to higher progesterone with pregnancy. Discussed importance of frequent modifications in pump settings to get blood sugars down. She is frequently entering \"phantom\" carbs to counteract high blood " sugars. Discussed need to increase basal settings x 24 hrs, increase correction factor. She is already using her more aggressive settings profile in her pump and ICR is already quite aggressive. She is trying to eat more low carb and is also experiencing more nausea today.     Plan/Response:  Pump settings changes:     Start time Basal rate  Correction Factor Carb Ratio  Midnight 1.1 --> 1.3 1u:50   1u:7.5g   6:00am 1.1 --> 1.3 1u:50   1u:6.2g  7:00am 0.8 --> 1.0 1u:35 --> 1u:30 1u:5.7g  11:00am 1.0 --> 1.1 1u:35 --> 1u:30 1u:6.0g  12:00pm 1.2 --> 1.4 1u:30 --> 1u:25 1u:5.8g  5:00pm 1.3 --> 1.4 1u:30 --> 1u:25 1u:4.3g  6:00pm 1.2 --> 1.3 1u:30 --> 1u:25 1u:4.3g  9:00pm 1.0 --> 1.1 1u:30 --> 1u:25 1u:5.2g    Follow up scheduled in 9 days, will adjust ICR as needed at that time  Discussed increased risk for hypoglycemia in 1st trimester of pregnancy, patient to monitor closely and reach out as needed for further help in adjusting   Reviewed small, more frequent meals for nausea, accurate carb counting, & premeal bolusing to help meet blood sugar targets  Using ADA guidelines for pregnancy with CMG, per Endocrinology  - Time in range >70% with range of  mg/dL     Kat Lima, RD, LD, [facility]ES     Any diabetes medication dose changes were made via the CDE Protocol and Collaborative Practice Agreement with the patient's endocrinology provider. A copy of this encounter was shared with the provider.

## 2023-03-23 ENCOUNTER — LAB (OUTPATIENT)
Dept: LAB | Facility: CLINIC | Age: 33
End: 2023-03-23
Payer: COMMERCIAL

## 2023-03-23 DIAGNOSIS — Z32.01 POSITIVE URINE PREGNANCY TEST: ICD-10-CM

## 2023-03-23 LAB — HCG INTACT+B SERPL-ACNC: 4501 MIU/ML

## 2023-03-23 PROCEDURE — 84702 CHORIONIC GONADOTROPIN TEST: CPT

## 2023-03-23 PROCEDURE — 36415 COLL VENOUS BLD VENIPUNCTURE: CPT

## 2023-03-27 ENCOUNTER — MYC MEDICAL ADVICE (OUTPATIENT)
Dept: EDUCATION SERVICES | Facility: CLINIC | Age: 33
End: 2023-03-27
Payer: COMMERCIAL

## 2023-03-27 NOTE — TELEPHONE ENCOUNTER
Type 1 Diabetes + pregnancy Follow-up    Subjective/Objective:    Linda Chavez sent in blood glucose log for review. Last date of communication was: 3/22/23.    Diabetes is being managed with diet, activity and medications    Taking diabetes medications:   yes:     Diabetes Medication(s)     Diabetic Other       Glucagon (BAQSIMI TWO PACK) 3 MG/DOSE POWD    Spray 1 Device in nostril once as needed (for severe hypoglycemia)    Insulin       insulin aspart (NOVOLOG PEN) 100 UNIT/ML pen    Inject 3 to 10 units subcutaneous at mealtimes as directed, total daily dose approx 30 units     insulin aspart (NOVOLOG VIAL) 100 UNITS/ML vial    Use with insulin pump, total daily dose approx 90 units     insulin glargine (LANTUS PEN) 100 UNIT/ML pen    Inject 22 Units Subcutaneous At Bedtime     SEMGLEE, YFGN, 100 UNIT/ML SOPN    ADMINISTER 22 UNITS UNDER THE SKIN AT BEDTIME SUBSTITUTE FOR LANTUS          Estimated Date of Delivery: Data Unavailable    BG/Food Log:             Assessment:  Significant improvement in TIR over the past few days -- only at 16% in range on 3/21, now at 34% but still needing to increase to get closer to goal of >70%. Recommend increase in basal rates x 24 hrs plus strengthening of correction factor today. Follow up planned on Thursday, 3/30 for continued review and adjustment.       Plan/Response:  Recommended pump settings changes in bold:     Start time Basal rate Correction Factor Carb Ratio   Midnight 1.4  1u:45   1u:7.5g              6:00am            1.4  1u:45   1u:6.2g  7:00am            1.1  1u:27                           1u:5.2g  11:00am          1.2                   1u:27                          1u:5.5g  12:00pm          1.5                1u:22                           1u:5.8g  5:00pm            1.5                   1u:22                         1u:4g  6:00pm            1.4                   1u:22                           1u:4g  9:00pm            1.2                    1u:25                           1u:4.5g    Follow up on 3/30/23 for continued review     Kta Lima RD, LD, [facility]     Any diabetes medication dose changes were made via the CDE Protocol and Collaborative Practice Agreement with the patient's endocrinology provider. A copy of this encounter was shared with the provider.

## 2023-03-29 ENCOUNTER — VIRTUAL VISIT (OUTPATIENT)
Dept: OBGYN | Facility: CLINIC | Age: 33
End: 2023-03-29
Attending: STUDENT IN AN ORGANIZED HEALTH CARE EDUCATION/TRAINING PROGRAM
Payer: COMMERCIAL

## 2023-03-29 ENCOUNTER — ANCILLARY PROCEDURE (OUTPATIENT)
Dept: ULTRASOUND IMAGING | Facility: CLINIC | Age: 33
End: 2023-03-29
Attending: STUDENT IN AN ORGANIZED HEALTH CARE EDUCATION/TRAINING PROGRAM
Payer: COMMERCIAL

## 2023-03-29 DIAGNOSIS — O36.80X0 PREGNANCY WITH INCONCLUSIVE FETAL VIABILITY, SINGLE OR UNSPECIFIED FETUS: Primary | ICD-10-CM

## 2023-03-29 DIAGNOSIS — O36.80X0 PREGNANCY WITH INCONCLUSIVE FETAL VIABILITY: ICD-10-CM

## 2023-03-29 DIAGNOSIS — Z32.01 POSITIVE URINE PREGNANCY TEST: ICD-10-CM

## 2023-03-29 PROCEDURE — 99213 OFFICE O/P EST LOW 20 MIN: CPT | Mod: 93 | Performed by: OBSTETRICS & GYNECOLOGY

## 2023-03-29 PROCEDURE — 76817 TRANSVAGINAL US OBSTETRIC: CPT | Performed by: OBSTETRICS & GYNECOLOGY

## 2023-03-29 NOTE — PROGRESS NOTES
Linda Chavez is a 32 year old female who is being evaluated via a billable telephone visit.      What phone number would you like to be contacted at? [phone]  How would you like to obtain your AVS? Nia      Originating Location (pt. Location): Home      Distant Location (provider location):  On-site      SUBJECTIVE:                                                   Linda Chavez is a 32 year old female who presents for virtual visit today for the following health issue(s):  Patient presents with:  Follow Up: US      Additional information: none    HPI:  By a sure LMP of  patient should be 6+4. However her last cycle was 33 days and a couple months ago she saw Dr. Santoyo and reported 2 cycles of 38 day intervals.  Conceived since that  period. Hasn't had cramping or bleeding or pain.  However cycles are so unpredictable and wasn't doing any OPKs or tracking of ovulatory sx that had no idea how far alone she was and was nervous    Had HCG levels done 48 hours apart roughly a week ago and had slightly more than doubling from 1900 to 4500    U/S was done today and shows a GS in the uterus with a very small and faint YS. The GS was about 8mm which corresponds to about 5-6 weeks. There is no fetal pole seen yet  There is a likely corpus luteum on the right and a quiet, normal left ovary  No free fluid in the pelvis and no signs of ectopic pregnancy    No LMP recorded..     Patient is sexually active, .  Using none for contraception.    reports that she has never smoked. She has never used smokeless tobacco.      Health maintenance updated:  yes    Today's PHQ-2 Score:   PHQ-2 (  [employer]) 2/15/2023   Q1: Little interest or pleasure in doing things 0   Q2: Feeling down, depressed or hopeless 0   PHQ-2 Score 0   PHQ-2 Total Score (12-17 Years)- Positive if 3 or more points; Administer PHQ-A if positive -   Q1: Little interest or pleasure in doing things -   Q2: Feeling down, depressed or hopeless -    PHQ-2 Score -     Today's PHQ-9 Score:   PHQ-9 SCORE 2/15/2023   PHQ-9 Total Score -   PHQ-9 Total Score MyChart -   PHQ-9 Total Score 0     Today's TRESA-7 Score:   TRESA-7 SCORE 2/15/2023   Total Score -   Total Score -   Total Score 0       Problem list and histories reviewed & adjusted, as indicated.  Additional history: as documented.    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)     Common migraine     Morbid obesity (H)     Past Surgical History:   Procedure Laterality Date     APPENDECTOMY  8/07    dr deshpande     PE TUBES  1996      Social History     Tobacco Use     Smoking status: Never     Smokeless tobacco: Never   Substance Use Topics     Alcohol use: Yes     Alcohol/week: 2.0 standard drinks     Types: 2 Standard drinks or equivalent per week     Comment: 2-5 drinks per week      Problem (# of Occurrences) Relation (Name,Age of Onset)    Genetic Disorder (1) Paternal Grandfather: kidney    Neurologic Disorder (1) Maternal Grandmother: dementia       Negative family history of: Family History Negative            Current Outpatient Medications   Medication Sig     blood glucose (VALE CONTOUR NEXT) test strip Patient tests 5 times/day, Contour Next test strips DAW1     blood glucose monitoring (VALE MICROLET) lancets Patient tests 8 times/day     Continuous Blood Gluc Sensor (DEXCOM G6 SENSOR) MISC CHANGE SENSOR EVERY 10 DAYS     Continuous Blood Gluc Transmit (DEXCOM G6 TRANSMITTER) MISC CHANGE EVERY 90 DAYS     Glucagon (BAQSIMI TWO PACK) 3 MG/DOSE POWD Spray 1 Device in nostril once as needed (for severe hypoglycemia)     insulin aspart (NOVOLOG PEN) 100 UNIT/ML pen Inject 3 to 10 units subcutaneous at mealtimes as directed, total daily  dose approx 30 units     insulin aspart (NOVOLOG VIAL) 100 UNITS/ML vial Use with insulin pump, total daily dose approx 90 units     insulin glargine (LANTUS PEN) 100 UNIT/ML pen Inject 22 Units Subcutaneous At Bedtime     insulin pen needle (BD CLEOPATRA U/F) 32G X 4 MM miscellaneous Use 4 pen needles daily or as directed.     levothyroxine (SYNTHROID/LEVOTHROID) 125 MCG tablet Take 1-tablet by mouth daily on Tues/Thurs/Sat/Sundays, as directed     levothyroxine (SYNTHROID/LEVOTHROID) 137 MCG tablet Take 1-tablet by mouth on Mon/Wed/Fridays as directed     naratriptan (AMERGE) 1 MG tablet      SEMGLEE, YFGN, 100 UNIT/ML SOPN ADMINISTER 22 UNITS UNDER THE SKIN AT BEDTIME SUBSTITUTE FOR LANTUS     sertraline (ZOLOFT) 50 MG tablet TAKE 1 TABLET(50 MG) BY MOUTH DAILY     triamcinolone (KENALOG) 0.1 % external cream Apply topically 2 times daily to affected areas on fingers for 10-14 days. Not for use on face nor groin.     No current facility-administered medications for this visit.     Allergies   Allergen Reactions     Penicillins Rash     augmentin & pcn     Sulfa Drugs Hives         OBJECTIVE:     No vitals were obtained today due to virtual visit.    Physical Exam    PSYCH: Mentation appears normal, affect normal/bright, judgement and insight intact, normal speech           ASSESSMENT/PLAN:                                                      Phone call duration: 14 minutes along with 8 minutes of chart review and completion for a total of 22 minutes on the same DOS      ICD-10-CM    1. Pregnancy with inconclusive fetal viability, single or unspecified fetus  O36.80X0 US OB Transvaginal Only            Patient was very nervous as wasn't sure that the tech was able to see any of the things that should have been.  Reassured patient that LMP assigning of GA takes into account a regular 28 day cycle with a day 14 ovulation.  Patient's cycles are always longer and states the last one was 33 days and prior had a couple of 38  so could very well be at least 5-10 days less far along than a GA of 6+4 and that is c/w what we see on U/S today    Had appropriate doubling in HCG last week and now has an 8mm GS that is about 5.5 weeks and a small forming YS and no fetal pole, c/w this GA  Has not had cramping or pain or bleeding to indicate ectopic or MAB nor any U/S evidence for either.  Recommend repeat U/S in 7-10 days for reevaluation and should be more definitively into 6-7 week GA and then fetal pole and FHTs should be more clearly seen.    Tegan Hurtado MD  [facility] [place]

## 2023-03-30 ENCOUNTER — VIRTUAL VISIT (OUTPATIENT)
Dept: EDUCATION SERVICES | Facility: CLINIC | Age: 33
End: 2023-03-30
Payer: COMMERCIAL

## 2023-03-30 DIAGNOSIS — E10.9 TYPE 1 DIABETES, HBA1C GOAL < 7% (H): Primary | ICD-10-CM

## 2023-03-30 PROCEDURE — 98967 PH1 ASSMT&MGMT NQHP 11-20: CPT | Mod: 95 | Performed by: DIETITIAN, REGISTERED

## 2023-03-30 NOTE — PROGRESS NOTES
SUBJECTIVE:                                                   Linda Chavez is a 32 year old female who presents to clinic today for the following health issue(s):  Patient presents with:  Ultrasound  Fertility        HPI:  Follows with Dr. Jimenez, will continue this monthly. Meets weekly with DM educator. BS's have been improving.     Has been doing well on sertraline.     Patient's last menstrual period was 2023..     Patient is sexually active, .  Using none for contraception.    reports that she has never smoked. She has never used smokeless tobacco.    STD testing offered?  Declined    Health maintenance updated:  yes    Today's PHQ-2 Score:       2/15/2023     8:26 AM   PHQ-2 (  [employer])   Q1: Little interest or pleasure in doing things 0   Q2: Feeling down, depressed or hopeless 0   PHQ-2 Score 0     Today's PHQ-9 Score:       2023     1:03 PM   PHQ-9 SCORE   PHQ-9 Total Score MyChart 1 (Minimal depression)   PHQ-9 Total Score 1     Today's TRESA-7 Score:       2023     1:03 PM   TRESA-7 SCORE   Total Score 2 (minimal anxiety)    2 (minimal anxiety)   Total Score 2       Problem list and histories reviewed & adjusted, as indicated.  Additional history: as documented.    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)     Common migraine     Morbid obesity (H)     Supervision of high-risk pregnancy     Past Surgical History:   Procedure Laterality Date     APPENDECTOMY  2007    dr deshpande     PE TUBES Bilateral 1996      Social History     Tobacco Use     Smoking status: Never     Smokeless tobacco: Never   Vaping Use     Vaping status: Never Used     Passive vaping exposure: Yes   Substance Use Topics     Alcohol  use: Not Currently     Alcohol/week: 2.0 standard drinks of alcohol     Types: 2 Standard drinks or equivalent per week     Comment: 2-5 drinks per week      Problem (# of Occurrences) Relation (Name,Age of Onset)    Genetic Disorder (1) Paternal Grandfather: kidney    Neurologic Disorder (1) Maternal Grandmother: dementia       Negative family history of: Family History Negative            Current Outpatient Medications   Medication Sig     Prenatal w/o A Vit-Fe Fum-FA (PRENATA PO)      blood glucose (VALE CONTOUR NEXT) test strip Patient tests 5 times/day, Contour Next test strips DAW1     blood glucose monitoring (VALE MICROLET) lancets Patient tests 8 times/day     Continuous Blood Gluc Sensor (DEXCOM G6 SENSOR) MISC CHANGE SENSOR EVERY 10 DAYS     Continuous Blood Gluc Transmit (DEXCOM G6 TRANSMITTER) MISC CHANGE EVERY 90 DAYS     Glucagon (BAQSIMI TWO PACK) 3 MG/DOSE POWD Spray 1 Device in nostril once as needed (for severe hypoglycemia)     insulin aspart (NOVOLOG PEN) 100 UNIT/ML pen Inject 3 to 10 units subcutaneous at mealtimes as directed, total daily dose approx 30 units     insulin aspart (NOVOLOG VIAL) 100 UNITS/ML vial Use with insulin pump, total daily dose approx 90 units     insulin glargine (LANTUS PEN) 100 UNIT/ML pen Inject 22 Units Subcutaneous At Bedtime     insulin pen needle (BD CLEOPATRA U/F) 32G X 4 MM miscellaneous Use 4 pen needles daily or as directed.     levothyroxine (SYNTHROID/LEVOTHROID) 125 MCG tablet Take 1-tablet by mouth daily on Tues/Thurs/Sat/Sundays, as directed     levothyroxine (SYNTHROID/LEVOTHROID) 137 MCG tablet Take 1-tablet by mouth on Mon/Wed/Fridays as directed     naratriptan (AMERGE) 1 MG tablet  (Patient not taking: Reported on 4/18/2023)     SEMGLEE, YFGN, 100 UNIT/ML SOPN ADMINISTER 22 UNITS UNDER THE SKIN AT BEDTIME SUBSTITUTE FOR LANTUS     sertraline (ZOLOFT) 50 MG tablet TAKE 1 TABLET(50 MG) BY MOUTH DAILY     triamcinolone (KENALOG) 0.1 % external cream Apply  "topically 2 times daily to affected areas on fingers for 10-14 days. Not for use on face nor groin.     No current facility-administered medications for this visit.     Allergies   Allergen Reactions     Penicillins Rash     augmentin & pcn     Sulfa Drugs Hives       ROS:  12 point review of systems negative other than symptoms noted below or in the HPI.  No urinary frequency or dysuria, bladder or kidney problems      OBJECTIVE:     /74   Ht 1.676 m (5' 6\")   Wt 101.6 kg (224 lb)   LMP 02/11/2023   BMI 36.15 kg/m    Body mass index is 36.15 kg/m .    Exam:  Constitutional:  Appearance: Well nourished, well developed alert, in no acute distress  Neurologic:  Mental Status:  Oriented X3.  Normal strength and tone, sensory exam grossly normal, mentation intact and speech normal.    Psychiatric:  Mentation appears normal and affect normal/bright.     In-Clinic Test Results:  Results for orders placed or performed in visit on 04/06/23   US OB Transvaginal Only    Narrative    Table formatting from the original result was not included.       Obstetrical Ultrasound Report  OB U/S  < 14 Weeks -  Transvaginal  [facility]  Referring Provider: Tegan Hurtado MD  Sonographer: Rogerio Jasso RDMS  Indication:  Viability check  and Size and Dates     Dating (mm/dd/yyyy):   LMP: 02/11/2023            EDC:  11/18/2023            GA by LMP:           7w5d     Current Scan On:  4/6/2023             EDC:  11/27/2023            GA by Current Scan:          6w3d  The calculation of the gestational age by current scan was based on CRL.  Anatomy Scan:  Hou gestation.  Biometry:  CRL                       0.59 cm                6w3d                      Yolk Sac                              0.5 cm                                                       Fetal heart activity:  Rate and rhythm is within normal limits.  Fetal   heart rate: 123 bpm  Findings: Single viable IUP     Maternal " Structures:  Cervix: The cervix appears long and closed.  Right Ovary: Complex cyst 1.8 x 1.6 x 1.6 cm  Left Ovary: Wnl    Impression:   Viable zhu gestation at 6 weeks 3 days. US today differs with   patient's LMP dating by more than 5 days giving final EDC of 11/27/23 by   today's ultrasound. Normal adnexa.   Recommend anatomy US at 18-22 weeks gestation.  Sierra Santoyo, DO                 ASSESSMENT/PLAN:                                                        ICD-10-CM    1. Encounter to determine fetal viability of pregnancy, single or unspecified fetus  O36.80X0             Reviewed ultrasound findings with Linda. Viable IUP confirmed, new EDC 11/27/23 based on this ultrasound as there was discrepancy with her LMP dating by more than 5d.   Continue to follow with ENdo/DNE for BS management.   F/U for first ob visit.     Sierra Higginss, DO  [facility]

## 2023-03-30 NOTE — LETTER
3/30/2023         RE: Linda Chavez  [address]  [facility] [address]        Dear Colleague,    Thank you for referring your patient, Linda Chavez, to the [facility] [place]. Please see a copy of my visit note below.    Type 1 Diabetes + pregnancy Follow-up    Type of Service: Telephone Visit    Originating Location (Patient Location): Home  Distant Location (Provider Location): Offsite  Mode of Communication:  Telephone    Telephone Visit Start Time: 9:00a  Telephone Visit End Time (telephone visit stop time): 9:16a    How would patient like to obtain AVS? MyChart    Subjective/Objective:    Linda Chavez sent in blood glucose log for review, via t:connect. Last date of communication was: 3/27/23.    Diabetes is being managed with diet, activity and medications    Taking diabetes medications:   yes:     Diabetes Medication(s)     Diabetic Other       Glucagon (BAQSIMI TWO PACK) 3 MG/DOSE POWD    Spray 1 Device in nostril once as needed (for severe hypoglycemia)    Insulin       insulin aspart (NOVOLOG PEN) 100 UNIT/ML pen    Inject 3 to 10 units subcutaneous at mealtimes as directed, total daily dose approx 30 units     insulin aspart (NOVOLOG VIAL) 100 UNITS/ML vial    Use with insulin pump, total daily dose approx 90 units     insulin glargine (LANTUS PEN) 100 UNIT/ML pen    Inject 22 Units Subcutaneous At Bedtime     SEMGLEE, YFGN, 100 UNIT/ML SOPN    ADMINISTER 22 UNITS UNDER THE SKIN AT BEDTIME SUBSTITUTE FOR LANTUS          Estimated Date of Delivery: Data Unavailable    BG/Food Log:           Assessment:    Linda is continuing to work hard to reach ADA target time in range for pregnancy of >70% at  mg/dL. She has made significant improvement in this direction and we continued to discuss strategies to do this today. She is feeling that the adjustments thus far have been helping. Her nausea has improved. We discussed adjusting her overnight basal rate to help get fasitng blood  sugar closer to goal <95 mg/dL and her breakfast and lunch ICRs to help with PP highs. Will continue to monitor closely and adjust as needed.     Plan/Response:  Recommended pump settings changes in bold:      Start time         Basal rate        Correction Factor        Carb Ratio   Midnight           1.5                   1u:45                           1u:7.5g              6:00am            1.4                   1u:45                           1u:6.2g  7:00am            1.3                   1u:27                             1u:5g  11:00am          1.2                   1u:27                             1u:5g  12:00pm          1.5                   1u:22                              1u:5.5g  5:00pm            1.5                   1u:22                               1u:4g  6:00pm            1.4                   1u:22                               1u:4g  9:00pm            1.4                   1u:25                               1u:4.5g    Send MyChart every Monday for pump settings review  Follow up scheduled with Dr. Jimenez in ~1 month  Follow up scheduled with SHELLEY Stephens on 4/19, 5/17    Kat Lima RD, TABITHA, SHELLEY     Any diabetes medication dose changes were made via the CDE Protocol and Collaborative Practice Agreement with the patient's endocrinology provider. A copy of this encounter was shared with the provider.

## 2023-03-30 NOTE — PROGRESS NOTES
Type 1 Diabetes + pregnancy Follow-up    Type of Service: Telephone Visit    Originating Location (Patient Location): Home  Distant Location (Provider Location): Offsite  Mode of Communication:  Telephone    Telephone Visit Start Time: 9:00a  Telephone Visit End Time (telephone visit stop time): 9:16a    How would patient like to obtain AVS? Nia    Subjective/Objective:    Linda Chavez sent in blood glucose log for review, via t:connect. Last date of communication was: 3/27/23.    Diabetes is being managed with diet, activity and medications    Taking diabetes medications:   yes:     Diabetes Medication(s)     Diabetic Other       Glucagon (BAQSIMI TWO PACK) 3 MG/DOSE POWD    Spray 1 Device in nostril once as needed (for severe hypoglycemia)    Insulin       insulin aspart (NOVOLOG PEN) 100 UNIT/ML pen    Inject 3 to 10 units subcutaneous at mealtimes as directed, total daily dose approx 30 units     insulin aspart (NOVOLOG VIAL) 100 UNITS/ML vial    Use with insulin pump, total daily dose approx 90 units     insulin glargine (LANTUS PEN) 100 UNIT/ML pen    Inject 22 Units Subcutaneous At Bedtime     SEMGLEE, YFGN, 100 UNIT/ML SOPN    ADMINISTER 22 UNITS UNDER THE SKIN AT BEDTIME SUBSTITUTE FOR LANTUS          Estimated Date of Delivery: Data Unavailable    BG/Food Log:           Assessment:    Linda is continuing to work hard to reach ADA target time in range for pregnancy of >70% at  mg/dL. She has made significant improvement in this direction and we continued to discuss strategies to do this today. She is feeling that the adjustments thus far have been helping. Her nausea has improved. We discussed adjusting her overnight basal rate to help get fasitng blood sugar closer to goal <95 mg/dL and her breakfast and lunch ICRs to help with PP highs. Will continue to monitor closely and adjust as needed.     Plan/Response:  Recommended pump settings changes in bold:      Start time         Basal rate         Correction Factor        Carb Ratio   Midnight           1.5                   1u:45                           1u:7.5g              6:00am            1.4                   1u:45                           1u:6.2g  7:00am            1.3                   1u:27                             1u:5g  11:00am          1.2                   1u:27                             1u:5g  12:00pm          1.5                   1u:22                              1u:5.5g  5:00pm            1.5                   1u:22                               1u:4g  6:00pm            1.4                   1u:22                               1u:4g  9:00pm            1.4                   1u:25                               1u:4.5g    Send MyChart every Monday for pump settings review  Follow up scheduled with Dr. Jimenez in ~1 month  Follow up scheduled with SHELLEY Stephens on 4/19, 5/17    Kat Lima RD, TABITHA, SHELLEY     Any diabetes medication dose changes were made via the CDE Protocol and Collaborative Practice Agreement with the patient's endocrinology provider. A copy of this encounter was shared with the provider.

## 2023-04-02 ENCOUNTER — HEALTH MAINTENANCE LETTER (OUTPATIENT)
Age: 33
End: 2023-04-02

## 2023-04-04 ENCOUNTER — MYC MEDICAL ADVICE (OUTPATIENT)
Dept: EDUCATION SERVICES | Facility: CLINIC | Age: 33
End: 2023-04-04
Payer: COMMERCIAL

## 2023-04-05 NOTE — TELEPHONE ENCOUNTER
Type 1 Diabetes + Pregnancy Follow-up    Subjective/Objective:    Linda Chavez sent in blood glucose log for review. Last date of communication was: 3/30/23.    Diabetes is being managed with diet, activity and medications    Taking diabetes medications:   yes:     Diabetes Medication(s)     Diabetic Other       Glucagon (BAQSIMI TWO PACK) 3 MG/DOSE POWD    Spray 1 Device in nostril once as needed (for severe hypoglycemia)    Insulin       insulin aspart (NOVOLOG PEN) 100 UNIT/ML pen    Inject 3 to 10 units subcutaneous at mealtimes as directed, total daily dose approx 30 units     insulin aspart (NOVOLOG VIAL) 100 UNITS/ML vial    Use with insulin pump, total daily dose approx 90 units     insulin glargine (LANTUS PEN) 100 UNIT/ML pen    Inject 22 Units Subcutaneous At Bedtime     SEMGLEE, YFGN, 100 UNIT/ML SOPN    ADMINISTER 22 UNITS UNDER THE SKIN AT BEDTIME SUBSTITUTE FOR LANTUS          Estimated Date of Delivery: Data Unavailable    BG/Food Log:             Assessment:    Continued improvement in average glucose & TIR (from 45% on 3/30 to 55% today!). Will adjust overnight basal rate, no change in ICR or ISF at this time. Continue weekly check ins via [employer].       Plan/Response:  Start time         Basal rate        Correction Factor        Carb Ratio   Midnight           1.6                   1u:45                           1u:7.5g              6:00am            1.4                   1u:45                           1u:6.2g  7:00am             1.3                   1u:27                             1u:5g  11:00am          1.2                   1u:27                             1u:5g  12:00pm          1.5                   1u:22                              1u:5.5g  5:00pm            1.5                   1u:22                               1u:4g  6:00pm            1.4                   1u:22                               1u:4g  9:00pm            1.5                   1u:25                               1u:4.5g    See [employer] message for patient communications.   Follow up in 1 week     Kat Lima RD, TABITHA, CAMRONES     Any diabetes medication dose changes were made via the CDE Protocol and Collaborative Practice Agreement with the patient's endocrinology provider. A copy of this encounter was shared with the provider.

## 2023-04-06 ENCOUNTER — ANCILLARY PROCEDURE (OUTPATIENT)
Dept: ULTRASOUND IMAGING | Facility: CLINIC | Age: 33
End: 2023-04-06
Payer: COMMERCIAL

## 2023-04-06 ENCOUNTER — OFFICE VISIT (OUTPATIENT)
Dept: OBGYN | Facility: CLINIC | Age: 33
End: 2023-04-06
Payer: COMMERCIAL

## 2023-04-06 VITALS
SYSTOLIC BLOOD PRESSURE: 118 MMHG | HEIGHT: 66 IN | BODY MASS INDEX: 36 KG/M2 | WEIGHT: 224 LBS | DIASTOLIC BLOOD PRESSURE: 74 MMHG

## 2023-04-06 DIAGNOSIS — O36.80X0 PREGNANCY WITH INCONCLUSIVE FETAL VIABILITY, SINGLE OR UNSPECIFIED FETUS: ICD-10-CM

## 2023-04-06 DIAGNOSIS — O36.80X0 ENCOUNTER TO DETERMINE FETAL VIABILITY OF PREGNANCY, SINGLE OR UNSPECIFIED FETUS: Primary | ICD-10-CM

## 2023-04-06 PROCEDURE — 99212 OFFICE O/P EST SF 10 MIN: CPT | Performed by: OBSTETRICS & GYNECOLOGY

## 2023-04-06 PROCEDURE — 76817 TRANSVAGINAL US OBSTETRIC: CPT | Performed by: OBSTETRICS & GYNECOLOGY

## 2023-04-07 ENCOUNTER — MYC MEDICAL ADVICE (OUTPATIENT)
Dept: ENDOCRINOLOGY | Facility: CLINIC | Age: 33
End: 2023-04-07
Payer: COMMERCIAL

## 2023-04-07 DIAGNOSIS — E10.9 TYPE 1 DIABETES, HBA1C GOAL < 7% (H): ICD-10-CM

## 2023-04-07 RX ORDER — PROCHLORPERAZINE 25 MG/1
SUPPOSITORY RECTAL
Qty: 1 EACH | Refills: 3 | Status: SHIPPED | OUTPATIENT
Start: 2023-04-07 | End: 2024-04-08

## 2023-04-07 NOTE — TELEPHONE ENCOUNTER
Last Written Prescription Date:  1/3/22  Last Fill Quantity: 1,  # refills: 3   Last office visit:3/7/2023 with prescribing provider:  Dr. Jimenez   Future Office Visit: 5/2/23  Next 5 appointments (look out 90 days)    Apr 19, 2023  2:00 PM  (Arrive by 1:40 PM)  Initial OB Visit with VERNON DOZIER  [facility] [place] ([facility] [place] ) [address] [place] [address]  [place] [address]  [phone]   May 04, 2023  3:30 PM  New Prenatal with Sierra Santoyo DO  [facility] [place] ([facility] [place] ) [address] [place] [address]  [place] [address]  [phone]   Jun 01, 2023  4:00 PM  ESTABLISHED PRENATAL with Sierra Santoyo DO  [facility] [place] ([facility] [place] ) [address] [place] [address]  [place] [address]  [phone]   Jul 06, 2023  9:15 AM  ESTABLISHED PRENATAL with Sierra Santoyo DO  [facility] [place] ([facility] [place] ) [address] [place] [address] [address]  [place] [address]  [phone]               Requested Prescriptions   Pending Prescriptions Disp Refills     Continuous Blood Gluc Transmit (DEXCOM G6 TRANSMITTER) MISC 1 each 3     Sig: CHANGE EVERY 90 DAYS       There is no refill protocol information for this order        Refills sent  Tana Olivares RN     Not applicable

## 2023-04-10 ENCOUNTER — MYC MEDICAL ADVICE (OUTPATIENT)
Dept: EDUCATION SERVICES | Facility: CLINIC | Age: 33
End: 2023-04-10
Payer: COMMERCIAL

## 2023-04-10 NOTE — TELEPHONE ENCOUNTER
Type 1 Diabetes + Pregnancy Follow-up    Subjective/Objective:    Linda Chavez sent in blood glucose log for review. Last date of communication was: 4/5/23.    Diabetes is being managed with diet, activity and medications    Taking diabetes medications:   yes:     Diabetes Medication(s)     Diabetic Other       Glucagon (BAQSIMI TWO PACK) 3 MG/DOSE POWD    Spray 1 Device in nostril once as needed (for severe hypoglycemia)    Insulin       insulin aspart (NOVOLOG PEN) 100 UNIT/ML pen    Inject 3 to 10 units subcutaneous at mealtimes as directed, total daily dose approx 30 units     insulin aspart (NOVOLOG VIAL) 100 UNITS/ML vial    Use with insulin pump, total daily dose approx 90 units     insulin glargine (LANTUS PEN) 100 UNIT/ML pen    Inject 22 Units Subcutaneous At Bedtime     SEMGLEE, YFGN, 100 UNIT/ML SOPN    ADMINISTER 22 UNITS UNDER THE SKIN AT BEDTIME SUBSTITUTE FOR LANTUS          Estimated Date of Delivery: Data Unavailable    BG/Food Log:             Assessment:    Continued improvement in TIR  mg/dL, ADA guidelines for pregnancy. Average blood sugar has come down a bit too. Fasting blood sugars still above goal and pre-prandial blood sugars seem a bit high during the day. Will increase basal settings a bit x 24 hrs.     Plan/Response:  Pump settings changes in bold:   Start time         Basal rate        Correction Factor        Carb Ratio   Midnight           1.7                   1u:45                           1u:7.5g              6:00am            1.5                   1u:45                           1u:6.2g  7:00am            1.4                   1u:27                             1u:5g  11:00am          1.4                  1u:27                             1u:5g  12:00pm          1.6                   1u:22                              1u:5.5g  5:00pm             1.6                  1u:22                               1u:4g  6:00pm            1.6                   1u:22                               1u:4g  9:00pm            1.6                   1u:25                               1u:4.5g    ISF seems appropriate, no change.   ICR seems ok, will stress importance of premeal bolusing to help with PP spikes   See [employer] message for patient communications.   Follow up in 1 week via [employer], phone appointment scheduled for 4/19    Kat Lima RD, LD, CDCES     Any diabetes medication dose changes were made via the CDE Protocol and Collaborative Practice Agreement with the patient's endocrinology provider. A copy of this encounter was shared with the provider.

## 2023-04-17 ENCOUNTER — MYC MEDICAL ADVICE (OUTPATIENT)
Dept: EDUCATION SERVICES | Facility: CLINIC | Age: 33
End: 2023-04-17
Payer: COMMERCIAL

## 2023-04-17 NOTE — TELEPHONE ENCOUNTER
Type 1 Diabetes + Pregnancy Follow-up    Subjective/Objective:    Linda Chavez sent in blood glucose log for review. Last date of communication was: 4/10/23.    Diabetes is being managed with diet, activity and medications    Taking diabetes medications:   yes:     Diabetes Medication(s)     Diabetic Other       Glucagon (BAQSIMI TWO PACK) 3 MG/DOSE POWD    Spray 1 Device in nostril once as needed (for severe hypoglycemia)    Insulin       insulin aspart (NOVOLOG PEN) 100 UNIT/ML pen    Inject 3 to 10 units subcutaneous at mealtimes as directed, total daily dose approx 30 units     insulin aspart (NOVOLOG VIAL) 100 UNITS/ML vial    Use with insulin pump, total daily dose approx 90 units     insulin glargine (LANTUS PEN) 100 UNIT/ML pen    Inject 22 Units Subcutaneous At Bedtime     SEMGLEE, YFGN, 100 UNIT/ML SOPN    ADMINISTER 22 UNITS UNDER THE SKIN AT BEDTIME SUBSTITUTE FOR LANTUS          Estimated Date of Delivery: Data Unavailable    BG/Food Log:             Assessment:    Continued improvement in TIR, although average glucose increased slightly since last check in. Although seeing some post-prandial hyperglycemia, often running a little too high going into meals as well. Will recommend increase in basal rates x 24 hrs again. Follow up scheduled over the phone in 2 days. Can review bolus timing, ICR, and correction factor as they all may need further adjustment for post-prandial hyperglycemia.     Plan/Response:  Pump settings changes in bold:   Start time         Basal rate        Correction Factor        Carb  Ratio   Midnight           1.8                   1u:45                           1u:7.5g              6:00am            1.6                   1u:45                           1u:6.2g  7:00am            1.5                   1u:27                           1u:5g  11:00am          1.5                  1u:27                            1u:5g  12:00pm          1.7                   1u:22                           1u:5.5g  5:00pm            1.7                  1u:22                            1u:4g  6:00pm            1.7                   1u:22                           1u:4g  9:00pm            1.7                   1u:25                           1u:4.5g    Follow up in 2 days via phone encounter.    Kat iLma RD, LD, CDCES     Any diabetes medication dose changes were made via the CDE Protocol and Collaborative Practice Agreement with the patient's endocrinology provider. A copy of this encounter was shared with the provider.

## 2023-04-18 PROBLEM — O09.90 SUPERVISION OF HIGH-RISK PREGNANCY: Status: ACTIVE | Noted: 2023-04-18

## 2023-04-18 NOTE — PROGRESS NOTES
SUBJECTIVE:     HPI:    This is a 32 year old female patient,  who presents for her first obstetrical visit.    MYKE: 2023, by Ultrasound.  She is 8w1d weeks.  Her cycles are regular.  Her last menstrual period was normal.   Since her LMP, she has experienced  nausea and fatigue).   She denies emesis, abdominal pain, headache, loss of appetite, vaginal discharge, dysuria, pelvic pain, urinary urgency, lightheadedness, urinary frequency, vaginal bleeding, hemorrhoids and constipation.    Additional History: First Pregnancy  Happy surprise, thought they were going to need IVF to get pregnant  Hx: Type I Diabetes, Anxiety, hypothyroidism, migraines.    TB listed on pt chart, but she denies ever having it    Dating US already completed and LMP adjusted.     Have you travelled during the pregnancy?No  Have your sexual partner(s) travelled during the pregnancy?No      HISTORY:   Planned Pregnancy: Yes  Marital Status:   Occupation:  at [employer]  Living in Household: Spouse    Past History:  Her past medical history   Past Medical History:   Diagnosis Date     Adjustment disorder with anxious mood      Allergic rhinitis, cause unspecified     h/o AIT     Chest discomfort      Common migraine     No visual aura.      Hyperlipidemia LDL goal < 70      Hypothyroidism      Infectious mononucleosis 1995     Tuberculosis      Type 1 diabetes, HbA1c goal < 7% (H) 2004     followed N - peds endocrinology - now Dr Jimenez   .      She has a history of  first pregnancy    Since her last LMP she denies use of alcohol, tobacco and street drugs.    Past medical, surgical, social and family history were reviewed and updated in [facility].      Current Outpatient Medications   Medication     insulin aspart (NOVOLOG PEN) 100 UNIT/ML pen     insulin aspart (NOVOLOG VIAL) 100 UNITS/ML vial     levothyroxine (SYNTHROID/LEVOTHROID) 125 MCG tablet     levothyroxine (SYNTHROID/LEVOTHROID)  137 MCG tablet     Prenatal w/o A Vit-Fe Fum-FA (PRENATA PO)     sertraline (ZOLOFT) 50 MG tablet     blood glucose (VALE CONTOUR NEXT) test strip     blood glucose monitoring (VALE MICROLET) lancets     Continuous Blood Gluc Sensor (DEXCOM G6 SENSOR) MISC     Continuous Blood Gluc Transmit (DEXCOM G6 TRANSMITTER) MISC     Glucagon (BAQSIMI TWO PACK) 3 MG/DOSE POWD     insulin glargine (LANTUS PEN) 100 UNIT/ML pen     insulin pen needle (BD CLEOPATRA U/F) 32G X 4 MM miscellaneous     naratriptan (AMERGE) 1 MG tablet     SEMGLEE, YFGN, 100 UNIT/ML SOPN     triamcinolone (KENALOG) 0.1 % external cream     No current facility-administered medications for this visit.       ROS:   12 point review of systems negative other than symptoms noted below or in the HPI.  Constitutional: Fatigue  Gastrointestinal: Nausea      OBJECTIVE:     EXAM:  LMP 2023  There is no height or weight on file to calculate BMI.      ASSESSMENT/PLAN:       ICD-10-CM    1. Supervision of high risk pregnancy in first trimester  O09.91           32 year old , 8w1d weeks of pregnancy with MYKE of 2023, by Ultrasound    Nurse phone visit completed. Prenatal book and folder (containing standard educational hand-outs and brochures) will be given to patient to patient. Information in folder reviewed over the phone. Questions answered. Brochure given on optional screening available to assess chromosomal anomalies. Pt desires NIPS. Pt advised to call the clinic if she has any questions or concerns related to her pregnancy. Prenatal labs future ordered. New prenatal visit scheduled on 23 with Masters  Blanka Magana RN on 2023 at 2:35 PM      Lab Results   Component Value Date    PAP NIL 2021     PHQ-9 score:        2023     1:03 PM   PHQ   PHQ-9 Total Score 1   Q9: Thoughts of better off dead/self-harm past 2 weeks Not at all             2022    12:57 PM 2/15/2023     8:27 AM 2023     1:03 PM   TRESA-7  SCORE   Total Score 4 (minimal anxiety)  2 (minimal anxiety)    2 (minimal anxiety)   Total Score 4 0 2       Patient supplied answers from flow sheet for:  Prenatal OB Questionnaire.  Past Medical History  Have you ever recieved care for your mental health? : (!) Yes  Have you ever been in a major accident or suffered serious trauma?: No  Within the last year, has anyone hit, slapped, kicked or otherwise hurt you?: No  In the last year, has anyone forced you to have sex when you didn't want to?: No    Past Medical History 2   Have you ever received a blood transfusion?: No  Would you accept a blood transfusion if was medically recommended?: Yes  Does anyone in your home smoke?: No   Is your blood type Rh negative?: Unknown  Have you ever ?: No  Have you been hospitalized for a nonsurgical reason excluding normal delivery?: No  Have you ever had an abnormal pap smear?: No    Past Medical History (Continued)  Do you have a history of abnormalities of the uterus?: No  Did your mother take NICO or any other hormones when she was pregnant with you?: Unknown  Do you have any other problems we have not asked about which you feel may be important to this pregnancy?: No

## 2023-04-19 ENCOUNTER — VIRTUAL VISIT (OUTPATIENT)
Dept: NURSING | Facility: CLINIC | Age: 33
End: 2023-04-19
Payer: COMMERCIAL

## 2023-04-19 ENCOUNTER — VIRTUAL VISIT (OUTPATIENT)
Dept: EDUCATION SERVICES | Facility: CLINIC | Age: 33
End: 2023-04-19
Payer: COMMERCIAL

## 2023-04-19 DIAGNOSIS — Z13.79 GENETIC SCREENING: ICD-10-CM

## 2023-04-19 DIAGNOSIS — O09.91 SUPERVISION OF HIGH RISK PREGNANCY IN FIRST TRIMESTER: ICD-10-CM

## 2023-04-19 DIAGNOSIS — O36.80X0 PREGNANCY WITH INCONCLUSIVE FETAL VIABILITY: Primary | ICD-10-CM

## 2023-04-19 DIAGNOSIS — E10.9 TYPE 1 DIABETES, HBA1C GOAL < 7% (H): Primary | ICD-10-CM

## 2023-04-19 PROCEDURE — 98968 PH1 ASSMT&MGMT NQHP 21-30: CPT | Mod: 93 | Performed by: DIETITIAN, REGISTERED

## 2023-04-19 PROCEDURE — 99207 PR NO CHARGE NURSE ONLY: CPT

## 2023-04-19 NOTE — PROGRESS NOTES
Type 1 Diabetes + Pregnancy Follow-up    Type of Service: Telephone Visit    Originating Location (Patient Location): Home  Distant Location (Provider Location): Offsite  Mode of Communication:  Telephone    Telephone Visit Start Time: 10:30a  Telephone Visit End Time (telephone visit stop time): 10:54a    How would patient like to obtain AVS? Nia      Subjective/Objective:    Linda Chavez sent in blood glucose log for review, via t:connect. Last date of communication was: 4/17/23.    Diabetes is being managed with diet, activity and medications    Taking diabetes medications:   yes:     Diabetes Medication(s)     Diabetic Other       Glucagon (BAQSIMI TWO PACK) 3 MG/DOSE POWD    Spray 1 Device in nostril once as needed (for severe hypoglycemia)    Insulin       insulin aspart (NOVOLOG PEN) 100 UNIT/ML pen    Inject 3 to 10 units subcutaneous at mealtimes as directed, total daily dose approx 30 units     insulin aspart (NOVOLOG VIAL) 100 UNITS/ML vial    Use with insulin pump, total daily dose approx 90 units     insulin glargine (LANTUS PEN) 100 UNIT/ML pen    Inject 22 Units Subcutaneous At Bedtime     SEMGLEE, YFGN, 100 UNIT/ML SOPN    ADMINISTER 22 UNITS UNDER THE SKIN AT BEDTIME SUBSTITUTE FOR LANTUS          Estimated Date of Delivery: Nov 27, 2023    BG/Food Log:             Assessment:  Patient very close to meeting ADA target of >70% TIR at 63-140mg/dL, continued improvement seen in average blood sugar as well. She is working to get boluses in before meals, sometimes just before, but is willing to work towards 10+ minutes before. Basal rates were just changed 2 days ago. Patient would benefit from increase in ICR and correction factor.     Plan/Response:  Pump settings changes in bold:   Start time         Basal rate        Correction Factor        Carb  Ratio   Midnight           1.8                   1u:45                           1u:7.5g              6:00am            1.6                   1u:45                           1u:6.2g  7:00am            1.5                   1u:27                           1u:4.5g  11:00am          1.5                  1u:27                            1u:4.5g  12:00pm          1.7                   1u:20                           1u:5g  5:00pm            1.7                  1u:20                           1u:3.5g  6:00pm            1.7                   1u:20                           1u:3.5g  9:00pm            1.7                   1u:25                           1u:4.5g    Send [employer] message on Monday for  Pump settings review  Follow up on 5/2 with Endocrinology, 5/17 with SHELLEY Lima RD, LD, SHELLEY     Time spent: 24 minutes     Any diabetes medication dose changes were made via the CDE Protocol and Collaborative Practice Agreement with the patient's endocrinology provider. A copy of this encounter was shared with the provider.

## 2023-04-19 NOTE — LETTER
4/19/2023         RE: Linda Chavez  [address]  [facility] [address]        Dear Colleague,    Thank you for referring your patient, Linda Chavez, to the [facility] [place]. Please see a copy of my visit note below.    Type 1 Diabetes + Pregnancy Follow-up    Type of Service: Telephone Visit    Originating Location (Patient Location): Home  Distant Location (Provider Location): Offsite  Mode of Communication:  Telephone    Telephone Visit Start Time: 10:30a  Telephone Visit End Time (telephone visit stop time): 10:54a    How would patient like to obtain AVS? MyChart      Subjective/Objective:    Linda Chavez sent in blood glucose log for review, via t:connect. Last date of communication was: 4/17/23.    Diabetes is being managed with diet, activity and medications    Taking diabetes medications:   yes:     Diabetes Medication(s)     Diabetic Other       Glucagon (BAQSIMI TWO PACK) 3 MG/DOSE POWD    Spray 1 Device in nostril once as needed (for severe hypoglycemia)    Insulin       insulin aspart (NOVOLOG PEN) 100 UNIT/ML pen    Inject 3 to 10 units subcutaneous at mealtimes as directed, total daily dose approx 30 units     insulin aspart (NOVOLOG VIAL) 100 UNITS/ML vial    Use with insulin pump, total daily dose approx 90 units     insulin glargine (LANTUS PEN) 100 UNIT/ML pen    Inject 22 Units Subcutaneous At Bedtime     SEMGLEE, YFGN, 100 UNIT/ML SOPN    ADMINISTER 22 UNITS UNDER THE SKIN AT BEDTIME SUBSTITUTE FOR LANTUS          Estimated Date of Delivery: Nov 27, 2023    BG/Food Log:             Assessment:  Patient very close to meeting ADA target of >70% TIR at 63-140mg/dL, continued improvement seen in average blood sugar as well. She is working to get boluses in before meals, sometimes just before, but is willing to work towards 10+ minutes before. Basal rates were just changed 2 days ago. Patient would benefit from increase in ICR and correction factor.     Plan/Response:  Pump  settings changes in bold:   Start time         Basal rate        Correction Factor        Carb Ratio   Midnight           1.8                   1u:45                           1u:7.5g              6:00am            1.6                   1u:45                           1u:6.2g  7:00am            1.5                   1u:27                           1u:4.5g  11:00am          1.5                  1u:27                            1u:4.5g  12:00pm          1.7                   1u:20                           1u:5g  5:00pm            1.7                  1u:20                           1u:3.5g  6:00pm            1.7                   1u:20                           1u:3.5g  9:00pm            1.7                   1u:25                           1u:4.5g    Send [employer] message on Monday for  Pump settings review  Follow up on 5/2 with Endocrinology, 5/17 with SHELLEY Lima RD, TABITHA, SHELLEY     Time spent: 24 minutes     Any diabetes medication dose changes were made via the CDE Protocol and Collaborative Practice Agreement with the patient's endocrinology provider. A copy of this encounter was shared with the provider.

## 2023-04-20 ENCOUNTER — MYC MEDICAL ADVICE (OUTPATIENT)
Dept: OBGYN | Facility: CLINIC | Age: 33
End: 2023-04-20
Payer: COMMERCIAL

## 2023-05-01 ENCOUNTER — MYC MEDICAL ADVICE (OUTPATIENT)
Dept: EDUCATION SERVICES | Facility: CLINIC | Age: 33
End: 2023-05-01
Payer: COMMERCIAL

## 2023-05-01 ENCOUNTER — LAB (OUTPATIENT)
Dept: LAB | Facility: CLINIC | Age: 33
End: 2023-05-01
Payer: COMMERCIAL

## 2023-05-01 DIAGNOSIS — Z96.41 INSULIN PUMP STATUS: ICD-10-CM

## 2023-05-01 DIAGNOSIS — E10.9 TYPE 1 DIABETES MELLITUS WITHOUT COMPLICATION (H): ICD-10-CM

## 2023-05-01 DIAGNOSIS — E06.3 HYPOTHYROIDISM DUE TO HASHIMOTO'S THYROIDITIS: ICD-10-CM

## 2023-05-01 LAB
HBA1C MFR BLD: 6.4 % (ref 0–5.6)
TSH SERPL DL<=0.005 MIU/L-ACNC: 2.14 UIU/ML (ref 0.3–4.2)

## 2023-05-01 PROCEDURE — 36415 COLL VENOUS BLD VENIPUNCTURE: CPT

## 2023-05-01 PROCEDURE — 84443 ASSAY THYROID STIM HORMONE: CPT

## 2023-05-01 PROCEDURE — 83036 HEMOGLOBIN GLYCOSYLATED A1C: CPT

## 2023-05-01 ASSESSMENT — ANXIETY QUESTIONNAIRES
GAD7 TOTAL SCORE: 2
7. FEELING AFRAID AS IF SOMETHING AWFUL MIGHT HAPPEN: SEVERAL DAYS
7. FEELING AFRAID AS IF SOMETHING AWFUL MIGHT HAPPEN: SEVERAL DAYS
5. BEING SO RESTLESS THAT IT IS HARD TO SIT STILL: NOT AT ALL
4. TROUBLE RELAXING: NOT AT ALL
6. BECOMING EASILY ANNOYED OR IRRITABLE: NOT AT ALL
GAD7 TOTAL SCORE: 2
8. IF YOU CHECKED OFF ANY PROBLEMS, HOW DIFFICULT HAVE THESE MADE IT FOR YOU TO DO YOUR WORK, TAKE CARE OF THINGS AT HOME, OR GET ALONG WITH OTHER PEOPLE?: NOT DIFFICULT AT ALL
2. NOT BEING ABLE TO STOP OR CONTROL WORRYING: NOT AT ALL
1. FEELING NERVOUS, ANXIOUS, OR ON EDGE: SEVERAL DAYS
IF YOU CHECKED OFF ANY PROBLEMS ON THIS QUESTIONNAIRE, HOW DIFFICULT HAVE THESE PROBLEMS MADE IT FOR YOU TO DO YOUR WORK, TAKE CARE OF THINGS AT HOME, OR GET ALONG WITH OTHER PEOPLE: NOT DIFFICULT AT ALL
3. WORRYING TOO MUCH ABOUT DIFFERENT THINGS: NOT AT ALL
GAD7 TOTAL SCORE: 2

## 2023-05-01 ASSESSMENT — PATIENT HEALTH QUESTIONNAIRE - PHQ9
10. IF YOU CHECKED OFF ANY PROBLEMS, HOW DIFFICULT HAVE THESE PROBLEMS MADE IT FOR YOU TO DO YOUR WORK, TAKE CARE OF THINGS AT HOME, OR GET ALONG WITH OTHER PEOPLE: NOT DIFFICULT AT ALL
SUM OF ALL RESPONSES TO PHQ QUESTIONS 1-9: 1
SUM OF ALL RESPONSES TO PHQ QUESTIONS 1-9: 1

## 2023-05-01 NOTE — TELEPHONE ENCOUNTER
Type 1 Diabetes + Pregnancy Follow-up    Subjective/Objective:    Linda Chavez sent in blood glucose log for review. Last date of communication was: 4/19/23.    Diabetes is being managed with diet, activity and medications    Taking diabetes medications:   yes:     Diabetes Medication(s)     Diabetic Other       Glucagon (BAQSIMI TWO PACK) 3 MG/DOSE POWD    Spray 1 Device in nostril once as needed (for severe hypoglycemia)    Insulin       insulin aspart (NOVOLOG PEN) 100 UNIT/ML pen    Inject 3 to 10 units subcutaneous at mealtimes as directed, total daily dose approx 30 units     insulin aspart (NOVOLOG VIAL) 100 UNITS/ML vial    Use with insulin pump, total daily dose approx 90 units     insulin glargine (LANTUS PEN) 100 UNIT/ML pen    Inject 22 Units Subcutaneous At Bedtime     SEMGLEE, YFGN, 100 UNIT/ML SOPN    ADMINISTER 22 UNITS UNDER THE SKIN AT BEDTIME SUBSTITUTE FOR LANTUS          Estimated Date of Delivery: Nov 27, 2023    BG/Food Log:               Assessment:  Seeing quite a bit more variability over the past week, likely a result of vacationing. Over past several days since returning, it appears blood sugar trends have improved. I would consider increasing carb coverage at breakfast and basal rate from 5p to midnight. Patient has Endocrinology appointment tomorrow - will wait to adjust settings until next week to see how trends normalize after vacation and based on Dr. Jimenez's review.       Plan/Response:  No changes in the patient's current treatment plan.  Follow-up on Monday.  See [employer] message for patient communications.     Kat Lima, RD, LD, CDCES     Any diabetes medication dose changes were made via the CDE Protocol and Collaborative Practice Agreement with the patient's endocrinology provider. A copy of this encounter was shared with the provider.

## 2023-05-02 ENCOUNTER — VIRTUAL VISIT (OUTPATIENT)
Dept: ENDOCRINOLOGY | Facility: CLINIC | Age: 33
End: 2023-05-02
Payer: COMMERCIAL

## 2023-05-02 DIAGNOSIS — Z96.41 INSULIN PUMP STATUS: ICD-10-CM

## 2023-05-02 DIAGNOSIS — E10.9 TYPE 1 DIABETES MELLITUS WITHOUT COMPLICATION (H): Primary | ICD-10-CM

## 2023-05-02 DIAGNOSIS — E06.3 HYPOTHYROIDISM DUE TO HASHIMOTO'S THYROIDITIS: ICD-10-CM

## 2023-05-02 DIAGNOSIS — O09.90 HIGH-RISK PREGNANCY, UNSPECIFIED TRIMESTER: ICD-10-CM

## 2023-05-02 PROCEDURE — 99214 OFFICE O/P EST MOD 30 MIN: CPT | Mod: VID | Performed by: INTERNAL MEDICINE

## 2023-05-02 PROCEDURE — 95251 CONT GLUC MNTR ANALYSIS I&R: CPT | Performed by: INTERNAL MEDICINE

## 2023-05-02 NOTE — PROGRESS NOTES
Virtual Visit Details    Type of service:  Video Visit     Originating Location (pt. Location): Home    Distant Location (provider location):  Off-site  Platform used for Video Visit: Edwin        Recent issues:  Diabetes and thyroid follow-up evaluation  Using the Tandem pump + DexcomG6  Currently 10 weeks gestation, sees Dr. Sierra Santoyo/[facility]   Previous symptoms with nausea and gagging (smells), fatigue, but feeling well overall           2003. Diagnosis of diabetes mellitus, age 13, living in [facility]  Had acute illness requiring hospitalization at [facility]  Began insulin injections, using Novolog and Lantus insulin  [place] carb counting method, gram estimates  On MDI treatment plan for approx 2 years, then changed to pump use  Previous medical evaluations with Dr. Yash Barcenas/[facility] End[facility]  2005. Began use of Mar Lin pump  2012. Changed to Medtronic pump  Subsequently using Medtronic 723 Paradigm pump  2012. Tried wearing CGMS sensor, but uncomfortable     12/8/14. Initial consult with me at my former clinic ([facility])  Continued pump management  Was not interested in CGMS use     Previous [facility] hgbA1c levels include:              Lab Test 02/05/18 10/10/17 06/21/17 02/01/17 11/16/16  0815   A1C 7.4* 7.8* 8.1* 7.3* 8.2*      ~12/2017. Upgraded to Medtronic 670G pump    Tried Medtronic Guardian sensor, recalls frequent low glucose alarms              Wore sensor in manual mode              Didn't like it, discontinued use     ~11/2018. Wore diagnostic LibrePro CGM  Subsequently obtained LibrePersonal CGM, not wearing past year  3/2022. Changed to Tandem TSlim insulin pump    Novolog in pump    Current Tandem pump settings:       Recent Tandem pump data:          Recent DexcomG6 data:         Recent [facility] labs include:  Lab Results   Component Value Date    A1C 6.4 (H) 05/01/2023     10/06/2022    POTASSIUM 4.4 10/06/2022    CHLORIDE 107 10/06/2022    CO2 26 10/06/2022    ANIONGAP 6 10/06/2022     (H)  10/06/2022    BUN 14 10/06/2022    CR 0.67 10/06/2022    GFRESTIMATED >90 10/06/2022    GFRESTBLACK >90 04/13/2021    MARCIA 9.2 10/06/2022    CHOL 241 (H) 10/06/2022    TRIG 75 10/06/2022    HDL 67 10/06/2022     (H) 10/06/2022    NHDL 174 (H) 10/06/2022    UCRR 64 06/08/2022    MICROL 5 06/08/2022    UMALCR 7.81 06/08/2022     Last eye exam at [facility] in [place] 11/3/22, no DR  Hyperlipidemia:  Takes simvastatin medication  DM Complications:  None known        Thyroid:  2013. Diagnosis of hypothyroidism  Previous [facility] thyroid labs include:    Treatment with levothyroxine medication  Previously on stable dose as levothyroxine 0.088 mg daily  Recent [facility] labs include:  Lab Results   Component Value Date    TSH 2.14 05/01/2023    T4 1.45 03/01/2023     (H) 12/19/2013     Current dose:  Levothyroxine 0.125 mg Tu/Th/Sat/Sun and 0.137 mg M/W/F        , lives in [facility]; works at [employer] with , in [place]  Has previously seen Dr. Ellen Burdick/[facility]  Also sees [facility] Sierra [facility]oyo/[facility]      PMH/PSH:  Past Medical History:   Diagnosis Date     Adjustment disorder with anxious mood      Allergic rhinitis, cause unspecified     h/o AIT     Chest discomfort      Common migraine     No visual aura.      Hyperlipidemia LDL goal < 70      Hypothyroidism      Infectious mononucleosis 01/01/1995     Tuberculosis      Type 1 diabetes, HbA1c goal < 7% (H) 03/01/2004     followed N - peds endocrinology - now Dr Jimenez     Past Surgical History:   Procedure Laterality Date     APPENDECTOMY  08/01/2007    dr deshpande     PE TUBES Bilateral 01/01/1996       Family Hx:  Family History   Problem Relation Age of Onset     Neurologic Disorder Maternal Grandmother         dementia     Genetic Disorder Paternal Grandfather         kidney     Family History Negative No family hx of          Social Hx:  Social History     Socioeconomic History     Marital  status:      Spouse name: Not on file     Number of children: Not on file     Years of education: Not on file     Highest education level: Not on file   Occupational History     Occupation: Works in supply chain   Tobacco Use     Smoking status: Never     Smokeless tobacco: Never   Vaping Use     Vaping status: Never Used     Passive vaping exposure: Yes   Substance and Sexual Activity     Alcohol use: Not Currently     Alcohol/week: 2.0 standard drinks of alcohol     Types: 2 Standard drinks or equivalent per week     Comment: 2-5 drinks per week     Drug use: No     Sexual activity: Yes     Partners: Male     Birth control/protection: Condom   Other Topics Concern      Service Not Asked     Blood Transfusions Not Asked     Caffeine Concern Yes     Comment: rare     Occupational Exposure Not Asked     Hobby Hazards Not Asked     Sleep Concern No     Stress Concern No     Weight Concern Not Asked     Special Diet Not Asked     Back Care Not Asked     Exercise Yes     Bike Helmet Not Asked     Seat Belt Yes     Self-Exams No     Comment: encouraged     Parent/sibling w/ CABG, MI or angioplasty before 65F 55M? No   Social History Narrative    -Working -       Social Determinants of Health     Financial Resource Strain: Not on file   Food Insecurity: Not on file   Transportation Needs: Not on file   Physical Activity: Not on file   Stress: Not on file   Social Connections: Not on file   Intimate Partner Violence: Not on file   Housing Stability: Not on file          MEDICATIONS:  has a current medication list which includes the following prescription(s): dexcom g6 sensor, dexcom g6 transmitter, insulin aspart, levothyroxine, levothyroxine, prenatal w/o a vit-fe fum-fa, sertraline, blood glucose, blood glucose monitoring, baqsimi two pack, insulin aspart, insulin glargine, insulin pen needle, naratriptan, semglee (yfgn), and triamcinolone.      ROS: 10 point ROS neg other than the symptoms noted above in  the HPI.     GENERAL: some fatigue, mild wt gain; denies fevers, chills, night sweats.   HEENT: no dysphagia, odonophagia, diplopia, neck pain  THYROID:  no apparent hyper or hypothyroid symptoms  CV: no chest pain, pressure, palpitations  LUNGS: no SOB, LANG, cough, wheezing   ABDOMEN: some bloating; no diarrhea, constipation, abdominal pain  EXTREMITIES: no rashes, ulcers, edema  NEUROLOGY: no headaches, denies changes in vision, tingling, extremitiy numbness   MSK: no muscle aches or pains, weakness  SKIN: no rashes or lesions  : no menses during Pg  PSYCH:  stable mood, no significant anxiety or depression  ENDOCRINE: no heat or cold intolerance     Physical Exam (visual exam)  VS:  no vital signs taken for video visit  CONSTITUTIONAL: healthy, alert and NAD, well dressed, answering questions appropriately  ENT: no nose swelling or nasal discharge, mouth redness or gum changes.  EYES: eyes grossly normal to inspection, conjunctivae and sclerae normal, no exophthalmos or proptosis  THYROID:  no apparent nodules or goiter  LUNGS: no audible wheeze, cough or visible cyanosis, no visible retractions or increased work of breathing  ABDOMEN: abdomen not evaluated  EXTREMITIES: no hand tremors, limited exam  NEUROLOGY: CN grossly intact, mentation intact and speech normal   SKIN:  no apparent skin lesions, rash, or edema with visualized skin appearance  PSYCH: mentation appears normal, affect normal/bright, judgement and insight intact,   normal speech and appearance well groomed       LABS:     All pertinent notes, labs, and images personally reviewed by me.      A/P:  Encounter Diagnoses   Name Primary?     Type 1 diabetes mellitus without complication (H) Yes     Insulin pump status      Hypothyroidism due to Hashimoto's thyroiditis      High-risk pregnancy        Comments:  Reviewed health history, diabetes and thyroid issues  Recent CGM trends good overall, but higher post breakfast  Reviewed preappt lab  results  Reviewed and interpreted tests that I previously ordered.   Ordered appropriate tests for the endocrinology disease management.    Management options discussed and implemented after shared medical decision making with the patient.  T1DM and hypothyroidism problems are chronic-stable    Plan:  Reviewed the overall T1DM management and insulin pump use.  Discussed optimal BG testing to assess glucose trends.  We reviewed insulin pump settings, basal rate and bolus dosing  Use of automated pump bolus dosing for meal/snack carb & correction dosing  Reviewed recent Tandem pump report information  Reviewed recent DexcomG6 CGM glucose trends, in detail    Reviewed general issues with the hypothyroidism diagnosis   Discussed lab tests used to assess patient thyroid hormone levels  Reviewed treatment options including levothyroxine medication, ideal dosing    Recommend:  Continue the Tandem insulin pump and diabetes management plan.  Pump setting changes:    Bolus ICR 7a 4.5 to 4.3, 11a 4.5 to 4.3    Plan mealtime boluses premeal, not postmeal  Change name of primary pump Profile to Pregnancy, but keep backup Profile 1  We have discussed use of the exercise and sleep modes with pump use  Continue use of the DexcomG6 CGM sensor  Reviewed overall pregnancy treatment goals for the diabetes and thyroid management  Continue the current levothyroxine 0.137 mg M/W/F and 0.125 mg Tu/Th/Sat/Sun  Plan repeat nonfasting labs 6/19/23    Testing at [facility] appt    Lab orders placed  Would not restart the simvastatin med while pregnant  Arrange annual dilated eye exam, fasting lipid panel testing.  Contact me if questions/concerns about diabetes and thyroid management     Addressed patient's questions today.    There are no Patient Instructions on file for this visit.    Future labs ordered today:   Orders Placed This Encounter   Procedures     GLUCOSE MONITOR, 72 HOUR, PHYS INTERP     TSH     T4 free     Hemoglobin  A1c     Basic metabolic panel     Radiology/Consults ordered today: None    Total time spent on day of encounter:  37 min    Follow-up:  6/19/23 at 2:30p, VVAm     7/17/23 at 2:30p, VVAm     8/21/23 at 3p, VVAm    TKE Jimenez MD, MS  Endocrinology  [facility]    CC:  RUBY Mustafa, Masters, A, and JYOTI Lima

## 2023-05-02 NOTE — LETTER
5/2/2023         RE: Linda Chavez  [address]  [facility] [address]        Dear Colleague,    Thank you for referring your patient, Linda Chavez, to the [facility] [place]. Please see a copy of my visit note below.    Virtual Visit Details    Type of service:  Video Visit     Originating Location (pt. Location): Home    Distant Location (provider location):  Off-site  Platform used for Video Visit: Edwin        Recent issues:  Diabetes and thyroid follow-up evaluation  Using the Tandem pump + DexcomG6  Currently 10 weeks gestation, sees Dr. Sierra Santoyo/[facility]   Previous symptoms with nausea and gagging (smells), fatigue, but feeling well overall           2003. Diagnosis of diabetes mellitus, age 13, living in [facility]  Had acute illness requiring hospitalization at [facility]  Began insulin injections, using Novolog and Lantus insulin  [place] carb counting method, gram estimates  On MDI treatment plan for approx 2 years, then changed to pump use  Previous medical evaluations with Dr. Yash Barcenas/[facility]  2005. Began use of Delano pump  2012. Changed to Medtronic pump  Subsequently using Medtronic 723 Paradigm pump  2012. Tried wearing CGMS sensor, but uncomfortable     12/8/14. Initial consult with me at my former clinic ([facility])  Continued pump management  Was not interested in CGMS use     Previous [facility] hgbA1c levels include:              Lab Test 02/05/18 10/10/17 06/21/17 02/01/17 11/16/16  0815   A1C 7.4* 7.8* 8.1* 7.3* 8.2*      ~12/2017. Upgraded to Medtronic 670G pump    Tried Medtronic Guardian sensor, recalls frequent low glucose alarms              Wore sensor in manual mode              Didn't like it, discontinued use     ~11/2018. Wore diagnostic LibrePro CGM  Subsequently obtained LibrePersonal CGM, not wearing past year  3/2022. Changed to Tandem TSlim insulin pump    Novolog in pump    Current Tandem pump settings:       Recent Tandem pump data:          Recent  DexcomG6 data:         Recent [facility] labs include:  Lab Results   Component Value Date    A1C 6.4 (H) 05/01/2023     10/06/2022    POTASSIUM 4.4 10/06/2022    CHLORIDE 107 10/06/2022    CO2 26 10/06/2022    ANIONGAP 6 10/06/2022     (H) 10/06/2022    BUN 14 10/06/2022    CR 0.67 10/06/2022    GFRESTIMATED >90 10/06/2022    GFRESTBLACK >90 04/13/2021    MARCIA 9.2 10/06/2022    CHOL 241 (H) 10/06/2022    TRIG 75 10/06/2022    HDL 67 10/06/2022     (H) 10/06/2022    NHDL 174 (H) 10/06/2022    UCRR 64 06/08/2022    MICROL 5 06/08/2022    UMALCR 7.81 06/08/2022     Last eye exam at [facility] in [place] 11/3/22, no DR  Hyperlipidemia:  Takes simvastatin medication  DM Complications:  None known        Thyroid:  2013. Diagnosis of hypothyroidism  Previous [facility] thyroid labs include:    Treatment with levothyroxine medication  Previously on stable dose as levothyroxine 0.088 mg daily  Recent [facility] labs include:  Lab Results   Component Value Date    TSH 2.14 05/01/2023    T4 1.45 03/01/2023     (H) 12/19/2013     Current dose:  Levothyroxine 0.125 mg Tu/Th/Sat/Sun and 0.137 mg M/W/F        [facility] works at [employer] with , in [place]  Has previously seen Dr. Ellen Burdick/[facility] [place]  Also sees Dr. Sierra Santoyo/[facility]      PMH/PSH:  Past Medical History:   Diagnosis Date     Adjustment disorder with anxious mood      Allergic rhinitis, cause unspecified     h/o AIT     Chest discomfort      Common migraine     No visual aura.      Hyperlipidemia LDL goal < 70      Hypothyroidism      Infectious mononucleosis 01/01/1995     Tuberculosis      Type 1 diabetes, HbA1c goal < 7% (H) 03/01/2004     followed [facility] - peds endocrinology - now Dr Jimenez     Past Surgical History:   Procedure Laterality Date     APPENDECTOMY  08/01/2007    dr deshpande     PE TUBES Bilateral 01/01/1996       Family Hx:  Family History   Problem Relation Age of  Onset     Neurologic Disorder Maternal Grandmother         dementia     Genetic Disorder Paternal Grandfather         kidney     Family History Negative No family hx of          Social Hx:  Social History     Socioeconomic History     Marital status:      Spouse name: Not on file     Number of children: Not on file     Years of education: Not on file     Highest education level: Not on file   Occupational History     Occupation: Works in supply chain   Tobacco Use     Smoking status: Never     Smokeless tobacco: Never   Vaping Use     Vaping status: Never Used     Passive vaping exposure: Yes   Substance and Sexual Activity     Alcohol use: Not Currently     Alcohol/week: 2.0 standard drinks of alcohol     Types: 2 Standard drinks or equivalent per week     Comment: 2-5 drinks per week     Drug use: No     Sexual activity: Yes     Partners: Male     Birth control/protection: Condom   Other Topics Concern      Service Not Asked     Blood Transfusions Not Asked     Caffeine Concern Yes     Comment: rare     Occupational Exposure Not Asked     Hobby Hazards Not Asked     Sleep Concern No     Stress Concern No     Weight Concern Not Asked     Special Diet Not Asked     Back Care Not Asked     Exercise Yes     Bike Helmet Not Asked     Seat Belt Yes     Self-Exams No     Comment: encouraged     Parent/sibling w/ CABG, MI or angioplasty before 65F 55M? No   Social History Narrative    -Working -       Social Determinants of Health     Financial Resource Strain: Not on file   Food Insecurity: Not on file   Transportation Needs: Not on file   Physical Activity: Not on file   Stress: Not on file   Social Connections: Not on file   Intimate Partner Violence: Not on file   Housing Stability: Not on file          MEDICATIONS:  has a current medication list which includes the following prescription(s): dexcom g6 sensor, dexcom g6 transmitter, insulin aspart, levothyroxine, levothyroxine, prenatal w/o a vit-fe  fum-fa, sertraline, blood glucose, blood glucose monitoring, baqsimi two pack, insulin aspart, insulin glargine, insulin pen needle, naratriptan, semglee (yfgn), and triamcinolone.      ROS: 10 point ROS neg other than the symptoms noted above in the HPI.     GENERAL: some fatigue, mild wt gain; denies fevers, chills, night sweats.   HEENT: no dysphagia, odonophagia, diplopia, neck pain  THYROID:  no apparent hyper or hypothyroid symptoms  CV: no chest pain, pressure, palpitations  LUNGS: no SOB, LANG, cough, wheezing   ABDOMEN: some bloating; no diarrhea, constipation, abdominal pain  EXTREMITIES: no rashes, ulcers, edema  NEUROLOGY: no headaches, denies changes in vision, tingling, extremitiy numbness   MSK: no muscle aches or pains, weakness  SKIN: no rashes or lesions  : no menses during Pg  PSYCH:  stable mood, no significant anxiety or depression  ENDOCRINE: no heat or cold intolerance     Physical Exam (visual exam)  VS:  no vital signs taken for video visit  CONSTITUTIONAL: healthy, alert and NAD, well dressed, answering questions appropriately  ENT: no nose swelling or nasal discharge, mouth redness or gum changes.  EYES: eyes grossly normal to inspection, conjunctivae and sclerae normal, no exophthalmos or proptosis  THYROID:  no apparent nodules or goiter  LUNGS: no audible wheeze, cough or visible cyanosis, no visible retractions or increased work of breathing  ABDOMEN: abdomen not evaluated  EXTREMITIES: no hand tremors, limited exam  NEUROLOGY: CN grossly intact, mentation intact and speech normal   SKIN:  no apparent skin lesions, rash, or edema with visualized skin appearance  PSYCH: mentation appears normal, affect normal/bright, judgement and insight intact,   normal speech and appearance well groomed       LABS:     All pertinent notes, labs, and images personally reviewed by me.      A/P:  Encounter Diagnoses   Name Primary?     Type 1 diabetes mellitus without complication (H) Yes     Insulin  pump status      Hypothyroidism due to Hashimoto's thyroiditis      High-risk pregnancy        Comments:  Reviewed health history, diabetes and thyroid issues  Recent CGM trends good overall, but higher post breakfast  Reviewed preappt lab results  Reviewed and interpreted tests that I previously ordered.   Ordered appropriate tests for the endocrinology disease management.    Management options discussed and implemented after shared medical decision making with the patient.  T1DM and hypothyroidism problems are chronic-stable    Plan:  Reviewed the overall T1DM management and insulin pump use.  Discussed optimal BG testing to assess glucose trends.  We reviewed insulin pump settings, basal rate and bolus dosing  Use of automated pump bolus dosing for meal/snack carb & correction dosing  Reviewed recent Tandem pump report information  Reviewed recent DexcomG6 CGM glucose trends, in detail    Reviewed general issues with the hypothyroidism diagnosis   Discussed lab tests used to assess patient thyroid hormone levels  Reviewed treatment options including levothyroxine medication, ideal dosing    Recommend:  Continue the Tandem insulin pump and diabetes management plan.  Pump setting changes:    Bolus ICR 7a 4.5 to 4.3, 11a 4.5 to 4.3    Plan mealtime boluses premeal, not postmeal  Change name of primary pump Profile to Pregnancy, but keep backup Profile 1  We have discussed use of the exercise and sleep modes with pump use  Continue use of the DexcomG6 CGM sensor  Reviewed overall pregnancy treatment goals for the diabetes and thyroid management  Continue the current levothyroxine 0.137 mg M/W/F and 0.125 mg Tu/Th/Sat/Sun  Plan repeat nonfasting labs 6/19/23    Testing at [facility] appt    Lab orders placed  Would not restart the simvastatin med while pregnant  Arrange annual dilated eye exam, fasting lipid panel testing.  Contact me if questions/concerns about diabetes and thyroid management     Addressed  patient's questions today.    There are no Patient Instructions on file for this visit.    Future labs ordered today:   Orders Placed This Encounter   Procedures     GLUCOSE MONITOR, 72 HOUR, PHYS INTERP     TSH     T4 free     Hemoglobin A1c     Basic metabolic panel     Radiology/Consults ordered today: None    Total time spent on day of encounter:  37 min    Follow-up:  6/19/23 at 2:30p, VVAm     7/17/23 at 2:30p, VVAm     8/21/23 at 3p, VVAm    TKE Jimenez MD, MS  Endocrinology  [facility]    CC:  RUBY Mustafa, Masters, A, and JYOTI Lima                          Again, thank you for allowing me to participate in the care of your patient.        Sincerely,        Santy Jimenez MD

## 2023-05-03 NOTE — PROGRESS NOTES
SUBJECTIVE:      HPI:     This is a 32 year old female patient,  who presents for her first obstetrical visit.     MYKE: 2023, by Ultrasound.  She is 8w1d weeks.  Her cycles are regular.  Her last menstrual period was normal.   Since her LMP, she has experienced  nausea and fatigue).   She denies emesis, abdominal pain, headache, loss of appetite, vaginal discharge, dysuria, pelvic pain, urinary urgency, lightheadedness, urinary frequency, vaginal bleeding, hemorrhoids and constipation.     Additional History: First Pregnancy  Happy surprise, thought they were going to need IVF to get pregnant  Hx: Type I Diabetes, Anxiety, hypothyroidism, migraines.     TB listed on pt chart, but she denies ever having it     Dating US already completed and LMP adjusted.      Have you travelled during the pregnancy?No  Have your sexual partner(s) travelled during the pregnancy?No        HISTORY:   Planned Pregnancy: Yes  Marital Status:   Occupation:  at [employer]  Living in Household: Spouse     Past History:  Her past medical history   Past Medical History        Past Medical History:   Diagnosis Date     Adjustment disorder with anxious mood       Allergic rhinitis, cause unspecified       h/o AIT     Chest discomfort       Common migraine       No visual aura.      Hyperlipidemia LDL goal < 70       Hypothyroidism       Infectious mononucleosis 1995     Tuberculosis       Type 1 diabetes, HbA1c goal < 7% (H) 2004      followed [facility] - peds endocrinology - now Dr Jimenez      .       She has a history of  first pregnancy     Since her last LMP she denies use of alcohol, tobacco and street drugs.     Past medical, surgical, social and family history were reviewed and updated in EPIC.            Current Outpatient Medications   Medication     insulin aspart (NOVOLOG PEN) 100 UNIT/ML pen     insulin aspart (NOVOLOG VIAL) 100 UNITS/ML vial     levothyroxine  (SYNTHROID/LEVOTHROID) 125 MCG tablet     levothyroxine (SYNTHROID/LEVOTHROID) 137 MCG tablet     Prenatal w/o A Vit-Fe Fum-FA (PRENATA PO)     sertraline (ZOLOFT) 50 MG tablet     blood glucose (VALE CONTOUR NEXT) test strip     blood glucose monitoring (VALE MICROLET) lancets     Continuous Blood Gluc Sensor (DEXCOM G6 SENSOR) MISC     Continuous Blood Gluc Transmit (DEXCOM G6 TRANSMITTER) MISC     Glucagon (BAQSIMI TWO PACK) 3 MG/DOSE POWD     insulin glargine (LANTUS PEN) 100 UNIT/ML pen     insulin pen needle (BD CLEOPATRA U/F) 32G X 4 MM miscellaneous     naratriptan (AMERGE) 1 MG tablet     SEMGLEE, YFGN, 100 UNIT/ML SOPN     triamcinolone (KENALOG) 0.1 % external cream      No current facility-administered medications for this visit.         ROS:   12 point review of systems negative other than symptoms noted below or in the HPI.  Constitutional: Fatigue  Gastrointestinal: Nausea        OBJECTIVE:      EXAM:  LMP 2023  There is no height or weight on file to calculate BMI.        ASSESSMENT/PLAN:          ICD-10-CM     1. Supervision of high risk pregnancy in first trimester  O09.91               32 year old , 8w1d weeks of pregnancy with MYKE of 2023, by Ultrasound     Nurse phone visit completed. Prenatal book and folder (containing standard educational hand-outs and brochures) will be given to patient to patient. Information in folder reviewed over the phone. Questions answered. Brochure given on optional screening available to assess chromosomal anomalies. Pt desires NIPS. Pt advised to call the clinic if she has any questions or concerns related to her pregnancy. Prenatal labs future ordered. New prenatal visit scheduled on 23 with s  Blanka Magana RN on 2023 at 2:35 PM              Lab Results   Component Value Date     PAP NIL 2021      PHQ-9 score:         2023     1:03 PM   PHQ   PHQ-9 Total Score 1   Q9: Thoughts of better off dead/self-harm past  "2 weeks Not at all                 5/19/2022    12:57 PM 2/15/2023     8:27 AM 4/18/2023     1:03 PM   TRESA-7 SCORE   Total Score 4 (minimal anxiety)   2 (minimal anxiety)    2 (minimal anxiety)   Total Score 4 0 2         Patient supplied answers from flow sheet for:  Prenatal OB Questionnaire.  Past Medical History  Have you ever recieved care for your mental health? : (!) Yes  Have you ever been in a major accident or suffered serious trauma?: No  Within the last year, has anyone hit, slapped, kicked or otherwise hurt you?: No  In the last year, has anyone forced you to have sex when you didn't want to?: No     Past Medical History 2   Have you ever received a blood transfusion?: No  Would you accept a blood transfusion if was medically recommended?: Yes  Does anyone in your home smoke?: No   Is your blood type Rh negative?: Unknown  Have you ever ?: No  Have you been hospitalized for a nonsurgical reason excluding normal delivery?: No  Have you ever had an abnormal pap smear?: No     Past Medical History (Continued)  Do you have a history of abnormalities of the uterus?: No  Did your mother take NICO or any other hormones when she was pregnant with you?: Unknown  Do you have any other problems we have not asked about which you feel may be important to this pregnancy?: No      OBJECTIVE:     EXAM:  LMP 02/11/2023  There is no height or weight on file to calculate BMI.    {female exam complete:[id number]::\"GENERAL: healthy, alert and no distress\",\"EYES: Eyes grossly normal to inspection, PERRL and conjunctivae and sclerae normal\",\"HENT: ear canals and TM's normal, nose and mouth without ulcers or lesions\",\"NECK: no adenopathy, no asymmetry, masses, or scars and thyroid normal to palpation\",\"RESP: lungs clear to auscultation - no rales, rhonchi or wheezes\",\"BREAST: normal without masses, tenderness or nipple discharge and no palpable axillary masses or adenopathy\",\"CV: regular rate and rhythm, normal S1 S2, no " "S3 or S4, no murmur, click or rub, no peripheral edema and peripheral pulses strong\",\"ABDOMEN: soft, nontender, no hepatosplenomegaly, no masses and bowel sounds normal\",\"MS: no gross musculoskeletal defects noted, no edema\",\"SKIN: no suspicious lesions or rashes\",\"NEURO: Normal strength and tone, mentation intact and speech normal\",\"PSYCH: mentation appears normal, affect normal/bright\"}    ASSESSMENT/PLAN:     No diagnosis found.    32 year old , On May 3, 2023 patient is 10w2d weeks of pregnancy with MYKE of 2023, by Ultrasound    Discussed as follows:***    Counseling given:   - Follow up in 4-6 weeks for return OB visit.  - Recommended weight gain for pregnancy: {WGT GAIN:336145}   {BMI < 18.5  28-40 lbs   18.5 - 24.9 25-35   25 - 29.9 15-25   > 30  11-20}      PLAN/PATIENT INSTRUCTIONS:    ***     Sierra Treviño Masters, DO    "

## 2023-05-04 ENCOUNTER — PREP FOR PROCEDURE (OUTPATIENT)
Dept: OBGYN | Facility: CLINIC | Age: 33
End: 2023-05-04

## 2023-05-04 ENCOUNTER — ANCILLARY PROCEDURE (OUTPATIENT)
Dept: ULTRASOUND IMAGING | Facility: CLINIC | Age: 33
End: 2023-05-04
Payer: COMMERCIAL

## 2023-05-04 ENCOUNTER — PRENATAL OFFICE VISIT (OUTPATIENT)
Dept: OBGYN | Facility: CLINIC | Age: 33
End: 2023-05-04
Payer: COMMERCIAL

## 2023-05-04 VITALS
WEIGHT: 221 LBS | HEIGHT: 66 IN | SYSTOLIC BLOOD PRESSURE: 126 MMHG | DIASTOLIC BLOOD PRESSURE: 78 MMHG | BODY MASS INDEX: 35.52 KG/M2

## 2023-05-04 DIAGNOSIS — Z3A.10 10 WEEKS GESTATION OF PREGNANCY: Primary | ICD-10-CM

## 2023-05-04 DIAGNOSIS — O36.80X0 PREGNANCY WITH INCONCLUSIVE FETAL VIABILITY: ICD-10-CM

## 2023-05-04 DIAGNOSIS — O09.91 SUPERVISION OF HIGH RISK PREGNANCY IN FIRST TRIMESTER: ICD-10-CM

## 2023-05-04 DIAGNOSIS — O02.1 MISSED ABORTION: Primary | ICD-10-CM

## 2023-05-04 DIAGNOSIS — Z3A.10 10 WEEKS GESTATION OF PREGNANCY: ICD-10-CM

## 2023-05-04 DIAGNOSIS — O02.1 MISSED ABORTION: ICD-10-CM

## 2023-05-04 LAB
ALBUMIN UR-MCNC: NEGATIVE MG/DL
APPEARANCE UR: CLEAR
BACTERIA #/AREA URNS HPF: ABNORMAL /HPF
BILIRUB UR QL STRIP: NEGATIVE
COLOR UR AUTO: YELLOW
GLUCOSE UR STRIP-MCNC: NEGATIVE MG/DL
HGB UR QL STRIP: NEGATIVE
KETONES UR STRIP-MCNC: NEGATIVE MG/DL
LEUKOCYTE ESTERASE UR QL STRIP: ABNORMAL
NITRATE UR QL: NEGATIVE
PH UR STRIP: 5.5 [PH] (ref 5–7)
RBC #/AREA URNS AUTO: ABNORMAL /HPF
SP GR UR STRIP: 1.01 (ref 1–1.03)
SQUAMOUS #/AREA URNS AUTO: ABNORMAL /LPF
UROBILINOGEN UR STRIP-ACNC: 0.2 E.U./DL
WBC #/AREA URNS AUTO: ABNORMAL /HPF

## 2023-05-04 PROCEDURE — 81001 URINALYSIS AUTO W/SCOPE: CPT | Performed by: OBSTETRICS & GYNECOLOGY

## 2023-05-04 PROCEDURE — 99213 OFFICE O/P EST LOW 20 MIN: CPT | Performed by: OBSTETRICS & GYNECOLOGY

## 2023-05-04 PROCEDURE — 76817 TRANSVAGINAL US OBSTETRIC: CPT | Performed by: OBSTETRICS & GYNECOLOGY

## 2023-05-04 PROCEDURE — 87086 URINE CULTURE/COLONY COUNT: CPT | Performed by: OBSTETRICS & GYNECOLOGY

## 2023-05-04 NOTE — H&P (VIEW-ONLY)
SUBJECTIVE:                                                   Linda Chavez is a 32 year old female who presents to clinic today for the following health issue(s):  Patient presents with:  Miscarriage          HPI:  F/U ultrasound scheduled today. No FCA noted, fetus 8wk by CRL, 10wk by ultrasound dating.   No symptoms.     Patient's last menstrual period was 2023..     Patient is sexually active, .  Using none for contraception.    reports that she has never smoked. She has never used smokeless tobacco.    STD testing offered?  Declined    Health maintenance updated:  yes    Today's PHQ-2 Score:       2023    10:38 AM   PHQ-2 (  [employer])   Q1: Little interest or pleasure in doing things 0   Q2: Feeling down, depressed or hopeless 0   PHQ-2 Score 0   Q1: Little interest or pleasure in doing things Not at all   Q2: Feeling down, depressed or hopeless Not at all   PHQ-2 Score 0     Today's PHQ-9 Score:       2023    10:37 AM   PHQ-9 SCORE   PHQ-9 Total Score MyChart 1 (Minimal depression)   PHQ-9 Total Score 1     Today's TRESA-7 Score:       2023    10:38 AM   TRESA-7 SCORE   Total Score 2 (minimal anxiety)   Total Score 2       Problem list and histories reviewed & adjusted, as indicated.  Additional history: as documented.    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)     Common migraine     Morbid obesity (H)     Supervision of high-risk pregnancy     Past Surgical History:   Procedure Laterality Date     APPENDECTOMY  2007    dr deshpande     PE TUBES Bilateral 1996      Social History     Tobacco Use     Smoking status: Never     Smokeless tobacco: Never   Vaping Use     Vaping status: Never Used     Passive  vaping exposure: Yes   Substance Use Topics     Alcohol use: Not Currently     Alcohol/week: 2.0 standard drinks of alcohol     Types: 2 Standard drinks or equivalent per week     Comment: 2-5 drinks per week      Problem (# of Occurrences) Relation (Name,Age of Onset)    Genetic Disorder (1) Paternal Grandfather: kidney    Neurologic Disorder (1) Maternal Grandmother: dementia       Negative family history of: Family History Negative            Current Outpatient Medications   Medication Sig     blood glucose (VALE CONTOUR NEXT) test strip Patient tests 5 times/day, Contour Next test strips DAW1     blood glucose monitoring (VALE MICROLET) lancets Patient tests 8 times/day     Continuous Blood Gluc Sensor (DEXCOM G6 SENSOR) MISC CHANGE SENSOR EVERY 10 DAYS     Continuous Blood Gluc Transmit (DEXCOM G6 TRANSMITTER) MISC CHANGE EVERY 90 DAYS     Glucagon (BAQSIMI TWO PACK) 3 MG/DOSE POWD Spray 1 Device in nostril once as needed (for severe hypoglycemia)     insulin aspart (NOVOLOG PEN) 100 UNIT/ML pen Inject 3 to 10 units subcutaneous at mealtimes as directed, total daily dose approx 30 units     insulin aspart (NOVOLOG VIAL) 100 UNITS/ML vial Use with insulin pump, total daily dose approx 90 units     insulin glargine (LANTUS PEN) 100 UNIT/ML pen Inject 22 Units Subcutaneous At Bedtime     insulin pen needle (BD CLEOPATRA U/F) 32G X 4 MM miscellaneous Use 4 pen needles daily or as directed.     levothyroxine (SYNTHROID/LEVOTHROID) 125 MCG tablet Take 1-tablet by mouth daily on Tues/Thurs/Sat/Sundays, as directed     levothyroxine (SYNTHROID/LEVOTHROID) 137 MCG tablet Take 1-tablet by mouth on Mon/Wed/Fridays as directed     naratriptan (AMERGE) 1 MG tablet  (Patient not taking: Reported on 4/18/2023)     Prenatal w/o A Vit-Fe Fum-FA (PRENATA PO)      RUBIO, YFGN, 100 UNIT/ML SOPN ADMINISTER 22 UNITS UNDER THE SKIN AT BEDTIME SUBSTITUTE FOR LANTUS     sertraline (ZOLOFT) 50 MG tablet TAKE 1 TABLET(50 MG) BY MOUTH  "DAILY     triamcinolone (KENALOG) 0.1 % external cream Apply topically 2 times daily to affected areas on fingers for 10-14 days. Not for use on face nor groin. (Patient not taking: Reported on 5/2/2023)     No current facility-administered medications for this visit.     Allergies   Allergen Reactions     Penicillins Rash     augmentin & pcn     Sulfa Antibiotics Hives       ROS:  12 point review of systems negative other than symptoms noted below or in the HPI.  No urinary frequency or dysuria, bladder or kidney problems      OBJECTIVE:     /78   Ht 1.676 m (5' 6\")   Wt 100.2 kg (221 lb)   LMP 02/11/2023   BMI 35.67 kg/m    Body mass index is 35.67 kg/m .    Exam:  Constitutional:  Appearance: Well nourished, well developed alert, in no acute distress  Chest:  Respiratory Effort:  Breathing unlabored. Clear to auscultation bilaterally.   Cardiovascular: Heart: Auscultation:  Regular rate, normal rhythm, no murmurs present  Neurologic:  Mental Status:  Oriented X3.  Normal strength and tone, sensory exam grossly normal, mentation intact and speech normal.    Psychiatric:  Mentation appears normal and affect normal/bright.     Results for orders placed or performed in visit on 05/04/23   US OB Transvaginal Only ([id number])    Narrative    Table formatting from the original result was not included.  US OB Transvaginal Only ([id number])  Order #: [id number] Accession #: [id number]  Study Notes       Rogerio Jasso on 5/4/2023  3:25 PM      Obstetrical Ultrasound Report  OB U/S  < 14 Weeks -  Transvaginal  [facility]  Referring Provider: Sierra Santoyo DO  Sonographer: Rogerio Jasso RDMS  Indication:  Viability check  and Size and Dates     Dating (mm/dd/yyyy):   LMP: 02/11/2023            Working EDC:  11/27/2023          GA by Working   EDC:        10w3d     Current Scan On:  5/4/2023             EDC:  12/14/2023            GA by Current Scan:          8w0d  The calculation of the gestational " age by current scan was based on CRL.  Anatomy Scan:  Hou gestation.  Biometry:  CRL                       1.58 cm                8w0d                      Yolk Sac                              NV                                                              Fetal heart activity: NV Fetal heart rate: NV  Findings: Gestational sac and fetal pole seen, FHR not visualized, MANDY 0.3   x 1.3 x 0.4 cm     Maternal Structures:  Cervix: The cervix appears long and closed.  Right Ovary: Complex cyst 1.5 x 1.4 x 1.3 cm  Left Ovary: Wnl    Impression:    Fetus 8wk without fetal cardiac activity, consistent with missed   . Subchorionic hemorrhage noted.     Sierra Santoyo DO                 ASSESSMENT/PLAN:                                                        ICD-10-CM    1. Missed   O02.1       2. 10 weeks gestation of pregnancy  Z3A.10             10wk3d with 8wk MAB, no FCA noted.   Discussed etiologies. Reviewed options for treatement, including expectant, medications, surgical. patient elects to proceed with D&C  Orders placed for suction D&C. Procedure, expectations, preop and postop instructions discussed    Sierra Santoyo,   [facility] [place]

## 2023-05-04 NOTE — LETTER
May 4, 2023      Linda Chavez  [address] [address]        To Whom It May Concern:    Linda Chavez was seen in our clinic. I recommend she be off of work through 5/12/23 due to a medical condition that is not expected to extend beyond this point.       Sincerely,        Sierra Treviño Masters, DO

## 2023-05-04 NOTE — PROGRESS NOTES
SUBJECTIVE:                                                   Linda Chavez is a 32 year old female who presents to clinic today for the following health issue(s):  Patient presents with:  Miscarriage          HPI:  F/U ultrasound scheduled today. No FCA noted, fetus 8wk by CRL, 10wk by ultrasound dating.   No symptoms.     Patient's last menstrual period was 2023..     Patient is sexually active, .  Using none for contraception.    reports that she has never smoked. She has never used smokeless tobacco.    STD testing offered?  Declined    Health maintenance updated:  yes    Today's PHQ-2 Score:       2023    10:38 AM   PHQ-2 (  [employer])   Q1: Little interest or pleasure in doing things 0   Q2: Feeling down, depressed or hopeless 0   PHQ-2 Score 0   Q1: Little interest or pleasure in doing things Not at all   Q2: Feeling down, depressed or hopeless Not at all   PHQ-2 Score 0     Today's PHQ-9 Score:       2023    10:37 AM   PHQ-9 SCORE   PHQ-9 Total Score MyChart 1 (Minimal depression)   PHQ-9 Total Score 1     Today's TRESA-7 Score:       2023    10:38 AM   TRESA-7 SCORE   Total Score 2 (minimal anxiety)   Total Score 2       Problem list and histories reviewed & adjusted, as indicated.  Additional history: as documented.    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)     Common migraine     Morbid obesity (H)     Supervision of high-risk pregnancy     Past Surgical History:   Procedure Laterality Date     APPENDECTOMY  2007    dr deshpande     PE TUBES Bilateral 1996      Social History     Tobacco Use     Smoking status: Never     Smokeless tobacco: Never   Vaping Use     Vaping status: Never Used     Passive  vaping exposure: Yes   Substance Use Topics     Alcohol use: Not Currently     Alcohol/week: 2.0 standard drinks of alcohol     Types: 2 Standard drinks or equivalent per week     Comment: 2-5 drinks per week      Problem (# of Occurrences) Relation (Name,Age of Onset)    Genetic Disorder (1) Paternal Grandfather: kidney    Neurologic Disorder (1) Maternal Grandmother: dementia       Negative family history of: Family History Negative            Current Outpatient Medications   Medication Sig     blood glucose (VALE CONTOUR NEXT) test strip Patient tests 5 times/day, Contour Next test strips DAW1     blood glucose monitoring (VALE MICROLET) lancets Patient tests 8 times/day     Continuous Blood Gluc Sensor (DEXCOM G6 SENSOR) MISC CHANGE SENSOR EVERY 10 DAYS     Continuous Blood Gluc Transmit (DEXCOM G6 TRANSMITTER) MISC CHANGE EVERY 90 DAYS     Glucagon (BAQSIMI TWO PACK) 3 MG/DOSE POWD Spray 1 Device in nostril once as needed (for severe hypoglycemia)     insulin aspart (NOVOLOG PEN) 100 UNIT/ML pen Inject 3 to 10 units subcutaneous at mealtimes as directed, total daily dose approx 30 units     insulin aspart (NOVOLOG VIAL) 100 UNITS/ML vial Use with insulin pump, total daily dose approx 90 units     insulin glargine (LANTUS PEN) 100 UNIT/ML pen Inject 22 Units Subcutaneous At Bedtime     insulin pen needle (BD CLEOPATRA U/F) 32G X 4 MM miscellaneous Use 4 pen needles daily or as directed.     levothyroxine (SYNTHROID/LEVOTHROID) 125 MCG tablet Take 1-tablet by mouth daily on Tues/Thurs/Sat/Sundays, as directed     levothyroxine (SYNTHROID/LEVOTHROID) 137 MCG tablet Take 1-tablet by mouth on Mon/Wed/Fridays as directed     naratriptan (AMERGE) 1 MG tablet  (Patient not taking: Reported on 4/18/2023)     Prenatal w/o A Vit-Fe Fum-FA (PRENATA PO)      RUBIO, YFGN, 100 UNIT/ML SOPN ADMINISTER 22 UNITS UNDER THE SKIN AT BEDTIME SUBSTITUTE FOR LANTUS     sertraline (ZOLOFT) 50 MG tablet TAKE 1 TABLET(50 MG) BY MOUTH  "DAILY     triamcinolone (KENALOG) 0.1 % external cream Apply topically 2 times daily to affected areas on fingers for 10-14 days. Not for use on face nor groin. (Patient not taking: Reported on 5/2/2023)     No current facility-administered medications for this visit.     Allergies   Allergen Reactions     Penicillins Rash     augmentin & pcn     Sulfa Antibiotics Hives       ROS:  12 point review of systems negative other than symptoms noted below or in the HPI.  No urinary frequency or dysuria, bladder or kidney problems      OBJECTIVE:     /78   Ht 1.676 m (5' 6\")   Wt 100.2 kg (221 lb)   LMP 02/11/2023   BMI 35.67 kg/m    Body mass index is 35.67 kg/m .    Exam:  Constitutional:  Appearance: Well nourished, well developed alert, in no acute distress  Chest:  Respiratory Effort:  Breathing unlabored. Clear to auscultation bilaterally.   Cardiovascular: Heart: Auscultation:  Regular rate, normal rhythm, no murmurs present  Neurologic:  Mental Status:  Oriented X3.  Normal strength and tone, sensory exam grossly normal, mentation intact and speech normal.    Psychiatric:  Mentation appears normal and affect normal/bright.     Results for orders placed or performed in visit on 05/04/23   US OB Transvaginal Only (CAB021)    Narrative    Table formatting from the original result was not included.  US OB Transvaginal Only ([id number])  Order #: [id number] Accession #: [id number]  Study Notes       Rogerio Jasso on 5/4/2023  3:25 PM      Obstetrical Ultrasound Report  OB U/S  < 14 Weeks -  Transvaginal  [facility]  Referring Provider: Sierra Santoyo DO  Sonographer: Rogerio Jasso RDMS  Indication:  Viability check  and Size and Dates     Dating (mm/dd/yyyy):   LMP: 02/11/2023            Working EDC:  11/27/2023          GA by Working   EDC:        10w3d     Current Scan On:  5/4/2023             EDC:  12/14/2023            GA by Current Scan:          8w0d  The calculation of the gestational " age by current scan was based on CRL.  Anatomy Scan:  Hou gestation.  Biometry:  CRL                       1.58 cm                8w0d                      Yolk Sac                              NV                                                              Fetal heart activity: NV Fetal heart rate: NV  Findings: Gestational sac and fetal pole seen, FHR not visualized, MANDY 0.3   x 1.3 x 0.4 cm     Maternal Structures:  Cervix: The cervix appears long and closed.  Right Ovary: Complex cyst 1.5 x 1.4 x 1.3 cm  Left Ovary: Wnl    Impression:    Fetus 8wk without fetal cardiac activity, consistent with missed   . Subchorionic hemorrhage noted.     Sierra Santoyo DO                 ASSESSMENT/PLAN:                                                        ICD-10-CM    1. Missed   O02.1       2. 10 weeks gestation of pregnancy  Z3A.10             10wk3d with 8wk MAB, no FCA noted.   Discussed etiologies. Reviewed options for treatement, including expectant, medications, surgical. patient elects to proceed with D&C  Orders placed for suction D&C. Procedure, expectations, preop and postop instructions discussed    Sierra Santoyo,   [facility] [place]

## 2023-05-06 LAB — BACTERIA UR CULT: NORMAL

## 2023-05-08 ENCOUNTER — TELEPHONE (OUTPATIENT)
Dept: OBGYN | Facility: CLINIC | Age: 33
End: 2023-05-08
Payer: COMMERCIAL

## 2023-05-08 NOTE — TELEPHONE ENCOUNTER
Reason for call:  Other   Patient called regarding (reason for call): call back  Additional comments: [facility] called regarding this patient. She needs her pre op appointment signed off on for her surgery tomorrow morning at 10:55am.     Phone number to reach patient:  Cell number on file:    Telephone Information:   Mobile [phone]   Mobile [phone]     Best Time:  any    Can we leave a detailed message on this number?  Not Applicable    Travel screening: Not Applicable

## 2023-05-08 NOTE — TELEPHONE ENCOUNTER
Type of surgery: SUCTION D&C  Location of surgery: [facility]  Date and time of surgery: 5/9/2023 11:05a  Surgeon: JOSE  Pre-Op Appt Date: 5/4/2023  Post-Op Appt Date: NA   Packet sent out: EMAILED 5/4/2023  Pre-cert/Authorization completed:  TBD  Date: 5/4/2023 [place] w/Katherine Sinha  Surgery Scheduler        ERAS BAG GIVEN No  ERAS INSTRUCTIONS EXPLAINED No    ASSIST: na    H&P TO BE COMPLETED BY:   Surgeon  FOR H&P TO BE DONE BY SURGEON   ALREADY DONE:  DATE  5/4/23  SAME DAY/OBSERVATION/INPATIENT: STRAIGHT SAME DAY  EQUIPMENT: na  VENDOR NEEDED AT CASE: na  IF IUD WHAT BRAND na  LABS/SPECIAL INSTRUCTIONS: will order  TIME OFF WORK: 1wk  ESTIMATED DOCTOR TIME NEEDED IN MINUTES 30  POST OP TO BE SCHEDULED AT n/a WEEKS AFTER SX APPOINTMENT LENGTH IN MINUTES n/a

## 2023-05-09 ENCOUNTER — ANESTHESIA EVENT (OUTPATIENT)
Dept: SURGERY | Facility: CLINIC | Age: 33
End: 2023-05-09
Payer: COMMERCIAL

## 2023-05-09 ENCOUNTER — ANESTHESIA (OUTPATIENT)
Dept: SURGERY | Facility: CLINIC | Age: 33
End: 2023-05-09
Payer: COMMERCIAL

## 2023-05-09 ENCOUNTER — HOSPITAL ENCOUNTER (OUTPATIENT)
Facility: CLINIC | Age: 33
Discharge: HOME | End: 2023-05-09
Attending: OBSTETRICS & GYNECOLOGY | Admitting: OBSTETRICS & GYNECOLOGY
Payer: COMMERCIAL

## 2023-05-09 VITALS
OXYGEN SATURATION: 96 % | DIASTOLIC BLOOD PRESSURE: 72 MMHG | HEIGHT: 66 IN | TEMPERATURE: 96.6 F | SYSTOLIC BLOOD PRESSURE: 109 MMHG | WEIGHT: 222.4 LBS | BODY MASS INDEX: 35.74 KG/M2 | HEART RATE: 70 BPM | RESPIRATION RATE: 16 BRPM

## 2023-05-09 DIAGNOSIS — Z98.890 S/P D&C (STATUS POST DILATION AND CURETTAGE): Primary | ICD-10-CM

## 2023-05-09 DIAGNOSIS — O02.1 MISSED AB: ICD-10-CM

## 2023-05-09 LAB
ABO/RH(D): NORMAL
BASOPHILS # BLD AUTO: 0 10E3/UL (ref 0–0.2)
BASOPHILS NFR BLD AUTO: 0 %
BLOOD BANK CHART COMMENT: NORMAL
EOSINOPHIL # BLD AUTO: 0.3 10E3/UL (ref 0–0.7)
EOSINOPHIL NFR BLD AUTO: 4 %
ERYTHROCYTE [DISTWIDTH] IN BLOOD BY AUTOMATED COUNT: 13.2 % (ref 10–15)
GLUCOSE BLDC GLUCOMTR-MCNC: 117 MG/DL (ref 70–99)
GLUCOSE BLDC GLUCOMTR-MCNC: 180 MG/DL (ref 70–99)
HCT VFR BLD AUTO: 41.7 % (ref 35–47)
HGB BLD-MCNC: 13.7 G/DL (ref 11.7–15.7)
IMM GRANULOCYTES # BLD: 0 10E3/UL
IMM GRANULOCYTES NFR BLD: 0 %
LYMPHOCYTES # BLD AUTO: 1.8 10E3/UL (ref 0.8–5.3)
LYMPHOCYTES NFR BLD AUTO: 24 %
MCH RBC QN AUTO: 30 PG (ref 26.5–33)
MCHC RBC AUTO-ENTMCNC: 32.9 G/DL (ref 31.5–36.5)
MCV RBC AUTO: 91 FL (ref 78–100)
MONOCYTES # BLD AUTO: 0.6 10E3/UL (ref 0–1.3)
MONOCYTES NFR BLD AUTO: 9 %
NEUTROPHILS # BLD AUTO: 4.6 10E3/UL (ref 1.6–8.3)
NEUTROPHILS NFR BLD AUTO: 63 %
NRBC # BLD AUTO: 0 10E3/UL
NRBC BLD AUTO-RTO: 0 /100
PLATELET # BLD AUTO: 265 10E3/UL (ref 150–450)
RBC # BLD AUTO: 4.56 10E6/UL (ref 3.8–5.2)
SPECIMEN EXPIRATION DATE: NORMAL
SPECIMEN EXPIRATION DATE: NORMAL
WBC # BLD AUTO: 7.3 10E3/UL (ref 4–11)

## 2023-05-09 PROCEDURE — 250N000009 HC RX 250: Performed by: OBSTETRICS & GYNECOLOGY

## 2023-05-09 PROCEDURE — 710N000009 HC RECOVERY PHASE 1, LEVEL 1, PER MIN: Performed by: OBSTETRICS & GYNECOLOGY

## 2023-05-09 PROCEDURE — 88305 TISSUE EXAM BY PATHOLOGIST: CPT | Mod: 26 | Performed by: PATHOLOGY

## 2023-05-09 PROCEDURE — 250N000011 HC RX IP 250 OP 636: Performed by: NURSE ANESTHETIST, CERTIFIED REGISTERED

## 2023-05-09 PROCEDURE — 999N000141 HC STATISTIC PRE-PROCEDURE NURSING ASSESSMENT: Performed by: OBSTETRICS & GYNECOLOGY

## 2023-05-09 PROCEDURE — 250N000009 HC RX 250: Performed by: NURSE ANESTHETIST, CERTIFIED REGISTERED

## 2023-05-09 PROCEDURE — 250N000011 HC RX IP 250 OP 636: Performed by: OBSTETRICS & GYNECOLOGY

## 2023-05-09 PROCEDURE — 86901 BLOOD TYPING SEROLOGIC RH(D): CPT | Performed by: OBSTETRICS & GYNECOLOGY

## 2023-05-09 PROCEDURE — 85014 HEMATOCRIT: CPT | Performed by: OBSTETRICS & GYNECOLOGY

## 2023-05-09 PROCEDURE — 710N000012 HC RECOVERY PHASE 2, PER MINUTE: Performed by: OBSTETRICS & GYNECOLOGY

## 2023-05-09 PROCEDURE — 258N000003 HC RX IP 258 OP 636: Performed by: ANESTHESIOLOGY

## 2023-05-09 PROCEDURE — 59820 CARE OF MISCARRIAGE: CPT | Performed by: OBSTETRICS & GYNECOLOGY

## 2023-05-09 PROCEDURE — 360N000075 HC SURGERY LEVEL 2, PER MIN: Performed by: OBSTETRICS & GYNECOLOGY

## 2023-05-09 PROCEDURE — 370N000017 HC ANESTHESIA TECHNICAL FEE, PER MIN: Performed by: OBSTETRICS & GYNECOLOGY

## 2023-05-09 PROCEDURE — 88305 TISSUE EXAM BY PATHOLOGIST: CPT | Mod: TC | Performed by: OBSTETRICS & GYNECOLOGY

## 2023-05-09 PROCEDURE — 250N000013 HC RX MED GY IP 250 OP 250 PS 637: Performed by: OBSTETRICS & GYNECOLOGY

## 2023-05-09 PROCEDURE — 272N000001 HC OR GENERAL SUPPLY STERILE: Performed by: OBSTETRICS & GYNECOLOGY

## 2023-05-09 PROCEDURE — 36415 COLL VENOUS BLD VENIPUNCTURE: CPT | Performed by: OBSTETRICS & GYNECOLOGY

## 2023-05-09 PROCEDURE — 82962 GLUCOSE BLOOD TEST: CPT

## 2023-05-09 RX ORDER — DOXYCYCLINE 100 MG/10ML
100 INJECTION, POWDER, LYOPHILIZED, FOR SOLUTION INTRAVENOUS
Status: COMPLETED | OUTPATIENT
Start: 2023-05-09 | End: 2023-05-09

## 2023-05-09 RX ORDER — ACETAMINOPHEN 325 MG/1
975 TABLET ORAL ONCE
Status: COMPLETED | OUTPATIENT
Start: 2023-05-09 | End: 2023-05-09

## 2023-05-09 RX ORDER — PROPOFOL 10 MG/ML
INJECTION, EMULSION INTRAVENOUS CONTINUOUS PRN
Status: DISCONTINUED | OUTPATIENT
Start: 2023-05-09 | End: 2023-05-09

## 2023-05-09 RX ORDER — DEXAMETHASONE SODIUM PHOSPHATE 4 MG/ML
INJECTION, SOLUTION INTRA-ARTICULAR; INTRALESIONAL; INTRAMUSCULAR; INTRAVENOUS; SOFT TISSUE PRN
Status: DISCONTINUED | OUTPATIENT
Start: 2023-05-09 | End: 2023-05-09

## 2023-05-09 RX ORDER — HYDROMORPHONE HCL IN WATER/PF 6 MG/30 ML
0.4 PATIENT CONTROLLED ANALGESIA SYRINGE INTRAVENOUS EVERY 5 MIN PRN
Status: DISCONTINUED | OUTPATIENT
Start: 2023-05-09 | End: 2023-05-09 | Stop reason: HOSPADM

## 2023-05-09 RX ORDER — ONDANSETRON 4 MG/1
4 TABLET, ORALLY DISINTEGRATING ORAL EVERY 30 MIN PRN
Status: DISCONTINUED | OUTPATIENT
Start: 2023-05-09 | End: 2023-05-09 | Stop reason: HOSPADM

## 2023-05-09 RX ORDER — ACETAMINOPHEN 500 MG
1000 TABLET ORAL EVERY 6 HOURS PRN
Qty: 90 TABLET | Refills: 0 | Status: SHIPPED | OUTPATIENT
Start: 2023-05-09 | End: 2023-08-08

## 2023-05-09 RX ORDER — LABETALOL HYDROCHLORIDE 5 MG/ML
5 INJECTION, SOLUTION INTRAVENOUS
Status: DISCONTINUED | OUTPATIENT
Start: 2023-05-09 | End: 2023-05-09 | Stop reason: HOSPADM

## 2023-05-09 RX ORDER — FENTANYL CITRATE 0.05 MG/ML
25 INJECTION, SOLUTION INTRAMUSCULAR; INTRAVENOUS EVERY 5 MIN PRN
Status: DISCONTINUED | OUTPATIENT
Start: 2023-05-09 | End: 2023-05-09 | Stop reason: HOSPADM

## 2023-05-09 RX ORDER — BUPIVACAINE HYDROCHLORIDE 2.5 MG/ML
INJECTION, SOLUTION EPIDURAL; INFILTRATION; INTRACAUDAL PRN
Status: DISCONTINUED | OUTPATIENT
Start: 2023-05-09 | End: 2023-05-09 | Stop reason: HOSPADM

## 2023-05-09 RX ORDER — FENTANYL CITRATE 0.05 MG/ML
50 INJECTION, SOLUTION INTRAMUSCULAR; INTRAVENOUS EVERY 5 MIN PRN
Status: DISCONTINUED | OUTPATIENT
Start: 2023-05-09 | End: 2023-05-09 | Stop reason: HOSPADM

## 2023-05-09 RX ORDER — KETOROLAC TROMETHAMINE 30 MG/ML
INJECTION, SOLUTION INTRAMUSCULAR; INTRAVENOUS PRN
Status: DISCONTINUED | OUTPATIENT
Start: 2023-05-09 | End: 2023-05-09

## 2023-05-09 RX ORDER — HYDROMORPHONE HCL IN WATER/PF 6 MG/30 ML
0.2 PATIENT CONTROLLED ANALGESIA SYRINGE INTRAVENOUS EVERY 5 MIN PRN
Status: DISCONTINUED | OUTPATIENT
Start: 2023-05-09 | End: 2023-05-09 | Stop reason: HOSPADM

## 2023-05-09 RX ORDER — OXYCODONE HYDROCHLORIDE 5 MG/1
10 TABLET ORAL
Status: DISCONTINUED | OUTPATIENT
Start: 2023-05-09 | End: 2023-05-09 | Stop reason: HOSPADM

## 2023-05-09 RX ORDER — IBUPROFEN 800 MG/1
800 TABLET, FILM COATED ORAL EVERY 8 HOURS PRN
Qty: 90 TABLET | Refills: 0 | Status: SHIPPED | OUTPATIENT
Start: 2023-05-09 | End: 2023-08-08

## 2023-05-09 RX ORDER — OXYCODONE HYDROCHLORIDE 5 MG/1
5 TABLET ORAL
Status: DISCONTINUED | OUTPATIENT
Start: 2023-05-09 | End: 2023-05-09 | Stop reason: HOSPADM

## 2023-05-09 RX ORDER — OXYCODONE HYDROCHLORIDE 5 MG/1
5 TABLET ORAL EVERY 4 HOURS PRN
Status: DISCONTINUED | OUTPATIENT
Start: 2023-05-09 | End: 2023-05-09 | Stop reason: HOSPADM

## 2023-05-09 RX ORDER — HYDRALAZINE HYDROCHLORIDE 20 MG/ML
2.5-5 INJECTION INTRAMUSCULAR; INTRAVENOUS
Status: DISCONTINUED | OUTPATIENT
Start: 2023-05-09 | End: 2023-05-09 | Stop reason: HOSPADM

## 2023-05-09 RX ORDER — ONDANSETRON 2 MG/ML
4 INJECTION INTRAMUSCULAR; INTRAVENOUS EVERY 30 MIN PRN
Status: DISCONTINUED | OUTPATIENT
Start: 2023-05-09 | End: 2023-05-09 | Stop reason: HOSPADM

## 2023-05-09 RX ORDER — MEPERIDINE HYDROCHLORIDE 25 MG/ML
12.5 INJECTION INTRAMUSCULAR; INTRAVENOUS; SUBCUTANEOUS EVERY 5 MIN PRN
Status: DISCONTINUED | OUTPATIENT
Start: 2023-05-09 | End: 2023-05-09 | Stop reason: HOSPADM

## 2023-05-09 RX ORDER — SODIUM CHLORIDE, SODIUM LACTATE, POTASSIUM CHLORIDE, CALCIUM CHLORIDE 600; 310; 30; 20 MG/100ML; MG/100ML; MG/100ML; MG/100ML
INJECTION, SOLUTION INTRAVENOUS CONTINUOUS
Status: DISCONTINUED | OUTPATIENT
Start: 2023-05-09 | End: 2023-05-09 | Stop reason: HOSPADM

## 2023-05-09 RX ORDER — FENTANYL CITRATE 50 UG/ML
INJECTION, SOLUTION INTRAMUSCULAR; INTRAVENOUS PRN
Status: DISCONTINUED | OUTPATIENT
Start: 2023-05-09 | End: 2023-05-09

## 2023-05-09 RX ORDER — LIDOCAINE HYDROCHLORIDE 20 MG/ML
INJECTION, SOLUTION INFILTRATION; PERINEURAL PRN
Status: DISCONTINUED | OUTPATIENT
Start: 2023-05-09 | End: 2023-05-09

## 2023-05-09 RX ORDER — ONDANSETRON 2 MG/ML
INJECTION INTRAMUSCULAR; INTRAVENOUS PRN
Status: DISCONTINUED | OUTPATIENT
Start: 2023-05-09 | End: 2023-05-09

## 2023-05-09 RX ORDER — PROPOFOL 10 MG/ML
INJECTION, EMULSION INTRAVENOUS PRN
Status: DISCONTINUED | OUTPATIENT
Start: 2023-05-09 | End: 2023-05-09

## 2023-05-09 RX ADMIN — DOXYCYCLINE 100 MG: 100 INJECTION, POWDER, LYOPHILIZED, FOR SOLUTION INTRAVENOUS at 10:18

## 2023-05-09 RX ADMIN — ONDANSETRON 4 MG: 2 INJECTION INTRAMUSCULAR; INTRAVENOUS at 11:05

## 2023-05-09 RX ADMIN — LIDOCAINE HYDROCHLORIDE 60 MG: 20 INJECTION, SOLUTION INFILTRATION; PERINEURAL at 10:50

## 2023-05-09 RX ADMIN — PROPOFOL 150 MCG/KG/MIN: 10 INJECTION, EMULSION INTRAVENOUS at 10:50

## 2023-05-09 RX ADMIN — DEXAMETHASONE SODIUM PHOSPHATE 4 MG: 4 INJECTION, SOLUTION INTRA-ARTICULAR; INTRALESIONAL; INTRAMUSCULAR; INTRAVENOUS; SOFT TISSUE at 11:03

## 2023-05-09 RX ADMIN — MIDAZOLAM 2 MG: 1 INJECTION INTRAMUSCULAR; INTRAVENOUS at 10:49

## 2023-05-09 RX ADMIN — FENTANYL CITRATE 25 MCG: 50 INJECTION, SOLUTION INTRAMUSCULAR; INTRAVENOUS at 11:34

## 2023-05-09 RX ADMIN — FENTANYL CITRATE 50 MCG: 50 INJECTION, SOLUTION INTRAMUSCULAR; INTRAVENOUS at 10:50

## 2023-05-09 RX ADMIN — FENTANYL CITRATE 25 MCG: 50 INJECTION, SOLUTION INTRAMUSCULAR; INTRAVENOUS at 11:09

## 2023-05-09 RX ADMIN — ACETAMINOPHEN 975 MG: 325 TABLET ORAL at 09:48

## 2023-05-09 RX ADMIN — KETOROLAC TROMETHAMINE 30 MG: 30 INJECTION, SOLUTION INTRAMUSCULAR at 11:33

## 2023-05-09 RX ADMIN — PROPOFOL 20 MG: 10 INJECTION, EMULSION INTRAVENOUS at 10:50

## 2023-05-09 RX ADMIN — SODIUM CHLORIDE, POTASSIUM CHLORIDE, SODIUM LACTATE AND CALCIUM CHLORIDE: 600; 310; 30; 20 INJECTION, SOLUTION INTRAVENOUS at 10:17

## 2023-05-09 ASSESSMENT — ACTIVITIES OF DAILY LIVING (ADL)
ADLS_ACUITY_SCORE: 35
ADLS_ACUITY_SCORE: 35

## 2023-05-09 ASSESSMENT — COPD QUESTIONNAIRES: COPD: 0

## 2023-05-09 ASSESSMENT — LIFESTYLE VARIABLES: TOBACCO_USE: 0

## 2023-05-09 ASSESSMENT — ENCOUNTER SYMPTOMS
SEIZURES: 0
DYSRHYTHMIAS: 1

## 2023-05-09 NOTE — DISCHARGE INSTRUCTIONS
Today you received Toradol, an antiinflammatory medication similar to Ibuprofen.  You should not take other antiinflammatory medication, such as Ibuprofen, Motrin, Advil, Aleve, Naprosyn, etc until 5:30 pm today.      Today you were given 975 mg of Tylenol at 9:45 am. The recommended daily maximum dose is 4000 mg.          Same Day Surgery Discharge Instructions     It's not unusual to feel dizzy, light-headed or faint for up to 24 hours after surgery or while taking pain medication.  If you have these symptoms: sit for a few minutes before standing and have someone assist you when you get up to walk or use the bathroom.    You should rest and relax for the next 24 hours. You must make arrangements to have someone stay with you for at least 24 hours after your discharge.  Avoid hazardous and strenuous activity.    DO NOT DRIVE any vehicle or operate mechanical equipment for 24 hours following the end of your surgery.  Even though you may feel normal, your reactions may be affected by the medication you have received. Do not drive while taking narcotic pain meds (for example vicodin or percocet).    Do not drink alcoholic beverages for 24 hours following surgery.     It's not unusual to feel nauseated and/or vomit after receiving anesthesia.  If you develop these symptoms, drink clear liquids (apple juice, ginger ale, broth, 7-up, etc. ) until you feel better.  Slowly progress to your regular diet as you feel able.  If your nausea and vomiting persists for 24 hours, please notify your surgeon.      All narcotic pain medications, along with inactivity and anesthesia, can cause constipation. Drinking plenty of liquids and increasing fiber intake will help.     For any questions of a medical nature, call your surgeon.    Do not make important decisions for 24 hours.    If you had general anesthesia, you may have a sore throat for a couple of days related to the breathing tube used during surgery.  You may use Cepacol  lozenges to help with this discomfort.  If it worsens or if you develop a fever, contact your surgeon.     If you feel your pain is not well managed with the pain medications prescribed by your surgeon, please contact your surgeon's office to let them know so they can address your concerns.         HOME CARE FOLLOWING SURGERY    MEDICATIONS:  -May take Ibuprofen (800mg by mouth every 8 hours as needed for pain) or tylenol (500mg by mouth every 6 hours as needed for pain; max 4000mg per day) when at home as needed for pain/cramps/headaches/pain, follow instructions on bottle.  -You may use miralax or docusate (one tab by mouth twice daily) for stool softening, these are over the counter and can be found at any pharmacy. Follow bottle instructions.    Diet  You have no restrictions on your diet.  During the evening following surgery, drink plenty of fluids and eat a light supper.    Nausea  The anesthesia may produce some nausea.  If you feel nauseated, stay in bed and try drinking fluids such as 7-Up, tea, or soup.    Discomfort  The amount of discomfort you can expect is very unpredictable.  If you have pain that cannot be controlled with Tylenol, Ibuprofen or with the prescription you may have received, you should notify your physician.   Abdominal cramping or low backache is not uncommon and should not be a cause for concern.  You will be drowsy and weak the day of surgery and possibly the following day.  You may experience light bleeding, cramping, discharge for 1-2 weeks. This should gradually improve over time.    Fever  A low grade fever (not over 100.4 degree Fahrenheit) is usual after this procedure.  Do not hesitate to notify your physician if your fever seems excessive or persists.    Activity   You may resume your normal activity.  You may shower.  Avoid sex without contraception until you have had one normal period.    Emergency Care  Contact your physician if you have any of these problems:   1.  A  fever over 100.4 degree Fahrenheit   2.  A large amount of bleeding or drainage   3.  Severe pain              4.  Foul vaginal discharge    Follow Up  Follow up with Dr. Santoyo as needed.           ** If you have questions or concerns about your procedure,   call Dr. Santoyo at  [phone] **

## 2023-05-09 NOTE — OR NURSING
"BG checked using facility device. Ready 180.  Pt's dexcom device states 202. Pt stated \"I'll re-calibrate it.\"  "

## 2023-05-09 NOTE — OP NOTE
D&C OPERATIVE REPORT    DATE OF PROCEDURE: May 9, 2023  PREOPERATIVE DIAGNOSES: 10wk Missed AB  POSTOPERATIVE DIAGNOSES: same  PROCEDURE: Suction dilation and curettage  SURGEON: Sierra Santoyo DO  ANESTHESIA: Local, MAC  COMPLICATIONS: None  ESTIMATED BLOOD LOSS: 150mL  IVF: 400mL  FINDINGS: normal vaginal vault and cervix. Products of conception removed  SPECIMENS: POCs    PROCEDURE:  The patient was taken to the operating room. She was prepped and draped in a normal sterile fashion in the dorsal lithotomy position under MAC anesthesia. Graves' speculum was placed in vagina. Quarter percent marcaine plain circumferentially injected into cervix. Anterior lip of the cervix was grasped with single-tooth tenaculum and the uterus was sounded to 11cm. The cervix was serially dilated to 8 mm and size 8 curved suction curette passed into uterus. Suction applied and products of conception removed. Sharp curettage performed and small membranes removed. Suction curette again passed with small return.  Tenaculum removed. Cervix hemostatic with silver nitrate.  The counts were correct x2. The patient was woken from anesthesia and taken to recovery recovery in a good condition.    Sierra Santoyo DO  May 9, 2023  11:35 AM

## 2023-05-09 NOTE — ANESTHESIA PREPROCEDURE EVALUATION
Anesthesia Pre-Procedure Evaluation    Patient: Linda Chavez   MRN: [id number] : 1990        Procedure : Procedure(s):  DILATION AND CURETTAGE USING SUCTION          Past Medical History:   Diagnosis Date     Adjustment disorder with anxious mood      Allergic rhinitis, cause unspecified     h/o AIT     Chest discomfort      Common migraine     No visual aura.      Hyperlipidemia LDL goal < 70      Hypothyroidism      Infectious mononucleosis 1995     Insulin pump in place     Dexcom continuous glucose monitoring     Morbid obesity (H)      Tuberculosis      Type 1 diabetes, HbA1c goal < 7% (H) 2004     followed [facility] - peds endocrinology - now Dr Jimenez      Past Surgical History:   Procedure Laterality Date     APPENDECTOMY  2007    dr deshpande     PE TUBES Bilateral 1996      Allergies   Allergen Reactions     Penicillins Rash     augmentin & pcn     Sulfa Antibiotics Hives      Social History     Tobacco Use     Smoking status: Never     Smokeless tobacco: Never   Vaping Use     Vaping status: Never Used     Passive vaping exposure: Yes   Substance Use Topics     Alcohol use: Not Currently     Alcohol/week: 2.0 standard drinks of alcohol     Types: 2 Standard drinks or equivalent per week     Comment: 2-5 drinks per week      Wt Readings from Last 1 Encounters:   23 100.9 kg (222 lb 6.4 oz)        Anesthesia Evaluation            ROS/MED HX  ENT/Pulmonary:    (-) tobacco use, asthma, COPD and sleep apnea   Neurologic:     (+) migraines,  (-) no seizures and no CVA   Cardiovascular: Comment: TTE, Stress test in  WNL    (+) Dyslipidemia -----dysrhythmias, WPW,  (-) hypertension, CAD and CHF   METS/Exercise Tolerance:     Hematologic:       Musculoskeletal:       GI/Hepatic:    (-) GERD and liver disease   Renal/Genitourinary:    (-) renal disease   Endo:     (+) type I DM, Using insulin, - using insulin pump. thyroid problem, hypothyroidism, Obesity,  (-) Type II DM    Psychiatric/Substance Use:     (+) psychiatric history anxiety     Infectious Disease:       Malignancy:       Other:            Physical Exam    Airway        Mallampati: II   TM distance: > 3 FB   Neck ROM: full   Mouth opening: > 3 cm    Respiratory Devices and Support         Dental       (+) Minor Abnormalities - some fillings, tiny chips      Cardiovascular          Rhythm and rate: regular     Pulmonary           breath sounds clear to auscultation           OUTSIDE LABS:  CBC:   Lab Results   Component Value Date    WBC 15.5 (H) 04/13/2021    WBC 7.3 09/28/2020    HGB 14.7 04/13/2021    HGB 14.6 09/28/2020    HCT 44.6 04/13/2021    HCT 42.8 09/28/2020     04/13/2021     09/28/2020     BMP:   Lab Results   Component Value Date     10/06/2022     06/08/2022    POTASSIUM 4.4 10/06/2022    POTASSIUM 4.5 06/08/2022    CHLORIDE 107 10/06/2022    CHLORIDE 104 06/08/2022    CO2 26 10/06/2022    CO2 27 06/08/2022    BUN 14 10/06/2022    BUN 13 06/08/2022    CR 0.67 10/06/2022    CR 0.66 06/08/2022     (H) 10/06/2022     (H) 06/08/2022     COAGS: No results found for: PTT, INR, FIBR  POC:   Lab Results   Component Value Date     (H) 04/13/2021    HCG Negative 05/19/2006    HCGS Negative 09/28/2020     HEPATIC:   Lab Results   Component Value Date    ALBUMIN 3.2 (L) 09/28/2020    PROTTOTAL 7.3 09/28/2020    ALT 23 04/07/2021    AST 19 09/28/2020    ALKPHOS 106 09/28/2020    BILITOTAL 0.3 09/28/2020     OTHER:   Lab Results   Component Value Date    LACT 1.5 08/25/2016    A1C 6.4 (H) 05/01/2023    MARCIA 9.2 10/06/2022    PHOS 3.7 04/13/2010    MAG 2.0 09/28/2020    LIPASE 79 09/28/2020    AMYLASE 32 05/30/2007    TSH 2.14 05/01/2023    T4 1.45 03/01/2023    T3 147 05/18/2015    SED 14 06/05/2006       Anesthesia Plan    ASA Status:  3      Anesthesia Type: MAC.              Consents    Anesthesia Plan(s) and associated risks, benefits, and realistic alternatives discussed.  Questions answered and patient/representative(s) expressed understanding.    - Discussed:     - Discussed with:  Patient         Postoperative Care    Pain management: Multi-modal analgesia.   PONV prophylaxis: Ondansetron (or other 5HT-3), Dexamethasone or Solumedrol     Comments:                Basil Wahl MD

## 2023-05-09 NOTE — ANESTHESIA POSTPROCEDURE EVALUATION
Patient: Linda Chavez    Procedure: Procedure(s):  DILATION AND CURETTAGE OF UTERUS USING SUCTION       Anesthesia Type:  MAC    Note:     Postop Pain Control: Uneventful            Sign Out: Well controlled pain   PONV:    Neuro/Psych: Uneventful            Sign Out: Acceptable/Baseline neuro status   Airway/Respiratory: Uneventful            Sign Out: Acceptable/Baseline resp. status   CV/Hemodynamics: Uneventful            Sign Out: Acceptable CV status; No obvious hypovolemia; No obvious fluid overload   Other NRE:    DID A NON-ROUTINE EVENT OCCUR?            Last vitals:  Vitals Value Taken Time   /70 05/09/23 1230   Temp 36.9  C (98.4  F) 05/09/23 1215   Pulse 69 05/09/23 1239   Resp 20 05/09/23 1239   SpO2 98 % 05/09/23 1238   Vitals shown include unvalidated device data.    Electronically Signed By: Basil Wahl MD  May 9, 2023  4:15 PM

## 2023-05-10 ENCOUNTER — MYC MEDICAL ADVICE (OUTPATIENT)
Dept: EDUCATION SERVICES | Facility: CLINIC | Age: 33
End: 2023-05-10
Payer: COMMERCIAL

## 2023-05-10 LAB
PATH REPORT.COMMENTS IMP SPEC: NORMAL
PATH REPORT.COMMENTS IMP SPEC: NORMAL
PATH REPORT.FINAL DX SPEC: NORMAL
PATH REPORT.GROSS SPEC: NORMAL
PATH REPORT.MICROSCOPIC SPEC OTHER STN: NORMAL
PATH REPORT.RELEVANT HX SPEC: NORMAL
PHOTO IMAGE: NORMAL

## 2023-05-10 NOTE — TELEPHONE ENCOUNTER
Diabetes Education Follow-up    Subjective/Objective:    Linda Chavez sent in blood glucose log for review. Last date of communication was: 5/1/23.    Diabetes is being managed with   Lifestyle (diet/activity), Diabetes Medications   Diabetes Medication(s)     Diabetic Other       Glucagon (BAQSIMI TWO PACK) 3 MG/DOSE POWD    Spray 1 Device in nostril once as needed (for severe hypoglycemia)    Insulin       insulin aspart (NOVOLOG PEN) 100 UNIT/ML pen    Inject 3 to 10 units subcutaneous at mealtimes as directed, total daily dose approx 30 units     insulin aspart (NOVOLOG VIAL) 100 UNITS/ML vial    Use with insulin pump, total daily dose approx 90 units     insulin glargine (LANTUS PEN) 100 UNIT/ML pen    Inject 22 Units Subcutaneous At Bedtime     SEMGLEE, YFGN, 100 UNIT/ML SOPN    ADMINISTER 22 UNITS UNDER THE SKIN AT BEDTIME SUBSTITUTE FOR LANTUS          BG/Food Log:             Assessment:    Patient sends in message to report recent miscarriage. Seeing higher blood sugars over the past few days, likely from missed boluses. As patient is no longer pregnant, we do not need to achieve very tight target range goals. No change in pump settings. Will offer follow up in a few months to continue to consider preconception counseling, as appropriate.     Plan/Response:  No changes in the patient's current treatment plan  Follow up in 2-3 months, per patient  See [employer] message for patient communications.     Kat Lima RD, LD, CDCES     Any diabetes medication dose changes were made via the CDE Protocol and Collaborative Practice Agreement with the patient's endocrinology provider. A copy of this encounter was shared with the provider.

## 2023-05-22 DIAGNOSIS — F41.1 GAD (GENERALIZED ANXIETY DISORDER): ICD-10-CM

## 2023-07-25 ENCOUNTER — MYC MEDICAL ADVICE (OUTPATIENT)
Dept: OBGYN | Facility: CLINIC | Age: 33
End: 2023-07-25
Payer: COMMERCIAL

## 2023-07-25 PROBLEM — O09.90 SUPERVISION OF HIGH-RISK PREGNANCY: Status: RESOLVED | Noted: 2023-04-18 | Resolved: 2023-07-25

## 2023-07-25 NOTE — TELEPHONE ENCOUNTER
5/9/23 D&C for 10 wk missed AB  LMP 6/21/23 - 4w6d w irregular cycles and delay of ovulation per report in mychart    Routing pt [facility] message to provider - see my response.    Shania Schulz RN on 7/25/2023 at 3:20 PM

## 2023-07-28 ENCOUNTER — MYC MEDICAL ADVICE (OUTPATIENT)
Dept: EDUCATION SERVICES | Facility: CLINIC | Age: 33
End: 2023-07-28
Payer: COMMERCIAL

## 2023-07-28 ENCOUNTER — MYC MEDICAL ADVICE (OUTPATIENT)
Dept: ENDOCRINOLOGY | Facility: CLINIC | Age: 33
End: 2023-07-28
Payer: COMMERCIAL

## 2023-08-01 ENCOUNTER — VIRTUAL VISIT (OUTPATIENT)
Dept: EDUCATION SERVICES | Facility: CLINIC | Age: 33
End: 2023-08-01
Payer: COMMERCIAL

## 2023-08-01 DIAGNOSIS — E10.9 TYPE 1 DIABETES, HBA1C GOAL < 7% (H): Primary | ICD-10-CM

## 2023-08-01 PROCEDURE — 98967 PH1 ASSMT&MGMT NQHP 11-20: CPT | Mod: VID | Performed by: DIETITIAN, REGISTERED

## 2023-08-01 RX ORDER — LANCETS
EACH MISCELLANEOUS
Qty: 100 EACH | Refills: 4 | Status: SHIPPED | OUTPATIENT
Start: 2023-08-01

## 2023-08-01 NOTE — PROGRESS NOTES
Diabetes and Pregnancy Follow-up   Type of Service: Telephone Visit    How would patient like to obtain AVS? Not needed    Subjective/Objective:    Linda Chavez was called for a scheduled BG review. Last date of communication was: 5/10/23.    Diabetes is being managed with diet, activity, and medications    Taking diabetes medications: yes:     Diabetes Medication(s)       Diabetic Other       Glucagon (BAQSIMI TWO PACK) 3 MG/DOSE POWD    Spray 1 Device in nostril once as needed (for severe hypoglycemia)      Insulin       insulin aspart (NOVOLOG PEN) 100 UNIT/ML pen    Inject 3 to 10 units subcutaneous at mealtimes as directed, total daily dose approx 30 units     insulin aspart (NOVOLOG VIAL) 100 UNITS/ML vial    Use with insulin pump, total daily dose approx 90 units     insulin glargine (LANTUS PEN) 100 UNIT/ML pen    Inject 22 Units Subcutaneous At Bedtime     SEMGLEE, YFGN, 100 UNIT/ML SOPN    ADMINISTER 22 UNITS UNDER THE SKIN AT BEDTIME SUBSTITUTE FOR LANTUS            Estimated Date of Delivery: Data Unavailable - patient with recent positive pregnancy test at home, working to get numbers down asap    Blood Glucose/Ketone Log:            Assessment:    Brief check in with Linda today - had recent miscarriage ~3 months ago and then just recently had a positive pregnancy test. She is having issues with her Dexcom G6 so has been out of Control IQ since Thursday, awaiting replacement sensors. She is only entering blood sugars that require a correction or bolus but is checking blood sugars very frequently.  Sent new prescription for test strips. Encouraged her to call [facility] to see if she can fill sensors early. No changes in dosing today but have plan for check in again next week. Reviewed blood sugar goals during pregnancy, tight targets and frequent adjustments required. We discussed how much better her blood sugars have been prior to this pregnancy and our goals to keep them there.      Plan/Response:  No changes in the patient's current treatment plan.  Follow-up on Monday    Kat Lima RD, TABITHA, CDCES   Time Spent: 14 minutes    Any diabetes medication dose changes were made via the CDE Protocol and Collaborative Practice Agreement with the patient's endocrinology provider. A copy of this encounter was shared with the provider.

## 2023-08-01 NOTE — LETTER
8/1/2023         RE: Linda Chavez  [address]  [place] [address]        Dear Colleague,    Thank you for referring your patient, Linda Chavez, to the [facility] [place]. Please see a copy of my visit note below.    Diabetes and Pregnancy Follow-up   Type of Service: Telephone Visit    How would patient like to obtain AVS? Not needed    Subjective/Objective:    Linda Chavez was called for a scheduled BG review. Last date of communication was: 5/10/23.    Diabetes is being managed with diet, activity, and medications    Taking diabetes medications: yes:     Diabetes Medication(s)       Diabetic Other       Glucagon (BAQSIMI TWO PACK) 3 MG/DOSE POWD    Spray 1 Device in nostril once as needed (for severe hypoglycemia)      Insulin       insulin aspart (NOVOLOG PEN) 100 UNIT/ML pen    Inject 3 to 10 units subcutaneous at mealtimes as directed, total daily dose approx 30 units     insulin aspart (NOVOLOG VIAL) 100 UNITS/ML vial    Use with insulin pump, total daily dose approx 90 units     insulin glargine (LANTUS PEN) 100 UNIT/ML pen    Inject 22 Units Subcutaneous At Bedtime     SEMGLEE, YFGN, 100 UNIT/ML SOPN    ADMINISTER 22 UNITS UNDER THE SKIN AT BEDTIME SUBSTITUTE FOR LANTUS            Estimated Date of Delivery: Data Unavailable - patient with recent positive pregnancy test at home, working to get numbers down asap    Blood Glucose/Ketone Log:            Assessment:    Brief check in with Linda today - had recent miscarriage ~3 months ago and then just recently had a positive pregnancy test. She is having issues with her Dexcom G6 so has been out of Control IQ since Thursday, awaiting replacement sensors. She is only entering blood sugars that require a correction or bolus but is checking blood sugars very frequently.  Sent new prescription for test strips. Encouraged her to call [facility] to see if she can fill sensors early. No changes in dosing today but have plan for check  in again next week. Reviewed blood sugar goals during pregnancy, tight targets and frequent adjustments required. We discussed how much better her blood sugars have been prior to this pregnancy and our goals to keep them there.     Plan/Response:  No changes in the patient's current treatment plan.  Follow-up on Monday    Kat Lima RD, TABITHA, CAMRONES   Time Spent: 14 minutes    Any diabetes medication dose changes were made via the CDE Protocol and Collaborative Practice Agreement with the patient's endocrinology provider. A copy of this encounter was shared with the provider.

## 2023-08-07 ENCOUNTER — VIRTUAL VISIT (OUTPATIENT)
Dept: EDUCATION SERVICES | Facility: CLINIC | Age: 33
End: 2023-08-07
Payer: COMMERCIAL

## 2023-08-07 DIAGNOSIS — E10.9 TYPE 1 DIABETES, HBA1C GOAL < 7% (H): Primary | ICD-10-CM

## 2023-08-07 PROCEDURE — 98967 PH1 ASSMT&MGMT NQHP 11-20: CPT | Mod: 93 | Performed by: DIETITIAN, REGISTERED

## 2023-08-07 NOTE — PROGRESS NOTES
Diabetes and Pregnancy Follow-up  Type of Service: Telephone Visit    How would patient like to obtain AVS? Not needed    Subjective/Objective:    Linda Chavez was called for a scheduled BG review. Last date of communication was: 8/3/23.    Type 1 diabetes is being managed with diet, activity, and medications    Taking diabetes medications: yes:     Diabetes Medication(s)       Diabetic Other       Glucagon (BAQSIMI TWO PACK) 3 MG/DOSE POWD    Spray 1 Device in nostril once as needed (for severe hypoglycemia)      Insulin       insulin aspart (NOVOLOG PEN) 100 UNIT/ML pen    Inject 3 to 10 units subcutaneous at mealtimes as directed, total daily dose approx 30 units     insulin aspart (NOVOLOG VIAL) 100 UNITS/ML vial    Use with insulin pump, total daily dose approx 90 units     insulin glargine (LANTUS PEN) 100 UNIT/ML pen    Inject 22 Units Subcutaneous At Bedtime     SEMGLEE, YFGN, 100 UNIT/ML SOPN    ADMINISTER 22 UNITS UNDER THE SKIN AT BEDTIME SUBSTITUTE FOR LANTUS            Estimated Date of Delivery: Data Unavailable    Blood Glucose/Ketone Log:                Assessment:  Not meeting ADA TIR targets today ( mg/dl >70% of the time). Consistently seeing high fastings & pre-prandial blood sugars. Linda feels her corrections aren't bringing her down well either. We reviewed pump settings adjustments, expectation of blood sugar trends in early pregnancy, importance of meeting tight targets, impact of hormones on blood sugars, and follow up plan today.     Plan/Response:  Pump settings changes made today:   Start Time Basal Rate Correction Factor  Carb Ratio   Midnight 1.8 --> 2.0 1u:45 --> 1u:35 1u:7.5g   6:00am 1.6 --> 1.8 1u:45 --> 1u:35 1u:6.2g   7:00am 1.5 --> 1.7 1u:25 --> 1u:22 1u:4.5g   11:00am 1.5 --> 1.7 1u:25 --> 1u:22 1u:4.5g   12:00pm 1.7 --> 1.9 1u:22 --> 1u:20 1u:5g   5:00pm 1.7 --> 1.9 1u:22 --> 1u:20 1u:4g   9:00pm 1.7 --> 1.9 1u:25 --> 1u:22 1u:4.5g   Recommend referral to Endocrinology -  patient needs to get appointment with Dr. Jimenez scheduled and she plans to call today   Follow-up in 3-4 days.    Kat Lima RD, LD, [facility]   Time Spent: 17 minutes    Any diabetes medication dose changes were made via the CDE Protocol and Collaborative Practice Agreement with the patient's endocrinology provider. A copy of this encounter was shared with the provider.

## 2023-08-07 NOTE — LETTER
8/7/2023         RE: Linda Chavez  [address]  [place] [facility] [address]        Dear Colleague,    Thank you for referring your patient, Linda Chavez, to the [facility]. Please see a copy of my visit note below.    Diabetes and Pregnancy Follow-up  Type of Service: Telephone Visit    How would patient like to obtain AVS? Not needed    Subjective/Objective:    Linda Chavez was called for a scheduled BG review. Last date of communication was: 8/3/23.    Type 1 diabetes is being managed with diet, activity, and medications    Taking diabetes medications: yes:     Diabetes Medication(s)       Diabetic Other       Glucagon (BAQSIMI TWO PACK) 3 MG/DOSE POWD    Spray 1 Device in nostril once as needed (for severe hypoglycemia)      Insulin       insulin aspart (NOVOLOG PEN) 100 UNIT/ML pen    Inject 3 to 10 units subcutaneous at mealtimes as directed, total daily dose approx 30 units     insulin aspart (NOVOLOG VIAL) 100 UNITS/ML vial    Use with insulin pump, total daily dose approx 90 units     insulin glargine (LANTUS PEN) 100 UNIT/ML pen    Inject 22 Units Subcutaneous At Bedtime     SEMGLEE, YFGN, 100 UNIT/ML SOPN    ADMINISTER 22 UNITS UNDER THE SKIN AT BEDTIME SUBSTITUTE FOR LANTUS            Estimated Date of Delivery: Data Unavailable    Blood Glucose/Ketone Log:                Assessment:  Not meeting ADA TIR targets today ( mg/dl >70% of the time). Consistently seeing high fastings & pre-prandial blood sugars. Linda feels her corrections aren't bringing her down well either. We reviewed pump settings adjustments, expectation of blood sugar trends in early pregnancy, importance of meeting tight targets, impact of hormones on blood sugars, and follow up plan today.     Plan/Response:  Pump settings changes made today:   Start Time Basal Rate Correction Factor  Carb Ratio   Midnight 1.8 --> 2.0 1u:45 --> 1u:35 1u:7.5g   6:00am 1.6 --> 1.8 1u:45 --> 1u:35 1u:6.2g   7:00am 1.5 --> 1.7  1u:25 --> 1u:22 1u:4.5g   11:00am 1.5 --> 1.7 1u:25 --> 1u:22 1u:4.5g   12:00pm 1.7 --> 1.9 1u:22 --> 1u:20 1u:5g   5:00pm 1.7 --> 1.9 1u:22 --> 1u:20 1u:4g   9:00pm 1.7 --> 1.9 1u:25 --> 1u:22 1u:4.5g   Recommend referral to Endocrinology - patient needs to get appointment with Dr. Jimenez scheduled and she plans to call today   Follow-up in 3-4 days.    Kat Lima RD, LD, [facility]   Time Spent: 17 minutes    Any diabetes medication dose changes were made via the CDE Protocol and Collaborative Practice Agreement with the patient's endocrinology provider. A copy of this encounter was shared with the provider.

## 2023-08-08 ENCOUNTER — VIRTUAL VISIT (OUTPATIENT)
Dept: OBGYN | Facility: CLINIC | Age: 33
End: 2023-08-08
Payer: COMMERCIAL

## 2023-08-08 DIAGNOSIS — O09.91 SUPERVISION OF HIGH RISK PREGNANCY IN FIRST TRIMESTER: Primary | ICD-10-CM

## 2023-08-08 DIAGNOSIS — O36.80X0 PREGNANCY WITH INCONCLUSIVE FETAL VIABILITY: ICD-10-CM

## 2023-08-08 DIAGNOSIS — Z13.79 GENETIC SCREENING: ICD-10-CM

## 2023-08-08 PROBLEM — O09.90 SUPERVISION OF HIGH-RISK PREGNANCY: Status: ACTIVE | Noted: 2023-08-08

## 2023-08-08 PROCEDURE — 99207 PR NO CHARGE NURSE ONLY: CPT

## 2023-08-08 NOTE — PROGRESS NOTES
SUBJECTIVE:     HPI:    This is a 33 year old female patient,  who presents for her first obstetrical visit.    MYKE: 3/27/2024, by Last Menstrual Period.  She is 6w6d weeks.  Her cycles are irregular.  Her last menstrual period was normal.   Since her LMP, she has experienced  nausea, fatigue, and bloating ).   She denies emesis, abdominal pain, headache, loss of appetite, vaginal discharge, dysuria, pelvic pain, urinary urgency, lightheadedness, urinary frequency, vaginal bleeding, hemorrhoids, and constipation.    Additional History: -SAB in May 2023 - D&C at 11wk - very nervous about   -Type 1 DM - insulin managed by Endo  -hypothyroidism - Endo manages Levothyroxine      Have you travelled during the pregnancy?No  Have your sexual partner(s) travelled during the pregnancy?No    HISTORY:   Planned Pregnancy: Yes  Marital Status:   Occupation: Unemployed currently  Living in Household: Spouse    Past History:  Her past medical history   Past Medical History:   Diagnosis Date    Adjustment disorder with anxious mood     Allergic rhinitis, cause unspecified     h/o AIT    Chest discomfort     Common migraine     No visual aura.     Hyperlipidemia LDL goal < 70     Hypothyroidism     Infectious mononucleosis 1995    Insulin pump in place     Dexcom continuous glucose monitoring    Morbid obesity (H)     Tuberculosis     Type 1 diabetes, HbA1c goal < 7% (H) 2004     followed N - [facility] endocrinology - now Dr Jimenez   .      She has a history of   SAB    Since her last LMP she denies use of alcohol, tobacco and street drugs.    Past medical, surgical, social and family history were reviewed and updated in [facility].    Current Outpatient Medications   Medication    Continuous Blood Gluc Sensor (DEXCOM G6 SENSOR) MISC    Continuous Blood Gluc Transmit (DEXCOM G6 TRANSMITTER) MISC    CONTOUR NEXT TEST test strip    Flaxseed, Linseed, (FLAX SEED OIL PO)    Glucagon (BAQSIMI TWO PACK) 3  MG/DOSE POWD    insulin aspart (NOVOLOG PEN) 100 UNIT/ML pen    insulin aspart (NOVOLOG VIAL) 100 UNITS/ML vial    insulin glargine (LANTUS PEN) 100 UNIT/ML pen    insulin pen needle (BD CLEOPATRA U/F) 32G X 4 MM miscellaneous    levothyroxine (SYNTHROID/LEVOTHROID) 125 MCG tablet    levothyroxine (SYNTHROID/LEVOTHROID) 137 MCG tablet    Microlet Lancets MISC    Prenatal w/o A Vit-Fe Fum-FA (PRENATA PO)    SEMGLEE, YFGN, 100 UNIT/ML SOPN    sertraline (ZOLOFT) 50 MG tablet    naratriptan (AMERGE) 1 MG tablet    triamcinolone (KENALOG) 0.1 % external cream     No current facility-administered medications for this visit.       ROS:   12 point review of systems negative other than symptoms noted below or in the HPI.  Constitutional: Fatigue  Gastrointestinal: Bloating and Nausea    Nurse phone visit completed. Prenatal book and folder (containing standard educational hand-outs and brochures) will be given at next visit to patient. Information in folder reviewed over the phone. Questions answered. Brochure given on optional screening available to assess chromosomal anomalies. Pt desires NIPS. Pt advised to call the clinic if she has any questions or concerns related to her pregnancy. Prenatal labs future ordered. New prenatal visit scheduled on 8/28/23 with DR Santoyo.  Shania Schulz, RN on 8/8/2023 at 11:59 AM    Lab Results   Component Value Date    PAP NIL 01/25/2021     PHQ-9 score:        8/7/2023    12:51 PM   PHQ   PHQ-9 Total Score 2   Q9: Thoughts of better off dead/self-harm past 2 weeks Not at all         4/18/2023     1:03 PM 5/1/2023    10:38 AM 8/7/2023    12:51 PM   TRESA-7 SCORE   Total Score 2 (minimal anxiety)    2 (minimal anxiety) 2 (minimal anxiety) 4 (minimal anxiety)   Total Score 2 2 4     Patient supplied answers from flow sheet for:  Prenatal OB Questionnaire.  Past Medical History  Have you ever recieved care for your mental health? : (!) Yes  Have you ever been in a major accident or suffered  serious trauma?: No  Within the last year, has anyone hit, slapped, kicked or otherwise hurt you?: No  In the last year, has anyone forced you to have sex when you didn't want to?: No    Past Medical History 2   Have you ever received a blood transfusion?: No  Would you accept a blood transfusion if was medically recommended?: Yes  Does anyone in your home smoke?: No   Is your blood type Rh negative?: Unknown  Have you ever ?: No  Have you been hospitalized for a nonsurgical reason excluding normal delivery?: No  Have you ever had an abnormal pap smear?: No    Past Medical History (Continued)  Do you have a history of abnormalities of the uterus?: No  Did your mother take NICO or any other hormones when she was pregnant with you?: No  Do you have any other problems we have not asked about which you feel may be important to this pregnancy?: No

## 2023-08-08 NOTE — PATIENT INSTRUCTIONS
Learning About Pregnancy  Your Care Instructions     Your health in the early weeks of your pregnancy is particularly important for your baby's health. Take good care of yourself. Anything you do that harms your body can also harm your baby.  Make sure to go to all of your doctor appointments. Regular checkups will help keep you and your baby healthy.  How can you care for yourself at home?  Diet    Eat a balanced diet. Make sure your diet includes plenty of beans, peas, and leafy green vegetables.     Do not skip meals or go for many hours without eating. If you are nauseated, try to eat a small, healthy snack every 2 to 3 hours.     Do not eat fish that has a high level of mercury, such as shark, swordfish, or mackerel. Do not eat more than one can of tuna each week.     Drink plenty of fluids. If you have kidney, heart, or liver disease and have to limit fluids, talk with your doctor before you increase the amount of fluids you drink.     Cut down on caffeine, such as coffee, tea, and cola.     Do not drink alcohol, such as beer, wine, or hard liquor.     Take a multivitamin that contains at least 400 micrograms (mcg) of folic acid to help prevent birth defects. Fortified cereal and whole wheat bread are good additional sources of folic acid.     Increase the calcium in your diet. Try to drink a quart of skim milk each day. You may also take calcium supplements and choose foods such as cheese and yogurt.   Lifestyle    Make sure you go to your follow-up appointments.     Get plenty of rest. You may be unusually tired while you are pregnant.     Get at least 30 minutes of exercise on most days of the week. Walking is a good choice. If you have not exercised in the past, start out slowly. Take several short walks each day.     Do not smoke. If you need help quitting, talk to your doctor about stop-smoking programs. These can increase your chances of quitting for good.     Do not touch cat feces or litter boxes.  Also, wash your hands after you handle raw meat, and fully cook all meat before you eat it. Wear gloves when you work in the yard or garden, and wash your hands well when you are done. Cat feces, raw or undercooked meat, and contaminated dirt can cause an infection that may harm your baby or lead to a miscarriage.     Avoid things that can make your body too hot and may be harmful to your baby, such as a hot tub or sauna. Or talk with your doctor before doing anything that raises your body temperature. Your doctor can tell you if it's safe.     Avoid chemical fumes, paint fumes, or poisons.     Do not use illegal drugs, marijuana, or alcohol.   Medicines    Review all of your medicines with your doctor. Some of your routine medicines may need to be changed to protect your baby.     Use acetaminophen (Tylenol) to relieve minor problems, such as a mild headache or backache or a mild fever with cold symptoms. Do not use nonsteroidal anti-inflammatory drugs (NSAIDs), such as ibuprofen (Advil, Motrin) or naproxen (Aleve), unless your doctor says it is okay.     Do not take two or more pain medicines at the same time unless the doctor told you to. Many pain medicines have acetaminophen, which is Tylenol. Too much acetaminophen (Tylenol) can be harmful.     Take your medicines exactly as prescribed. Call your doctor if you think you are having a problem with your medicine.   To manage morning sickness    If you feel sick when you first wake up, try eating a small snack (such as crackers) before you get out of bed. Allow some time to digest the snack, and then get out of bed slowly.     Do not skip meals or go for long periods without eating. An empty stomach can make nausea worse.     Eat small, frequent meals instead of three large meals each day.     Drink plenty of fluids.     Eat foods that are high in protein but low in fat.     If you are taking iron supplements, ask your doctor if they are necessary. Iron can make  "nausea worse.     Avoid any smells, such as coffee, that make you feel sick.     Get lots of rest. Morning sickness may be worse when you are tired.   Follow-up care is a key part of your treatment and safety. Be sure to make and go to all appointments, and call your doctor if you are having problems. It's also a good idea to know your test results and keep a list of the medicines you take.  Where can you learn more?  Go to https://www.Fragegg.net/patiented  Enter E868 in the search box to learn more about \"Learning About Pregnancy.\"  Current as of: November 9, 2022               Content Version: 13.7    4745-7027 [employer].   Care instructions adapted under license by your healthcare professional. If you have questions about a medical condition or this instruction, always ask your healthcare professional. [employer] disclaims any warranty or liability for your use of this information.      Weeks 6 to 10 of Your Pregnancy: Care Instructions  During these weeks of pregnancy, your body goes through many changes. You may start to feel different, both in your body and your emotions. Each pregnancy is different, so there's no \"right\" way to feel. These early weeks are a time to make healthy choices for you and your pregnancy.    Take a daily prenatal vitamin. Choose one with folic acid in it.   Avoid alcohol, tobacco, and drugs (including marijuana). If you need help quitting, talk to your doctor.     Drink plenty of liquids.  Be sure to drink enough water. And limit sodas, other sweetened drinks, and caffeine.     Choose foods that are good sources of calcium, iron, and folate.  You can try dairy products, dark leafy greens, fortified orange juice and cereals, almonds, broccoli, dried fruit, and beans.     Avoid foods that may be harmful.  Don't eat raw meat, deli meat, raw seafood, or raw eggs. Avoid soft cheese and unpasteurized dairy, like Brie and blue cheese. And don't eat fish that " "contains a lot of mercury, like shark and swordfish.     Don't touch mariaelena litter or cat poop.  They can cause an infection that could be harmful during pregnancy.     Avoid things that can make your body too hot.  For example, avoid hot tubs and saunas.     Soothe morning sickness.  Try eating 5 or 6 small meals a day, getting some fresh air, or using shyam to control symptoms.     Ask your doctor about flu and COVID-19 shots.  Getting them can help protect against infection.   Follow-up care is a key part of your treatment and safety. Be sure to make and go to all appointments, and call your doctor if you are having problems. It's also a good idea to know your test results and keep a list of the medicines you take.  Where can you learn more?  Go to https://www.WemoLab.CAD Crowd/patiented  Enter G112 in the search box to learn more about \"Weeks 6 to 10 of Your Pregnancy: Care Instructions.\"  Current as of: November 9, 2022               Content Version: 13.7 2006-2023 [employer].   Care instructions adapted under license by your healthcare professional. If you have questions about a medical condition or this instruction, always ask your healthcare professional. [employer] disclaims any warranty or liability for your use of this information.         Managing Morning Sickness (01:55)  Your health professional recommends that you watch this short online health video.  Learn tips for dealing with morning sickness, no matter what time of day you have it.  Purpose:  Gives tips for managing morning sickness, including eating small low-fat meals and avoiding caffeine and spicy food.  Goal:  The user will learn tips for dealing with morning sickness during pregnancy.     How to watch the video    Scan the QR code   OR Visit the website    https://hwi.se/[email or web address]   Current as of: November 9, 2022               Content Version: 13.7 2006-2023 [employer].   Care instructions " adapted under license by your healthcare professional. If you have questions about a medical condition or this instruction, always ask your healthcare professional. Health[employer], [employer] disclaims any warranty or liability for your use of this information.      Pregnancy and Heartburn: Care Instructions  Overview     Heartburn is a common problem during pregnancy.  Heartburn happens when stomach acid backs up into the tube that carries food to the stomach. This tube is called the esophagus. Early in pregnancy, heartburn is caused by hormone changes that slow down digestion. Later on, it's also caused by the large uterus pushing up on the stomach.  Even though you can't fix the cause, there are things you can do to get relief. Treating heartburn during pregnancy focuses first on making lifestyle changes, like changing what and how you eat, and on taking medicines.  Heartburn usually improves or goes away after childbirth.  Follow-up care is a key part of your treatment and safety. Be sure to make and go to all appointments, and call your doctor if you are having problems. It's also a good idea to know your test results and keep a list of the medicines you take.  How can you care for yourself at home?  Eat small, frequent meals.  Avoid foods that make your symptoms worse, such as chocolate, peppermint, and spicy foods. Avoid drinks with caffeine, such as coffee, tea, and sodas.  Avoid bending over or lying down after meals.  Take a short walk after you eat.  If heartburn is a problem at night, do not eat for 2 hours before bedtime.  Take antacids like Mylanta, Maalox, Rolaids, or Tums. Do not take antacids that have sodium bicarbonate, magnesium trisilicate, or aspirin. Be careful when you take over-the-counter antacid medicines. Many of these medicines have aspirin in them. While you are pregnant, do not take aspirin or medicines that contain aspirin unless your doctor says it is okay.  If you're not getting  "relief, talk to your doctor. You may be able to take a stronger acid-reducing medicine.  When should you call for help?   Call your doctor now or seek immediate medical care if:    You have new or worse belly pain.     You are vomiting.   Watch closely for changes in your health, and be sure to contact your doctor if:    You have new or worse symptoms of reflux.     You are losing weight.     You have trouble or pain swallowing.     You do not get better as expected.   Where can you learn more?  Go to https://www.[email or web address].net/patiented  Enter U946 in the search box to learn more about \"Pregnancy and Heartburn: Care Instructions.\"  Current as of: November 9, 2022               Content Version: 13.7 2006-2023 [employer].   Care instructions adapted under license by your healthcare professional. If you have questions about a medical condition or this instruction, always ask your healthcare professional. [employer] disclaims any warranty or liability for your use of this information.      Constipation: Care Instructions  Overview     Constipation means that you have a hard time passing stools (bowel movements). People pass stools from 3 times a day to once every 3 days. What is normal for you may be different. Constipation may occur with pain in the rectum and cramping. The pain may get worse when you try to pass stools. Sometimes there are small amounts of bright red blood on toilet paper or the surface of stools. This is because of enlarged veins near the rectum (hemorrhoids).  A few changes in your diet and lifestyle may help you avoid ongoing constipation. Your doctor may also prescribe medicine to help loosen your stool.  Some medicines can cause constipation. These include pain medicines and antidepressants. Tell your doctor about all the medicines you take. Your doctor may want to make a medicine change to ease your symptoms.  Follow-up care is a key part of your treatment and " "safety. Be sure to make and go to all appointments, and call your doctor if you are having problems. It's also a good idea to know your test results and keep a list of the medicines you take.  How can you care for yourself at home?  Drink plenty of fluids. If you have kidney, heart, or liver disease and have to limit fluids, talk with your doctor before you increase the amount of fluids you drink.  Include high-fiber foods in your diet each day. These include fruits, vegetables, beans, and whole grains.  Get at least 30 minutes of exercise on most days of the week. Walking is a good choice. You also may want to do other activities, such as running, swimming, cycling, or playing tennis or team sports.  Take a fiber supplement, such as Citrucel or Metamucil, every day. Read and follow all instructions on the label.  Schedule time each day for a bowel movement. A daily routine may help. Take your time having a bowel movement, but don't sit for more than 10 minutes at a time. And don't strain too much.  Support your feet with a small step stool when you sit on the toilet. This helps flex your hips and places your pelvis in a squatting position.  Your doctor may recommend an over-the-counter laxative to relieve your constipation. Examples are Milk of Magnesia and MiraLax. Read and follow all instructions on the label. Do not use laxatives on a long-term basis.  When should you call for help?   Call your doctor now or seek immediate medical care if:    You have new or worse belly pain.     You have new or worse nausea or vomiting.     You have blood in your stools.   Watch closely for changes in your health, and be sure to contact your doctor if:    Your constipation is getting worse.     You do not get better as expected.   Where can you learn more?  Go to https://www.healthwise.net/patiented  Enter P343 in the search box to learn more about \"Constipation: Care Instructions.\"  Current as of: March 22, " "2023               Content Version: 13.7 2006-2023 [employer].   Care instructions adapted under license by your healthcare professional. If you have questions about a medical condition or this instruction, always ask your healthcare professional. [employer] disclaims any warranty or liability for your use of this information.      Learning About High-Iron Foods  What foods are high in iron?     The foods you eat contain nutrients, such as vitamins and minerals. Iron is a nutrient. Your body needs the right amount to stay healthy and work as it should. You can use the list below to help you make choices about which foods to eat.  Here are some foods that contain iron. They have 1 to 2 milligrams of iron per serving.  Fruits  Figs (dried), 5 figs  Vegetables  Asparagus (canned), 6 louis  Geri, beet, Swiss chard, or turnip greens, 1 cup  Dried peas, cooked,   cup  Seaweed, spirulina (dried),   cup  Spinach, (cooked)   cup or (raw) 1 cup  Grains  Cereals, fortified with iron, 1 cup  Grits (instant, cooked), fortified with iron,   cup  Meats and other protein foods  Beans (kidney, lima, navy, white), canned or cooked,   cup  Beef or lamb, 3 oz  Chicken giblets, 3 oz  Chickpeas (garbanzo beans),   cup  Liver of beef, lamb, or pork, 3 oz  Oysters (cooked), 3 oz  Sardines (canned), 3 oz  Soybeans (boiled),   cup  Tofu (firm),   cup  Work with your doctor to find out how much of this nutrient you need. Depending on your health, you may need more or less of it in your diet.  Where can you learn more?  Go to https://www.healthKlipfolio.net/patiented  Enter R005 in the search box to learn more about \"Learning About High-Iron Foods.\"  Current as of: March 1, 2023               Content Version: 13.7 2006-2023 [employer].   Care instructions adapted under license by your healthcare professional. If you have questions about a medical condition or this instruction, always ask your " "healthcare professional. [employer], [facility] disclaims any warranty or liability for your use of this information.      Rh Antibodies Screening During Pregnancy: About This Test  What is it?     The Rh antibodies screening test is a blood test. It checks your blood for Rh antibodies. If you have Rh-negative blood and have been exposed to Rh-positive blood, your immune system may make antibodies to attack the Rh-positive blood. When a pregnant woman has these antibodies, it is called Rh sensitization.  Why is this test done?  The Rh antibodies screening test is done during pregnancy to find out if your baby is at risk for Rh disease. This can happen if you have Rh-negative blood and your baby has Rh-positive blood. If your Rh-negative blood mixes with Rh-positive blood, your immune system will make antibodies to attack the Rh-positive blood.  During pregnancy, these antibodies could attach to the baby's red blood cells. This can cause your baby to have serious health problems. The results of this test will help your doctor know how to best care for you and your baby during your pregnancy.  How do you prepare for the test?  In general, there's nothing you have to do before this test, unless your doctor tells you to.  How is the test done?  A health professional uses a needle to take a blood sample, usually from the arm.  What happens after the test?  You will probably be able to go home right away. It depends on the reason for the test.  You can go back to your usual activities right away.  Follow-up care is a key part of your treatment and safety. Be sure to make and go to all appointments, and call your doctor if you are having problems. It's also a good idea to keep a list of the medicines you take. Ask your doctor when you can expect to have your test results.  Where can you learn more?  Go to https://www.MailTime.net/patiented  Enter P722 in the search box to learn more about \"Rh Antibodies Screening " "During Pregnancy: About This Test.\"  Current as of: 2022               Content Version: 13.7    8037-6916 [employer].   Care instructions adapted under license by your healthcare professional. If you have questions about a medical condition or this instruction, always ask your healthcare professional. [employer] disclaims any warranty or liability for your use of this information.      Learning About Preventing Rh Disease  What is Rh disease?     Rh disease can be a serious problem in pregnancy. It happens when substances called antibodies in the mother's blood cause red blood cells in her baby's blood to be destroyed. This can occur when the blood types of a mother and her baby do not match.  All blood has an Rh factor. This is what makes a blood type positive or negative. When you are Rh-negative, your baby may be Rh-negative or Rh-positive. If your baby has Rh-positive blood and it mixes with yours, your body will make antibodies. This is called Rh sensitization.  Most of the time, this is not a problem in a first pregnancy. But in future pregnancies, it could cause Rh disease.  A  with Rh disease has mild anemia and may have jaundice. In severe cases, anemia, jaundice, and swelling can be very dangerous or fatal. Some babies need to be delivered early. Some need special care in the NICU. A very sick baby will need a blood transfusion before or after birth.  Fortunately, Rh sensitization is usually easy to prevent.  That's why it's important to get your Rh status checked in your first trimester. It doesn't cause any warning signs. A blood test is the only way to know if you are Rh-sensitive or are at risk for it.  How can you prevent Rh disease?  If you are Rh-negative, your doctor gives you an Rh immune globulin shot (such as RhoGAM). It helps prevent your body from making the antibodies that attack your baby's red blood cells.  Timing is important. You need the " "shot at certain times during your pregnancy. And you need one anytime there is a chance that your baby's blood might mix with yours. That can happen with certain prenatal tests or when you have pregnancy bleeding, such as:  Right after any pregnancy loss, amniocentesis, or CVS testing.  After turning of a breech baby.  Before and maybe after childbirth. Your doctor gives you a shot around week 28. If your  is Rh-positive, you will have another shot.  Follow-up care is a key part of your treatment and safety. Be sure to make and go to all appointments, and call your doctor if you are having problems. It's also a good idea to know your test results and keep a list of the medicines you take.  Where can you learn more?  Go to https://[email or web address]/[email or web address]  Enter W177 in the search box to learn more about \"Learning About Preventing Rh Disease.\"  Current as of: 2022               Content Version: 13.7    6868-7449 [employer].   Care instructions adapted under license by your healthcare professional. If you have questions about a medical condition or this instruction, always ask your healthcare professional. [employer] disclaims any warranty or liability for your use of this information.      Learning About Rh Immunoglobulin Shots  Introduction     An Rh immunoglobulin shot is given to pregnant women who have Rh-negative blood.  You may have Rh-negative blood, and your baby may have Rh-positive blood. If the two types of blood mix, your body will make antibodies. This is called Rh sensitization. Most of the time, this is not a problem the first time you're pregnant. But it could cause problems in future pregnancies.  This shot keeps your body from making the antibodies. You get the shot around 28 weeks of pregnancy. After the birth, your baby's blood is tested. If the blood is Rh positive, you will get another shot. You may also get the shot if you have vaginal bleeding " "while you are pregnant or if you have a miscarriage. These shots protect future pregnancies.  Women with Rh negative blood will need this shot each time they get pregnant.  Example  Rh immunoglobulin (HypRho-D, MICRhoGAM, and RhoGAM)  Possible side effects  Rare side effects may include:  Some mild pain where you got the shot.  A slight fever.  An allergic reaction.  You may have other side effects not listed here. Check the information that comes with your medicine.  What to know about taking this medicine  You may need more than one shot. You may need the shot again:  After amniocentesis, fetal blood sampling, or chorionic villus sampling tests.  If you have bleeding in your second or third trimester.  After turning of a breech baby.  After an injury to the belly while you are pregnant.  After a miscarriage or an .  Before or right after treatment for an ectopic or a partial molar pregnancy.  Tell your doctor if you have any allergies or have had a bad response to medicines in the past.  If you get this shot within 3 months of getting a live-virus vaccine, the vaccine may not work. Your doctor will tell you if you need more vaccine.  Check with your doctor or pharmacist before you use any other medicines. This includes over-the-counter medicines. Make sure your doctor knows all of the medicines, vitamins, herbs, and supplements you take. Taking some medicines at the same time can cause problems.  Where can you learn more?  Go to https://www.Salad Labs.net/patiented  Enter V615 in the search box to learn more about \"Learning About Rh Immunoglobulin Shots.\"  Current as of: 2022               Content Version: 13.7    8233-6760 [employer].   Care instructions adapted under license by your healthcare professional. If you have questions about a medical condition or this instruction, always ask your healthcare professional. [employer] disclaims any warranty or liability for " your use of this information.      Rubella ([place] Measles): Care Instructions  Overview  Rubella, also called [place] measles or 3-day measles, is a disease caused by a virus. It spreads by coughs, sneezes, and close contact. Rubella usually is mild and does not cause long-term problems. But if you are pregnant and get it, you can give the disease to your unborn baby. This can cause serious birth defects.  While you have rubella, you may get a rash and a mild fever, and the lymph glands in your neck may swell. Older children often have a fever, eye pain, a sore throat, and body aches. You can relieve most symptoms with care at home. Avoid being around others, especially pregnant people, until your rash has been gone for at least 4 days. People who have not had this disease before or have not had the vaccine have the greatest chance of getting the virus.  Follow-up care is a key part of your treatment and safety. Be sure to make and go to all appointments, and call your doctor if you are having problems. It's also a good idea to know your test results and keep a list of the medicines you take.  How can you care for yourself at home?  Drink plenty of fluids. If you have kidney, heart, or liver disease and have to limit fluids, talk with your doctor before you increase the amount of fluids you drink.  Get plenty of rest to help your body heal.  Take an over-the-counter pain medicine, such as acetaminophen (Tylenol), ibuprofen (Advil, Motrin), or naproxen (Aleve), to reduce fever and discomfort. Read and follow all instructions on the label. Do not give aspirin to anyone younger than 20. It has been linked to Reye syndrome, a serious illness.  Do not take two or more pain medicines at the same time unless the doctor told you to. Many pain medicines have acetaminophen, which is Tylenol. Too much acetaminophen (Tylenol) can be harmful.  Try not to scratch the rash. Put cold, wet cloths on the rash to reduce itching.  Do  "not smoke. Smoking can make your symptoms worse. If you need help quitting, talk to your doctor about stop-smoking programs and medicines. These can increase your chances of quitting for good.  Avoid contact with people who have never had rubella and who have not been immunized.  When should you call for help?   Call your doctor now or seek immediate medical care if:    You have a fever with a stiff neck or a severe headache.     You are sensitive to light or feel very sleepy or confused.   Watch closely for changes in your health, and be sure to contact your doctor if:    You do not get better as expected.   Where can you learn more?  Go to https://www.[employer].net/patiented  Enter B812 in the search box to learn more about \"Rubella (Serbian Measles): Care Instructions.\"  Current as of: 2022               Content Version: 13.7    9750-2004 [employer].   Care instructions adapted under license by your healthcare professional. If you have questions about a medical condition or this instruction, always ask your healthcare professional. [employer] disclaims any warranty or liability for your use of this information.      Gonorrhea and Chlamydia: About These Tests  What is it?  These tests use a sample of urine or other body fluid to look for the bacteria that cause these sexually transmitted infections (STIs). The fluid sample can come from the cervix, vagina, rectum, throat, or eyes.  Why is this test done?  These tests may be done to:  Find out if symptoms are caused by gonorrhea or chlamydia.  Check people who are at high risk of being infected with gonorrhea or chlamydia.  Retest people several months after they have been treated for gonorrhea or chlamydia.  Check for infection in your  if you had a gonorrhea or chlamydia infection at the time of delivery.  How can you prepare for the test?  If you are going to have a urine test, do not urinate for at least 1 hour " "before the test.  If you think you may have chlamydia or gonorrhea, don't have sexual intercourse until you get your test results. And you may want to have tests for other STIs, such as HIV.  How is the test done?  For a direct sample, a swab is used to collect body fluid from the cervix, vagina, rectum, throat, or eyes. Your doctor may collect the sample. Or you may be given instructions on how to collect your own sample.  For a urine sample, you will collect the urine that comes out when you first start to urinate. Don't wipe the genital area clean before you urinate.  How long does the test take?  The test will take a few minutes.  What happens after the test?  You will be able to go home right away.  You can go back to your usual activities right away.  If you do have an infection, don't have sexual intercourse for 7 days after you start treatment. And your sex partner(s) should also be treated.  Follow-up care is a key part of your treatment and safety. Be sure to make and go to all appointments, and call your doctor if you are having problems. It's also a good idea to keep a list of the medicines you take. Ask your doctor when you can expect to have your test results.  Where can you learn more?  Go to https://www.[email or web address].net/patiented  Enter K976 in the search box to learn more about \"Gonorrhea and Chlamydia: About These Tests.\"  Current as of: August 2, 2022               Content Version: 13.7    2491-9985 [employer].   Care instructions adapted under license by your healthcare professional. If you have questions about a medical condition or this instruction, always ask your healthcare professional. [employer] disclaims any warranty or liability for your use of this information.      Trichomoniasis: About This Test  What is it?     This test uses a sample of urine or other body fluid to look for the tiny parasite that causes trichomoniasis (also called trich). The fluid sample " can come from the vagina, cervix, or urethra. Your doctor may choose to use one or more of many available tests.  Why is it done?  A trich test may be done to:  Find out if symptoms are caused by trich.  Check people who are at high risk for being infected with trich.  Check after treatment to make sure that the infection is gone.  How do you prepare for the test?  If you are going to have a urine test, do not urinate for at least 1 hour before the test.  How is the test done?  For a direct sample, a swab is used to collect body fluid from the cervix, vagina, or urethra. Your doctor may collect the sample. Or you may be given instructions on how to collect your own sample.  For a urine sample, you will collect the urine that comes out when you first start to urinate. Don't wipe the area clean before you urinate.  How long does the test take?  It will take a few minutes to collect a sample.  What happens after the test?  You can go home right away.  You can go back to your usual activities right away.  You may get the test results the same day or several days later. It depends on the test used.  If you do have an infection, don't have sexual intercourse for 7 days after you start treatment. Your sex partner(s) should also be treated.  Follow-up care is a key part of your treatment and safety. Be sure to make and go to all appointments, and call your doctor if you are having problems. Ask your doctor when you can expect to have your test results.  Current as of: August 2, 2022               Content Version: 13.7    9410-9997 [employer].   Care instructions adapted under license by your healthcare professional. If you have questions about a medical condition or this instruction, always ask your healthcare professional. [employer] disclaims any warranty or liability for your use of this information.      HIV Testing: Care Instructions  Overview     You can get tested for the human  "immunodeficiency virus (HIV). This test checks for HIV antibodies and antigens in your blood. If they are found, the test is positive.  If HIV antibodies or antigens are not found, you may need a repeat test to be sure the results are correct. Or your doctor may want to do a different test.  Follow-up care is a key part of your treatment and safety. Be sure to make and go to all appointments, and call your doctor if you are having problems. It's also a good idea to know your test results and keep a list of the medicines you take.  What do the results mean?  Normal result  A normal result means that no HIV antibodies or antigens were found in your blood. And if you had a test that checked for HIV RNA or DNA, none was found. Normal results are called negative.  You may need more testing to be sure the test results are correct.  Uncertain result  Test results don't clearly show whether you have an HIV infection. This is usually called an indeterminate result. This may happen before HIV antibodies or antigens develop. Or it may happen when some other type of antibody or antigen interferes with the results. If this occurs, you will probably have another test right away.  Abnormal result  An abnormal result means that you have HIV antibodies or antigens in your blood. These results are called positive.  A positive test will be confirmed by another type of test. This is because some tests can cause false-positive results. No one is considered HIV-positive until the result is confirmed by a test that shows HIV RNA or DNA in the person's blood.  If your test result is positive, your doctor will talk to you about starting treatment.  Where can you learn more?  Go to https://www.[employer].net/patiented  Enter T792 in the search box to learn more about \"HIV Testing: Care Instructions.\"  Current as of: October 31, 2022               Content Version: 13.7    3054-5862 [employer].   Care instructions adapted under " license by your healthcare professional. If you have questions about a medical condition or this instruction, always ask your healthcare professional. [employer] disclaims any warranty or liability for your use of this information.      Hepatitis C Virus Tests: About These Tests  What are they?     Hepatitis C virus tests are blood tests that check for substances in the blood that show whether you have hepatitis C now or had it in the past. The tests can also tell you what type of hepatitis C you have and how severe the disease is. This can help your doctor with treatment.  If the tests show that you have long-term hepatitis C, you need to take steps to prevent spreading the disease.  Why are these tests done?  You may need these tests if:  You have symptoms of hepatitis.  You may have been exposed to the virus. You are more likely to have been exposed to the virus if you inject drugs or are exposed to body fluids (such as if you are a health care worker).  You've had other tests that show you have liver problems.  You are 18 to 79 years old.  You have an HIV infection.  The tests also are done to help your doctor decide about your treatment and see how well it works.  How do you prepare for the test?  In general, there's nothing you have to do before this test, unless your doctor tells you to.  How is the test done?  A health professional uses a needle to take a blood sample, usually from the arm.  What happens after these tests?  You will probably be able to go home right away.  You can go back to your usual activities right away.  Follow-up care is a key part of your treatment and safety. Be sure to make and go to all appointments, and call your doctor if you are having problems. It's also a good idea to keep a list of the medicines you take. Ask your doctor when you can expect to have your test results.  Where can you learn more?  Go to https://www.[employer].net/patiented  Enter W551 in the search  "box to learn more about \"Hepatitis C Virus Tests: About These Tests.\"  Current as of: October 31, 2022               Content Version: 13.7    8749-4126 [employer].   Care instructions adapted under license by your healthcare professional. If you have questions about a medical condition or this instruction, always ask your healthcare professional. [employer] disclaims any warranty or liability for your use of this information.      Learning About Fetal Ultrasound Results  What is a fetal ultrasound?     Fetal ultrasound is a test that lets your doctor see an image of your baby. Your doctor learns information about your baby from this picture. You may find out, for example, if you are having a boy or a girl. But the main reason you have this test is to get information about your baby's health.  (You may hear your baby called a fetus. This is a common medical term for a baby that's growing in the mother's uterus.)  What kind of information can you learn from this test?  The findings of an ultrasound fall into two categories, normal and abnormal.  Normal  The fetus is the right size for its age.  The placenta is the expected size and does not cover the cervix.  There is enough amniotic fluid in the uterus.  No birth defects can be seen.  Abnormal  The fetus is small or large for its age.  The placenta covers the cervix.  There is too much or too little amniotic fluid in the uterus.  The fetus may have a birth defect.  What does an abnormal result mean?  Abnormal seems to imply that something is wrong with your baby. But what it means is that the test has shown something the doctor wants to take a closer look at.  And that's what happens next. Your doctor will talk to you about what further test or tests you may need.  What do the results mean?  Some of the things your doctor may see on an abnormal ultrasound include:  Echogenic bowel.  The bowel looks very bright on the screen. This could " mean that there's blood in the bowel. Or it could mean that something is blocking the small bowel.  Increased nuchal translucency.  The ultrasound measures the thickness at the back of the baby's neck. An increase in thickness is sometimes an early sign of Down syndrome.  Increased or decreased amniotic fluid.  The doctor will look for a reason for the level of amniotic fluid and will watch the pregnancy closely as it progresses.  Large ventricles.  Ventricles in the brain look larger than they should. Your doctor may take a closer look at the brain.  Renal pyelectasis/hydronephrosis.  The ultrasound measures the fluid around the kidney. If there is more fluid than expected, there is a chance of urinary tract or kidney problems.  Short long bones.  The ultrasound measures certain arm and leg bones. A long bone (humerus or femur) that is shorter than average could be a sign of Down syndrome.  Subchorionic hemorrhage.  An ultrasound can show bleeding under one of the membranes that surrounds the fetus. Some women don't have symptoms of bleeding. The ultrasound can find this problem when women are not bleeding from their vagina. Women who have this condition have a slightly higher chance of miscarriage.  What do you do now?  Take a deep breath, and let it out. Keep in mind that an abnormal finding on an ultrasound, after it's coupled with more information, may:  Turn out to be nothing.  Turn out to be something mild that won't affect the baby.  Turn out to be something more serious. But if this happens, early diagnosis helps you and your doctor plan treatment options sooner rather than later.  Your medical team is there for you. So are your family and friends. Ask questions, and get the help and support you need.  Follow-up care is a key part of your treatment and safety. Be sure to make and go to all appointments, and call your doctor if you are having problems. It's also a good idea to know your test results and keep  "a list of the medicines you take.  Where can you learn more?  Go to https://www.[employer]net/patiented  Enter K451 in the search box to learn more about \"Learning About Fetal Ultrasound Results.\"  Current as of: November 9, 2022               Content Version: 13.7    0224-6154 [employer].   Care instructions adapted under license by your healthcare professional. If you have questions about a medical condition or this instruction, always ask your healthcare professional. [employer] disclaims any warranty or liability for your use of this information.      Learning About Prenatal Visits  Your Care Instructions     Regular prenatal visits are very important during any pregnancy. These quick office visits may seem simple and routine. But they can help you and your baby stay healthy. Your doctor is watching for problems that can only be found by regularly checking you and your baby. The visits also give you and your doctor time to build a good relationship.  Many women have prenatal visits every 4 to 6 weeks until week 28 of pregnancy. Then the visits become more frequent. This is often every 2 to 3 weeks through week 36 of pregnancy. In the final month of pregnancy, you likely will see your doctor every week. You may have a different schedule if you have a medical problem or are a teen.  At different times in your pregnancy, you will have exams and tests. Some are routine. Others are done only when there is a chance of a problem. Everything healthy you do for your body helps your growing baby. Rest when you need it. Eat well, drink plenty of water, and exercise regularly.  What happens during a prenatal visit?  You will have blood pressure checks, along with urine tests. You also may have blood tests. If you need to go to the bathroom while waiting for the doctor, tell the nurse. He or she will give you a sample cup so your urine can be tested.  You will be weighed and have your belly " measured.  Your doctor may listen to your baby's heartbeat with a special stethoscope.  In your second trimester, your doctor will check your blood sugar (glucose tolerance test) for diabetes that can occur during pregnancy. This is gestational diabetes, which can harm your baby.  You will have tests to check for infections that could harm your . These include group B streptococcus and hepatitis B.  Your doctor may do ultrasounds to check for problems. This also checks your baby's position. An ultrasound uses sound waves to produce a picture of your baby.  You may have other tests at any time during your pregnancy.  Use your visits to discuss with your doctor any concerns you have.  How can you care for yourself at home?  Get plenty of rest.  Exercise every day, if your doctor says it is okay. If you have not exercised in the past, start out slowly. Take many short walks each day.  Eat a balanced diet. Make sure your diet includes plenty of beans, peas, and leafy green vegetables.  Drink plenty of fluids. Cut down on drinks with caffeine, such as coffee, tea, and cola. If you have kidney, heart, or liver disease and have to limit fluids, talk with your doctor before you increase the amount of fluids you drink.  Avoid chemical fumes, paint fumes, and poisons. Do not use alcohol, marijuana, or illegal drugs. Do not smoke, vape, or use tobacco. If you need help quitting, talk to your doctor about stop-smoking programs and medicines. These can increase your chances of quitting for good.  Review all of your medicines with your doctor. Some of your routine medicines may need to be changed to protect your baby. Do not stop or start taking any medicines without talking to your doctor first.  Follow-up care is a key part of your treatment and safety. Be sure to make and go to all appointments, and call your doctor if you are having problems. It's also a good idea to know your test results and keep a list of the  "medicines you take.  Where can you learn more?  Go to https://www.[employer]XKO.net/patiented  Enter J502 in the search box to learn more about \"Learning About Prenatal Visits.\"  Current as of: November 9, 2022               Content Version: 13.7    0522-6649 [employer].   Care instructions adapted under license by your healthcare professional. If you have questions about a medical condition or this instruction, always ask your healthcare professional. [employer] disclaims any warranty or liability for your use of this information.      Domestic Abuse: Care Instructions  Overview     If you want to save this information but don't think it is safe to take it home, see if a trusted friend can keep it for you. Plan ahead. Know who you can call for help, and memorize the phone number.   Be careful online too. Your online activity may be seen by others. Do not use your personal computer or device to read about this topic. Use a safe computer such as one at work, a friend's house, or a library.    Domestic abuse is different from an argument now and then. It is a pattern of abuse that one person uses to control another person's behavior. It may start with threats and name-calling. Then, it may lead to more serious acts, like pushing and slapping. The abuse also may occur in other areas. For example, the abuser may withhold money or spend a partner's money without their knowledge.  Abuse can cause serious harm. You are more likely to have a long-term health problem from the injuries and stress of living in a violent relationship. People who are sexually abused by their partners have more sexually transmitted infections and unwanted pregnancies. Anyone can be abused in relationships. Anyone who is abused also faces emotional pain.  If you are pregnant, abuse can cause problems such as poor weight gain, infections, and bleeding. Abuse during this time may increase your baby's risk of low birth " "weight, premature birth, and death.  Follow-up care is a key part of your treatment and safety. Be sure to make and go to all appointments, and call your doctor if you are having problems. It's also a good idea to know your test results and keep a list of the medicines you take.  How can you care for yourself at home?  If you do not have a safe place to stay, discuss this with your doctor before you leave.  Have a plan for where to go, how to leave your house, and where to stay in case of an emergency. Do not tell your partner about your plan. Contact:  The National Domestic Violence Hotline toll-free at 1-606.570.3692. They can help you find resources in your area.  Your local police department, hospital, or clinic for information about shelters and safe homes near you.  Talk to a trusted friend or neighbor or a [employer] counselor. Do not feel that you have to hide what happened.  Teach your children how to call for help in an emergency.  Be alert to warning signs, such as threats, heavy alcohol use, or drug use. This can help you avoid danger.  If you can, make sure that there are no guns or other weapons in the house.  When should you call for help?   Call 911 anytime you think you may need emergency care. For example, call if:    You or someone else has just been abused.     You think you or someone else is in danger of being abused.   Watch closely for changes in your health, and be sure to contact your doctor if you have any problems.  Where can you learn more?  Go to https://www.[employer].net/patiented  Enter G282 in the search box to learn more about \"Domestic Abuse: Care Instructions.\"  Current as of: February 27, 2023               Content Version: 13.7    7927-4093 [employer].   Care instructions adapted under license by your healthcare professional. If you have questions about a medical condition or this instruction, always ask your healthcare professional. [employer] " disclaims any warranty or liability for your use of this information.      Domestic Violence Safety Instructions: Care Instructions  Overview     If you want to save this information but don't think it is safe to take it home, see if a trusted friend can keep it for you. Plan ahead. Know who you can call for help, and memorize the phone number.   Be careful online too. Your online activity may be seen by others. Do not use your personal computer or device to read about this topic. Use a safe computer such as one at work, a friend's house, or a library.    When you are abused by a spouse or partner, you can take actions to protect yourself and your children.  You can increase your safety whether you decide to stay with your spouse or partner or you decide to leave. You can also prepare an action plan and kit ahead of time. This will allow you to leave quickly when you decide to. Remember, you cannot stop your partner's abuse, but you can find help for you and your children. No one deserves to be abused.  Follow-up care is a key part of your treatment and safety. Be sure to make and go to all appointments, and call your doctor if you are having problems. It's also a good idea to know your test results and keep a list of the medicines you take.  How can you care for yourself at home?  Make a plan for your safety   If you decide to stay with your abusive spouse or partner, you can do the following to increase your safety:  Decide what works best to keep you safe in an emergency.  Decide who you can call to help you in an emergency.  Decide if you will call the police if you get hurt again. If you can, agree on a signal with your children or neighbor to call the police for you if you need help. You can flash lights or hang something out of a window.  Choose a place to go for a short time if you need to leave home. Memorize the address and phone number.  Learn escape routes out of your home in case you need to leave in a  hurry. Teach your children different ways to get out of the house quickly if they need to.  Take objects that can be used as weapons (guns, knives, hammers) and hide them or lock them up.  Learn the number of a domestic violence shelter. Talk to the people there about how they can help.  Find out about other community resources that can help you.  Take pictures of bruises or other injuries if you can. You can also take pictures of things your abuser has broken.  Teach your children that violence is never okay. Tell them that they do not deserve to be hurt.  Pack a bag   Prepare a kit with things you will need if you leave the house suddenly. You can try to hide this in your house, or you can leave it with a friend or relative you can trust. You should include the following items in the kit:  A set of keys to your house and car.  Emergency phone numbers and addresses. You might also want to have a map and a small flashlight in case you need to leave in the night.  Money such as cash or checks. You can also ask a trusted friend or family member to hold money for you.  Copies of legal documents such as house and car titles or rent receipts, birth certificates, Social Security card, voter registration, marriage and 's licenses, and your children's health records.  Personal items you would need for a few days, such as clothes, a toothbrush, toothpaste, and any medicines you or your children need.  A favorite toy or book for your child or children.  Diapers and bottles, if you have very young children.  Pictures that show signs of abuse and violence. You may also add pictures of your abuser.  If you leave   If you decide to leave, you can take the following steps:  Go to the emergency room at a hospital if you have been hurt.  Ask the police to be with you as you leave. They can protect you as you leave the house.  If you decide to leave secretly, remember that activities can be tracked. Your abuser may still have  "access to your cell phone, email, and credit cards. It may be possible for these to be traced. Always be aware of your surroundings.  Take the kit you have prepared. If this is an emergency, do not worry about gathering up anything. Just leave--your safety is most important.  If your abuser moves out, change the locks on the doors. If you have a security system, change the access code.  When should you call for help?   Call 911 anytime you think you may need emergency care. For example, call if:    You or someone else has just been abused.     You think you or someone else is in danger of being abused.   Watch closely for changes in your health, and be sure to contact your doctor if you have any problems.  Where can you learn more?  Go to https://www.[employer].net/patiented  Enter A752 in the search box to learn more about \"Domestic Violence Safety Instructions: Care Instructions.\"  Current as of: October 20, 2022               Content Version: 13.7    5788-5529 [employer].   Care instructions adapted under license by your healthcare professional. If you have questions about a medical condition or this instruction, always ask your healthcare professional. [employer] disclaims any warranty or liability for your use of this information.      Learning About Domestic Abuse  What is domestic abuse?  Domestic abuse is threats or violent behavior in a personal relationship. It can happen between past or current partners or spouses. It's also called domestic violence or intimate partner violence.  Domestic abuse can affect people of any ethnic group, race, or Lutheran. It can affect teens, adults, or the elderly. And it can happen to people of any sexual identity or social status. But most abuse victims are women.  Abusers use fear, bullying, and threats to control their partners. They control what their partners do, where they go, or who they see. They may act jealous, controlling, or " possessive. These early signs of abuse may happen soon after the start of the relationship. Sometimes it can be hard to notice abuse at first. But after the relationship becomes more serious, the abuse may get worse.  If you are being abused in your relationship, it's important to get help. The abuse is not your fault, and you don't have to face it alone.  Be careful  It may not be safe to take home domestic abuse information like this handout. Some people ask a trusted friend to keep it for them. It's also important to plan ahead and to memorize the phone number of places you can go for help. If you are concerned about your safety, do not use your computer, smartphone, or tablet to read about domestic abuse.   What are the types of domestic abuse?  Abuse can be emotional, physical, or sexual.  Emotional abuse  Emotional abuse is a pattern of threats, insults, or controlling behavior. It includes verbal abuse. It goes beyond healthy disagreements in a relationship. It's a sign of an unhealthy relationship, and it may be against the law.  Do you feel threatened, intimidated, or controlled? Does your partner threaten your children, other family members, or pets?  Does your partner:  Use jokes meant to embarrass or shame you?  Call you names?  Tell you that you are a bad parent or threaten to take away your children?  Threaten to have you or your family members deported?  Control your access to money or other basic needs?  Control what you do, who you see or talk to, or where you go?  Another form of emotional abuse is denying that it is happening. Or the abuser may act like the abuse is no big deal or is your fault.  Sexual abuse  With sexual abuse, abusers may try to convince or force you to have sex. They may force you into sex acts you're not comfortable with. Or they may sexually assault you. Sexual abuse can happen even if you are in a committed relationship.  Physical abuse  Physical abuse means that a partner  hits, kicks, or physically hurts you. Physical abuse that starts with a slap might lead to kicking, shoving, and choking over time. The abuser may also threaten to hurt or kill you.  What problems can domestic abuse lead to?  Domestic abuse can be very dangerous. It can cause serious, repeated injury. It can even lead to death.  All forms of abuse can cause long-term health problems from the stress of a violent relationship. Verbal abuse can lead to sexual and physical abuse.  Abuse causes emotional pain, depression, anxiety, and post-traumatic stress. Sexual abuse can lead to sexually transmitted infections (including HIV/AIDS) and unplanned pregnancy.  Pregnancy can be a very dangerous time for people in abusive relationships. Pregnant people who are abused may have anemia, infections, bleeding, or poor weight gain. Abuse during this time may also increase your baby's risk of low birth weight, premature birth, and death.  It can be hard for some victims of domestic abuse to ask for help or to leave their relationship. You may feel scared, stuck, or not sure what steps to take. But it's important not to ignore abuse. Talking to someone could be the first step to ending the abuse and taking care of your own health and happiness again.  Where can you get help?  Talk to a trusted friend. Find a local advocacy group, or talk to your doctor about the abuse.  Contact the National Domestic Violence Hotline at 1-978-630-YIQO (1-642.726.4585) for more safety tips. They can guide you to groups in your area that can help. Or go to the National Coalition Against Domestic Violence website at www.thehotline.org to learn more.  Domestic violence groups or a counselor in your area can help you make a safety plan for yourself and your children.  When to call for help  Call 911 anytime you think you may need emergency care. For example, call if:  You think that you or someone you know is in danger of being abused.  You have been  "hurt and can't have someone safely take you to emergency care.  You have just been abused.  A family member has just been abused.  Where can you learn more?  Go to https://www.[employer].net/patiented  Enter S665 in the search box to learn more about \"Learning About Domestic Abuse.\"  Current as of: February 27, 2023               Content Version: 13.7    1109-4864 [employer].   Care instructions adapted under license by your healthcare professional. If you have questions about a medical condition or this instruction, always ask your healthcare professional. [employer] disclaims any warranty or liability for your use of this information.      Vaginal Bleeding During Pregnancy: Care Instructions  Overview     It's common to have some vaginal spotting when you are pregnant. In some cases, the bleeding isn't serious. And there aren't any more problems with the pregnancy.  But sometimes bleeding is a sign of a more serious problem. This is more common if the bleeding is heavy or painful. Examples of more serious problems include miscarriage, an ectopic pregnancy, and a problem with the placenta.  You may have to see your doctor again to be sure everything is okay. You may also need more tests to find the cause of the bleeding.  Home treatment may be all you need. But it depends on what is causing the bleeding. Be sure to tell your doctor if you have any new symptoms or if your symptoms get worse.  The doctor has checked you carefully, but problems can develop later. If you notice any problems or new symptoms, get medical treatment right away.  Follow-up care is a key part of your treatment and safety. Be sure to make and go to all appointments, and call your doctor if you are having problems. It's also a good idea to know your test results and keep a list of the medicines you take.  How can you care for yourself at home?  If your doctor prescribed medicines, take them exactly as directed. Call " "your doctor if you think you are having a problem with your medicine.  Do not have vaginal sex until your doctor says it's okay.  Do not put anything in your vagina until your doctor says it's okay.  Ask your doctor about other activities you can or can't do.  Get a lot of rest. Being pregnant can make you tired.  Do not use nonsteroidal anti-inflammatory drugs (NSAIDs), such as ibuprofen (Advil, Motrin), naproxen (Aleve), or aspirin, unless your doctor says it is okay.  When should you call for help?   Call 911 anytime you think you may need emergency care. For example, call if:    You passed out (lost consciousness).     You have severe vaginal bleeding. This means you are soaking through a pad each hour for 2 or more hours.     You have sudden, severe pain in your belly or pelvis.   Call your doctor now or seek immediate medical care if:    You have new or worse vaginal bleeding.     You are dizzy or lightheaded, or you feel like you may faint.     You have pain in your belly, pelvis, or lower back.     You think that you are in labor.     You have a sudden release of fluid from your vagina.     You've been having regular contractions for an hour. This means that you've had at least 8 contractions within 1 hour or at least 4 contractions within 20 minutes, even after you change your position and drink fluids.     You notice that your baby has stopped moving or is moving much less than normal.   Watch closely for changes in your health, and be sure to contact your doctor if you have any problems.  Where can you learn more?  Go to https://www.[employer].net/patiented  Enter N829 in the search box to learn more about \"Vaginal Bleeding During Pregnancy: Care Instructions.\"  Current as of: November 9, 2022               Content Version: 13.7    2678-2387 [employer].   Care instructions adapted under license by your healthcare professional. If you have questions about a medical condition or this " instruction, always ask your healthcare professional. [employer], [employer] disclaims any warranty or liability for your use of this information.      Weeks 10 to 14 of Your Pregnancy: Care Instructions  It's now possible to hear the fetus's heartbeat with a special ultrasound device. And the fetus's organs are developing.    Decide about tests to check for birth defects. Think about your age, your chance of passing on a family disease, your need to know about any problems, and what you might do after you have the test results.   It's okay to exercise. Try activities such as walking or swimming. Check with your doctor before starting a new program.     You may feel more tired than usual.  Taking naps during the day may help.     You may feel emotional.  It might help to talk to someone.     You may have headaches.  Try lying down and putting a cool cloth over your forehead.     You can use acetaminophen (Tylenol) for pain relief.  Don't take any anti-inflammatory medicines (such as Advil, Motrin, Aleve), unless your doctor says it's okay.     You may feel a fullness or aching in your lower belly.  This can feel like the kind of cramps you might get before a period. A back rub may help.     You may need to urinate more.  Your growing uterus and changing hormones can affect your bladder.     You may feel sick to your stomach (morning sickness).  Try avoiding food and smells that make you feel sick.     Your breasts may feel different.  They may feel tender or get bigger. Your nipples may get darker. Try a bra that gives you good support.     Avoid alcohol, tobacco, and drugs (including marijuana).  If you need help quitting, talk to your doctor.     Take a daily prenatal vitamin.  Choose one with folic acid.   Follow-up care is a key part of your treatment and safety. Be sure to make and go to all appointments, and call your doctor if you are having problems. It's also a good idea to know your test results and keep  "a list of the medicines you take.  Where can you learn more?  Go to https://www.[employer].net/patiented  Enter E090 in the search box to learn more about \"Weeks 10 to 14 of Your Pregnancy: Care Instructions.\"  Current as of: November 9, 2022               Content Version: 13.7    8424-3551 [employer].   Care instructions adapted under license by your healthcare professional. If you have questions about a medical condition or this instruction, always ask your healthcare professional. [employer] disclaims any warranty or liability for your use of this information.      Understanding Alpha and Beta Thalassemia  What is thalassemia?  Thalassemia [reina-teresa-SEE-zainab-uh] is a lifelong blood disorder. It is caused by mutations (changes) in the genes that make hemoglobin.   Hemoglobin is a protein in red blood cells that carries oxygen. Hemoglobin is made of 4 smaller protein chains: 2 blocks of alpha chains and 2 blocks of beta chains (see Figure 1). Each block has heme (iron) in the center. Oxygen sticks to the heme, allowing your body to carry it through the bloodstream. The oxygen is delivered to your whole body.  When you have alpha thalassemia, your alpha blocks are smaller or are missing one or both alpha chains. (See Figure 2 for an example.) For beta thalassemia, the beta blocks are smaller or fewer in number.   With either disorder, your body makes smaller hemoglobin and possibly fewer red blood cells to hold hemoglobin (anemia). You may feel very tired or have other problems as a result.  How do you get thalassemia?  There are 4 genes for alpha thalassemia and 2 genes for beta thalassemia. For you to have either alpha or beta thalassemia, both of your parents must carry a gene mutation for the disease and pass it down to you.   It's possible to have more severe or less severe symptoms than your parents. If you get a mutation from only one parent, for example, you won't have symptoms of " thalassemia (thalassemia trait)--but you could still pass the mutation to your children.  Types of thalassemias  Some types of thalassemia have fewer symptoms than others. How severe the thalassemia is depends on how many mutated genes you have inherited and how many alpha or beta blocks are affected.   Alpha Thalassemia  Silent carrier (1 alpha mutation): People with this type have no symptoms and often do not know they have thalassemia.  Alpha thalassemia trait (2 alpha mutations): Some people with this trait may have mild anemia, but they often feel well.  Hemoglobin H disease (3 alpha mutations): People with this disorder have anemia more often. They sometimes need blood transfusions, but can have a normal life span.  Hemoglobin Barts (4 alpha mutations):  With this type, no alpha blocks are made. Severe problems occur during the mother's pregnancy and newborns rarely survive.  Beta Thalassemia  Beta thalassemia trait (1 beta mutation): People with this trait (also called beta thalassemia minor) have red blood cells that are small. Mild anemia can occur, but it is rare.  Beta thalassemia intermedia (2 beta mutations): In this type, both beta genes are abnormal, but these mutations may be mild. More severe types sometimes need blood transfusions to treat anemia and medicine to treat iron buildup. Patients have normal life spans.  Beta thalassemia major (2 severe beta mutations): This is the most severe form of beta thalassemia. Both beta genes are abnormal, causing little to no beta blocks to be made. Patients often need medicine to reduce iron buildup, as well as monthly blood transfusions to stay healthy. The only cure at this time is a bone marrow transplant. More options are still being studied.  Treatment and outcomes  Mild types: People with a mild type of alpha or beta thalassemia rarely need treatment. Some need folic acid to help make more red blood cells. Most have normal life spans.  Moderate to severe  types: Patients with these types have a higher risk for reduced growth, early bone thinning and pregnancy problems.  Most severe types: These patients often need regular blood transfusions, even if not sick. It is common to have iron build up in your body, so medicine may be needed to reduce the buildup. If left untreated, too much iron, especially in the liver and heart, can lead to premature death.  Outcomes for patients with alpha or beta thalassemia are usually very good in developed countries like the [place]. Survival rates higher than 90% have been reported for children.   For informational purposes only. Not to replace the advice of your health care provider. Copyright   2021 [facility]. All rights reserved. Clinically reviewed by Parag Aguiar MD, Hematology. GMI [id number] - REV 08/21.    Nutrition During Pregnancy: Care Instructions  Overview     Healthy eating when you are pregnant is important for you and your baby. It can help you feel well and have a successful pregnancy and delivery. During pregnancy your nutrition needs increase. Even if you have excellent eating habits, your doctor may recommend a multivitamin to make sure you get enough iron and folic acid.  You may wonder how much weight you should gain. In general, if you were at a healthy weight before you became pregnant, then you should gain between 25 and 35 pounds. If you were overweight before pregnancy, then you'll likely be advised to gain 15 to 25 pounds. If you were underweight before pregnancy, then you'll probably be advised to gain 28 to 40 pounds. Your doctor will work with you to set a weight goal that is right for you. Gaining a healthy amount of weight helps you have a healthy baby.  Follow-up care is a key part of your treatment and safety. Be sure to make and go to all appointments, and call your doctor if you are having problems. It's also a good idea to know your test results and keep a list of  the medicines you take.  How can you care for yourself at home?  Eat plenty of fruits and vegetables. Include a variety of orange, yellow, and leafy dark-green vegetables every day.  Choose whole-grain bread, cereal, and pasta. Good choices include whole wheat bread, whole wheat pasta, brown rice, and oatmeal.  Get 4 or more servings of milk and milk products each day. Good choices include nonfat or low-fat milk, yogurt, and cheese. If you cannot eat milk products, you can get calcium from calcium-fortified products such as orange juice, soy milk, and tofu. Other non-milk sources of calcium include leafy green vegetables, such as broccoli, kale, mustard greens, turnip greens, bok geovanna, and brussels sprouts.  If you eat meat, pick lower-fat types. Good choices include lean cuts of meat and chicken or turkey without the skin.  Do not eat shark, swordfish, mabel mackerel, or tilefish. They have high levels of mercury, which is dangerous to your baby. You can eat up to 12 ounces a week of fish or shellfish that have low mercury levels. Good choices include shrimp, wild salmon, pollock, and catfish. Limit some other types of fish, such as white (albacore) tuna, to 4 oz (0.1 kg) a week.  Heat lunch meats (such as turkey, ham, or bologna) to 165 F before you eat them. This reduces your risk of getting sick from a kind of bacteria that can be found in lunch meats.  Do not eat unpasteurized soft cheeses, such as brie, feta, fresh mozzarella, and blue cheese. They have a bacteria that could harm your baby.  Limit caffeine. If you drink coffee or tea, have no more than 1 cup a day. Caffeine is also found in scarlett.  Do not drink any alcohol. No amount of alcohol has been found to be safe during pregnancy.  Do not diet or try to lose weight. For example, do not follow a low-carbohydrate diet. If you are overweight at the start of your pregnancy, your doctor will work with you to manage your weight gain.  Tell your doctor about  "all vitamins and supplements you take.  When should you call for help?  Watch closely for changes in your health, and be sure to contact your doctor if you have any problems.  Where can you learn more?  Go to https://www.[employer].net/patiented  Enter Y785 in the search box to learn more about \"Nutrition During Pregnancy: Care Instructions.\"  Current as of: March 1, 2023               Content Version: 13.7    0182-4252 [employer].   Care instructions adapted under license by your healthcare professional. If you have questions about a medical condition or this instruction, always ask your healthcare professional. [employer] disclaims any warranty or liability for your use of this information.      Exercise During Pregnancy: Care Instructions  Your Care Instructions     Exercise is good for healthy pregnant women. It can relieve back pain, swelling, and other discomforts of pregnancy. It also prepares your muscles for childbirth. And exercise can improve your energy level and help you sleep better.  If your doctor recommends it, get more exercise. Walking is a good choice. Bit by bit, increase the amount you walk every day. Try for at least 30 minutes on most days of the week. But if you do not already exercise, be sure to talk with your doctor before you start a new exercise program. Try exercise classes for pregnant women. Doctors do not recommend contact sports during pregnancy.  Follow-up care is a key part of your treatment and safety. Be sure to make and go to all appointments, and call your doctor if you are having problems. It's also a good idea to know your test results and keep a list of the medicines you take.  How can you care for yourself at home?  Talk with your doctor about the right kind of exercise for each stage of pregnancy.  Listen to your body to know if your exercise is at a safe level.  Do not become overheated while you exercise. High body temperature can be harmful " "to your baby. Avoid activities that can make your body too hot.  If you feel tired, take it easy. You might walk instead of run.  If you are used to strenuous exercise, pay attention to changes in your body that mean it is time to slow down.  If you exercised before getting pregnant, you should be able to keep up your routine early in your pregnancy. That might include running and aerobics. Later, you may want to switch to swimming or walking.  Eat a small snack or drink juice 15 to 30 minutes before you exercise.  Eat a healthy diet. Make sure it includes plenty of beans, peas, and leafy green vegetables. You may need to increase how much you eat to get extra energy for exercise.  Drink plenty of fluids before, during, and after exercise.  Avoid contact sports, such as soccer and basketball. Also avoid scuba diving, exercise in high altitude (above 6,000 feet), and horseback riding.  Do not get overtired while you exercise. You should be able to talk while you work out.  After your fourth month of pregnancy, avoid exercises (such as sit-ups and some yoga poses) that require you to lie flat on your back on a hard surface.  Try swimming and brisk walking during all your pregnancy.  Get plenty of rest. You may be very tired while you are pregnant.  Where can you learn more?  Go to https://www.[employer].net/patiented  Enter S801 in the search box to learn more about \"Exercise During Pregnancy: Care Instructions.\"  Current as of: November 9, 2022               Content Version: 13.7    8330-7007 [employer].   Care instructions adapted under license by your healthcare professional. If you have questions about a medical condition or this instruction, always ask your healthcare professional. [employer] disclaims any warranty or liability for your use of this information.      Learning About Pregnancy and Obesity  How does your weight affect your pregnancy?     The basics of prenatal care are the " "same for everyone, regardless of size. You'll get what you need to have a healthy baby.  But your size can make a difference in a few things. You and your doctor will have to watch your pregnancy weight. Your weight may affect your labor and delivery.  You may have some extra doctor visits and tests. And you may have some tests earlier in your pregnancy. You'll need to pay close attention to things like blood pressure and the chance of getting gestational diabetes. (This is a type of diabetes that sometimes happens during pregnancy.) And close attention will be given to your developing baby.  Work with your doctor to get the care you need. Go to all your doctor visits, and follow your doctor's advice about what to do and what to avoid during pregnancy.  How much weight gain is healthy?  If you are very overweight (obese), experts recommend that you gain between 11 and 20 pounds. Your doctor will work with you to set a weight goal that's right for you. In some cases, your doctor may recommend that you not gain any weight.  How much extra food do you need to eat?  Although you may joke that you're \"eating for two\" during pregnancy, you don't need to eat twice as much food. How much you can eat depends on:  Your height.  How much you weigh when you get pregnant.  How active you are.  How many babies you're carrying.  In the first trimester, you'll probably need the same amount of calories as you did before you were pregnant. In general, in your second trimester, you need to eat about 340 extra calories a day. In your third trimester, you need to eat about 450 extra calories a day.  You can get about 340 calories in a peanut butter sandwich. Having a cup of 1% milk with a peanut butter sandwich is about 450 calories.  What can you do to have a healthy pregnancy?  The best things you can do for you and your baby are to eat healthy foods, get regular exercise, avoid alcohol and smoking, and go to your doctor visits.  Eat " "a variety of foods from all the food groups. Make sure to get enough calcium and folic acid.  You may want to work with a dietitian to help you plan healthy meals to get the right amount of calories for you.  If you didn't exercise much before you got pregnant, talk to your doctor about how you can slowly get more active. Your doctor may want to set up an exercise program with you.  Where can you learn more?  Go to https://www.[employer].net/patiented  Enter B644 in the search box to learn more about \"Learning About Pregnancy and Obesity.\"  Current as of: November 9, 2022               Content Version: 13.7    3915-4083 [employer].   Care instructions adapted under license by your healthcare professional. If you have questions about a medical condition or this instruction, always ask your healthcare professional. [employer] disclaims any warranty or liability for your use of this information.      "

## 2023-08-10 ENCOUNTER — VIRTUAL VISIT (OUTPATIENT)
Dept: ENDOCRINOLOGY | Facility: CLINIC | Age: 33
End: 2023-08-10
Payer: COMMERCIAL

## 2023-08-10 DIAGNOSIS — O09.90 HIGH-RISK PREGNANCY, UNSPECIFIED TRIMESTER: ICD-10-CM

## 2023-08-10 DIAGNOSIS — Z96.41 INSULIN PUMP STATUS: ICD-10-CM

## 2023-08-10 DIAGNOSIS — E10.9 TYPE 1 DIABETES MELLITUS WITHOUT COMPLICATION (H): Primary | ICD-10-CM

## 2023-08-10 DIAGNOSIS — E06.3 HYPOTHYROIDISM DUE TO HASHIMOTO'S THYROIDITIS: ICD-10-CM

## 2023-08-10 PROCEDURE — 95251 CONT GLUC MNTR ANALYSIS I&R: CPT | Performed by: INTERNAL MEDICINE

## 2023-08-10 PROCEDURE — 99214 OFFICE O/P EST MOD 30 MIN: CPT | Mod: VID | Performed by: INTERNAL MEDICINE

## 2023-08-10 NOTE — PROGRESS NOTES
Virtual Visit Details    Type of service:  Video Visit     Originating Location (pt. Location): Home  Distant Location (provider location):  Off-site  Platform used for Video Visit: Edwin        Recent issues:  Diabetes and thyroid follow-up evaluation  Using the Tandem pump + DexcomG6  Previous miscarrage at 10 weeks gestation in early 5/2023 then another Pg  ... Currently 6 weeks pregnant, sees Dr. Sierra Santoyo/[facility]   Planning fetal U/S 8/28/23  Feeling well but some nausea, fatigue           2003. Diagnosis of diabetes mellitus, age 13, living in [facility]  Had acute illness requiring hospitalization at [facility]  Began insulin injections, using Novolog and Lantus insulin  [place] carb counting method, gram estimates  On MDI treatment plan for approx 2 years, then changed to pump use  Previous medical evaluations with Dr. Yash Barcenas/[facility] Callahan  2005. Began use of [place] pump  2012. Changed to Medtronic pump  Subsequently using Medtronic 723 Paradigm pump  2012. Tried wearing CGMS sensor, but uncomfortable     12/8/14. Initial consult with me at my former clinic ([facility])  Continued pump management  Was not interested in CGMS use     Previous [facility] hgbA1c levels include:              Lab Test 02/05/18 10/10/17 06/21/17 02/01/17 11/16/16  0815   A1C 7.4* 7.8* 8.1* 7.3* 8.2*      ~12/2017. Upgraded to Medtronic 670G pump    Tried Medtronic Guardian sensor, recalls frequent low glucose alarms              Wore sensor in manual mode              Didn't like it, discontinued use     ~11/2018. Wore diagnostic LibrePro CGM  Subsequently obtained LibrePersonal CGM, not wearing past year  3/2022. Changed to Tandem TSlim insulin pump    Novolog in pump    Current Tandem pump settings:       Recent Tandem pump data:          Recent DexcomG6 data:         Recent [facility] labs include:  Lab Results   Component Value Date    A1C 6.4 (H) 05/01/2023     10/06/2022    POTASSIUM 4.4 10/06/2022    CHLORIDE 107 10/06/2022    CO2 26  10/06/2022    ANIONGAP 6 10/06/2022     (H) 05/09/2023    BUN 14 10/06/2022    CR 0.67 10/06/2022    GFRESTIMATED >90 10/06/2022    GFRESTBLACK >90 04/13/2021    MARCIA 9.2 10/06/2022    CHOL 241 (H) 10/06/2022    TRIG 75 10/06/2022    HDL 67 10/06/2022     (H) 10/06/2022    NHDL 174 (H) 10/06/2022    UCRR 64 06/08/2022    MICROL 5 06/08/2022    UMALCR 7.81 06/08/2022     Last eye exam at [facility] in [place] 11/3/22, no DR  Hyperlipidemia:  Takes simvastatin medication  DM Complications:  None known        Thyroid:  2013. 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 Ellen Burdick/[facility] [place]  Also sees Dr. Sierra Santoyo/[facility]      PMH/PSH:  Past Medical History:   Diagnosis Date     Adjustment disorder with anxious mood      Allergic rhinitis, cause unspecified     h/o AIT     Chest discomfort      Common migraine     No visual aura.      Hyperlipidemia LDL goal < 70      Hypothyroidism      Infectious mononucleosis 01/01/1995     Insulin pump in place     Dexcom continuous glucose monitoring     Morbid obesity (H)      Tuberculosis      Type 1 diabetes, HbA1c goal < 7% (H) 03/01/2004     followed N - peds endocrinology - now Dr Jimenez     Past Surgical History:   Procedure Laterality Date     APPENDECTOMY  08/01/2007    dr deshpande     DILATION AND CURETTAGE SUCTION N/A 5/9/2023    Procedure: DILATION AND CURETTAGE OF UTERUS USING SUCTION;  Surgeon: Sierra Santoyo DO;  Location: [facility] OR     PE TUBES Bilateral 01/01/1996        Family Hx:  Family History   Problem Relation Age of Onset     Neurologic Disorder Maternal Grandmother         dementia     Genetic Disorder Paternal Grandfather         kidney     Family History Negative No family hx of          Social Hx:  Social History     Socioeconomic History     Marital status:      Spouse name: Not on file     Number of children: Not on file     Years of education: Not on file     Highest education level: Not on file   Occupational History     Occupation: Works in supply chain   Tobacco Use     Smoking status: Never     Smokeless tobacco: Never   Vaping Use     Vaping Use: Never used   Substance and Sexual Activity     Alcohol use: Not Currently     Alcohol/week: 2.0 standard drinks of alcohol     Types: 2 Standard drinks or equivalent per week     Comment: 2-5 drinks per week     Drug use: No     Sexual activity: Yes     Partners: Male   Other Topics Concern      Service Not Asked     Blood Transfusions Not Asked     Caffeine Concern Yes     Comment: rare     Occupational Exposure Not Asked     Hobby Hazards Not Asked     Sleep Concern No     Stress Concern No     Weight Concern Not Asked     Special Diet Not Asked     Back Care Not Asked     Exercise Yes     Bike Helmet Not Asked     Seat Belt Yes     Self-Exams No     Comment: encouraged     Parent/sibling w/ CABG, MI or angioplasty before 65F 55M? No   Social History Narrative    -Working -       Social Determinants of Health     Financial Resource Strain: Not on file   Food Insecurity: Not on file   Transportation Needs: Not on file   Physical Activity: Not on file   Stress: Not on file   Social Connections: Not on file   Intimate Partner Violence: Not on file   Housing Stability: Not on file          MEDICATIONS:  has a current medication list which includes the following prescription(s): dexcom g6 sensor, dexcom g6 transmitter, contour next test, flaxseed (linseed), baqsimi two pack, insulin aspart, insulin  aspart, insulin glargine, insulin pen needle, levothyroxine, levothyroxine, microlet lancets, naratriptan, prenatal w/o a vit-fe fum-fa, semglee (yfgn), sertraline, and triamcinolone.      ROS: 10 point ROS neg other than the symptoms noted above in the HPI.     GENERAL: some fatigue, mild wt gain; denies fevers, chills, night sweats.   HEENT: no dysphagia, odonophagia, diplopia, neck pain  THYROID:  no apparent hyper or hypothyroid symptoms  CV: no chest pain, pressure, palpitations  LUNGS: no SOB, LANG, cough, wheezing   ABDOMEN: some bloating; no diarrhea, constipation, abdominal pain  EXTREMITIES: no rashes, ulcers, edema  NEUROLOGY: no headaches, denies changes in vision, tingling, extremitiy numbness   MSK: no muscle aches or pains, weakness  SKIN: no rashes or lesions  : no menses during Pg  PSYCH:  stable mood, no significant anxiety or depression  ENDOCRINE: no heat or cold intolerance     Physical Exam (visual exam)  VS:  no vital signs taken for video visit  CONSTITUTIONAL: healthy, alert and NAD, well dressed, answering questions appropriately  ENT: no nose swelling or nasal discharge, mouth redness or gum changes.  EYES: eyes grossly normal to inspection, conjunctivae and sclerae normal, no exophthalmos or proptosis  THYROID:  no apparent nodules or goiter  LUNGS: no audible wheeze, cough or visible cyanosis, no visible retractions or increased work of breathing  ABDOMEN: abdomen not evaluated  EXTREMITIES: no hand tremors, limited exam  NEUROLOGY: CN grossly intact, mentation intact and speech normal   SKIN:  no apparent skin lesions, rash, or edema with visualized skin appearance  PSYCH: mentation appears normal, affect normal/bright, judgement and insight intact,   normal speech and appearance well groomed       LABS:     All pertinent notes, labs, and images personally reviewed by me.      A/P:  Encounter Diagnoses   Name Primary?     Type 1 diabetes mellitus without complication (H) Yes      Insulin pump status      Hypothyroidism due to Hashimoto's thyroiditis      High-risk pregnancy, unspecified trimester        Comments:  Reviewed health history, diabetes and thyroid issues  Recent CGM trends good overall  Reviewed and interpreted tests that I previously ordered.   Ordered appropriate tests for the endocrinology disease management.    Management options discussed and implemented after shared medical decision making with the patient.  T1DM and hypothyroidism problems are chronic-stable    Plan:  Reviewed the overall T1DM management and insulin pump use.  Discussed optimal BG testing to assess glucose trends.  We reviewed insulin pump settings, basal rate and bolus dosing  Use of automated pump bolus dosing for meal/snack carb & correction dosing  Reviewed recent Tandem pump report information  Reviewed recent DexcomG6 CGM glucose trends, in detail    Reviewed general issues with the hypothyroidism diagnosis   Discussed lab tests used to assess patient thyroid hormone levels  Reviewed treatment options including levothyroxine medication, ideal dosing    Recommend:  Continue the Tandem insulin pump and diabetes management plan.  No pump setting changes at this time    Plan mealtime boluses premeal, not postmeal  Would be helpful for name change of primary pump Profile 2 to Pregnancy, but keep backup Profile 1  We have discussed use of the exercise and sleep modes with pump use  Continue use of the DexcomG6 CGM sensor  Reviewed overall pregnancy treatment goals for the diabetes and thyroid management  Continue the current levothyroxine 0.137 mg M/W/F and 0.125 mg Tu/Th/Sat/Sun  Plan repeat nonfasting labs soon    [facility] or [facility]    Lab orders placed  Would not restart the simvastatin med while pregnant  Arrange annual dilated eye exam, fasting lipid panel testing.  Plan to have endocrinology appointments monthly and Diab Ed appointments every 7-10 days during  Pg  Contact me if questions/concerns about diabetes and thyroid management     Addressed patient's questions today.    There are no Patient Instructions on file for this visit.    Future labs ordered today:   Orders Placed This Encounter   Procedures     GLUCOSE MONITOR, 72 HOUR, PHYS INTERP     TSH     T4 free     Hemoglobin A1c     Basic metabolic panel     Radiology/Consults ordered today: None    Total time spent on day of encounter:  28 min    Follow-up:  9/14/23, 10/12/23, 11/16/23    ANTHONY Jimenez MD, MS  Endocrinology  [facility]    CC:  RUBY Mustafa, Masters, A, and JYOTI Lima

## 2023-08-10 NOTE — LETTER
8/10/2023         RE: Linda Chavez  [address]  [facility] [address]        Dear Colleague,    Thank you for referring your patient, Linda Chavez, to the [facility] [place]. Please see a copy of my visit note below.    Virtual Visit Details    Type of service:  Video Visit     Originating Location (pt. Location): Home  Distant Location (provider location):  Off-site  Platform used for Video Visit: Edwin        Recent issues:  Diabetes and thyroid follow-up evaluation  Using the Tandem pump + DexcomG6  Previous miscarrage at 10 weeks gestation in early 5/2023 then another Pg  ... Currently 6 weeks pregnant, sees Dr. Sierra Santoyo/[facility]   Planning fetal U/S 8/28/23  Feeling well but some nausea, fatigue           2003. Diagnosis of diabetes mellitus, age 13, living in [facility]  Had acute illness requiring hospitalization at [facility]  Began insulin injections, using Novolog and Lantus insulin  Lowrey carb counting method, gram estimates  On MDI treatment plan for approx 2 years, then changed to pump use  Previous medical evaluations with Dr. Yash Barcenas/[facility]  2005. Began use of [place] pump  2012. Changed to Medtronic pump  Subsequently using Medtronic 723 Paradigm pump  2012. Tried wearing CGMS sensor, but uncomfortable     12/8/14. Initial consult with me at my former clinic ([facility])  Continued pump management  Was not interested in CGMS use     Previous [facility] hgbA1c levels include:              Lab Test 02/05/18 10/10/17 06/21/17 02/01/17 11/16/16  0815   A1C 7.4* 7.8* 8.1* 7.3* 8.2*      ~12/2017. Upgraded to Medtronic 670G pump    Tried Medtronic Guardian sensor, recalls frequent low glucose alarms              Wore sensor in manual mode              Didn't like it, discontinued use     ~11/2018. Wore diagnostic LibrePro CGM  Subsequently obtained LibrePersonal CGM, not wearing past year  3/2022. Changed to Tandem TSlim insulin pump    Novolog in pump    Current Tandem pump  settings:       Recent Tandem pump data:          Recent DexcomG6 data:         Recent [facility] labs include:  Lab Results   Component Value Date    A1C 6.4 (H) 05/01/2023     10/06/2022    POTASSIUM 4.4 10/06/2022    CHLORIDE 107 10/06/2022    CO2 26 10/06/2022    ANIONGAP 6 10/06/2022     (H) 05/09/2023    BUN 14 10/06/2022    CR 0.67 10/06/2022    GFRESTIMATED >90 10/06/2022    GFRESTBLACK >90 04/13/2021    MARCIA 9.2 10/06/2022    CHOL 241 (H) 10/06/2022    TRIG 75 10/06/2022    HDL 67 10/06/2022     (H) 10/06/2022    NHDL 174 (H) 10/06/2022    UCRR 64 06/08/2022    MICROL 5 06/08/2022    UMALCR 7.81 06/08/2022     Last eye exam at [facility] in [place] 11/3/22, no DR  Hyperlipidemia:  Takes simvastatin medication  DM Complications:  None known        Thyroid:  2013. Diagnosis of hypothyroidism  Previous [facility] thyroid labs include:    Treatment with levothyroxine medication  Previously on stable dose as levothyroxine 0.088 mg daily  Recent [facility] labs include:  Lab Results   Component Value Date    TSH 2.14 05/01/2023    T4 1.45 03/01/2023     (H) 12/19/2013     Current dose:  Levothyroxine 0.125 mg Tu/Th/Sat/Sun and 0.137 mg M/W/F        , lives in [facility]; works at [employer] with , in [place]  Has previously seen Dr. Ellen Burdick/[facility] [place]  Also sees Dr. Sierra Santoyo/[facility]      PMH/PSH:  Past Medical History:   Diagnosis Date     Adjustment disorder with anxious mood      Allergic rhinitis, cause unspecified     h/o AIT     Chest discomfort      Common migraine     No visual aura.      Hyperlipidemia LDL goal < 70      Hypothyroidism      Infectious mononucleosis 01/01/1995     Insulin pump in place     Dexcom continuous glucose monitoring     Morbid obesity (H)      Tuberculosis      Type 1 diabetes, HbA1c goal < 7% (H) 03/01/2004     followed [facility] - peds endocrinology - now Dr Jimenez     Past Surgical History:   Procedure  Laterality Date     APPENDECTOMY  08/01/2007    dr deshpande     DILATION AND CURETTAGE SUCTION N/A 5/9/2023    Procedure: DILATION AND CURETTAGE OF UTERUS USING SUCTION;  Surgeon: Sierra Santoyo DO;  Location: [facility] OR     PE TUBES Bilateral 01/01/1996       Family Hx:  Family History   Problem Relation Age of Onset     Neurologic Disorder Maternal Grandmother         dementia     Genetic Disorder Paternal Grandfather         kidney     Family History Negative No family hx of          Social Hx:  Social History     Socioeconomic History     Marital status:      Spouse name: Not on file     Number of children: Not on file     Years of education: Not on file     Highest education level: Not on file   Occupational History     Occupation: Works in supply chain   Tobacco Use     Smoking status: Never     Smokeless tobacco: Never   Vaping Use     Vaping Use: Never used   Substance and Sexual Activity     Alcohol use: Not Currently     Alcohol/week: 2.0 standard drinks of alcohol     Types: 2 Standard drinks or equivalent per week     Comment: 2-5 drinks per week     Drug use: No     Sexual activity: Yes     Partners: Male   Other Topics Concern      Service Not Asked     Blood Transfusions Not Asked     Caffeine Concern Yes     Comment: rare     Occupational Exposure Not Asked     Hobby Hazards Not Asked     Sleep Concern No     Stress Concern No     Weight Concern Not Asked     Special Diet Not Asked     Back Care Not Asked     Exercise Yes     Bike Helmet Not Asked     Seat Belt Yes     Self-Exams No     Comment: encouraged     Parent/sibling w/ CABG, MI or angioplasty before 65F 55M? No   Social History Narrative    -Working -       Social Determinants of Health     Financial Resource Strain: Not on file   Food Insecurity: Not on file   Transportation Needs: Not on file   Physical Activity: Not on file   Stress: Not on file   Social Connections: Not on file   Intimate Partner Violence: Not on  file   Housing Stability: Not on file          MEDICATIONS:  has a current medication list which includes the following prescription(s): dexcom g6 sensor, dexcom g6 transmitter, contour next test, flaxseed (linseed), baqsimi two pack, insulin aspart, insulin aspart, insulin glargine, insulin pen needle, levothyroxine, levothyroxine, microlet lancets, naratriptan, prenatal w/o a vit-fe fum-fa, semglee (yfgn), sertraline, and triamcinolone.      ROS: 10 point ROS neg other than the symptoms noted above in the HPI.     GENERAL: some fatigue, mild wt gain; denies fevers, chills, night sweats.   HEENT: no dysphagia, odonophagia, diplopia, neck pain  THYROID:  no apparent hyper or hypothyroid symptoms  CV: no chest pain, pressure, palpitations  LUNGS: no SOB, LANG, cough, wheezing   ABDOMEN: some bloating; no diarrhea, constipation, abdominal pain  EXTREMITIES: no rashes, ulcers, edema  NEUROLOGY: no headaches, denies changes in vision, tingling, extremitiy numbness   MSK: no muscle aches or pains, weakness  SKIN: no rashes or lesions  : no menses during Pg  PSYCH:  stable mood, no significant anxiety or depression  ENDOCRINE: no heat or cold intolerance     Physical Exam (visual exam)  VS:  no vital signs taken for video visit  CONSTITUTIONAL: healthy, alert and NAD, well dressed, answering questions appropriately  ENT: no nose swelling or nasal discharge, mouth redness or gum changes.  EYES: eyes grossly normal to inspection, conjunctivae and sclerae normal, no exophthalmos or proptosis  THYROID:  no apparent nodules or goiter  LUNGS: no audible wheeze, cough or visible cyanosis, no visible retractions or increased work of breathing  ABDOMEN: abdomen not evaluated  EXTREMITIES: no hand tremors, limited exam  NEUROLOGY: CN grossly intact, mentation intact and speech normal   SKIN:  no apparent skin lesions, rash, or edema with visualized skin appearance  PSYCH: mentation appears normal, affect normal/bright, judgement  and insight intact,   normal speech and appearance well groomed       LABS:     All pertinent notes, labs, and images personally reviewed by me.      A/P:  Encounter Diagnoses   Name Primary?     Type 1 diabetes mellitus without complication (H) Yes     Insulin pump status      Hypothyroidism due to Hashimoto's thyroiditis      High-risk pregnancy, unspecified trimester        Comments:  Reviewed health history, diabetes and thyroid issues  Recent CGM trends good overall  Reviewed and interpreted tests that I previously ordered.   Ordered appropriate tests for the endocrinology disease management.    Management options discussed and implemented after shared medical decision making with the patient.  T1DM and hypothyroidism problems are chronic-stable    Plan:  Reviewed the overall T1DM management and insulin pump use.  Discussed optimal BG testing to assess glucose trends.  We reviewed insulin pump settings, basal rate and bolus dosing  Use of automated pump bolus dosing for meal/snack carb & correction dosing  Reviewed recent Tandem pump report information  Reviewed recent DexcomG6 CGM glucose trends, in detail    Reviewed general issues with the hypothyroidism diagnosis   Discussed lab tests used to assess patient thyroid hormone levels  Reviewed treatment options including levothyroxine medication, ideal dosing    Recommend:  Continue the Tandem insulin pump and diabetes management plan.  No pump setting changes at this time    Plan mealtime boluses premeal, not postmeal  Would be helpful for name change of primary pump Profile 2 to Pregnancy, but keep backup Profile 1  We have discussed use of the exercise and sleep modes with pump use  Continue use of the DexcomG6 CGM sensor  Reviewed overall pregnancy treatment goals for the diabetes and thyroid management  Continue the current levothyroxine 0.137 mg M/W/F and 0.125 mg Tu/Th/Sat/Sun  Plan repeat nonfasting labs soon    Testing at [facility] or [facility]  [place] [facility]    Lab orders placed  Would not restart the simvastatin med while pregnant  Arrange annual dilated eye exam, fasting lipid panel testing.  Plan to have endocrinology appointments monthly and Diab Ed appointments every 7-10 days during Pg  Contact me if questions/concerns about diabetes and thyroid management     Addressed patient's questions today.    There are no Patient Instructions on file for this visit.    Future labs ordered today:   Orders Placed This Encounter   Procedures     GLUCOSE MONITOR, 72 HOUR, PHYS INTERP     TSH     T4 free     Hemoglobin A1c     Basic metabolic panel     Radiology/Consults ordered today: None    Total time spent on day of encounter:  28 min    Follow-up:  9/14/23, 10/12/23, 11/16/23    ANTHONY Jimenez MD, MS  Endocrinology  [facility]    CC:  RUBY Mustafa, Masters, A, and JYOTI Lima                        Again, thank you for allowing me to participate in the care of your patient.        Sincerely,        Santy Jimenez MD

## 2023-08-16 ENCOUNTER — TELEPHONE (OUTPATIENT)
Dept: OBGYN | Facility: CLINIC | Age: 33
End: 2023-08-16

## 2023-08-16 ENCOUNTER — MYC MEDICAL ADVICE (OUTPATIENT)
Dept: OBGYN | Facility: CLINIC | Age: 33
End: 2023-08-16
Payer: COMMERCIAL

## 2023-08-16 ENCOUNTER — OFFICE VISIT (OUTPATIENT)
Dept: OBGYN | Facility: CLINIC | Age: 33
End: 2023-08-16
Payer: COMMERCIAL

## 2023-08-16 ENCOUNTER — MYC MEDICAL ADVICE (OUTPATIENT)
Dept: EDUCATION SERVICES | Facility: CLINIC | Age: 33
End: 2023-08-16

## 2023-08-16 ENCOUNTER — NURSE TRIAGE (OUTPATIENT)
Dept: NURSING | Facility: CLINIC | Age: 33
End: 2023-08-16
Payer: COMMERCIAL

## 2023-08-16 VITALS — DIASTOLIC BLOOD PRESSURE: 68 MMHG | WEIGHT: 224.6 LBS | SYSTOLIC BLOOD PRESSURE: 112 MMHG | BODY MASS INDEX: 36.25 KG/M2

## 2023-08-16 DIAGNOSIS — R30.9 PAINFUL URINATION: Primary | ICD-10-CM

## 2023-08-16 LAB
ALBUMIN UR-MCNC: NEGATIVE MG/DL
APPEARANCE UR: CLEAR
BILIRUB UR QL STRIP: NEGATIVE
COLOR UR AUTO: YELLOW
GLUCOSE UR STRIP-MCNC: NEGATIVE MG/DL
HGB UR QL STRIP: NEGATIVE
KETONES UR STRIP-MCNC: 80 MG/DL
LEUKOCYTE ESTERASE UR QL STRIP: NEGATIVE
NITRATE UR QL: NEGATIVE
PH UR STRIP: 5.5 [PH] (ref 5–7)
SP GR UR STRIP: 1.01 (ref 1–1.03)
UROBILINOGEN UR STRIP-ACNC: 0.2 E.U./DL

## 2023-08-16 PROCEDURE — 81003 URINALYSIS AUTO W/O SCOPE: CPT | Performed by: NURSE PRACTITIONER

## 2023-08-16 PROCEDURE — 87086 URINE CULTURE/COLONY COUNT: CPT | Performed by: NURSE PRACTITIONER

## 2023-08-16 PROCEDURE — 99213 OFFICE O/P EST LOW 20 MIN: CPT | Performed by: NURSE PRACTITIONER

## 2023-08-16 NOTE — PROGRESS NOTES
SUBJECTIVE:                                                   Linda Chavez is a 33 year old female who presents to clinic today for the following health issue(s):  Patient presents with:  Urinary Problem: Pressure with urinating, has had two bowel movements this morning and just feels like gas pressure      HPI:  Here today with concerns of pressure with urination.  She feels as if her bladder is tightening with urination.  She has no true dysuria.  She has normal bowel movements.  She is about 8 weeks pregnant and has no vaginal bleeding.  She has no vaginal symptoms otherwise.    She has been nauseous and has not been drinking enough fluid admittedly.    Patient's last menstrual period was 2023..     Patient is sexually active, .  Using none for contraception.    reports that she has never smoked. She has never used smokeless tobacco.    STD testing offered?  Declined    Health maintenance updated:  yes    Today's PHQ-2 Score:       2023     8:28 AM   PHQ-2 (  [employer])   Q1: Little interest or pleasure in doing things 0   Q2: Feeling down, depressed or hopeless 0   PHQ-2 Score 0   Q1: Little interest or pleasure in doing things Not at all   Q2: Feeling down, depressed or hopeless Not at all   PHQ-2 Score 0     Today's PHQ-9 Score:       2023    12:51 PM   PHQ-9 SCORE   PHQ-9 Total Score MyChart 2 (Minimal depression)   PHQ-9 Total Score 2     Today's TRESA-7 Score:       2023    12:51 PM   TRESA-7 SCORE   Total Score 4 (minimal anxiety)   Total Score 4       Problem list and histories reviewed & adjusted, as indicated.  Additional history: as documented.    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)    Common migraine    Morbid  obesity (H)    Supervision of high-risk pregnancy     Past Surgical History:   Procedure Laterality Date    APPENDECTOMY  08/01/2007    dr deshpande    DILATION AND CURETTAGE SUCTION N/A 5/9/2023    Procedure: DILATION AND CURETTAGE OF UTERUS USING SUCTION;  Surgeon: Sierra Santoyo DO;  Location: [facility] OR    PE TUBES Bilateral 01/01/1996      Social History     Tobacco Use    Smoking status: Never    Smokeless tobacco: Never   Substance Use Topics    Alcohol use: Not Currently     Alcohol/week: 2.0 standard drinks of alcohol     Types: 2 Standard drinks or equivalent per week     Comment: 2-5 drinks per week      Problem (# of Occurrences) Relation (Name,Age of Onset)    Genetic Disorder (1) Paternal Grandfather: kidney    Neurologic Disorder (1) Maternal Grandmother: dementia           Negative family history of: Family History Negative              Current Outpatient Medications   Medication Sig    Continuous Blood Gluc Sensor (DEXCOM G6 SENSOR) MISC CHANGE SENSOR EVERY 10 DAYS    Continuous Blood Gluc Transmit (DEXCOM G6 TRANSMITTER) MISC CHANGE EVERY 90 DAYS    CONTOUR NEXT TEST test strip Use to test blood sugar 10 times daily.    Flaxseed, Linseed, (FLAX SEED OIL PO) Take 1,400 mg by mouth    Glucagon (BAQSIMI TWO PACK) 3 MG/DOSE POWD Spray 1 Device in nostril once as needed (for severe hypoglycemia)    insulin aspart (NOVOLOG PEN) 100 UNIT/ML pen Inject 3 to 10 units subcutaneous at mealtimes as directed, total daily dose approx 30 units    insulin aspart (NOVOLOG VIAL) 100 UNITS/ML vial Use with insulin pump, total daily dose approx 90 units    insulin glargine (LANTUS PEN) 100 UNIT/ML pen Inject 22 Units Subcutaneous At Bedtime    insulin pen needle (BD CLEOPATRA U/F) 32G X 4 MM miscellaneous Use 4 pen needles daily or as directed.    levothyroxine (SYNTHROID/LEVOTHROID) 125 MCG tablet Take 1-tablet by mouth daily on Tues/Thurs/Sat/Sundays, as directed    levothyroxine (SYNTHROID/LEVOTHROID) 137 MCG  tablet Take 1-tablet by mouth on Mon/Wed/Fridays as directed    Microlet Lancets MISC Use to test blood sugar 10 times daily.    Prenatal w/o A Vit-Fe Fum-FA (PRENATA PO)     SEMCRYSTALRICKYLASTGN, 100 UNIT/ML SOPN ADMINISTER 22 UNITS UNDER THE SKIN AT BEDTIME SUBSTITUTE FOR LANTUS    sertraline (ZOLOFT) 50 MG tablet TAKE 1 TABLET(50 MG) BY MOUTH DAILY    naratriptan (AMERGE) 1 MG tablet  (Patient not taking: Reported on 4/18/2023)    triamcinolone (KENALOG) 0.1 % external cream Apply topically 2 times daily to affected areas on fingers for 10-14 days. Not for use on face nor groin. (Patient not taking: Reported on 8/8/2023)     No current facility-administered medications for this visit.     Allergies   Allergen Reactions    Penicillins Rash     augmentin & pcn    Sulfa Antibiotics Hives       ROS:  12 point review of systems negative other than symptoms noted below or in the HPI.  No urinary frequency or dysuria, bladder or kidney problems      OBJECTIVE:     /68   Wt 101.9 kg (224 lb 9.6 oz)   LMP 06/21/2023   BMI 36.25 kg/m    Body mass index is 36.25 kg/m .    Exam:  Constitutional:  Appearance: Well nourished, well developed alert, in no acute distress  Psychiatric:  Mentation appears normal and affect normal/bright.     In-Clinic Test Results:  Results for orders placed or performed in visit on 08/16/23 (from the past 24 hour(s))   UA Macroscopic with reflex to Microscopic and Culture - Lab Collect    Specimen: Urine, Midstream   Result Value Ref Range    Color Urine Yellow Colorless, Straw, Light Yellow, Yellow    Appearance Urine Clear Clear    Glucose Urine Negative Negative mg/dL    Bilirubin Urine Negative Negative    Ketones Urine 80 (A) Negative mg/dL    Specific Gravity Urine 1.010 1.003 - 1.035    Blood Urine Negative Negative    pH Urine 5.5 5.0 - 7.0    Protein Albumin Urine Negative Negative mg/dL    Urobilinogen Urine 0.2 0.2, 1.0 E.U./dL    Nitrite Urine Negative Negative    Leukocyte Esterase  Urine Negative Negative    Narrative    Microscopic not indicated       ASSESSMENT/PLAN:                                                        ICD-10-CM    1. Painful urination  R30.9 UA Macroscopic with reflex to Microscopic and Culture - Lab Collect     UA Macroscopic with reflex to Microscopic and Culture - Lab Collect     Urine Culture Aerobic Bacterial - lab collect          There are no Patient Instructions on file for this visit.    Urinalysis shows positive ketones.  We have encouraged her to increase her fluid intake.  Urine culture will be sent and we will update as needed.  We have also recommended that she have 1 Gatorade per day to boost her hydration.    Shania Jones, AMA CNP  [facility] FOR [facility]

## 2023-08-16 NOTE — TELEPHONE ENCOUNTER
Triage call  Patient calling to report she has been having mils abdominal discomfort when she urinates.  She states it almost feels like it could be gas.she has the pressure all the time but it is stronger when she is urinating. She rates the ain a 4/10. She had a bowel movement this morning.    Per protocol go to ED/UCC ( or to Office with PCP approval)  transferred to [facility] for a appointment.Care advice given.  Verbalizes understanding and agrees with plan.    Thao Linares RN   [facility] Nurse Advisor  8:08 AM 8/16/2023      Reason for Disposition   MILD constant abdominal pain (e.g., doesn't interfere with normal activities) and present > 2 hours    Additional Information   Negative: Passed out (i.e., fainted, collapsed and was not responding)   Negative: Shock suspected (e.g., cold/pale/clammy skin, too weak to stand, low BP, rapid pulse)   Negative: Difficult to awaken or acting confused (e.g., disoriented, slurred speech)   Negative: Sounds like a life-threatening emergency to the triager   Negative: Abdominal pain and pregnant 20 or more weeks   Negative: MODERATE-SEVERE abdominal pain   Negative: SEVERE vaginal bleeding (e.g., soaking 2 pads or tampons per hour and present 2 or more hours; 1 menstrual cup every 2 hours)   Negative: MODERATE vaginal bleeding (e.g., soaking 1 pad or tampon per hour and present > 6 hours; 1 menstrual cup every 6 hours)   Negative: MODERATE vaginal bleeding and pregnant > 12 weeks   Negative: Passed tissue (e.g., gray-white)   Negative: Vomiting and contains red blood or black ('coffee ground') material   Negative: Shoulder pain    Protocols used: Pregnancy - Abdominal Pain Less Than 20 Weeks EGA-A-OH

## 2023-08-16 NOTE — TELEPHONE ENCOUNTER
Diabetes in pregnancy Follow-up    Subjective/Objective:    Linda Chavez uploaded insulin pump for review. Last date of communication was: 8/10 (Dr. Jimenez).    Diabetes is being managed with diet, activity, and medications    Taking diabetes medications: yes:     Diabetes Medication(s)       Diabetic Other       Glucagon (BAQSIMI TWO PACK) 3 MG/DOSE POWD    Spray 1 Device in nostril once as needed (for severe hypoglycemia)      Insulin       insulin aspart (NOVOLOG PEN) 100 UNIT/ML pen    Inject 3 to 10 units subcutaneous at mealtimes as directed, total daily dose approx 30 units     insulin aspart (NOVOLOG VIAL) 100 UNITS/ML vial    Use with insulin pump, total daily dose approx 90 units     insulin glargine (LANTUS PEN) 100 UNIT/ML pen    Inject 22 Units Subcutaneous At Bedtime     SEMGLEE, YFGN, 100 UNIT/ML SOPN    ADMINISTER 22 UNITS UNDER THE SKIN AT BEDTIME SUBSTITUTE FOR LANTUS            Estimated Date of Delivery: Mar 27, 2024    BG/Food Log:             Assessment:    BG improving, but still Not meeting ADA TIR targets today ( mg/dl >70% of the time). Having some post-prandial spikes.     Plan/Response:    Start Time Basal Rate Correction Factor Carb Ratio Target BG  Midnight 2.000 u/hr 1u:35 mg/dL 1u:7.5 g  110 mg/dL  6:00 AM 1.800 u/hr 1u:35 mg/dL 1u:6.2 g -> 6.0 110 mg/dL  7:00 AM 1.700 u/hr 1u:22 mg/dL 1u:4.5 g -> 4.3 110 mg/dL  11:00 AM 1.700 u/hr 1u:22 mg/dL 1u:4.5 g -> 4.3 110 mg/dL  Noon             1.900 u/hr 1u:20 mg/dL 1u:5.0 g -> 4.8 110 mg/dL  5:00 PM 1.900 u/hr 1u:20 mg/dL 1u:4.0 g -> 3.8 110 mg/dL  9:00 PM 1.900 u/hr 1u:22 mg/dL 1u:4.5 g   110 mg/dL  Calculated Total Daily Basal 45.1 units        Duration of Insulin:  5:00 hours         Carbohydrates:   On          Max Bolus:  17 units      Follow-up in 1 week.    [facility]  Triage: [phone]  [phone]      Any diabetes medication dose changes were made via the CDE Protocol and Collaborative  Practice Agreement with the patient's referring provider. A copy of this encounter was shared with the provider.

## 2023-08-16 NOTE — TELEPHONE ENCOUNTER
8w0d  Pt calling with a little bit of pelvic discomfort when she urinates. Does feel mildly when not urinating.  Does not feel like a UTI.  Feels more like gas pressure.  Denies any bleeding at this time.  BM this morning, states it was normal.    Discomfort started last night before bed.  Denies any recent intercourse.  Denies any change in vaginal discharge or odors.    Pt open to appt to rule out UTI/vaginal infection.  ED precautions reviewed.  Pt verbalized understanding, in agreement with plan, and voiced no further questions.  Blanka Magana RN on 8/16/2023 at 8:14 AM

## 2023-08-18 LAB — BACTERIA UR CULT: NORMAL

## 2023-08-22 ENCOUNTER — MYC MEDICAL ADVICE (OUTPATIENT)
Dept: EDUCATION SERVICES | Facility: CLINIC | Age: 33
End: 2023-08-22
Payer: COMMERCIAL

## 2023-08-22 DIAGNOSIS — E10.9 TYPE 1 DIABETES MELLITUS WITHOUT COMPLICATION (H): ICD-10-CM

## 2023-08-22 RX ORDER — PROCHLORPERAZINE 25 MG/1
SUPPOSITORY RECTAL
Qty: 3 EACH | Refills: 5 | Status: SHIPPED | OUTPATIENT
Start: 2023-08-22 | End: 2024-02-13

## 2023-08-22 NOTE — TELEPHONE ENCOUNTER
"Requested Prescriptions   Pending Prescriptions Disp Refills    Continuous Blood Gluc Sensor (DEXCOM G6 SENSOR) MISC [Pharmacy Med Name: DEXCOM G6 SENSOR  MISC]  5     Sig: CHANGE SENSOR EVERY 10 DAYS       There is no refill protocol information for this order           Last Written Prescription Date:  3/7/23  Last Fill Quantity: 3 each,  # refills: 5   Last office visit: 10/19/2021 ; last virtual visit: 8/10/2023 with prescribing provider:  Dr Jimenez   Future Office Visit:  9/14/23    Per LOV with Dr Jimenez (8/10/23)  \"Continue use of the DexcomG6 CGM sensor \"    Refill sent  Vicente Castro RN    "

## 2023-08-22 NOTE — TELEPHONE ENCOUNTER
Type 1 Diabetes + Pregnancy Follow-up    Subjective/Objective:    Linda Chavez sent in blood glucose log for review. Last date of communication was: 8/16/23.    Diabetes is being managed with diet, activity, and medications    Taking diabetes medications: yes:     Diabetes Medication(s)       Diabetic Other       Glucagon (BAQSIMI TWO PACK) 3 MG/DOSE POWD    Spray 1 Device in nostril once as needed (for severe hypoglycemia)      Insulin       insulin aspart (NOVOLOG PEN) 100 UNIT/ML pen    Inject 3 to 10 units subcutaneous at mealtimes as directed, total daily dose approx 30 units     insulin aspart (NOVOLOG VIAL) 100 UNITS/ML vial    Use with insulin pump, total daily dose approx 90 units     insulin glargine (LANTUS PEN) 100 UNIT/ML pen    Inject 22 Units Subcutaneous At Bedtime     SEMGLEE, YFGN, 100 UNIT/ML SOPN    ADMINISTER 22 UNITS UNDER THE SKIN AT BEDTIME SUBSTITUTE FOR LANTUS            Estimated Date of Delivery: Mar 27, 2024    BG/Food Log:                 Assessment:    Improving towards goal TIR but still short of 70+%. Some gaps in data that appear to be a t:connect issue - reporting of TDD, bolusing and time in range may be skewed as a result. Will increase ICR for lunch & dinner meals today and request patient send in again in 1 week.     Plan/Response:  Recommend increase to insulin -   Start Time       Basal Rate      Correction Factor        Carb Ratio       Target BG  Midnight          2.000 u/hr        1u:35 mg/dL     1u:7.5 g                       110 mg/dL  6:00 AM           1.800 u/hr        1u:35 mg/dL     1u:6.0             110 mg/dL  7:00 AM           1.700 u/hr        1u:22 mg/dL     1u:4.3             110 mg/dL  11:00 AM         1.700 u/hr        1u:22 mg/dL     1u:4.3 g -> 3.8            110 mg/dL  Noon                1.900 u/hr        1u:20 mg/dL     1u:4.8 g -> 4.5            110 mg/dL  5:00 PM           1.900 u/hr        1u:20 mg/dL     1u:3.8 g -> 3.5            110 mg/dL  9:00 PM            1.900 u/hr        1u:22 mg/dL     1u:4.5 g -> 4.0             110 mg/dL.  Follow-up in 1 week.  See [employer] message for patient communications.   \    Kat Lima RD, LD, [facility]    Any diabetes medication dose changes were made via the CDE Protocol and Collaborative Practice Agreement with the patient's endocrinology provider. A copy of this encounter was shared with the provider.

## 2023-08-30 LAB
ABO/RH(D): NORMAL
ANTIBODY SCREEN: NEGATIVE
SPECIMEN EXPIRATION DATE: NORMAL

## 2023-08-30 NOTE — PROGRESS NOTES
SUBJECTIVE:      HPI:     This is a 33 year old female patient,  who presents for her first obstetrical visit.     MYKE: 3/27/2024, by Last Menstrual Period.  She is 6w6d weeks.  Her cycles are irregular.  Her last menstrual period was normal.   Since her LMP, she has experienced  nausea, fatigue, and bloating ).   She denies emesis, abdominal pain, headache, loss of appetite, vaginal discharge, dysuria, pelvic pain, urinary urgency, lightheadedness, urinary frequency, vaginal bleeding, hemorrhoids, and constipation.     Additional History:   -SAB in May 2023 - D&C at 11wk - very nervous about   -Type 1 DM - insulin managed by Endo  -hypothyroidism - Endo manages Levothyroxine    LMP 23, cycles normally q 35d.     Has had some nausea, more than last time. Fatigue. Aversions to foods.     Has had eye exam in last year.         Have you travelled during the pregnancy?No  Have your sexual partner(s) travelled during the pregnancy?No     HISTORY:   Planned Pregnancy: Yes  Marital Status:   Occupation: Unemployed currently  Living in Household: Spouse     Past History:  Her past medical history   Past Medical History        Past Medical History:   Diagnosis Date     Adjustment disorder with anxious mood       Allergic rhinitis, cause unspecified       h/o AIT     Chest discomfort       Common migraine       No visual aura.      Hyperlipidemia LDL goal < 70       Hypothyroidism       Infectious mononucleosis 1995     Insulin pump in place       Dexcom continuous glucose monitoring     Morbid obesity (H)       Tuberculosis       Type 1 diabetes, HbA1c goal < 7% (H) 2004      followed [facility] endocrinology - now Dr Jimenez      .       She has a history of   SAB     Since her last LMP she denies use of alcohol, tobacco and street drugs.     Past medical, surgical, social and family history were reviewed and updated in [facility].         Current Outpatient Medications   Medication      Continuous Blood Gluc Sensor (DEXCOM G6 SENSOR) MISC     Continuous Blood Gluc Transmit (DEXCOM G6 TRANSMITTER) MISC     CONTOUR NEXT TEST test strip     Flaxseed, Linseed, (FLAX SEED OIL PO)     Glucagon (BAQSIMI TWO PACK) 3 MG/DOSE POWD     insulin aspart (NOVOLOG PEN) 100 UNIT/ML pen     insulin aspart (NOVOLOG VIAL) 100 UNITS/ML vial     insulin glargine (LANTUS PEN) 100 UNIT/ML pen     insulin pen needle (BD CLEOPATRA U/F) 32G X 4 MM miscellaneous     levothyroxine (SYNTHROID/LEVOTHROID) 125 MCG tablet     levothyroxine (SYNTHROID/LEVOTHROID) 137 MCG tablet     Microlet Lancets MISC     Prenatal w/o A Vit-Fe Fum-FA (PRENATA PO)     SEMGLEE, YFGN, 100 UNIT/ML SOPN     sertraline (ZOLOFT) 50 MG tablet     naratriptan (AMERGE) 1 MG tablet     triamcinolone (KENALOG) 0.1 % external cream      No current facility-administered medications for this visit.         ROS:   12 point review of systems negative other than symptoms noted below or in the HPI.  Constitutional: Fatigue  Gastrointestinal: Bloating and Nausea     Nurse phone visit completed. Prenatal book and folder (containing standard educational hand-outs and brochures) will be given at next visit to patient. Information in folder reviewed over the phone. Questions answered. Brochure given on optional screening available to assess chromosomal anomalies. Pt desires NIPS. Pt advised to call the clinic if she has any questions or concerns related to her pregnancy. Prenatal labs future ordered. New prenatal visit scheduled on 8/28/23 with DR Santoyo.  Shania Schulz, RN on 8/8/2023 at 11:59 AM           Lab Results   Component Value Date     PAP NIL 01/25/2021      PHQ-9 score:         8/7/2023    12:51 PM   PHQ   PHQ-9 Total Score 2   Q9: Thoughts of better off dead/self-harm past 2 weeks Not at all           4/18/2023     1:03 PM 5/1/2023    10:38 AM 8/7/2023    12:51 PM   TRESA-7 SCORE   Total Score 2 (minimal anxiety)    2 (minimal anxiety) 2 (minimal anxiety) 4  (minimal anxiety)   Total Score 2 2 4      Patient supplied answers from flow sheet for:  Prenatal OB Questionnaire.  Past Medical History  Have you ever recieved care for your mental health? : (!) Yes  Have you ever been in a major accident or suffered serious trauma?: No  Within the last year, has anyone hit, slapped, kicked or otherwise hurt you?: No  In the last year, has anyone forced you to have sex when you didn't want to?: No     Past Medical History 2   Have you ever received a blood transfusion?: No  Would you accept a blood transfusion if was medically recommended?: Yes  Does anyone in your home smoke?: No   Is your blood type Rh negative?: Unknown  Have you ever ?: No  Have you been hospitalized for a nonsurgical reason excluding normal delivery?: No  Have you ever had an abnormal pap smear?: No     Past Medical History (Continued)  Do you have a history of abnormalities of the uterus?: No  Did your mother take NICO or any other hormones when she was pregnant with you?: No  Do you have any other problems we have not asked about which you feel may be important to this pregnancy?: No       OBJECTIVE:     EXAM:  /78   Wt 101.2 kg (223 lb)   LMP 06/21/2023   BMI 35.99 kg/m   Body mass index is 35.99 kg/m .    GENERAL: healthy, alert and no distress  EYES: Eyes grossly normal to inspection, PERRL and conjunctivae and sclerae normal  HENT: ear canals and TM's normal, nose and mouth without ulcers or lesions  NECK: no adenopathy, no asymmetry, masses, or scars and thyroid normal to palpation  RESP: lungs clear to auscultation - no rales, rhonchi or wheezes  BREAST: normal without masses, tenderness or nipple discharge and no palpable axillary masses or adenopathy  CV: regular rate and rhythm, normal S1 S2, no S3 or S4, no murmur, click or rub, no peripheral edema and peripheral pulses strong  ABDOMEN: soft, nontender, no hepatosplenomegaly, no masses and bowel sounds normal  MS: no gross  musculoskeletal defects noted, no edema  SKIN: no suspicious lesions or rashes  NEURO: Normal strength and tone, mentation intact and speech normal  PSYCH: mentation appears normal, affect normal/bright      Results for orders placed or performed in visit on 08/31/23   US OB < 14 Weeks Single    Narrative    Obstetrical Ultrasound Report  OB U/S  < 14 Weeks -  Transabdominal  [facility]  Referring Provider: Sierra Santoyo DO  Sonographer: Rogerio Jasso RDMS  Indication:  Viability check  and Size and Dates     Dating (mm/dd/yyyy):   LMP: 06/21/2023            EDC:  03/27/2024            GA by LMP:           10w1d     Current Scan On:  8/31/2023          EDC:  04/05/2024            GA by Current Scan:          8w6d  The calculation of the gestational age by current scan was based on CRL.  Anatomy Scan:  Hou gestation.  Biometry:  CRL                                      2.21 cm                8w6d                        Yolk Sac                                             0.3 cm  Gestational Sac                 3.44 cm                8w4d                           Fetal heart activity:  Rate and rhythm is within normal limits.  Fetal   heart rate: 173 bpm  Findings: Single viable IUP     Maternal Structures:  Cervix: The cervix appears long and closed.  Right Ovary: Not visualized   Left Ovary: Not visualized     Impression:     Viable hou gestation at 8 weeks 6 days. US today differs with   patient's LMP dating giving final EDC of 4/5/24. Normal adnexa.   Recommend anatomy US at 18-22 weeks gestation.  Sierra Santoyo DO       ASSESSMENT/PLAN:       ICD-10-CM    1. Supervision of high risk pregnancy in first trimester  O09.91 Urine Culture Aerobic Bacterial     *UA reflex to Microscopic     ABO/Rh type and screen     Hepatitis B surface antigen     CBC with platelets     HIV Antigen Antibody Combo     Rubella Antibody IgG     Treponema Abs w Reflex to RPR and Titer     TSH      T4 free     Hemoglobin A1c     Basic metabolic panel     [facility] Referral - Pregnancy     Hepatic panel (Albumin, ALT, AST, Bili, Alk Phos, TP)      2. Type 1 diabetes mellitus during pregnancy, antepartum  O24.019 Hemoglobin A1c     Basic metabolic panel     [facility] Referral - Pregnancy     Hepatic panel (Albumin, ALT, AST, Bili, Alk Phos, TP)      3. Insulin pump status  Z96.41 Hemoglobin A1c     Basic metabolic panel      4. Hypothyroid in pregnancy, antepartum  O99.280 TSH    E03.9 T4 free          33 year old , On 2023 patient is 10w0d weeks of pregnancy with MYKE of 24 by today's 8+ wk ultrasound      PLAN/PATIENT INSTRUCTIONS:    -UTD cervical cancer screening.  -REveiwed ultrasound findings today with patient and . Discrepant dates, patient hx irregular cycles. Based on ultrasound, new EDC 24  -NOB labs today, orders reviewed  -Discussed carrier and aneuploidy screening. Discussed what the disorders are the tests look for, what the risk factors may be for the conditions, what the testing options are, that they are elective and not required, and the information which is being obtained in these tests. Discussed why one might choose to do these screening tests or why they may not, how the information can be used. For aneuploidy testing, discussed screening test options, how screening differs from diagnostic testing, and what the diagnostic tests are. Discussed that a negative test does not eliminate the chance for a chromosomal or other genetic disorder to be present in the child. For pricing information, recommend pt contact [facility] directly to determine estimation of costs as they may not be covered by insurance. Patient chose to accept testing. Will find out sex by envelope left at desk for them  -Type 1 DM on pump. Managed by Endo and DNEs. Plan level II ultrasound and fetal echo. MFM referral placed  Plan serial growth ultrasound. Twice weekly BPP  32wk. Delivery 39wk  -miscarriage precautions reviewed, 4-6 weeks    Sierra Treviño Masters, DO

## 2023-08-31 ENCOUNTER — PRENATAL OFFICE VISIT (OUTPATIENT)
Dept: OBGYN | Facility: CLINIC | Age: 33
End: 2023-08-31
Payer: COMMERCIAL

## 2023-08-31 ENCOUNTER — ANCILLARY PROCEDURE (OUTPATIENT)
Dept: ULTRASOUND IMAGING | Facility: CLINIC | Age: 33
End: 2023-08-31
Payer: COMMERCIAL

## 2023-08-31 VITALS — BODY MASS INDEX: 35.99 KG/M2 | SYSTOLIC BLOOD PRESSURE: 128 MMHG | WEIGHT: 223 LBS | DIASTOLIC BLOOD PRESSURE: 78 MMHG

## 2023-08-31 DIAGNOSIS — E03.9 HYPOTHYROID IN PREGNANCY, ANTEPARTUM: ICD-10-CM

## 2023-08-31 DIAGNOSIS — O24.019 TYPE 1 DIABETES MELLITUS DURING PREGNANCY, ANTEPARTUM: ICD-10-CM

## 2023-08-31 DIAGNOSIS — O09.91 SUPERVISION OF HIGH RISK PREGNANCY IN FIRST TRIMESTER: ICD-10-CM

## 2023-08-31 DIAGNOSIS — Z96.41 INSULIN PUMP STATUS: ICD-10-CM

## 2023-08-31 DIAGNOSIS — O36.80X0 PREGNANCY WITH INCONCLUSIVE FETAL VIABILITY: ICD-10-CM

## 2023-08-31 DIAGNOSIS — O99.280 HYPOTHYROID IN PREGNANCY, ANTEPARTUM: ICD-10-CM

## 2023-08-31 DIAGNOSIS — O09.91 SUPERVISION OF HIGH RISK PREGNANCY IN FIRST TRIMESTER: Primary | ICD-10-CM

## 2023-08-31 LAB
ALBUMIN UR-MCNC: NEGATIVE MG/DL
ANION GAP SERPL CALCULATED.3IONS-SCNC: 12 MMOL/L (ref 7–15)
APPEARANCE UR: CLEAR
BILIRUB UR QL STRIP: NEGATIVE
BUN SERPL-MCNC: 8.9 MG/DL (ref 6–20)
CALCIUM SERPL-MCNC: 9.5 MG/DL (ref 8.6–10)
CHLORIDE SERPL-SCNC: 104 MMOL/L (ref 98–107)
COLOR UR AUTO: YELLOW
CREAT SERPL-MCNC: 0.6 MG/DL (ref 0.51–0.95)
DEPRECATED HCO3 PLAS-SCNC: 20 MMOL/L (ref 22–29)
ERYTHROCYTE [DISTWIDTH] IN BLOOD BY AUTOMATED COUNT: 12.8 % (ref 10–15)
GFR SERPL CREATININE-BSD FRML MDRD: >90 ML/MIN/1.73M2
GLUCOSE SERPL-MCNC: 95 MG/DL (ref 70–99)
GLUCOSE UR STRIP-MCNC: NEGATIVE MG/DL
HBA1C MFR BLD: 6.3 % (ref 0–5.6)
HCT VFR BLD AUTO: 42.4 % (ref 35–47)
HGB BLD-MCNC: 14.3 G/DL (ref 11.7–15.7)
HGB UR QL STRIP: NEGATIVE
KETONES UR STRIP-MCNC: NEGATIVE MG/DL
LEUKOCYTE ESTERASE UR QL STRIP: NEGATIVE
MCH RBC QN AUTO: 31.1 PG (ref 26.5–33)
MCHC RBC AUTO-ENTMCNC: 33.7 G/DL (ref 31.5–36.5)
MCV RBC AUTO: 92 FL (ref 78–100)
NITRATE UR QL: NEGATIVE
PH UR STRIP: 5.5 [PH] (ref 5–7)
PLATELET # BLD AUTO: 266 10E3/UL (ref 150–450)
POTASSIUM SERPL-SCNC: 3.9 MMOL/L (ref 3.4–5.3)
RBC # BLD AUTO: 4.6 10E6/UL (ref 3.8–5.2)
SODIUM SERPL-SCNC: 136 MMOL/L (ref 136–145)
SP GR UR STRIP: <=1.005 (ref 1–1.03)
T4 FREE SERPL-MCNC: 1.37 NG/DL (ref 0.9–1.7)
TSH SERPL DL<=0.005 MIU/L-ACNC: 4.5 UIU/ML (ref 0.3–4.2)
UROBILINOGEN UR STRIP-ACNC: 0.2 E.U./DL
WBC # BLD AUTO: 10.4 10E3/UL (ref 4–11)

## 2023-08-31 PROCEDURE — 2894A RUBELLA ANTIBODY IGG: CPT | Performed by: OBSTETRICS & GYNECOLOGY

## 2023-08-31 PROCEDURE — 84439 ASSAY OF FREE THYROXINE: CPT | Performed by: OBSTETRICS & GYNECOLOGY

## 2023-08-31 PROCEDURE — 2894A TYPE AND SCREEN, ADULT: CPT | Performed by: OBSTETRICS & GYNECOLOGY

## 2023-08-31 PROCEDURE — 87389 HIV-1 AG W/HIV-1&-2 AB AG IA: CPT | Performed by: OBSTETRICS & GYNECOLOGY

## 2023-08-31 PROCEDURE — 81003 URINALYSIS AUTO W/O SCOPE: CPT | Performed by: OBSTETRICS & GYNECOLOGY

## 2023-08-31 PROCEDURE — 76801 OB US < 14 WKS SINGLE FETUS: CPT | Performed by: OBSTETRICS & GYNECOLOGY

## 2023-08-31 PROCEDURE — 99214 OFFICE O/P EST MOD 30 MIN: CPT | Performed by: OBSTETRICS & GYNECOLOGY

## 2023-08-31 PROCEDURE — 82248 BILIRUBIN DIRECT: CPT | Performed by: OBSTETRICS & GYNECOLOGY

## 2023-08-31 PROCEDURE — 2894A HEPATITIS B SURFACE ANTIGEN: CPT | Performed by: OBSTETRICS & GYNECOLOGY

## 2023-08-31 PROCEDURE — 80053 COMPREHEN METABOLIC PANEL: CPT | Performed by: OBSTETRICS & GYNECOLOGY

## 2023-08-31 PROCEDURE — 84443 ASSAY THYROID STIM HORMONE: CPT | Performed by: OBSTETRICS & GYNECOLOGY

## 2023-08-31 PROCEDURE — 87086 URINE CULTURE/COLONY COUNT: CPT | Performed by: OBSTETRICS & GYNECOLOGY

## 2023-08-31 PROCEDURE — 86780 TREPONEMA PALLIDUM: CPT | Performed by: OBSTETRICS & GYNECOLOGY

## 2023-08-31 PROCEDURE — 85027 COMPLETE CBC AUTOMATED: CPT | Performed by: OBSTETRICS & GYNECOLOGY

## 2023-08-31 PROCEDURE — 36415 COLL VENOUS BLD VENIPUNCTURE: CPT | Performed by: OBSTETRICS & GYNECOLOGY

## 2023-08-31 PROCEDURE — 83036 HEMOGLOBIN GLYCOSYLATED A1C: CPT | Performed by: OBSTETRICS & GYNECOLOGY

## 2023-09-01 ENCOUNTER — MYC MEDICAL ADVICE (OUTPATIENT)
Dept: ENDOCRINOLOGY | Facility: CLINIC | Age: 33
End: 2023-09-01
Payer: COMMERCIAL

## 2023-09-01 LAB
HBV SURFACE AG SERPL QL IA: NONREACTIVE
HIV 1+2 AB+HIV1 P24 AG SERPL QL IA: NONREACTIVE
RUBV IGG SERPL QL IA: 1.25 INDEX
RUBV IGG SERPL QL IA: POSITIVE
T PALLIDUM AB SER QL: NONREACTIVE

## 2023-09-02 DIAGNOSIS — E06.3 HYPOTHYROIDISM DUE TO HASHIMOTO'S THYROIDITIS: ICD-10-CM

## 2023-09-02 LAB — BACTERIA UR CULT: NORMAL

## 2023-09-02 RX ORDER — LEVOTHYROXINE SODIUM 137 UG/1
TABLET ORAL
Qty: 90 TABLET | Refills: 3 | Status: SHIPPED | OUTPATIENT
Start: 2023-09-02 | End: 2024-09-11

## 2023-09-05 PROBLEM — Z96.41 INSULIN PUMP STATUS: Status: ACTIVE | Noted: 2023-09-05

## 2023-09-05 PROBLEM — O99.280 HYPOTHYROID IN PREGNANCY, ANTEPARTUM: Status: ACTIVE | Noted: 2023-09-05

## 2023-09-05 PROBLEM — E03.9 HYPOTHYROID IN PREGNANCY, ANTEPARTUM: Status: ACTIVE | Noted: 2023-09-05

## 2023-09-06 ENCOUNTER — MYC MEDICAL ADVICE (OUTPATIENT)
Dept: EDUCATION SERVICES | Facility: CLINIC | Age: 33
End: 2023-09-06
Payer: COMMERCIAL

## 2023-09-06 ENCOUNTER — TRANSCRIBE ORDERS (OUTPATIENT)
Dept: MATERNAL FETAL MEDICINE | Facility: CLINIC | Age: 33
End: 2023-09-06
Payer: COMMERCIAL

## 2023-09-06 DIAGNOSIS — O26.90 PREGNANCY RELATED CONDITION, ANTEPARTUM: Primary | ICD-10-CM

## 2023-09-06 LAB
ALBUMIN SERPL BCG-MCNC: 4.1 G/DL (ref 3.5–5.2)
ALP SERPL-CCNC: 82 U/L (ref 35–104)
ALT SERPL W P-5'-P-CCNC: 14 U/L (ref 0–50)
AST SERPL W P-5'-P-CCNC: 19 U/L (ref 0–45)
BILIRUB DIRECT SERPL-MCNC: <0.2 MG/DL (ref 0–0.3)
BILIRUB SERPL-MCNC: <0.2 MG/DL
PROT SERPL-MCNC: 6.9 G/DL (ref 6.4–8.3)

## 2023-09-06 NOTE — TELEPHONE ENCOUNTER
Type 1 Diabetes + Pregnancy Follow-up    Subjective/Objective:    Linda Chavez sent in blood glucose log for review. Last date of communication was: 8/22/23.    Diabetes is being managed with diet, activity, and medications    Taking diabetes medications: yes:     Diabetes Medication(s)       Diabetic Other       Glucagon (BAQSIMI TWO PACK) 3 MG/DOSE POWD    Spray 1 Device in nostril once as needed (for severe hypoglycemia)      Insulin       insulin aspart (NOVOLOG PEN) 100 UNIT/ML pen    Inject 3 to 10 units subcutaneous at mealtimes as directed, total daily dose approx 30 units     insulin aspart (NOVOLOG VIAL) 100 UNITS/ML vial    Use with insulin pump, total daily dose approx 90 units     insulin glargine (LANTUS PEN) 100 UNIT/ML pen    Inject 22 Units Subcutaneous At Bedtime     SEMGLEE, YFGN, 100 UNIT/ML SOPN    ADMINISTER 22 UNITS UNDER THE SKIN AT BEDTIME SUBSTITUTE FOR LANTUS            Estimated Date of Delivery: Apr 5, 2024    BG/Food Log:               Assessment:    Meeting ADA TIR target >70%  mg/dL, improved since last check in. Post-prandial excursions seen in the middle of the day. Will request patient start writing down fasting and 2-hr post-prandial blood sugars using Dexcom data, as able, and sending in weekly.      Plan/Response:  Recommend increase to insulin -   Start Time       Basal Rate      Correction Factor        Carb Ratio       Target BG  Midnight          2.000 u/hr        1u:35 mg/dL              1u:7.5 g                       110 mg/dL  6:00 AM           1.800 u/hr        1u:35 mg/dL              1u:6.0               110 mg/dL  7:00 AM           1.700 u/hr        1u:22 mg/dL              1u:4.3 --> 1u:4g         110 mg/dL  11:00 AM         1.700 u/hr        1u:22 mg/dL             1u:3.8 --> 1u:3.5g      110 mg/dL  Noon                1.900 u/hr        1u:20 mg/dL        1u:4.5 --> 1u:4g         110 mg/dL  5:00 PM           1.900 u/hr        1u:20 mg/dL              1u:3.5               110 mg/dL  9:00 PM           1.900 u/hr        1u:22 mg/dL              1u: 4.0              110 mg/dL  Write down fasting, 2 hr post-prandial blood sugars to gauge mealtime insulin doses   Follow-up in 1 week.  See [employer] message for patient communications.       Kat Lima RD, LD, [facility]     Any diabetes medication dose changes were made via the CDE Protocol and Collaborative Practice Agreement with the patient's endocrinology provider. A copy of this encounter was shared with the provider.

## 2023-09-11 ENCOUNTER — LAB (OUTPATIENT)
Dept: LAB | Facility: CLINIC | Age: 33
End: 2023-09-11
Payer: COMMERCIAL

## 2023-09-11 DIAGNOSIS — Z13.79 GENETIC SCREENING: Primary | ICD-10-CM

## 2023-09-11 DIAGNOSIS — O09.91 SUPERVISION OF HIGH RISK PREGNANCY IN FIRST TRIMESTER: ICD-10-CM

## 2023-09-11 PROCEDURE — 36415 COLL VENOUS BLD VENIPUNCTURE: CPT

## 2023-09-18 LAB — SCANNED LAB RESULT: NORMAL

## 2023-09-19 ENCOUNTER — MYC MEDICAL ADVICE (OUTPATIENT)
Dept: EDUCATION SERVICES | Facility: CLINIC | Age: 33
End: 2023-09-19
Payer: COMMERCIAL

## 2023-09-20 DIAGNOSIS — F41.1 GAD (GENERALIZED ANXIETY DISORDER): ICD-10-CM

## 2023-09-20 NOTE — TELEPHONE ENCOUNTER
Diabetes and Pregnancy Follow-up  Type of Service: [facility] follow-up    How would patient like to obtain AVS? Not needed    Subjective/Objective:    Linda Chavez was called for a scheduled BG review. Last date of communication was: .    Gestational diabetes is being managed with diet, activity, and medications    Taking diabetes medications: yes:     Diabetes Medication(s)       Diabetic Other       Glucagon (BAQSIMI TWO PACK) 3 MG/DOSE POWD    Spray 1 Device in nostril once as needed (for severe hypoglycemia)      Insulin       insulin aspart (NOVOLOG PEN) 100 UNIT/ML pen    Inject 3 to 10 units subcutaneous at mealtimes as directed, total daily dose approx 30 units     insulin aspart (NOVOLOG VIAL) 100 UNITS/ML vial    Use with insulin pump, total daily dose approx 90 units     insulin glargine (LANTUS PEN) 100 UNIT/ML pen    Inject 22 Units Subcutaneous At Bedtime     SEMGLEE, YFGN, 100 UNIT/ML SOPN    ADMINISTER 22 UNITS UNDER THE SKIN AT BEDTIME SUBSTITUTE FOR LANTUS              Estimated Date of Delivery: 2024    Blood Glucose/Ketone Log:                              Assessment:    Not meeting goal of <70% of TIR ().     Plan/Response:  Midnight 2.000 u/hr 1u:35 mg/dL 1u:7.5 g 110 mg/dL  6:00 AM 1.800 u/hr 1u:35 mg/dL 1u:6.0 g 110 mg/dL  7:00 AM 1.700 u/hr 1u:22 mg/dL 1u:4.0 g 110 mg/dL --> 1u:3.7 g  11:00 AM 1.700 u/hr 1u:22 mg/dL 1u:3.5 g 110 mg/dL --> 1u:3.2 g  12:00 PM 1.900 u/hr 1u:20 mg/dL 1u:4.0 g 110 mg/dL --> 1u:3.7 g  5:00 PM 1.900 u/hr 1u:20 mg/dL 1u:3.5 g 110 mg/dL --> 1u:3.2 g  9:00 PM 1.900 u/hr 1u:22 mg/dL 1u:4.0 g 110 mg/dL  Calculated Total Daily Basal 45.1 units  Duration of Insulin: 5:00 hours  Carbohydrates: On  Max Bolus: 17 units    [facility]  Triage: [phone]  [phone]      Any diabetes medication dose changes were made via the CDE Protocol and Collaborative Practice Agreement with the patient's endocrinology provider. A copy of  this encounter was shared with the provider.     ambulatory

## 2023-09-21 ENCOUNTER — PRENATAL OFFICE VISIT (OUTPATIENT)
Dept: OBGYN | Facility: CLINIC | Age: 33
End: 2023-09-21
Payer: COMMERCIAL

## 2023-09-21 VITALS — BODY MASS INDEX: 35.83 KG/M2 | SYSTOLIC BLOOD PRESSURE: 126 MMHG | DIASTOLIC BLOOD PRESSURE: 78 MMHG | WEIGHT: 222 LBS

## 2023-09-21 DIAGNOSIS — E03.9 HYPOTHYROID IN PREGNANCY, ANTEPARTUM: ICD-10-CM

## 2023-09-21 DIAGNOSIS — O09.91 SUPERVISION OF HIGH RISK PREGNANCY IN FIRST TRIMESTER: Primary | ICD-10-CM

## 2023-09-21 DIAGNOSIS — O99.280 HYPOTHYROID IN PREGNANCY, ANTEPARTUM: ICD-10-CM

## 2023-09-21 DIAGNOSIS — E66.01 MORBID OBESITY (H): ICD-10-CM

## 2023-09-21 DIAGNOSIS — E10.9 TYPE 1 DIABETES, HBA1C GOAL < 7% (H): ICD-10-CM

## 2023-09-21 PROCEDURE — 99213 OFFICE O/P EST LOW 20 MIN: CPT | Performed by: OBSTETRICS & GYNECOLOGY

## 2023-09-21 NOTE — PROGRESS NOTES
OB Visit @ 11w6d     No loss of fluid/vaginal bleeding/regular contractions. No FM  N/v, mostly in am. Not using anything.         ICD-10-CM    1. Supervision of high risk pregnancy in first trimester  O09.91 US OB Follow Up >14 Weeks     US OB Fetal Biophys Prf wo NST Singls W/Ltd      2. Hypothyroid in pregnancy, antepartum  O99.280     E03.9       3. Type 1 diabetes, HbA1c goal < 7% (H)  E10.9 US OB Follow Up >14 Weeks     US OB Fetal Biophys Prf wo NST Singls W/Ltd      4. Morbid obesity (H)  E66.01           -Schedule every 4wk appt to 28wks. NO DIABETES TESTING. Growth ultrasound every 4 weeks starting at 28 weeks.Twice weekly BPPs starting at 3 weeks.Then every 2 wk appt to 36 wks. THen weekly to 40wks.   -Type 1 DM, Obesity.   Start ASA 81mg.  LEvel II and fetal echo planned.   Due for eye exam in the fall.   Follows with Dr. Bhat  -Will return for Influenza vaccine  - Labor precautions. F/U 4wk    Sierra Treviño Masters, DO

## 2023-09-25 ENCOUNTER — MYC MEDICAL ADVICE (OUTPATIENT)
Dept: EDUCATION SERVICES | Facility: CLINIC | Age: 33
End: 2023-09-25

## 2023-09-25 ENCOUNTER — IMMUNIZATION (OUTPATIENT)
Dept: FAMILY MEDICINE | Facility: CLINIC | Age: 33
End: 2023-09-25
Payer: COMMERCIAL

## 2023-09-25 DIAGNOSIS — Z23 NEED FOR PROPHYLACTIC VACCINATION AND INOCULATION AGAINST INFLUENZA: Primary | ICD-10-CM

## 2023-09-25 PROCEDURE — 90471 IMMUNIZATION ADMIN: CPT

## 2023-09-25 PROCEDURE — 90686 IIV4 VACC NO PRSV 0.5 ML IM: CPT

## 2023-09-26 NOTE — TELEPHONE ENCOUNTER
Type 1 Diabetes + Pregnancy Follow-up    Subjective/Objective:    Linda Chavez sent in blood glucose log for review. Last date of communication was: 9/19/23.    Diabetes is being managed with diet, activity, and medications    Taking diabetes medications: yes:     Diabetes Medication(s)       Diabetic Other       Glucagon (BAQSIMI TWO PACK) 3 MG/DOSE POWD    Spray 1 Device in nostril once as needed (for severe hypoglycemia)      Insulin       insulin aspart (NOVOLOG PEN) 100 UNIT/ML pen    Inject 3 to 10 units subcutaneous at mealtimes as directed, total daily dose approx 30 units     insulin aspart (NOVOLOG VIAL) 100 UNITS/ML vial    Use with insulin pump, total daily dose approx 90 units     insulin glargine (LANTUS PEN) 100 UNIT/ML pen    Inject 22 Units Subcutaneous At Bedtime     SEMGLEE, YFGN, 100 UNIT/ML SOPN    ADMINISTER 22 UNITS UNDER THE SKIN AT BEDTIME SUBSTITUTE FOR LANTUS            Estimated Date of Delivery: Apr 5, 2024    BG/Food Log:               Assessment:    Meeting ADA pregnancy targets for CGM >70%    Seeing high post-prandial blood sugars after midday meal - will increase ICR to help with this. Fasting blood sugars appear to be in target range most of the time this week.     Plan/Response:  Recommend increase to insulin -   Start Time Basal Rate Correction Factor  Carb Ratio Target BG   Midnight  2u/hr 1u:35 mg/dL 1u:7.5g 110 mg/dL   6:00AM 1.8u/hr 1u:35 mg/dL 1u:6.0g 110 mg/dL   7:00AM 1.7u/hr 1u:22 mg/dL 1u:3.7g --> 1u:3.2g 110 mg/dL   11:00AM 1.7u/hr 1u:22 mg/dL 1u:3.2g  --> 1u:2.9g 110 mg/dL   12:00PM 1.9u/hr 1u:20 mg/dL 1u:3.7g --> 1u:3.2g 110 mg/dL   5:00PM 1.9u/hr 1u:20 mg/dL 1u:3.2g 110 mg/dL   9:00PM 1.9u/hr 1u:22 mg/dL 1u:4g 110 mg/dL     Follow-up in 1 week.  See MyChart message for patient communications.     Kat Lima RD, LD, [facility]     Any diabetes medication dose changes were made via the CDE Protocol and Collaborative Practice Agreement with the patient's endocrinology  provider. A copy of this encounter was shared with the provider.

## 2023-10-02 ENCOUNTER — VIRTUAL VISIT (OUTPATIENT)
Dept: ENDOCRINOLOGY | Facility: CLINIC | Age: 33
End: 2023-10-02
Payer: COMMERCIAL

## 2023-10-02 DIAGNOSIS — E06.3 HYPOTHYROIDISM DUE TO HASHIMOTO'S THYROIDITIS: ICD-10-CM

## 2023-10-02 DIAGNOSIS — Z96.41 INSULIN PUMP STATUS: ICD-10-CM

## 2023-10-02 DIAGNOSIS — E10.9 TYPE 1 DIABETES MELLITUS WITHOUT COMPLICATION (H): Primary | ICD-10-CM

## 2023-10-02 PROCEDURE — 99214 OFFICE O/P EST MOD 30 MIN: CPT | Mod: VID | Performed by: INTERNAL MEDICINE

## 2023-10-02 PROCEDURE — 95251 CONT GLUC MNTR ANALYSIS I&R: CPT | Performed by: INTERNAL MEDICINE

## 2023-10-02 NOTE — PROGRESS NOTES
Virtual Visit Details    Type of service:  Video Visit     Originating Location (pt. Location): Home  Distant Location (provider location):  Off-site  Platform used for Video Visit: Edwin        Recent issues:  Diabetes and thyroid follow-up evaluation  Using the Tandem pump + DexcomG6 per plan  Previous miscarrage at 10 weeks gestation in early 5/2023 then another Pg  ... Currently 13 weeks pregnant, MYKE 4/5/24 (may deliver late 3/2024), sees Dr. Sierra Santoyo/[facility]   Feeling well overall and has less nausea and fatigue           2003. Diagnosis of diabetes mellitus, age 13, living in [place]  Had acute illness requiring hospitalization at [facility]  Began insulin injections, using Novolog and Lantus insulin  Gananda carb counting method, gram estimates  On MDI treatment plan for approx 2 years, then changed to pump use  Previous medical evaluations with Dr. Yash Barcenas/[facility]  2005. Began use of [place] pump  2012. Changed to Medtronic pump  Subsequently using Medtronic 723 Paradigm pump  2012. Tried wearing CGMS sensor, but uncomfortable     12/8/14. Initial consult with me at my former clinic ([facility])  Continued pump management  General medicine appointments with Dr. Ellen Burdick/[facility] [place]     Previous [facility] hgbA1c levels include:              Lab Test 02/05/18 10/10/17 06/21/17 02/01/17 11/16/16  0815   A1C 7.4* 7.8* 8.1* 7.3* 8.2*      ~12/2017. Upgraded to Medtronic 670G pump    Tried Medtronic Guardian sensor, recalls frequent low glucose alarms              Wore sensor in manual mode              Didn't like it, discontinued use     ~11/2018. Wore diagnostic LibrePro CGM  Subsequently obtained LibrePersonal CGM, not wearing past year  3/2022. Changed to Tandem TSlim insulin pump    Novolog in pump    Current Tandem pump settings:       Recent Tandem pump data:            Recent DexcomG6 data:       Recent [facility] labs include:  Lab Results   Component Value Date    A1C 6.3 (H) 08/31/2023    NA  136 08/31/2023    POTASSIUM 3.9 08/31/2023    CHLORIDE 104 08/31/2023    CO2 20 (L) 08/31/2023    ANIONGAP 12 08/31/2023    GLC 95 08/31/2023    BUN 8.9 08/31/2023    CR 0.60 08/31/2023    GFRESTIMATED >90 08/31/2023    GFRESTBLACK >90 04/13/2021    MARCIA 9.5 08/31/2023    CHOL 241 (H) 10/06/2022    TRIG 75 10/06/2022    HDL 67 10/06/2022     (H) 10/06/2022    NHDL 174 (H) 10/06/2022    UCRR 64 06/08/2022    MICROL 5 06/08/2022    UMALCR 7.81 06/08/2022     Last eye exam at [facility] in [place] 11/3/22, no DR  Hyperlipidemia:  Takes simvastatin medication  DM Complications:  None known        Thyroid:  2013. Diagnosis of hypothyroidism  Previous [facility] thyroid labs include:    Treatment with levothyroxine medication  Previously on stable dose as levothyroxine 0.088 mg daily  Subsequently taking alternating levothyroxine 0.125 mg and 0.137 mg daily, then dose increase  Recent [facility] labs include:  Lab Results   Component Value Date    TSH 4.50 (H) 08/31/2023    T4 1.37 08/31/2023     (H) 12/19/2013     Current dose:  Levothyroxine 0.137 mg daily        , lives in [facility]; works at [employer] with , in [place]; dog Shine  Sees Dr. Belén Mustafa/[facility] clinic for general medicine appointments  Also sees Dr. Sierra Santoyo/[facility]      PMH/PSH:  Past Medical History:   Diagnosis Date    Adjustment disorder with anxious mood     Allergic rhinitis, cause unspecified     h/o AIT    Chest discomfort     Common migraine     No visual aura.     Hyperlipidemia LDL goal < 70     Hypothyroidism     Infectious mononucleosis 01/01/1995    Insulin pump in place     Dexcom continuous glucose monitoring    Obesity     Tuberculosis     Type 1 diabetes, HbA1c goal < 7% (H) 03/01/2004     followed [facility] - peds endocrinology - now Dr Jimenez     Past Surgical History:   Procedure Laterality Date    APPENDECTOMY  08/01/2007    dr deshpande    DILATION AND CURETTAGE SUCTION N/A  5/9/2023    Procedure: DILATION AND CURETTAGE OF UTERUS USING SUCTION;  Surgeon: Sierra Santoyo DO;  Location: [facility] OR    PE TUBES Bilateral 01/01/1996       Family Hx:  Family History   Problem Relation Age of Onset    Neurologic Disorder Maternal Grandmother         dementia    Genetic Disorder Paternal Grandfather         kidney    Family History Negative No family hx of          Social Hx:  Social History     Socioeconomic History    Marital status:      Spouse name: Not on file    Number of children: Not on file    Years of education: Not on file    Highest education level: Not on file   Occupational History    Occupation: Works in supply chain   Tobacco Use    Smoking status: Never    Smokeless tobacco: Never   Vaping Use    Vaping Use: Never used   Substance and Sexual Activity    Alcohol use: Not Currently     Alcohol/week: 2.0 standard drinks of alcohol     Types: 2 Standard drinks or equivalent per week     Comment: 2-5 drinks per week    Drug use: No    Sexual activity: Yes     Partners: Male   Other Topics Concern     Service Not Asked    Blood Transfusions Not Asked    Caffeine Concern Yes     Comment: rare    Occupational Exposure Not Asked    Hobby Hazards Not Asked    Sleep Concern No    Stress Concern No    Weight Concern Not Asked    Special Diet Not Asked    Back Care Not Asked    Exercise Yes    Bike Helmet Not Asked    Seat Belt Yes    Self-Exams No     Comment: encouraged    Parent/sibling w/ CABG, MI or angioplasty before 65F 55M? No   Social History Narrative    -Working -       Social Determinants of Health     Financial Resource Strain: Not on file   Food Insecurity: Not on file   Transportation Needs: Not on file   Physical Activity: Not on file   Stress: Not on file   Social Connections: Not on file   Interpersonal Safety: Not on file   Housing Stability: Not on file          MEDICATIONS:  has a current medication list which includes the following prescription(s):  dexcom g6 sensor, dexcom g6 transmitter, contour next test, flaxseed (linseed), baqsimi two pack, insulin aspart, insulin aspart, insulin glargine, insulin pen needle, levothyroxine, microlet lancets, prenatal w/o a vit-fe fum-fa, semglee (yfgn), sertraline, naratriptan, and triamcinolone.      ROS: 10 point ROS neg other than the symptoms noted above in the HPI.     GENERAL: some fatigue, mild wt gain; denies fevers, chills, night sweats.   HEENT: no dysphagia, odonophagia, diplopia, neck pain  THYROID:  no apparent hyper or hypothyroid symptoms  CV: no chest pain, pressure, palpitations  LUNGS: no SOB, LANG, cough, wheezing   ABDOMEN: some bloating; no diarrhea, constipation, abdominal pain  EXTREMITIES: no rashes, ulcers, edema  NEUROLOGY: no headaches, denies changes in vision, tingling, extremitiy numbness   MSK: no muscle aches or pains, weakness  SKIN: no rashes or lesions  : no menses during Pg  PSYCH:  stable mood, no significant anxiety or depression  ENDOCRINE: no heat or cold intolerance     Physical Exam (visual exam)  VS:  no vital signs taken for video visit  CONSTITUTIONAL: healthy, alert and NAD, well dressed, answering questions appropriately  ENT: no nose swelling or nasal discharge, mouth redness or gum changes.  EYES: eyes grossly normal to inspection, conjunctivae and sclerae normal, no exophthalmos or proptosis  THYROID:  no apparent nodules or goiter  LUNGS: no audible wheeze, cough or visible cyanosis, no visible retractions or increased work of breathing  ABDOMEN: abdomen not evaluated  EXTREMITIES: no hand tremors, limited exam  NEUROLOGY: CN grossly intact, mentation intact and speech normal   SKIN:  no apparent skin lesions, rash, or edema with visualized skin appearance  PSYCH: mentation appears normal, affect normal/bright, judgement and insight intact,   normal speech and appearance well groomed       LABS:     All pertinent notes, labs, and images personally reviewed by me.      A/P:  Encounter Diagnoses   Name Primary?    Type 1 diabetes mellitus without complication (H) Yes    Insulin pump status     Hypothyroidism due to Hashimoto's thyroiditis        Comments:  Reviewed health history, diabetes and thyroid issues  Recent CGM trends good overall  Now on higher thyroid med dose with recent dose adjustment plan  Reviewed and interpreted tests that I previously ordered.   Ordered appropriate tests for the endocrinology disease management.    Management options discussed and implemented after shared medical decision making with the patient.  T1DM and hypothyroidism problems are chronic-stable    Plan:  Reviewed the overall T1DM management and insulin pump use.  Discussed optimal BG testing to assess glucose trends.  We reviewed insulin pump settings, basal rate and bolus dosing  Use of automated pump bolus dosing for meal/snack carb & correction dosing  Reviewed recent Tandem pump report information  Reviewed recent DexcomG6 CGM glucose trends, in detail    Reviewed general issues with the hypothyroidism diagnosis   Discussed lab tests used to assess patient thyroid hormone levels  Reviewed treatment options including levothyroxine medication, ideal dosing    Recommend:  Continue the Tandem insulin pump and diabetes management plan  Pump setting changes:    Bolus ICR:  Add new time interval at 2:30pm with softer ICR 4.0 for mid afternoon snack    Continue the mealtime boluses premeal  Would be helpful for name change of primary pump Profile 2 to Pregnancy, but keep backup Profile 1  We have discussed use of the exercise and sleep modes with pump use  Continue use of the DexcomG6 CGM sensor  Reviewed overall pregnancy treatment goals for the diabetes and thyroid management  Continue the current levothyroxine 0.137 mg every day, message me if problems getting Rx filled  Plan repeat nonfasting labs 10/18/23    [facility] at [facility] or [facility]    Lab orders placed  Would  not restart the simvastatin med while pregnant  Arrange annual dilated eye exam, fasting lipid panel testing.  Plan to have endocrinology appointments monthly and Diab Ed appointments every 7-10 days during Pg  Contact me if questions/concerns about diabetes and thyroid management    Addressed patient's questions today.    There are no Patient Instructions on file for this visit.    Future labs ordered today:   Orders Placed This Encounter   Procedures    GLUCOSE MONITOR, 72 HOUR, PHYS INTERP    TSH with free T4 reflex    Hemoglobin A1c     Radiology/Consults ordered today: None    Total time spent on day of encounter:  31 min    Follow-up:  11/16/23    ANTHONY Jimenez MD, MS  Endocrinology  [facility]    CC:  RUBY Mustafa, Masters, A, and JYOTI Lima

## 2023-10-02 NOTE — NURSING NOTE
Chief Complaint   Patient presents with    Follow Up     Type 1 diabetes mellitus without complication (H) +3 more       There were no vitals filed for this visit.    There is no height or weight on file to calculate BMI.    Coleen Melo, HUGHF  
4 = No assist / stand by assistance

## 2023-10-02 NOTE — Clinical Note
"    10/2/2023         RE: Linda Chavez  [address]  [facility] [address]        Dear Colleague,    Thank you for referring your patient, Linda Chavez, to the [facility] [place]. Please see a copy of my visit note below.    Virtual Visit Details    Type of service:  Video Visit     Originating Location (pt. Location): {video visit patient location:933750::\"Home\"}  {PROVIDER LOCATION On-site should be selected for visits conducted from your clinic location or adjoining [facility] hospital, academic office, or other nearby [facility] building. Off-site should be selected for all other provider locations, including home:[id number]}  Distant Location (provider location):  {virtual location provider:945371}  Platform used for Video Visit: {Virtual Visit Platforms:003577::\"Supremex\"}        Recent issues:  Diabetes and thyroid follow-up evaluation  Using the Tandem pump + DexcomG6  Previous miscarrage at 10 weeks gestation in early 5/2023 then another Pg  ... Currently 13 weeks pregnant, MYKE 4/5/24 (may deliver late 3/2024), sees Dr. Sierra Santoyo/[facility]   Feeling well but mild nausea and fatigue           2003. Diagnosis of diabetes mellitus, age 13, living in [facility]  Had acute illness requiring hospitalization at [facility]  Began insulin injections, using Novolog and Lantus insulin  [place] carb counting method, gram estimates  On MDI treatment plan for approx 2 years, then changed to pump use  Previous medical evaluations with Dr. Yash Barcenas/[facility]  2005. Began use of [place] pump  2012. Changed to Medtronic pump  Subsequently using Medtronic 723 Paradigm pump  2012. Tried wearing CGMS sensor, but uncomfortable     12/8/14. Initial consult with me at my former clinic ([facility])  Continued pump management  Was not interested in CGMS use     Previous FV hgbA1c levels include:              Lab Test 02/05/18 10/10/17 06/21/17 02/01/17 11/16/16  0815   A1C 7.4* 7.8* 8.1* 7.3* 8.2*      ~12/2017. Upgraded to " Medtronic 670G pump    Tried Medtronic Guardian sensor, recalls frequent low glucose alarms              Wore sensor in manual mode              Didn't like it, discontinued use     ~11/2018. Wore diagnostic LibrePro CGM  Subsequently obtained LibrePersonal CGM, not wearing past year  3/2022. Changed to Tandem TSlim insulin pump    Novolog in pump    Current Tandem pump settings:       Recent Tandem pump data:            Recent DexcomG6 data:         Recent FV labs include:  Lab Results   Component Value Date    A1C 6.3 (H) 08/31/2023     08/31/2023    POTASSIUM 3.9 08/31/2023    CHLORIDE 104 08/31/2023    CO2 20 (L) 08/31/2023    ANIONGAP 12 08/31/2023    GLC 95 08/31/2023    BUN 8.9 08/31/2023    CR 0.60 08/31/2023    GFRESTIMATED >90 08/31/2023    GFRESTBLACK >90 04/13/2021    MARCIA 9.5 08/31/2023    CHOL 241 (H) 10/06/2022    TRIG 75 10/06/2022    HDL 67 10/06/2022     (H) 10/06/2022    NHDL 174 (H) 10/06/2022    UCRR 64 06/08/2022    MICROL 5 06/08/2022    UMALCR 7.81 06/08/2022     Last eye exam at [facility] in [place] 11/3/22, no DR  Hyperlipidemia:  Takes simvastatin medication  DM Complications:  None known        Thyroid:  2013. Diagnosis of hypothyroidism  Previous FV thyroid labs include:    Treatment with levothyroxine medication  Previously on stable dose as levothyroxine 0.088 mg daily  Recent FV labs include:  Lab Results   Component Value Date    TSH 4.50 (H) 08/31/2023    T4 1.37 08/31/2023     (H) 12/19/2013     Current dose:  Levothyroxine 0.137 mg daily        , lives in [facility]; works at [employer] with , in [place]  Has previously seen Dr. Ellen Burdick/[facility]  Also sees Dr. Sierra Higginss[facility]      PMH/PSH:  Past Medical History:   Diagnosis Date    Adjustment disorder with anxious mood     Allergic rhinitis, cause unspecified     h/o AIT    Chest discomfort     Common migraine     No visual aura.      Hyperlipidemia LDL goal < 70     Hypothyroidism     Infectious mononucleosis 01/01/1995    Insulin pump in place     Dexcom continuous glucose monitoring    Morbid obesity (H)     Tuberculosis     Type 1 diabetes, HbA1c goal < 7% (H) 03/01/2004     followed N - peds endocrinology - now Dr Jimenez     Past Surgical History:   Procedure Laterality Date    APPENDECTOMY  08/01/2007    dr deshpande    DILATION AND CURETTAGE SUCTION N/A 5/9/2023    Procedure: DILATION AND CURETTAGE OF UTERUS USING SUCTION;  Surgeon: Sierra Santoyo DO;  Location: [facility] OR    PE TUBES Bilateral 01/01/1996       Family Hx:  Family History   Problem Relation Age of Onset    Neurologic Disorder Maternal Grandmother         dementia    Genetic Disorder Paternal Grandfather         kidney    Family History Negative No family hx of          Social Hx:  Social History     Socioeconomic History    Marital status:      Spouse name: Not on file    Number of children: Not on file    Years of education: Not on file    Highest education level: Not on file   Occupational History    Occupation: Works in supply chain   Tobacco Use    Smoking status: Never    Smokeless tobacco: Never   Vaping Use    Vaping Use: Never used   Substance and Sexual Activity    Alcohol use: Not Currently     Alcohol/week: 2.0 standard drinks of alcohol     Types: 2 Standard drinks or equivalent per week     Comment: 2-5 drinks per week    Drug use: No    Sexual activity: Yes     Partners: Male   Other Topics Concern     Service Not Asked    Blood Transfusions Not Asked    Caffeine Concern Yes     Comment: rare    Occupational Exposure Not Asked    Hobby Hazards Not Asked    Sleep Concern No    Stress Concern No    Weight Concern Not Asked    Special Diet Not Asked    Back Care Not Asked    Exercise Yes    Bike Helmet Not Asked    Seat Belt Yes    Self-Exams No     Comment: encouraged    Parent/sibling w/ CABG, MI or angioplasty before 65F 55M? No   Social  History Narrative    -Working -       Social Determinants of Health     Financial Resource Strain: Not on file   Food Insecurity: Not on file   Transportation Needs: Not on file   Physical Activity: Not on file   Stress: Not on file   Social Connections: Not on file   Interpersonal Safety: Not on file   Housing Stability: Not on file          MEDICATIONS:  has a current medication list which includes the following prescription(s): dexcom g6 sensor, dexcom g6 transmitter, contour next test, flaxseed (linseed), baqsimi two pack, insulin aspart, insulin aspart, insulin glargine, insulin pen needle, levothyroxine, microlet lancets, prenatal w/o a vit-fe fum-fa, semglee (yfgn), sertraline, naratriptan, and triamcinolone.      ROS: 10 point ROS neg other than the symptoms noted above in the HPI.     GENERAL: some fatigue, mild wt gain; denies fevers, chills, night sweats.   HEENT: no dysphagia, odonophagia, diplopia, neck pain  THYROID:  no apparent hyper or hypothyroid symptoms  CV: no chest pain, pressure, palpitations  LUNGS: no SOB, LANG, cough, wheezing   ABDOMEN: some bloating; no diarrhea, constipation, abdominal pain  EXTREMITIES: no rashes, ulcers, edema  NEUROLOGY: no headaches, denies changes in vision, tingling, extremitiy numbness   MSK: no muscle aches or pains, weakness  SKIN: no rashes or lesions  : no menses during Pg  PSYCH:  stable mood, no significant anxiety or depression  ENDOCRINE: no heat or cold intolerance     Physical Exam (visual exam)  VS:  no vital signs taken for video visit  CONSTITUTIONAL: healthy, alert and NAD, well dressed, answering questions appropriately  ENT: no nose swelling or nasal discharge, mouth redness or gum changes.  EYES: eyes grossly normal to inspection, conjunctivae and sclerae normal, no exophthalmos or proptosis  THYROID:  no apparent nodules or goiter  LUNGS: no audible wheeze, cough or visible cyanosis, no visible retractions or increased work of  breathing  ABDOMEN: abdomen not evaluated  EXTREMITIES: no hand tremors, limited exam  NEUROLOGY: CN grossly intact, mentation intact and speech normal   SKIN:  no apparent skin lesions, rash, or edema with visualized skin appearance  PSYCH: mentation appears normal, affect normal/bright, judgement and insight intact,   normal speech and appearance well groomed       LABS:     All pertinent notes, labs, and images personally reviewed by me.      A/P:  No diagnosis found.      Comments:  Reviewed health history, diabetes and thyroid issues  Recent CGM trends good overall  Reviewed and interpreted tests that I previously ordered.   Ordered appropriate tests for the endocrinology disease management.    Management options discussed and implemented after shared medical decision making with the patient.  T1DM and hypothyroidism problems are chronic-stable    Plan:  Reviewed the overall T1DM management and insulin pump use.  Discussed optimal BG testing to assess glucose trends.  We reviewed insulin pump settings, basal rate and bolus dosing  Use of automated pump bolus dosing for meal/snack carb & correction dosing  Reviewed recent Tandem pump report information  Reviewed recent DexcomG6 CGM glucose trends, in detail    Reviewed general issues with the hypothyroidism diagnosis   Discussed lab tests used to assess patient thyroid hormone levels  Reviewed treatment options including levothyroxine medication, ideal dosing    Recommend:  Continue the Tandem insulin pump and diabetes management plan  Pump setting changes:    Bolus ICR:  Add new time interval at 2:30pm with softer ICR 4.0 for mid afternoon snack    Plan mealtime boluses premeal, not postmeal  Would be helpful for name change of primary pump Profile 2 to Pregnancy, but keep backup Profile 1  We have discussed use of the exercise and sleep modes with pump use  Continue use of the DexcomG6 CGM sensor  Reviewed overall pregnancy treatment goals for the diabetes and  thyroid management  Continue the current levothyroxine 0.137 mg every day, message me if problems getting Rx filled  Plan repeat nonfasting labs 10/18/23    Testing at [facility] or [facility]    Lab orders placed  Would not restart the simvastatin med while pregnant  Arrange annual dilated eye exam, fasting lipid panel testing.  Plan to have endocrinology appointments monthly and Diab Ed appointments every 7-10 days during Pg  Contact me if questions/concerns about diabetes and thyroid management     Cancel the scheduled f/unit(s) 10/12/23    Addressed patient's questions today.    There are no Patient Instructions on file for this visit.    Future labs ordered today:   No orders of the defined types were placed in this encounter.    Radiology/Consults ordered today: None    Total time spent on day of encounter:  28 min    Follow-up:  11/16/23    ANTHONY Jimenez MD, MS  Endocrinology  [facility]    CC:  RUBY Mustafa, GISELLE Santoyo, and JYOTI Lima                      Virtual Visit Details    Type of service:  Video Visit     Originating Location (pt. Location): Home  Distant Location (provider location):  Off-site  Platform used for Video Visit: Ediwn        Recent issues:  Diabetes and thyroid follow-up evaluation  Using the Tandem pump + DexcomG6 per plan  Previous miscarrage at 10 weeks gestation in early 5/2023 then another Pg  ... Currently 13 weeks pregnant, MYKE 4/5/24 (may deliver late 3/2024), sees Dr. Sierra Santoyo/[facility]   Feeling well overall and has less nausea and fatigue           2003. Diagnosis of diabetes mellitus, age 13, living in [facility]  Had acute illness requiring hospitalization at [facility]  Began insulin injections, using Novolog and Lantus insulin  [place] carb counting method, gram estimates  On MDI treatment plan for approx 2 years, then changed to pump use  Previous medical evaluations with Dr. Yash Barcenas/[facility]  2005. Began use of [place] pump  2012. Changed to  Medtronic pump  Subsequently using Medtronic 723 Paradigm pump  2012. Tried wearing CGMS sensor, but uncomfortable     12/8/14. Initial consult with me at my former clinic ([facility])  Continued pump management  General medicine appointments with Dr. Ellen Burdick/[facility]     Previous FV hgbA1c levels include:              Lab Test 02/05/18 10/10/17 06/21/17 02/01/17 11/16/16  0815   A1C 7.4* 7.8* 8.1* 7.3* 8.2*      ~12/2017. Upgraded to Medtronic 670G pump    Tried Medtronic Guardian sensor, recalls frequent low glucose alarms              Wore sensor in manual mode              Didn't like it, discontinued use     ~11/2018. Wore diagnostic LibrePro CGM  Subsequently obtained LibrePersonal CGM, not wearing past year  3/2022. Changed to Tandem TSlim insulin pump    Novolog in pump    Current Tandem pump settings:       Recent Tandem pump data:            Recent DexcomG6 data:       Recent FV labs include:  Lab Results   Component Value Date    A1C 6.3 (H) 08/31/2023     08/31/2023    POTASSIUM 3.9 08/31/2023    CHLORIDE 104 08/31/2023    CO2 20 (L) 08/31/2023    ANIONGAP 12 08/31/2023    GLC 95 08/31/2023    BUN 8.9 08/31/2023    CR 0.60 08/31/2023    GFRESTIMATED >90 08/31/2023    GFRESTBLACK >90 04/13/2021    MARCIA 9.5 08/31/2023    CHOL 241 (H) 10/06/2022    TRIG 75 10/06/2022    HDL 67 10/06/2022     (H) 10/06/2022    NHDL 174 (H) 10/06/2022    UCRR 64 06/08/2022    MICROL 5 06/08/2022    UMALCR 7.81 06/08/2022     [facility] [place] 11/3/22, no DR  Hyperlipidemia:  Takes simvastatin medication  DM Complications:  None known        Thyroid:  2013. Diagnosis of hypothyroidism  Previous FV thyroid labs include:    Treatment with levothyroxine medication  Previously on stable dose as levothyroxine 0.088 mg daily  Subsequently taking alternating levothyroxine 0.125 mg and 0.137 mg daily, then dose increase  Recent FV labs include:  Lab Results   Component Value  Date    TSH 4.50 (H) 08/31/2023    T4 1.37 08/31/2023     (H) 12/19/2013     Current dose:  Levothyroxine 0.137 mg daily        , lives in [facility]; works at [email or web address] with , in [place]; dog Shine  Sees Dr. Belén Mustafa/[facility] clinic for general medicine appointments  Also sees Dr. Sierra [facility]      PMH/PSH:  Past Medical History:   Diagnosis Date     Adjustment disorder with anxious mood      Allergic rhinitis, cause unspecified     h/o AIT     Chest discomfort      Common migraine     No visual aura.      Hyperlipidemia LDL goal < 70      Hypothyroidism      Infectious mononucleosis 01/01/1995     Insulin pump in place     Dexcom continuous glucose monitoring     Obesity      Tuberculosis      Type 1 diabetes, HbA1c goal < 7% (H) 03/01/2004     followed [facility] - peds endocrinology - now Dr Jimenez     Past Surgical History:   Procedure Laterality Date     APPENDECTOMY  08/01/2007    dr deshpande     DILATION AND CURETTAGE SUCTION N/A 5/9/2023    Procedure: DILATION AND CURETTAGE OF UTERUS USING SUCTION;  Surgeon: Sierra Santoyo DO;  Location: [facility] OR     PE TUBES Bilateral 01/01/1996       Family Hx:  Family History   Problem Relation Age of Onset     Neurologic Disorder Maternal Grandmother         dementia     Genetic Disorder Paternal Grandfather         kidney     Family History Negative No family hx of          Social Hx:  Social History     Socioeconomic History     Marital status:      Spouse name: Not on file     Number of children: Not on file     Years of education: Not on file     Highest education level: Not on file   Occupational History     Occupation: Works in supply chain   Tobacco Use     Smoking status: Never     Smokeless tobacco: Never   Vaping Use     Vaping Use: Never used   Substance and Sexual Activity     Alcohol use: Not Currently     Alcohol/week: 2.0 standard drinks of alcohol     Types: 2 Standard drinks or equivalent  per week     Comment: 2-5 drinks per week     Drug use: No     Sexual activity: Yes     Partners: Male   Other Topics Concern      Service Not Asked     Blood Transfusions Not Asked     Caffeine Concern Yes     Comment: rare     Occupational Exposure Not Asked     Hobby Hazards Not Asked     Sleep Concern No     Stress Concern No     Weight Concern Not Asked     Special Diet Not Asked     Back Care Not Asked     Exercise Yes     Bike Helmet Not Asked     Seat Belt Yes     Self-Exams No     Comment: encouraged     Parent/sibling w/ CABG, MI or angioplasty before 65F 55M? No   Social History Narrative    -Working -       Social Determinants of Health     Financial Resource Strain: Not on file   Food Insecurity: Not on file   Transportation Needs: Not on file   Physical Activity: Not on file   Stress: Not on file   Social Connections: Not on file   Interpersonal Safety: Not on file   Housing Stability: Not on file          MEDICATIONS:  has a current medication list which includes the following prescription(s): dexcom g6 sensor, dexcom g6 transmitter, contour next test, flaxseed (linseed), baqsimi two pack, insulin aspart, insulin aspart, insulin glargine, insulin pen needle, levothyroxine, microlet lancets, prenatal w/o a vit-fe fum-fa, semglee (yfgn), sertraline, naratriptan, and triamcinolone.      ROS: 10 point ROS neg other than the symptoms noted above in the HPI.     GENERAL: some fatigue, mild wt gain; denies fevers, chills, night sweats.   HEENT: no dysphagia, odonophagia, diplopia, neck pain  THYROID:  no apparent hyper or hypothyroid symptoms  CV: no chest pain, pressure, palpitations  LUNGS: no SOB, LANG, cough, wheezing   ABDOMEN: some bloating; no diarrhea, constipation, abdominal pain  EXTREMITIES: no rashes, ulcers, edema  NEUROLOGY: no headaches, denies changes in vision, tingling, extremitiy numbness   MSK: no muscle aches or pains, weakness  SKIN: no rashes or lesions  : no menses during  Pg  PSYCH:  stable mood, no significant anxiety or depression  ENDOCRINE: no heat or cold intolerance     Physical Exam (visual exam)  VS:  no vital signs taken for video visit  CONSTITUTIONAL: healthy, alert and NAD, well dressed, answering questions appropriately  ENT: no nose swelling or nasal discharge, mouth redness or gum changes.  EYES: eyes grossly normal to inspection, conjunctivae and sclerae normal, no exophthalmos or proptosis  THYROID:  no apparent nodules or goiter  LUNGS: no audible wheeze, cough or visible cyanosis, no visible retractions or increased work of breathing  ABDOMEN: abdomen not evaluated  EXTREMITIES: no hand tremors, limited exam  NEUROLOGY: CN grossly intact, mentation intact and speech normal   SKIN:  no apparent skin lesions, rash, or edema with visualized skin appearance  PSYCH: mentation appears normal, affect normal/bright, judgement and insight intact,   normal speech and appearance well groomed       LABS:     All pertinent notes, labs, and images personally reviewed by me.     A/P:  Encounter Diagnoses   Name Primary?     Type 1 diabetes mellitus without complication (H) Yes     Insulin pump status      Hypothyroidism due to Hashimoto's thyroiditis        Comments:  Reviewed health history, diabetes and thyroid issues  Recent CGM trends good overall  Now on higher thyroid med dose with recent dose adjustment plan  Reviewed and interpreted tests that I previously ordered.   Ordered appropriate tests for the endocrinology disease management.    Management options discussed and implemented after shared medical decision making with the patient.  T1DM and hypothyroidism problems are chronic-stable    Plan:  Reviewed the overall T1DM management and insulin pump use.  Discussed optimal BG testing to assess glucose trends.  We reviewed insulin pump settings, basal rate and bolus dosing  Use of automated pump bolus dosing for meal/snack carb & correction dosing  Reviewed recent Tandem  pump report information  Reviewed recent DexcomG6 CGM glucose trends, in detail    Reviewed general issues with the hypothyroidism diagnosis   Discussed lab tests used to assess patient thyroid hormone levels  Reviewed treatment options including levothyroxine medication, ideal dosing    Recommend:  Continue the Tandem insulin pump and diabetes management plan  Pump setting changes:    Bolus ICR:  Add new time interval at 2:30pm with softer ICR 4.0 for mid afternoon snack    Continue the mealtime boluses premeal  Would be helpful for name change of primary pump Profile 2 to Pregnancy, but keep backup Profile 1  We have discussed use of the exercise and sleep modes with pump use  Continue use of the DexcomG6 CGM sensor  Reviewed overall pregnancy treatment goals for the diabetes and thyroid management  Continue the current levothyroxine 0.137 mg every day, message me if problems getting Rx filled  Plan repeat nonfasting labs 10/18/23    [facility] at [facility] or [facility]    Lab orders placed  Would not restart the simvastatin med while pregnant  Arrange annual dilated eye exam, fasting lipid panel testing.  Plan to have endocrinology appointments monthly and Diab Ed appointments every 7-10 days during Pg  Contact me if questions/concerns about diabetes and thyroid management    Addressed patient's questions today.    There are no Patient Instructions on file for this visit.    Future labs ordered today:   Orders Placed This Encounter   Procedures     GLUCOSE MONITOR, 72 HOUR, PHYS INTERP     TSH with free T4 reflex     Hemoglobin A1c     Radiology/Consults ordered today: None    Total time spent on day of encounter:  31 min    Follow-up:  11/16/23    ANTOHNY Jimenez MD, MS  Endocrinology  [facility]    CC:  RUBY Mustafa, s, A, and JYOTI Lima                        Again, thank you for allowing me to participate in the care of your patient.        Sincerely,        Santy Jimenez MD

## 2023-10-11 ENCOUNTER — MYC MEDICAL ADVICE (OUTPATIENT)
Dept: EDUCATION SERVICES | Facility: CLINIC | Age: 33
End: 2023-10-11
Payer: COMMERCIAL

## 2023-10-11 NOTE — TELEPHONE ENCOUNTER
Diabetes in Pregnancy Follow-up    Subjective/Objective:    Linda Chavez sent in blood glucose log for review. Last date of communication was: 10/2 (dr. Jimenez).    Diabetes is being managed with diet, activity, and medications    Taking diabetes medications: yes:     Diabetes Medication(s)       Diabetic Other       Glucagon (BAQSIMI TWO PACK) 3 MG/DOSE POWD    Spray 1 Device in nostril once as needed (for severe hypoglycemia)      Insulin       insulin aspart (NOVOLOG PEN) 100 UNIT/ML pen    Inject 3 to 10 units subcutaneous at mealtimes as directed, total daily dose approx 30 units     insulin aspart (NOVOLOG VIAL) 100 UNITS/ML vial    Use with insulin pump, total daily dose approx 90 units     insulin glargine (LANTUS PEN) 100 UNIT/ML pen    Inject 22 Units Subcutaneous At Bedtime     SEMGLEE, YFGN, 100 UNIT/ML SOPN    ADMINISTER 22 UNITS UNDER THE SKIN AT BEDTIME SUBSTITUTE FOR LANTUS            Estimated Date of Delivery: 2024    BG/Food Log:               Start Time Basal Rate Correction Factor Carb Ratio Target BG  Midnight 2.000 u/hr 1u:35 mg/dL 1u:7.5 g 110 mg/dL  6:00 AM 1.800 u/hr 1u:35 mg/dL 1u:6.0 g 110 mg/dL  7:00 AM 1.700 u/hr 1u:22 mg/dL 1u:3.7 g 110 mg/dL  11:00 AM 1.700 u/hr 1u:22 mg/dL 1u:3.2 g 110 mg/dL  12:00 PM 1.900 u/hr 1u:20 mg/dL 1u:3.7 g 110 mg/dL  2:30 PM 1.900 u/hr 1u:20 mg/dL 1u:4.0 g 110 mg/dL  5:00 PM 1.900 u/hr 1u:20 mg/dL 1u:3.2 g 110 mg/dL  9:00 PM 1.900 u/hr 1u:22 mg/dL 1u:4.0 g 110 mg/dL  Calculated Total Daily Basal 45.1 units  Duration of Insulin: 5:00 hours  Carbohydrates: On  Max Bolus: 17 units      Assessment:    Close, but not meeting TIR targets for pregnancy.     Plan/Response:  Recommend using sleep mode 24 hours to intensify basal rates  Continue working on pre-bolusing  Consider adjusting I:C at next follow-up    TAURUS Ty [facility]  Triage: [phone]  [phone]      Any diabetes medication dose changes were made via the CDE  Protocol and Collaborative Practice Agreement with the patient's referring provider. A copy of this encounter was shared with the provider.

## 2023-10-17 NOTE — TELEPHONE ENCOUNTER
Messages and pump management recommendations noted.    ANTHONY Jimenez MD, MS  Endocrinology  [facility]

## 2023-10-18 ENCOUNTER — PRENATAL OFFICE VISIT (OUTPATIENT)
Dept: OBGYN | Facility: CLINIC | Age: 33
End: 2023-10-18
Payer: COMMERCIAL

## 2023-10-18 VITALS — SYSTOLIC BLOOD PRESSURE: 128 MMHG | BODY MASS INDEX: 35.83 KG/M2 | WEIGHT: 222 LBS | DIASTOLIC BLOOD PRESSURE: 70 MMHG

## 2023-10-18 DIAGNOSIS — Z36.1 NEED FOR MATERNAL SERUM ALPHA-PROTEIN (MSAFP) SCREENING: ICD-10-CM

## 2023-10-18 DIAGNOSIS — Z96.41 INSULIN PUMP STATUS: ICD-10-CM

## 2023-10-18 DIAGNOSIS — R82.90 CLOUDY URINE: ICD-10-CM

## 2023-10-18 DIAGNOSIS — E10.9 TYPE 1 DIABETES MELLITUS WITHOUT COMPLICATION (H): ICD-10-CM

## 2023-10-18 DIAGNOSIS — O09.92 SUPERVISION OF HIGH RISK PREGNANCY IN SECOND TRIMESTER: Primary | ICD-10-CM

## 2023-10-18 DIAGNOSIS — E06.3 HYPOTHYROIDISM DUE TO HASHIMOTO'S THYROIDITIS: ICD-10-CM

## 2023-10-18 LAB
ANION GAP SERPL CALCULATED.3IONS-SCNC: 16 MMOL/L (ref 7–15)
BUN SERPL-MCNC: 7.7 MG/DL (ref 6–20)
CALCIUM SERPL-MCNC: 9.8 MG/DL (ref 8.6–10)
CHLORIDE SERPL-SCNC: 102 MMOL/L (ref 98–107)
CREAT SERPL-MCNC: 0.61 MG/DL (ref 0.51–0.95)
DEPRECATED HCO3 PLAS-SCNC: 19 MMOL/L (ref 22–29)
EGFRCR SERPLBLD CKD-EPI 2021: >90 ML/MIN/1.73M2
GLUCOSE SERPL-MCNC: 117 MG/DL (ref 70–99)
HBA1C MFR BLD: 5.9 % (ref 0–5.6)
POTASSIUM SERPL-SCNC: 3.9 MMOL/L (ref 3.4–5.3)
SODIUM SERPL-SCNC: 137 MMOL/L (ref 135–145)
T4 FREE SERPL-MCNC: 1.54 NG/DL (ref 0.9–1.7)
TSH SERPL DL<=0.005 MIU/L-ACNC: 1.29 UIU/ML (ref 0.3–4.2)

## 2023-10-18 PROCEDURE — 82105 ALPHA-FETOPROTEIN SERUM: CPT | Mod: 90 | Performed by: OBSTETRICS & GYNECOLOGY

## 2023-10-18 PROCEDURE — 99000 SPECIMEN HANDLING OFFICE-LAB: CPT | Performed by: OBSTETRICS & GYNECOLOGY

## 2023-10-18 PROCEDURE — 99213 OFFICE O/P EST LOW 20 MIN: CPT | Performed by: OBSTETRICS & GYNECOLOGY

## 2023-10-18 PROCEDURE — 84443 ASSAY THYROID STIM HORMONE: CPT | Performed by: OBSTETRICS & GYNECOLOGY

## 2023-10-18 PROCEDURE — 80048 BASIC METABOLIC PNL TOTAL CA: CPT | Performed by: OBSTETRICS & GYNECOLOGY

## 2023-10-18 PROCEDURE — 83036 HEMOGLOBIN GLYCOSYLATED A1C: CPT | Performed by: OBSTETRICS & GYNECOLOGY

## 2023-10-18 PROCEDURE — 84439 ASSAY OF FREE THYROXINE: CPT | Performed by: OBSTETRICS & GYNECOLOGY

## 2023-10-18 PROCEDURE — 36415 COLL VENOUS BLD VENIPUNCTURE: CPT | Performed by: OBSTETRICS & GYNECOLOGY

## 2023-10-18 NOTE — PROGRESS NOTES
OB Visit @ 15w5d     No loss of fluid/vaginal bleeding/regular contractions. No FM  Feeling better overall.   Urine is cloudy couple times per week.   Occ has times when feels fine, eats, then starts to feel bad and vomits. Then is fine again. Sometimes with GERD at night. Brushing teeth bothers her nausea.         ICD-10-CM    1. Supervision of high risk pregnancy in second trimester  O09.92       2. Need for maternal serum alpha-protein (MSAFP) screening  Z36.1 Alpha fetoprotein maternal screen     Alpha fetoprotein maternal screen      3. Cloudy urine  R82.90 Urine Culture      4. Hypothyroidism due to Hashimoto's thyroiditis  E03.8 TSH    E06.3 T4 free     CANCELED: TSH with free T4 reflex      5. Type 1 diabetes mellitus without complication (H)  E10.9 Hemoglobin A1c     Basic metabolic panel     CANCELED: Hemoglobin A1c      6. Insulin pump status  Z96.41 Hemoglobin A1c     Basic metabolic panel     CANCELED: Hemoglobin A1c          -Ty2DM. Follows with MFM. Level II and echo planned.   Serial growth ultrasound   AP testing 32 wk  -Will get some labs from Endo done today as well  -AFP today  -New cloudy urine. Culture will be run.   - Labor precautions. F/U 4wk    Sierra Treviño Masters, DO

## 2023-10-19 ENCOUNTER — TRANSFERRED RECORDS (OUTPATIENT)
Dept: HEALTH INFORMATION MANAGEMENT | Facility: CLINIC | Age: 33
End: 2023-10-19
Payer: COMMERCIAL

## 2023-10-19 LAB — RETINOPATHY: NEGATIVE

## 2023-10-20 ENCOUNTER — LAB (OUTPATIENT)
Dept: LAB | Facility: CLINIC | Age: 33
End: 2023-10-20
Payer: COMMERCIAL

## 2023-10-20 DIAGNOSIS — R82.90 CLOUDY URINE: ICD-10-CM

## 2023-10-20 LAB
# FETUSES US: NORMAL
AFP MOM SERPL: 1.04
AFP SERPL-MCNC: 26 NG/ML
AGE - REPORTED: 31.9 YR
CURRENT SMOKER: NO
FAMILY MEMBER DISEASES HX: NO
GA METHOD: NORMAL
GA: NORMAL WK
IDDM PATIENT QL: NO
INTEGRATED SCN PATIENT-IMP: NORMAL
SPECIMEN DRAWN SERPL: NORMAL

## 2023-10-20 PROCEDURE — 87086 URINE CULTURE/COLONY COUNT: CPT

## 2023-10-22 LAB — BACTERIA UR CULT: NORMAL

## 2023-10-24 ENCOUNTER — MYC MEDICAL ADVICE (OUTPATIENT)
Dept: ENDOCRINOLOGY | Facility: CLINIC | Age: 33
End: 2023-10-24
Payer: COMMERCIAL

## 2023-10-24 DIAGNOSIS — E10.9 TYPE 1 DIABETES MELLITUS WITHOUT COMPLICATION (H): Primary | ICD-10-CM

## 2023-10-25 RX ORDER — INSULIN ASPART 100 [IU]/ML
INJECTION, SOLUTION INTRAVENOUS; SUBCUTANEOUS
Qty: 30 ML | Refills: 11 | Status: SHIPPED | OUTPATIENT
Start: 2023-10-25 | End: 2024-01-29

## 2023-10-30 ENCOUNTER — MYC MEDICAL ADVICE (OUTPATIENT)
Dept: EDUCATION SERVICES | Facility: CLINIC | Age: 33
End: 2023-10-30
Payer: COMMERCIAL

## 2023-10-30 ENCOUNTER — PRE VISIT (OUTPATIENT)
Dept: MATERNAL FETAL MEDICINE | Facility: CLINIC | Age: 33
End: 2023-10-30
Payer: COMMERCIAL

## 2023-10-30 NOTE — TELEPHONE ENCOUNTER
Type 1 Diabetes + Pregnancy Follow-up    Subjective/Objective:    Linda Chavez sent in blood glucose log for review. Last date of communication was: 10/11/23.    Type 1 diabetes is being managed with diet, activity, and medications    Taking diabetes medications: yes:     Diabetes Medication(s)       Diabetic Other       Glucagon (BAQSIMI TWO PACK) 3 MG/DOSE POWD    Spray 1 Device in nostril once as needed (for severe hypoglycemia)      Insulin       insulin aspart (NOVOLOG PEN) 100 UNIT/ML pen    Inject 3 to 10 units subcutaneous at mealtimes as directed, total daily dose approx 30 units     insulin aspart (NOVOLOG VIAL) 100 UNITS/ML vial    Use with insulin pump, total daily dose approx 100 units     insulin glargine (LANTUS PEN) 100 UNIT/ML pen    Inject 22 Units Subcutaneous At Bedtime     SEMGLEE, YFGN, 100 UNIT/ML SOPN    ADMINISTER 22 UNITS UNDER THE SKIN AT BEDTIME SUBSTITUTE FOR LANTUS            Estimated Date of Delivery: Apr 5, 2024    BG/Food Log:               Assessment:    Falling short of time in range target goal >70%. Not in Sleep Mode x 24 hrs as recommended at last visit. Will encourage her to adjust again or see why she hasn't been using this tool. Pre-prandial blood sugars remain high in afternoon & evening. Still seeing some post-prandial hyperglycemia as well but appears somewhat improved    Plan/Response:  Recommend using sleep mode 24 hours to intensify basal rates   Recommend increase to insulin -   Basal rates:   12 p 1.9 --> 2.1  2:30p 1.9 --> 2.1   5:00p 1.9 --> 2.1  9:00p 1.9 --> 2.1.  Follow-up in 1 week.  See [employer] message for patient communications.       Kat Lima, RD, LD, CDCES     Any diabetes medication dose changes were made via the CDE Protocol and Collaborative Practice Agreement with the patient's endocrinology provider. A copy of this encounter was shared with the provider.

## 2023-11-06 ENCOUNTER — OFFICE VISIT (OUTPATIENT)
Dept: MATERNAL FETAL MEDICINE | Facility: CLINIC | Age: 33
End: 2023-11-06
Attending: OBSTETRICS & GYNECOLOGY
Payer: COMMERCIAL

## 2023-11-06 ENCOUNTER — HOSPITAL ENCOUNTER (OUTPATIENT)
Dept: ULTRASOUND IMAGING | Facility: CLINIC | Age: 33
Discharge: HOME | End: 2023-11-06
Attending: OBSTETRICS & GYNECOLOGY
Payer: COMMERCIAL

## 2023-11-06 DIAGNOSIS — O24.012 TYPE 1 DIABETES MELLITUS DURING PREGNANCY IN SECOND TRIMESTER: Primary | ICD-10-CM

## 2023-11-06 DIAGNOSIS — Z36.3 ANTENATAL SCREENING FOR MALFORMATION USING ULTRASONICS: ICD-10-CM

## 2023-11-06 DIAGNOSIS — O26.90 PREGNANCY RELATED CONDITION, ANTEPARTUM: ICD-10-CM

## 2023-11-06 PROCEDURE — 76811 OB US DETAILED SNGL FETUS: CPT | Mod: 26 | Performed by: OBSTETRICS & GYNECOLOGY

## 2023-11-06 PROCEDURE — 76811 OB US DETAILED SNGL FETUS: CPT

## 2023-11-06 NOTE — NURSING NOTE
Patient  presents to [facility] for L2 ultrasound for type 1 diabetes.  Reports no pain, no contractions, leaking of fluid, or bleeding.  SBAR given to [facility] MD, see their note in Epic.

## 2023-11-06 NOTE — PROGRESS NOTES
Please refer to ultrasound report under 'Imaging' Studies of 'Chart Review' tabs.    Maurisio Mulligan M.D.

## 2023-11-08 ENCOUNTER — MYC MEDICAL ADVICE (OUTPATIENT)
Dept: EDUCATION SERVICES | Facility: CLINIC | Age: 33
End: 2023-11-08
Payer: COMMERCIAL

## 2023-11-08 NOTE — TELEPHONE ENCOUNTER
"Type 1 Diabetes + Pregnancy Follow-up    Subjective/Objective:    Linda Chavez sent in blood glucose log for review. Last date of communication was: 10/30/23.    Diabetes is being managed with diet, activity, and medications    Taking diabetes medications: yes:     Diabetes Medication(s)       Diabetic Other       Glucagon (BAQSIMI TWO PACK) 3 MG/DOSE POWD    Spray 1 Device in nostril once as needed (for severe hypoglycemia)      Insulin       insulin aspart (NOVOLOG PEN) 100 UNIT/ML pen    Inject 3 to 10 units subcutaneous at mealtimes as directed, total daily dose approx 30 units     insulin aspart (NOVOLOG VIAL) 100 UNITS/ML vial    Use with insulin pump, total daily dose approx 100 units     insulin glargine (LANTUS PEN) 100 UNIT/ML pen    Inject 22 Units Subcutaneous At Bedtime     SEMGLEE, YFGN, 100 UNIT/ML SOPN    ADMINISTER 22 UNITS UNDER THE SKIN AT BEDTIME SUBSTITUTE FOR LANTUS            Estimated Date of Delivery: Apr 5, 2024    BG/Food Log:               Assessment:    Closer to goal >70% TIR in ADA pregnancy taret of  mg/dL, but still not quite meeting  the goal. Nearly all \"correction\" boluses appear to be overrides. Will adjust correction factor and request patient use blood sugar in pump to make corrections moving forward.     Plan/Response:  Recommend increase to insulin -   Start Time Basal Rate Correction Factor  Carb Ratio Target BG   MN 2.0 unit(s)/hr 1u:35 mg/dL 1u:7.5g 110   6:00am 1.8 unit(s)/hr 1u:35 1u:6.0g 110   7:00am 1.7 unit(s)/hr --> 1.8  1u:22 --> 1u:20 1u:3.7g 110   11:00am 1.7 unit(s)/hr 1u:22 --> 1u:20 1u:3.2g --> 1u:2.9g 110   12:00pm 2.1 unit(s)/hr 1u:20 --> 1u:18 1u:3.7g --> 1u:3.3g 110   2:30pm 2.1 unit(s)/hr 1u:20 --> 1u:18 1u:4.0g --> 1u:3.5g 110   5:00pm 2.1 unit(s)/hr 1u:20 --> 1u:18 1u:3.2g --> 1u:2.9g 110   9:00pm 2.1 unit(s)/hr 1u:22 --> 1u:20 1u:4.0g --> 1u:3.5g 110     Follow-up in 1 week.  See [employer] for patient communications.       Kat Lima RD, " SHELLEY LU     Any diabetes medication dose changes were made via the CDE Protocol and Collaborative Practice Agreement with the patient's endocrinology provider. A copy of this encounter was shared with the provider.

## 2023-11-16 ENCOUNTER — VIRTUAL VISIT (OUTPATIENT)
Dept: ENDOCRINOLOGY | Facility: CLINIC | Age: 33
End: 2023-11-16
Payer: COMMERCIAL

## 2023-11-16 ENCOUNTER — MYC MEDICAL ADVICE (OUTPATIENT)
Dept: OBGYN | Facility: CLINIC | Age: 33
End: 2023-11-16

## 2023-11-16 ENCOUNTER — PRENATAL OFFICE VISIT (OUTPATIENT)
Dept: OBGYN | Facility: CLINIC | Age: 33
End: 2023-11-16
Payer: COMMERCIAL

## 2023-11-16 VITALS — SYSTOLIC BLOOD PRESSURE: 130 MMHG | BODY MASS INDEX: 36.15 KG/M2 | DIASTOLIC BLOOD PRESSURE: 78 MMHG | WEIGHT: 224 LBS

## 2023-11-16 DIAGNOSIS — E10.9 TYPE 1 DIABETES MELLITUS WITHOUT COMPLICATION (H): Primary | ICD-10-CM

## 2023-11-16 DIAGNOSIS — Z96.41 INSULIN PUMP STATUS: ICD-10-CM

## 2023-11-16 DIAGNOSIS — E06.3 HYPOTHYROIDISM DUE TO HASHIMOTO'S THYROIDITIS: ICD-10-CM

## 2023-11-16 DIAGNOSIS — O09.90 HIGH-RISK PREGNANCY, UNSPECIFIED TRIMESTER: ICD-10-CM

## 2023-11-16 DIAGNOSIS — E66.01 MORBID OBESITY (H): ICD-10-CM

## 2023-11-16 DIAGNOSIS — O09.92 SUPERVISION OF HIGH RISK PREGNANCY IN SECOND TRIMESTER: Primary | ICD-10-CM

## 2023-11-16 DIAGNOSIS — O99.280 HYPOTHYROID IN PREGNANCY, ANTEPARTUM: ICD-10-CM

## 2023-11-16 DIAGNOSIS — E10.9 TYPE 1 DIABETES MELLITUS WITHOUT COMPLICATION (H): ICD-10-CM

## 2023-11-16 DIAGNOSIS — E10.9 TYPE 1 DIABETES, HBA1C GOAL < 7% (H): ICD-10-CM

## 2023-11-16 DIAGNOSIS — E03.9 HYPOTHYROID IN PREGNANCY, ANTEPARTUM: ICD-10-CM

## 2023-11-16 LAB
HBA1C MFR BLD: 6 % (ref 0–5.6)
TSH SERPL DL<=0.005 MIU/L-ACNC: 1.35 UIU/ML (ref 0.3–4.2)

## 2023-11-16 PROCEDURE — 36415 COLL VENOUS BLD VENIPUNCTURE: CPT | Performed by: OBSTETRICS & GYNECOLOGY

## 2023-11-16 PROCEDURE — 83036 HEMOGLOBIN GLYCOSYLATED A1C: CPT | Performed by: OBSTETRICS & GYNECOLOGY

## 2023-11-16 PROCEDURE — 99214 OFFICE O/P EST MOD 30 MIN: CPT | Mod: VID | Performed by: INTERNAL MEDICINE

## 2023-11-16 PROCEDURE — 95251 CONT GLUC MNTR ANALYSIS I&R: CPT | Performed by: INTERNAL MEDICINE

## 2023-11-16 PROCEDURE — 99207 PR PRENATAL VISIT: CPT | Performed by: OBSTETRICS & GYNECOLOGY

## 2023-11-16 PROCEDURE — 84443 ASSAY THYROID STIM HORMONE: CPT | Performed by: OBSTETRICS & GYNECOLOGY

## 2023-11-16 NOTE — PROGRESS NOTES
OB Visit @ 19w6d     No loss of fluid/vaginal bleeding/regular contractions. +/-FM        ICD-10-CM    1. Supervision of high risk pregnancy in second trimester  O09.92       2. Type 1 diabetes, HbA1c goal < 7% (H)  E10.9       3. Hypothyroid in pregnancy, antepartum  O99.280     E03.9       4. Morbid obesity (H)  E66.01       5. Type 1 diabetes mellitus without complication (H)  E10.9 TSH with free T4 reflex     Hemoglobin A1c      6. Insulin pump status  Z96.41 TSH with free T4 reflex     Hemoglobin A1c      7. High-risk pregnancy, unspecified trimester  O09.90 TSH with free T4 reflex     Hemoglobin A1c      8. Hypothyroidism due to Hashimoto's thyroiditis  E03.8 TSH with free T4 reflex    E06.3 Hemoglobin A1c          -Ty 1 Dm. Hypothyroidism.   ASA 81mg.  LEvel II completed and fetal echo planned.   Follows with Dr. Bhat  Monthly growth ultrasound, start AP testing at 32wk  -Needs labs drawn for Laedtke  - Labor precautions. F/U 4wk    Sierra Treviño Masters, DO

## 2023-11-16 NOTE — Clinical Note
2023         RE: Linda Chavez  [address]  [facility] [address]        Dear Colleague,    Thank you for referring your patient, Linda Chavez, to the [facility]. Please see a copy of my visit note below.    Virtual Visit Details    Type of service:  Video Visit     Originating Location (pt. Location):  Home  Distant Location (provider location):  Off-site  Platform used for Video Visit: Edwin        Recent issues:  Diabetes and thyroid follow-up evaluation  Currently 20 weeks gestation, [id number], had fetal U/S recently-went well, MYKE 24, sees Dr. Sierra Santoyo/[facility]  Using the Tandem pump + DexcomG6 in Sleep Mode  Feeling well overall, infrequent nausea but fatigue           . Diagnosis of diabetes mellitus, age 13, living in [place]  Had acute illness requiring hospitalization at [facility]  Began insulin injections, using Novolog and Lantus insulin  [place] carb counting method, gram estimates  On MDI treatment plan for approx 2 years, then changed to pump use  Previous medical evaluations with Dr. Yash Barcenas/[facility] Endo  . Began use of [place] pump  . Changed to Medtronic pump  Subsequently using Medtronic 723 Paradigm pump  . Tried wearing CGMS sensor, but uncomfortable     14. Initial consult with me at my former clinic ([facility])  Continued pump management  General medicine [facility] hgbA1c levels include:              Lab Test 02/05/18 10/10/17 06/21/17 02/01/17 11/16/16  0815   A1C 7.4* 7.8* 8.1* 7.3* 8.2*      ~2017. Upgraded to Medtronic 670G pump    Tried Medtronic Guardian sensor, recalls frequent low glucose alarms              Wore sensor in manual mode              Didn't like it, discontinued use     ~2018. Wore diagnostic LibrePro CGM  Subsequently obtained LibrePersonal CGM, not wearing past year  3/2022. Changed to Tandem TSlim insulin pump    Novolog in  pump    Current Tandem pump settings:         Recent Tandem pump data:                Recent DexcomG6 data:       Recent [facility] labs include:  Lab Results   Component Value Date    A1C 5.9 (H) 10/18/2023     10/18/2023    POTASSIUM 3.9 10/18/2023    CHLORIDE 102 10/18/2023    CO2 19 (L) 10/18/2023    ANIONGAP 16 (H) 10/18/2023     (H) 10/18/2023    BUN 7.7 10/18/2023    CR 0.61 10/18/2023    GFRESTIMATED >90 10/18/2023    GFRESTBLACK >90 04/13/2021    MARCIA 9.8 10/18/2023    CHOL 241 (H) 10/06/2022    TRIG 75 10/06/2022    HDL 67 10/06/2022     (H) 10/06/2022    NHDL 174 (H) 10/06/2022    UCRR 64 06/08/2022    MICROL 5 06/08/2022    UMALCR 7.81 06/08/2022     Last eye exam at [facility] in [place] 11/3/22, no DR  Hyperlipidemia:  Takes simvastatin medication  DM Complications:  None known        Thyroid:  2013. 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 Belén Mustafa/[facility] [place] [facility] for general medicine appointments  Also sees Dr. Sierra Higginss/[facility]      PMH/PSH:  Past Medical History:   Diagnosis Date    Adjustment disorder with anxious mood     Allergic rhinitis, cause unspecified     h/o AIT    Chest discomfort     Common migraine     No visual aura.     Hyperlipidemia LDL goal < 70     Hypothyroidism     Infectious mononucleosis 01/01/1995    Insulin pump in place     Dexcom continuous glucose monitoring    Obesity     Tuberculosis     Type 1 diabetes, HbA1c goal  < 7% (H) 03/01/2004     followed [facility] - peds endocrinology - now Dr Jimenez     Past Surgical History:   Procedure Laterality Date    APPENDECTOMY  08/01/2007    dr deshpande    DILATION AND CURETTAGE SUCTION N/A 5/9/2023    Procedure: DILATION AND CURETTAGE OF UTERUS USING SUCTION;  Surgeon: Sierra Santoyo DO;  Location: [facility] OR    PE TUBES Bilateral 01/01/1996       Family Hx:  Family History   Problem Relation Age of Onset    Neurologic Disorder Maternal Grandmother         dementia    Genetic Disorder Paternal Grandfather         kidney    Family History Negative No family hx of          Social Hx:  Social History     Socioeconomic History    Marital status:      Spouse name: Not on file    Number of children: Not on file    Years of education: Not on file    Highest education level: Not on file   Occupational History    Occupation: Works in supply chain   Tobacco Use    Smoking status: Never    Smokeless tobacco: Never   Vaping Use    Vaping Use: Never used   Substance and Sexual Activity    Alcohol use: Not Currently     Alcohol/week: 2.0 standard drinks of alcohol     Types: 2 Standard drinks or equivalent per week     Comment: 2-5 drinks per week    Drug use: No    Sexual activity: Yes     Partners: Male   Other Topics Concern     Service Not Asked    Blood Transfusions Not Asked    Caffeine Concern Yes     Comment: rare    Occupational Exposure Not Asked    Hobby Hazards Not Asked    Sleep Concern No    Stress Concern No    Weight Concern Not Asked    Special Diet Not Asked    Back Care Not Asked    Exercise Yes    Bike Helmet Not Asked    Seat Belt Yes    Self-Exams No     Comment: encouraged    Parent/sibling w/ CABG, MI or angioplasty before 65F 55M? No   Social History Narrative    -Working -       Social Determinants of Health     Financial Resource Strain: Not on file   Food Insecurity: Not on file   Transportation Needs: Not on file   Physical Activity: Not on file   Stress: Not  on file   Social Connections: Not on file   Interpersonal Safety: Not on file   Housing Stability: Not on file          MEDICATIONS:  has a current medication list which includes the following prescription(s): flaxseed (linseed), insulin aspart, insulin glargine, levothyroxine, prenatal w/o a vit-fe fum-fa, semglee (yfgn), sertraline, dexcom g6 sensor, dexcom g6 transmitter, contour next test, baqsimi two pack, insulin aspart, insulin pen needle, microlet lancets, naratriptan, and triamcinolone.      ROS: 10 point ROS neg other than the symptoms noted above in the HPI.     GENERAL: some fatigue, mild wt gain with Pg; denies fevers, chills, night sweats.   HEENT: no dysphagia, odonophagia, diplopia, neck pain  THYROID:  no apparent hyper or hypothyroid symptoms  CV: no chest pain, pressure, palpitations  LUNGS: no SOB, LANG, cough, wheezing   ABDOMEN: some nausea; no diarrhea, constipation, abdominal pain  EXTREMITIES: no rashes, ulcers, edema  NEUROLOGY: no headaches, denies changes in vision, tingling, extremitiy numbness   MSK: no muscle aches or pains, weakness  SKIN: no rashes or lesions  : no menses during Pg  PSYCH:  stable mood, no significant anxiety or depression  ENDOCRINE: no heat or cold intolerance     Physical Exam (visual exam)  VS:  no vital signs taken for video visit  CONSTITUTIONAL: healthy, alert and NAD, well dressed, answering questions appropriately  ENT: no nose swelling or nasal discharge, mouth redness or gum changes.  EYES: eyes grossly normal to inspection, conjunctivae and sclerae normal, no exophthalmos or proptosis  THYROID:  no apparent nodules or goiter  LUNGS: no audible wheeze, cough or visible cyanosis, no visible retractions or increased work of breathing  ABDOMEN: abdomen not evaluated  EXTREMITIES: no hand tremors, limited exam  NEUROLOGY: CN grossly intact, mentation intact and speech normal   SKIN:  no apparent skin lesions, rash, or edema with visualized skin  appearance  PSYCH: mentation appears normal, affect normal/bright, judgement and insight intact,   normal speech and appearance well groomed       LABS:     All pertinent notes, labs, and images personally reviewed by me.     A/P:  Encounter Diagnoses   Name Primary?    Type 1 diabetes mellitus without complication (H) Yes    Insulin pump status     High-risk pregnancy, unspecified trimester     Hypothyroidism due to Hashimoto's thyroiditis          Comments:  Reviewed health history, diabetes and thyroid issues  Recent CGM trends good overall  Now on higher thyroid med dose with recent dose adjustment plan  Reviewed and interpreted tests that I previously ordered.   Ordered appropriate tests for the endocrinology disease management.    Management options discussed and implemented after shared medical decision making with the patient.  T1DM and hypothyroidism problems are chronic-stable    Plan:  Reviewed the overall T1DM management and insulin pump use.  Discussed optimal BG testing to assess glucose trends.  We reviewed insulin pump settings, basal rate and bolus dosing  Use of automated pump bolus dosing for meal/snack carb & correction dosing  Reviewed recent Tandem pump report information  Reviewed recent DexcomG6 CGM glucose trends, in detail    Reviewed general issues with the hypothyroidism diagnosis   Discussed lab tests used to assess patient thyroid hormone levels  Reviewed treatment options including levothyroxine medication, ideal dosing    Recommend:  Continue the Tandem insulin pump and diabetes management plan  Pump setting changes:    Bolus ICR 7a 3.7 to 3.5    Reviewed use of the Sleep Mode vs Exercise Mode pump settings:  Sleep Mode changes sensor glucose target from 112 to 120 mgdl, allows for increased basal rate delivery but no auto correction boluses.   Exercise Mode to raise sensor glucose target from 112 to 140 mg/dl, suspend target from 70 to 80 mg/dl.  Cannot set time duration in  advance.          Continue the mealtime boluses premeal  Would be helpful for name change of primary pump Profile 2 to Pregnancy, but keep backup Profile 1  We have discussed use of the exercise and sleep modes with pump use  Continue use of the DexcomG6 CGM sensor  Reviewed overall pregnancy treatment goals for the diabetes and thyroid management  Continue the current levothyroxine 0.137 mg every day, message me if problems getting Rx filled  Plan repeat nonfasting labs today    Testing at [facility]    Lab orders placed  Would not restart the simvastatin med while pregnant  Arrange annual dilated eye exam, fasting lipid panel testing.  Plan to have endocrinology appointments monthly and Diab Ed appointments every 7-10 days during Pg  Contact me if questions/concerns about diabetes and thyroid management    Addressed patient's questions today.    There are no Patient Instructions on file for this visit.    Future labs ordered today:   Orders Placed This Encounter   Procedures    Hemoglobin A1c    TSH with free T4 reflex     Radiology/Consults ordered today: None    Total time spent on day of encounter:  ***    Follow-up:  23 at 7:45a VVAm,     24 at 7:45a, VVAm     24 at 8a, VVAm     3/18/24 at 8a, VVAm    ANTHONY Jimenez MD, MS  Endocrinology  [facility]      CC:  RUBY Mustafa, GISELLE Santoyo, and JYOTI Lima                      Virtual Visit Details    Type of service:  Video Visit     Originating Location (pt. Location):  Home  Distant Location (provider location):  Off-site  Platform used for Video Visit: Edwin        Recent issues:  Diabetes and thyroid follow-up evaluation  Currently 20 weeks gestation, [id number], had fetal U/S recently-went well, MYKE 24, sees Dr. Sierra Santoyo/[facility]  Using the Tandem pump + DexcomG6 in Sleep Mode pump setting  Feeling well overall, infrequent nausea but fatigue           . Diagnosis of diabetes mellitus, age 13, living in [facility]  Had  acute illness requiring hospitalization at [facility]  Began insulin injections, using Novolog and Lantus insulin  [place] carb counting method, gram estimates  On MDI treatment plan for approx 2 years, then changed to pump use  Previous medical evaluations with Dr. Yash Barcenas/[facility] Callahan  2005. Began use of [place] pump  2012. Changed to Medtronic pump  Subsequently using Medtronic 723 Paradigm pump  2012. Tried wearing CGMS sensor, but uncomfortable     12/8/14. Initial consult with me at my former clinic ([facility])  Continued pump management  General medicine appointments with Dr. Ellen Burdick/[facility] [place]     Previous [facility] hgbA1c levels include:              Lab Test 02/05/18 10/10/17 06/21/17 02/01/17 11/16/16  0815   A1C 7.4* 7.8* 8.1* 7.3* 8.2*      ~12/2017. Upgraded to Medtronic 670G pump    Tried Medtronic Guardian sensor, recalls frequent low glucose alarms              Wore sensor in manual mode              Didn't like it, discontinued use     ~11/2018. Wore diagnostic LibrePro CGM  Subsequently obtained LibrePersonal CGM, not wearing past year  3/2022. Changed to Tandem TSlim insulin pump    Novolog in pump    Current Tandem pump settings:         Recent Tandem pump data:                Recent DexcomG6 data:       Recent [facility] labs include:  Lab Results   Component Value Date    A1C 6.0 (H) 11/16/2023     10/18/2023    POTASSIUM 3.9 10/18/2023    CHLORIDE 102 10/18/2023    CO2 19 (L) 10/18/2023    ANIONGAP 16 (H) 10/18/2023     (H) 10/18/2023    BUN 7.7 10/18/2023    CR 0.61 10/18/2023    GFRESTIMATED >90 10/18/2023    GFRESTBLACK >90 04/13/2021    MARCIA 9.8 10/18/2023    CHOL 241 (H) 10/06/2022    TRIG 75 10/06/2022    HDL 67 10/06/2022     (H) 10/06/2022    NHDL 174 (H) 10/06/2022    UCRR 64 06/08/2022    MICROL 5 06/08/2022    UMALCR 7.81 06/08/2022     Last eye exam at [facility] in [place] 11/3/22, no DR  Hyperlipidemia:  Takes simvastatin medication  DM  Complications:  None known        Thyroid:  2013. 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 Belén Mustafa/[facility] clinic for general medicine appointments  Also sees Dr. Sierra Santoyo/[facility]      PMH/PSH:  Past Medical History:   Diagnosis Date     Adjustment disorder with anxious mood      Allergic rhinitis, cause unspecified     h/o AIT     Chest discomfort      Common migraine     No visual aura.      Hyperlipidemia LDL goal < 70      Hypothyroidism      Infectious mononucleosis 01/01/1995     Insulin pump in place     Dexcom continuous glucose monitoring     Obesity      Tuberculosis      Type 1 diabetes, HbA1c goal < 7% (H) 03/01/2004     followed N - peds endocrinology - now Dr Jimenez     Past Surgical History:   Procedure Laterality Date     APPENDECTOMY  08/01/2007    dr deshpande     DILATION AND CURETTAGE SUCTION N/A 5/9/2023    Procedure: DILATION AND CURETTAGE OF UTERUS USING SUCTION;  Surgeon: Sierra Santoyo DO;  Location: [facility] OR     PE TUBES Bilateral 01/01/1996       Family Hx:  Family History   Problem Relation Age of Onset     Neurologic Disorder Maternal Grandmother         dementia     Genetic Disorder Paternal Grandfather         kidney     Family History Negative No family hx of          Social Hx:  Social History     Socioeconomic History     Marital status:      Spouse name: Not on file     Number of children: Not on file     Years of education: Not on  file     Highest education level: Not on file   Occupational History     Occupation: Works in supply chain   Tobacco Use     Smoking status: Never     Smokeless tobacco: Never   Vaping Use     Vaping Use: Never used   Substance and Sexual Activity     Alcohol use: Not Currently     Alcohol/week: 2.0 standard drinks of alcohol     Types: 2 Standard drinks or equivalent per week     Comment: 2-5 drinks per week     Drug use: No     Sexual activity: Yes     Partners: Male   Other Topics Concern      Service Not Asked     Blood Transfusions Not Asked     Caffeine Concern Yes     Comment: rare     Occupational Exposure Not Asked     Hobby Hazards Not Asked     Sleep Concern No     Stress Concern No     Weight Concern Not Asked     Special Diet Not Asked     Back Care Not Asked     Exercise Yes     Bike Helmet Not Asked     Seat Belt Yes     Self-Exams No     Comment: encouraged     Parent/sibling w/ CABG, MI or angioplasty before 65F 55M? No   Social History Narrative    -Working -       Social Determinants of Health     Financial Resource Strain: Not on file   Food Insecurity: Not on file   Transportation Needs: Not on file   Physical Activity: Not on file   Stress: Not on file   Social Connections: Not on file   Interpersonal Safety: Not on file   Housing Stability: Not on file          MEDICATIONS:  has a current medication list which includes the following prescription(s): flaxseed (linseed), insulin aspart, insulin glargine, levothyroxine, prenatal w/o a vit-fe fum-fa, semglee (yfgn), sertraline, dexcom g6 sensor, dexcom g6 transmitter, contour next test, baqsimi two pack, insulin aspart, insulin pen needle, microlet lancets, naratriptan, and triamcinolone.      ROS: 10 point ROS neg other than the symptoms noted above in the HPI.     GENERAL: some fatigue, mild wt gain with Pg; denies fevers, chills, night sweats.   HEENT: no dysphagia, odonophagia, diplopia, neck pain  THYROID:  no apparent hyper or  hypothyroid symptoms  CV: no chest pain, pressure, palpitations  LUNGS: no SOB, LANG, cough, wheezing   ABDOMEN: some nausea; no diarrhea, constipation, abdominal pain  EXTREMITIES: no rashes, ulcers, edema  NEUROLOGY: no headaches, denies changes in vision, tingling, extremitiy numbness   MSK: no muscle aches or pains, weakness  SKIN: no rashes or lesions  : no menses during Pg  PSYCH:  stable mood, no significant anxiety or depression  ENDOCRINE: no heat or cold intolerance     Physical Exam (visual exam)  VS:  no vital signs taken for video visit  CONSTITUTIONAL: healthy, alert and NAD, well dressed, answering questions appropriately  ENT: no nose swelling or nasal discharge, mouth redness or gum changes.  EYES: eyes grossly normal to inspection, conjunctivae and sclerae normal, no exophthalmos or proptosis  THYROID:  no apparent nodules or goiter  LUNGS: no audible wheeze, cough or visible cyanosis, no visible retractions or increased work of breathing  ABDOMEN: abdomen not evaluated  EXTREMITIES: no hand tremors, limited exam  NEUROLOGY: CN grossly intact, mentation intact and speech normal   SKIN:  no apparent skin lesions, rash, or edema with visualized skin appearance  PSYCH: mentation appears normal, affect normal/bright, judgement and insight intact,   normal speech and appearance well groomed       LABS:     All pertinent notes, labs, and images personally reviewed by me.     A/P:  Encounter Diagnoses   Name Primary?     Type 1 diabetes mellitus without complication (H) Yes     Insulin pump status      Hypothyroidism due to Hashimoto's thyroiditis      High-risk pregnancy, unspecified trimester        Comments:  Reviewed health history, diabetes and thyroid issues  Recent CGM trends very good overall  Reviewed and interpreted tests that I previously ordered.   Ordered appropriate tests for the endocrinology disease management.    Management options discussed and implemented after shared medical decision  making with the patient.  T1DM and hypothyroidism problems are chronic-stable    Plan:  Reviewed the overall T1DM management and insulin pump use.  Discussed optimal BG testing to assess glucose trends.  We reviewed insulin pump settings, basal rate and bolus dosing  Use of automated pump bolus dosing for meal/snack carb & correction dosing  Reviewed recent Tandem pump report information  Reviewed recent DexcomG6 CGM glucose trends, in detail    Reviewed general issues with the hypothyroidism diagnosis   Discussed lab tests used to assess patient thyroid hormone levels  Reviewed treatment options including levothyroxine medication, ideal dosing    Recommend:  Continue the Tandem insulin pump and diabetes management plan  Pump setting changes:    Bolus ICR 7a 3.7 to 3.5    Reviewed use of the Sleep Mode vs Exercise Mode pump settings:  Sleep Mode changes sensor glucose target from 112 to 120 mgdl, allows for increased basal rate delivery but no auto correction boluses.   Exercise Mode to raise sensor glucose target from 112 to 140 mg/dl, suspend target from 70 to 80 mg/dl.  Cannot set time duration in advance.          Continue the mealtime boluses premeal  Would be helpful for name change of primary pump Profile 2 to Pregnancy, but keep backup Profile 1  We have discussed use of the exercise and sleep modes with pump use  Continue use of the DexcomG6 CGM sensor  Continue the current levothyroxine 0.137 mg every day, message me if problems getting Rx filled  Plan repeat nonfasting labs today    Testing at [facility]    Lab orders placed  Would not restart the simvastatin med while pregnant  Arrange annual dilated eye exam, fasting lipid panel testing.  Reviewed overall pregnancy treatment goals for the diabetes and thyroid management  Plan to have endocrinology appointments monthly and Diab Ed appointments every 7-10 days during Pg  Contact me if questions/concerns about diabetes and thyroid  management    Important to share medical records between endocrinology and OBGYN providers  Addressed patient's questions today.    There are no Patient Instructions on file for this visit.    Future labs ordered today:   Orders Placed This Encounter   Procedures     GLUCOSE MONITOR, 72 HOUR, PHYS INTERP     Hemoglobin A1c     TSH with free T4 reflex     Radiology/Consults ordered today: None    Total time spent on day of encounter:  32 min    Follow-up:  12/12/23 at 7:45a VVAm,     1/8/24 at 7:45a, VVAm     2/12/24 at 8a, VVAm     3/19/24 at 8a, VVAm    TKE Jimenez MD, MS  Endocrinology  [facility]    CC:  RUBY Mustafa, Masters, A, and JYOTI Lima                        Again, thank you for allowing me to participate in the care of your patient.        Sincerely,        Santy Jimenez MD

## 2023-11-16 NOTE — PROGRESS NOTES
Virtual Visit Details    Type of service:  Video Visit     Originating Location (pt. Location):  Home  Distant Location (provider location):  Off-site  Platform used for Video Visit: Edwin        Recent issues:  Diabetes and thyroid follow-up evaluation  Currently 20 weeks gestation, [id number], had fetal U/S recently-went well, MYKE 24, sees Dr. Sierra Santoyo/[facility]  Using the Tandem pump + DexcomG6 in Sleep Mode pump setting  Feeling well overall, infrequent nausea but fatigue           . Diagnosis of diabetes mellitus, age 13, living in [place]  Had acute illness requiring hospitalization at [facility]  Began insulin injections, using Novolog and Lantus insulin  Coggon carb counting method, gram estimates  On MDI treatment plan for approx 2 years, then changed to pump use  Previous medical evaluations with Dr. Yash Barcenas/[facility] Andreia Callahan  . Began use of Ashton pump  . Changed to Medtronic pump  Subsequently using Medtronic 723 Paradigm pump  . Tried wearing CGMS sensor, but uncomfortable     14. Initial consult with me at my former clinic ([facility])  Continued pump management  General medicine appointments with Dr. Ellen Burdick/[facility] [place]     Previous FV hgbA1c levels include:              Lab Test 02/05/18 10/10/17 06/21/17 02/01/17 11/16/16  0815   A1C 7.4* 7.8* 8.1* 7.3* 8.2*      ~2017. Upgraded to Medtronic 670G pump    Tried Medtronic Guardian sensor, recalls frequent low glucose alarms              Wore sensor in manual mode              Didn't like it, discontinued use     ~2018. Wore diagnostic LibrePro CGM  Subsequently obtained LibrePersonal CGM, not wearing past year  3/2022. Changed to Tandem TSlim insulin pump    Novolog in pump    Current Tandem pump settings:         Recent Tandem pump data:                Recent DexcomG6 data:       Recent FV labs include:  Lab Results   Component Value Date    A1C 6.0 (H) 2023     10/18/2023    POTASSIUM 3.9  10/18/2023    CHLORIDE 102 10/18/2023    CO2 19 (L) 10/18/2023    ANIONGAP 16 (H) 10/18/2023     (H) 10/18/2023    BUN 7.7 10/18/2023    CR 0.61 10/18/2023    GFRESTIMATED >90 10/18/2023    GFRESTBLACK >90 04/13/2021    MARCIA 9.8 10/18/2023    CHOL 241 (H) 10/06/2022    TRIG 75 10/06/2022    HDL 67 10/06/2022     (H) 10/06/2022    NHDL 174 (H) 10/06/2022    UCRR 64 06/08/2022    MICROL 5 06/08/2022    UMALCR 7.81 06/08/2022     Last eye exam at [facility] in [place] 11/3/22, no DR  Hyperlipidemia:  Takes simvastatin medication  DM Complications:  None known        Thyroid:  2013. 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 Belén Mustafa/[facility] clinic for general medicine appointments  Also sees Dr. Esquivel Masters/[facility]      PMH/PSH:  Past Medical History:   Diagnosis Date    Adjustment disorder with anxious mood     Allergic rhinitis, cause unspecified     h/o AIT    Chest discomfort     Common migraine     No visual aura.     Hyperlipidemia LDL goal < 70     Hypothyroidism     Infectious mononucleosis 01/01/1995    Insulin pump in place     Dexcom continuous glucose monitoring    Obesity     Tuberculosis     Type 1 diabetes, HbA1c goal < 7% (H) 03/01/2004     followed [facility] - peds endocrinology - now Dr Jimenez     Past Surgical History:   Procedure Laterality Date    APPENDECTOMY  08/01/2007    dr deshpande    DILATION AND CURETTAGE SUCTION N/A 5/9/2023    Procedure:  DILATION AND CURETTAGE OF UTERUS USING SUCTION;  Surgeon: Sierra Santoyo DO;  Location: [facility] OR    PE TUBES Bilateral 01/01/1996       Family Hx:  Family History   Problem Relation Age of Onset    Neurologic Disorder Maternal Grandmother         dementia    Genetic Disorder Paternal Grandfather         kidney    Family History Negative No family hx of          Social Hx:  Social History     Socioeconomic History    Marital status:      Spouse name: Not on file    Number of children: Not on file    Years of education: Not on file    Highest education level: Not on file   Occupational History    Occupation: Works in supply chain   Tobacco Use    Smoking status: Never    Smokeless tobacco: Never   Vaping Use    Vaping Use: Never used   Substance and Sexual Activity    Alcohol use: Not Currently     Alcohol/week: 2.0 standard drinks of alcohol     Types: 2 Standard drinks or equivalent per week     Comment: 2-5 drinks per week    Drug use: No    Sexual activity: Yes     Partners: Male   Other Topics Concern     Service Not Asked    Blood Transfusions Not Asked    Caffeine Concern Yes     Comment: rare    Occupational Exposure Not Asked    Hobby Hazards Not Asked    Sleep Concern No    Stress Concern No    Weight Concern Not Asked    Special Diet Not Asked    Back Care Not Asked    Exercise Yes    Bike Helmet Not Asked    Seat Belt Yes    Self-Exams No     Comment: encouraged    Parent/sibling w/ CABG, MI or angioplasty before 65F 55M? No   Social History Narrative    -Working -       Social Determinants of Health     Financial Resource Strain: Not on file   Food Insecurity: Not on file   Transportation Needs: Not on file   Physical Activity: Not on file   Stress: Not on file   Social Connections: Not on file   Interpersonal Safety: Not on file   Housing Stability: Not on file          MEDICATIONS:  has a current medication list which includes the following prescription(s): flaxseed (linseed),  insulin aspart, insulin glargine, levothyroxine, prenatal w/o a vit-fe fum-fa, semglee (yfgn), sertraline, dexcom g6 sensor, dexcom g6 transmitter, contour next test, baqsimi two pack, insulin aspart, insulin pen needle, microlet lancets, naratriptan, and triamcinolone.      ROS: 10 point ROS neg other than the symptoms noted above in the HPI.     GENERAL: some fatigue, mild wt gain with Pg; denies fevers, chills, night sweats.   HEENT: no dysphagia, odonophagia, diplopia, neck pain  THYROID:  no apparent hyper or hypothyroid symptoms  CV: no chest pain, pressure, palpitations  LUNGS: no SOB, LANG, cough, wheezing   ABDOMEN: some nausea; no diarrhea, constipation, abdominal pain  EXTREMITIES: no rashes, ulcers, edema  NEUROLOGY: no headaches, denies changes in vision, tingling, extremitiy numbness   MSK: no muscle aches or pains, weakness  SKIN: no rashes or lesions  : no menses during Pg  PSYCH:  stable mood, no significant anxiety or depression  ENDOCRINE: no heat or cold intolerance     Physical Exam (visual exam)  VS:  no vital signs taken for video visit  CONSTITUTIONAL: healthy, alert and NAD, well dressed, answering questions appropriately  ENT: no nose swelling or nasal discharge, mouth redness or gum changes.  EYES: eyes grossly normal to inspection, conjunctivae and sclerae normal, no exophthalmos or proptosis  THYROID:  no apparent nodules or goiter  LUNGS: no audible wheeze, cough or visible cyanosis, no visible retractions or increased work of breathing  ABDOMEN: abdomen not evaluated  EXTREMITIES: no hand tremors, limited exam  NEUROLOGY: CN grossly intact, mentation intact and speech normal   SKIN:  no apparent skin lesions, rash, or edema with visualized skin appearance  PSYCH: mentation appears normal, affect normal/bright, judgement and insight intact,   normal speech and appearance well groomed       LABS:     All pertinent notes, labs, and images personally reviewed by me.     A/P:  Encounter  Diagnoses   Name Primary?    Type 1 diabetes mellitus without complication (H) Yes    Insulin pump status     Hypothyroidism due to Hashimoto's thyroiditis     High-risk pregnancy, unspecified trimester        Comments:  Reviewed health history, diabetes and thyroid issues  Recent CGM trends very good overall  Reviewed and interpreted tests that I previously ordered.   Ordered appropriate tests for the endocrinology disease management.    Management options discussed and implemented after shared medical decision making with the patient.  T1DM and hypothyroidism problems are chronic-stable    Plan:  Reviewed the overall T1DM management and insulin pump use.  Discussed optimal BG testing to assess glucose trends.  We reviewed insulin pump settings, basal rate and bolus dosing  Use of automated pump bolus dosing for meal/snack carb & correction dosing  Reviewed recent Tandem pump report information  Reviewed recent DexcomG6 CGM glucose trends, in detail    Reviewed general issues with the hypothyroidism diagnosis   Discussed lab tests used to assess patient thyroid hormone levels  Reviewed treatment options including levothyroxine medication, ideal dosing    Recommend:  Continue the Tandem insulin pump and diabetes management plan  Pump setting changes:    Bolus ICR 7a 3.7 to 3.5    Reviewed use of the Sleep Mode vs Exercise Mode pump settings:  Sleep Mode changes sensor glucose target from 112 to 120 mgdl, allows for increased basal rate delivery but no auto correction boluses.   Exercise Mode to raise sensor glucose target from 112 to 140 mg/dl, suspend target from 70 to 80 mg/dl.  Cannot set time duration in advance.          Continue the mealtime boluses premeal  Would be helpful for name change of primary pump Profile 2 to Pregnancy, but keep backup Profile 1  We have discussed use of the exercise and sleep modes with pump use  Continue use of the DexcomG6 CGM sensor  Continue the current levothyroxine 0.137 mg  every day, message me if problems getting Rx filled  Plan repeat nonfasting labs today    [facility]    Lab orders placed  Would not restart the simvastatin med while pregnant  Arrange annual dilated eye exam, fasting lipid panel testing.  Reviewed overall pregnancy treatment goals for the diabetes and thyroid management  Plan to have endocrinology appointments monthly and Diab Ed appointments every 7-10 days during Pg  Contact me if questions/concerns about diabetes and thyroid management    Important to share medical records between endocrinology and OBGYN providers  Addressed patient's questions today.    There are no Patient Instructions on file for this visit.    Future labs ordered today:   Orders Placed This Encounter   Procedures    GLUCOSE MONITOR, 72 HOUR, PHYS INTERP    Hemoglobin A1c    TSH with free T4 reflex     Radiology/Consults ordered today: None    Total time spent on day of encounter:  32 min    Follow-up:  12/12/23 at 7:45a VVAm,     1/8/24 at 7:45a, VVAm     2/12/24 at 8a, VVAm     3/19/24 at 8a, VVAm    TKE Jimenez MD, MS  Endocrinology  [facility]    CC:  RUBY Mustafa, Masters, A, and JYOTI Lima

## 2023-11-17 ENCOUNTER — HOSPITAL ENCOUNTER (OUTPATIENT)
Dept: CARDIOLOGY | Facility: CLINIC | Age: 33
Discharge: HOME | End: 2023-11-17
Attending: OBSTETRICS & GYNECOLOGY | Admitting: OBSTETRICS & GYNECOLOGY
Payer: COMMERCIAL

## 2023-11-17 ENCOUNTER — OFFICE VISIT (OUTPATIENT)
Dept: PEDIATRIC CARDIOLOGY | Facility: CLINIC | Age: 33
End: 2023-11-17
Payer: COMMERCIAL

## 2023-11-17 DIAGNOSIS — O35.BXX0 ANOMALY OF HEART OF FETUS AFFECTING PREGNANCY, ANTEPARTUM, SINGLE OR UNSPECIFIED FETUS: Primary | ICD-10-CM

## 2023-11-17 DIAGNOSIS — O26.90 PREGNANCY RELATED CONDITION, ANTEPARTUM: ICD-10-CM

## 2023-11-17 PROCEDURE — 93325 DOPPLER ECHO COLOR FLOW MAPG: CPT | Mod: 26 | Performed by: PEDIATRICS

## 2023-11-17 PROCEDURE — 93325 DOPPLER ECHO COLOR FLOW MAPG: CPT

## 2023-11-17 PROCEDURE — 76827 ECHO EXAM OF FETAL HEART: CPT | Mod: 26 | Performed by: PEDIATRICS

## 2023-11-17 PROCEDURE — 99202 OFFICE O/P NEW SF 15 MIN: CPT | Mod: 25 | Performed by: PEDIATRICS

## 2023-11-17 PROCEDURE — 76825 ECHO EXAM OF FETAL HEART: CPT | Mod: 26 | Performed by: PEDIATRICS

## 2023-11-17 NOTE — PROGRESS NOTES
[facility]   Heart Center Fetal Consult Note    Patient:  Linda Chavez MRN:  [id number]   YOB: 1990 Age:  33 year old   Date of Visit:  2023 PCP:  Belén Mustafa MD     Dear Doctor:    I had the pleasure of seeing Linda Chavez at the [facility] on 2023 in fetal cardiology consultation for fetal echocardiogram results. She presented today accompanied by her partner. As you know, she is a 33 year old  at 20w0d who presented for fetal echocardiogram today because of Type 1 diabetes.     I performed and interpreted the fetal echocardiogram today, which was a difficult study due to poor imaging windows, but demonstrated likely normal fetal cardiac anatomy. Normal right and left ventricular size and function. Fetal heart rate is regular at 148 bpm. No hydrops. Difficult study due to poor imaging windows.     I reviewed today's findings with Linda Chavez and her partner. There are no obvious abnormalities on today's study but the images were somewhat limited due to poor imaging windows. There is likely normal fetal cardiac anatomy. She is aware of the general limitations of fetal echocardiography. No additional fetal echocardiograms are recommended. No  cardiac follow-up is required.     Thank you for allowing me to participate in Linda's care. Please do not hesitate to contact me with questions or concerns.    This visit was separate from the performance and interpretation of the ultrasound. The majority of the time (>50%) was spent in counseling and coordination of care. I spent approximately 20 minutes in face-to-face time reviewing the above considerations.    Watson Alcala M.D.  Pediatric Cardiology  [address] Office [address] 4th [facility], [place] [address]  Phone [phone]  Fax [phone]

## 2023-11-17 NOTE — LETTER
2023      RE: Linda Chavez  [address] [facility] [address]     Dear Colleague,    Thank you for the opportunity to participate in the care of your patient, Lidna Chavez, at the [facility] at [facility]. Please see a copy of my visit note below.    [facility]   Heart Center Fetal Consult Note    Patient:  Linda Chavez MRN:  [id number]   YOB: 1990 Age:  33 year old   Date of Visit:  2023 PCP:  Belén Mustafa MD     Dear Doctor:    I had the pleasure of seeing Linda Chavez at the [facility] on 2023 in fetal cardiology consultation for fetal echocardiogram results. She presented today accompanied by her partner. As you know, she is a 33 year old  at 20w0d who presented for fetal echocardiogram today because of Type 1 diabetes.     I performed and interpreted the fetal echocardiogram today, which was a difficult study due to poor imaging windows, but demonstrated likely normal fetal cardiac anatomy. Normal right and left ventricular size and function. Fetal heart rate is regular at 148 bpm. No hydrops. Difficult study due to poor imaging windows.     I reviewed today's findings with Linda Chavez and her partner. There are no obvious abnormalities on today's study but the images were somewhat limited due to poor imaging windows. There is likely normal fetal cardiac anatomy. She is aware of the general limitations of fetal echocardiography. No additional fetal echocardiograms are recommended. No  cardiac follow-up is required.     Thank you for allowing me to participate in Linda's care. Please do not hesitate to contact me with questions or concerns.    This visit was separate from the performance and interpretation of the ultrasound. The majority of the time (>50%) was spent in counseling and coordination of care. I spent approximately 20  minutes in face-to-face time reviewing the above considerations.    Watson Alcala M.D.  Pediatric Cardiology  [facility]'[place] [id number] [facility] Academic Office Building 4th [facility], [place] [address]  Phone [phone]  Fax [phone]

## 2023-11-21 ENCOUNTER — MYC MEDICAL ADVICE (OUTPATIENT)
Dept: EDUCATION SERVICES | Facility: CLINIC | Age: 33
End: 2023-11-21
Payer: COMMERCIAL

## 2023-11-22 NOTE — TELEPHONE ENCOUNTER
Diabetes Education Follow-up    Subjective/Objective:    Linda Chavez sent in blood glucose log for review. Last date of communication was:  with Dr. Jimenez.    Diabetes is being managed with Diabetes Medications   Diabetes Medication(s)       Diabetic Other       Glucagon (BAQSIMI TWO PACK) 3 MG/DOSE POWD    Spray 1 Device in nostril once as needed (for severe hypoglycemia)      Insulin       insulin aspart (NOVOLOG PEN) 100 UNIT/ML pen    Inject 3 to 10 units subcutaneous at mealtimes as directed, total daily dose approx 30 units     insulin aspart (NOVOLOG VIAL) 100 UNITS/ML vial    Use with insulin pump, total daily dose approx 100 units     insulin glargine (LANTUS PEN) 100 UNIT/ML pen    Inject 22 Units Subcutaneous At Bedtime     SEMGLEE, YFGN, 100 UNIT/ML SOPN    ADMINISTER 22 UNITS UNDER THE SKIN AT BEDTIME SUBSTITUTE FOR LANTUS            BG/Food Log:                     Assessment:    Not meeting TIR goals (>70% ) but close at 68%. Having some post-prandial BG spikes.     Plan/Response:  See Patient Instructions for co-developed, patient-stated behavior change goals.      Start Time Basal Rate Correction Factor  Carb Ratio Target BG   MN 2.0 unit(s)/hr 1u:35 mg/dL 1u:7.5g 110   6:00am 1.8 unit(s)/hr 1u:35 1u:6.0g 110   7:00am 1.8 unit(s)/hr  1u:20 1u:3.5g --> 3.3 g 110   11:00am 1.7 unit(s)/hr 1u:20 1u:2.9g --> 2.7g  110   12:00pm 2.1 unit(s)/hr  1u:18 1u:3.3g --> 3.1 g 110   2:30pm 2.1 unit(s)/hr 1u:18 1u:3.5g 110   5:00pm 2.1 unit(s)/hr 1u:18 1u:2.9g 110   9:00pm 2.1 unit(s)/hr 1u:20 1u:3.5g 110       [facility]  Triage: [phone]  [phone]      Any diabetes medication dose changes were made via the CDE Protocol and Collaborative Practice Agreement with the patient's referring provider. A copy of this encounter was shared with the provider.

## 2023-11-29 ENCOUNTER — VIRTUAL VISIT (OUTPATIENT)
Dept: EDUCATION SERVICES | Facility: CLINIC | Age: 33
End: 2023-11-29
Payer: COMMERCIAL

## 2023-11-29 DIAGNOSIS — E10.9 TYPE 1 DIABETES, HBA1C GOAL < 7% (H): Primary | ICD-10-CM

## 2023-11-29 PROCEDURE — 98967 PH1 ASSMT&MGMT NQHP 11-20: CPT | Mod: 93 | Performed by: DIETITIAN, REGISTERED

## 2023-11-29 NOTE — PROGRESS NOTES
Diabetes and Pregnancy Follow-up  Type of Service: Telephone Visit    How would patient like to obtain AVS? Not needed    Subjective/Objective:    Linda Chavez was called for a scheduled BG review. Last date of communication was: 11/21/23.    Diabetes is being managed with diet, activity, and medications    Taking diabetes medications: yes:     Diabetes Medication(s)       Diabetic Other       Glucagon (BAQSIMI TWO PACK) 3 MG/DOSE POWD    Spray 1 Device in nostril once as needed (for severe hypoglycemia)      Insulin       insulin aspart (NOVOLOG PEN) 100 UNIT/ML pen    Inject 3 to 10 units subcutaneous at mealtimes as directed, total daily dose approx 30 units     insulin aspart (NOVOLOG VIAL) 100 UNITS/ML vial    Use with insulin pump, total daily dose approx 100 units     insulin glargine (LANTUS PEN) 100 UNIT/ML pen    Inject 22 Units Subcutaneous At Bedtime     SEMGLEE, YFGN, 100 UNIT/ML SOPN    ADMINISTER 22 UNITS UNDER THE SKIN AT BEDTIME SUBSTITUTE FOR LANTUS            Estimated Date of Delivery: Apr 5, 2024    Blood Glucose/Ketone Log:                Assessment:    Spoke with Linda today. She feels things are going well - recent A1C was the best she's ever had. She is working hard on blood sugar control. She has not made recent ICR updates as she felt they might be too aggressive. We discussed the importance of premeal bolusing, especially as insulin resistance ramps up over the next weeks into 3rd trimester. I recommended trying to get bolus in at a minimum of 10 minutes prior to eating, could work towards 15, 20, even 30 minutes as pregnancy progresses. We reviewed insulin needs commonly seen throughout the rest of pregnancy and she is understanding of this.     We briefly discussed management of blood sugars during labor. She plans to deliver at [facility]. Recommended she work with Dr. Jimenez to determine a good plan for this. We also discussed need for close follow up post-partum,  particularly if she plans to breastfeed as this can cause hypoglycemia. She is agreeable.     Plan/Response:  Focus on pre-meal bolusing, 10-20 minutes prior to meals  No change in pump settings today  Continue weekly follow up via [employer] for pump review    Kat Lima RD, LD, [facility]   Time Spent: 17 minutes    Any diabetes medication dose changes were made via the CDE Protocol and Collaborative Practice Agreement with the patient's endocrinology provider. A copy of this encounter was shared with the provider.     Mustarde Flap Text: The defect edges were debeveled with a #15 scalpel blade.  Given the size, depth and location of the defect and the proximity to free margins a Mustarde flap was deemed most appropriate.  Using a sterile surgical marker, an appropriate flap was drawn incorporating the defect. The area thus outlined was incised with a #15 scalpel blade.  The skin margins were undermined to an appropriate distance in all directions utilizing iris scissors.

## 2023-11-29 NOTE — PATIENT INSTRUCTIONS
Keep  up the hard work!   Focus on premeal bolusing, at least 10 minutes prior to meals but even 15-20 would be good  Send a Prom[employer] message next week so I can review your pump reports    Call or send a Promethean[employer] message with any questions or concerns    Kat Lima RD, LD, [facility]   Diabetes Education Triage Line: [phone]  Diabetes Education Appointment [phone]

## 2023-11-29 NOTE — LETTER
11/29/2023         RE: Linda Chavez  [address]  [place] [facility] [address]        Dear Colleague,    Thank you for referring your patient, Linda Chavez, to the [facility] [place]. Please see a copy of my visit note below.    Diabetes and Pregnancy Follow-up  Type of Service: Telephone Visit    How would patient like to obtain AVS? Not needed    Subjective/Objective:    Linda Chavez was called for a scheduled BG review. Last date of communication was: 11/21/23.    Diabetes is being managed with diet, activity, and medications    Taking diabetes medications: yes:     Diabetes Medication(s)       Diabetic Other       Glucagon (BAQSIMI TWO PACK) 3 MG/DOSE POWD    Spray 1 Device in nostril once as needed (for severe hypoglycemia)      Insulin       insulin aspart (NOVOLOG PEN) 100 UNIT/ML pen    Inject 3 to 10 units subcutaneous at mealtimes as directed, total daily dose approx 30 units     insulin aspart (NOVOLOG VIAL) 100 UNITS/ML vial    Use with insulin pump, total daily dose approx 100 units     insulin glargine (LANTUS PEN) 100 UNIT/ML pen    Inject 22 Units Subcutaneous At Bedtime     SEMGLEE, YFGN, 100 UNIT/ML SOPN    ADMINISTER 22 UNITS UNDER THE SKIN AT BEDTIME SUBSTITUTE FOR LANTUS            Estimated Date of Delivery: Apr 5, 2024    Blood Glucose/Ketone Log:                Assessment:    Spoke with Linda today. She feels things are going well - recent A1C was the best she's ever had. She is working hard on blood sugar control. She has not made recent ICR updates as she felt they might be too aggressive. We discussed the importance of premeal bolusing, especially as insulin resistance ramps up over the next weeks into 3rd trimester. I recommended trying to get bolus in at a minimum of 10 minutes prior to eating, could work towards 15, 20, even 30 minutes as pregnancy progresses. We reviewed insulin needs commonly seen throughout the rest of pregnancy and she is understanding of this.      We briefly discussed management of blood sugars during labor. She plans to deliver at [facility]. Recommended she work with Dr. Jimenez to determine a good plan for this. We also discussed need for close follow up post-partum, particularly if she plans to breastfeed as this can cause hypoglycemia. She is agreeable.     Plan/Response:  Focus on pre-meal bolusing, 10-20 minutes prior to meals  No change in pump settings today  Continue weekly follow up via [place] for pump review    Kat Lima RD, LD, [facility]   Time Spent: 17 minutes    Any diabetes medication dose changes were made via the CDE Protocol and Collaborative Practice Agreement with the patient's endocrinology provider. A copy of this encounter was shared with the provider.

## 2023-12-12 ENCOUNTER — VIRTUAL VISIT (OUTPATIENT)
Dept: ENDOCRINOLOGY | Facility: CLINIC | Age: 33
End: 2023-12-12
Payer: COMMERCIAL

## 2023-12-12 DIAGNOSIS — E06.3 HYPOTHYROIDISM DUE TO HASHIMOTO'S THYROIDITIS: ICD-10-CM

## 2023-12-12 DIAGNOSIS — O09.90 HIGH-RISK PREGNANCY, UNSPECIFIED TRIMESTER: ICD-10-CM

## 2023-12-12 DIAGNOSIS — E10.9 TYPE 1 DIABETES MELLITUS WITHOUT COMPLICATION (H): Primary | ICD-10-CM

## 2023-12-12 DIAGNOSIS — Z96.41 INSULIN PUMP STATUS: ICD-10-CM

## 2023-12-12 PROCEDURE — 99214 OFFICE O/P EST MOD 30 MIN: CPT | Mod: 95 | Performed by: INTERNAL MEDICINE

## 2023-12-12 PROCEDURE — 95251 CONT GLUC MNTR ANALYSIS I&R: CPT | Mod: 95 | Performed by: INTERNAL MEDICINE

## 2023-12-12 NOTE — PROGRESS NOTES
Virtual Visit Details    Type of service:  Video Visit     Originating Location (pt. Location): Home  Distant Location (provider location):  Off-site  Platform used for Video Visit: Edwin      Recent issues:  Diabetes and thyroid follow-up evaluation  Currently 23 weeks gestation, [id number], had fetal U/S recently-went well, MYKE 24, sees Dr. Sierra Santoyo/[facility]  Using the Tandem pump + DexcomG6 in Sleep Mode pump setting  Feeling well overall, rare nausea but fatigue           . Diagnosis of diabetes mellitus, age 13, living in [place]  Had acute illness requiring hospitalization at [facility]  Began insulin injections, using Novolog and Lantus insulin  [place] carb counting method, gram estimates  On MDI treatment plan for approx 2 years, then changed to pump use  Previous medical evaluations with Dr. Yash Barcenas/[facility] Andreia Callahan  . Began use of [place] pump  . Changed to Medtronic pump  Subsequently using Medtronic 723 Paradigm pump  . Tried wearing CGMS sensor, but uncomfortable     14. Initial consult with me at my former clinic ([facility])  Continued pump management  General medicine appointments with Dr. Ellen Burdick/[facility] [place]     Previous FV hgbA1c levels include:              Lab Test 02/05/18 10/10/17 06/21/17 02/01/17 11/16/16  0815   A1C 7.4* 7.8* 8.1* 7.3* 8.2*      ~2017. Upgraded to Medtronic 670G pump    Tried Medtronic Guardian sensor, recalls frequent low glucose alarms              Wore sensor in manual mode              Didn't like it, discontinued use     ~2018. Wore diagnostic LibrePro CGM  Subsequently obtained LibrePersonal CGM, not wearing past year  3/2022. Changed to Tandem TSlim insulin pump    Novolog in pump    Current Tandem pump settings:             Recent Tandem pump data:                    Recent DexcomG6 data:       Recent FV labs include:  Lab Results   Component Value Date    A1C 6.0 (H) 2023     10/18/2023    POTASSIUM 3.9  10/18/2023    CHLORIDE 102 10/18/2023    CO2 19 (L) 10/18/2023    ANIONGAP 16 (H) 10/18/2023     (H) 10/18/2023    BUN 7.7 10/18/2023    CR 0.61 10/18/2023    GFRESTIMATED >90 10/18/2023    GFRESTBLACK >90 04/13/2021    MARCIA 9.8 10/18/2023    CHOL 241 (H) 10/06/2022    TRIG 75 10/06/2022    HDL 67 10/06/2022     (H) 10/06/2022    NHDL 174 (H) 10/06/2022    UCRR 64 06/08/2022    MICROL 5 06/08/2022    UMALCR 7.81 06/08/2022     Last eye exam at [facility] in [place] 10/19/23, no DR  Hyperlipidemia:  Takes simvastatin medication  DM Complications:  None known        Thyroid:  2013. 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 Belén Mustafa/[facility] clinic for general medicine appointments  Also sees Dr. Esquivel Masters/[facility]      PMH/PSH:  Past Medical History:   Diagnosis Date    Adjustment disorder with anxious mood     Allergic rhinitis, cause unspecified     h/o AIT    Chest discomfort     Common migraine     No visual aura.     Hyperlipidemia LDL goal < 70     Hypothyroidism     Infectious mononucleosis 01/01/1995    Insulin pump in place     Dexcom continuous glucose monitoring    Obesity     Tuberculosis     Type 1 diabetes, HbA1c goal < 7% (H) 03/01/2004     followed [facility] - peds endocrinology - now Dr Jimenez     Past Surgical History:   Procedure Laterality Date    APPENDECTOMY  08/01/2007    dr deshpande    DILATION AND CURETTAGE SUCTION N/A 5/9/2023    Procedure:  DILATION AND CURETTAGE OF UTERUS USING SUCTION;  Surgeon: Sierra Santoyo DO;  Location: [facility] OR    PE TUBES Bilateral 01/01/1996       Family Hx:  Family History   Problem Relation Age of Onset    Neurologic Disorder Maternal Grandmother         dementia    Genetic Disorder Paternal Grandfather         kidney    Family History Negative No family hx of          Social Hx:  Social History     Socioeconomic History    Marital status:      Spouse name: Not on file    Number of children: Not on file    Years of education: Not on file    Highest education level: Not on file   Occupational History    Occupation: Works in supply chain   Tobacco Use    Smoking status: Never    Smokeless tobacco: Never   Vaping Use    Vaping Use: Never used   Substance and Sexual Activity    Alcohol use: Not Currently     Alcohol/week: 2.0 standard drinks of alcohol     Types: 2 Standard drinks or equivalent per week     Comment: 2-5 drinks per week    Drug use: No    Sexual activity: Yes     Partners: Male   Other Topics Concern     Service Not Asked    Blood Transfusions Not Asked    Caffeine Concern Yes     Comment: rare    Occupational Exposure Not Asked    Hobby Hazards Not Asked    Sleep Concern No    Stress Concern No    Weight Concern Not Asked    Special Diet Not Asked    Back Care Not Asked    Exercise Yes    Bike Helmet Not Asked    Seat Belt Yes    Self-Exams No     Comment: encouraged    Parent/sibling w/ CABG, MI or angioplasty before 65F 55M? No   Social History Narrative    -Working -       Social Determinants of Health     Financial Resource Strain: Not on file   Food Insecurity: Not on file   Transportation Needs: Not on file   Physical Activity: Not on file   Stress: Not on file   Social Connections: Not on file   Interpersonal Safety: Not on file   Housing Stability: Not on file          MEDICATIONS:  has a current medication list which includes the following prescription(s): dexcom g6 sensor, dexcom  g6 transmitter, flaxseed (linseed), insulin aspart, insulin aspart, insulin glargine, levothyroxine, prenatal w/o a vit-fe fum-fa, sertraline, contour next test, baqsimi two pack, insulin pen needle, microlet lancets, naratriptan, semglee (yfgn), and triamcinolone.      ROS: 10 point ROS neg other than the symptoms noted above in the HPI.     GENERAL: some fatigue, wt gain with Pg; denies fevers, chills, night sweats.   HEENT: no dysphagia, odonophagia, diplopia, neck pain  THYROID:  no apparent hyper or hypothyroid symptoms  CV: no chest pain, pressure, palpitations  LUNGS: no SOB, LANG, cough, wheezing   ABDOMEN: mild nausea; no diarrhea, constipation, abdominal pain  EXTREMITIES: no rashes, ulcers, edema  NEUROLOGY: no headaches, denies changes in vision, tingling, extremitiy numbness   MSK: no muscle aches or pains, weakness  SKIN: no rashes or lesions  : no menses during Pg  PSYCH:  stable mood, no significant anxiety or depression  ENDOCRINE: no heat or cold intolerance     Physical Exam (visual exam)  VS:  no vital signs taken for video visit  CONSTITUTIONAL: healthy, alert and NAD, well dressed, answering questions appropriately  ENT: no nose swelling or nasal discharge, mouth redness or gum changes.  EYES: eyes grossly normal to inspection, conjunctivae and sclerae normal, no exophthalmos or proptosis  THYROID:  no apparent nodules or goiter  LUNGS: no audible wheeze, cough or visible cyanosis, no visible retractions or increased work of breathing  ABDOMEN: abdomen not evaluated  EXTREMITIES: no hand tremors, limited exam  NEUROLOGY: CN grossly intact, mentation intact and speech normal   SKIN:  no apparent skin lesions, rash, or edema with visualized skin appearance  PSYCH: mentation appears normal, affect normal/bright, judgement and insight intact,   normal speech and appearance well groomed       LABS:     All pertinent notes, labs, and images personally reviewed by me.     A/P:  Encounter Diagnoses    Name Primary?    Type 1 diabetes mellitus without complication (H) Yes    Insulin pump status     Hypothyroidism due to Hashimoto's thyroiditis     High-risk pregnancy, unspecified trimester      Comments:  Reviewed health history, diabetes and thyroid issues  Recent CGM trends with some postprandial hyperglycemia and also some low glucose trends at night and mid afternoon  Reviewed and interpreted tests that I previously ordered.   Ordered appropriate tests for the endocrinology disease management.    Management options discussed and implemented after shared medical decision making with the patient.  T1DM and hypothyroidism problems are chronic-stable    Plan:  Reviewed the overall T1DM management and insulin pump use.  Discussed optimal BG testing to assess glucose trends.  We reviewed insulin pump settings, basal rate and bolus dosing  Use of automated pump bolus dosing for meal/snack carb & correction dosing  Reviewed recent Tandem pump report information  Reviewed recent DexcomG6 CGM glucose trends, in detail    Reviewed general issues with the hypothyroidism diagnosis   Discussed lab tests used to assess patient thyroid hormone levels  Reviewed treatment options including levothyroxine medication, ideal dosing    Recommend:  Continue the Tandem insulin pump and diabetes management plan  Pump setting changes:    Profile 2 Basals: [place] 2.0 to 1.9, 12p 2.1 to 2.0, 230p 2.1 to 2.0    We have reviewed use of the Sleep Mode vs Exercise Mode pump settings:  Sleep Mode changes sensor glucose target from 112 to 120 mgdl, allows for increased basal rate delivery but no auto correction boluses.   Exercise Mode to raise sensor glucose target from 112 to 140 mg/dl, suspend target from 70 to 80 mg/dl.  Cannot set time duration in advance.          Continue the mealtime boluses premeal  Would be helpful for name change of primary pump Profile 2 to Pregnancy, but keep backup Profile 1  Continue use of the DexcomG6 CGM  sensor  Continue the current levothyroxine 0.137 mg every day  Plan repeat nonfasting labs today    Testing at [facility]    Lab orders placed  Would not restart the simvastatin med while pregnant  Arrange annual dilated eye exam, fasting lipid panel testing.  Reviewed overall pregnancy treatment goals for the diabetes and thyroid management  Plan to have endocrinology appointments monthly and Diab Ed appointments every 7-10 days during Pg  Contact me if questions/concerns about diabetes and thyroid management    Important to share medical records between endocrinology and OBGYN providers  Addressed patient's questions today.    There are no Patient Instructions on file for this visit.    Future labs ordered today:   Orders Placed This Encounter   Procedures    GLUCOSE MONITOR, 72 HOUR, PHYS INTERP    TSH with free T4 reflex    Hemoglobin A1c     Radiology/Consults ordered today: None    Total time spent on day of encounter:  25 min    Follow-up:  1/8/24 at 7:45a, VVAm     2/12/24 at 8a, VVAm     3/19/24 at 8a, VVAm    ANTHONY Jimenez MD, MS  Endocrinology  [facility]    CC:  RUBY Mustafa, Masters, A, and JYOTI Lima

## 2023-12-12 NOTE — Clinical Note
2023         RE: Linda Chavez  [address]  [place] [facility] [place] [address]        Dear Colleague,    Thank you for referring your patient, Linda Chavez, to the [facility] [place]. Please see a copy of my visit note below.    Virtual Visit Details    Type of service:  Video Visit     Originating Location (pt. Location): Home  Distant Location (provider location):  Off-site  Platform used for Video Visit: Edwin      Recent issues:  Diabetes and thyroid follow-up evaluation  Currently 23 weeks gestation, [id number], had fetal U/S recently-went well, MYKE 24, sees Dr. Sierra Santoyo/[facility]  Using the Tandem pump + DexcomG6 in Sleep Mode pump setting  Feeling well overall, rare nausea but fatigue           . Diagnosis of diabetes mellitus, age 13, living in [place]  Had acute illness requiring hospitalization at [facility]  Began insulin injections, using Novolog and Lantus insulin  [place] carb counting method, gram estimates  On MDI treatment plan for approx 2 years, then changed to pump use  Previous medical evaluations with Dr. Yash Barcenas/[facility] Andreia Endo  . Began use of [place] pump  . Changed to Medtronic pump  Subsequently using Medtronic 723 Paradigm pump  . Tried wearing CGMS sensor, but uncomfortable     14. Initial consult with me at my former clinic ([facility])  Continued pump management  General medicine [facility] hgbA1c levels include:              Lab Test 02/05/18 10/10/17 06/21/17 02/01/17 11/16/16  0815   A1C 7.4* 7.8* 8.1* 7.3* 8.2*      ~2017. Upgraded to Medtronic 670G pump    Tried Medtronic Guardian sensor, recalls frequent low glucose alarms              Wore sensor in manual mode              Didn't like it, discontinued use     ~2018. Wore diagnostic LibrePro CGM  Subsequently obtained LibrePersonal CGM, not wearing past year  3/2022. Changed to Tandem TSlim insulin pump    Novolog in  pump    Current Tandem pump settings:             Recent Tandem pump data:                    Recent DexcomG6 data:       Recent FV labs include:  Lab Results   Component Value Date    A1C 6.0 (H) 11/16/2023     10/18/2023    POTASSIUM 3.9 10/18/2023    CHLORIDE 102 10/18/2023    CO2 19 (L) 10/18/2023    ANIONGAP 16 (H) 10/18/2023     (H) 10/18/2023    BUN 7.7 10/18/2023    CR 0.61 10/18/2023    GFRESTIMATED >90 10/18/2023    GFRESTBLACK >90 04/13/2021    MARCIA 9.8 10/18/2023    CHOL 241 (H) 10/06/2022    TRIG 75 10/06/2022    HDL 67 10/06/2022     (H) 10/06/2022    NHDL 174 (H) 10/06/2022    UCRR 64 06/08/2022    MICROL 5 06/08/2022    UMALCR 7.81 06/08/2022     Last eye exam at [facility] in [place] 10/19/23, no DR  Hyperlipidemia:  Takes simvastatin medication  DM Complications:  None known        Thyroid:  2013. 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; dog Shine  Sees Dr. Belén Mustafa/[facility] for general medicine appointments  Also sees Dr. Sierra Higginss/[facility]      PMH/PSH:  Past Medical History:   Diagnosis Date    Adjustment disorder with anxious mood     Allergic rhinitis, cause unspecified     h/o AIT    Chest discomfort     Common migraine     No visual aura.     Hyperlipidemia LDL goal < 70     Hypothyroidism     Infectious mononucleosis 01/01/1995    Insulin pump in place     Dexcom continuous glucose monitoring    Obesity     Tuberculosis     Type 1 diabetes,  HbA1c goal < 7% (H) 03/01/2004     followed [facility] - peds endocrinology - now Dr Jimenez     Past Surgical History:   Procedure Laterality Date    APPENDECTOMY  08/01/2007    dr deshpande    DILATION AND CURETTAGE SUCTION N/A 5/9/2023    Procedure: DILATION AND CURETTAGE OF UTERUS USING SUCTION;  Surgeon: Sierra Santoyo DO;  Location: [facility] OR    PE TUBES Bilateral 01/01/1996       Family Hx:  Family History   Problem Relation Age of Onset    Neurologic Disorder Maternal Grandmother         dementia    Genetic Disorder Paternal Grandfather         kidney    Family History Negative No family hx of          Social Hx:  Social History     Socioeconomic History    Marital status:      Spouse name: Not on file    Number of children: Not on file    Years of education: Not on file    Highest education level: Not on file   Occupational History    Occupation: Works in supply chain   Tobacco Use    Smoking status: Never    Smokeless tobacco: Never   Vaping Use    Vaping Use: Never used   Substance and Sexual Activity    Alcohol use: Not Currently     Alcohol/week: 2.0 standard drinks of alcohol     Types: 2 Standard drinks or equivalent per week     Comment: 2-5 drinks per week    Drug use: No    Sexual activity: Yes     Partners: Male   Other Topics Concern     Service Not Asked    Blood Transfusions Not Asked    Caffeine Concern Yes     Comment: rare    Occupational Exposure Not Asked    Hobby Hazards Not Asked    Sleep Concern No    Stress Concern No    Weight Concern Not Asked    Special Diet Not Asked    Back Care Not Asked    Exercise Yes    Bike Helmet Not Asked    Seat Belt Yes    Self-Exams No     Comment: encouraged    Parent/sibling w/ CABG, MI or angioplasty before 65F 55M? No   Social History Narrative    -Working -       Social Determinants of Health     Financial Resource Strain: Not on file   Food Insecurity: Not on file   Transportation Needs: Not on file   Physical Activity: Not on file    Stress: Not on file   Social Connections: Not on file   Interpersonal Safety: Not on file   Housing Stability: Not on file          MEDICATIONS:  has a current medication list which includes the following prescription(s): dexcom g6 sensor, dexcom g6 transmitter, flaxseed (linseed), insulin aspart, insulin aspart, insulin glargine, levothyroxine, prenatal w/o a vit-fe fum-fa, sertraline, contour next test, baqsimi two pack, insulin pen needle, microlet lancets, naratriptan, semglee (yfgn), and triamcinolone.      ROS: 10 point ROS neg other than the symptoms noted above in the HPI.     GENERAL: some fatigue, wt gain with Pg; denies fevers, chills, night sweats.   HEENT: no dysphagia, odonophagia, diplopia, neck pain  THYROID:  no apparent hyper or hypothyroid symptoms  CV: no chest pain, pressure, palpitations  LUNGS: no SOB, LANG, cough, wheezing   ABDOMEN: mild nausea; no diarrhea, constipation, abdominal pain  EXTREMITIES: no rashes, ulcers, edema  NEUROLOGY: no headaches, denies changes in vision, tingling, extremitiy numbness   MSK: no muscle aches or pains, weakness  SKIN: no rashes or lesions  : no menses during Pg  PSYCH:  stable mood, no significant anxiety or depression  ENDOCRINE: no heat or cold intolerance     Physical Exam (visual exam)  VS:  no vital signs taken for video visit  CONSTITUTIONAL: healthy, alert and NAD, well dressed, answering questions appropriately  ENT: no nose swelling or nasal discharge, mouth redness or gum changes.  EYES: eyes grossly normal to inspection, conjunctivae and sclerae normal, no exophthalmos or proptosis  THYROID:  no apparent nodules or goiter  LUNGS: no audible wheeze, cough or visible cyanosis, no visible retractions or increased work of breathing  ABDOMEN: abdomen not evaluated  EXTREMITIES: no hand tremors, limited exam  NEUROLOGY: CN grossly intact, mentation intact and speech normal   SKIN:  no apparent skin lesions, rash, or edema with visualized skin  appearance  PSYCH: mentation appears normal, affect normal/bright, judgement and insight intact,   normal speech and appearance well groomed       LABS:     All pertinent notes, labs, and images personally reviewed by me.     A/P:  Encounter Diagnoses   Name Primary?    Type 1 diabetes mellitus without complication (H) Yes    Insulin pump status     Hypothyroidism due to Hashimoto's thyroiditis     High-risk pregnancy, unspecified trimester      Comments:  Reviewed health history, diabetes and thyroid issues  Recent CGM trends with some postprandial hyperglycemia and also some low glucose trends at night and mid afternoon  Reviewed and interpreted tests that I previously ordered.   Ordered appropriate tests for the endocrinology disease management.    Management options discussed and implemented after shared medical decision making with the patient.  T1DM and hypothyroidism problems are chronic-stable    Plan:  Reviewed the overall T1DM management and insulin pump use.  Discussed optimal BG testing to assess glucose trends.  We reviewed insulin pump settings, basal rate and bolus dosing  Use of automated pump bolus dosing for meal/snack carb & correction dosing  Reviewed recent Tandem pump report information  Reviewed recent DexcomG6 CGM glucose trends, in detail    Reviewed general issues with the hypothyroidism diagnosis   Discussed lab tests used to assess patient thyroid hormone levels  Reviewed treatment options including levothyroxine medication, ideal dosing    Recommend:  Continue the Tandem insulin pump and diabetes management plan  Pump setting changes:    Profile 2 Basals: [place] 2.0 to 1.9, 12p 2.1 to 2.0, 230p 2.1 to 2.0    We have reviewed use of the Sleep Mode vs Exercise Mode pump settings:  Sleep Mode changes sensor glucose target from 112 to 120 mgdl, allows for increased basal rate delivery but no auto correction boluses.   Exercise Mode to raise sensor glucose target from 112 to 140 mg/dl, suspend  target from 70 to 80 mg/dl.  Cannot set time duration in advance.          Continue the mealtime boluses premeal  Would be helpful for name change of primary pump Profile 2 to Pregnancy, but keep backup Profile 1  Continue use of the DexcomG6 CGM sensor  Continue the current levothyroxine 0.137 mg every day  Plan repeat nonfasting labs today    Testing at [facility]    Lab orders placed  Would not restart the simvastatin med while pregnant  Arrange annual dilated eye exam, fasting lipid panel testing.  Reviewed overall pregnancy treatment goals for the diabetes and thyroid management  Plan to have endocrinology appointments monthly and Diab Ed appointments every 7-10 days during Pg  Contact me if questions/concerns about diabetes and thyroid management    Important to share medical records between endocrinology and OBGYN providers  Addressed patient's questions today.    There are no Patient Instructions on file for this visit.    Future labs ordered today:   Orders Placed This Encounter   Procedures    GLUCOSE MONITOR, 72 HOUR, PHYS INTERP    TSH with free T4 reflex    Hemoglobin A1c     Radiology/Consults ordered today: None    Total time spent on day of encounter:  25 min    Follow-up:  1/8/24 at 7:45a, VVAm     2/12/24 at 8a, VVAm     3/19/24 at 8a, VVAm    T.SOTO Jimenez MD, MS  Endocrinology  [facility]    CC:  RUBY Mustafa, Masters, A, and JYOTI Lima                        Again, thank you for allowing me to participate in the care of your patient.        Sincerely,        Santy Jimenez MD

## 2023-12-13 ENCOUNTER — ANCILLARY PROCEDURE (OUTPATIENT)
Dept: ULTRASOUND IMAGING | Facility: CLINIC | Age: 33
End: 2023-12-13
Attending: OBSTETRICS & GYNECOLOGY
Payer: COMMERCIAL

## 2023-12-13 ENCOUNTER — PRENATAL OFFICE VISIT (OUTPATIENT)
Dept: OBGYN | Facility: CLINIC | Age: 33
End: 2023-12-13
Attending: OBSTETRICS & GYNECOLOGY
Payer: COMMERCIAL

## 2023-12-13 VITALS — DIASTOLIC BLOOD PRESSURE: 78 MMHG | BODY MASS INDEX: 36.48 KG/M2 | SYSTOLIC BLOOD PRESSURE: 118 MMHG | WEIGHT: 226 LBS

## 2023-12-13 DIAGNOSIS — E10.9 TYPE 1 DIABETES, HBA1C GOAL < 7% (H): ICD-10-CM

## 2023-12-13 DIAGNOSIS — O99.280 HYPOTHYROID IN PREGNANCY, ANTEPARTUM: ICD-10-CM

## 2023-12-13 DIAGNOSIS — O09.92 SUPERVISION OF HIGH RISK PREGNANCY IN SECOND TRIMESTER: Primary | ICD-10-CM

## 2023-12-13 DIAGNOSIS — O09.91 SUPERVISION OF HIGH RISK PREGNANCY IN FIRST TRIMESTER: ICD-10-CM

## 2023-12-13 DIAGNOSIS — E03.9 HYPOTHYROID IN PREGNANCY, ANTEPARTUM: ICD-10-CM

## 2023-12-13 PROCEDURE — 76816 OB US FOLLOW-UP PER FETUS: CPT | Performed by: OBSTETRICS & GYNECOLOGY

## 2023-12-13 PROCEDURE — 99207 PR PRENATAL VISIT: CPT | Performed by: OBSTETRICS & GYNECOLOGY

## 2023-12-13 NOTE — LETTER
12/13/2023        RE: Linda Chavez  [address]  [facility] [address]        OB Visit @ 23w5d     No loss of fluid/vaginal bleeding/regular contractions. + FM    Has been having some back pain verenice in am, better as day goes on.     Results for orders placed or performed in visit on 12/13/23   US OB Follow Up >14 Weeks    Narrative    Table formatting from the original result was not included.  Obstetrical Ultrasound Report  OB U/S Follow Up > 14 Weeks - Transabdominal  [facility]  Referring physician: Sierra Santoyo DO   Sonographer: Brittnee Spurling, RDMS  Indication:  F/U Growth     Dating (mm/dd/yyyy):   LMP: Patient's last menstrual period was 06/21/2023.               EDC:    Estimated Date of Delivery: Apr 5, 2024   GA by LMP:      23w5d  Current Scan On (mm/dd/yyyy):  12/13/2023                     EDC:   04/08/2024        GA by Current   Scan:      23w2d  The calculation of the gestational age by current scan was based on BPD,   HC, AC and FL.     Anatomy Scan:  Hou gestation.  Visualized: Stomach, Kidneys, and Bladder.  Biometry:  BPD 5.5 cm 22w6d 17%   HC 21.3 cm 23w2d 20%   AC 18.6 cm 23w3d 32%   FL 4.2 cm 23w4d 32%   EFW (lbs/oz) 1 lbs               5ozs       EFW (g) 593 g 29%        Fetal heart rate: 155 bpm  Fetal presentation: Breech  Amniotic fluid: 4.2 cm MVP  Placenta: Anterior , no previa, > 2 cm from internal os    Maternal Anatomy:  Right adnexa: wnl  Left adnexa: wnl  Technique: Transabdominal Imaging performed    Impression:   Viable fetus with normal heart rate. Breech presentation. Normal amniotic   fluid. Anterior placenta, no previa. Normal fetal growth with estimated   fetal weight 1lb 5oz which is 29%.     Sierra Santoyo DO           ICD-10-CM    1. Supervision of high risk pregnancy in second trimester  O09.92       2. Hypothyroid in pregnancy, antepartum  O99.280     E03.9       3. Type 1 diabetes, HbA1c goal < 7% (H)  E10.9           -Ty 1  Dm. Hypothyroidism.   ASA 81mg.  LEvel II completed and fetal echo completed-wnl..   Follows with Dr. Bhat  Reviewed normal growth ultrasound EFW 29% with Linda.  Continue Monthly growth ultrasound, start AP testing at 32wk.   -CBC, Tdap to be offered 28wk.  - Labor precautions. F/U 4wk    Sierra Treviño Masters, DO        Sincerely,        Sierra Higginss, DO

## 2023-12-13 NOTE — PROGRESS NOTES
OB Visit @ 23w5d     No loss of fluid/vaginal bleeding/regular contractions. + FM    Has been having some back pain verenice in am, better as day goes on.     Results for orders placed or performed in visit on 12/13/23   US OB Follow Up >14 Weeks    Narrative    Table formatting from the original result was not included.  Obstetrical Ultrasound Report  OB U/S Follow Up > 14 Weeks - Transabdominal  [facility]  Referring physician: Sierra Santoyo DO   Sonographer: Brittnee Spurling, RDMS  Indication:  F/U Growth     Dating (mm/dd/yyyy):   LMP: Patient's last menstrual period was 06/21/2023.               EDC:    Estimated Date of Delivery: Apr 5, 2024   GA by LMP:      23w5d  Current Scan On (mm/dd/yyyy):  12/13/2023                     EDC:   04/08/2024        GA by Current   Scan:      23w2d  The calculation of the gestational age by current scan was based on BPD,   HC, AC and FL.     Anatomy Scan:  Hou gestation.  Visualized: Stomach, Kidneys, and Bladder.  Biometry:  BPD 5.5 cm 22w6d 17%   HC 21.3 cm 23w2d 20%   AC 18.6 cm 23w3d 32%   FL 4.2 cm 23w4d 32%   EFW (lbs/oz) 1 lbs               5ozs       EFW (g) 593 g 29%        Fetal heart rate: 155 bpm  Fetal presentation: Breech  Amniotic fluid: 4.2 cm MVP  Placenta: Anterior , no previa, > 2 cm from internal os    Maternal Anatomy:  Right adnexa: wnl  Left adnexa: wnl  Technique: Transabdominal Imaging performed    Impression:   Viable fetus with normal heart rate. Breech presentation. Normal amniotic   fluid. Anterior placenta, no previa. Normal fetal growth with estimated   fetal weight 1lb 5oz which is 29%.     Sierra Santoyo, DO           ICD-10-CM    1. Supervision of high risk pregnancy in second trimester  O09.92       2. Hypothyroid in pregnancy, antepartum  O99.280     E03.9       3. Type 1 diabetes, HbA1c goal < 7% (H)  E10.9           -Ty 1 Dm. Hypothyroidism.   ASA 81mg.  LEvel II completed and fetal echo completed-wnl..    Follows with Dr. Bhat  Reviewed normal growth ultrasound EFW 29% with Linda.  Continue Monthly growth ultrasound, start AP testing at 32wk.   -CBC, Tdap to be offered 28wk.  - Labor precautions. F/U 4wk    Sierra Higginss, DO

## 2023-12-14 DIAGNOSIS — F41.1 GAD (GENERALIZED ANXIETY DISORDER): ICD-10-CM

## 2023-12-19 ENCOUNTER — LAB (OUTPATIENT)
Dept: LAB | Facility: CLINIC | Age: 33
End: 2023-12-19
Payer: COMMERCIAL

## 2023-12-19 DIAGNOSIS — E10.9 TYPE 1 DIABETES MELLITUS WITHOUT COMPLICATION (H): ICD-10-CM

## 2023-12-19 DIAGNOSIS — E06.3 HYPOTHYROIDISM DUE TO HASHIMOTO'S THYROIDITIS: ICD-10-CM

## 2023-12-19 DIAGNOSIS — Z96.41 INSULIN PUMP STATUS: ICD-10-CM

## 2023-12-19 DIAGNOSIS — O09.90 HIGH-RISK PREGNANCY, UNSPECIFIED TRIMESTER: ICD-10-CM

## 2023-12-19 LAB
HBA1C MFR BLD: 5.8 % (ref 0–5.6)
TSH SERPL DL<=0.005 MIU/L-ACNC: 1.14 UIU/ML (ref 0.3–4.2)

## 2023-12-19 PROCEDURE — 83036 HEMOGLOBIN GLYCOSYLATED A1C: CPT

## 2023-12-19 PROCEDURE — 36415 COLL VENOUS BLD VENIPUNCTURE: CPT

## 2023-12-19 PROCEDURE — 84443 ASSAY THYROID STIM HORMONE: CPT

## 2023-12-20 ENCOUNTER — MYC MEDICAL ADVICE (OUTPATIENT)
Dept: EDUCATION SERVICES | Facility: CLINIC | Age: 33
End: 2023-12-20
Payer: COMMERCIAL

## 2023-12-20 NOTE — TELEPHONE ENCOUNTER
Type 1 Diabetes + Pregnancy Follow-up    Subjective/Objective:    Linda Chavez sent in blood glucose log for review. Last date of communication was: 11/29/23.    Diabetes is being managed with diet, activity, and medications    Taking diabetes medications: yes:     Diabetes Medication(s)       Diabetic Other       Glucagon (BAQSIMI TWO PACK) 3 MG/DOSE POWD Spray 1 Device in nostril once as needed (for severe hypoglycemia)       Insulin       insulin aspart (NOVOLOG PEN) 100 UNIT/ML pen Inject 3 to 10 units subcutaneous at mealtimes as directed, total daily dose approx 30 units     insulin aspart (NOVOLOG VIAL) 100 UNITS/ML vial Use with insulin pump, total daily dose approx 100 units     insulin glargine (LANTUS PEN) 100 UNIT/ML pen Inject 22 Units Subcutaneous At Bedtime     SEMGLEE, YFGN, 100 UNIT/ML SOPN ADMINISTER 22 UNITS UNDER THE SKIN AT BEDTIME SUBSTITUTE FOR LANTUS     Patient not taking: Reported on 12/12/2023            Estimated Date of Delivery: Apr 5, 2024    BG/Food Log:               Assessment:    Not meeting TIR target >70% with tight ADA pregnancy target. Will adjust pump settings today, both basal & bolus settings, to help meet target.     Patient asking about switch to Dexcom G7 - will encourage patient to perform software update to be ready to switch as able. Dexccom G7 is approved during pregnancy, unlike G6 product she is currently using. When she has completed this, we can send for new Dexcom G7 order.     Plan/Response:  Recommend increase to insulin -     Start Time Basal Rate Correction Factor  Carb Ratio Target BG   MN 2.0 --> 2.2 unit(s)/hr 1u:35 mg/dL 1u:7.5g 110   6:00am 1.8 --> 2.0 unit(s)/hr 1u:35 1u:6.0g 110   7:00am 1.8 --> 2.0 unit(s)/hr 1u:20 1u:3.5g --> 3.2g 110   11:00am 1.7 --> 2.0 unit(s)/hr 1u:20 1u:2.9g --> 2.6g 110   12:00pm 2.0 --> 2.3 unit(s)/hr  1u:18 1u:3.3g --> 3.0g 110   2:30pm 2.1 --> 2.3 unit(s)/hr 1u:18 1u:3.5g --> 3.2g 110   5:00pm 2.1 --> 2.3 unit(s)/hr 1u:18  1u:2.9g --> 2.6g 110   9:00pm  2.1 --> 2.3 unit(s)/hr 1u:20 1u:3.5g --> 3.2g 110   .  Follow-up in 1 week.  See [employer] message for patient communications.     Kat Lima RD, LD, [facility]      Any diabetes medication dose changes were made via the CDE Protocol and Collaborative Practice Agreement with the patient's endocrinology provider. A copy of this encounter was shared with the provider.

## 2023-12-27 ENCOUNTER — MYC MEDICAL ADVICE (OUTPATIENT)
Dept: EDUCATION SERVICES | Facility: CLINIC | Age: 33
End: 2023-12-27
Payer: COMMERCIAL

## 2023-12-29 NOTE — CONFIDENTIAL NOTE
Diabetes Education Follow-up    Subjective/Objective:    Linda POLK Chavez sent in blood glucose log for review. Last date of communication was: 12-20-23.    Diabetes is being managed with Lifestyle (diet/activity), Diabetes Medications   Diabetes Medication(s)       Diabetic Other       Glucagon (BAQSIMI TWO PACK) 3 MG/DOSE POWD Spray 1 Device in nostril once as needed (for severe hypoglycemia)       Insulin       insulin aspart (NOVOLOG PEN) 100 UNIT/ML pen Inject 3 to 10 units subcutaneous at mealtimes as directed, total daily dose approx 30 units     insulin aspart (NOVOLOG VIAL) 100 UNITS/ML vial Use with insulin pump, total daily dose approx 100 units     insulin glargine (LANTUS PEN) 100 UNIT/ML pen Inject 22 Units Subcutaneous At Bedtime     SEMGLEE, YFGN, 100 UNIT/ML SOPN ADMINISTER 22 UNITS UNDER THE SKIN AT BEDTIME SUBSTITUTE FOR LANTUS     Patient not taking: Reported on 12/12/2023            BG/Food Log:                         Assessment:  Start Time Basal Rate Correction Factor  Carb Ratio Target BG   MN 2.2 unit(s)/hr 1u:35 mg/dL 1u:7.5g 110   6:00am 2.0 unit(s)/hr 1u:35 1u: 6.0g 110   7:00am 2.0 unit(s)/hr 1u:20 1u: 3.2g 110   11:00am 2.0 unit(s)/hr 1u:20 1u: 2.6g 110   12:00pm 2.3 unit(s)/hr  1u:18 1u: 3.0g 110   2:30pm 2.3 unit(s)/hr 1u:18 1u: 3.2g 110   5:00pm 2.3 unit(s)/hr 1u:18 1u: 2.6g 110   9:00pm  2.3 unit(s)/hr 1u:20 1u: 3.2g 110     BG's doing well since changes recommended on 12/20/23 review. Hyperglycemia last night appears to be related to an infusion site change.    Plan/Response:  See Patient Instructions for co-developed, patient-stated behavior change goals.  No changes in the patient's current treatment plan  Follow-up with Dr. Jimenez as scheduled on 1-8-24.  Follow-up with Diabetes Education about every week and as needed.  Sent "[employer]hart response.    Virgen Bailey RN, [facility]    Any diabetes medication dose changes were made via the CDE Protocol and Collaborative Practice Agreement with  the patient's referring provider and endocrinology provider. A copy of this encounter was shared with the provider.

## 2024-01-08 ENCOUNTER — VIRTUAL VISIT (OUTPATIENT)
Dept: ENDOCRINOLOGY | Facility: CLINIC | Age: 34
End: 2024-01-08
Payer: COMMERCIAL

## 2024-01-08 DIAGNOSIS — O09.90 HIGH-RISK PREGNANCY, UNSPECIFIED TRIMESTER: ICD-10-CM

## 2024-01-08 DIAGNOSIS — Z96.41 INSULIN PUMP STATUS: ICD-10-CM

## 2024-01-08 DIAGNOSIS — E06.3 HYPOTHYROIDISM DUE TO HASHIMOTO'S THYROIDITIS: ICD-10-CM

## 2024-01-08 DIAGNOSIS — E10.9 TYPE 1 DIABETES MELLITUS WITHOUT COMPLICATION (H): Primary | ICD-10-CM

## 2024-01-08 PROCEDURE — 99214 OFFICE O/P EST MOD 30 MIN: CPT | Mod: 95 | Performed by: INTERNAL MEDICINE

## 2024-01-08 NOTE — Clinical Note
2024         RE: Linda Chavez  [address]  [facility] [address]        Dear Colleague,    Thank you for referring your patient, Linda Chavez, to the [facility] [place]. Please see a copy of my visit note below.    Virtual Visit Details    Type of service:  Video Visit     Originating Location (pt. Location): Home  Distant Location (provider location):  Off-site  Platform used for Video Visit: Edwin        Recent issues:  Diabetes and thyroid follow-up evaluation  Currently 27 weeks gestation, [id number], had fetal U/S recently-went well, MYKE 24, sees Dr. Sierra Santoyo/[facility]  Using the Tandem pump + DexcomG6 in Sleep Mode pump setting  Feeling well overall, rare nausea and mild fatigue           . Diagnosis of diabetes mellitus, age 13, living in [place]  Had acute illness requiring hospitalization at [facility]  Began insulin injections, using Novolog and Lantus insulin  [place] carb counting method, gram estimates  On MDI treatment plan for approx 2 years, then changed to pump use  Previous medical evaluations with Dr. Yash Barcenas/[facility] Andreia Callahan  . Began use of Magna pump  . Changed to Medtronic pump  Subsequently using Medtronic 723 Paradigm pump  . Tried wearing CGMS sensor, but uncomfortable     14. Initial consult with me at my former clinic ([facility])  Continued pump management  General medicine [facility] hgbA1c levels include:              Lab Test 02/05/18 10/10/17 06/21/17 02/01/17 11/16/16  0815   A1C 7.4* 7.8* 8.1* 7.3* 8.2*      ~2017. Upgraded to Medtronic 670G pump    Tried Medtronic Guardian sensor, recalls frequent low glucose alarms              Wore sensor in manual mode              Didn't like it, discontinued use     ~2018. Wore diagnostic LibrePro CGM  Subsequently obtained LibrePersonal CGM, not wearing past year  3/2022. Changed to Tandem TSlim insulin pump    Novolog in  Refill sent, patient notified. pump    Current Tandem pump settings:                   Recent Tandem pump data:                  Recent DexcomG6 data:             Recent [facility] labs include:  Lab Results   Component Value Date    A1C 5.8 (H) 12/19/2023     10/18/2023    POTASSIUM 3.9 10/18/2023    CHLORIDE 102 10/18/2023    CO2 19 (L) 10/18/2023    ANIONGAP 16 (H) 10/18/2023     (H) 10/18/2023    BUN 7.7 10/18/2023    CR 0.61 10/18/2023    GFRESTIMATED >90 10/18/2023    GFRESTBLACK >90 04/13/2021    MARCIA 9.8 10/18/2023    CHOL 241 (H) 10/06/2022    TRIG 75 10/06/2022    HDL 67 10/06/2022     (H) 10/06/2022    NHDL 174 (H) 10/06/2022    UCRR 64 06/08/2022    MICROL 5 06/08/2022    UMALCR 7.81 06/08/2022     Last eye exam at [facility] in [place] 10/19/23, no DR  Hyperlipidemia:  Takes simvastatin medication  DM Complications:  None known        Thyroid:  2013. 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 Belén Mustafa/[facility] for general medicine appointments  Also sees Dr. Sierra Higginss/[facility]      PMH/PSH:  Past Medical History:   Diagnosis Date    Adjustment disorder with anxious mood     Allergic rhinitis, cause unspecified     h/o AIT    Chest discomfort     Common migraine     No visual aura.     Hyperlipidemia LDL goal < 70     Hypothyroidism     Infectious mononucleosis 01/01/1995    Insulin pump in place     Dexcom continuous glucose monitoring    Obesity     Tuberculosis     Type 1  diabetes, HbA1c goal < 7% (H) 03/01/2004     followed [facility] - peds endocrinology - now Dr Jimenez     Past Surgical History:   Procedure Laterality Date    APPENDECTOMY  08/01/2007    dr deshpande    DILATION AND CURETTAGE SUCTION N/A 5/9/2023    Procedure: DILATION AND CURETTAGE OF UTERUS USING SUCTION;  Surgeon: Sierra Santoyo DO;  Location: [facility] OR    PE TUBES Bilateral 01/01/1996       Family Hx:  Family History   Problem Relation Age of Onset    Neurologic Disorder Maternal Grandmother         dementia    Genetic Disorder Paternal Grandfather         kidney    Family History Negative No family hx of          Social Hx:  Social History     Socioeconomic History    Marital status:      Spouse name: Not on file    Number of children: Not on file    Years of education: Not on file    Highest education level: Not on file   Occupational History    Occupation: Works in supply chain   Tobacco Use    Smoking status: Never    Smokeless tobacco: Never   Vaping Use    Vaping Use: Never used   Substance and Sexual Activity    Alcohol use: Not Currently     Alcohol/week: 2.0 standard drinks of alcohol     Types: 2 Standard drinks or equivalent per week     Comment: 2-5 drinks per week    Drug use: No    Sexual activity: Yes     Partners: Male   Other Topics Concern     Service Not Asked    Blood Transfusions Not Asked    Caffeine Concern Yes     Comment: rare    Occupational Exposure Not Asked    Hobby Hazards Not Asked    Sleep Concern No    Stress Concern No    Weight Concern Not Asked    Special Diet Not Asked    Back Care Not Asked    Exercise Yes    Bike Helmet Not Asked    Seat Belt Yes    Self-Exams No     Comment: encouraged    Parent/sibling w/ CABG, MI or angioplasty before 65F 55M? No   Social History Narrative    -Working -       Social Determinants of Health     Financial Resource Strain: Not on file   Food Insecurity: Not on file   Transportation Needs: Not on file   Physical Activity: Not  on file   Stress: Not on file   Social Connections: Not on file   Interpersonal Safety: Not on file   Housing Stability: Not on file          MEDICATIONS:  has a current medication list which includes the following prescription(s): dexcom g6 sensor, dexcom g6 transmitter, insulin aspart, levothyroxine, prenatal w/o a vit-fe fum-fa, sertraline, contour next test, flaxseed (linseed), baqsimi two pack, insulin aspart, insulin glargine, insulin pen needle, microlet lancets, naratriptan, semglee (yfgn), and triamcinolone.      ROS: 10 point ROS neg other than the symptoms noted above in the HPI.     GENERAL: some fatigue, wt gain with Pg; denies fevers, chills, night sweats.   HEENT: no dysphagia, odonophagia, diplopia, neck pain  THYROID:  no apparent hyper or hypothyroid symptoms  CV: no chest pain, pressure, palpitations  LUNGS: no SOB, LANG, cough, wheezing   ABDOMEN: mild nausea; no diarrhea, constipation, abdominal pain  EXTREMITIES: no rashes, ulcers, edema  NEUROLOGY: no headaches, denies changes in vision, tingling, extremitiy numbness   MSK: no muscle aches or pains, weakness  SKIN: no rashes or lesions  : no menses during Pg  PSYCH:  stable mood, no significant anxiety or depression  ENDOCRINE: no heat or cold intolerance     Physical Exam (visual exam)  VS:  no vital signs taken for video visit  CONSTITUTIONAL: healthy, alert and NAD, well dressed, answering questions appropriately  ENT: no nose swelling or nasal discharge, mouth redness or gum changes.  EYES: eyes grossly normal to inspection, conjunctivae and sclerae normal, no exophthalmos or proptosis  THYROID:  no apparent nodules or goiter  LUNGS: no audible wheeze, cough or visible cyanosis, no visible retractions or increased work of breathing  ABDOMEN: abdomen not evaluated  EXTREMITIES: no hand tremors, limited exam  NEUROLOGY: CN grossly intact, mentation intact and speech normal   SKIN:  no apparent skin lesions, rash, or edema with visualized  skin appearance  PSYCH: mentation appears normal, affect normal/bright, judgement and insight intact,   normal speech and appearance well groomed       LABS:     All pertinent notes, labs, and images personally reviewed by me.     A/P:  Encounter Diagnoses   Name Primary?    Type 1 diabetes mellitus without complication (H) Yes    Insulin pump status     Hypothyroidism due to Hashimoto's thyroiditis     High-risk pregnancy, unspecified trimester        Comments:  Reviewed health history, diabetes and thyroid issues  Recent CGM trends with some postprandial hyperglycemia and also some low glucose trends at night and mid afternoon  Reviewed and interpreted tests that I previously ordered.   Ordered appropriate tests for the endocrinology disease management.    Management options discussed and implemented after shared medical decision making with the patient.  T1DM and hypothyroidism problems are chronic-stable    Plan:  Reviewed the overall T1DM management and insulin pump use.  Discussed optimal BG testing to assess glucose trends.  We reviewed insulin pump settings, basal rate and bolus dosing  Use of automated pump bolus dosing for meal/snack carb & correction dosing  Reviewed recent Tandem pump report information  Reviewed recent DexcomG6 CGM glucose trends, in detail    Reviewed general issues with the hypothyroidism diagnosis   Discussed lab tests used to assess patient thyroid hormone levels  Reviewed treatment options including levothyroxine medication, ideal dosing    Recommend:  Continue the Tandem insulin pump and diabetes management plan  Pump setting changes:    Basals:  MN-6a 2.2 to 2.1 unit(s)/hr    We have reviewed use of the Sleep Mode vs Exercise Mode pump settings:  Sleep Mode changes sensor glucose target from 112 to 120 mgdl, allows for increased basal rate delivery but no auto correction boluses.   Exercise Mode to raise sensor glucose target from 112 to 140 mg/dl, suspend target from 70 to 80  mg/dl.  Cannot set time duration in advance.          Continue the mealtime boluses premeal  Would be helpful for name change of primary pump Profile 2 to Pregnancy, but keep backup Profile 1  We will send an updated Tandem pump supply order form, to reflect the increased quantity of infusion sets needed with her Pg  Continue use of the DexcomG6 CGM sensor  Continue the current levothyroxine 0.137 mg every day  Plan repeat nonfasting labs soon    [facility] at [facility]    Lab orders placed  Would not restart the simvastatin med while pregnant  Arrange annual dilated eye exam, fasting lipid panel testing.  Reviewed overall pregnancy treatment goals for the diabetes and thyroid management  Plan to have endocrinology appointments monthly and Diab Ed appointments every 7-10 days during Pg  Contact me if questions/concerns about diabetes and thyroid management    Important to share medical records between endocrinology and OBGYN providers  Addressed patient's questions today.    There are no Patient Instructions on file for this visit.    Future labs ordered today:   Orders Placed This Encounter   Procedures    GLUCOSE MONITOR, 72 HOUR, PHYS INTERP    Basic metabolic panel    Hemoglobin A1c     Radiology/Consults ordered today: None    Total time spent on day of encounter:  26 min    Follow-up:  2/12/24 at 8a, VVAm     3/19/24 at 8a, VVAm    ANTHONY Jimenez MD, MS  Endocrinology  [facility]    CC:  s, A, and JYOTI Lima                        Again, thank you for allowing me to participate in the care of your patient.        Sincerely,        Santy Jimenez MD

## 2024-01-08 NOTE — PROGRESS NOTES
Virtual Visit Details    Type of service:  Video Visit     Originating Location (pt. Location): Home  Distant Location (provider location):  Off-site  Platform used for Video Visit: Edwin        Recent issues:  Diabetes and thyroid follow-up evaluation  Currently 27 weeks gestation, [id number], had fetal U/S recently-went well, MYKE 24, sees Dr. Sierra Santoyo/[facility]  Using the Tandem pump + DexcomG6 in Sleep Mode pump setting  Feeling well overall, rare nausea and mild fatigue           . Diagnosis of diabetes mellitus, age 13, living in [place]  Had acute illness requiring hospitalization at [facility]  Began insulin injections, using Novolog and Lantus insulin  [place] carb counting method, gram estimates  On MDI treatment plan for approx 2 years, then changed to pump use  Previous medical evaluations with Dr. Yash Barcenas/[facility]ia Callahan  . Began use of [place] pump  . Changed to Medtronic pump  Subsequently using Medtronic 723 Paradigm pump  . Tried wearing CGMS sensor, but uncomfortable     14. Initial consult with me at my former clinic ([facility])  Continued pump management  General medicine appointments with Dr. Ellen Burdick/[facility] [place]     Previous FV hgbA1c levels include:              Lab Test 02/05/18 10/10/17 06/21/17 02/01/17 11/16/16  0815   A1C 7.4* 7.8* 8.1* 7.3* 8.2*      ~2017. Upgraded to Medtronic 670G pump    Tried Medtronic Guardian sensor, recalls frequent low glucose alarms              Wore sensor in manual mode              Didn't like it, discontinued use     ~2018. Wore diagnostic LibrePro CGM  Subsequently obtained LibrePersonal CGM, not wearing past year  3/2022. Changed to Tandem TSlim insulin pump    Novolog in pump    Current Tandem pump settings:                   Recent Tandem pump data:                  Recent DexcomG6 data:             Recent FV labs include:  Lab Results   Component Value Date    A1C 5.8 (H) 2023     10/18/2023     POTASSIUM 3.9 10/18/2023    CHLORIDE 102 10/18/2023    CO2 19 (L) 10/18/2023    ANIONGAP 16 (H) 10/18/2023     (H) 10/18/2023    BUN 7.7 10/18/2023    CR 0.61 10/18/2023    GFRESTIMATED >90 10/18/2023    GFRESTBLACK >90 04/13/2021    MARCIA 9.8 10/18/2023    CHOL 241 (H) 10/06/2022    TRIG 75 10/06/2022    HDL 67 10/06/2022     (H) 10/06/2022    NHDL 174 (H) 10/06/2022    UCRR 64 06/08/2022    MICROL 5 06/08/2022    UMALCR 7.81 06/08/2022     Last eye exam at [facility] in [place] 10/19/23, no DR  Hyperlipidemia:  Takes simvastatin medication  DM Complications:  None known        Thyroid:  2013. 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 Belén Mustafa/[facility] clinic for general medicine appointments  Also sees Dr. Sierra Higginss/[facility]      PMH/PSH:  Past Medical History:   Diagnosis Date    Adjustment disorder with anxious mood     Allergic rhinitis, cause unspecified     h/o AIT    Chest discomfort     Common migraine     No visual aura.     Hyperlipidemia LDL goal < 70     Hypothyroidism     Infectious mononucleosis 01/01/1995    Insulin pump in place     Dexcom continuous glucose monitoring    Obesity     Tuberculosis     Type 1 diabetes, HbA1c goal < 7% (H) 03/01/2004     followed [facility] - peds endocrinology - now Dr Jimenez     Past Surgical History:   Procedure Laterality Date    APPENDECTOMY  08/01/2007    dr deshpande    DILATION AND CURETTAGE SUCTION N/A 5/9/2023     Procedure: DILATION AND CURETTAGE OF UTERUS USING SUCTION;  Surgeon: Sierra Santoyo DO;  Location: [facility] OR    PE TUBES Bilateral 01/01/1996       Family Hx:  Family History   Problem Relation Age of Onset    Neurologic Disorder Maternal Grandmother         dementia    Genetic Disorder Paternal Grandfather         kidney    Family History Negative No family hx of          Social Hx:  Social History     Socioeconomic History    Marital status:      Spouse name: Not on file    Number of children: Not on file    Years of education: Not on file    Highest education level: Not on file   Occupational History    Occupation: Works in supply chain   Tobacco Use    Smoking status: Never    Smokeless tobacco: Never   Vaping Use    Vaping Use: Never used   Substance and Sexual Activity    Alcohol use: Not Currently     Alcohol/week: 2.0 standard drinks of alcohol     Types: 2 Standard drinks or equivalent per week     Comment: 2-5 drinks per week    Drug use: No    Sexual activity: Yes     Partners: Male   Other Topics Concern     Service Not Asked    Blood Transfusions Not Asked    Caffeine Concern Yes     Comment: rare    Occupational Exposure Not Asked    Hobby Hazards Not Asked    Sleep Concern No    Stress Concern No    Weight Concern Not Asked    Special Diet Not Asked    Back Care Not Asked    Exercise Yes    Bike Helmet Not Asked    Seat Belt Yes    Self-Exams No     Comment: encouraged    Parent/sibling w/ CABG, MI or angioplasty before 65F 55M? No   Social History Narrative    -Working -       Social Determinants of Health     Financial Resource Strain: Not on file   Food Insecurity: Not on file   Transportation Needs: Not on file   Physical Activity: Not on file   Stress: Not on file   Social Connections: Not on file   Interpersonal Safety: Not on file   Housing Stability: Not on file          MEDICATIONS:  has a current medication list which includes the following prescription(s): dexcom g6  sensor, dexcom g6 transmitter, insulin aspart, levothyroxine, prenatal w/o a vit-fe fum-fa, sertraline, contour next test, flaxseed (linseed), baqsimi two pack, insulin aspart, insulin glargine, insulin pen needle, microlet lancets, naratriptan, semglee (yfgn), and triamcinolone.      ROS: 10 point ROS neg other than the symptoms noted above in the HPI.     GENERAL: some fatigue, wt gain with Pg; denies fevers, chills, night sweats.   HEENT: no dysphagia, odonophagia, diplopia, neck pain  THYROID:  no apparent hyper or hypothyroid symptoms  CV: no chest pain, pressure, palpitations  LUNGS: no SOB, LANG, cough, wheezing   ABDOMEN: mild nausea; no diarrhea, constipation, abdominal pain  EXTREMITIES: no rashes, ulcers, edema  NEUROLOGY: no headaches, denies changes in vision, tingling, extremitiy numbness   MSK: no muscle aches or pains, weakness  SKIN: no rashes or lesions  : no menses during Pg  PSYCH:  stable mood, no significant anxiety or depression  ENDOCRINE: no heat or cold intolerance     Physical Exam (visual exam)  VS:  no vital signs taken for video visit  CONSTITUTIONAL: healthy, alert and NAD, well dressed, answering questions appropriately  ENT: no nose swelling or nasal discharge, mouth redness or gum changes.  EYES: eyes grossly normal to inspection, conjunctivae and sclerae normal, no exophthalmos or proptosis  THYROID:  no apparent nodules or goiter  LUNGS: no audible wheeze, cough or visible cyanosis, no visible retractions or increased work of breathing  ABDOMEN: abdomen not evaluated  EXTREMITIES: no hand tremors, limited exam  NEUROLOGY: CN grossly intact, mentation intact and speech normal   SKIN:  no apparent skin lesions, rash, or edema with visualized skin appearance  PSYCH: mentation appears normal, affect normal/bright, judgement and insight intact,   normal speech and appearance well groomed       LABS:     All pertinent notes, labs, and images personally reviewed by me.      A/P:  Encounter Diagnoses   Name Primary?    Type 1 diabetes mellitus without complication (H) Yes    Insulin pump status     Hypothyroidism due to Hashimoto's thyroiditis     High-risk pregnancy, unspecified trimester        Comments:  Reviewed health history, diabetes and thyroid issues  Recent CGM trends with some postprandial hyperglycemia and also some low glucose trends at night and mid afternoon  Reviewed and interpreted tests that I previously ordered.   Ordered appropriate tests for the endocrinology disease management.    Management options discussed and implemented after shared medical decision making with the patient.  T1DM and hypothyroidism problems are chronic-stable    Plan:  Reviewed the overall T1DM management and insulin pump use.  Discussed optimal BG testing to assess glucose trends.  We reviewed insulin pump settings, basal rate and bolus dosing  Use of automated pump bolus dosing for meal/snack carb & correction dosing  Reviewed recent Tandem pump report information  Reviewed recent DexcomG6 CGM glucose trends, in detail    Reviewed general issues with the hypothyroidism diagnosis   Discussed lab tests used to assess patient thyroid hormone levels  Reviewed treatment options including levothyroxine medication, ideal dosing    Recommend:  Continue the Tandem insulin pump and diabetes management plan  Pump setting changes:    Basals:  MN-6a 2.2 to 2.1 unit(s)/hr    We have reviewed use of the Sleep Mode vs Exercise Mode pump settings:  Sleep Mode changes sensor glucose target from 112 to 120 mgdl, allows for increased basal rate delivery but no auto correction boluses.   Exercise Mode to raise sensor glucose target from 112 to 140 mg/dl, suspend target from 70 to 80 mg/dl.  Cannot set time duration in advance.          Continue the mealtime boluses premeal  Would be helpful for name change of primary pump Profile 2 to Pregnancy, but keep backup Profile 1  We will send an updated Tandem pump  supply order form, to reflect the increased quantity of infusion sets needed with her Pg  Continue use of the DexcomG6 CGM sensor  Continue the current levothyroxine 0.137 mg every day  Plan repeat nonfasting labs soon    Testing at [facility]    Lab orders placed  Would not restart the simvastatin med while pregnant  Arrange annual dilated eye exam, fasting lipid panel testing.  Reviewed overall pregnancy treatment goals for the diabetes and thyroid management  Plan to have endocrinology appointments monthly and Diab Ed appointments every 7-10 days during Pg  Contact me if questions/concerns about diabetes and thyroid management    Important to share medical records between endocrinology and OBGYN providers  Addressed patient's questions today.    There are no Patient Instructions on file for this visit.    Future labs ordered today:   Orders Placed This Encounter   Procedures    GLUCOSE MONITOR, 72 HOUR, PHYS INTERP    Basic metabolic panel    Hemoglobin A1c     Radiology/Consults ordered today: None    Total time spent on day of encounter:  26 min    Follow-up:  2/12/24 at 8a, VVAm     3/19/24 at 8a, VVAm    ANTHONY Jimenez MD, MS  Endocrinology  [facility]    CC:  Natanael, A, and JYOTI Lima

## 2024-01-10 ENCOUNTER — PRENATAL OFFICE VISIT (OUTPATIENT)
Dept: OBGYN | Facility: CLINIC | Age: 34
End: 2024-01-10
Attending: OBSTETRICS & GYNECOLOGY
Payer: COMMERCIAL

## 2024-01-10 ENCOUNTER — ANCILLARY PROCEDURE (OUTPATIENT)
Dept: ULTRASOUND IMAGING | Facility: CLINIC | Age: 34
End: 2024-01-10
Attending: OBSTETRICS & GYNECOLOGY
Payer: COMMERCIAL

## 2024-01-10 VITALS — SYSTOLIC BLOOD PRESSURE: 126 MMHG | BODY MASS INDEX: 36.96 KG/M2 | WEIGHT: 229 LBS | DIASTOLIC BLOOD PRESSURE: 78 MMHG

## 2024-01-10 DIAGNOSIS — O09.93 SUPERVISION OF HIGH RISK PREGNANCY IN THIRD TRIMESTER: Primary | ICD-10-CM

## 2024-01-10 DIAGNOSIS — O24.013 TYPE 1 DIABETES MELLITUS IN PREGNANCY, THIRD TRIMESTER: ICD-10-CM

## 2024-01-10 DIAGNOSIS — E03.9 HYPOTHYROID IN PREGNANCY, ANTEPARTUM: ICD-10-CM

## 2024-01-10 DIAGNOSIS — K21.9 GASTROESOPHAGEAL REFLUX IN PREGNANCY: ICD-10-CM

## 2024-01-10 DIAGNOSIS — O99.280 HYPOTHYROID IN PREGNANCY, ANTEPARTUM: ICD-10-CM

## 2024-01-10 DIAGNOSIS — O99.619 GASTROESOPHAGEAL REFLUX IN PREGNANCY: ICD-10-CM

## 2024-01-10 PROCEDURE — 76816 OB US FOLLOW-UP PER FETUS: CPT | Performed by: OBSTETRICS & GYNECOLOGY

## 2024-01-10 PROCEDURE — 99207 PR PRENATAL VISIT: CPT | Performed by: OBSTETRICS & GYNECOLOGY

## 2024-01-10 NOTE — PROGRESS NOTES
OB Visit @ 27w5d     No loss of fluid/vaginal bleeding/regular contractions. + FM  Discussed sleeping positions.     Job does not provide short term disability or maternity leave pay. 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       Results for orders placed or performed in visit on 12/13/23   US OB Follow Up >14 Weeks    Narrative    Table formatting from the original result was not included.  Obstetrical Ultrasound Report  OB U/S Follow Up > 14 Weeks - Transabdominal  [facility]  Referring physician: Sierra Santoyo DO   Sonographer: Brittnee Spurling, RDMS  Indication:  F/U Growth     Dating (mm/dd/yyyy):   LMP: Patient's last menstrual period was 06/21/2023.               EDC:    Estimated Date of Delivery: Apr 5, 2024   GA by LMP:      23w5d  Current Scan On (mm/dd/yyyy):  12/13/2023                     EDC:   04/08/2024        GA by Current   Scan:      23w2d  The calculation of the gestational age by current scan was based on BPD,   HC, AC and FL.     Anatomy Scan:  Hou gestation.  Visualized: Stomach, Kidneys, and Bladder.  Biometry:  BPD 5.5 cm 22w6d 17%   HC 21.3 cm 23w2d 20%   AC 18.6 cm 23w3d 32%   FL 4.2 cm 23w4d 32%   EFW (lbs/oz) 1 lbs               5ozs       EFW (g) 593 g 29%        Fetal heart rate: 155 bpm  Fetal presentation: Breech  Amniotic fluid: 4.2 cm MVP  Placenta: Anterior , no previa, > 2 cm from internal os    Maternal Anatomy:  Right adnexa: wnl  Left adnexa: wnl  Technique: Transabdominal Imaging performed    Impression:   Viable fetus with normal heart rate. Breech presentation. Normal amniotic   fluid. Anterior placenta, no previa. Normal fetal growth with estimated   fetal weight 1lb 5oz which is 29%.     Sierra Santoyo, DO         ICD-10-CM    1. Supervision of high risk pregnancy in third trimester  O09.93 US OB Fetal Biophys Prf wo NST Singls W/Ltd     US OB Biophys Single Gestation Measure      2. Gastroesophageal reflux in pregnancy   O99.619     K21.9       3. Hypothyroid in pregnancy, antepartum  O99.280     E03.9       4. Type 1 diabetes mellitus in pregnancy, third trimester  O24.013 US OB Fetal Biophys Prf wo NST Singls W/Ltd     US OB Biophys Single Gestation Measure          -GERD in pregnancy. Failed tums. Recommend starting tagamet. Discussed diet/lifestyle modifications  -Ty 1 Dm. Hypothyroidism.   ASA 81mg.  LEvel II completed and fetal echo completed-wnl..   Follows with Dr. Bhat  Reviewed normal growth ultrasound EFW 52% with Linda.  Continue Monthly growth ultrasound, start AP testing at 32wk.   -CBC, Tdap to be offered 28wk.  - Labor precautions. F/U 2wk    Sierra Treviño Masters, DO

## 2024-01-10 NOTE — Clinical Note
Hello, I am wondering if you can hlep with resources for this patient to secure short term disability through the state as her employer does not offer these benefits, only FMLA. Let me know if I need to put in a care coordinator referral instead.   Thanks AM

## 2024-01-11 ENCOUNTER — MEDICAL CORRESPONDENCE (OUTPATIENT)
Dept: HEALTH INFORMATION MANAGEMENT | Facility: CLINIC | Age: 34
End: 2024-01-11
Payer: COMMERCIAL

## 2024-01-17 ENCOUNTER — MYC MEDICAL ADVICE (OUTPATIENT)
Dept: EDUCATION SERVICES | Facility: CLINIC | Age: 34
End: 2024-01-17
Payer: COMMERCIAL

## 2024-01-17 NOTE — TELEPHONE ENCOUNTER
Type 1 Diabetes in Pregnancy Follow-up    Subjective/Objective:    Linda POLK Chavez sent in blood glucose log for review. Last date of communication was:  (Dr. Jimenez).    Gestational diabetes is being managed with diet, activity, and medications    Taking diabetes medications: yes:     Diabetes Medication(s)       Diabetic Other       Glucagon (BAQSIMI TWO PACK) 3 MG/DOSE POWD Spray 1 Device in nostril once as needed (for severe hypoglycemia)       Insulin       insulin aspart (NOVOLOG PEN) 100 UNIT/ML pen Inject 3 to 10 units subcutaneous at mealtimes as directed, total daily dose approx 30 units     insulin aspart (NOVOLOG VIAL) 100 UNITS/ML vial Use with insulin pump, total daily dose approx 100 units     insulin glargine (LANTUS PEN) 100 UNIT/ML pen Inject 22 Units Subcutaneous At Bedtime     SEMGLEE, YFGN, 100 UNIT/ML SOPN ADMINISTER 22 UNITS UNDER THE SKIN AT BEDTIME SUBSTITUTE FOR LANTUS     Patient not taking: Reported on 2023            Estimated Date of Delivery: 2024    BG/Food Log:             Assessment:    Not meeting TIR targets for pregnancy      Plan/Response:    2 Profile Active at the time of upload  Start Time Basal Rate Correction Factor Carb Ratio Target BG  Midnight 2.100 u/hr 1u:35 mg/dL 1u:7.5 g 110 mg/dL  6:00 AM 2.000 u/hr 1u:35 mg/dL 1u:6.0 g 110 mg/dL  7:00 AM 2.000 u/hr 1u:20 mg/dL 1u:3.2 g 110 mg/dL  11:00 AM 2.000 u/hr 1u:20 mg/dL 1u:2.6 g 110 mg/dL  --> 1u:2.4 g  12:00 PM 2.300 u/hr 1u:18 mg/dL 1u:3.0 g 110 mg/dL --> 1u:2.8 g  2:30 PM 2.300 u/hr 1u:18 mg/dL 1u:3.2 g 110 mg/dL --> 1u:3.0 g  5:00 PM 2.300 u/hr 1u:18 mg/dL 1u:2.6 g 110 mg/dL --> 1u:2.4g  9:00 PM 2.300 u/hr 1u:20 mg/dL 1u:3.2 g 110 mg/dL  Calculated Total Daily Basal 52.2 units  Duration of Insulin: 5:00 hours  Carbohydrates: On  Profile    Marie Wagner RD [facility]  Triage: [phone]  [phone]      Any diabetes medication dose changes were made via the CDE Protocol and Collaborative  Practice Agreement with the patient's referring provider. A copy of this encounter was shared with the provider.

## 2024-01-22 ENCOUNTER — PRENATAL OFFICE VISIT (OUTPATIENT)
Dept: OBGYN | Facility: CLINIC | Age: 34
End: 2024-01-22
Payer: COMMERCIAL

## 2024-01-22 VITALS — DIASTOLIC BLOOD PRESSURE: 84 MMHG | BODY MASS INDEX: 37.12 KG/M2 | WEIGHT: 230 LBS | SYSTOLIC BLOOD PRESSURE: 126 MMHG

## 2024-01-22 DIAGNOSIS — E03.9 HYPOTHYROID IN PREGNANCY, ANTEPARTUM: ICD-10-CM

## 2024-01-22 DIAGNOSIS — Z96.41 INSULIN PUMP STATUS: ICD-10-CM

## 2024-01-22 DIAGNOSIS — O99.280 HYPOTHYROID IN PREGNANCY, ANTEPARTUM: ICD-10-CM

## 2024-01-22 DIAGNOSIS — O24.019 TYPE 1 DIABETES MELLITUS DURING PREGNANCY, ANTEPARTUM: ICD-10-CM

## 2024-01-22 DIAGNOSIS — O09.93 SUPERVISION OF HIGH RISK PREGNANCY IN THIRD TRIMESTER: Primary | ICD-10-CM

## 2024-01-22 DIAGNOSIS — E10.9 TYPE 1 DIABETES MELLITUS WITHOUT COMPLICATION (H): ICD-10-CM

## 2024-01-22 LAB
ANION GAP SERPL CALCULATED.3IONS-SCNC: 10 MMOL/L (ref 7–15)
BUN SERPL-MCNC: 6.3 MG/DL (ref 6–20)
CALCIUM SERPL-MCNC: 9.2 MG/DL (ref 8.6–10)
CHLORIDE SERPL-SCNC: 105 MMOL/L (ref 98–107)
CREAT SERPL-MCNC: 0.56 MG/DL (ref 0.51–0.95)
DEPRECATED HCO3 PLAS-SCNC: 21 MMOL/L (ref 22–29)
EGFRCR SERPLBLD CKD-EPI 2021: >90 ML/MIN/1.73M2
ERYTHROCYTE [DISTWIDTH] IN BLOOD BY AUTOMATED COUNT: 12.9 % (ref 10–15)
GLUCOSE SERPL-MCNC: 110 MG/DL (ref 70–99)
HBA1C MFR BLD: 6.2 % (ref 0–5.6)
HCT VFR BLD AUTO: 39.1 % (ref 35–47)
HGB BLD-MCNC: 13.5 G/DL (ref 11.7–15.7)
MCH RBC QN AUTO: 31.5 PG (ref 26.5–33)
MCHC RBC AUTO-ENTMCNC: 34.5 G/DL (ref 31.5–36.5)
MCV RBC AUTO: 91 FL (ref 78–100)
PLATELET # BLD AUTO: 179 10E3/UL (ref 150–450)
POTASSIUM SERPL-SCNC: 4 MMOL/L (ref 3.4–5.3)
RBC # BLD AUTO: 4.28 10E6/UL (ref 3.8–5.2)
SODIUM SERPL-SCNC: 136 MMOL/L (ref 135–145)
WBC # BLD AUTO: 7.9 10E3/UL (ref 4–11)

## 2024-01-22 PROCEDURE — 85027 COMPLETE CBC AUTOMATED: CPT | Performed by: OBSTETRICS & GYNECOLOGY

## 2024-01-22 PROCEDURE — 80048 BASIC METABOLIC PNL TOTAL CA: CPT | Performed by: OBSTETRICS & GYNECOLOGY

## 2024-01-22 PROCEDURE — 99207 PR PRENATAL VISIT: CPT | Performed by: OBSTETRICS & GYNECOLOGY

## 2024-01-22 PROCEDURE — 36415 COLL VENOUS BLD VENIPUNCTURE: CPT | Performed by: OBSTETRICS & GYNECOLOGY

## 2024-01-22 PROCEDURE — 83036 HEMOGLOBIN GLYCOSYLATED A1C: CPT | Performed by: OBSTETRICS & GYNECOLOGY

## 2024-01-22 NOTE — Clinical Note
Hello! Sending along Linda's note.  Linda was asking about blood sugar control in labor. I imagine you were already planning to address that at some point in the pregnancy. I gave her some basic information on what our goals are in labor and for BS at time of delivery.

## 2024-01-22 NOTE — PROGRESS NOTES
OB Visit @ 29w3d     No loss of fluid/vaginal bleeding/regular contractions. + FM  Has questions about DM control in labor.        ICD-10-CM    1. Supervision of high risk pregnancy in third trimester  O09.93 CBC with platelets     CBC with platelets     Primary Care - Care Coordination Referral      2. Type 1 diabetes mellitus during pregnancy, antepartum  O24.019       3. Hypothyroid in pregnancy, antepartum  O99.280     E03.9       4. Type 1 diabetes mellitus without complication (H)  E10.9 Basic metabolic panel     Hemoglobin A1c      5. Insulin pump status  Z96.41 Basic metabolic panel     Hemoglobin A1c          -Ty 1 Dm. Hypothyroidism.   ASA 81mg.  LEvel II completed and fetal echo completed-wnl..   Follows with Dr. Bhat  Last growth ultrasound 1/10/24 at 27+5wk growth ultrasound EFW 52% with Linda.  Continue Monthly growth ultrasound, start AP testing at 32wk.  Discussed hospital admission and labor, would look for Dr Bhat's recommendations on pump settings. Reviewed we do have protocols for management of DM in pregnancy available, which does include insulin drip. Discussed need for good control in the weeks/days leading up to labor and in labor, with our goal of  or less. Discsused how high BS prior to delivery can be impactful on newborns.   - Labor precautions. F/U 2wk.    Sierra Treviño Masters, DO

## 2024-01-23 ENCOUNTER — PATIENT OUTREACH (OUTPATIENT)
Dept: CARE COORDINATION | Facility: CLINIC | Age: 34
End: 2024-01-23
Payer: COMMERCIAL

## 2024-01-23 NOTE — LETTER
[facility] CARE COORDINATION  [address] [place] [address]    January 23, 2024    Linda Chavez  [address]  [place] [address]      Dear Linda,    I am a clinic community health worker who works with Belén Mustafa MD with the [facility]. I have been trying to reach you recently to introduce Clinic Care Coordination. Below is a description of clinic care coordination and how I can further assist you.       The clinic care coordination team is made up of a registered nurse, , financial resource worker and community health worker who understand the health care system. The goal of clinic care coordination is to help you manage your health and improve access to the health care system. Our team works alongside your provider to assist you in determining your health and social needs. We can help you obtain health care and community resources, providing you with necessary information and education. We can work with you through any barriers and develop a care plan that helps coordinate and strengthen the communication between you and your care team.  Our services are voluntary and are offered without charge to you personally.    Please feel free to contact me with any questions or concerns regarding care coordination and what we can offer.      We are focused on providing you with the highest-quality healthcare experience possible.    Sincerely,     Tamar Graham, TERRIE  Clinic Care Coordination  [facility]: [place], [place], [place], and [facility]  Phone: [phone]

## 2024-01-23 NOTE — Clinical Note
Daniel Abreu, Phone Assessment is scheduled for 1/29/24 at 9:00am. Please read my note. Thank you. Tamar

## 2024-01-23 NOTE — PROGRESS NOTES
Clinic Care Coordination Contact  [facility]/Voicemail    Clinical Data: Care Coordinator Outreach    Outreach Documentation Number of Outreach Attempt   1/23/2024   8:59 AM 1   1/23/2024   4:12 PM 2         Reason for Referral:  Financial Support  Insurance         Left message on patient's voicemail with call back information and requested return call.      Plan: Care Coordinator will send care coordination introduction letter with care coordinator contact information and explanation of care coordination services via [employer].     Care Coordinator will try to reach patient again in 1-2 business days.      SANKET Robins  Clinic Care Coordination  [facility] Clin[facility]: [place], [place], [place], and [facility]  Phone: [phone]

## 2024-01-23 NOTE — LETTER
[facility] CARE COORDINATION  [address] [place] [address]    January 25, 2024    Linda Chavez  [address]  [place] [address]      Dear Linda,    I am a clinic community health worker who works with Belén Mustafa MD with the [facility]. I have been trying to reach you recently to introduce Clinic Care Coordination. Below is a description of clinic care coordination and how I can further assist you.       The clinic care coordination team is made up of a registered nurse, , financial resource worker (insurance) and community health worker who understand the health care system. The goal of clinic care coordination is to help you manage your health and improve access to the health care system. Our team works alongside your provider to assist you in determining your health and social needs. We can help you obtain health care and community resources, providing you with necessary information and education. We can work with you through any barriers and develop a care plan that helps coordinate and strengthen the communication between you and your care team.  Our services are voluntary and are offered without charge to you personally.    Please feel free to contact me with any questions or concerns regarding care coordination and what we can offer.      We are focused on providing you with the highest-quality healthcare experience possible.    Sincerely,     SANKET Robins  Clinic Care Coordination  [facility]: [place], [place], [place], and [facility]  Phone: [phone]

## 2024-01-23 NOTE — PROGRESS NOTES
Clinic Care Coordination Contact  [facility]/Voicemail    Clinical Data: Care Coordinator Outreach    Outreach Documentation Number of Outreach Attempt   1/23/2024   8:59 AM 1       Reason for Referral:  Financial Support  Insurance    Additional Information:  job does not offer leave for pregnancy, is interested in state resources to support her during postpartum leave        Left message on patient's voicemail with call back information and requested return call.    Plan: Care Coordinator will try to reach patient again in 1-2 business days.    SANKET Robins  Clinic Care Coordination  [facility] Clinics: [place], and [place] [facility]  Phone: [phone]

## 2024-01-24 NOTE — PROGRESS NOTES
Clinic Care Coordination Contact  [facility]/Voicemail    Clinical Data: Care Coordinator Outreach    Outreach Documentation Number of Outreach Attempt   1/23/2024   8:59 AM 1   1/23/2024   4:12 PM 2   1/24/2024  10:13 AM 3         Reason for Referral:  Financial Support  Insurance     Additional Information:  job does not offer leave for pregnancy, is interested in state resources to support her during postpartum leave       Left message on patient's voicemail with call back information and requested return call.    Plan: Care Coordinator will try to reach patient again in 1-2 business days.    SANKET Robins  Clinic Care Coordination  [facility] Clinics: [facility] [place], [place], [facility], and [facility]  Phone: [phone]       URGENT/EMERGENT  INPATIENT/SPU AUTHORIZATION REQUEST    Date: 11/07/19            # Pages in this Request:     x New Request   Additional Information for PA#:     Office Contact Name:  Sangeeta Deshpande Title: Utilization Review, Registed Nurse     Phone: [phone]  Ext  Availability (Date/Time): Wednesday - Friday 8 am- 4 pm    x Inpatient Review  SPU Review        Current       x Late Pick-up   · How your facility was first notified of the Late Pick-up: Paths Letter   · When your facility was first notified of the Late Pick-up (date): 10/31/2019         RECIPIENT INFORMATION    Recipient ID#: [id number]   Recipient Name: Rajiv Lopez     YOB: 1959  64 y o  Recipient Alias:     Gender:  x Male  Female Medicaid Eligibility ([address] [place] [address]): INSURANCE INFORMATION    (All other private or governmental health insurance benefits must be utilized prior to billing the [place] Program)    Was this admission the result of an MVA, other accident, assault, injury, fall, gunshot, bite etc ? Yes x No                   If yes, provide a brief description of the incident  Does the recipient have other insurance coverage? Yes x No        Insurance Company Name/Policy #      Did that insurance pay on this claim? Yes  No        Did that insurance deny this claim? Yes  No    If yes, reason for denial:      Does the recipient have Medicare? Yes x No        Did Medicare exhaust prior to this admission? Yes  No        Did Medicare partially pay this claim? Yes  No        Did that insurance deny this claim? Yes  No    If yes, reason for denial:          Was the recipient a prisoner at the time of admission?   Yes x No            PROVIDER INFORMATION    Hospital Name: [address] [place] [address] Provider ID#:[id number]    Admitting Physician Name:[address] Provider ID#: [id number]        ADMISSION INFORMATION    Type of Admission: (please choose one)    x ED      Direct    If yes, from where? Transfer    If yes, transferring hospital (inpatient, rehab, or psych) Provider Name/Provider ID#: Admission Floor or Unit Type: ICU    Dates/Times:        ED Date/Time: 9/29/2019  3:51 PM        Observation Date/Time:         Admission Date/Time: 9/29/19 1617        Discharge or Transfer Date/Time: 9/30/2019  2:00 AM        DIAGNOSIS/PROCEDURE CODES    Primary Diagnosis Code/Primary Diagnosis Code description:  I60 4 Nontraumatic subarachnoid hemorrhage from basilar artery   I21 09 ST elevation (STEMI) myocardial infarction involving other coronary artery of anterior wall   J96 01 Acute respiratory failure with hypoxia   G93 1 Anoxic brain damage, not elsewhere classified   Additional Diagnosis Code(s) and Description(s)-(up to three additional codes):    Procedure Code (one) and description:  [id number] Respiratory Ventilation, Less than 24 Consecutive Hours      CLINICAL INFORMATION - PRIOR ADMISSION ONLY    Is there a prior admission with a discharge date within 30 days of the date of this admission?    x No (Proceed to the next section - "Clinical Information - General Review Checklist:)      Yes (Provide the following information)     Prior admission dates:    MA Prior Authorization Number:        Review Outcome:     Diagnosis Code(s)/Description:    Procedure Code/Description:    Findings:    Treatment:    Condition on Discharge:   Vitals:    Labs:   Imaging:   Medications: Follow-up Instructions:    Disposition:        CLINICAL INFORMATION - GENERAL REVIEW CHECKLIST    EMERGENCY DEPARTMENT: (Proceed to "ADMISSION" if Direct Admission)    Presenting Signs/Symptoms:Medication/treatment prior to arrival in the ED:60 y  o  male who presents after being found down at home  History is from the patient's wife and the ER record since the patient is intubated and unable to provide his own history  Mr Ellen Varela has a known history of hypertension and hyperlipidemia  He was in his usual state of health today  His family spoke to him at [address]  He was supposed to be at work at noon  When they arrived home at 2:30PM, they found him naked on the floor  EMS was called  Upon the arrival of EMS  EMS found the patient in ventricular tachycardia and v fib  He received 3 shocks 5 doses of Epi  In the ER, he had ROSC  His EKG revealed ST elevations and an MI alert was called  The patient lost pulses and required ACLS protocol several times  ROSC was obtained and he was transferred to the ICU for further care      Past Medical History:   Past Medical History:   Diagnosis Date    Diabetes mellitus ([facility] [address] )     Hypertension        Clinical Exam:    Initial Vital Signs: (Temp, Pulse, Resp, and BP)   ED Triage Vitals   Temperature Pulse Respirations Blood Pressure SpO2   09/29/19 1733 09/29/19 1554 09/29/19 1555 09/29/19 1555 09/29/19 1555   (!) 94 6 °F (34 8 °C) 87 16 120/90 95 %      Temp Source Heart Rate Source Patient Position - Orthostatic VS BP Location FiO2 (%)   09/29/19 1733 09/29/19 1648 09/29/19 1648 09/29/19 1648 09/29/19 1733   Probe Monitor Lying Left arm 100      Pain Score       --                  Pertinent Repeat Vital Signs: (include times they were obtained)  Additional Vital Signs:   09/29/19 1630      92   20   53/27Abnormal    35         97 %   09/29/19 1625      94   20   47/26Abnormal    31         98 %   09/29/19 1624      95   20   60/28Abnormal    33         99 %   09/29/19 1620      100      138/98   109         98 %   09/29/19 16:09:11      122Abnormal    20               92 %   09/29/19 1609                        92 %   09/29/19 16:06:22      121Abnormal    16   80/43Abnormal             92 %    09/29/19 16:06:21      147Abnormal    120Abnormal                83 %Abnormal    09/29/19 16:02:11      150Abnormal    104Abnormal                86 %Abnormal    09/29/19 1602      0Abnormal    16   80/42Abnormal             85 %Abnormal    09/29/19 1601            80/43Abnormal                09/29/19 16:00:12      57   14               81 %Abnormal    09/29/19 15:56:41      103   17               80 %Abnormal    09/29/19 15:55:51         17                  09/29/19 1555      103   16   120/90            95 %   09/29/19 15:54:11      87                          Pertinent Sustained Findings: (include times they were obtained)    Weight in Kilograms:  Wt Readings from Last 1 Encounters:   09/29/19 93 7 kg (206 lb 9 1 oz)       Pertinent Labs (results):  Results from last 7 days   Lab Units 09/29/19  1850 09/29/19  1607 09/29/19  1559   WBC Thousand/uL 33 45* 21 83*  --    HEMOGLOBIN g/dL 15 2 13 9  --    I STAT HEMOGLOBIN g/dl  --   --  15 3  15 6   HEMATOCRIT % 48 0 47 3  --    HEMATOCRIT, ISTAT %  --   --  45  46   PLATELETS Thousands/uL 488* 424*  --    BANDS PCT %  --  9*  --              Results from last 7 days   Lab Units 09/29/19 1850 09/29/19  1607 09/29/19  1559   SODIUM mmol/L 140 143  --    POTASSIUM mmol/L 3 9 2 2*  --    CHLORIDE mmol/L 105 104  --    CO2 mmol/L 21 21  --    CO2, I-STAT mmol/L  --   --  21  21   AGAP mmol/L  --   --  21*   ANION GAP mmol/L 14* 18*  --    BUN mg/dL 23 17  --    CREATININE mg/dL 2 13* 1 93*  --    EGFR ml/min/1 73sq m 33 37 51   CALCIUM mg/dL 8 4 8 8  --    CALCIUM, IONIZED, ISTAT mmol/L  --   --  1 09*  1 09*   MAGNESIUM mg/dL 2 5  --   --    PHOSPHORUS mg/dL 7 0*  --   --             Results from last 7 days   Lab Units 09/29/19 1850 09/29/19  1607   AST U/L 236* 97*   ALT U/L 158* 102*   ALK PHOS U/L 115 107   TOTAL PROTEIN g/dL 6 8 6 4   ALBUMIN g/dL 3 4* 3 4*   TOTAL BILIRUBIN mg/dL 0 87 0 71   BILIRUBIN DIRECT mg/dL 0 24*  --            Results from last 7 days   Lab Units 09/29/19  1910   POC GLUCOSE mg/dl 386*            Results from last 7 days   Lab Units 09/29/19  1850 09/29/19  1607   GLUCOSE RANDOM mg/dL 430* 483*           Results from last 7 days   Lab Units 09/29/19  1850   PH ART   7 246*   PCO2 ART mm Hg 47 6*   PO2 ART mm Hg 399 1*   HCO3 ART mmol/L 20 2*   BASE EXC ART mmol/L -7 2   O2 CONTENT ART mL/dL 22 7   O2 HGB, ARTERIAL % 98 2*   ABG SOURCE   Line, Arterial           Results from last 7 days   Lab Units 09/29/19  1559   PH, JOHANNA I-STAT   7 003*   PCO2, JOHANNA ISTAT mm HG 75 4*   PO2, JOHANNA ISTAT mm HG 48 0*   HCO3, JOHANNA ISTAT mmol/L 18 7*   I STAT BASE EXC mmol/L -14*   I STAT O2 SAT % 61*           Results from last 7 days   Lab Units 09/29/19  1850   CK TOTAL U/L 1,016*   CK MB INDEX % 8 1*   CK MB ng/mL 81 8*           Results from last 7 days   Lab Units 09/29/19  1850   TROPONIN I ng/mL 12 00*            Results from last 7 days   Lab Units 09/29/19  1850 09/29/19  1640   LACTIC ACID mmol/L 6 8* 12 3*        Radiology (results):  CT brain 9/29 -- 1  Diffuse subarachnoid and intraventricular hemorrhage likely related to ruptured aneurysm  2   Focal hyperdensity at the prepontine cistern measuring up to 1 9 cm in size likely aneurysm possibly of the basilar tip or posterior cerebral artery  CXR - 9/29 - Lung bases excluded from x-ray  Endotracheal tube in good position  NG tube tip is not seen below the distal esophagus    Mild pulmonary edema      CT Cervical Spine - 9/29 - No cervical spine fracture or traumatic malalignment    Evidence of subarachnoid hemorrhage in the proximal spinal canal      EKG (results): EKG -  9/29 - consistent with STEMI     Other tests (results):    Tests pending final results:    Treatment in the ED:   Medication Administration from 09/29/2019 1551 to 09/29/2019 1700       Date/Time Order Dose Route Action     09/29/2019 1600 EPINEPHrine (ADRENALIN) injection 1 mg Intravenous Given     09/29/2019 1603 sodium bicarbonate 50 mEq/50 mL injection 50 mEq Intravenous Given     09/29/2019 1604 EPINEPHrine (ADRENALIN) injection 1 mg Intravenous Given     09/29/2019 1623 potassium chloride 20 mEq IVPB (premix) 20 mEq Intravenous New Bag     09/29/2019 1637 norepinephrine (LEVOPHED) 4 mg (STANDARD CONCENTRATION) IV in sodium chloride 0 9% 250 mL 25 mcg/min Intravenous Rate/Dose Change     09/29/2019 1627 norepinephrine (LEVOPHED) 4 mg (STANDARD CONCENTRATION) IV in sodium chloride 0 9% 250 mL 20 mcg/min Intravenous Rate/Dose Change     09/29/2019 1625 norepinephrine (LEVOPHED) 4 mg (STANDARD CONCENTRATION) IV in sodium chloride 0 9% 250 mL 10 mcg/min Intravenous Rate/Dose Change     09/29/2019 1608 norepinephrine (LEVOPHED) 4 mg (STANDARD CONCENTRATION) IV in sodium chloride 0 9% 250 mL 5 mcg/min Intravenous New Bag           Other treatments:      Change in condition while in the ED:     Response to ED Treatment:          OBSERVATION: (Proceed to "ADMISSION" if Direct Admission)    Orders written during the observation period  Meds Name, dose, route, time, how may doses given:  PRN Meds Name, dose, route, time, how many doses given within the first 24 hrs :  IVs Type, rate, and total amt  ordered/given:  Labs, imaging, other:  Consults and findings:    Test Results during the observation period  Pertinent Lab tests (dates/results):  Culture results (blood, urine, spinal, wound, respiratory, etc ):  Imaging tests (dates/results):  EKG (dates/results):   Other test (dates/results):  Tests pending (dates/results):    Surgical or Invasive Procedures during the observation period  Name of surgery/procedure:  Date & Time:  Patient Response:  Post-operative orders:  Operative Report/Findings:    Response to Treatment, Major Change in Condition, Major Charge in Treatment during the observation period          ADMISSION:    DIRECT Admissions Only:    · Presenting Signs/Symptoms:   ·   · Medication/treatment prior to arrival:  ·   · Past Medical History:  ·   · Clinical Exam on admission:  ·   · Vital Signs on admission: (Temp, Pulse, Resp, and BP)  ·   · Weight in kilograms:     ALL Admissions:    Admission Orders and Other Orders written within the first 24 hrs after admission    A line placed in ICU       Meds Name, dose, route, time, how may doses given: peridex oral rinse -  q12  Pepcid 20 mg iv bid  Potassium Chloride 20 meq - IV 9029 x 2       PRN Meds Name, dose, route, time, how many doses given within the first 24 hrs :  IVs Type, rate, and total amt  Ordered/given:  Epinephrine gtt - Titrated   Insulin Gtt  - titrated   Plasmalyte  @ 125ml/hr  Levophed gtt 18 8 ml/hr  Sodium Bicarb @ 125ml/hr  Vasopressin 12 ml/hr     Labs, imaging, other:      Consults and findings:    Test Results after admission  Pertinent Lab tests (dates/results):  Culture results (blood, urine, spinal, wound, respiratory, etc ):  Imaging tests (dates/results):  EKG (dates/results): Other test (dates/results):  Tests pending (dates/results):    Surgical or Invasive Procedures  Name of surgery/procedure:  Date & Time:  Patient Response:  Post-operative orders:  Operative Report/Findings:    Response to Treatment, Major Change in Condition, Major Charge in Treatment anytime during admission  Summary of [facility] was admitted to the ICU after successful ROSC from the ED  He was started on a bicarb drip and had a R radial A-line placed  Patient had a small laceration/abrasion to his forehead and this, combined with the unclear story of why he was found unresponsive, prompted a stat head and neck CT  The CT scans showed 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  Patient was made a level 2 DNR by his family  Shortly after returning from the CT scan, the patient went into ventricular fibrillation  He was given 300mg IV amio along with calcium, bicarb, and magnesium before he was declared dead at 2144 via ruptured aneurysm and SAH    Disposition on Discharge  Home, Rehab, SNF, LTC, Shelter, etc :     Cease to Breathe (CTB)  If a patient expires during an admission, in addition to the above information, please include:    Summary/timeline of the patient's decline in condition:    Medications and treatment:    Patient response to treatment:    Date and time patient ceased to breathe:Called to bedside by RN  Patient is identified visually and identification confirmed with identification bracelet  All lines and tubes intact  Patient unresponsive to stimuli  No spontaneous respirations  No palpable pulse or audible heart sounds  Pupils fixed bilaterally  Asystolic on two contiguous leads       Time of death: 2144     Family present at bedside  Attending notified  [place] office called by RN  Is there a Readmission that follows this admission? Yes x No    If yes, provide dates:          InterQual Review    InterQual Criteria Met: x Yes  No  N/A        Please include the InterQual Review, InterQual year/version used, and the criteria selected:   Created Using Review Status Review Entered   Design A® In Primary 9/30/2019 17:33       Criteria Set Name - Subset   LOC:Acute Adult-General Medical       Criteria Review   REVIEW SUMMARY     Patient: Oma Marquez  Review Number: [id number]  Review Status: In Primary  Criteria Status: Critical Met  Day Met: Episode Day 1     Condition Specific: Yes        OUTCOMES  Outcome Type: Primary           REVIEW DETAILS     Product: Ann Caller Adult  Subset: General Medical      (Symptom or finding within 24h)         (Excludes PO medications unless noted)          [X] Select Day, One:              [X] Episode Day 1, One:                  [X] CRITICAL, >= One:                      [X] General, >= One:                          [X] Hemodynamic monitoring, invasive     Version: ParentPlus 2018  2  ParentPlus and ParentPlus  © 2018 [employer] and/or one of its [place]  All Rights Reserved  CPT only © 2017 American Medical Association  All Rights Reserved               PLEASE SUBMIT THE COMPLETED FORM TO THE DEPARTMENT OF HUMAN SERVICES - DIVISION OF CLINICAL  REVIEW VIA FAX [phone] or VIA E-MAIL TO [email or web address]    Signature: Myrnane Raymond Date:  11/07/19    Confidentiality Notice: The documents accompanying this telecopy may contain confidential information belonging to the sender  The information is intended only for the use of the individual named above  If you are not the intended recipient, you are hereby notified  That any disclosure, copying, distribution or taking of any telecopy is strictly prohibited

## 2024-01-25 NOTE — PROGRESS NOTES
Clinic Care Coordination Contact  Community Health Worker Initial Outreach    Patient Called Back    Discussed:  Patient stated she changed employers when she was 18 weeks pregnant.  Patient stated she does not have short term disability at work.  Patient stated she is looking for a supplemental income from the state while being home with the baby.  Patient stated she does not need medical insurance.  Patient has insurance through her .  Patient stated her concern is not having any income while being at home after the baby is born.  Patient stated this is her first baby.      Reason for Referral:  Financial Support  Insurance     Additional Information:  job does not offer leave for pregnancy, is interested in state resources to support her during postpartum leave         CHW Initial Information Gathering:  Referral Source: PCP  Preferred Hospital: [facility]  [phone]  Current living arrangement:: I live in a private home with spouse  Type of residence:: Private home - stairs  Community Resources: None  Supplies Currently Used at Home: Diabetic Supplies  Equipment Currently Used at Home: none  Informal Support system:: Family, Spouse  No PCP office visit in Past Year: No  Transportation means:: Regular car  CHW Additional Questions  If ED/Hospital discharge, follow-up appointment scheduled as recommended?: N/A  Medication changes made following ED/Hospital discharge?: N/A  MyChart active?: Yes  Patient sent Social Determinants of Health questionnaire?: Yes    Patient accepts CC: Yes. Patient scheduled for assessment with ANDERSON Abreu on 1/29/24 at 9:00am. Patient noted desire to discuss supplemental income from state after baby is born, baby resources including diaper ortiz, FMLA (paperwork/forms) and CC support.     SANKET Robins  Clinic Care Coordination  [facility] Clin[facility]: [facility] [place], [place], [place], and [place] [facility]  Phone: [phone]    82 yo M pmh of HTN, HLD, HFrEF with AICD presents for battery replacement of AICD    Chronic HFrEF with AICD at end of service  - Battery changed today   - F/u interrogation  - Continue with ARB, BB, and aldactone    Essential HTN:   -c/w BB and ARB    CKD III: at baseline    DLD: c/w statin       discharge later today spent 32 min on discharge

## 2024-01-25 NOTE — PROGRESS NOTES
Clinic Care Coordination Contact  [facility]    Clinical Data: Care Coordinator Outreach    Outreach Documentation Number of Outreach Attempt   1/23/2024   8:59 AM 1   1/23/2024   4:12 PM 2   1/24/2024  10:13 AM 3   1/25/2024  12:22 PM 4       Reason for Referral:  Financial Support  Insurance     Additional Information:  job does not offer leave for pregnancy, is interested in state resources to support her during postpartum leave          Left message on patient's voicemail with call back information and requested return call.    Plan: Care Coordinator will send care coordination introduction letter with care coordinator contact information and explanation of care coordination services via mail.     Care Coordinator will do no further outreaches at this time.    SANKET Robins  Clinic Care Coordination  [facility] Clin[facility]: [facility] [place], [place], [facility], and [facility]  Phone: [phone]

## 2024-01-26 ENCOUNTER — MYC REFILL (OUTPATIENT)
Dept: ENDOCRINOLOGY | Facility: CLINIC | Age: 34
End: 2024-01-26
Payer: COMMERCIAL

## 2024-01-26 ENCOUNTER — MYC MEDICAL ADVICE (OUTPATIENT)
Dept: ENDOCRINOLOGY | Facility: CLINIC | Age: 34
End: 2024-01-26
Payer: COMMERCIAL

## 2024-01-26 DIAGNOSIS — E10.9 TYPE 1 DIABETES MELLITUS WITHOUT COMPLICATION (H): ICD-10-CM

## 2024-01-26 RX ORDER — INSULIN ASPART 100 [IU]/ML
INJECTION, SOLUTION INTRAVENOUS; SUBCUTANEOUS
Qty: 30 ML | Refills: 11 | Status: CANCELLED | OUTPATIENT
Start: 2024-01-26

## 2024-01-26 NOTE — TELEPHONE ENCOUNTER
RN reviewed chart.  Refills were sent 10/25/23 for 1 yr.  Called pharmacy.  States she last picked up on 1/5, so the earliest she can refill is tomorrow the 27th.  Called pt and left generic msg on vm with that information, and also to let us know if she is using more than 100 units TDD as new rx may need to be sent. Tana Olivares RN

## 2024-01-29 ENCOUNTER — MYC MEDICAL ADVICE (OUTPATIENT)
Dept: EDUCATION SERVICES | Facility: CLINIC | Age: 34
End: 2024-01-29

## 2024-01-29 ENCOUNTER — PATIENT OUTREACH (OUTPATIENT)
Dept: NURSING | Facility: CLINIC | Age: 34
End: 2024-01-29
Payer: COMMERCIAL

## 2024-01-29 DIAGNOSIS — E10.9 TYPE 1 DIABETES MELLITUS WITHOUT COMPLICATION (H): ICD-10-CM

## 2024-01-29 RX ORDER — INSULIN ASPART 100 [IU]/ML
INJECTION, SOLUTION INTRAVENOUS; SUBCUTANEOUS
Qty: 40 ML | Refills: 11 | Status: SHIPPED | OUTPATIENT
Start: 2024-01-29 | End: 2024-03-11

## 2024-01-29 NOTE — PROGRESS NOTES
Clinic Care Coordination Contact  Clinic Care Coordination Contact  OUTREACH    Referral Information:  Referral Source: PCP    SW spoke to pt about her overall wellbeing and her needs and goals.  Pt shares that her employer does not offer short term disability, and she is facing a completely unpaid maternity leave other than PTO which is not very much since it is a newer job.  Pt shares that her only need for speaking to SW is around this stressor, other than that she and her  are doing well and they are VERY excited about baby.  She says they have a good support system.  SW mentions that the new parental leave laws that were passed do not take effect until 2026 unfortunately.  Pt shares that her employer will allow her to return to her job after maternity leave, even though she has not been there long enough to qualify for FMLA.    SW shares that [employer] is a resource that is often utilized for this situation, and that pt could access this resource by looking at the [employer] website and finding a partnering agency and SW will include this information in resources RAFFAELE is sending.  RAFFAELE mentions that there are many community resources RAFFAELE has written up into a document that pt may wish to peruse for ways to save money on diapers, maternity clothing, and baby equipment.  SW also shares that if there is a complication with her pregnancy that requires her or her baby to be in the hospital for an extended period, or if she is on bedrest, she could look into support from Help me Bounce.    Pt is appreciative of resources, and still hopeful that she and her spouse can find a way to have her maternity leave not be interrupted for financial reasons only.    RAFFAELE encourages pt to reach out to  at any time for support, questions, or resources.  Pt expresses understanding.           Chief Complaint   Patient presents with    Clinic Care Coordination - Initial        Universal Utilization:      Utilization       No Show Count (past year)  0             ED Visits  0             Hospital Admissions  1                    Current as of: 1/29/2024 12:54 AM                Clinical Concerns:  Current Medical Concerns:  Pregnancy    Current Behavioral Concerns: N/A    Education Provided to patient: CCC, [facility] [employer] [facility], low cost diapers and baby and maternity resources, [facility], 2026 paid leave laws      Health Maintenance Reviewed: Due/Overdue   Health Maintenance Due   Topic Date Due    DIABETIC FOOT EXAM  Never done    ADVANCE CARE PLANNING  Never done    MIGRAINE ACTION PLAN  Never done    Pneumococcal Vaccine: Pediatrics (0 to 5 Years) and At-Risk Patients (6 to 64 Years) (1 of 2 - PCV) Never done    HEPATITIS B IMMUNIZATION (2 of 3 - Hep B Twinrix 3-dose series) 09/06/2010    YEARLY PREVENTIVE VISIT  01/28/2023    MICROALBUMIN  06/08/2023    COVID-19 Vaccine (4 - 2023-24 season) 09/01/2023    LIPID  10/06/2023    OBGCT (OB)  Never done    REPEAT ANTIBODY SCREEN (OB)  01/12/2024    PAP  01/25/2024     Clinical Pathway: None    Medication Management:  Medication review status:        Functional Status:  Bed or wheelchair confined:: No  Mobility Status: Independent    Living Situation:  Current living arrangement:: I live in a private home with spouse  Type of residence:: Private home - stairs    Lifestyle & Psychosocial Needs:    Social Determinants of Health     Food Insecurity: Low Risk  (1/25/2024)    Food Insecurity     Within the past 12 months, did you worry that your food would run out before you got money to buy more?: No     Within the past 12 months, did the food you bought just not last and you didn t have money to get more?: No   Depression: Not at risk (1/10/2024)    PHQ-2     PHQ-2 Score: 0   Housing Stability: Low Risk  (1/25/2024)    Housing Stability     Do you have housing? : Yes     Are you worried about losing your housing?: No   Tobacco Use: Low Risk  (1/8/2024)    Patient History     Smoking Tobacco Use:  Never     Smokeless Tobacco Use: Never     Passive Exposure: Not on file   Financial Resource Strain: Low Risk  (1/25/2024)    Financial Resource Strain     Within the past 12 months, have you or your family members you live with been unable to get utilities (heat, electricity) when it was really needed?: No   Alcohol Use: Not At Risk (1/25/2024)    AUDIT-C     Frequency of Alcohol Consumption: Never     Average Number of Drinks: Patient does not drink     Frequency of Binge Drinking: Never   Transportation Needs: Low Risk  (1/25/2024)    Transportation Needs     Within the past 12 months, has lack of transportation kept you from medical appointments, getting your medicines, non-medical meetings or appointments, work, or from getting things that you need?: No   Physical Activity: Insufficiently Active (1/25/2024)    Exercise Vital Sign     Days of Exercise per Week: 2 days     Minutes of Exercise per Session: 20 min   Interpersonal Safety: Not on file   Stress: No Stress Concern Present (1/25/2024)    [employer] [employer] - Occupational Stress Questionnaire     Feeling of Stress : Only a little   Social Connections: Moderately Isolated (1/25/2024)    Social Connection and Isolation Panel [NHANES]     Frequency of Communication with Friends and Family: Three times a week     Frequency of Social Gatherings with Friends and Family: Twice a week     Attends Uatsdin Services: Never     Active Member of Clubs or Organizations: No     Attends Club or Organization Meetings: Never     Marital Status:               Informal Support system:: Family, Spouse             Resources and Interventions:  Current Resources:      Community Resources: None  Supplies Currently Used at Home: Diabetic Supplies  Equipment Currently Used at Home: none  Employment Status: employed full-time         Advance Care Plan/Directive  Advanced Care Plans/Directives on file:: No    Referrals Placed: Community  Resources         Care Plan:      Patient/Caregiver understanding: yes       Future Appointments                In 1 week Kat Lima RD [facility] [place], CS    In 1 week WEUS1 [facility] [place]    In 1 week Clarissa Mcfadden MD [facility] [place], [facility]    In 2 weeks Santy Jimenez MD [facility] [place], CS    In 2 weeks WEUS2 [facility] [place], [facility]    In 2 weeks WEUS1 [facility] [place], [facility]    In 3 weeks WEUS1 [facility] [place], [facility]    In 3 weeks WEUS2 [facility] [place], [facility]    In 3 weeks Sierra Santoyo DO [facility] [place], [facility]    In 4 weeks WEUS1 [facility] [place], [facility]    In 1 month WEUS2 [facility] [place], [facility]    In 1 month WEUS1 [facility] [place], [facility]    In 1 month Sierra Santoyo DO [facility] [place], [facility]    In 1 month WEUS1 [facility] [place], [facility]    In 1 month WEUS1 [facility] [place], [facility]    In 1 month WEUS1 [facility] [place]    In 1 month Sierra Santoyo DO [facility] [place], [facility]    In 1 month WEUS1 [facility] [place], [facility]    In 1 month Vy Amin MD [facility] [place], [facility]    In 1 month Santy Jimenez MD [facility] [place], CS    In 1 month WEUS1 [facility] [place], [facility]    In 1 month WEUS2 [facility] [place], [facility]    In 1 month Sierra Santoyo DO [facility]  [facility] [place], [place] [facility]    In 1 month WEUS1 [facility]    In 2 months WEUS2 [facility]    In 2 months s, Sierra Treviño DO [facility] [place], [place] [facility]    In 2 months WEUS1 [facility] [place] [place] [facility]            Plan: Patient will reach out in the future if she has further needs.     Lois Hurst,  [facility]  Clinic Care Coordinator  [facility]  [phone]  [email or web address]

## 2024-01-29 NOTE — TELEPHONE ENCOUNTER
Type 1 Diabetes + Pregnancy Follow-up    Subjective/Objective:    Linda Chavez sent in blood glucose log for review. Last date of communication was: 1/17/24.    Diabetes is being managed with diet, activity, and medications    Taking diabetes medications: yes:     Diabetes Medication(s)       Diabetic Other       Glucagon (BAQSIMI TWO PACK) 3 MG/DOSE POWD Spray 1 Device in nostril once as needed (for severe hypoglycemia)       Insulin       insulin aspart (NOVOLOG PEN) 100 UNIT/ML pen Inject 3 to 10 units subcutaneous at mealtimes as directed, total daily dose approx 30 units     insulin aspart (NOVOLOG VIAL) 100 UNITS/ML vial Use with insulin pump, total daily dose approx 100 units     insulin glargine (LANTUS PEN) 100 UNIT/ML pen Inject 22 Units Subcutaneous At Bedtime     SEMGLEE, YFGN, 100 UNIT/ML SOPN             Estimated Date of Delivery: Apr 5, 2024    BG/Food Log:               Assessment:    NOT meeting TIR targets at only 60%, fasting, pre- and post-prandial blood sugars appear to be above goal majority of the time. Will increase basal rates x 24 hrs/day by 20% as well as correction factor x 24 hrs/day. Recommend increase to ICR for lunch & dinner. Will encourage patient to manually correct blood sugars >140 using correction factor in pump's calculator.     Plan/Response:  Recommend increase to insulin -   Start Time Basal Rate Correction Factor  Carb Ratio Target BG   MN 2.1 --> 2.5 unit(s)/hr 1u:35 --> 1u:30 1u:7.5g 110   6:00am 2.0 --> 2.4 unit(s)/hr 1u:35 --> 1u:30 1u:6.0g 110   7:00am 2.0 --> 2.4 unit(s)/hr 1u:20 --> 1u:16 1u:3.2g  110   11:00am 2.0 --> 2.4 unit(s)/hr 1u:20 --> 1u:16 1u:2.4g --> 2g 110   12:00pm 2.3 --> 2.7 unit(s)/hr 1u:18 --> 1u:14 1u:2.8g --> 2.4g 110   2:30pm 2.3 --> 2.7 unit(s)/hr 1u:18 --> 1u:14 1u:3g --> 2.5g 110   5:00pm 2.3 --> 2.7 unit(s)/hr 1u:18 --> 1u:14 1u:2.4g --> 2g 110   9:00pm  2.3 --> 2.7 unit(s)/hr 1u:20 --> 1u:16 1u:3.2g --> 2.8g 110   .  Follow-up in 1  week.    Kat Lima RD, LD, CDCES     Any diabetes medication dose changes were made via the CDE Protocol and Collaborative Practice Agreement with the patient's endocrinology provider. A copy of this encounter was shared with the provider.

## 2024-01-29 NOTE — LETTER
[facility] CARE COORDINATION  [address] [place] [address]    January 29, 2024    Linda Chavez  [address]  [place] [address]      Dear Linda,    I am a clinic care coordinator who works with Belén Mustafa MD with the [facility]. I wanted to thank you for spending the time to talk with me.  Below is a description of clinic care coordination and how I can further assist you.       The clinic care coordination team is made up of a registered nurse, , financial resource worker and community health worker who understand the health care system. The goal of clinic care coordination is to help you manage your health and improve access to the health care system. Our team works alongside your provider to assist you in determining your health and social needs. We can help you obtain health care and community resources, providing you with necessary information and education. We can work with you through any barriers and develop a care plan that helps coordinate and strengthen the communication between you and your care team.  Our services are voluntary and are offered without charge to you personally.    Please feel free to contact me with any questions or concerns regarding care coordination and what we can offer.      We are focused on providing you with the highest-quality healthcare experience possible.    Sincerely,     Lois Hurst, [facility]  Clinic Care Coordinator  [facility] [place]  [phone]      Enclosed: Baby resources    Baby Supplies, classes, life coaching:      [facility] -  https://NaldoBayley Seton HospitalHybrid Energy Solutions.[place] [phone]  Do you need help with housing, food support, diapers, a crib for your baby, or other material needs? We can help connect you with the resources you need.We have many community partners that work together to help you get the support you need. Want more info on pregnancy, parenting, finances, or  relationships?  Our online classes can help!    Are you ready to make positive, lasting change in your life?  Is it time to set new goals, and make a plan to reach them?  If you are ready to start a new journey, our  can help. As a free service to our clients, [facility] will provide clients with 10 private sessions with a .    [employer] - http://www.wearDignity Health [facility] General Hospital.org/mamas-grayson.html  Individual Counseling and Support Groups. Childcare provided while utilizing [employer]andem services.  Care Packages - Every 3 months you can come meet with an advocate and together you will assemble a care package for your family.  Thrift Store - Gently used clothing at very affordable prices. Clothing, diapers, toys, books, small equipment Tuesdays 12 - 3pm. [employer]ia[employer]ot $3 - $5 per pack, 1 pack per month per child  Located: [address] [place] [address] - phone: [phone]  Hours: Monday, Wednesday, Thursday 9am to 3pm and Tuesday appointment only     [facility] - https://[facility].org/  A supportive, non-judgmental environment for facing an unexpected pregnancy or sexual health concerns. Services include free pregnancy testing, , resources for general assistance, financial assistance, education and earn while you learn courses. Services assist new moms from pregnancy to parents (mom s, dad s, grandparents) with children up to 2 years of age. Pearl-based program  Free parental classes. Incentives for class completion: free car seats, free crib, free clothing, free pack and play, and so much more!  Located: [address], [place] [address] - phone: [phone] - appointments and walk-ins [facility] [place] - https://[facility].org/  Hope program: support available while pregnant until baby turns one. Earn points to redeem for baby items. Pearl-based program  Located: [address] [place] [address] - phone:  "[phone]    New Day; Pregnancy Care Center - https://www.newdaycenter.org/  Free and confidential: pregnancy mentoring,  recovery support and information, parenting classes, earn while you learn classes, help for families, community resources, and 24 hour helpline. Pearl-based program  Located: [address], [place] [address] - phone: [phone]   Hours: Monday, Wednesday, Friday 10am - 2pm and Thursday 10am - 6pm    Everyday Miracles - https://www.everyday-miracles.org/requests/  Most of our services are available for free for those on straight Medicaid or Blue plus, [facility], and [facility]. Car Seats, Breast pumps, and  services available. As well as Chiropractic and acupuncture services. Many different childbirth education, breastfeeding, and parenting classes are available.  [address], [place] [address] - phone: [phone]    First [facility]regnancy [facility] - https://firstcarepregnancycenter.org/  We know that every child is unique, and so are you. From the moment you find out you are pregnant until your youngest child turns five, The Every Family Parenting Program provides tools to encourage and support you. Parenting Coaching and baby items available.  Located in [facility].    Women's Life Care Center - https://www.womenslifecarecenter.org/  [phone]  Offers pregnancy testing, referrals for food and clothing, life coaching, referals for therapy, housing and legal support.    ASSISTANCE AND REFERRALS FOR HOUSING, INCLUDING PREGNANCY HOMES  The center uses state and federally funded agencies, as well as privately funded agencies, for our referrals. We assist clients in completing needed county paperwork such as monthly household report forms.    From Me To You- https://www.ubwjixxaomt01.org/  (540) [phone]  Clothing for children and adults  \"Dress for Success\" clothing program for adults returning to work or entering " school  Coaching for job interviews, including resume help  Miscellaneous household items    [place]- https://sites.google.com/view/[place]/home 913-443-4375  Mad[place]i [place] is a registered 501(c)(3) nonprofit organization that was started to fill a need in the community. It is designed to help people in need with infant, toddler, and children clothing/toys/supplies. We additionally work with local organizations in our community to bring external resources to the people in which we serve.    Azael Schneider; Diaper [employer]ot - https://www.[facility].org/community/neighborhood-ministries/supporting-neighborhood-youth-and-children/  N - 6 $3 per pack, Pull-ups $4 per pack. 2 packs per child per month until 4 years old.  [address] [place] [address] - phone [phone]  Tuesday 4 - 6pm; 1st and 3rd Saturday 11am - 1pm    Cradle [facility] Hope - https://cradleofPomaria.org/    Cradle of Hope is a pearl-based organization that encourages life by providing financial aid to women and babies in crisis, especially those women who might not choose life because of financial pressures.   Offer support with rent/mortgage, utility bills, medical bills, childcare, transportation, portable cribs, safe sleep information, safe sleep class, baby shower baskets, and many other resources.  Location: [address] [place] [address], [address], [place] [address]   Hours: Monday - Friday 8am - 4pm     [facility] - https://nlfs.org/services/pregnancy-help/  We can offer our Parenting Plus educational program to help you learn about parenting and receive baby items such as clothing, diapers, wipes, and new equipment as well information about additional community resources to assist you and your child. Pearl-based program  Location: [address] [place] [address] - phone: [phone]    Pregnancy Choices and Life Care Center- https://www.[employer]regnancy[employer].Quantum/  [phone]  We provide limited medical  services for women and men experiencing an unexpected pregnancy, sexual health concern, and/or relational poverty. We also connect clients to over 250 community resources throughout [place] and the [facility].   Services offered include medical services, life coaching, classes, and education.      [employer] [place] https://po.org/   Provides free pregnancy testing, ultrasounds, family support, parenting classes and support, free baby supplies, fertility awareness training.  Primarily serves women and their families who reside in [facility] ([place], [place], [facility], and [place]) and in [facility] heavily / [facility] located in [place]. Approximately 25 percent of our clients live outside our traditional service area.    Diaper resources  https://www.diaperbankmn.org/diaper-bank-partner-agencies/  https://www.diaperbankmn.org/find-diapers/    Clothing  https://www.[employer] Acqusitions/html/[place]_clothing_closets.html  https://www.[employer] Acqusitions/html/[place]_clothing_closets.html  https://sites.[employer].com/view/kaitlynskloset-[place]/home    General Baby supplies  https://babiesneedboxes.org/  https://birthright.org/services/?tab=3    WIC - http://www.health.[facility].[place].us/divs/fh/wic/ppthome.html  A nutrition and breastfeeding program that helps young families eat well and be healthy.   [facility] can help: Pregnant woman learn about nutritious foods for a healthy pregnancy and birth. Support breastfeeding and help new moms meet their breastfeeding goals. Families provide nutritious foods to their young children so they are healthy, happy and ready to learn.  Phone: [phone] to find [facility] office nearest you           Miscellaneous  [employer]- [phone] Ramakrishna Haley s mission is to provide assistance to families with a critically ill or seriously injured family member. No matter the illness, no matter the  injury, no matter the income. We help families  Bounce and Not Break  through our Help Me Bounce platform, harnessing the power of crowdfunding to connect thousands of families directly to donors. We are committed to helping families stay by their loved one s side by relieving the stress, anxiety and financial burden that comes with a medical crisis.    ALIGN-  [place] only.  https://www.alignmpls.org/era    Emergency rental assistance for [facility] only.     Helpful [facility] Website - https://[facility].org/what-we-do/community-resources.html

## 2024-02-06 ENCOUNTER — VIRTUAL VISIT (OUTPATIENT)
Dept: EDUCATION SERVICES | Facility: CLINIC | Age: 34
End: 2024-02-06
Payer: COMMERCIAL

## 2024-02-06 DIAGNOSIS — E10.9 TYPE 1 DIABETES, HBA1C GOAL < 7% (H): Primary | ICD-10-CM

## 2024-02-06 PROCEDURE — G0108 DIAB MANAGE TRN  PER INDIV: HCPCS | Mod: 93 | Performed by: DIETITIAN, REGISTERED

## 2024-02-06 NOTE — LETTER
2/6/2024         RE: Linda Chavez  [address]  [place] [place] [address]        Dear Colleague,    Thank you for referring your patient, Linda Chavez, to the [facility]. Please see a copy of my visit note below.    Diabetes and Pregnancy Follow-up  Type of Service: Telephone Visit    How would patient like to obtain AVS? Not needed    Subjective/Objective:    Linda Chavez was called for a scheduled BG review. Last date of communication was: 1/29/24.    Diabetes is being managed with diet, activity, and medications    Taking diabetes medications: yes:     Diabetes Medication(s)       Diabetic Other       Glucagon (BAQSIMI TWO PACK) 3 MG/DOSE POWD Spray 1 Device in nostril once as needed (for severe hypoglycemia)       Insulin       insulin aspart (NOVOLOG PEN) 100 UNIT/ML pen Inject 3 to 10 units subcutaneous at mealtimes as directed, total daily dose approx 30 units     insulin aspart (NOVOLOG VIAL) 100 UNITS/ML vial Use with insulin pump, total daily dose approx 120 units     insulin glargine (LANTUS PEN) 100 UNIT/ML pen Inject 22 Units Subcutaneous At Bedtime     SEMGLEE, YFGN, 100 UNIT/ML SOPN             Estimated Date of Delivery: Apr 5, 2024    Blood Glucose/Ketone Log:                Assessment:    Not meeting ADA TIR target for pregnancy >70% but improved since previous week. We discussed today plan for labor & delivery - patient would prefer to keep her pump on as long as possible during this process. I will discuss with Endocrinology, Dr. Jimenez, to make sure that is reflected in his notes, per his preference as well. Additionally, we discussed post-partum pump settings - the plan is to go back to her previous profile saved in her pump - and we reviewed significant changes to insulin needs after baby is born and when breastfeeding. May need to adjust further. She inquired about IOL at 39 weeks and we reviewed this plus twice weekly BPPs are standard of care for individuals  with T1DM who are pregnant. She is understanding of that. She has Endocrinology follow up next week. We did not make changes to settings today at patient's request as we just previously made fairly significant adjustments the week prior. I stressed importance of premeal bolusing - 20-30 minutes before meals - and manual corrections for any blood sugar >140. She will try to enter these more often.     Plan/Response:  No changes in the patient's current treatment plan.  Follow-up in 1 week.  Will connect with Dr. Jimenez about using pump through L&D, per patient preference    Kat Lima, RD, LD, [facility]   Time Spent: 31 minutes    Any diabetes medication dose changes were made via the CDE Protocol and Collaborative Practice Agreement with the patient's endocrinology provider. A copy of this encounter was shared with the provider.

## 2024-02-06 NOTE — PROGRESS NOTES
Diabetes and Pregnancy Follow-up  Type of Service: Telephone Visit    How would patient like to obtain AVS? Not needed    Subjective/Objective:    Linda Chavez was called for a scheduled BG review. Last date of communication was: 1/29/24.    Diabetes is being managed with diet, activity, and medications    Taking diabetes medications: yes:     Diabetes Medication(s)       Diabetic Other       Glucagon (BAQSIMI TWO PACK) 3 MG/DOSE POWD Spray 1 Device in nostril once as needed (for severe hypoglycemia)       Insulin       insulin aspart (NOVOLOG PEN) 100 UNIT/ML pen Inject 3 to 10 units subcutaneous at mealtimes as directed, total daily dose approx 30 units     insulin aspart (NOVOLOG VIAL) 100 UNITS/ML vial Use with insulin pump, total daily dose approx 120 units     insulin glargine (LANTUS PEN) 100 UNIT/ML pen Inject 22 Units Subcutaneous At Bedtime     SEMGLEE, YFGN, 100 UNIT/ML SOPN             Estimated Date of Delivery: Apr 5, 2024    Blood Glucose/Ketone Log:                Assessment:    Not meeting ADA TIR target for pregnancy >70% but improved since previous week. We discussed today plan for labor & delivery - patient would prefer to keep her pump on as long as possible during this process. I will discuss with Endocrinology, Dr. Jimenez, to make sure that is reflected in his notes, per his preference as well. Additionally, we discussed post-partum pump settings - the plan is to go back to her previous profile saved in her pump - and we reviewed significant changes to insulin needs after baby is born and when breastfeeding. May need to adjust further. She inquired about IOL at 39 weeks and we reviewed this plus twice weekly BPPs are standard of care for individuals with T1DM who are pregnant. She is understanding of that. She has Endocrinology follow up next week. We did not make changes to settings today at patient's request as we just previously made fairly significant adjustments the week prior. I  stressed importance of premeal bolusing - 20-30 minutes before meals - and manual corrections for any blood sugar >140. She will try to enter these more often.     Plan/Response:  No changes in the patient's current treatment plan.  Follow-up in 1 week.  Will connect with Dr. Jimenez about using pump through L&D, per patient preference    Kat Lima RD, LD, [facility]   Time Spent: 31 minutes    Any diabetes medication dose changes were made via the CDE Protocol and Collaborative Practice Agreement with the patient's endocrinology provider. A copy of this encounter was shared with the provider.

## 2024-02-09 ENCOUNTER — PRENATAL OFFICE VISIT (OUTPATIENT)
Dept: OBGYN | Facility: CLINIC | Age: 34
End: 2024-02-09
Attending: OBSTETRICS & GYNECOLOGY
Payer: COMMERCIAL

## 2024-02-09 ENCOUNTER — ANCILLARY PROCEDURE (OUTPATIENT)
Dept: ULTRASOUND IMAGING | Facility: CLINIC | Age: 34
End: 2024-02-09
Attending: OBSTETRICS & GYNECOLOGY
Payer: COMMERCIAL

## 2024-02-09 VITALS — DIASTOLIC BLOOD PRESSURE: 80 MMHG | WEIGHT: 232 LBS | SYSTOLIC BLOOD PRESSURE: 132 MMHG | BODY MASS INDEX: 37.45 KG/M2

## 2024-02-09 DIAGNOSIS — O24.019 TYPE 1 DIABETES MELLITUS DURING PREGNANCY, ANTEPARTUM: ICD-10-CM

## 2024-02-09 DIAGNOSIS — O24.013 TYPE 1 DIABETES MELLITUS IN PREGNANCY, THIRD TRIMESTER: ICD-10-CM

## 2024-02-09 DIAGNOSIS — O09.93 SUPERVISION OF HIGH RISK PREGNANCY IN THIRD TRIMESTER: ICD-10-CM

## 2024-02-09 DIAGNOSIS — O09.93 SUPERVISION OF HIGH RISK PREGNANCY IN THIRD TRIMESTER: Primary | ICD-10-CM

## 2024-02-09 DIAGNOSIS — O99.280 HYPOTHYROID IN PREGNANCY, ANTEPARTUM: ICD-10-CM

## 2024-02-09 DIAGNOSIS — E03.9 HYPOTHYROID IN PREGNANCY, ANTEPARTUM: ICD-10-CM

## 2024-02-09 DIAGNOSIS — Z3A.32 32 WEEKS GESTATION OF PREGNANCY: ICD-10-CM

## 2024-02-09 PROCEDURE — 99207 PR PRENATAL VISIT: CPT | Performed by: OBSTETRICS & GYNECOLOGY

## 2024-02-09 PROCEDURE — 76819 FETAL BIOPHYS PROFIL W/O NST: CPT | Performed by: OBSTETRICS & GYNECOLOGY

## 2024-02-09 PROCEDURE — 76816 OB US FOLLOW-UP PER FETUS: CPT | Performed by: OBSTETRICS & GYNECOLOGY

## 2024-02-09 NOTE — PROGRESS NOTES
Doing well today.  +FM  No ctx/cramping/vb/spotting  No bowel/bladder issues  Overall feeling well  Type I DM --follows closely with DM educator and Dr. Jimenez --seeing Dr. Jimenez on Monday; currently on lantus and novolog with good sugar control  Growth us today:  vtx, EFW 2047g/4-8# (64%ile) MVP 6.4cm, BPP 8/8 --will now start 2x weekly BPP and monthly growth us with Dr. Santoyo  -questions today about timing of delivery, IOL, etc.  Discussed likely delivery between 37-39th week depending on sugar control.  Discussed induction methods/plan based on cervical exams and readiness at that time  RTC 2wks with DR. Santoyo

## 2024-02-11 NOTE — PROGRESS NOTES
Virtual Visit Details    Type of service:  Video Visit     Originating Location (pt. Location): Home  Distant Location (provider location):  Off-site  Platform used for Video Visit: Edwin        Recent issues:  Diabetes and thyroid follow-up evaluation  Currently 32 weeks gestation, [id number], had fetal U/S recently-went well, MYKE 24 but expected 3/20/24, sees Dr. Sierra Santoyo/[facility]  Using the Tandem pump + DexcomG6 in Sleep Mode pump setting  Feeling well overall, no new health issues with her Pg reported           . Diagnosis of diabetes mellitus, age 13, living in [place]  Had acute illness requiring hospitalization at [facility]  Began insulin injections, using Novolog and Lantus insulin  [place] carb counting method, gram estimates  On MDI treatment plan for approx 2 years, then changed to pump use  Previous medical evaluations with Dr. Yash Barcenas/[facility]ia Callahan  . Began use of Salix pump  . Changed to Medtronic pump  Subsequently using Medtronic 723 Paradigm pump  . Tried wearing CGMS sensor, but uncomfortable     16. Initial consult with me at my former clinic ([facility])  Continued pump management  General medicine appointments with Dr. Ellen Burdick/[facility] [place]     Previous [facility] hgbA1c levels include:              Lab Test 02/05/18 10/10/17 06/21/17 02/01/17 11/16/16  0815   A1C 7.4* 7.8* 8.1* 7.3* 8.2*      ~2017. Upgraded to Medtronic 670G pump    Tried Medtronic Guardian sensor, recalls frequent low glucose alarms              Wore sensor in manual mode              Didn't like it, discontinued use     ~2018. Wore diagnostic LibrePro CGM  Subsequently obtained LibrePersonal CGM, not wearing past year  3/2022. Changed to Tandem TSlim insulin pump    Novolog in pump    Tandem pump settings:              Recent Tandem pump data:                  Recent DexcomG6 data:         Recent [facility] labs include:  Lab Results   Component Value Date    A1C 6.2 (H) 2024    NA  136 01/22/2024    POTASSIUM 4.0 01/22/2024    CHLORIDE 105 01/22/2024    CO2 21 (L) 01/22/2024    ANIONGAP 10 01/22/2024     (H) 01/22/2024    BUN 6.3 01/22/2024    CR 0.56 01/22/2024    GFRESTIMATED >90 01/22/2024    GFRESTBLACK >90 04/13/2021    MARCIA 9.2 01/22/2024    CHOL 241 (H) 10/06/2022    TRIG 75 10/06/2022    HDL 67 10/06/2022     (H) 10/06/2022    NHDL 174 (H) 10/06/2022    UCRR 64 06/08/2022    MICROL 5 06/08/2022    UMALCR 7.81 06/08/2022     Last [facility] in [place] 10/19/23, no DR  Hyperlipidemia:  Takes simvastatin medication  DM Complications:  None known        Thyroid:  2013. 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 Belén Mustafa/[facility] clinic for general medicine appointments  Also [facility] Sierra Higginss[facility]      PMH/PSH:  Past Medical History:   Diagnosis Date    Adjustment disorder with anxious mood     Allergic rhinitis, cause unspecified     h/o AIT    Chest discomfort     Common migraine     No visual aura.     Hyperlipidemia LDL goal < 70     Hypothyroidism     Infectious mononucleosis 01/01/1995    Insulin pump in place     Dexcom continuous glucose monitoring    Obesity     Tuberculosis     Type 1 diabetes, HbA1c goal < 7% (H) 03/01/2004     followed [facility] - peds endocrinology - now Dr Jimenez     Past Surgical History:   Procedure Laterality Date    APPENDECTOMY  08/01/2007    dr deshpande    DILATION AND CURETTAGE SUCTION  N/A 5/9/2023    Procedure: DILATION AND CURETTAGE OF UTERUS USING SUCTION;  Surgeon: Sierra Santoyo DO;  Location: [facility] OR    PE TUBES Bilateral 01/01/1996       Family Hx:  Family History   Problem Relation Age of Onset    Neurologic Disorder Maternal Grandmother         dementia    Genetic Disorder Paternal Grandfather         kidney    Family History Negative No family hx of          Social Hx:  Social History     Socioeconomic History    Marital status:      Spouse name: Not on file    Number of children: Not on file    Years of education: Not on file    Highest education level: Not on file   Occupational History    Occupation: Works in supply chain   Tobacco Use    Smoking status: Never    Smokeless tobacco: Never   Vaping Use    Vaping Use: Never used   Substance and Sexual Activity    Alcohol use: Not Currently     Alcohol/week: 2.0 standard drinks of alcohol     Types: 2 Standard drinks or equivalent per week     Comment: 2-5 drinks per week    Drug use: No    Sexual activity: Yes     Partners: Male   Other Topics Concern     Service Not Asked    Blood Transfusions Not Asked    Caffeine Concern Yes     Comment: rare    Occupational Exposure Not Asked    Hobby Hazards Not Asked    Sleep Concern No    Stress Concern No    Weight Concern Not Asked    Special Diet Not Asked    Back Care Not Asked    Exercise Yes    Bike Helmet Not Asked    Seat Belt Yes    Self-Exams No     Comment: encouraged    Parent/sibling w/ CABG, MI or angioplasty before 65F 55M? No   Social History Narrative    -Working -       Social Determinants of Health     Financial Resource Strain: Low Risk  (1/25/2024)    Financial Resource Strain     Within the past 12 months, have you or your family members you live with been unable to get utilities (heat, electricity) when it was really needed?: No   Food Insecurity: Low Risk  (1/25/2024)    Food Insecurity     Within the past 12 months, did you worry that your food would  run out before you got money to buy more?: No     Within the past 12 months, did the food you bought just not last and you didn t have money to get more?: No   Transportation Needs: Low Risk  (1/25/2024)    Transportation Needs     Within the past 12 months, has lack of transportation kept you from medical appointments, getting your medicines, non-medical meetings or appointments, work, or from getting things that you need?: No   Physical Activity: Insufficiently Active (1/25/2024)    Exercise Vital Sign     Days of Exercise per Week: 2 days     Minutes of Exercise per Session: 20 min   Stress: No Stress Concern Present (1/25/2024)    [employer] [place] [employer] - Occupational Stress Questionnaire     Feeling of Stress : Only a little   Social Connections: Moderately Isolated (1/25/2024)    Social Connection and Isolation Panel [NHANES]     Frequency of Communication with Friends and Family: Three times a week     Frequency of Social Gatherings with Friends and Family: Twice a week     Attends Gnosticist Services: Never     Active Member of Clubs or Organizations: No     Attends Club or Organization Meetings: Never     Marital Status:    Interpersonal Safety: Not on file   Housing Stability: Low Risk  (1/25/2024)    Housing Stability     Do you have housing? : Yes     Are you worried about losing your housing?: No          MEDICATIONS:  has a current medication list which includes the following prescription(s): aspirin, dexcom g6 sensor, dexcom g6 transmitter, flaxseed (linseed), insulin aspart, levothyroxine, prenatal w/o a vit-fe fum-fa, sertraline, contour next test, baqsimi two pack, insulin aspart, insulin glargine, insulin pen needle, microlet lancets, naratriptan, and triamcinolone.      ROS: 10 point ROS neg other than the symptoms noted above in the HPI.     GENERAL: some fatigue, wt gain with Pg; denies fevers, chills, night sweats.   HEENT: no dysphagia, odonophagia, diplopia, neck  pain  THYROID:  no apparent hyper or hypothyroid symptoms  CV: no chest pain, pressure, palpitations  LUNGS: no SOB, LANG, cough, wheezing   ABDOMEN: rare nausea; no diarrhea, constipation, abdominal pain  EXTREMITIES: no rashes, ulcers, edema  NEUROLOGY: no headaches, denies changes in vision, tingling, extremitiy numbness   MSK: no muscle aches or pains, weakness  SKIN: no rashes or lesions  : no menses during Pg  PSYCH:  stable mood, no significant anxiety or depression  ENDOCRINE: no heat or cold intolerance     Physical Exam (visual exam)  VS:  no vital signs taken for video visit  CONSTITUTIONAL: healthy, alert and NAD, well dressed, answering questions appropriately  ENT: no nose swelling or nasal discharge, mouth redness or gum changes.  EYES: eyes grossly normal to inspection, conjunctivae and sclerae normal, no exophthalmos or proptosis  THYROID:  no apparent nodules or goiter  LUNGS: no audible wheeze, cough or visible cyanosis, no visible retractions or increased work of breathing  ABDOMEN: abdomen not evaluated  EXTREMITIES: no hand tremors, limited exam  NEUROLOGY: CN grossly intact, mentation intact and speech normal   SKIN:  no apparent skin lesions, rash, or edema with visualized skin appearance  PSYCH: mentation appears normal, affect normal/bright, judgement and insight intact,   normal speech and appearance well groomed       LABS:     All pertinent notes, labs, and images personally reviewed by me.     A/P:  Encounter Diagnoses   Name Primary?    Type 1 diabetes mellitus without complication (H) Yes    Insulin pump status     Hypothyroidism due to Hashimoto's thyroiditis     High-risk pregnancy, unspecified trimester        Comments:  Reviewed health history, diabetes and thyroid issues  Recent CGM glucose trends with some postprandial hyperglycemia, more frequently after breakfast  Reviewed and interpreted tests that I previously ordered.   Ordered appropriate tests for the endocrinology  disease management.    Management options discussed and implemented after shared medical decision making with the patient.  T1DM and hypothyroidism problems are chronic-stable    Plan:  Reviewed the overall T1DM management and insulin pump use.  Discussed optimal BG testing to assess glucose trends.  We reviewed insulin pump settings, basal rate and bolus dosing  Use of automated pump bolus dosing for meal/snack carb & correction dosing  Reviewed recent Tandem pump report information  Reviewed recent DexcomG6 CGM glucose trends, in detail    Reviewed general issues with the hypothyroidism diagnosis   Discussed lab tests used to assess patient thyroid hormone levels  Reviewed treatment options including levothyroxine medication, ideal dosing    Recommend:  Continue the Tandem insulin pump and diabetes management plan  Pump setting changes (current Profile 2):    Bolus ICR 7a 3.2 to 3.0    See AVS for the L&D [facility] plan  We have reviewed use of the Sleep Mode vs Exercise Mode pump settings:  Sleep Mode changes sensor glucose target from 112 to 120 mgdl, allows for increased basal rate delivery but no auto correction boluses.   Exercise Mode to raise sensor glucose target from 112 to 140 mg/dl, suspend target from 70 to 80 mg/dl.  Cannot set time duration in advance.          Continue the mealtime boluses premeal  Discussed plan for upgrading Tandem pump software to enable use of the DexcomG7 CGM sensors, message me when ready for G7 Rx  Continue use of the Dexcom CGM sensor  Continue the current levothyroxine 0.137 mg every day  Plan repeat nonfasting labs at 2/22/24 OBGYN    Testing at [facility]    Lab orders placed  Would not restart the simvastatin med while pregnant  Arrange annual dilated eye exam, fasting lipid panel testing.  Reviewed overall pregnancy treatment goals for the diabetes and thyroid management  Plan to have endocrinology appointments monthly and Diab Ed appointments every  7-10 days during Pg  Contact me if questions/concerns about diabetes and thyroid management    Important to share medical records between endocrinology and OBGYN providers  Addressed patient's questions today.    The longitudinal plan of care for the endocrine problem(s) were addressed during this visit.  Due to added complexity of care,   we will continue to support the patient and the subsequent management of this condition with ongoing continuity of care.    Patient Instructions   Trip to hospital for delivery:  - bring extra infusion sets and cartridges, Dexcom sensor  - inpatient diabetes orders per OB and/or hospitalist  - may use Activity mode if low glucose trends  - continue same levothyroxine 0.137 mg daily dose    After childbirth:  - change pump settings from Profile 2 (pregnancy) to Profile 1 (pre-pregnancy)  - continue Control IQ auto mode  - hospital team to message me if general questions about diabetes management  - message [facility] Diab Educator for postpartum update after returning home  - see Dr Hong for follow-up endocrinology appt approx 1 month postpartum,    Work-in appointment OK    Future labs ordered today:   Orders Placed This Encounter   Procedures    GLUCOSE MONITOR, 72 HOUR, PHYS INTERP    Hemoglobin A1c    TSH with free T4 reflex     Radiology/Consults ordered today: None    Total time spent on day of encounter:  36 min    Follow-up:  3/19/24 at 8a, Reggie Jimenez MD, MS  Endocrinology  [facility]    CC:  s, A, and JYOTI Lima

## 2024-02-12 ENCOUNTER — ANCILLARY PROCEDURE (OUTPATIENT)
Dept: ULTRASOUND IMAGING | Facility: CLINIC | Age: 34
End: 2024-02-12
Attending: OBSTETRICS & GYNECOLOGY
Payer: COMMERCIAL

## 2024-02-12 ENCOUNTER — VIRTUAL VISIT (OUTPATIENT)
Dept: ENDOCRINOLOGY | Facility: CLINIC | Age: 34
End: 2024-02-12
Payer: COMMERCIAL

## 2024-02-12 DIAGNOSIS — O24.013 TYPE 1 DIABETES MELLITUS IN PREGNANCY, THIRD TRIMESTER: ICD-10-CM

## 2024-02-12 DIAGNOSIS — E10.9 TYPE 1 DIABETES MELLITUS WITHOUT COMPLICATION (H): ICD-10-CM

## 2024-02-12 DIAGNOSIS — O09.93 SUPERVISION OF HIGH RISK PREGNANCY IN THIRD TRIMESTER: ICD-10-CM

## 2024-02-12 DIAGNOSIS — E10.9 TYPE 1 DIABETES MELLITUS WITHOUT COMPLICATION (H): Primary | ICD-10-CM

## 2024-02-12 DIAGNOSIS — E06.3 HYPOTHYROIDISM DUE TO HASHIMOTO'S THYROIDITIS: ICD-10-CM

## 2024-02-12 DIAGNOSIS — Z96.41 INSULIN PUMP STATUS: ICD-10-CM

## 2024-02-12 DIAGNOSIS — O09.90 HIGH-RISK PREGNANCY, UNSPECIFIED TRIMESTER: ICD-10-CM

## 2024-02-12 PROCEDURE — G2211 COMPLEX E/M VISIT ADD ON: HCPCS | Mod: 95 | Performed by: INTERNAL MEDICINE

## 2024-02-12 PROCEDURE — 76819 FETAL BIOPHYS PROFIL W/O NST: CPT | Performed by: OBSTETRICS & GYNECOLOGY

## 2024-02-12 PROCEDURE — 95251 CONT GLUC MNTR ANALYSIS I&R: CPT | Performed by: INTERNAL MEDICINE

## 2024-02-12 PROCEDURE — 99214 OFFICE O/P EST MOD 30 MIN: CPT | Mod: 95 | Performed by: INTERNAL MEDICINE

## 2024-02-12 RX ORDER — ASPIRIN 81 MG/1
81 TABLET ORAL DAILY
COMMUNITY
End: 2024-08-26

## 2024-02-12 NOTE — PATIENT INSTRUCTIONS
Trip to hospital for delivery:  - bring extra infusion sets and cartridges, Dexcom sensor  - inpatient diabetes orders per OB and/or hospitalist  - may use Activity mode if low glucose trends  - continue same levothyroxine 0.137 mg daily dose    After childbirth:  - change pump settings from Profile 2 (pregnancy) to Profile 1 (pre-pregnancy)  - continue Control IQ auto mode  - hospital team to message me if general questions about diabetes management  - message [facility] Diab Educator for postpartum update after returning home  - see Dr Hong for follow-up endocrinology appt approx 1 month postpartum,    Work-in appointment OK

## 2024-02-12 NOTE — LETTER
2024         RE: Linda Chavez  [address]  [facility] [address]        Dear Colleague,    Thank you for referring your patient, Linda Chavez, to the [facility] [place]. Please see a copy of my visit note below.    Virtual Visit Details    Type of service:  Video Visit     Originating Location (pt. Location): Home  Distant Location (provider location):  Off-site  Platform used for Video Visit: Edwin        Recent issues:  Diabetes and thyroid follow-up evaluation  Currently 32 weeks gestation, [id number], had fetal U/S recently-went well, MYKE 24 but expected 3/20/24, sees Dr. Sierra Santoyo/[facility] at [facility]  Using the Tandem pump + DexcomG6 in Sleep Mode pump setting  Feeling well overall, no new health issues with her Pg reported           . Diagnosis of diabetes mellitus, age 13, living in [place]  Had acute illness requiring hospitalization at [facility]  Began insulin injections, using Novolog and Lantus insulin  [place] carb counting method, gram estimates  On MDI treatment plan for approx 2 years, then changed to pump use  Previous medical evaluations with Dr. Yash Barcenas/[facility]  . Began use of [place] pump  . Changed to Medtronic pump  Subsequently using Medtronic 723 Paradigm pump  . Tried wearing CGMS sensor, but uncomfortable     14. Initial consult with me at my former clinic ([facility])  Continued pump management  General [facility] hgbA1c levels include:              Lab Test 02/05/18 10/10/17 06/21/17 02/01/17 11/16/16  0815   A1C 7.4* 7.8* 8.1* 7.3* 8.2*      ~2017. Upgraded to Medtronic 670G pump    Tried Medtronic Guardian sensor, recalls frequent low glucose alarms              Wore sensor in manual mode              Didn't like it, discontinued use     ~2018. Wore diagnostic LibrePro CGM  Subsequently obtained LibrePersonal CGM, not wearing past year  3/2022. Changed  to Tandem TSlim insulin pump    Novolog in pump    Tandem pump settings:              Recent Tandem pump data:                  Recent DexcomG6 data:         Recent [facility] labs include:  Lab Results   Component Value Date    A1C 6.2 (H) 01/22/2024     01/22/2024    POTASSIUM 4.0 01/22/2024    CHLORIDE 105 01/22/2024    CO2 21 (L) 01/22/2024    ANIONGAP 10 01/22/2024     (H) 01/22/2024    BUN 6.3 01/22/2024    CR 0.56 01/22/2024    GFRESTIMATED >90 01/22/2024    GFRESTBLACK >90 04/13/2021    MARCIA 9.2 01/22/2024    CHOL 241 (H) 10/06/2022    TRIG 75 10/06/2022    HDL 67 10/06/2022     (H) 10/06/2022    NHDL 174 (H) 10/06/2022    UCRR 64 06/08/2022    MICROL 5 06/08/2022    UMALCR 7.81 06/08/2022     Last eye exam at [facility] in [place] 10/19/23, no DR  Hyperlipidemia:  Takes simvastatin medication  DM Complications:  None known        Thyroid:  2013. Diagnosis of hypothyroidism  Previous [facility] thyroid labs include:    Treatment with levothyroxine medication  Previously on stable dose as levothyroxine 0.088 mg daily  Subsequently taking alternating levothyroxine 0.125 mg and 0.137 mg daily, then dose increase  Recent [facility] labs include:  Lab Results   Component Value Date    TSH 1.14 12/19/2023    T4 1.54 10/18/2023     (H) 12/19/2013     Current dose:  Levothyroxine 0.137 mg daily        , lives in 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 Belén Mustafa/[facility] for general medicine appointments  Also sees Dr[facility]ginss[facility]      PMH/PSH:  Past Medical History:   Diagnosis Date     Adjustment disorder with anxious mood      Allergic rhinitis, cause unspecified     h/o AIT     Chest discomfort      Common migraine     No visual aura.      Hyperlipidemia LDL goal < 70      Hypothyroidism      Infectious mononucleosis 01/01/1995     Insulin pump in place     Dexcom continuous glucose monitoring     Obesity       Tuberculosis      Type 1 diabetes, HbA1c goal < 7% (H) 03/01/2004     followed [facility] - peds endocrinology - now Dr Jimenez     Past Surgical History:   Procedure Laterality Date     APPENDECTOMY  08/01/2007    dr deshpande     DILATION AND CURETTAGE SUCTION N/A 5/9/2023    Procedure: DILATION AND CURETTAGE OF UTERUS USING SUCTION;  Surgeon: Sierra Santoyo DO;  Location: [facility] OR     PE TUBES Bilateral 01/01/1996       Family Hx:  Family History   Problem Relation Age of Onset     Neurologic Disorder Maternal Grandmother         dementia     Genetic Disorder Paternal Grandfather         kidney     Family History Negative No family hx of          Social Hx:  Social History     Socioeconomic History     Marital status:      Spouse name: Not on file     Number of children: Not on file     Years of education: Not on file     Highest education level: Not on file   Occupational History     Occupation: Works in supply chain   Tobacco Use     Smoking status: Never     Smokeless tobacco: Never   Vaping Use     Vaping Use: Never used   Substance and Sexual Activity     Alcohol use: Not Currently     Alcohol/week: 2.0 standard drinks of alcohol     Types: 2 Standard drinks or equivalent per week     Comment: 2-5 drinks per week     Drug use: No     Sexual activity: Yes     Partners: Male   Other Topics Concern      Service Not Asked     Blood Transfusions Not Asked     Caffeine Concern Yes     Comment: rare     Occupational Exposure Not Asked     Hobby Hazards Not Asked     Sleep Concern No     Stress Concern No     Weight Concern Not Asked     Special Diet Not Asked     Back Care Not Asked     Exercise Yes     Bike Helmet Not Asked     Seat Belt Yes     Self-Exams No     Comment: encouraged     Parent/sibling w/ CABG, MI or angioplasty before 65F 55M? No   Social History Narrative    -Working -       Social Determinants of Health     Financial Resource Strain: Low Risk  (1/25/2024)    Financial  Resource Strain      Within the past 12 months, have you or your family members you live with been unable to get utilities (heat, electricity) when it was really needed?: No   Food Insecurity: Low Risk  (1/25/2024)    Food Insecurity      Within the past 12 months, did you worry that your food would run out before you got money to buy more?: No      Within the past 12 months, did the food you bought just not last and you didn t have money to get more?: No   Transportation Needs: Low Risk  (1/25/2024)    Transportation Needs      Within the past 12 months, has lack of transportation kept you from medical appointments, getting your medicines, non-medical meetings or appointments, work, or from getting things that you need?: No   Physical Activity: Insufficiently Active (1/25/2024)    Exercise Vital Sign      Days of Exercise per Week: 2 days      Minutes of Exercise per Session: 20 min   Stress: No Stress Concern Present (1/25/2024)    [employer] [place] [employer] - Occupational Stress Questionnaire      Feeling of Stress : Only a little   Social Connections: Moderately Isolated (1/25/2024)    Social Connection and Isolation Panel [NHANES]      Frequency of Communication with Friends and Family: Three times a week      Frequency of Social Gatherings with Friends and Family: Twice a week      Attends Yazidism Services: Never      Active Member of Clubs or Organizations: No      Attends Club or Organization Meetings: Never      Marital Status:    Interpersonal Safety: Not on file   Housing Stability: Low Risk  (1/25/2024)    Housing Stability      Do you have housing? : Yes      Are you worried about losing your housing?: No          MEDICATIONS:  has a current medication list which includes the following prescription(s): aspirin, dexcom g6 sensor, dexcom g6 transmitter, flaxseed (linseed), insulin aspart, levothyroxine, prenatal w/o a vit-fe fum-fa, sertraline, contour next test, baqsimi two pack,  insulin aspart, insulin glargine, insulin pen needle, microlet lancets, naratriptan, and triamcinolone.      ROS: 10 point ROS neg other than the symptoms noted above in the HPI.     GENERAL: some fatigue, wt gain with Pg; denies fevers, chills, night sweats.   HEENT: no dysphagia, odonophagia, diplopia, neck pain  THYROID:  no apparent hyper or hypothyroid symptoms  CV: no chest pain, pressure, palpitations  LUNGS: no SOB, LANG, cough, wheezing   ABDOMEN: rare nausea; no diarrhea, constipation, abdominal pain  EXTREMITIES: no rashes, ulcers, edema  NEUROLOGY: no headaches, denies changes in vision, tingling, extremitiy numbness   MSK: no muscle aches or pains, weakness  SKIN: no rashes or lesions  : no menses during Pg  PSYCH:  stable mood, no significant anxiety or depression  ENDOCRINE: no heat or cold intolerance     Physical Exam (visual exam)  VS:  no vital signs taken for video visit  CONSTITUTIONAL: healthy, alert and NAD, well dressed, answering questions appropriately  ENT: no nose swelling or nasal discharge, mouth redness or gum changes.  EYES: eyes grossly normal to inspection, conjunctivae and sclerae normal, no exophthalmos or proptosis  THYROID:  no apparent nodules or goiter  LUNGS: no audible wheeze, cough or visible cyanosis, no visible retractions or increased work of breathing  ABDOMEN: abdomen not evaluated  EXTREMITIES: no hand tremors, limited exam  NEUROLOGY: CN grossly intact, mentation intact and speech normal   SKIN:  no apparent skin lesions, rash, or edema with visualized skin appearance  PSYCH: mentation appears normal, affect normal/bright, judgement and insight intact,   normal speech and appearance well groomed       LABS:     All pertinent notes, labs, and images personally reviewed by me.     A/P:  Encounter Diagnoses   Name Primary?     Type 1 diabetes mellitus without complication (H) Yes     Insulin pump status      Hypothyroidism due to Hashimoto's thyroiditis      High-risk  pregnancy, unspecified trimester        Comments:  Reviewed health history, diabetes and thyroid issues  Recent CGM glucose trends with some postprandial hyperglycemia, more frequently after breakfast  Reviewed and interpreted tests that I previously ordered.   Ordered appropriate tests for the endocrinology disease management.    Management options discussed and implemented after shared medical decision making with the patient.  T1DM and hypothyroidism problems are chronic-stable    Plan:  Reviewed the overall T1DM management and insulin pump use.  Discussed optimal BG testing to assess glucose trends.  We reviewed insulin pump settings, basal rate and bolus dosing  Use of automated pump bolus dosing for meal/snack carb & correction dosing  Reviewed recent Tandem pump report information  Reviewed recent DexcomG6 CGM glucose trends, in detail    Reviewed general issues with the hypothyroidism diagnosis   Discussed lab tests used to assess patient thyroid hormone levels  Reviewed treatment options including levothyroxine medication, ideal dosing    Recommend:  Continue the Tandem insulin pump and diabetes management plan  Pump setting changes (current Profile 2):    Bolus ICR 7a 3.2 to 3.0    See AVS for the &[facility] plan  We have reviewed use of the Sleep Mode vs Exercise Mode pump settings:  Sleep Mode changes sensor glucose target from 112 to 120 mgdl, allows for increased basal rate delivery but no auto correction boluses.   Exercise Mode to raise sensor glucose target from 112 to 140 mg/dl, suspend target from 70 to 80 mg/dl.  Cannot set time duration in advance.          Continue the mealtime boluses premeal  Discussed plan for upgrading Tandem pump software to enable use of the DexcomG7 CGM sensors, message me when ready for G7 Rx  Continue use of the Dexcom CGM sensor  Continue the current levothyroxine 0.137 mg every day  Plan repeat nonfasting labs at 2/22/24 OBGYN    Testing at [facility]  clinic    Lab orders placed  Would not restart the simvastatin med while pregnant  Arrange annual dilated eye exam, fasting lipid panel testing.  Reviewed overall pregnancy treatment goals for the diabetes and thyroid management  Plan to have endocrinology appointments monthly and Diab Ed appointments every 7-10 days during Pg  Contact me if questions/concerns about diabetes and thyroid management    Important to share medical records between endocrinology and OBGYN providers  Addressed patient's questions today.    The longitudinal plan of care for the endocrine problem(s) were addressed during this visit.  Due to added complexity of care,   we will continue to support the patient and the subsequent management of this condition with ongoing continuity of care.    Patient Instructions   Trip to hospital for delivery:  - bring extra infusion sets and cartridges, Dexcom sensor  - inpatient diabetes orders per OB and/or hospitalist  - may use Activity mode if low glucose trends  - continue same levothyroxine 0.137 mg daily dose    After childbirth:  - change pump settings from Profile 2 (pregnancy) to Profile 1 (pre-pregnancy)  - continue Control IQ auto mode  - hospital team to message me if general questions about diabetes management  - message [facility] Diab Educator for postpartum update after returning home  - see Dr Hong for follow-up endocrinology appt approx 1 month postpartum,    Work-in appointment OK    Future labs ordered today:   Orders Placed This Encounter   Procedures     GLUCOSE MONITOR, 72 HOUR, PHYS INTERP     Hemoglobin A1c     TSH with free T4 reflex     Radiology/Consults ordered today: None    Total time spent on day of encounter:  36 min    Follow-up:  3/19/24 at 8a, Reggie Jimenez MD, MS  Endocrinology  [facility]    CC:  Masters, A, and JYOTI Lima                      Again, thank you for allowing me to participate in the care of your patient.        Sincerely,        Santy VALERA  MD Barbara

## 2024-02-13 RX ORDER — PROCHLORPERAZINE 25 MG/1
SUPPOSITORY RECTAL
Qty: 3 EACH | Refills: 5 | Status: SHIPPED | OUTPATIENT
Start: 2024-02-13 | End: 2024-08-07

## 2024-02-13 NOTE — TELEPHONE ENCOUNTER
Requested Prescriptions   Pending Prescriptions Disp Refills    Continuous Blood Gluc Sensor (DEXCOM G6 SENSOR) MISC [Pharmacy Med Name: DEXCOM G6 SENSOR  MISC]  5     Sig: CHANGE SENSOR EVERY 10 DAYS       There is no refill protocol information for this order        Last Written Prescription Date:  8/22/23  Last Fill Quantity: 3 each,  # refills: 5   Last office visit: Visit date not found ; last virtual visit: 2/12/2024 with prescribing provider:  Dr Jimenez   Future Office Visit: 3/19/24    Refill sent  Vicente Castro RN

## 2024-02-15 ENCOUNTER — ANCILLARY PROCEDURE (OUTPATIENT)
Dept: ULTRASOUND IMAGING | Facility: CLINIC | Age: 34
End: 2024-02-15
Attending: OBSTETRICS & GYNECOLOGY
Payer: COMMERCIAL

## 2024-02-15 PROCEDURE — 76819 FETAL BIOPHYS PROFIL W/O NST: CPT | Performed by: OBSTETRICS & GYNECOLOGY

## 2024-02-19 ENCOUNTER — ANCILLARY PROCEDURE (OUTPATIENT)
Dept: ULTRASOUND IMAGING | Facility: CLINIC | Age: 34
End: 2024-02-19
Attending: OBSTETRICS & GYNECOLOGY
Payer: COMMERCIAL

## 2024-02-19 ENCOUNTER — MYC MEDICAL ADVICE (OUTPATIENT)
Dept: EDUCATION SERVICES | Facility: CLINIC | Age: 34
End: 2024-02-19

## 2024-02-19 DIAGNOSIS — E10.9 TYPE 1 DIABETES, HBA1C GOAL < 7% (H): ICD-10-CM

## 2024-02-19 DIAGNOSIS — O09.91 SUPERVISION OF HIGH RISK PREGNANCY IN FIRST TRIMESTER: ICD-10-CM

## 2024-02-19 PROCEDURE — 76819 FETAL BIOPHYS PROFIL W/O NST: CPT | Performed by: OBSTETRICS & GYNECOLOGY

## 2024-02-20 NOTE — TELEPHONE ENCOUNTER
Type 1 Diabetes +  Pregnant Follow-up    Subjective/Objective:    Linda Chavez sent in blood glucose log for review. Last date of communication was: 2/6, 2/12 with Endo.    Diabetes is being managed with diet, activity, and medications    Taking diabetes medications: yes:     Diabetes Medication(s)       Diabetic Other       Glucagon (BAQSIMI TWO PACK) 3 MG/DOSE POWD Spray 1 Device in nostril once as needed (for severe hypoglycemia)       Insulin       insulin aspart (NOVOLOG PEN) 100 UNIT/ML pen Inject 3 to 10 units subcutaneous at mealtimes as directed, total daily dose approx 30 units     insulin aspart (NOVOLOG VIAL) 100 UNITS/ML vial Use with insulin pump, total daily dose approx 120 units     insulin glargine (LANTUS PEN) 100 UNIT/ML pen Inject 22 Units Subcutaneous At Bedtime            Estimated Date of Delivery: Apr 5, 2024    BG/Food Log:             Assessment:    Not meeting TIR target >70% for ADA target of  mg/dL during pregnancy      More post prandial hyperglycemia seen on this week's report as well as fasting blood sugars >95 mg/dL most dayso fthe week     Plan/Response:  Recommend increase to insulin -   Start Time Basal Rate Correction Factor  Carb Ratio Target BG   [place] 2.5 --> 2.8 unit(s)/hr 1u:30 1u:7.5g 110   6:00am 2.4 --> 2.7 unit(s)/hr 1u:30 1u:6.0g 110   7:00am 2.4 --> 2.7 unit(s)/hr 1u:16 1u:3.0g --> 2g 110   11:00am 2.4 --> 2.7 unit(s)/hr 1u:16 1u:2.0g 110   12:00pm 2.7 --> 3.0 unit(s)/hr 1u:14 1u:2.4g --> 2g 110   2:30pm 2.7 --> 3.0 unit(s)/hr 1u:14 1u:2.5g --> 2g 110   5:00pm 2.7 --> 3.0 unit(s)/hr 1u:14 1u:2.4g --> 2g 110   9:00pm  2.7 --> 3.0 unit(s)/hr 1u:16 1u:2.8g --> 2.5g 110     Follow-up in 1 week.    Kat Lima RD, LD, CDCES     Any diabetes medication dose changes were made via the CDE Protocol and Collaborative Practice Agreement with the patient's endocrinology provider. A copy of this encounter was shared with the provider.

## 2024-02-22 ENCOUNTER — ANCILLARY PROCEDURE (OUTPATIENT)
Dept: ULTRASOUND IMAGING | Facility: CLINIC | Age: 34
End: 2024-02-22
Attending: OBSTETRICS & GYNECOLOGY
Payer: COMMERCIAL

## 2024-02-22 ENCOUNTER — PRENATAL OFFICE VISIT (OUTPATIENT)
Dept: OBGYN | Facility: CLINIC | Age: 34
End: 2024-02-22
Attending: OBSTETRICS & GYNECOLOGY
Payer: COMMERCIAL

## 2024-02-22 VITALS — DIASTOLIC BLOOD PRESSURE: 76 MMHG | SYSTOLIC BLOOD PRESSURE: 126 MMHG | WEIGHT: 233.8 LBS | BODY MASS INDEX: 37.74 KG/M2

## 2024-02-22 DIAGNOSIS — E06.3 HYPOTHYROIDISM DUE TO HASHIMOTO'S THYROIDITIS: ICD-10-CM

## 2024-02-22 DIAGNOSIS — Z96.41 INSULIN PUMP STATUS: ICD-10-CM

## 2024-02-22 DIAGNOSIS — E10.9 TYPE 1 DIABETES MELLITUS WITHOUT COMPLICATION (H): ICD-10-CM

## 2024-02-22 DIAGNOSIS — O09.93 SUPERVISION OF HIGH RISK PREGNANCY IN THIRD TRIMESTER: Primary | ICD-10-CM

## 2024-02-22 DIAGNOSIS — O09.90 HIGH-RISK PREGNANCY, UNSPECIFIED TRIMESTER: ICD-10-CM

## 2024-02-22 LAB
HBA1C MFR BLD: 6.2 % (ref 0–5.6)
TSH SERPL DL<=0.005 MIU/L-ACNC: 0.56 UIU/ML (ref 0.3–4.2)

## 2024-02-22 PROCEDURE — 99207 PR COMPLICATED OB VISIT: CPT | Performed by: OBSTETRICS & GYNECOLOGY

## 2024-02-22 PROCEDURE — 83036 HEMOGLOBIN GLYCOSYLATED A1C: CPT | Performed by: OBSTETRICS & GYNECOLOGY

## 2024-02-22 PROCEDURE — 36415 COLL VENOUS BLD VENIPUNCTURE: CPT | Performed by: OBSTETRICS & GYNECOLOGY

## 2024-02-22 PROCEDURE — 84443 ASSAY THYROID STIM HORMONE: CPT | Performed by: OBSTETRICS & GYNECOLOGY

## 2024-02-22 PROCEDURE — 76819 FETAL BIOPHYS PROFIL W/O NST: CPT | Performed by: OBSTETRICS & GYNECOLOGY

## 2024-02-22 NOTE — PROGRESS NOTES
OB Visit @ 33w6d     No loss of fluid/vaginal bleeding/regular contractions. + FM  Nausea has returned and having reflux. Using tums        ICD-10-CM    1. Supervision of high risk pregnancy in third trimester  O09.93       2. Type 1 diabetes mellitus without complication (H)  E10.9 Hemoglobin A1c      3. Insulin pump status  Z96.41 Hemoglobin A1c      4. High-risk pregnancy, unspecified trimester  O09.90 Hemoglobin A1c      5. Hypothyroidism due to Hashimoto's thyroiditis  E03.8 TSH with free T4 reflex    E06.3           --Ty 1 Dm. Hypothyroidism.   ASA 81mg.  LEvel II completed and fetal echo completed-wnl.   Follows with Dr. Bhat and DNE's  Serial growth ultrasound and twice weekly AP testing.   Last growth 24  BPP reviewed with Linda today. Reassuring   Discussed plans for induction 37-39wk gestation depending on BS control. Have received communication from Dr. Jimenez regarding plans discussed with Linda for pump settings with delivery  Will get labs for Endo today  -Reflux in pregnancy. Discussed lifestyle and diet changes. Start Tagamet twice daily.   -Discussed hospital, Peds.   - Labor precautions. F/U 2wk    Sierra Treviño Masters, DO

## 2024-02-22 NOTE — PATIENT INSTRUCTIONS
WHERE TO GO AT THE HOSPITAL FOR EVALUATION    -To get to the Labor and Delivery, or Maternal Assessment Center at [facility], enter the hospital through Door 6 on East end 7:00am to 7:00pm.  After this time, 7:00pm to 7:00am go through door #4 (ER entrance).There will be a screening station just inside.  Go straight ahead to the elevators, up to 2nd floor, then go left to Maternal Assessment Center.          PEDIATRICIANS  -[facility]-pediatricians, Dr. Corrales, Dr. Dan, Dr. Lester.  Can do circumcisions here as well.  -[facility]-Dr. West, family practitioner that sees pediatrics  -[facility]-family practitioners that sees pediatrics.  -[facility] [place], operated by [facility], [facility], [facility] (Dr Wade, Dr. Hussein)-check insurance coverage as these may be outside of network

## 2024-02-26 ENCOUNTER — ANCILLARY PROCEDURE (OUTPATIENT)
Dept: ULTRASOUND IMAGING | Facility: CLINIC | Age: 34
End: 2024-02-26
Attending: OBSTETRICS & GYNECOLOGY
Payer: COMMERCIAL

## 2024-02-26 PROCEDURE — 76819 FETAL BIOPHYS PROFIL W/O NST: CPT | Performed by: OBSTETRICS & GYNECOLOGY

## 2024-02-27 ENCOUNTER — VIRTUAL VISIT (OUTPATIENT)
Dept: EDUCATION SERVICES | Facility: CLINIC | Age: 34
End: 2024-02-27
Payer: COMMERCIAL

## 2024-02-27 DIAGNOSIS — E10.9 TYPE 1 DIABETES, HBA1C GOAL < 7% (H): Primary | ICD-10-CM

## 2024-02-27 PROCEDURE — 98967 PH1 ASSMT&MGMT NQHP 11-20: CPT | Mod: 93 | Performed by: DIETITIAN, REGISTERED

## 2024-02-27 NOTE — LETTER
2/27/2024         RE: Linda Chavez  [address]  [place] [address]        Dear Colleague,    Thank you for referring your patient, Linda Chavez, to the [facility] [place]. Please see a copy of my visit note below.    Diabetes and Pregnancy Follow-up  Type of Service: Telephone Visit    How would patient like to obtain AVS? Not needed    Subjective/Objective:    Linda Chavez was called for a scheduled BG review. Last date of communication was: 2/19.    Diabetes is being managed with diet, activity, and medications    Taking diabetes medications: yes:     Diabetes Medication(s)       Diabetic Other       Glucagon (BAQSIMI TWO PACK) 3 MG/DOSE POWD Spray 1 Device in nostril once as needed (for severe hypoglycemia)       Insulin       insulin aspart (NOVOLOG PEN) 100 UNIT/ML pen Inject 3 to 10 units subcutaneous at mealtimes as directed, total daily dose approx 30 units     insulin aspart (NOVOLOG VIAL) 100 UNITS/ML vial Use with insulin pump, total daily dose approx 120 units     insulin glargine (LANTUS PEN) 100 UNIT/ML pen Inject 22 Units Subcutaneous At Bedtime            Estimated Date of Delivery: Apr 5, 2024    Blood Glucose/Ketone Log:                Assessment:    Linda continues to work hard on blood sugar control. She is asking about if/when early induction might be warranted - discussed that we review her blood sugars on a regular basis and that overall, things are looking very good. No reason to worry about early induction today - continue surveillance with twice weekly BPPs, weekly pump report reviews, and monitoring any changes in s/sx. If she is feeling like there are unexpected changes, follow up with OB team right away. We reviewed that high insulin needs during this stage of pregnancy are very common and to be expected. We discussed importance of bolus timing. This has been more difficult with recent nausea.     Plan/Response:  Recommend increase to insulin -   Start Time  Basal Rate Correction Factor  Carb Ratio Target BG   [place] 2.8 unit(s)/hr 1u:30 1u:7.5g 110   6:00am 2.7 unit(s)/hr 1u:30 1u:6.0g 110   7:00am 2.7 unit(s)/hr 1u:16 --> 13 1u:2g 110   11:00am 2.7 unit(s)/hr 1u:16 --> 13 1u:2g 110   12:00pm 3.0 unit(s)/hr 1u:14 --> 12 1u:2g 110   2:30pm 3.0 unit(s)/hr 1u:14 --> 12 1u:2g 110   5:00pm 3.0 unit(s)/hr 1u:14 --> 12 1u:2g 110   9:00pm  3.0 unit(s)/hr 1u:16 --> 13 1u:2.5g 110     Focus on premeal bolusing, taking 2nd bolus if/when total bolus recommendation is >25u   Send MyChart in 1 week for further review   Phone visit scheduled in 2 weeks from today, at patient's request    Kat Lima RD, LD, [facility]   Time Spent: 20 minutes    Any diabetes medication dose changes were made via the CDE Protocol and Collaborative Practice Agreement with the patient's endocrinology provider and OB/GYN provider. A copy of this encounter was shared with the provider.

## 2024-02-27 NOTE — PROGRESS NOTES
Diabetes and Pregnancy Follow-up  Type of Service: Telephone Visit    How would patient like to obtain AVS? Not needed    Subjective/Objective:    Linda Chavez was called for a scheduled BG review. Last date of communication was: 2/19.    Diabetes is being managed with diet, activity, and medications    Taking diabetes medications: yes:     Diabetes Medication(s)       Diabetic Other       Glucagon (BAQSIMI TWO PACK) 3 MG/DOSE POWD Spray 1 Device in nostril once as needed (for severe hypoglycemia)       Insulin       insulin aspart (NOVOLOG PEN) 100 UNIT/ML pen Inject 3 to 10 units subcutaneous at mealtimes as directed, total daily dose approx 30 units     insulin aspart (NOVOLOG VIAL) 100 UNITS/ML vial Use with insulin pump, total daily dose approx 120 units     insulin glargine (LANTUS PEN) 100 UNIT/ML pen Inject 22 Units Subcutaneous At Bedtime            Estimated Date of Delivery: Apr 5, 2024    Blood Glucose/Ketone Log:                Assessment:    Linda continues to work hard on blood sugar control. She is asking about if/when early induction might be warranted - discussed that we review her blood sugars on a regular basis and that overall, things are looking very good. No reason to worry about early induction today - continue surveillance with twice weekly BPPs, weekly pump report reviews, and monitoring any changes in s/sx. If she is feeling like there are unexpected changes, follow up with OB team right away. We reviewed that high insulin needs during this stage of pregnancy are very common and to be expected. We discussed importance of bolus timing. This has been more difficult with recent nausea.     Plan/Response:  Recommend increase to insulin -   Start Time Basal Rate Correction Factor  Carb Ratio Target BG   MN 2.8 unit(s)/hr 1u:30 1u:7.5g 110   6:00am 2.7 unit(s)/hr 1u:30 1u:6.0g 110   7:00am 2.7 unit(s)/hr 1u:16 --> 13 1u:2g 110   11:00am 2.7 unit(s)/hr 1u:16 --> 13 1u:2g 110   12:00pm 3.0  unit(s)/hr 1u:14 --> 12 1u:2g 110   2:30pm 3.0 unit(s)/hr 1u:14 --> 12 1u:2g 110   5:00pm 3.0 unit(s)/hr 1u:14 --> 12 1u:2g 110   9:00pm  3.0 unit(s)/hr 1u:16 --> 13 1u:2.5g 110     Focus on premeal bolusing, taking 2nd bolus if/when total bolus recommendation is >25u   Send MyChart in 1 week for further review   Phone visit scheduled in 2 weeks from today, at patient's request    Kat Lima RD, LD, [facility]   Time Spent: 20 minutes    Any diabetes medication dose changes were made via the CDE Protocol and Collaborative Practice Agreement with the patient's endocrinology provider and OB/GYN provider. A copy of this encounter was shared with the provider.

## 2024-02-29 ENCOUNTER — ANCILLARY PROCEDURE (OUTPATIENT)
Dept: ULTRASOUND IMAGING | Facility: CLINIC | Age: 34
End: 2024-02-29
Attending: OBSTETRICS & GYNECOLOGY
Payer: COMMERCIAL

## 2024-02-29 PROCEDURE — 76819 FETAL BIOPHYS PROFIL W/O NST: CPT | Performed by: OBSTETRICS & GYNECOLOGY

## 2024-03-04 ENCOUNTER — PRENATAL OFFICE VISIT (OUTPATIENT)
Dept: OBGYN | Facility: CLINIC | Age: 34
End: 2024-03-04
Attending: OBSTETRICS & GYNECOLOGY
Payer: COMMERCIAL

## 2024-03-04 ENCOUNTER — TELEPHONE (OUTPATIENT)
Dept: OBGYN | Facility: CLINIC | Age: 34
End: 2024-03-04

## 2024-03-04 ENCOUNTER — MYC MEDICAL ADVICE (OUTPATIENT)
Dept: EDUCATION SERVICES | Facility: CLINIC | Age: 34
End: 2024-03-04

## 2024-03-04 ENCOUNTER — ANCILLARY PROCEDURE (OUTPATIENT)
Dept: ULTRASOUND IMAGING | Facility: CLINIC | Age: 34
End: 2024-03-04
Attending: OBSTETRICS & GYNECOLOGY
Payer: COMMERCIAL

## 2024-03-04 VITALS — SYSTOLIC BLOOD PRESSURE: 146 MMHG | WEIGHT: 238 LBS | BODY MASS INDEX: 38.41 KG/M2 | DIASTOLIC BLOOD PRESSURE: 82 MMHG

## 2024-03-04 DIAGNOSIS — R03.0 ELEVATED BP WITHOUT DIAGNOSIS OF HYPERTENSION: ICD-10-CM

## 2024-03-04 DIAGNOSIS — O24.019 TYPE 1 DIABETES MELLITUS DURING PREGNANCY, ANTEPARTUM: ICD-10-CM

## 2024-03-04 DIAGNOSIS — O99.280 HYPOTHYROID IN PREGNANCY, ANTEPARTUM: ICD-10-CM

## 2024-03-04 DIAGNOSIS — E03.9 HYPOTHYROID IN PREGNANCY, ANTEPARTUM: ICD-10-CM

## 2024-03-04 DIAGNOSIS — O09.93 SUPERVISION OF HIGH RISK PREGNANCY IN THIRD TRIMESTER: Primary | ICD-10-CM

## 2024-03-04 DIAGNOSIS — O21.9 NAUSEA AND VOMITING DURING PREGNANCY: ICD-10-CM

## 2024-03-04 LAB
ALBUMIN MFR UR ELPH: 39.4 MG/DL
ALBUMIN SERPL BCG-MCNC: 3.1 G/DL (ref 3.5–5.2)
ALP SERPL-CCNC: 141 U/L (ref 40–150)
ALT SERPL W P-5'-P-CCNC: 10 U/L (ref 0–50)
ANION GAP SERPL CALCULATED.3IONS-SCNC: 15 MMOL/L (ref 7–15)
AST SERPL W P-5'-P-CCNC: 19 U/L (ref 0–45)
BILIRUB SERPL-MCNC: <0.2 MG/DL
BUN SERPL-MCNC: 5.9 MG/DL (ref 6–20)
CALCIUM SERPL-MCNC: 9 MG/DL (ref 8.6–10)
CHLORIDE SERPL-SCNC: 104 MMOL/L (ref 98–107)
CREAT SERPL-MCNC: 0.64 MG/DL (ref 0.51–0.95)
CREAT UR-MCNC: 145.9 MG/DL
DEPRECATED HCO3 PLAS-SCNC: 20 MMOL/L (ref 22–29)
EGFRCR SERPLBLD CKD-EPI 2021: >90 ML/MIN/1.73M2
ERYTHROCYTE [DISTWIDTH] IN BLOOD BY AUTOMATED COUNT: 12.5 % (ref 10–15)
GLUCOSE SERPL-MCNC: 138 MG/DL (ref 70–99)
HCT VFR BLD AUTO: 40.2 % (ref 35–47)
HGB BLD-MCNC: 13.7 G/DL (ref 11.7–15.7)
MCH RBC QN AUTO: 31.3 PG (ref 26.5–33)
MCHC RBC AUTO-ENTMCNC: 34.1 G/DL (ref 31.5–36.5)
MCV RBC AUTO: 92 FL (ref 78–100)
PLATELET # BLD AUTO: 141 10E3/UL (ref 150–450)
POTASSIUM SERPL-SCNC: 3.6 MMOL/L (ref 3.4–5.3)
PROT SERPL-MCNC: 6.3 G/DL (ref 6.4–8.3)
PROT/CREAT 24H UR: 0.27 MG/MG CR (ref 0–0.2)
RBC # BLD AUTO: 4.38 10E6/UL (ref 3.8–5.2)
SODIUM SERPL-SCNC: 139 MMOL/L (ref 135–145)
WBC # BLD AUTO: 6.8 10E3/UL (ref 4–11)

## 2024-03-04 PROCEDURE — 76819 FETAL BIOPHYS PROFIL W/O NST: CPT | Performed by: OBSTETRICS & GYNECOLOGY

## 2024-03-04 PROCEDURE — 59426 ANTEPARTUM CARE ONLY: CPT | Performed by: OBSTETRICS & GYNECOLOGY

## 2024-03-04 PROCEDURE — 99207 PR PRENATAL VISIT: CPT | Performed by: OBSTETRICS & GYNECOLOGY

## 2024-03-04 PROCEDURE — 99214 OFFICE O/P EST MOD 30 MIN: CPT | Mod: 25 | Performed by: OBSTETRICS & GYNECOLOGY

## 2024-03-04 PROCEDURE — 84156 ASSAY OF PROTEIN URINE: CPT | Performed by: OBSTETRICS & GYNECOLOGY

## 2024-03-04 PROCEDURE — 36415 COLL VENOUS BLD VENIPUNCTURE: CPT | Performed by: OBSTETRICS & GYNECOLOGY

## 2024-03-04 PROCEDURE — 80053 COMPREHEN METABOLIC PANEL: CPT | Performed by: OBSTETRICS & GYNECOLOGY

## 2024-03-04 PROCEDURE — 85027 COMPLETE CBC AUTOMATED: CPT | Performed by: OBSTETRICS & GYNECOLOGY

## 2024-03-04 RX ORDER — OMEPRAZOLE 40 MG/1
40 CAPSULE, DELAYED RELEASE ORAL DAILY
Qty: 30 CAPSULE | Refills: 1 | Status: SHIPPED | OUTPATIENT
Start: 2024-03-04 | End: 2024-08-26

## 2024-03-04 NOTE — Clinical Note
Please contact Linda to have her come in for a blood pressure check in 1-2 days. Has one elevated blood pressure in clinic today 3/4/24 with risk factors for HTN in pregnancy. 35+3wk. She will also start checking BPs at home and sending us the log.  Thanks AM

## 2024-03-04 NOTE — PATIENT INSTRUCTIONS
To be assessed at [facility] at Hospital:  -To get to the Labor and Delivery, or Maternal Assessment Center at [facility], enter the hospital through Door 6 on East end 7:00am to 7:00pm.  After this time, 7:00pm to 7:00am go through door #4 (ER entrance). There will be a screening station just inside.  Go straight ahead to the elevators, up to 2nd floor, then go left to Maternal Assessment Center.      Take your blood pressure 1-2 times per day. Keep a log. Send us the log via MyVR every 3 days.   -If your blood pressure is higher than 160/100, wait for 30 min, resting, then retake. If it's still greater than 160/100 (either or) go into the hospital to maternal assessment center.  -If your blood pressures are running 150's/90's, notify the clinic sooner than when you normally would just send us the blood pressure log by MyVR. (If it is Friday night or the weekend, please call)    **When you get your blood pressure cuff, bring to clinic with you so we can check the accuracy**

## 2024-03-04 NOTE — TELEPHONE ENCOUNTER
Dr Santoyo was informed of pt inquiry.  She sent pt a message via [employer]    Pt has future BP check w Nurse 3/6    Shania Schulz, RN on 3/4/2024 at 12:59 PM

## 2024-03-04 NOTE — PROGRESS NOTES
OB Visit @ 35w3d     No loss of fluid/vaginal bleeding/regular contractions. + FM    Had vomiting episode this weekend. Woke in middle of night last two nights to vomit. For last couple weeks when starts eating will all the sudden feel like she has to vomit  Has been taking Tagamet twice daily. Not sure has been feeling much difference.     No HA, vision changes.    Per latest BS report, 62% in target.    Has not been sleeping well. Not comfortable. Feeling crummy in daytime. Exhausted.   [employer] works 11a-8pm, TWF 8-5. Meets with clients in day, outside of the home. Wants to keep working as she does not have any pregnancy benefits.       Results for orders placed or performed in visit on 02/19/24   US OB Fetal Biophys Prf wo [facility] [employer]    Narrative    Obstetrical Ultrasound Report  OB U/S - Biophysical Profile & COLT - Transabdominal  [facility]  Referring physician: Dr. Sierra Higginss  Sonographer: Brittany Baltazar RDMS  Indication:  BPP (including COLT)     Dating (mm/dd/yyyy):   LMP: Patient's last menstrual period was 06/21/2023.              EDC:    Estimated Date of Delivery: Apr 5, 2024                GA by LMP:          33w3d      Anatomy Scan:  Hou gestation.  Fetal heart activity: Rate and rhythm is within normal limits.  Fetal   heart rate: 153bpm  Fetal presentation: Cephalic  Placenta: Anterior   Amniotic fluid: 5.38cm MVP     Biophysical Profile:  Fetal body movements: Normal (2)  Fetal tone: Normal (2)  Fetal breathing movements: Normal (2)  Amniotic fluid volume: Normal (2)   BPP Score: 8/8  Technique: Transabdominal Imaging performed     Impression:   Cephalic fetus. Normal fluid. Reassuring BPP 8/8.    Sierra Treviño Masters, DO         Vitals:    03/04/24 0846   BP: (!) 146/82   Weight: 108 kg (238 lb)     Gen: AOx3, NAD. Tearful when discussing her sleep and symptoms  Resp; non labored breathing.      ICD-10-CM    1. Supervision of high risk pregnancy in third trimester   O09.93 Home Blood Pressure Monitor Order for DME - ONLY FOR DME      2. Hypothyroid in pregnancy, antepartum  O99.280     E03.9       3. Type 1 diabetes mellitus during pregnancy, antepartum  O24.019       4. Elevated BP without diagnosis of hypertension  R03.0 CBC with platelets     Comprehensive metabolic panel (BMP + Alb, Alk Phos, ALT, AST, Total. Bili, TP)     Protein  random urine     Home Blood Pressure Monitor Order for DME - ONLY FOR DME     CBC with platelets     Comprehensive metabolic panel (BMP + Alb, Alk Phos, ALT, AST, Total. Bili, TP)     Protein  random urine      5. Nausea and vomiting during pregnancy  O21.9 Comprehensive metabolic panel (BMP + Alb, Alk Phos, ALT, AST, Total. Bili, TP)     omeprazole (PRILOSEC) 40 MG DR capsule     Comprehensive metabolic panel (BMP + Alb, Alk Phos, ALT, AST, Total. Bili, TP)          -Acute Isolated elevated blood pressure w/o diagnosis of HTN. Due to increased risk of HTN, will proceed with checking labs. Will order blood pressure cuff for home use and monitor at home.  If diagnosed with GHTN, recommendations will be for induction of labor around 37wks.  -note for work given to go down to half time.  -Discussed GERD in preg. Add omeprazole daily at night.   -Ty 1 Dm. Hypothyroidism.   ASA 81mg.  LEvel II completed and fetal echo completed-wnl.   Follows with Dr. Bhat and DNE's  Serial growth ultrasound and twice weekly AP testing.   Last growth 24  BPP reviewed with Linda today. Reassuring   Discussed plans for induction 37-39wk gestation depending on BS control. Have received communication from Dr. Jimenez regarding plans discussed with Linda for pump settings with delivery  - Labor precautions. F/U 1wk    Patient Instruction:  To be assessed at [facility] at Hospital:  -To get to the Labor and Delivery, or Maternal Assessment Center at [facility], enter the hospital through Door 6 on East end 7:00am to 7:00pm.   After this time, 7:00pm to 7:00am go through door #4 (ER entrance). There will be a screening station just inside.  Go straight ahead to the elevators, up to 2nd floor, then go left to Maternal Assessment Center.      Take your blood pressure 1-2 times per day. Keep a log. Send us the log via "[employer] every 3 days.   -If your blood pressure is higher than 160/100, wait for 30 min, resting, then retake. If it's still greater than 160/100 (either or) go into the hospital to maternal assessment center.  -If your blood pressures are running 150's/90's, notify the clinic sooner than when you normally would just send us the blood pressure log by "[employer]". (If it is Friday night or the weekend, please call)  **When you get your blood pressure cuff, bring to clinic with you so we can check the accuracy**      (36 minutes was spent on the date of the encounter in addition to routine OB care doing chart review, review and interpretation of pertinent test results, coordination of care, history and/or exam, documentation, patient counseling.)    Sierra Treviño Masters, DO

## 2024-03-04 NOTE — TELEPHONE ENCOUNTER
[facility] Call Center    Phone Message    May a detailed message be left on voicemail: yes     Reason for Call: Other: Pt is 35 weeks pregnant and received abnormal test results, pt is wanting to speak with a care team member about this. Please call pt      Action Taken: Message routed to:  Other: [facility]    Travel Screening: Not Applicable

## 2024-03-04 NOTE — LETTER
March 4, 2024      Linda Chavez  [address] [facility] [address]        To Whom It May Concern:    Linda Chavez  was seen on 3/4/24. She is currently 35+3 weeks gestation with a high risk pregnancy due to multiple factors. At this point, considering the high risk nature of her pregnancy, it is recommended she decrease working to 20 hours per week. She is being seen at least weekly, we will be reevaluating her medical condition consistently, including how this relates to workability and for timing of delivery.         Sincerely,    Sierra Treviño Masters, DO

## 2024-03-04 NOTE — NURSING NOTE
"Chief Complaint   Patient presents with    Prenatal Care     35w3d       Initial BP (!) 146/82   Wt 108 kg (238 lb)   LMP 2023   BMI 38.41 kg/m   Estimated body mass index is 38.41 kg/m  as calculated from the following:    Height as of 23: 1.676 m (5' 6\").    Weight as of this encounter: 108 kg (238 lb).  BP completed using cuff size: regular    Questioned patient about current smoking habits.  Pt. has never smoked.          Had 2 vomiting episodes this weekend, in the middle of the night  Krystal Weston CMA on 3/4/2024 at 8:47 AM    "

## 2024-03-05 NOTE — TELEPHONE ENCOUNTER
Type 1 Diabetes + Pregnancy Follow-up    Subjective/Objective:    Linda Chavez sent in blood glucose log for review. Last date of communication was: 2/27/24.    Diabetes is being managed with diet, activity, and medications    Taking diabetes medications: yes:     Diabetes Medication(s)       Diabetic Other       Glucagon (BAQSIMI TWO PACK) 3 MG/DOSE POWD Spray 1 Device in nostril once as needed (for severe hypoglycemia)       Insulin       insulin aspart (NOVOLOG PEN) 100 UNIT/ML pen Inject 3 to 10 units subcutaneous at mealtimes as directed, total daily dose approx 30 units     insulin aspart (NOVOLOG VIAL) 100 UNITS/ML vial Use with insulin pump, total daily dose approx 120 units     insulin glargine (LANTUS PEN) 100 UNIT/ML pen Inject 22 Units Subcutaneous At Bedtime            Estimated Date of Delivery: Apr 5, 2024    BG/Food Log:               Assessment:    63% TIR - NOT meeting TIR target with ADA targets for pregnancy (63-140mg/dL) >70%    Control IQ Settings are no longer accurate - patient needs to increase TDD to better reflect current insulin dosing.       Plan/Response:  Recommend increase to insulin -   Start Time Basal Rate Correction Factor  Carb Ratio Target BG   MN 2.8 unit(s)/hr 1u:30 1u:7.5g 110   6:00am 2.7 unit(s)/hr 1u:30 1u:6.0g 110   7:00am 2.7 unit(s)/hr 1u:13 1u:2g --> 1.7g 110   11:00am 2.7 unit(s)/hr 1u:13 1u:2g --> 1.7g 110   12:00pm 3.0 unit(s)/hr 1u:12 1u:2g --> 1.7g 110   2:30pm 3.0 unit(s)/hr 1u:12 1u:2g --> 1.7g 110   5:00pm 3.0 unit(s)/hr 1u:12 1u:2g --> 1.7g 110   9:00pm  3.0 unit(s)/hr 1u:13 1u:2.5g -->2.2g  110      Update Control IQ settings to reflect TDD as 120 units/day  Follow-up scheduled next week on 3/12    Kat Lima RD, TABITHA, [facility]     Any diabetes medication dose changes were made via the CDE Protocol and Collaborative Practice Agreement with the patient's endocrinology provider. A copy of this encounter was shared with the provider.

## 2024-03-06 ENCOUNTER — ALLIED HEALTH/NURSE VISIT (OUTPATIENT)
Dept: OBGYN | Facility: CLINIC | Age: 34
End: 2024-03-06
Payer: COMMERCIAL

## 2024-03-06 VITALS — SYSTOLIC BLOOD PRESSURE: 137 MMHG | DIASTOLIC BLOOD PRESSURE: 82 MMHG

## 2024-03-06 DIAGNOSIS — R03.0 ELEVATED BP WITHOUT DIAGNOSIS OF HYPERTENSION: Primary | ICD-10-CM

## 2024-03-06 PROCEDURE — 99207 PR NO CHARGE NURSE ONLY: CPT

## 2024-03-06 NOTE — PROGRESS NOTES
35w5d    Here for BP check due to elevated BP in clinic on 3/4 of 146/82    Pts initial BP was 140/78, 10 min recheck was 137/82  Pt brought home cuff in, BP was 131/92 right after recheck    Pts home Bps from last few days:  3/4: 136/74  3/5:133/93, 126/85  3/6: 143/94    Spoke with Dr Santoyo. MD would like pt to return for BP check tomorrow after her BPP appointment  Pt to continue checking BP at home twice daily. If symptomatic or if pt has 2 BP readings >140/90, pt to call clinic. Pt verbalizes understanding    Nydia Olvera RN on 3/6/2024 at 4:22 PM

## 2024-03-07 ENCOUNTER — ANCILLARY PROCEDURE (OUTPATIENT)
Dept: ULTRASOUND IMAGING | Facility: CLINIC | Age: 34
End: 2024-03-07
Attending: OBSTETRICS & GYNECOLOGY
Payer: COMMERCIAL

## 2024-03-07 ENCOUNTER — ALLIED HEALTH/NURSE VISIT (OUTPATIENT)
Dept: OBGYN | Facility: CLINIC | Age: 34
End: 2024-03-07
Payer: COMMERCIAL

## 2024-03-07 VITALS — SYSTOLIC BLOOD PRESSURE: 134 MMHG | HEART RATE: 94 BPM | DIASTOLIC BLOOD PRESSURE: 87 MMHG

## 2024-03-07 DIAGNOSIS — R03.0 ELEVATED BP WITHOUT DIAGNOSIS OF HYPERTENSION: Primary | ICD-10-CM

## 2024-03-07 PROCEDURE — 76819 FETAL BIOPHYS PROFIL W/O NST: CPT | Performed by: OBSTETRICS & GYNECOLOGY

## 2024-03-07 PROCEDURE — 76816 OB US FOLLOW-UP PER FETUS: CPT | Performed by: OBSTETRICS & GYNECOLOGY

## 2024-03-07 PROCEDURE — 99207 PR NO CHARGE NURSE ONLY: CPT

## 2024-03-07 NOTE — Clinical Note
Sending as FYI, pt denies s/s, BP last night at home was 134/87 and BP in clinic today was 134/87 after pt sitting for 10 minutes. Pt will continue with current plan to check BP twice daily and to call if 2 or more BPs 140/90 or greater or if symptomatic.  Let me know if you want any changes to current plan!

## 2024-03-07 NOTE — PROGRESS NOTES
Pt here for BP check with nurses this AM, she had a BPP this AM    35w6d today    Reports active fetal movement  Denies contractions, bleeding, or LOF    BP in clinic was 134/87, pt denies s/s preeclampsia    Pt reports her BP last night on her home cuff was 134/87, denies symptoms    S/s preeclampsia reviewed - if pt having severe headaches, vision changes, RUQ pain, shortness of breath, nausea/vomiting - to call clinic    Pt will also call clinic if she is having more than 1 BP at home 140/90 or greater. Pt verbalizes understanding. Will continue current plan of checking BP twice daily    Reviewed PTL symptoms and when pt should call clinic or go to [facility] if LOF/decreased fetal movement/frequent and strong contractions. Pt verbalized understanding    She has her next BPP on 3/11 and visit with Dr. Santoyo on 3/14    Pt discharged home with her significant other    Routing to provider as LETHA Olvera RN on 3/7/2024 at 8:50 AM

## 2024-03-11 ENCOUNTER — HOSPITAL ENCOUNTER (OUTPATIENT)
Facility: CLINIC | Age: 34
End: 2024-03-11
Admitting: OBSTETRICS & GYNECOLOGY
Payer: COMMERCIAL

## 2024-03-11 ENCOUNTER — HOSPITAL ENCOUNTER (OUTPATIENT)
Facility: CLINIC | Age: 34
Discharge: HOME | End: 2024-03-11
Attending: OBSTETRICS & GYNECOLOGY | Admitting: OBSTETRICS & GYNECOLOGY
Payer: COMMERCIAL

## 2024-03-11 ENCOUNTER — ANCILLARY PROCEDURE (OUTPATIENT)
Dept: ULTRASOUND IMAGING | Facility: CLINIC | Age: 34
End: 2024-03-11
Attending: OBSTETRICS & GYNECOLOGY
Payer: COMMERCIAL

## 2024-03-11 VITALS
DIASTOLIC BLOOD PRESSURE: 72 MMHG | BODY MASS INDEX: 39.61 KG/M2 | RESPIRATION RATE: 16 BRPM | HEIGHT: 65 IN | HEART RATE: 100 BPM | SYSTOLIC BLOOD PRESSURE: 138 MMHG | TEMPERATURE: 99 F

## 2024-03-11 PROBLEM — Z36.89 ENCOUNTER FOR TRIAGE IN PREGNANT PATIENT: Status: ACTIVE | Noted: 2024-03-11

## 2024-03-11 PROBLEM — F32.A DEPRESSIVE DISORDER: Status: ACTIVE | Noted: 2024-03-11

## 2024-03-11 LAB
ABO/RH(D): NORMAL
ALBUMIN MFR UR ELPH: 18.1 MG/DL
ALBUMIN SERPL BCG-MCNC: 3.1 G/DL (ref 3.5–5.2)
ALP SERPL-CCNC: 144 U/L (ref 40–150)
ALT SERPL W P-5'-P-CCNC: 9 U/L (ref 0–50)
ANION GAP SERPL CALCULATED.3IONS-SCNC: 13 MMOL/L (ref 7–15)
ANTIBODY SCREEN: NEGATIVE
AST SERPL W P-5'-P-CCNC: 20 U/L (ref 0–45)
BILIRUB SERPL-MCNC: <0.2 MG/DL
BUN SERPL-MCNC: 7 MG/DL (ref 6–20)
CALCIUM SERPL-MCNC: 9.1 MG/DL (ref 8.6–10)
CHLORIDE SERPL-SCNC: 107 MMOL/L (ref 98–107)
CREAT SERPL-MCNC: 0.58 MG/DL (ref 0.51–0.95)
CREAT UR-MCNC: 81.6 MG/DL
DEPRECATED HCO3 PLAS-SCNC: 17 MMOL/L (ref 22–29)
EGFRCR SERPLBLD CKD-EPI 2021: >90 ML/MIN/1.73M2
ERYTHROCYTE [DISTWIDTH] IN BLOOD BY AUTOMATED COUNT: 12.6 % (ref 10–15)
GLUCOSE SERPL-MCNC: 72 MG/DL (ref 70–99)
HCT VFR BLD AUTO: 35.7 % (ref 35–47)
HGB BLD-MCNC: 12.3 G/DL (ref 11.7–15.7)
HOLD SPECIMEN: NORMAL
MCH RBC QN AUTO: 31.2 PG (ref 26.5–33)
MCHC RBC AUTO-ENTMCNC: 34.5 G/DL (ref 31.5–36.5)
MCV RBC AUTO: 91 FL (ref 78–100)
PLATELET # BLD AUTO: 142 10E3/UL (ref 150–450)
POTASSIUM SERPL-SCNC: 3.9 MMOL/L (ref 3.4–5.3)
PROT SERPL-MCNC: 6.1 G/DL (ref 6.4–8.3)
PROT/CREAT 24H UR: 0.22 MG/MG CR (ref 0–0.2)
RBC # BLD AUTO: 3.94 10E6/UL (ref 3.8–5.2)
SODIUM SERPL-SCNC: 137 MMOL/L (ref 135–145)
SPECIMEN EXPIRATION DATE: NORMAL
WBC # BLD AUTO: 7.4 10E3/UL (ref 4–11)

## 2024-03-11 PROCEDURE — 80053 COMPREHEN METABOLIC PANEL: CPT | Performed by: OBSTETRICS & GYNECOLOGY

## 2024-03-11 PROCEDURE — 59025 FETAL NON-STRESS TEST: CPT

## 2024-03-11 PROCEDURE — G0463 HOSPITAL OUTPT CLINIC VISIT: HCPCS | Mod: 25

## 2024-03-11 PROCEDURE — 85027 COMPLETE CBC AUTOMATED: CPT | Performed by: OBSTETRICS & GYNECOLOGY

## 2024-03-11 PROCEDURE — 86900 BLOOD TYPING SEROLOGIC ABO: CPT | Performed by: OBSTETRICS & GYNECOLOGY

## 2024-03-11 PROCEDURE — 76819 FETAL BIOPHYS PROFIL W/O NST: CPT | Performed by: OBSTETRICS & GYNECOLOGY

## 2024-03-11 PROCEDURE — 59025 FETAL NON-STRESS TEST: CPT | Mod: 26 | Performed by: OBSTETRICS & GYNECOLOGY

## 2024-03-11 PROCEDURE — 36415 COLL VENOUS BLD VENIPUNCTURE: CPT | Performed by: OBSTETRICS & GYNECOLOGY

## 2024-03-11 PROCEDURE — 84156 ASSAY OF PROTEIN URINE: CPT | Performed by: OBSTETRICS & GYNECOLOGY

## 2024-03-11 RX ORDER — METOCLOPRAMIDE HYDROCHLORIDE 5 MG/ML
10 INJECTION INTRAMUSCULAR; INTRAVENOUS EVERY 6 HOURS PRN
Status: DISCONTINUED | OUTPATIENT
Start: 2024-03-11 | End: 2024-03-11 | Stop reason: HOSPADM

## 2024-03-11 RX ORDER — ONDANSETRON 2 MG/ML
4 INJECTION INTRAMUSCULAR; INTRAVENOUS EVERY 6 HOURS PRN
Status: DISCONTINUED | OUTPATIENT
Start: 2024-03-11 | End: 2024-03-11 | Stop reason: HOSPADM

## 2024-03-11 RX ORDER — PROCHLORPERAZINE MALEATE 5 MG
10 TABLET ORAL EVERY 6 HOURS PRN
Status: DISCONTINUED | OUTPATIENT
Start: 2024-03-11 | End: 2024-03-11 | Stop reason: HOSPADM

## 2024-03-11 RX ORDER — ONDANSETRON 4 MG/1
4 TABLET, ORALLY DISINTEGRATING ORAL EVERY 6 HOURS PRN
Status: DISCONTINUED | OUTPATIENT
Start: 2024-03-11 | End: 2024-03-11 | Stop reason: HOSPADM

## 2024-03-11 RX ORDER — PROCHLORPERAZINE 25 MG
25 SUPPOSITORY, RECTAL RECTAL EVERY 12 HOURS PRN
Status: DISCONTINUED | OUTPATIENT
Start: 2024-03-11 | End: 2024-03-11 | Stop reason: HOSPADM

## 2024-03-11 RX ORDER — METOCLOPRAMIDE 10 MG/1
10 TABLET ORAL EVERY 6 HOURS PRN
Status: DISCONTINUED | OUTPATIENT
Start: 2024-03-11 | End: 2024-03-11 | Stop reason: HOSPADM

## 2024-03-11 ASSESSMENT — ACTIVITIES OF DAILY LIVING (ADL)
HEARING_DIFFICULTY_OR_DEAF: NO
ADLS_ACUITY_SCORE: 35
ADLS_ACUITY_SCORE: 20
ADLS_ACUITY_SCORE: 35

## 2024-03-11 NOTE — DISCHARGE INSTRUCTIONS
Learning About When to Call Your Doctor During Pregnancy (After 20 Weeks)  Overview  It's common to have concerns about what might be a problem when you're pregnant. Most pregnancies don't have any serious problems. But it's still important to know when to call your doctor if you have certain symptoms or signs of labor.  These are general suggestions. Your doctor may give you some more information about when to call.  When to call your doctor (after 20 weeks)  Call 911  anytime you think you may need emergency care. For example, call if:  You have severe vaginal bleeding. This means you are soaking through a pad each hour for 2 or more hours.  You have sudden, severe pain in your belly.  You have chest pain, are short of breath, or cough up blood.  You passed out (lost consciousness).  You have a seizure.  You see or feel the umbilical cord.  You think you are about to deliver your baby and can't make it safely to the hospital or birthing center.  Call your doctor now or seek immediate medical care if:  You have vaginal bleeding.  You have belly pain.  You have a fever.  You are dizzy or lightheaded, or you feel like you may faint.  You have signs of a blood clot in your leg (called a deep vein thrombosis), such as:  Pain in the calf, back of the knee, thigh, or groin.  Swelling in your leg or groin.  A color change on the leg or groin. The skin may be reddish or purplish, depending on your usual skin color.  You have symptoms of preeclampsia, such as:  Sudden swelling of your face, hands, or feet.  New vision problems (such as dimness, blurring, or seeing spots).  A severe headache.  You have a sudden release of fluid from your vagina. (You think your water broke.)  You've been having regular contractions for an hour. This means that you've had at least 6 contractions within 1 hour, even after you change your position and drink fluids.  You notice that your baby has stopped moving or is moving less than  "normal.  You have signs of heart failure, such as:  New or increased shortness of breath.  New or worse swelling in your legs, ankles, or feet.  Sudden weight gain, such as more than 2 to 3 pounds in a day or 5 pounds in a week.  Feeling so tired or weak that you cannot do your usual activities.  You have symptoms of a urinary tract infection. These may include:  Pain or burning when you urinate.  A frequent need to urinate without being able to pass much urine.  Pain in the flank, which is just below the rib cage and above the waist on either side of the back.  Blood in your urine.  Watch closely for changes in your health, and be sure to contact your doctor if:  You have vaginal discharge that smells bad.  You feel sad, anxious, or hopeless for more than a few days.  You have skin changes, such as a rash, itching, or a yellow color to your skin.  You have other concerns about your pregnancy.  If you have labor signs at 37 weeks or more  If you have signs of labor at 37 weeks or more, your doctor may tell you to call when your labor becomes more active. Symptoms of active labor include:  Contractions that are regular.  Contractions that are less than 5 minutes apart.  Contractions that are hard to talk through.  Follow-up care is a key part of your treatment and safety. Be sure to make and go to all appointments, and call your doctor if you are having problems. It's also a good idea to know your test results and keep a list of the medicines you take.  Where can you learn more?  Go to https://www.[employer]/patiented  Enter N531 in the search box to learn more about \"Learning About When to Call Your Doctor During Pregnancy (After 20 Weeks).\"  Current as of: July 11, 2023               Content Version: 13.8    9393-4806 [employer].   Care instructions adapted under license by your healthcare professional. If you have questions about a medical condition or this instruction, always ask your healthcare " professional. [employer] disclaims any warranty or liability for your use of this information.

## 2024-03-12 ENCOUNTER — HOSPITAL ENCOUNTER (OUTPATIENT)
Facility: CLINIC | Age: 34
End: 2024-03-12
Admitting: OBSTETRICS & GYNECOLOGY
Payer: COMMERCIAL

## 2024-03-12 NOTE — PLAN OF CARE
Pt arrived from home with sopuse at 1551. Pt here due to having x2 Bp's at home 150's/90's. Nurse line sent her in to be evaluated. Pt is a  36 3/7 pregnant that has been being monitored in clinic for increased Blood presssures.  Pt denies vaginal bleeding, Leaking of fluid, or contractions. Denies Headaches or epigastric pain but stated last two days in a row has had visual floaters x1 each day. At 1610 [place] and [place] applied. Assessment done at this time. At 1635 Dr. Vincent called and updated on maternal status and fht's and VS and assessment thus far. Instructed to stop taking blood pressures and call back when labs are resulted. At 1754 Dr. Vincent updated on labs. Maternal status and fht's. Orders received to discharge homw. Keep monitoring bp's at home. Call with parameters given to patient. Gave discharge instructions. Pt to follow up in clinic as scheduled.

## 2024-03-13 ENCOUNTER — VIRTUAL VISIT (OUTPATIENT)
Dept: EDUCATION SERVICES | Facility: CLINIC | Age: 34
End: 2024-03-13
Payer: COMMERCIAL

## 2024-03-13 DIAGNOSIS — E10.9 TYPE 1 DIABETES MELLITUS WITHOUT COMPLICATION (H): Primary | ICD-10-CM

## 2024-03-13 PROCEDURE — 98967 PH1 ASSMT&MGMT NQHP 11-20: CPT | Mod: 93 | Performed by: DIETITIAN, REGISTERED

## 2024-03-13 NOTE — LETTER
3/13/2024         RE: Linda Chavez  [address]  [place] [address]        Dear Colleague,    Thank you for referring your patient, Linda Chavez, to the [facility] [place]. Please see a copy of my visit note below.    Diabetes and Pregnancy Follow-up  Type of Service: Telephone Visit    How would patient like to obtain AVS? Not needed    Subjective/Objective:    Linda Chavez was called for a scheduled BG review. Last date of communication was: 3/11/24.    Gestational diabetes is being managed with diet, activity, and medications    Taking diabetes medications: yes:     Diabetes Medication(s)       Diabetic Other       Glucagon (BAQSIMI TWO PACK) 3 MG/DOSE POWD Spray 1 Device in nostril once as needed (for severe hypoglycemia)       Insulin       insulin aspart (NOVOLOG PEN) 100 UNIT/ML pen Inject 3 to 10 units subcutaneous at mealtimes as directed, total daily dose approx 30 units     insulin aspart (NOVOLOG VIAL) 100 UNITS/ML vial Use with insulin pump, total daily dose approx 150 units     insulin glargine (LANTUS PEN) 100 UNIT/ML pen Inject 22 Units Subcutaneous At Bedtime            Estimated Date of Delivery: Apr 5, 2024    Blood Glucose/Ketone Log:              Assessment:    NOT meeting TIR >70% for ADA pregnancy targets ( mg/dL)     Feels that her overnight basal is too aggressive.     She has had borderline HTN, awaiting induction plan but looking at next week as likely time for induction. We discussed blood sugar goals leading up to and during delivery - trying for tight control, at least staying <200 mg/dL. Discussed having her  review her blood sugars while she is in labor and she is agreeable.     We will plan to check in in the weeks after baby is born - patient to reach out via [employer] to set this up.     Plan/Response:  Recommend decrease to insulin -   Start Time Basal Rate Correction Factor  Carb Ratio Target BG   MN 2.8 unit(s)/hr --> 2.7 unit(s)/hr   1u:30 1u:7.5g 110   6:00am 2.7 unit(s)/hr 1u:30 1u:6.0g 110   7:00am 2.7 unit(s)/hr 1u:13 1u:1.7g  110   11:00am 2.7 unit(s)/hr 1u:13 1u:1.7g  110   12:00pm 3.0 unit(s)/hr 1u:12 1u:1.7g  110   2:30pm 3.0 unit(s)/hr 1u:12 1u:1.7g  110   5:00pm 3.0 unit(s)/hr 1u:12 1u:1.7g  110   9:00pm  3.0 unit(s)/hr --> 2.7 unit(s)/hr 1u:13 1u:2.2g  110     Follow up ~2 weeks post-partum for pump review     Kat Lima RD, TABITHA, [facility]   Time Spent: 11 minutes    Any diabetes medication dose changes were made via the CDE Protocol and Collaborative Practice Agreement with the patient's endocrinology provider. A copy of this encounter was shared with the provider.

## 2024-03-13 NOTE — PROGRESS NOTES
Diabetes and Pregnancy Follow-up  Type of Service: Telephone Visit    How would patient like to obtain AVS? Not needed    Subjective/Objective:    Linda Chavez was called for a scheduled BG review. Last date of communication was: 3/11/24.    Gestational diabetes is being managed with diet, activity, and medications    Taking diabetes medications: yes:     Diabetes Medication(s)       Diabetic Other       Glucagon (BAQSIMI TWO PACK) 3 MG/DOSE POWD Spray 1 Device in nostril once as needed (for severe hypoglycemia)       Insulin       insulin aspart (NOVOLOG PEN) 100 UNIT/ML pen Inject 3 to 10 units subcutaneous at mealtimes as directed, total daily dose approx 30 units     insulin aspart (NOVOLOG VIAL) 100 UNITS/ML vial Use with insulin pump, total daily dose approx 150 units     insulin glargine (LANTUS PEN) 100 UNIT/ML pen Inject 22 Units Subcutaneous At Bedtime            Estimated Date of Delivery: Apr 5, 2024    Blood Glucose/Ketone Log:              Assessment:    NOT meeting TIR >70% for ADA pregnancy targets ( mg/dL)     Feels that her overnight basal is too aggressive.     She has had borderline HTN, awaiting induction plan but looking at next week as likely time for induction. We discussed blood sugar goals leading up to and during delivery - trying for tight control, at least staying <200 mg/dL. Discussed having her  review her blood sugars while she is in labor and she is agreeable.     We will plan to check in in the weeks after baby is born - patient to reach out via [employer] to set this up.     Plan/Response:  Recommend decrease to insulin -   Start Time Basal Rate Correction Factor  Carb Ratio Target BG   MN 2.8 unit(s)/hr --> 2.7 unit(s)/hr  1u:30 1u:7.5g 110   6:00am 2.7 unit(s)/hr 1u:30 1u:6.0g 110   7:00am 2.7 unit(s)/hr 1u:13 1u:1.7g  110   11:00am 2.7 unit(s)/hr 1u:13 1u:1.7g  110   12:00pm 3.0 unit(s)/hr 1u:12 1u:1.7g  110   2:30pm 3.0 unit(s)/hr 1u:12 1u:1.7g  110   5:00pm 3.0  unit(s)/hr 1u:12 1u:1.7g  110   9:00pm  3.0 unit(s)/hr --> 2.7 unit(s)/hr 1u:13 1u:2.2g  110     Follow up ~2 weeks post-partum for pump review     Kat Lima RD, LD, [facility]   Time Spent: 11 minutes    Any diabetes medication dose changes were made via the CDE Protocol and Collaborative Practice Agreement with the patient's endocrinology provider. A copy of this encounter was shared with the provider.

## 2024-03-14 ENCOUNTER — MYC MEDICAL ADVICE (OUTPATIENT)
Dept: OBGYN | Facility: CLINIC | Age: 34
End: 2024-03-14

## 2024-03-14 ENCOUNTER — ANCILLARY PROCEDURE (OUTPATIENT)
Dept: ULTRASOUND IMAGING | Facility: CLINIC | Age: 34
End: 2024-03-14
Attending: OBSTETRICS & GYNECOLOGY
Payer: COMMERCIAL

## 2024-03-14 ENCOUNTER — PRENATAL OFFICE VISIT (OUTPATIENT)
Dept: OBGYN | Facility: CLINIC | Age: 34
End: 2024-03-14
Attending: OBSTETRICS & GYNECOLOGY
Payer: COMMERCIAL

## 2024-03-14 VITALS — DIASTOLIC BLOOD PRESSURE: 88 MMHG | WEIGHT: 240.8 LBS | BODY MASS INDEX: 40.07 KG/M2 | SYSTOLIC BLOOD PRESSURE: 142 MMHG

## 2024-03-14 DIAGNOSIS — O09.93 SUPERVISION OF HIGH RISK PREGNANCY IN THIRD TRIMESTER: ICD-10-CM

## 2024-03-14 DIAGNOSIS — E03.9 HYPOTHYROID IN PREGNANCY, ANTEPARTUM: ICD-10-CM

## 2024-03-14 DIAGNOSIS — Z36.85 SCREENING, ANTENATAL, FOR STREPTOCOCCUS B: ICD-10-CM

## 2024-03-14 DIAGNOSIS — O24.019 TYPE 1 DIABETES MELLITUS DURING PREGNANCY, ANTEPARTUM: ICD-10-CM

## 2024-03-14 DIAGNOSIS — O13.3 GESTATIONAL HYPERTENSION, THIRD TRIMESTER: Primary | ICD-10-CM

## 2024-03-14 DIAGNOSIS — O99.280 HYPOTHYROID IN PREGNANCY, ANTEPARTUM: ICD-10-CM

## 2024-03-14 LAB
ALBUMIN MFR UR ELPH: 32.9 MG/DL
ALBUMIN SERPL BCG-MCNC: 3 G/DL (ref 3.5–5.2)
ALP SERPL-CCNC: 156 U/L (ref 40–150)
ALT SERPL W P-5'-P-CCNC: 10 U/L (ref 0–50)
ANION GAP SERPL CALCULATED.3IONS-SCNC: 12 MMOL/L (ref 7–15)
AST SERPL W P-5'-P-CCNC: 18 U/L (ref 0–45)
BILIRUB SERPL-MCNC: <0.2 MG/DL
BUN SERPL-MCNC: 6.6 MG/DL (ref 6–20)
CALCIUM SERPL-MCNC: 9.2 MG/DL (ref 8.6–10)
CHLORIDE SERPL-SCNC: 105 MMOL/L (ref 98–107)
CREAT SERPL-MCNC: 0.6 MG/DL (ref 0.51–0.95)
CREAT UR-MCNC: 123.4 MG/DL
DEPRECATED HCO3 PLAS-SCNC: 19 MMOL/L (ref 22–29)
EGFRCR SERPLBLD CKD-EPI 2021: >90 ML/MIN/1.73M2
ERYTHROCYTE [DISTWIDTH] IN BLOOD BY AUTOMATED COUNT: 12.6 % (ref 10–15)
GLUCOSE SERPL-MCNC: 141 MG/DL (ref 70–99)
HCT VFR BLD AUTO: 38.7 % (ref 35–47)
HGB BLD-MCNC: 13.2 G/DL (ref 11.7–15.7)
MCH RBC QN AUTO: 31.5 PG (ref 26.5–33)
MCHC RBC AUTO-ENTMCNC: 34.1 G/DL (ref 31.5–36.5)
MCV RBC AUTO: 92 FL (ref 78–100)
PLATELET # BLD AUTO: 141 10E3/UL (ref 150–450)
POTASSIUM SERPL-SCNC: 4.2 MMOL/L (ref 3.4–5.3)
PROT SERPL-MCNC: 6.4 G/DL (ref 6.4–8.3)
PROT/CREAT 24H UR: 0.27 MG/MG CR (ref 0–0.2)
RBC # BLD AUTO: 4.19 10E6/UL (ref 3.8–5.2)
SODIUM SERPL-SCNC: 136 MMOL/L (ref 135–145)
WBC # BLD AUTO: 7 10E3/UL (ref 4–11)

## 2024-03-14 PROCEDURE — 80053 COMPREHEN METABOLIC PANEL: CPT | Performed by: OBSTETRICS & GYNECOLOGY

## 2024-03-14 PROCEDURE — 99215 OFFICE O/P EST HI 40 MIN: CPT | Mod: 25 | Performed by: OBSTETRICS & GYNECOLOGY

## 2024-03-14 PROCEDURE — 76819 FETAL BIOPHYS PROFIL W/O NST: CPT | Performed by: OBSTETRICS & GYNECOLOGY

## 2024-03-14 PROCEDURE — 85027 COMPLETE CBC AUTOMATED: CPT | Performed by: OBSTETRICS & GYNECOLOGY

## 2024-03-14 PROCEDURE — 36415 COLL VENOUS BLD VENIPUNCTURE: CPT | Performed by: OBSTETRICS & GYNECOLOGY

## 2024-03-14 PROCEDURE — 87077 CULTURE AEROBIC IDENTIFY: CPT | Performed by: OBSTETRICS & GYNECOLOGY

## 2024-03-14 PROCEDURE — 87653 STREP B DNA AMP PROBE: CPT | Performed by: OBSTETRICS & GYNECOLOGY

## 2024-03-14 PROCEDURE — 87186 SC STD MICRODIL/AGAR DIL: CPT | Performed by: OBSTETRICS & GYNECOLOGY

## 2024-03-14 PROCEDURE — 84156 ASSAY OF PROTEIN URINE: CPT | Performed by: OBSTETRICS & GYNECOLOGY

## 2024-03-14 RX ORDER — METHYLERGONOVINE MALEATE 0.2 MG/ML
200 INJECTION INTRAVENOUS
Status: CANCELLED | OUTPATIENT
Start: 2024-03-14

## 2024-03-14 RX ORDER — PROCHLORPERAZINE 25 MG
25 SUPPOSITORY, RECTAL RECTAL EVERY 12 HOURS PRN
Status: CANCELLED | OUTPATIENT
Start: 2024-03-14

## 2024-03-14 RX ORDER — OXYTOCIN/0.9 % SODIUM CHLORIDE 30/500 ML
340 PLASTIC BAG, INJECTION (ML) INTRAVENOUS CONTINUOUS PRN
Status: CANCELLED | OUTPATIENT
Start: 2024-03-14

## 2024-03-14 RX ORDER — NALOXONE HYDROCHLORIDE 0.4 MG/ML
0.4 INJECTION, SOLUTION INTRAMUSCULAR; INTRAVENOUS; SUBCUTANEOUS
Status: CANCELLED | OUTPATIENT
Start: 2024-03-14

## 2024-03-14 RX ORDER — HYDRALAZINE HYDROCHLORIDE 20 MG/ML
10 INJECTION INTRAMUSCULAR; INTRAVENOUS
Status: CANCELLED | OUTPATIENT
Start: 2024-03-14

## 2024-03-14 RX ORDER — ACETAMINOPHEN 325 MG/1
650 TABLET ORAL EVERY 4 HOURS PRN
Status: CANCELLED | OUTPATIENT
Start: 2024-03-14

## 2024-03-14 RX ORDER — TERBUTALINE SULFATE 1 MG/ML
0.25 INJECTION, SOLUTION SUBCUTANEOUS
Status: CANCELLED | OUTPATIENT
Start: 2024-03-14

## 2024-03-14 RX ORDER — MISOPROSTOL 200 UG/1
800 TABLET ORAL
Status: CANCELLED | OUTPATIENT
Start: 2024-03-14

## 2024-03-14 RX ORDER — ONDANSETRON 2 MG/ML
4 INJECTION INTRAMUSCULAR; INTRAVENOUS EVERY 6 HOURS PRN
Status: CANCELLED | OUTPATIENT
Start: 2024-03-14

## 2024-03-14 RX ORDER — OXYTOCIN 10 [USP'U]/ML
10 INJECTION, SOLUTION INTRAMUSCULAR; INTRAVENOUS
Status: CANCELLED | OUTPATIENT
Start: 2024-03-14

## 2024-03-14 RX ORDER — IBUPROFEN 200 MG
800 TABLET ORAL
Status: CANCELLED | OUTPATIENT
Start: 2024-03-14 | End: 2024-03-19

## 2024-03-14 RX ORDER — LOPERAMIDE HCL 2 MG
2 CAPSULE ORAL
Status: CANCELLED | OUTPATIENT
Start: 2024-03-14

## 2024-03-14 RX ORDER — NALOXONE HYDROCHLORIDE 0.4 MG/ML
0.2 INJECTION, SOLUTION INTRAMUSCULAR; INTRAVENOUS; SUBCUTANEOUS
Status: CANCELLED | OUTPATIENT
Start: 2024-03-14

## 2024-03-14 RX ORDER — FENTANYL CITRATE 50 UG/ML
50 INJECTION, SOLUTION INTRAMUSCULAR; INTRAVENOUS EVERY 30 MIN PRN
Status: CANCELLED | OUTPATIENT
Start: 2024-03-14

## 2024-03-14 RX ORDER — CARBOPROST TROMETHAMINE 250 UG/ML
250 INJECTION, SOLUTION INTRAMUSCULAR
Status: CANCELLED | OUTPATIENT
Start: 2024-03-14

## 2024-03-14 RX ORDER — CITRIC ACID/SODIUM CITRATE 334-500MG
30 SOLUTION, ORAL ORAL
Status: CANCELLED | OUTPATIENT
Start: 2024-03-14

## 2024-03-14 RX ORDER — KETOROLAC TROMETHAMINE 30 MG/ML
30 INJECTION, SOLUTION INTRAMUSCULAR; INTRAVENOUS
Status: CANCELLED | OUTPATIENT
Start: 2024-03-14 | End: 2024-03-19

## 2024-03-14 RX ORDER — OXYTOCIN/0.9 % SODIUM CHLORIDE 30/500 ML
100-340 PLASTIC BAG, INJECTION (ML) INTRAVENOUS CONTINUOUS PRN
Status: CANCELLED | OUTPATIENT
Start: 2024-03-14

## 2024-03-14 RX ORDER — LOPERAMIDE HCL 2 MG
4 CAPSULE ORAL
Status: CANCELLED | OUTPATIENT
Start: 2024-03-14

## 2024-03-14 RX ORDER — METOCLOPRAMIDE 5 MG/1
10 TABLET ORAL EVERY 6 HOURS PRN
Status: CANCELLED | OUTPATIENT
Start: 2024-03-14

## 2024-03-14 RX ORDER — MISOPROSTOL 100 UG/1
400 TABLET ORAL
Status: CANCELLED | OUTPATIENT
Start: 2024-03-14

## 2024-03-14 RX ORDER — LIDOCAINE 40 MG/G
CREAM TOPICAL
Status: CANCELLED | OUTPATIENT
Start: 2024-03-14

## 2024-03-14 RX ORDER — PROCHLORPERAZINE MALEATE 5 MG
10 TABLET ORAL EVERY 6 HOURS PRN
Status: CANCELLED | OUTPATIENT
Start: 2024-03-14

## 2024-03-14 RX ORDER — METOCLOPRAMIDE HYDROCHLORIDE 5 MG/ML
10 INJECTION INTRAMUSCULAR; INTRAVENOUS EVERY 6 HOURS PRN
Status: CANCELLED | OUTPATIENT
Start: 2024-03-14

## 2024-03-14 RX ORDER — LABETALOL HYDROCHLORIDE 5 MG/ML
20-80 INJECTION, SOLUTION INTRAVENOUS EVERY 10 MIN PRN
Status: CANCELLED | OUTPATIENT
Start: 2024-03-14

## 2024-03-14 RX ORDER — MISOPROSTOL 100 UG/1
25 TABLET ORAL EVERY 4 HOURS PRN
Status: CANCELLED | OUTPATIENT
Start: 2024-03-14

## 2024-03-14 RX ORDER — ONDANSETRON 4 MG/1
4 TABLET, ORALLY DISINTEGRATING ORAL EVERY 6 HOURS PRN
Status: CANCELLED | OUTPATIENT
Start: 2024-03-14

## 2024-03-14 NOTE — PROGRESS NOTES
OB Visit @ 36w6d     No loss of fluid/vaginal bleeding/regular contractions. + FM  BPs at home 140s/80s. No ha/vision changes.  BS's 60% controlled. Continues to follow with Endo and diabetic RNs.    Labs today, trend:  PCR:  0.27<0.22< 3/4/24 0.27  Cr 0.6 <0.58< 3/4/24 0,64  Ast/ALT wnl  Platelets 141 <142< 141< 24 179 <(prior to pregnancy 266, 265)    3/14/24:  Dating (mm/dd/yyyy):   LMP: Patient's last menstrual period was 2023.              EDC:  Estimated Date of Delivery: 2024                GA by LMP:        36w6d  Anatomy Scan:  Hou gestation.  Fetal heart activity: Rate and rhythm is within normal limits.  Fetal heart rate: 144 bpm  Fetal presentation: Cephalic  Placenta: Anterior   Amniotic fluid: 7.3 cm MVP, COLT: 27.6 cm  Biophysical Profile:  Fetal body movements: Normal (2)  Fetal tone: Normal (2)  Fetal breathing movements: Normal (2)  Amniotic fluid volume: Normal (2)   BPP Score: 8/8  Technique: Transabdominal Imaging performed  Impression:   Fetus cephalic. Normal fluid. Reassuring biophysical profile .       3/7/24 Last growth ultrasound EFW 6lb 11oz (75%), 3033g, AC 91%        ICD-10-CM    1. Gestational hypertension, third trimester  O13.3 CBC with platelets     Comprehensive metabolic panel (BMP + Alb, Alk Phos, ALT, AST, Total. Bili, TP)     Protein  random urine     CBC with platelets     Comprehensive metabolic panel (BMP + Alb, Alk Phos, ALT, AST, Total. Bili, TP)     Protein  random urine      2. Type 1 diabetes mellitus during pregnancy, antepartum  O24.019       3. Screening, , for Streptococcus B  Z36.85 Group B strep PCR      4. Hypothyroid in pregnancy, antepartum  O99.280     E03.9       5. Supervision of high risk pregnancy in third trimester  O09.93 CBC with platelets     Comprehensive metabolic panel (BMP + Alb, Alk Phos, ALT, AST, Total. Bili, TP)     Protein  random urine     CBC with platelets     Comprehensive metabolic panel (BMP + Alb, Alk  Phos, ALT, AST, Total. Bili, TP)     Protein  random urine          -New development of GHTN this week. Chronic Type 1 DM with pump. Labs checked today. Stable. Likely gestational thrombocytopenia, with plt stable at 140k. No symptoms of preE or bleeding.   Reviewed normal BPP with Linda.  Discussed induction of labor timing, considering both Type 1 DM (60% control) and HTN. Reviewed pros and cons. As her labs are stable and she is asymptomatic, BPS mildly elevated range, will plan for induction of labor  night with cervical ripening.   Discussed the process of induction, ripening, medications, discussed how labor is evaluated and managed, discussed possible indications for c/s. Discussed fetus may have poorer tolerance of labor due to vascular affects of her comorbidities on the placenta.   Will plan ripening with cytotec  at 7:30pm. Patient is aware of my absence next week.     Labor management of DM has been discussed with Endocrinology Dr Jimenez:  Trip to hospital for delivery:  - bring extra infusion sets and cartridges, Dexcom sensor  - inpatient diabetes orders per OB and/or hospitalist  - may use Activity mode if low glucose trends  - continue same levothyroxine 0.137 mg daily dose  After childbirth:  - change pump settings from Profile 2 (pregnancy) to Profile 1 (pre-pregnancy)  - continue Control IQ auto mode  - hospital team to message me if general questions about diabetes management  - message [facility] Diab Educator for postpartum update after returning home  - see Dr Hong for follow-up endocrinology appt approx 1 month postpartum    Discussed need to keep tight control in labor, verenice leading up to delivery and why this is important with fetal affects.   Gamal and Linda asked questions, these were answered to their satisfaction. They agree with the plan  -SVE FT//-3, mod, mid  -GBS collected  - Labor precautions. F/U as needed    (45 minutes was spent on the date of the encounter on  top of typical prenatal visit in coordination of induction, documentation, patient counseling.)      Sierra Treviño Masters, DO

## 2024-03-14 NOTE — Clinical Note
Hi ladies,  I unfortunately for me need to have you two induce a sweet prime for GHTN and Type! DM Sunday/Monday. She has a pump. Has been following with Barbara, he has recs in chart for labor/PP management. Plan cytotec ripening.  Orders are placed for intrapartum admission, ripening. Needs PCN if GBS returns positive AND DM orderset ordered (not availble to me). Thank you for caring for her!!

## 2024-03-15 LAB — GP B STREP DNA SPEC QL NAA+PROBE: POSITIVE

## 2024-03-17 ENCOUNTER — HOSPITAL ENCOUNTER (INPATIENT)
Facility: CLINIC | Age: 34
LOS: 5 days | Discharge: HOME HEALTH | End: 2024-03-22
Attending: OBSTETRICS & GYNECOLOGY | Admitting: OBSTETRICS & GYNECOLOGY
Payer: COMMERCIAL

## 2024-03-17 DIAGNOSIS — Z98.891 STATUS POST CESAREAN SECTION: ICD-10-CM

## 2024-03-17 DIAGNOSIS — O13.3 GESTATIONAL HYPERTENSION, THIRD TRIMESTER: ICD-10-CM

## 2024-03-17 DIAGNOSIS — O24.019 TYPE 1 DIABETES MELLITUS DURING PREGNANCY, ANTEPARTUM: ICD-10-CM

## 2024-03-17 LAB
ABO/RH(D): NORMAL
ALBUMIN SERPL BCG-MCNC: 2.9 G/DL (ref 3.5–5.2)
ALP SERPL-CCNC: 152 U/L (ref 40–150)
ALT SERPL W P-5'-P-CCNC: 11 U/L (ref 0–50)
ANION GAP SERPL CALCULATED.3IONS-SCNC: 13 MMOL/L (ref 7–15)
ANTIBODY SCREEN: NEGATIVE
AST SERPL W P-5'-P-CCNC: 17 U/L (ref 0–45)
BILIRUB SERPL-MCNC: <0.2 MG/DL
BUN SERPL-MCNC: 9.9 MG/DL (ref 6–20)
CALCIUM SERPL-MCNC: 9 MG/DL (ref 8.6–10)
CHLORIDE SERPL-SCNC: 106 MMOL/L (ref 98–107)
CREAT SERPL-MCNC: 0.69 MG/DL (ref 0.51–0.95)
DEPRECATED HCO3 PLAS-SCNC: 17 MMOL/L (ref 22–29)
EGFRCR SERPLBLD CKD-EPI 2021: >90 ML/MIN/1.73M2
ERYTHROCYTE [DISTWIDTH] IN BLOOD BY AUTOMATED COUNT: 12.5 % (ref 10–15)
GLUCOSE SERPL-MCNC: 124 MG/DL (ref 70–99)
HCT VFR BLD AUTO: 35.8 % (ref 35–47)
HGB BLD-MCNC: 12.2 G/DL (ref 11.7–15.7)
MCH RBC QN AUTO: 31.2 PG (ref 26.5–33)
MCHC RBC AUTO-ENTMCNC: 34.1 G/DL (ref 31.5–36.5)
MCV RBC AUTO: 92 FL (ref 78–100)
PLATELET # BLD AUTO: 169 10E3/UL (ref 150–450)
POTASSIUM SERPL-SCNC: 3.8 MMOL/L (ref 3.4–5.3)
PROT SERPL-MCNC: 5.9 G/DL (ref 6.4–8.3)
RBC # BLD AUTO: 3.91 10E6/UL (ref 3.8–5.2)
SODIUM SERPL-SCNC: 136 MMOL/L (ref 135–145)
SPECIMEN EXPIRATION DATE: NORMAL
WBC # BLD AUTO: 7.5 10E3/UL (ref 4–11)

## 2024-03-17 PROCEDURE — 258N000003 HC RX IP 258 OP 636: Performed by: OBSTETRICS & GYNECOLOGY

## 2024-03-17 PROCEDURE — 120N000001 HC R&B MED SURG/OB

## 2024-03-17 PROCEDURE — 80053 COMPREHEN METABOLIC PANEL: CPT | Performed by: OBSTETRICS & GYNECOLOGY

## 2024-03-17 PROCEDURE — 85014 HEMATOCRIT: CPT | Performed by: OBSTETRICS & GYNECOLOGY

## 2024-03-17 PROCEDURE — 36415 COLL VENOUS BLD VENIPUNCTURE: CPT | Performed by: OBSTETRICS & GYNECOLOGY

## 2024-03-17 PROCEDURE — 250N000013 HC RX MED GY IP 250 OP 250 PS 637: Performed by: OBSTETRICS & GYNECOLOGY

## 2024-03-17 PROCEDURE — 86900 BLOOD TYPING SEROLOGIC ABO: CPT | Performed by: OBSTETRICS & GYNECOLOGY

## 2024-03-17 PROCEDURE — 86780 TREPONEMA PALLIDUM: CPT | Performed by: OBSTETRICS & GYNECOLOGY

## 2024-03-17 PROCEDURE — 99222 1ST HOSP IP/OBS MODERATE 55: CPT | Performed by: OBSTETRICS & GYNECOLOGY

## 2024-03-17 RX ORDER — KETOROLAC TROMETHAMINE 30 MG/ML
30 INJECTION, SOLUTION INTRAMUSCULAR; INTRAVENOUS
Status: DISCONTINUED | OUTPATIENT
Start: 2024-03-17 | End: 2024-03-19

## 2024-03-17 RX ORDER — CITRIC ACID/SODIUM CITRATE 334-500MG
30 SOLUTION, ORAL ORAL
Status: DISCONTINUED | OUTPATIENT
Start: 2024-03-17 | End: 2024-03-19

## 2024-03-17 RX ORDER — TRANEXAMIC ACID 10 MG/ML
1 INJECTION, SOLUTION INTRAVENOUS EVERY 30 MIN PRN
Status: DISCONTINUED | OUTPATIENT
Start: 2024-03-17 | End: 2024-03-19

## 2024-03-17 RX ORDER — NALOXONE HYDROCHLORIDE 0.4 MG/ML
0.4 INJECTION, SOLUTION INTRAMUSCULAR; INTRAVENOUS; SUBCUTANEOUS
Status: DISCONTINUED | OUTPATIENT
Start: 2024-03-17 | End: 2024-03-19

## 2024-03-17 RX ORDER — MISOPROSTOL 200 UG/1
400 TABLET ORAL
Status: DISCONTINUED | OUTPATIENT
Start: 2024-03-17 | End: 2024-03-19

## 2024-03-17 RX ORDER — OXYTOCIN 10 [USP'U]/ML
10 INJECTION, SOLUTION INTRAMUSCULAR; INTRAVENOUS
Status: DISCONTINUED | OUTPATIENT
Start: 2024-03-17 | End: 2024-03-19

## 2024-03-17 RX ORDER — ACETAMINOPHEN 325 MG/1
650 TABLET ORAL EVERY 4 HOURS PRN
Status: DISCONTINUED | OUTPATIENT
Start: 2024-03-17 | End: 2024-03-19

## 2024-03-17 RX ORDER — METOCLOPRAMIDE HYDROCHLORIDE 5 MG/ML
10 INJECTION INTRAMUSCULAR; INTRAVENOUS EVERY 6 HOURS PRN
Status: DISCONTINUED | OUTPATIENT
Start: 2024-03-17 | End: 2024-03-19

## 2024-03-17 RX ORDER — LOPERAMIDE HCL 2 MG
4 CAPSULE ORAL
Status: DISCONTINUED | OUTPATIENT
Start: 2024-03-17 | End: 2024-03-19

## 2024-03-17 RX ORDER — CARBOPROST TROMETHAMINE 250 UG/ML
250 INJECTION, SOLUTION INTRAMUSCULAR
Status: DISCONTINUED | OUTPATIENT
Start: 2024-03-17 | End: 2024-03-19

## 2024-03-17 RX ORDER — ONDANSETRON 4 MG/1
4 TABLET, ORALLY DISINTEGRATING ORAL EVERY 6 HOURS PRN
Status: DISCONTINUED | OUTPATIENT
Start: 2024-03-17 | End: 2024-03-19

## 2024-03-17 RX ORDER — METHYLERGONOVINE MALEATE 0.2 MG/ML
200 INJECTION INTRAVENOUS
Status: DISCONTINUED | OUTPATIENT
Start: 2024-03-17 | End: 2024-03-19

## 2024-03-17 RX ORDER — NALOXONE HYDROCHLORIDE 0.4 MG/ML
0.2 INJECTION, SOLUTION INTRAMUSCULAR; INTRAVENOUS; SUBCUTANEOUS
Status: DISCONTINUED | OUTPATIENT
Start: 2024-03-17 | End: 2024-03-19

## 2024-03-17 RX ORDER — METOCLOPRAMIDE 10 MG/1
10 TABLET ORAL EVERY 6 HOURS PRN
Status: DISCONTINUED | OUTPATIENT
Start: 2024-03-17 | End: 2024-03-19

## 2024-03-17 RX ORDER — OXYTOCIN/0.9 % SODIUM CHLORIDE 30/500 ML
100-340 PLASTIC BAG, INJECTION (ML) INTRAVENOUS CONTINUOUS PRN
Status: DISCONTINUED | OUTPATIENT
Start: 2024-03-17 | End: 2024-03-19

## 2024-03-17 RX ORDER — LOPERAMIDE HCL 2 MG
2 CAPSULE ORAL
Status: DISCONTINUED | OUTPATIENT
Start: 2024-03-17 | End: 2024-03-19

## 2024-03-17 RX ORDER — MISOPROSTOL 200 UG/1
800 TABLET ORAL
Status: DISCONTINUED | OUTPATIENT
Start: 2024-03-17 | End: 2024-03-19

## 2024-03-17 RX ORDER — PROCHLORPERAZINE MALEATE 5 MG
10 TABLET ORAL EVERY 6 HOURS PRN
Status: DISCONTINUED | OUTPATIENT
Start: 2024-03-17 | End: 2024-03-19

## 2024-03-17 RX ORDER — HYDRALAZINE HYDROCHLORIDE 20 MG/ML
10 INJECTION INTRAMUSCULAR; INTRAVENOUS
Status: DISCONTINUED | OUTPATIENT
Start: 2024-03-17 | End: 2024-03-21

## 2024-03-17 RX ORDER — OXYTOCIN/0.9 % SODIUM CHLORIDE 30/500 ML
340 PLASTIC BAG, INJECTION (ML) INTRAVENOUS CONTINUOUS PRN
Status: DISCONTINUED | OUTPATIENT
Start: 2024-03-17 | End: 2024-03-19

## 2024-03-17 RX ORDER — ONDANSETRON 2 MG/ML
4 INJECTION INTRAMUSCULAR; INTRAVENOUS EVERY 6 HOURS PRN
Status: DISCONTINUED | OUTPATIENT
Start: 2024-03-17 | End: 2024-03-19

## 2024-03-17 RX ORDER — FENTANYL CITRATE 50 UG/ML
50 INJECTION, SOLUTION INTRAMUSCULAR; INTRAVENOUS EVERY 30 MIN PRN
Status: DISCONTINUED | OUTPATIENT
Start: 2024-03-17 | End: 2024-03-19

## 2024-03-17 RX ORDER — NICOTINE POLACRILEX 4 MG
15-30 LOZENGE BUCCAL
Status: DISCONTINUED | OUTPATIENT
Start: 2024-03-17 | End: 2024-03-18

## 2024-03-17 RX ORDER — LABETALOL HYDROCHLORIDE 5 MG/ML
20-80 INJECTION, SOLUTION INTRAVENOUS EVERY 10 MIN PRN
Status: DISCONTINUED | OUTPATIENT
Start: 2024-03-17 | End: 2024-03-21

## 2024-03-17 RX ORDER — LIDOCAINE 40 MG/G
CREAM TOPICAL
Status: DISCONTINUED | OUTPATIENT
Start: 2024-03-17 | End: 2024-03-19

## 2024-03-17 RX ORDER — HYDROXYZINE HYDROCHLORIDE 25 MG/1
50-100 TABLET, FILM COATED ORAL
Status: DISCONTINUED | OUTPATIENT
Start: 2024-03-17 | End: 2024-03-19

## 2024-03-17 RX ORDER — DEXTROSE MONOHYDRATE 25 G/50ML
25-50 INJECTION, SOLUTION INTRAVENOUS
Status: DISCONTINUED | OUTPATIENT
Start: 2024-03-17 | End: 2024-03-18

## 2024-03-17 RX ORDER — PROCHLORPERAZINE 25 MG
25 SUPPOSITORY, RECTAL RECTAL EVERY 12 HOURS PRN
Status: DISCONTINUED | OUTPATIENT
Start: 2024-03-17 | End: 2024-03-19

## 2024-03-17 RX ORDER — IBUPROFEN 400 MG/1
800 TABLET, FILM COATED ORAL
Status: DISCONTINUED | OUTPATIENT
Start: 2024-03-17 | End: 2024-03-19

## 2024-03-17 RX ORDER — SODIUM CHLORIDE, SODIUM LACTATE, POTASSIUM CHLORIDE, CALCIUM CHLORIDE 600; 310; 30; 20 MG/100ML; MG/100ML; MG/100ML; MG/100ML
INJECTION, SOLUTION INTRAVENOUS CONTINUOUS
Status: DISCONTINUED | OUTPATIENT
Start: 2024-03-17 | End: 2024-03-19

## 2024-03-17 RX ADMIN — DINOPROSTONE 10 MG: 10 INSERT VAGINAL at 21:50

## 2024-03-17 RX ADMIN — SODIUM CHLORIDE, POTASSIUM CHLORIDE, SODIUM LACTATE AND CALCIUM CHLORIDE 1000 ML: 600; 310; 30; 20 INJECTION, SOLUTION INTRAVENOUS at 20:40

## 2024-03-17 RX ADMIN — SODIUM CHLORIDE, POTASSIUM CHLORIDE, SODIUM LACTATE AND CALCIUM CHLORIDE: 600; 310; 30; 20 INJECTION, SOLUTION INTRAVENOUS at 21:31

## 2024-03-17 RX ADMIN — HYDROXYZINE HYDROCHLORIDE 50 MG: 25 TABLET, FILM COATED ORAL at 21:50

## 2024-03-17 ASSESSMENT — ACTIVITIES OF DAILY LIVING (ADL)
ADLS_ACUITY_SCORE: 20

## 2024-03-18 ENCOUNTER — ANESTHESIA EVENT (OUTPATIENT)
Dept: OBGYN | Facility: CLINIC | Age: 34
End: 2024-03-18
Payer: COMMERCIAL

## 2024-03-18 ENCOUNTER — ANESTHESIA (OUTPATIENT)
Dept: OBGYN | Facility: CLINIC | Age: 34
End: 2024-03-18
Payer: COMMERCIAL

## 2024-03-18 LAB
GLUCOSE BLDC GLUCOMTR-MCNC: 80 MG/DL (ref 70–99)
GLUCOSE BLDC GLUCOMTR-MCNC: 81 MG/DL (ref 70–99)
T PALLIDUM AB SER QL: NONREACTIVE

## 2024-03-18 PROCEDURE — 250N000011 HC RX IP 250 OP 636: Performed by: OBSTETRICS & GYNECOLOGY

## 2024-03-18 PROCEDURE — 250N000011 HC RX IP 250 OP 636

## 2024-03-18 PROCEDURE — 370N000003 HC ANESTHESIA WARD SERVICE: Performed by: ANESTHESIOLOGY

## 2024-03-18 PROCEDURE — 59510 CESAREAN DELIVERY: CPT | Performed by: ANESTHESIOLOGY

## 2024-03-18 PROCEDURE — 250N000011 HC RX IP 250 OP 636: Performed by: STUDENT IN AN ORGANIZED HEALTH CARE EDUCATION/TRAINING PROGRAM

## 2024-03-18 PROCEDURE — 258N000003 HC RX IP 258 OP 636: Performed by: OBSTETRICS & GYNECOLOGY

## 2024-03-18 PROCEDURE — 59200 INSERT CERVICAL DILATOR: CPT | Performed by: OBSTETRICS & GYNECOLOGY

## 2024-03-18 PROCEDURE — 999N000128 HC STATISTIC PERIPHERAL IV START W/O US GUIDANCE

## 2024-03-18 PROCEDURE — 59510 CESAREAN DELIVERY: CPT

## 2024-03-18 PROCEDURE — 250N000009 HC RX 250: Performed by: ANESTHESIOLOGY

## 2024-03-18 PROCEDURE — 120N000001 HC R&B MED SURG/OB

## 2024-03-18 PROCEDURE — 250N000013 HC RX MED GY IP 250 OP 250 PS 637: Performed by: STUDENT IN AN ORGANIZED HEALTH CARE EDUCATION/TRAINING PROGRAM

## 2024-03-18 PROCEDURE — 99232 SBSQ HOSP IP/OBS MODERATE 35: CPT | Mod: 25 | Performed by: STUDENT IN AN ORGANIZED HEALTH CARE EDUCATION/TRAINING PROGRAM

## 2024-03-18 PROCEDURE — 250N000011 HC RX IP 250 OP 636: Performed by: ANESTHESIOLOGY

## 2024-03-18 RX ORDER — OXYTOCIN/0.9 % SODIUM CHLORIDE 30/500 ML
1-24 PLASTIC BAG, INJECTION (ML) INTRAVENOUS CONTINUOUS
Status: DISCONTINUED | OUTPATIENT
Start: 2024-03-18 | End: 2024-03-22 | Stop reason: HOSPADM

## 2024-03-18 RX ORDER — MISOPROSTOL 100 UG/1
25 TABLET ORAL EVERY 4 HOURS PRN
Status: DISCONTINUED | OUTPATIENT
Start: 2024-03-18 | End: 2024-03-19

## 2024-03-18 RX ORDER — CEFAZOLIN SODIUM 1 G/3ML
1 INJECTION, POWDER, FOR SOLUTION INTRAMUSCULAR; INTRAVENOUS EVERY 8 HOURS
Status: DISCONTINUED | OUTPATIENT
Start: 2024-03-18 | End: 2024-03-19

## 2024-03-18 RX ORDER — ROPIVACAINE HYDROCHLORIDE 2 MG/ML
INJECTION, SOLUTION EPIDURAL; INFILTRATION; PERINEURAL
Status: COMPLETED | OUTPATIENT
Start: 2024-03-18 | End: 2024-03-18

## 2024-03-18 RX ORDER — NICOTINE POLACRILEX 4 MG
15-30 LOZENGE BUCCAL
Status: DISCONTINUED | OUTPATIENT
Start: 2024-03-18 | End: 2024-03-19

## 2024-03-18 RX ORDER — EPHEDRINE SULFATE 50 MG/ML
5 INJECTION, SOLUTION INTRAMUSCULAR; INTRAVENOUS; SUBCUTANEOUS
Status: DISCONTINUED | OUTPATIENT
Start: 2024-03-18 | End: 2024-03-19

## 2024-03-18 RX ORDER — DEXTROSE MONOHYDRATE 25 G/50ML
25-50 INJECTION, SOLUTION INTRAVENOUS
Status: DISCONTINUED | OUTPATIENT
Start: 2024-03-18 | End: 2024-03-19

## 2024-03-18 RX ORDER — ROPIVACAINE HYDROCHLORIDE 2 MG/ML
10 INJECTION, SOLUTION EPIDURAL; INFILTRATION; PERINEURAL ONCE
Status: DISCONTINUED | OUTPATIENT
Start: 2024-03-18 | End: 2024-03-19

## 2024-03-18 RX ORDER — LIDOCAINE 40 MG/G
CREAM TOPICAL
Status: DISCONTINUED | OUTPATIENT
Start: 2024-03-18 | End: 2024-03-20

## 2024-03-18 RX ORDER — FENTANYL CITRATE 50 UG/ML
100 INJECTION, SOLUTION INTRAMUSCULAR; INTRAVENOUS ONCE
Status: DISCONTINUED | OUTPATIENT
Start: 2024-03-18 | End: 2024-03-19

## 2024-03-18 RX ORDER — LEVOTHYROXINE SODIUM 137 UG/1
137 TABLET ORAL DAILY
Status: DISCONTINUED | OUTPATIENT
Start: 2024-03-18 | End: 2024-03-19

## 2024-03-18 RX ORDER — CEFAZOLIN SODIUM 2 G/100ML
2 INJECTION, SOLUTION INTRAVENOUS ONCE
Status: COMPLETED | OUTPATIENT
Start: 2024-03-18 | End: 2024-03-18

## 2024-03-18 RX ORDER — TERBUTALINE SULFATE 1 MG/ML
0.25 INJECTION, SOLUTION SUBCUTANEOUS
Status: DISCONTINUED | OUTPATIENT
Start: 2024-03-18 | End: 2024-03-19

## 2024-03-18 RX ORDER — FENTANYL CITRATE 50 UG/ML
INJECTION, SOLUTION INTRAMUSCULAR; INTRAVENOUS
Status: COMPLETED | OUTPATIENT
Start: 2024-03-18 | End: 2024-03-18

## 2024-03-18 RX ORDER — MISOPROSTOL 100 UG/1
25 TABLET ORAL
Status: DISCONTINUED | OUTPATIENT
Start: 2024-03-18 | End: 2024-03-18

## 2024-03-18 RX ORDER — DEXTROSE, SODIUM CHLORIDE, SODIUM LACTATE, POTASSIUM CHLORIDE, AND CALCIUM CHLORIDE 5; .6; .31; .03; .02 G/100ML; G/100ML; G/100ML; G/100ML; G/100ML
INJECTION, SOLUTION INTRAVENOUS CONTINUOUS
Status: DISCONTINUED | OUTPATIENT
Start: 2024-03-18 | End: 2024-03-19

## 2024-03-18 RX ORDER — SODIUM CHLORIDE 9 MG/ML
INJECTION, SOLUTION INTRAVENOUS CONTINUOUS
Status: DISCONTINUED | OUTPATIENT
Start: 2024-03-18 | End: 2024-03-19

## 2024-03-18 RX ORDER — SODIUM CHLORIDE, SODIUM LACTATE, POTASSIUM CHLORIDE, CALCIUM CHLORIDE 600; 310; 30; 20 MG/100ML; MG/100ML; MG/100ML; MG/100ML
INJECTION, SOLUTION INTRAVENOUS CONTINUOUS PRN
Status: DISCONTINUED | OUTPATIENT
Start: 2024-03-18 | End: 2024-03-22 | Stop reason: HOSPADM

## 2024-03-18 RX ORDER — TERBUTALINE SULFATE 1 MG/ML
0.25 INJECTION, SOLUTION SUBCUTANEOUS
Status: DISCONTINUED | OUTPATIENT
Start: 2024-03-18 | End: 2024-03-22 | Stop reason: HOSPADM

## 2024-03-18 RX ORDER — CEFAZOLIN SODIUM 1 G/3ML
INJECTION, POWDER, FOR SOLUTION INTRAMUSCULAR; INTRAVENOUS
Status: COMPLETED
Start: 2024-03-18 | End: 2024-03-18

## 2024-03-18 RX ORDER — NALBUPHINE HYDROCHLORIDE 20 MG/ML
2.5-5 INJECTION, SOLUTION INTRAMUSCULAR; INTRAVENOUS; SUBCUTANEOUS EVERY 6 HOURS PRN
Status: DISCONTINUED | OUTPATIENT
Start: 2024-03-18 | End: 2024-03-19

## 2024-03-18 RX ADMIN — MISOPROSTOL 25 MCG: 100 TABLET ORAL at 09:40

## 2024-03-18 RX ADMIN — FENTANYL CITRATE 50 MCG: 50 INJECTION, SOLUTION INTRAMUSCULAR; INTRAVENOUS at 19:23

## 2024-03-18 RX ADMIN — CEFAZOLIN SODIUM 1 G: 1 INJECTION, POWDER, FOR SOLUTION INTRAMUSCULAR; INTRAVENOUS at 22:31

## 2024-03-18 RX ADMIN — EPHEDRINE SULFATE 5 MG: 5 INJECTION INTRAVENOUS at 21:22

## 2024-03-18 RX ADMIN — Medication: at 21:14

## 2024-03-18 RX ADMIN — EPHEDRINE SULFATE 5 MG: 5 INJECTION INTRAVENOUS at 21:27

## 2024-03-18 RX ADMIN — ROPIVACAINE HYDROCHLORIDE 8 ML: 2 INJECTION, SOLUTION EPIDURAL; INFILTRATION at 21:10

## 2024-03-18 RX ADMIN — SODIUM CHLORIDE, SODIUM LACTATE, POTASSIUM CHLORIDE, CALCIUM CHLORIDE AND DEXTROSE MONOHYDRATE: 5; 600; 310; 30; 20 INJECTION, SOLUTION INTRAVENOUS at 22:29

## 2024-03-18 RX ADMIN — MISOPROSTOL 25 MCG: 100 TABLET ORAL at 13:41

## 2024-03-18 RX ADMIN — CEFAZOLIN SODIUM 2 G: 2 INJECTION, SOLUTION INTRAVENOUS at 13:54

## 2024-03-18 RX ADMIN — SODIUM CHLORIDE, POTASSIUM CHLORIDE, SODIUM LACTATE AND CALCIUM CHLORIDE: 600; 310; 30; 20 INJECTION, SOLUTION INTRAVENOUS at 13:54

## 2024-03-18 RX ADMIN — LEVOTHYROXINE SODIUM 137 MCG: 137 TABLET ORAL at 09:25

## 2024-03-18 RX ADMIN — CEFAZOLIN 1 G: 1 INJECTION, POWDER, FOR SOLUTION INTRAMUSCULAR; INTRAVENOUS at 22:31

## 2024-03-18 RX ADMIN — FENTANYL CITRATE 50 MCG: 50 INJECTION, SOLUTION INTRAMUSCULAR; INTRAVENOUS at 17:37

## 2024-03-18 RX ADMIN — FENTANYL CITRATE 100 MCG: 50 INJECTION INTRAMUSCULAR; INTRAVENOUS at 21:10

## 2024-03-18 RX ADMIN — SODIUM CHLORIDE, POTASSIUM CHLORIDE, SODIUM LACTATE AND CALCIUM CHLORIDE: 600; 310; 30; 20 INJECTION, SOLUTION INTRAVENOUS at 20:49

## 2024-03-18 RX ADMIN — SERTRALINE HYDROCHLORIDE 50 MG: 50 TABLET ORAL at 09:25

## 2024-03-18 RX ADMIN — EPHEDRINE SULFATE 5 MG: 5 INJECTION INTRAVENOUS at 21:19

## 2024-03-18 ASSESSMENT — ACTIVITIES OF DAILY LIVING (ADL)
ADLS_ACUITY_SCORE: 20

## 2024-03-18 NOTE — PROVIDER NOTIFICATION
03/17/24 2010   Provider Notification   Provider Name/Title Dr. Hurtado   Method of Notification In Department   Request Evaluate - Remote   Notification Reason Uterine Activity;Status Update     Dr. Hurtado on unit for delivery. Due to contraction pattern, give fluid bolus of 1 liter and if contractions space out may do cervidil. If not, will do low dose pitocin. VORB.

## 2024-03-18 NOTE — PLAN OF CARE
Report given to Sarah SHELDON RN. Pt notes more frequent cramping. Encourage frequent maternal repositioning. Category 1 tracing. Plan is for Dr. Vincent to come to assess pt this evening and possible AROM. Pt in agreement.

## 2024-03-18 NOTE — PROGRESS NOTES
"FHR monitoring note     Linda Chavez      MRN: [id number]   Age: 33 year old YOB: 1990   Date of Admission: 3/17/2024      Objective:  Patient Vitals for the past 24 hrs:   BP Temp Temp src Resp Height Weight   24 0714 (!) 140/82 97.8  F (36.6  C) Temporal 16 -- --   24 0420 (!) 143/68 97.9  F (36.6  C) Temporal 16 -- --   24 0006 (!) 141/67 97.2  F (36.2  C) Temporal 16 -- --   24 131/83 99  F (37.2  C) Temporal 16 -- --   24 -- -- -- -- 1.651 m (5' 5\") 108.9 kg (240 lb)     FHT: BL 130bpm, moderatre variability, + accels, no decels  [place]: 3 contractions in 10 mins     Assessment/Plan:  Ms. Linda Chavez is a 33 year old  at 37w3d admitted for IOL for gHTN.    Labor:  - Cervidil o/n.Plan to remove, check cervix, and proceed with PV miso   - GBS positive, sensitivities have not returned, has history of rash. Ancef ordered. Start after two doses of PV miso   - Anticipate      gHTN  - BPs are consistently elevated in MR  - Continue to monitor, start PO antihypertensive if high mild range   - Repeat labs as clinically indicated     T1DM  - Active labor orders placed  Per endocrine:   \"Trip to hospital for delivery:  - bring extra infusion sets and cartridges, Dexcom sensor  - inpatient diabetes orders per OB and/or hospitalist  - may use Activity mode if low glucose trends  - continue same levothyroxine 0.137 mg daily dose     After childbirth:  - change pump settings from Profile 2 (pregnancy) to Profile 1 (pre-pregnancy)  - continue Control IQ auto mode  - hospital team to message me if general questions about diabetes management  - message [facility] Diab Educator for postpartum update after returning home  - see Dr Hong for follow-up endocrinology appt approx 1 month postpartum,               Work-in appointment OK\"    FWB:  - Category I, reactive      Brooke Vincent MD, [facility]  OB/GYN  3/18/2024     "

## 2024-03-18 NOTE — PLAN OF CARE
Patient denies adhesive allergy. Specifically discussed no previous reaction to band-aids, other adhesives, or other medical patches.      Sheri monitoring in use.  Patient educated that redness may occur following removal of the patches and will slowly fade.  Patient instructed to notify nurse if any adverse reaction noted.

## 2024-03-18 NOTE — PROGRESS NOTES
"Labor Progress Note    Linda Chavez      MRN: [id number]   Age: 33 year old YOB: 1990   Date of Admission: 3/17/2024    Subjective:  Feels well. Mild contractions, no leaking fluid, vaginal bleeding. Cervical checks are uncomfortable.     Objective:  Patient Vitals for the past 24 hrs:   BP Temp Temp src Resp Height Weight   24 1513 117/61 97.6  F (36.4  C) Temporal 16 -- --   24 1130 (!) 141/84 97.2  F (36.2  C) Temporal 16 -- --   24 0714 (!) 140/82 97.8  F (36.6  C) Temporal 16 -- --   24 0420 (!) 143/68 97.9  F (36.6  C) Temporal 16 -- --   24 0006 (!) 141/67 97.2  F (36.2  C) Temporal 16 -- --   24 131/83 99  F (37.2  C) Temporal 16 -- --   24 -- -- -- -- 1.651 m (5' 5\") 108.9 kg (240 lb)     Gen: Well-appearing, NAD  Pulm: Breathing comfortably on room air  Abd: Soft, gravid, non-tender  Ext: trace LE edema b/l    FHT: BL 140bpm, moderate variability, moderate accels, decels   [place]: 3-4 contractions in 10 minutes      Assessment/Plan:  Ms. Linda Chavez is a 33 year old  at 37w3d  admitted for IOL for gHTN.    Labor:  - S/p cervidil, and s/p two doses of PV miso. Cvx /-3, mid position   - Lengthy discussion of next steps to progress labor. Pt hesitant to proceed with balloon due to discomfort. Discussed that balloon can be removed at any point if too uncomfortable, then can switch back to PV miso for ripening.   Procedure:  IV Fentanyl administered. The patient was placed in frog leg position. A sterile cervical exam was performed. A 80cc catheter was placed through the cervix with guidance from the stylet. The balloon was filled with 70 ml saline.The patient tolerated well.   - Will provide concurrent low dose pitocin. When balloon is removed or falls out, plan to continue to titrate pitocin as usual and proceed with AROM.   - Reviewed indications for CS including FHR concerns     gHTN  - BPs are normal to MR  - Continue to monitor, " "start PO antihypertensive if high mild range   - Repeat labs as clinically indicated      T1DM  - Active labor orders placed  Per endocrine:   \"Trip to hospital for delivery:  - bring extra infusion sets and cartridges, Dexcom sensor  - inpatient diabetes orders per OB and/or hospitalist  - may use Activity mode if low glucose trends  - continue same levothyroxine 0.137 mg daily dose     After childbirth:  - change pump settings from Profile 2 (pregnancy) to Profile 1 (pre-pregnancy)  - continue Control IQ auto mode  - hospital team to message me if general questions about diabetes management  - message [facility] Diab Educator for postpartum update after returning home  - see Dr Hong for follow-up endocrinology appt approx 1 month postpartum,               Work-in appointment OK\"     FWB:  - Category I, reactive      Brooke Vincent MD, [facility]  OB/GYN  3/18/2024     "

## 2024-03-18 NOTE — PLAN OF CARE
Pt fasting BG at 0720 115 according to her CGM. Postprandial  per pt CGM. Blood pressures staying stable 140s/80s. Pt denies headache, blurred vision, or epigastric pain. Trace edema noted.

## 2024-03-18 NOTE — PLAN OF CARE
Data: Patient admitted to room 219 at 1932. Patient is a . Prenatal record reviewed.   OB History    Para Term  AB Living   2 0 0 0 1 0   SAB IAB Ectopic Multiple Live Births   1 0 0 0 0      # Outcome Date GA Lbr Lino/2nd Weight Sex Delivery Anes PTL Lv   2 Current            1 SAB 23 11w1d    AB, MISSED      .  Medical History:   Past Medical History:   Diagnosis Date    Adjustment disorder with anxious mood     Allergic rhinitis, cause unspecified     h/o AIT    Anxiety     Chest discomfort     Common migraine     No visual aura.     Hyperlipidemia LDL goal < 70     Hypothyroidism     Infectious mononucleosis 1995    Insulin pump in place     Dexcom continuous glucose monitoring    Obesity     Tuberculosis     Type 1 diabetes, HbA1c goal < 7% (H) 2004     followed N - peds endocrinology - now Dr Jimenez   .  Gestational age 37w2d. Vital signs per doc flowsheet. Fetal movement present. Patient reports Induction Of Labor (Denies LOF or bleeding. Baby moving as normal.)   as reason for admission. Support persons Gamal present.  Action: Verbal consent for EFM, external fetal monitors applied. Admission assessment completed. Patient and support persons educated on labor process. Patient instructed to report change in fetal movement, contractions, vaginal leaking of fluid or bleeding, abdominal pain, or any concerns related to the pregnancy to her nurse/physician. Patient oriented to room, call light in reach.   Response: Dr. Hurtado informed of admission. Plan per provider is pending SVE and fluid bolus. Patient verbalized understanding of education and verbalized agreement with plan. Patient coping with labor via support person.

## 2024-03-18 NOTE — H&P
Significant change in general health status.  See prenatal record and notation in the progress note.  32 yo  coming in for cervical ripening due to preeclampsia without severe features.  Has had BPs>140/90 x2 and LA/CR 0.27 on 3/14 but all other labs normal and admission BP is normal    Mild poly at 27cm on U/s 3/14    Hypothyroid on 137 mcg levoxyl    Type I DM managed with CGS and pump. Will have her manage her DM with this until active labor and then will transition to normal active labor protocol    GBS + and sensitivities are still pending  Patient with PCN allergy-hives. Will do vanco once active labor or SROM and if sensitivities return tomorrow before abx needed, can then do clinda if sensitive.    GTCP with plts 140s. Admission labs pending    On arrival maternal  and fetal heart rate 170s. Ctx q2 minutes though patient not feeling them. Per RN check cervix is only FT    Will do IVF fluid bolus and if ctx space out will do cervidil rather than cytotec b/c baseline frequency of BH on toco will ow preclude repeat doses of cytotec overnight and at least with cervidil will be able to maintain through the night.    EFW: 7lb

## 2024-03-18 NOTE — PROVIDER NOTIFICATION
"   03/18/24 0910   Provider Notification   Provider Name/Title Dr. Vincent   Method of Notification Electronic Page   Notification Reason Decels;Status Update       Notified provider \"pt having intermittent decels since 0820. Mod yuridia, accels present. Contractions Irregular.\" Appears to be loss of capture during some decels making it hard to determine accuracy of brian monitor. Asked provider to review strip. After reviewing strip Dr. Vincent confirmed okay to proceed with vaginal Cytotec.   "

## 2024-03-19 LAB
BACTERIA SPEC CULT: ABNORMAL
GLUCOSE (EXTERNAL): 160 MG/DL (ref 70–99)
GLUCOSE BLDC GLUCOMTR-MCNC: 100 MG/DL (ref 70–99)
GLUCOSE BLDC GLUCOMTR-MCNC: 117 MG/DL (ref 70–99)
GLUCOSE BLDC GLUCOMTR-MCNC: 121 MG/DL (ref 70–99)
GLUCOSE BLDC GLUCOMTR-MCNC: 130 MG/DL (ref 70–99)
GLUCOSE BLDC GLUCOMTR-MCNC: 136 MG/DL (ref 70–99)
GLUCOSE BLDC GLUCOMTR-MCNC: 139 MG/DL (ref 70–99)
GLUCOSE BLDC GLUCOMTR-MCNC: 148 MG/DL (ref 70–99)
GLUCOSE BLDC GLUCOMTR-MCNC: 156 MG/DL (ref 70–99)
GLUCOSE BLDC GLUCOMTR-MCNC: 160 MG/DL (ref 70–99)
GLUCOSE BLDC GLUCOMTR-MCNC: 176 MG/DL (ref 70–99)
GLUCOSE BLDC GLUCOMTR-MCNC: 179 MG/DL (ref 70–99)
GLUCOSE BLDC GLUCOMTR-MCNC: 179 MG/DL (ref 70–99)
GLUCOSE BLDC GLUCOMTR-MCNC: 187 MG/DL (ref 70–99)
GLUCOSE BLDC GLUCOMTR-MCNC: 79 MG/DL (ref 70–99)
GLUCOSE BLDC GLUCOMTR-MCNC: 84 MG/DL (ref 70–99)
GLUCOSE BLDC GLUCOMTR-MCNC: 86 MG/DL (ref 70–99)
GLUCOSE BLDC GLUCOMTR-MCNC: 88 MG/DL (ref 70–99)
GLUCOSE BLDC GLUCOMTR-MCNC: 96 MG/DL (ref 70–99)

## 2024-03-19 PROCEDURE — 10907ZC DRAINAGE OF AMNIOTIC FLUID, THERAPEUTIC FROM PRODUCTS OF CONCEPTION, VIA NATURAL OR ARTIFICIAL OPENING: ICD-10-PCS | Performed by: OBSTETRICS & GYNECOLOGY

## 2024-03-19 PROCEDURE — 59515 CESAREAN DELIVERY: CPT | Performed by: OBSTETRICS & GYNECOLOGY

## 2024-03-19 PROCEDURE — 999N000016 HC STATISTIC ATTENDANCE AT DELIVERY

## 2024-03-19 PROCEDURE — 250N000011 HC RX IP 250 OP 636: Performed by: ANESTHESIOLOGY

## 2024-03-19 PROCEDURE — 250N000009 HC RX 250: Performed by: OBSTETRICS & GYNECOLOGY

## 2024-03-19 PROCEDURE — 250N000011 HC RX IP 250 OP 636: Performed by: OBSTETRICS & GYNECOLOGY

## 2024-03-19 PROCEDURE — 250N000009 HC RX 250: Performed by: ANESTHESIOLOGY

## 2024-03-19 PROCEDURE — 258N000003 HC RX IP 258 OP 636: Performed by: ANESTHESIOLOGY

## 2024-03-19 PROCEDURE — 250N000013 HC RX MED GY IP 250 OP 250 PS 637

## 2024-03-19 PROCEDURE — 250N000013 HC RX MED GY IP 250 OP 250 PS 637: Performed by: STUDENT IN AN ORGANIZED HEALTH CARE EDUCATION/TRAINING PROGRAM

## 2024-03-19 PROCEDURE — 88307 TISSUE EXAM BY PATHOLOGIST: CPT | Mod: TC | Performed by: OBSTETRICS & GYNECOLOGY

## 2024-03-19 PROCEDURE — 999N000157 HC STATISTIC RCP TIME EA 10 MIN

## 2024-03-19 PROCEDURE — 370N000017 HC ANESTHESIA TECHNICAL FEE, PER MIN: Performed by: OBSTETRICS & GYNECOLOGY

## 2024-03-19 PROCEDURE — 250N000011 HC RX IP 250 OP 636

## 2024-03-19 PROCEDURE — 88307 TISSUE EXAM BY PATHOLOGIST: CPT | Mod: 26 | Performed by: PATHOLOGY

## 2024-03-19 PROCEDURE — 00HU33Z INSERTION OF INFUSION DEVICE INTO SPINAL CANAL, PERCUTANEOUS APPROACH: ICD-10-PCS | Performed by: OBSTETRICS & GYNECOLOGY

## 2024-03-19 PROCEDURE — 360N000076 HC SURGERY LEVEL 3, PER MIN: Performed by: OBSTETRICS & GYNECOLOGY

## 2024-03-19 PROCEDURE — 258N000003 HC RX IP 258 OP 636: Performed by: OBSTETRICS & GYNECOLOGY

## 2024-03-19 PROCEDURE — 250N000009 HC RX 250: Performed by: STUDENT IN AN ORGANIZED HEALTH CARE EDUCATION/TRAINING PROGRAM

## 2024-03-19 PROCEDURE — 120N000012 HC R&B POSTPARTUM

## 2024-03-19 PROCEDURE — 272N000001 HC OR GENERAL SUPPLY STERILE: Performed by: OBSTETRICS & GYNECOLOGY

## 2024-03-19 PROCEDURE — 3E0R3BZ INTRODUCTION OF ANESTHETIC AGENT INTO SPINAL CANAL, PERCUTANEOUS APPROACH: ICD-10-PCS | Performed by: OBSTETRICS & GYNECOLOGY

## 2024-03-19 PROCEDURE — 250N000013 HC RX MED GY IP 250 OP 250 PS 637: Performed by: OBSTETRICS & GYNECOLOGY

## 2024-03-19 PROCEDURE — 710N000010 HC RECOVERY PHASE 1, LEVEL 2, PER MIN: Performed by: OBSTETRICS & GYNECOLOGY

## 2024-03-19 RX ORDER — ACETAMINOPHEN 325 MG/1
TABLET ORAL
Status: COMPLETED
Start: 2024-03-19 | End: 2024-03-19

## 2024-03-19 RX ORDER — DEXTROSE, SODIUM CHLORIDE, SODIUM LACTATE, POTASSIUM CHLORIDE, AND CALCIUM CHLORIDE 5; .6; .31; .03; .02 G/100ML; G/100ML; G/100ML; G/100ML; G/100ML
INJECTION, SOLUTION INTRAVENOUS CONTINUOUS
Status: DISCONTINUED | OUTPATIENT
Start: 2024-03-19 | End: 2024-03-22 | Stop reason: HOSPADM

## 2024-03-19 RX ORDER — NICOTINE POLACRILEX 4 MG
15-30 LOZENGE BUCCAL
Status: DISCONTINUED | OUTPATIENT
Start: 2024-03-19 | End: 2024-03-22 | Stop reason: HOSPADM

## 2024-03-19 RX ORDER — NICOTINE POLACRILEX 4 MG
15-30 LOZENGE BUCCAL
Status: DISCONTINUED | OUTPATIENT
Start: 2024-03-19 | End: 2024-03-19

## 2024-03-19 RX ORDER — DEXTROSE MONOHYDRATE 25 G/50ML
25-50 INJECTION, SOLUTION INTRAVENOUS
Status: DISCONTINUED | OUTPATIENT
Start: 2024-03-19 | End: 2024-03-22 | Stop reason: HOSPADM

## 2024-03-19 RX ORDER — MISOPROSTOL 200 UG/1
800 TABLET ORAL
Status: DISCONTINUED | OUTPATIENT
Start: 2024-03-19 | End: 2024-03-22 | Stop reason: HOSPADM

## 2024-03-19 RX ORDER — IBUPROFEN 400 MG/1
800 TABLET, FILM COATED ORAL EVERY 6 HOURS
Status: DISCONTINUED | OUTPATIENT
Start: 2024-03-20 | End: 2024-03-22 | Stop reason: HOSPADM

## 2024-03-19 RX ORDER — MODIFIED LANOLIN
OINTMENT (GRAM) TOPICAL
Status: DISCONTINUED | OUTPATIENT
Start: 2024-03-19 | End: 2024-03-22 | Stop reason: HOSPADM

## 2024-03-19 RX ORDER — ACETAMINOPHEN 325 MG/1
975 TABLET ORAL EVERY 6 HOURS
Status: DISCONTINUED | OUTPATIENT
Start: 2024-03-20 | End: 2024-03-22 | Stop reason: HOSPADM

## 2024-03-19 RX ORDER — CEFAZOLIN SODIUM/WATER 2 G/20 ML
SYRINGE (ML) INTRAVENOUS
Status: DISCONTINUED
Start: 2024-03-19 | End: 2024-03-19 | Stop reason: HOSPADM

## 2024-03-19 RX ORDER — KETOROLAC TROMETHAMINE 30 MG/ML
30 INJECTION, SOLUTION INTRAMUSCULAR; INTRAVENOUS EVERY 6 HOURS
Status: COMPLETED | OUTPATIENT
Start: 2024-03-20 | End: 2024-03-20

## 2024-03-19 RX ORDER — DEXTROSE MONOHYDRATE 25 G/50ML
25-50 INJECTION, SOLUTION INTRAVENOUS
Status: DISCONTINUED | OUTPATIENT
Start: 2024-03-19 | End: 2024-03-19

## 2024-03-19 RX ORDER — NALOXONE HYDROCHLORIDE 0.4 MG/ML
0.2 INJECTION, SOLUTION INTRAMUSCULAR; INTRAVENOUS; SUBCUTANEOUS
Status: DISCONTINUED | OUTPATIENT
Start: 2024-03-19 | End: 2024-03-22 | Stop reason: HOSPADM

## 2024-03-19 RX ORDER — ONDANSETRON 2 MG/ML
4 INJECTION INTRAMUSCULAR; INTRAVENOUS EVERY 6 HOURS PRN
Status: DISCONTINUED | OUTPATIENT
Start: 2024-03-19 | End: 2024-03-22 | Stop reason: HOSPADM

## 2024-03-19 RX ORDER — NALOXONE HYDROCHLORIDE 0.4 MG/ML
0.4 INJECTION, SOLUTION INTRAMUSCULAR; INTRAVENOUS; SUBCUTANEOUS
Status: DISCONTINUED | OUTPATIENT
Start: 2024-03-19 | End: 2024-03-22 | Stop reason: HOSPADM

## 2024-03-19 RX ORDER — SODIUM CHLORIDE, SODIUM LACTATE, POTASSIUM CHLORIDE, CALCIUM CHLORIDE 600; 310; 30; 20 MG/100ML; MG/100ML; MG/100ML; MG/100ML
INJECTION, SOLUTION INTRAVENOUS CONTINUOUS
Status: DISCONTINUED | OUTPATIENT
Start: 2024-03-19 | End: 2024-03-19

## 2024-03-19 RX ORDER — OXYTOCIN 10 [USP'U]/ML
10 INJECTION, SOLUTION INTRAMUSCULAR; INTRAVENOUS
Status: DISCONTINUED | OUTPATIENT
Start: 2024-03-19 | End: 2024-03-19

## 2024-03-19 RX ORDER — ONDANSETRON 2 MG/ML
INJECTION INTRAMUSCULAR; INTRAVENOUS PRN
Status: DISCONTINUED | OUTPATIENT
Start: 2024-03-19 | End: 2024-03-19

## 2024-03-19 RX ORDER — LOPERAMIDE HCL 2 MG
2 CAPSULE ORAL
Status: DISCONTINUED | OUTPATIENT
Start: 2024-03-19 | End: 2024-03-19

## 2024-03-19 RX ORDER — SIMETHICONE 80 MG
80 TABLET,CHEWABLE ORAL 4 TIMES DAILY PRN
Status: DISCONTINUED | OUTPATIENT
Start: 2024-03-19 | End: 2024-03-22 | Stop reason: HOSPADM

## 2024-03-19 RX ORDER — METOCLOPRAMIDE 10 MG/1
10 TABLET ORAL EVERY 6 HOURS PRN
Status: DISCONTINUED | OUTPATIENT
Start: 2024-03-19 | End: 2024-03-22 | Stop reason: HOSPADM

## 2024-03-19 RX ORDER — LOPERAMIDE HCL 2 MG
4 CAPSULE ORAL
Status: DISCONTINUED | OUTPATIENT
Start: 2024-03-19 | End: 2024-03-22 | Stop reason: HOSPADM

## 2024-03-19 RX ORDER — LOPERAMIDE HCL 2 MG
2 CAPSULE ORAL
Status: DISCONTINUED | OUTPATIENT
Start: 2024-03-19 | End: 2024-03-22 | Stop reason: HOSPADM

## 2024-03-19 RX ORDER — LIDOCAINE 40 MG/G
CREAM TOPICAL
Status: DISCONTINUED | OUTPATIENT
Start: 2024-03-19 | End: 2024-03-19

## 2024-03-19 RX ORDER — CEFAZOLIN SODIUM 2 G/100ML
2 INJECTION, SOLUTION INTRAVENOUS SEE ADMIN INSTRUCTIONS
Status: DISCONTINUED | OUTPATIENT
Start: 2024-03-19 | End: 2024-03-19

## 2024-03-19 RX ORDER — METHYLERGONOVINE MALEATE 0.2 MG/ML
200 INJECTION INTRAVENOUS
Status: DISCONTINUED | OUTPATIENT
Start: 2024-03-19 | End: 2024-03-22 | Stop reason: HOSPADM

## 2024-03-19 RX ORDER — LOPERAMIDE HCL 2 MG
4 CAPSULE ORAL
Status: DISCONTINUED | OUTPATIENT
Start: 2024-03-19 | End: 2024-03-19

## 2024-03-19 RX ORDER — TRANEXAMIC ACID 10 MG/ML
1 INJECTION, SOLUTION INTRAVENOUS EVERY 30 MIN PRN
Status: DISCONTINUED | OUTPATIENT
Start: 2024-03-19 | End: 2024-03-19

## 2024-03-19 RX ORDER — PROCHLORPERAZINE 25 MG
25 SUPPOSITORY, RECTAL RECTAL EVERY 12 HOURS PRN
Status: DISCONTINUED | OUTPATIENT
Start: 2024-03-19 | End: 2024-03-22 | Stop reason: HOSPADM

## 2024-03-19 RX ORDER — CEFAZOLIN SODIUM 1 G/3ML
INJECTION, POWDER, FOR SOLUTION INTRAMUSCULAR; INTRAVENOUS
Status: COMPLETED
Start: 2024-03-19 | End: 2024-03-19

## 2024-03-19 RX ORDER — AMOXICILLIN 250 MG
2 CAPSULE ORAL 2 TIMES DAILY
Status: DISCONTINUED | OUTPATIENT
Start: 2024-03-19 | End: 2024-03-22 | Stop reason: HOSPADM

## 2024-03-19 RX ORDER — CARBOPROST TROMETHAMINE 250 UG/ML
250 INJECTION, SOLUTION INTRAMUSCULAR
Status: DISCONTINUED | OUTPATIENT
Start: 2024-03-19 | End: 2024-03-19

## 2024-03-19 RX ORDER — CITRIC ACID/SODIUM CITRATE 334-500MG
30 SOLUTION, ORAL ORAL
Status: COMPLETED | OUTPATIENT
Start: 2024-03-19 | End: 2024-03-19

## 2024-03-19 RX ORDER — ONDANSETRON 4 MG/1
4 TABLET, ORALLY DISINTEGRATING ORAL EVERY 6 HOURS PRN
Status: DISCONTINUED | OUTPATIENT
Start: 2024-03-19 | End: 2024-03-22 | Stop reason: HOSPADM

## 2024-03-19 RX ORDER — LIDOCAINE 40 MG/G
CREAM TOPICAL
Status: DISCONTINUED | OUTPATIENT
Start: 2024-03-19 | End: 2024-03-22 | Stop reason: HOSPADM

## 2024-03-19 RX ORDER — ACETAMINOPHEN 325 MG/1
975 TABLET ORAL ONCE
Status: COMPLETED | OUTPATIENT
Start: 2024-03-19 | End: 2024-03-19

## 2024-03-19 RX ORDER — CITRIC ACID/SODIUM CITRATE 334-500MG
SOLUTION, ORAL ORAL
Status: COMPLETED
Start: 2024-03-19 | End: 2024-03-19

## 2024-03-19 RX ORDER — PROCHLORPERAZINE MALEATE 10 MG
10 TABLET ORAL EVERY 6 HOURS PRN
Status: DISCONTINUED | OUTPATIENT
Start: 2024-03-19 | End: 2024-03-22 | Stop reason: HOSPADM

## 2024-03-19 RX ORDER — AZITHROMYCIN 500 MG/1
500 INJECTION, POWDER, LYOPHILIZED, FOR SOLUTION INTRAVENOUS
Status: COMPLETED | OUTPATIENT
Start: 2024-03-19 | End: 2024-03-19

## 2024-03-19 RX ORDER — MORPHINE SULFATE 1 MG/ML
INJECTION, SOLUTION EPIDURAL; INTRATHECAL; INTRAVENOUS
Status: COMPLETED | OUTPATIENT
Start: 2024-03-19 | End: 2024-03-19

## 2024-03-19 RX ORDER — MISOPROSTOL 200 UG/1
400 TABLET ORAL
Status: DISCONTINUED | OUTPATIENT
Start: 2024-03-19 | End: 2024-03-19

## 2024-03-19 RX ORDER — METOCLOPRAMIDE HYDROCHLORIDE 5 MG/ML
10 INJECTION INTRAMUSCULAR; INTRAVENOUS EVERY 6 HOURS PRN
Status: DISCONTINUED | OUTPATIENT
Start: 2024-03-19 | End: 2024-03-22 | Stop reason: HOSPADM

## 2024-03-19 RX ORDER — PHENYLEPHRINE HCL IN 0.9% NACL 50MG/250ML
PLASTIC BAG, INJECTION (ML) INTRAVENOUS CONTINUOUS PRN
Status: DISCONTINUED | OUTPATIENT
Start: 2024-03-19 | End: 2024-03-19

## 2024-03-19 RX ORDER — BISACODYL 10 MG
10 SUPPOSITORY, RECTAL RECTAL DAILY PRN
Status: DISCONTINUED | OUTPATIENT
Start: 2024-03-21 | End: 2024-03-22 | Stop reason: HOSPADM

## 2024-03-19 RX ORDER — OXYTOCIN/0.9 % SODIUM CHLORIDE 30/500 ML
340 PLASTIC BAG, INJECTION (ML) INTRAVENOUS CONTINUOUS PRN
Status: COMPLETED | OUTPATIENT
Start: 2024-03-19 | End: 2024-03-19

## 2024-03-19 RX ORDER — HYDROCORTISONE 25 MG/G
CREAM TOPICAL 3 TIMES DAILY PRN
Status: DISCONTINUED | OUTPATIENT
Start: 2024-03-19 | End: 2024-03-22 | Stop reason: HOSPADM

## 2024-03-19 RX ORDER — OXYTOCIN/0.9 % SODIUM CHLORIDE 30/500 ML
100-340 PLASTIC BAG, INJECTION (ML) INTRAVENOUS CONTINUOUS PRN
Status: CANCELLED | OUTPATIENT
Start: 2024-03-19

## 2024-03-19 RX ORDER — OXYTOCIN 10 [USP'U]/ML
10 INJECTION, SOLUTION INTRAMUSCULAR; INTRAVENOUS
Status: CANCELLED | OUTPATIENT
Start: 2024-03-19

## 2024-03-19 RX ORDER — MISOPROSTOL 200 UG/1
800 TABLET ORAL
Status: DISCONTINUED | OUTPATIENT
Start: 2024-03-19 | End: 2024-03-19

## 2024-03-19 RX ORDER — CARBOPROST TROMETHAMINE 250 UG/ML
250 INJECTION, SOLUTION INTRAMUSCULAR
Status: DISCONTINUED | OUTPATIENT
Start: 2024-03-19 | End: 2024-03-22 | Stop reason: HOSPADM

## 2024-03-19 RX ORDER — OXYCODONE HYDROCHLORIDE 5 MG/1
5 TABLET ORAL EVERY 4 HOURS PRN
Status: DISCONTINUED | OUTPATIENT
Start: 2024-03-19 | End: 2024-03-22 | Stop reason: HOSPADM

## 2024-03-19 RX ORDER — TRANEXAMIC ACID 10 MG/ML
1 INJECTION, SOLUTION INTRAVENOUS EVERY 30 MIN PRN
Status: DISCONTINUED | OUTPATIENT
Start: 2024-03-19 | End: 2024-03-22 | Stop reason: HOSPADM

## 2024-03-19 RX ORDER — AMOXICILLIN 250 MG
1 CAPSULE ORAL 2 TIMES DAILY
Status: DISCONTINUED | OUTPATIENT
Start: 2024-03-19 | End: 2024-03-22 | Stop reason: HOSPADM

## 2024-03-19 RX ORDER — BUPIVACAINE HYDROCHLORIDE 7.5 MG/ML
INJECTION, SOLUTION INTRASPINAL
Status: COMPLETED | OUTPATIENT
Start: 2024-03-19 | End: 2024-03-19

## 2024-03-19 RX ORDER — OXYTOCIN 10 [USP'U]/ML
10 INJECTION, SOLUTION INTRAMUSCULAR; INTRAVENOUS
Status: DISCONTINUED | OUTPATIENT
Start: 2024-03-19 | End: 2024-03-22 | Stop reason: HOSPADM

## 2024-03-19 RX ORDER — CEFAZOLIN SODIUM 2 G/100ML
2 INJECTION, SOLUTION INTRAVENOUS
Status: COMPLETED | OUTPATIENT
Start: 2024-03-19 | End: 2024-03-19

## 2024-03-19 RX ORDER — METHYLERGONOVINE MALEATE 0.2 MG/ML
200 INJECTION INTRAVENOUS
Status: DISCONTINUED | OUTPATIENT
Start: 2024-03-19 | End: 2024-03-19

## 2024-03-19 RX ORDER — AZITHROMYCIN 500 MG/1
INJECTION, POWDER, LYOPHILIZED, FOR SOLUTION INTRAVENOUS
Status: DISCONTINUED
Start: 2024-03-19 | End: 2024-03-19 | Stop reason: HOSPADM

## 2024-03-19 RX ORDER — OXYTOCIN/0.9 % SODIUM CHLORIDE 30/500 ML
340 PLASTIC BAG, INJECTION (ML) INTRAVENOUS CONTINUOUS PRN
Status: DISCONTINUED | OUTPATIENT
Start: 2024-03-19 | End: 2024-03-22 | Stop reason: HOSPADM

## 2024-03-19 RX ORDER — SODIUM CHLORIDE, SODIUM LACTATE, POTASSIUM CHLORIDE, CALCIUM CHLORIDE 600; 310; 30; 20 MG/100ML; MG/100ML; MG/100ML; MG/100ML
INJECTION, SOLUTION INTRAVENOUS CONTINUOUS PRN
Status: DISCONTINUED | OUTPATIENT
Start: 2024-03-19 | End: 2024-03-19

## 2024-03-19 RX ORDER — KETOROLAC TROMETHAMINE 30 MG/ML
INJECTION, SOLUTION INTRAMUSCULAR; INTRAVENOUS PRN
Status: DISCONTINUED | OUTPATIENT
Start: 2024-03-19 | End: 2024-03-19

## 2024-03-19 RX ORDER — MISOPROSTOL 200 UG/1
400 TABLET ORAL
Status: DISCONTINUED | OUTPATIENT
Start: 2024-03-19 | End: 2024-03-22 | Stop reason: HOSPADM

## 2024-03-19 RX ADMIN — ACETAMINOPHEN 975 MG: 325 TABLET, FILM COATED ORAL at 17:31

## 2024-03-19 RX ADMIN — Medication 0.5 MCG/KG/MIN: at 18:08

## 2024-03-19 RX ADMIN — SODIUM CHLORIDE, POTASSIUM CHLORIDE, SODIUM LACTATE AND CALCIUM CHLORIDE: 600; 310; 30; 20 INJECTION, SOLUTION INTRAVENOUS at 22:17

## 2024-03-19 RX ADMIN — CEFAZOLIN SODIUM 1 G: 1 INJECTION, POWDER, FOR SOLUTION INTRAMUSCULAR; INTRAVENOUS at 15:41

## 2024-03-19 RX ADMIN — TRANEXAMIC ACID 1 G: 1 INJECTION, SOLUTION INTRAVENOUS at 18:30

## 2024-03-19 RX ADMIN — SODIUM CHLORIDE, SODIUM LACTATE, POTASSIUM CHLORIDE, CALCIUM CHLORIDE AND DEXTROSE MONOHYDRATE: 5; 600; 310; 30; 20 INJECTION, SOLUTION INTRAVENOUS at 15:41

## 2024-03-19 RX ADMIN — Medication: at 04:03

## 2024-03-19 RX ADMIN — CEFAZOLIN SODIUM 2 G: 2 INJECTION, SOLUTION INTRAVENOUS at 18:08

## 2024-03-19 RX ADMIN — SODIUM CHLORIDE, POTASSIUM CHLORIDE, SODIUM LACTATE AND CALCIUM CHLORIDE: 600; 310; 30; 20 INJECTION, SOLUTION INTRAVENOUS at 19:00

## 2024-03-19 RX ADMIN — SODIUM CHLORIDE, POTASSIUM CHLORIDE, SODIUM LACTATE AND CALCIUM CHLORIDE: 600; 310; 30; 20 INJECTION, SOLUTION INTRAVENOUS at 18:01

## 2024-03-19 RX ADMIN — INSULIN HUMAN 1 UNITS/HR: 1 INJECTION, SOLUTION INTRAVENOUS at 00:27

## 2024-03-19 RX ADMIN — SODIUM CHLORIDE, POTASSIUM CHLORIDE, SODIUM LACTATE AND CALCIUM CHLORIDE 500 ML: 600; 310; 30; 20 INJECTION, SOLUTION INTRAVENOUS at 17:47

## 2024-03-19 RX ADMIN — ACETAMINOPHEN 975 MG: 325 TABLET ORAL at 17:31

## 2024-03-19 RX ADMIN — AZITHROMYCIN 500 MG: 500 INJECTION, POWDER, LYOPHILIZED, FOR SOLUTION INTRAVENOUS at 17:31

## 2024-03-19 RX ADMIN — Medication: at 16:14

## 2024-03-19 RX ADMIN — KETOROLAC TROMETHAMINE 30 MG: 30 INJECTION, SOLUTION INTRAMUSCULAR at 19:11

## 2024-03-19 RX ADMIN — BUPIVACAINE HYDROCHLORIDE IN DEXTROSE 1.6 ML: 7.5 INJECTION, SOLUTION SUBARACHNOID at 18:09

## 2024-03-19 RX ADMIN — Medication 1 MILLI-UNITS/MIN: at 04:01

## 2024-03-19 RX ADMIN — SERTRALINE HYDROCHLORIDE 50 MG: 50 TABLET ORAL at 08:00

## 2024-03-19 RX ADMIN — SODIUM CHLORIDE: 9 INJECTION, SOLUTION INTRAVENOUS at 00:24

## 2024-03-19 RX ADMIN — SODIUM CHLORIDE, SODIUM LACTATE, POTASSIUM CHLORIDE, CALCIUM CHLORIDE AND DEXTROSE MONOHYDRATE: 5; 600; 310; 30; 20 INJECTION, SOLUTION INTRAVENOUS at 07:11

## 2024-03-19 RX ADMIN — SODIUM CHLORIDE, SODIUM LACTATE, POTASSIUM CHLORIDE, CALCIUM CHLORIDE AND DEXTROSE MONOHYDRATE: 5; 600; 310; 30; 20 INJECTION, SOLUTION INTRAVENOUS at 23:00

## 2024-03-19 RX ADMIN — Medication 30 ML: at 17:31

## 2024-03-19 RX ADMIN — CEFAZOLIN 1 G: 1 INJECTION, POWDER, FOR SOLUTION INTRAMUSCULAR; INTRAVENOUS at 15:41

## 2024-03-19 RX ADMIN — DOCUSATE SODIUM 50 MG AND SENNOSIDES 8.6 MG 1 TABLET: 8.6; 5 TABLET, FILM COATED ORAL at 23:00

## 2024-03-19 RX ADMIN — Medication: at 11:14

## 2024-03-19 RX ADMIN — PHENYLEPHRINE HYDROCHLORIDE 400 MCG: 10 INJECTION INTRAVENOUS at 18:10

## 2024-03-19 RX ADMIN — SODIUM CITRATE AND CITRIC ACID MONOHYDRATE 30 ML: 500; 334 SOLUTION ORAL at 17:31

## 2024-03-19 RX ADMIN — CEFAZOLIN SODIUM 1 G: 1 INJECTION, POWDER, FOR SOLUTION INTRAMUSCULAR; INTRAVENOUS at 07:09

## 2024-03-19 RX ADMIN — CEFAZOLIN 1 G: 1 INJECTION, POWDER, FOR SOLUTION INTRAMUSCULAR; INTRAVENOUS at 07:09

## 2024-03-19 RX ADMIN — MORPHINE SULFATE 0.15 MG: 1 INJECTION, SOLUTION EPIDURAL; INTRATHECAL; INTRAVENOUS at 18:09

## 2024-03-19 RX ADMIN — LEVOTHYROXINE SODIUM 137 MCG: 137 TABLET ORAL at 08:00

## 2024-03-19 RX ADMIN — ONDANSETRON 4 MG: 2 INJECTION INTRAMUSCULAR; INTRAVENOUS at 18:08

## 2024-03-19 RX ADMIN — Medication 340 ML/HR: at 18:30

## 2024-03-19 RX ADMIN — AZITHROMYCIN MONOHYDRATE 500 MG: 500 INJECTION, POWDER, LYOPHILIZED, FOR SOLUTION INTRAVENOUS at 17:31

## 2024-03-19 ASSESSMENT — ACTIVITIES OF DAILY LIVING (ADL)
ADLS_ACUITY_SCORE: 20

## 2024-03-19 NOTE — ANESTHESIA PROCEDURE NOTES
Epidural catheter Procedure Note    Pre-Procedure   Staff -        Anesthesiologist:  Eleanor Tsang MD       Performed By: anesthesiologist       Location: OB       Pre-Anesthestic Checklist: patient identified, IV checked, site marked, risks and benefits discussed, informed consent, monitors and equipment checked, pre-op evaluation and at physician/surgeon's request  Timeout:       Correct Patient: Yes        Correct Procedure: Yes        Correct Site: Yes        Correct Position: Yes   Procedure Documentation  Procedure: epidural catheter       Patient Position: sitting       Skin prep: Betadine       Local skin infiltrated with 1 mL of 1% lidocaine.        Insertion Site: L2-3. (midline approach).       Technique: LORT saline and LORT air        Needle Type: Touhy needle       Needle Gauge: 17.        Needle Length (Inches): 3.5        Catheter: 19 G.          Catheter threaded easily.             # of attempts: 1 and  # of redirects:  1    Assessment/Narrative         Paresthesias: No.       Test dose of 3 mL lidocaine 1.5% w/ 1:200,000 epinephrine at 09:07 CDT.         Test dose negative, 3 minutes after injection, for signs of intravascular, subdural, or intrathecal injection.       Insertion/Infusion Method: LORT saline and LORT air       Aspiration negative for Heme or CSF via Epidural Catheter.    Medication(s) Administered   0.2% Ropivacaine (Epidural) - EPIDURAL   8 mL - 3/18/2024 9:10:00 PM  Fentanyl PF (Epidural) - EPIDURAL   100 mcg - 3/18/2024 9:10:00 PM   Comments:  Pre-procedure time out completed. Patient in sitting position, the lumbar spine was prepped and draped in sterile fashion. The L2/L3 interspace was identified and local anesthetic was injected for local skin infiltration. A 17 G touhy needle was advanced to the epidural space which was confirmed with the loss of resistance technique at 7 cm. A catheter was then advanced easily into the epidural space. The catheter was left at 11 cm at  "the skin. Negative aspiration of blood and CSF was confirmed. A test dose of 1.5% lidocaine with 1:200,000 epinephrine was injected through the catheter and was negative for intravascular injection. The site was covered with sterile tegaderm and the catheter was secured with tape.    Orders to manage the epidural infusion have been entered and, through coordination with the nurse, we will continue to manage and monitor the patient's labor epidural.  We will continuously be available to adjust as needed throughout the entire labor and delivery process.      FOR [facility] ([facility]) ONLY:   Pain Team Contact information: please page the Pain Team Via [employer]. Search \"Pain\". During daytime hours, please page the attending first. At night please page the resident first.      "

## 2024-03-19 NOTE — PROVIDER NOTIFICATION
03/19/24 0415   Provider Notification   Provider Name/Title Dr. Vincent   Method of Notification Electronic Page   Request Evaluate - Remote   Notification Reason Other (Comment);Status Update  (blood glucose)     Dr. Vincent notified of pt blood glucose. Per Dr. Vincent, increase insulin drip to 2.5 units/hr and recheck blood glucose in 30 minutes, if no decrease then increase insulin drip in 3 units/hr. Pt informed of plan of care and is agreeable.

## 2024-03-19 NOTE — ANESTHESIA PROCEDURE NOTES
"Intrathecal injection Procedure Note    Pre-Procedure   Staff -        Anesthesiologist:  Blaise Majano MD       Performed By: anesthesiologist       Location: OR       Pre-Anesthestic Checklist: patient identified, IV checked, risks and benefits discussed, informed consent, monitors and equipment checked, pre-op evaluation and at physician/surgeon's request  Timeout:       Correct Patient: Yes        Correct Procedure: Yes        Correct Site: Yes        Correct Position: Yes   Procedure Documentation  Procedure: intrathecal injection       Patient Position: sitting       Patient Prep/Sterile Barriers: sterile gloves, mask, patient draped       Skin prep: Betadine       Insertion Site: L3-4. (midline approach).       Needle Gauge: 24.        Needle Length (Inches): 4        Spinal Needle Type: Pencan       Introducer used       Introducer: 20 G       # of attempts: 1 and  # of redirects:  0    Assessment/Narrative         Paresthesias: No.       CSF fluid: clear.    Medication(s) Administered   0.75% Hyperbaric Bupivacaine (Intrathecal) - Intrathecal   1.6 mL - 3/19/2024 6:09:00 PM  Morphine PF 1 mg/mL (Intrathecal) - Intrathecal   0.15 mg - 3/19/2024 6:09:00 PM    FOR [facility] ([facility]) ONLY:   Pain Team Contact information: please page the Pain Team Via [place]. Search \"Pain\". During daytime hours, please page the attending first. At night please page the resident first.      "

## 2024-03-19 NOTE — PLAN OF CARE
Pt has remained comfortable with epidural, VSS and caategory 1 FHR tracing.  Repositioning pt every hour or as needed.  Pitocin at 10mu and pt is tang every 2-3 min.  Pt continues on insulin gtt, currently at 2.5 units/hr and blood sugar is 86.  Dr Mcfadden contacted with each BS check for further orders.

## 2024-03-19 NOTE — PROGRESS NOTES
"OB Progress Note    S: resting comfortably with epidural    O: AF, VSS; normotensive  Gen: resting, NAD, pleasant  Abd: gravid, nontender  Ext: no edema/tenderness  Cvx: 4-5/70/-3; AROM for large clear fluid  FHT: 150s, mod variability, +accels, no decels  [place]: q2-4min    Blood sugars(Insulin gtt): 117(1)-148(1.5)-187(2)-179(2)-176(2.5)-179(3)-156-160(3.5)    A/P:  32yo  admitted at 37+ weeks for GHTN; Type I DM    --s/p cervidil, 2 doses vaginal cytotec, cook catheter, pitocin, and now AROM   --continuous fetal monitoring  --will continue insulin gtt per order set; currently above parameters so will continue to titrate appropriately with goal <110    Per endocrine:   \"Trip to hospital for delivery:  - bring extra infusion sets and cartridges, Dexcom sensor  - inpatient diabetes orders per OB and/or hospitalist  - may use Activity mode if low glucose trends  - continue same levothyroxine 0.137 mg daily dose     After childbirth:  - change pump settings from Profile 2 (pregnancy) to Profile 1 (pre-pregnancy)  - continue Control IQ auto mode  - hospital team to message me if general questions about diabetes management  - message [facility] Diab Educator for postpartum update after returning home  - see Dr Hong for follow-up endocrinology appt approx 1 month postpartum,               Work-in appointment OK\"  --normotensive now with epidural; labs normal on admission    Clarissa Mcfadden MD      "

## 2024-03-19 NOTE — ANESTHESIA PREPROCEDURE EVALUATION
Anesthesia Pre-Procedure Evaluation    Patient: Linda Chavez   MRN: [id number] : 1990        Procedure :           Past Medical History:   Diagnosis Date    Adjustment disorder with anxious mood     Allergic rhinitis, cause unspecified     h/o AIT    Anxiety     Chest discomfort     Common migraine     No visual aura.     Gestational thrombocytopenia (H24)     Hyperlipidemia LDL goal < 70     Hypothyroidism     Infectious mononucleosis 1995    Insulin pump in place     Dexcom continuous glucose monitoring    Obesity     Tuberculosis     Type 1 diabetes, HbA1c goal < 7% (H) 2004     followed [facility] endocrinology - now Dr Jimenez      Past Surgical History:   Procedure Laterality Date    APPENDECTOMY  2007    dr deshpande    DILATION AND CURETTAGE SUCTION N/A 2023    Procedure: DILATION AND CURETTAGE OF UTERUS USING SUCTION;  Surgeon: Sierra Santoyo DO;  Location: [facility] OR    PE TUBES Bilateral 1996      Allergies   Allergen Reactions    Sulfa Antibiotics Hives    Augmentin [Amoxicillin-Pot Clavulanate] Hives    Penicillins Hives      Social History     Tobacco Use    Smoking status: Never    Smokeless tobacco: Never   Substance Use Topics    Alcohol use: Not Currently     Alcohol/week: 2.0 standard drinks of alcohol     Types: 2 Standard drinks or equivalent per week     Comment: 2-5 drinks per week      Wt Readings from Last 1 Encounters:   24 108.9 kg (240 lb)        Prior to Admission medications    Medication Sig Start Date End Date Taking? Authorizing Provider   aspirin 81 MG EC tablet Take 81 mg by mouth daily   Yes Reported, Patient   Flaxseed, Linseed, (FLAX SEED OIL PO) Take 1,400 mg by mouth   Yes Reported, Patient   insulin aspart (NOVOLOG PEN) 100 UNIT/ML pen Inject 3 to 10 units subcutaneous at mealtimes as directed, total daily dose approx 30 units 22  Yes Santy Jimenez MD   insulin aspart (NOVOLOG VIAL) 100 UNITS/ML vial Use with insulin  pump, total daily dose approx 150 units 3/11/24  Yes Santy Jimenez MD   insulin glargine (LANTUS PEN) 100 UNIT/ML pen Inject 22 Units Subcutaneous At Bedtime 4/12/22  Yes Santy Jimenez MD   levothyroxine (SYNTHROID/LEVOTHROID) 137 MCG tablet Take 1-tablet by mouth every day, as directed 9/2/23  Yes Santy Jimenez MD   naratriptan (AMERGE) 1 MG tablet  1/1/21  Yes Reported, Patient   omeprazole (PRILOSEC) 40 MG DR capsule Take 1 capsule (40 mg) by mouth daily 3/4/24  Yes Masters, Sierra Treviño, DO   Prenatal w/o A Vit-Fe Fum-FA (PRENATA PO)    Yes Reported, Patient   sertraline (ZOLOFT) 50 MG tablet TAKE 1 TABLET(50 MG) BY MOUTH DAILY 12/15/23  Yes Belén Mustafa MD   triamcinolone (KENALOG) 0.1 % external cream Apply topically 2 times daily to affected areas on fingers for 10-14 days. Not for use on face nor groin. 10/14/19  Yes Lisa Cordova MD   Continuous Blood Gluc Sensor (DEXCOM G6 SENSOR) MISC CHANGE SENSOR EVERY 10 DAYS 2/13/24   Santy Jimenez MD   Continuous Blood Gluc Transmit (DEXCOM G6 TRANSMITTER) MISC CHANGE EVERY 90 DAYS 4/7/23   Santy Jimenez MD   CONTOUR NEXT TEST test strip Use to test blood sugar 10 times daily. 8/1/23   Santy Jimenez MD   Glucagon (BAQSIMI TWO PACK) 3 MG/DOSE POWD Spray 1 Device in nostril once as needed (for severe hypoglycemia) 2/8/22   Santy Jimenez MD   insulin pen needle (BD CLEOPATRA U/F) 32G X 4 MM miscellaneous Use 4 pen needles daily or as directed. 2/15/19   Santy Jimenez MD   Microlet Lancets MISC Use to test blood sugar 10 times daily. 8/1/23   Santy Jimenez MD     RECENT LABS:   ECG:   ECHO:   CXR:      Anesthesia Evaluation            ROS/MED HX  ENT/Pulmonary:       Neurologic:     (+)      migraines,                          Cardiovascular:     (+)  - - PIH  -  - -                                      METS/Exercise Tolerance:     Hematologic:       Musculoskeletal:       GI/Hepatic:     (+) GERD,                  "  Renal/Genitourinary:       Endo:     (+) type I DM,         thyroid problem, hypothyroidism,    Obesity,       Psychiatric/Substance Use:       Infectious Disease:       Malignancy:       Other:            Physical Exam    Airway        Mallampati: II   TM distance: > 3 FB   Neck ROM: full   Mouth opening: > 3 cm    Respiratory Devices and Support         Dental           Cardiovascular             Pulmonary                   OUTSIDE LABS:  CBC:   Lab Results   Component Value Date    WBC 7.5 03/17/2024    WBC 7.0 03/14/2024    HGB 12.2 03/17/2024    HGB 13.2 03/14/2024    HCT 35.8 03/17/2024    HCT 38.7 03/14/2024     03/17/2024     (L) 03/14/2024     BMP:   Lab Results   Component Value Date     03/17/2024     03/14/2024    POTASSIUM 3.8 03/17/2024    POTASSIUM 4.2 03/14/2024    CHLORIDE 106 03/17/2024    CHLORIDE 105 03/14/2024    CO2 17 (L) 03/17/2024    CO2 19 (L) 03/14/2024    BUN 9.9 03/17/2024    BUN 6.6 03/14/2024    CR 0.69 03/17/2024    CR 0.60 03/14/2024     (H) 03/17/2024     (H) 03/14/2024     COAGS: No results found for: \"PTT\", \"INR\", \"FIBR\"  POC:   Lab Results   Component Value Date     (H) 04/13/2021    HCG Negative 05/19/2006    HCGS Negative 09/28/2020     HEPATIC:   Lab Results   Component Value Date    ALBUMIN 2.9 (L) 03/17/2024    PROTTOTAL 5.9 (L) 03/17/2024    ALT 11 03/17/2024    AST 17 03/17/2024    ALKPHOS 152 (H) 03/17/2024    BILITOTAL <0.2 03/17/2024     OTHER:   Lab Results   Component Value Date    LACT 1.5 08/25/2016    A1C 6.2 (H) 02/22/2024    MARCIA 9.0 03/17/2024    PHOS 3.7 04/13/2010    MAG 2.0 09/28/2020    LIPASE 79 09/28/2020    AMYLASE 32 05/30/2007    TSH 0.56 02/22/2024    T4 1.54 10/18/2023    T3 147 05/18/2015    SED 14 06/05/2006       Anesthesia Plan    ASA Status:  2       Anesthesia Type: Epidural.              Consents    Anesthesia Plan(s) and associated risks, benefits, and realistic alternatives discussed. Questions " answered and patient/representative(s) expressed understanding.     - Discussed:     - Discussed with:  Patient            Postoperative Care            Comments:    Other Comments: Labor epidural to c section           Eleanor Tsang MD    I have reviewed the pertinent notes and labs in the chart from the past 30 days and (re)examined the patient.  Any updates or changes from those notes are reflected in this note.

## 2024-03-19 NOTE — PROVIDER NOTIFICATION
03/19/24 0450   Provider Notification   Provider Name/Title Dr. Vincent   Method of Notification Electronic Page   Request Evaluate - Remote   Notification Reason Other (Comment)  (blood glucose)     Dr. Vincent notified of pt blood glucose at 179 and increased insulin drip to 3 units/hr.

## 2024-03-19 NOTE — PROGRESS NOTES
OB Progress Note    Called to evaluate patient due to significant rectal pressure and urge to push with contractions in spite of epidural reboluses.  Linda requesting  section.  Prolonged induction of labor for for GHTN with cervidil, vaginal cytotec, cook catheter, pitocin and AROM.  Currently 8.5cm/80/-1.  Minimal change since 1500 and significant pain/discomfort.  No significant caput.  Fetal heart tones in 150s with moderate variability and +accels, no decels.  Cat I tracing.  Contractions palpate strong every 2-4minutes.  Discussed  sections including risks, benefits and alternatives.  Dsicussed risk of bleeding, infection, injury to bowel/bladder, etc.  Discussed IV antibiotics to prevent infection.  Will have NICU present due to earlier gestation and Type I DM.  Consents signed and appropriate teams notified.      Clarissa Mcfadden MD

## 2024-03-19 NOTE — PROVIDER NOTIFICATION
03/19/24 0217   Provider Notification   Provider Name/Title Dr. Vincent   Method of Notification Phone   Request Evaluate - Remote   Notification Reason Status Update;Other (Comment)  (Blood glucose)     Dr. Vincent notified of FHR tracing, bloody show present, and blood glucose level. Per Dr. Vincent, continue to follow Blood glucose protocol.

## 2024-03-19 NOTE — PLAN OF CARE
Pt has been having rectal pain and states she has the urge to push.  Pt has pushed her epidural button repeatedly this afternoon with minimal relief, also have tried frequent repositioning.  Anesthesia was called and came to bedside and gave patient a rebolus, pt did not have any relief from that.  Pt is 8.5/80/-1.  Dr Mcfadden will be coming after clinic to reassess.

## 2024-03-20 LAB — HGB BLD-MCNC: 11 G/DL (ref 11.7–15.7)

## 2024-03-20 PROCEDURE — 250N000013 HC RX MED GY IP 250 OP 250 PS 637: Performed by: OBSTETRICS & GYNECOLOGY

## 2024-03-20 PROCEDURE — 120N000012 HC R&B POSTPARTUM

## 2024-03-20 PROCEDURE — 250N000013 HC RX MED GY IP 250 OP 250 PS 637

## 2024-03-20 PROCEDURE — 36415 COLL VENOUS BLD VENIPUNCTURE: CPT | Performed by: OBSTETRICS & GYNECOLOGY

## 2024-03-20 PROCEDURE — 99232 SBSQ HOSP IP/OBS MODERATE 35: CPT | Performed by: INTERNAL MEDICINE

## 2024-03-20 PROCEDURE — 250N000011 HC RX IP 250 OP 636: Performed by: OBSTETRICS & GYNECOLOGY

## 2024-03-20 PROCEDURE — 85018 HEMOGLOBIN: CPT | Performed by: OBSTETRICS & GYNECOLOGY

## 2024-03-20 RX ADMIN — KETOROLAC TROMETHAMINE 30 MG: 30 INJECTION, SOLUTION INTRAMUSCULAR; INTRAVENOUS at 10:28

## 2024-03-20 RX ADMIN — KETOROLAC TROMETHAMINE 30 MG: 30 INJECTION, SOLUTION INTRAMUSCULAR; INTRAVENOUS at 16:49

## 2024-03-20 RX ADMIN — KETOROLAC TROMETHAMINE 30 MG: 30 INJECTION, SOLUTION INTRAMUSCULAR; INTRAVENOUS at 04:20

## 2024-03-20 RX ADMIN — ACETAMINOPHEN 975 MG: 325 TABLET, FILM COATED ORAL at 11:31

## 2024-03-20 RX ADMIN — ACETAMINOPHEN 975 MG: 325 TABLET, FILM COATED ORAL at 06:09

## 2024-03-20 RX ADMIN — DOCUSATE SODIUM 50 MG AND SENNOSIDES 8.6 MG 1 TABLET: 8.6; 5 TABLET, FILM COATED ORAL at 21:27

## 2024-03-20 RX ADMIN — DOCUSATE SODIUM 50 MG AND SENNOSIDES 8.6 MG 1 TABLET: 8.6; 5 TABLET, FILM COATED ORAL at 08:03

## 2024-03-20 RX ADMIN — LEVOTHYROXINE SODIUM 137 MCG: 25 TABLET ORAL at 09:00

## 2024-03-20 RX ADMIN — SERTRALINE HYDROCHLORIDE 50 MG: 50 TABLET ORAL at 09:00

## 2024-03-20 RX ADMIN — ACETAMINOPHEN 975 MG: 325 TABLET, FILM COATED ORAL at 17:49

## 2024-03-20 RX ADMIN — ACETAMINOPHEN 975 MG: 325 TABLET, FILM COATED ORAL at 00:06

## 2024-03-20 RX ADMIN — IBUPROFEN 800 MG: 400 TABLET ORAL at 22:06

## 2024-03-20 ASSESSMENT — ACTIVITIES OF DAILY LIVING (ADL)
ADLS_ACUITY_SCORE: 25
ADLS_ACUITY_SCORE: 20
ADLS_ACUITY_SCORE: 25
ADLS_ACUITY_SCORE: 20
ADLS_ACUITY_SCORE: 20
ADLS_ACUITY_SCORE: 25

## 2024-03-20 NOTE — PROGRESS NOTES
"[facility]  Obstetrics Postpartum Rounding Note, [id number]        Interval History:  Doing well. Pain well controlled with tylenol and ibuprofen. Bottle feeding, mom is pumping while baby is in the NICU. Minimal lochia.   Denies nausea/vomiting. Tolerating diet well. Not yet passing gas. Ambulating in the room to the toilet. Voiding without difficulty since catheter was removed. Baby Bogdan is in the NICU for low glucose.     No other concerns.     Physical Exam:  Temp: 98.8  F (37.1  C) Temp src: Oral BP: 136/83 Pulse: 100   Resp: 16 SpO2: 96 % O2 Device: None (Room air) Oxygen Delivery: 2 LPM Height: 165.1 cm (5' 5\") Weight: 108.9 kg (240 lb)  Estimated body mass index is 39.94 kg/m  as calculated from the following:    Height as of this encounter: 1.651 m (5' 5\").    Weight as of this encounter: 108.9 kg (240 lb).    Gen: AOx3, NAD  Abd: soft, ND, mildly tender to palpation, Fundus Firm @ umbilicus. Incision C/D/I  Ext: no calf ttp, mild edema    Labs:         Hemoglobin   Date Value Ref Range Status   2024 11.0 (L) 11.7 - 15.7 g/dL Final   2024 12.2 11.7 - 15.7 g/dL Final   2021 14.7 11.7 - 15.7 g/dL Final   2020 14.6 11.7 - 15.7 g/dL Final       Meds:  Current Facility-Administered Medications   Medication    acetaminophen (TYLENOL) tablet 975 mg    [START ON 3/21/2024] bisacodyl (DULCOLAX) suppository 10 mg    carboprost (HEMABATE) injection 250 mcg    And    loperamide (IMODIUM) capsule 4 mg    dextrose 5% in lactated ringers infusion    glucose gel 15-30 g    Or    dextrose 50 % injection 25-50 mL    Or    glucagon injection 1 mg    hydrALAZINE (APRESOLINE) injection 10 mg    hydrocortisone (Perianal) (ANUSOL-HC) 2.5 % cream    ibuprofen (ADVIL/MOTRIN) tablet 800 mg    insulin aspart (NovoLOG/FIASP) 100 UNIT/ML VIAL FOR FILLING PUMP RESERVOIR    insulin basal rate from AMBULATORY PUMP    insulin bolus from AMBULATORY PUMP    ketorolac (TORADOL) " injection 30 mg    labetalol (NORMODYNE/TRANDATE) injection 20-80 mg    lactated ringers infusion    lanolin cream    lidocaine (LMX4) cream    lidocaine (LMX4) cream    lidocaine 1 % 0.1-1 mL    lidocaine 1 % 0.1-1 mL    loperamide (IMODIUM) capsule 2 mg    Medication Instructions - cervical ripening and induction medications    methylergonovine (METHERGINE) injection 200 mcg    metoclopramide (REGLAN) injection 10 mg    Or    metoclopramide (REGLAN) tablet 10 mg    misoprostol (CYTOTEC) tablet 400 mcg    Or    misoprostol (CYTOTEC) tablet 800 mcg    naloxone (NARCAN) injection 0.2 mg    Or    naloxone (NARCAN) injection 0.4 mg    Or    naloxone (NARCAN) injection 0.2 mg    Or    naloxone (NARCAN) injection 0.4 mg    No MMR Needed -  Assessment: Patient does not need MMR vaccine    No Tdap Needed - Assessment: Patient does not need Tdap vaccine    ondansetron (ZOFRAN ODT) ODT tab 4 mg    Or    ondansetron (ZOFRAN) injection 4 mg    oxyCODONE (ROXICODONE) tablet 5 mg    oxytocin (PITOCIN) 30 units in 500 mL 0.9% NaCl infusion    oxytocin (PITOCIN) 30 units in 500 mL 0.9% NaCl infusion    oxytocin (PITOCIN) injection 10 Units    prochlorperazine (COMPAZINE) injection 10 mg    Or    prochlorperazine (COMPAZINE) tablet 10 mg    Or    prochlorperazine (COMPAZINE) suppository 25 mg    senna-docusate (SENOKOT-S/PERICOLACE) 8.6-50 MG per tablet 1 tablet    Or    senna-docusate (SENOKOT-S/PERICOLACE) 8.6-50 MG per tablet 2 tablet    simethicone (MYLICON) chewable tablet 80 mg    sodium chloride (PF) 0.9% PF flush 3 mL    sodium chloride (PF) 0.9% PF flush 3 mL    sodium chloride (PF) 0.9% PF flush 3 mL    sodium chloride (PF) 0.9% PF flush 3 mL    [START ON 3/21/2024] sodium phosphate (FLEET ENEMA) 1 enema    terbutaline (BRETHINE) injection 0.25 mg    tranexamic acid 1 g in 100 mL NS IV bag (premix)       Assessment/Plan:  Assessment:  1. Breast feeding status of mother    2. Type 1 diabetes mellitus during pregnancy,  antepartum    3. Gestational hypertension, third trimester      D#1 s/p C/S d/t failure to descend and nonworking epidural with significant maternal pain  -Patient status: Doing well and pain well controlled  -normal diet  -hgb 11.0 today  -Complications: No immediate surgical complications identified.  -Continue routine cares  -Ambulation in room and hallways encouraged.  -Monitor wound for signs of infection, discussed wound maintenance and infectious signs with patient.  -Reportable signs and symptoms dicussed with the patient.  -Anticipate discharge between 3/21/24-3/22/24    Gestational HTN  -blood pressures have been well controlled, patient continues to be normotensive  -if BP becomes out of range will then order preeclampsia orders for more frequent checks and strict I/O monitoring    Type 1 diabetes mellitus:  -overnight B, 223, 182 at 5:00am 3/20/24, patient managed through ambulatory pump  -followed by hospitalist for glucose monitoring   -patient has ambulatory insulin pump  -Of note, note from Dr. Mcfadden of OB/Gyn references pump setting change recommendations as follows, taken from note of Dr. Jimenez from 2024  After childbirth:  - change pump settings from Profile 2 (pregnancy) to Profile 1 (pre-pregnancy)  - continue Control IQ auto mode    Hypothyroidism:  -preadmission diagnosis, restarted home medication  -levothyroxine 137 mcg    Anxiety  -preadmission diagnosis, restarted home medication  -ordered zoloft 50mg     Mona Bolden PA-C  2024  8:00 AM    Pager #: [phone]

## 2024-03-20 NOTE — PHARMACY-CONSULT NOTE
Pharmacist consult received to review insulin pump settings and document in Epic. Per orders of Dr. Jimenez from 2/12/24, plan to change pump settings from Profile 2 (pregnancy) to Profile 1 (pre-pregnancy) and continue Control IQ auto mode. I spoke with consulting hospitalist; will enter orders based on endocrinology note from 2/12/24 and verify that patient's pump is set to Profile 1. Confirmed with RN that patient's pump is currently set to Profile 1.    Pump Name: Tandem    BASAL RATES and times:  12 AM (midnight): 0.9 units/hour    7 AM: 0.8 units/hour   12 PM (noon): 0.9 units/hour   6 PM: 0.95 units/hour   9 PM: 1 units/hour   Daily total Basal: 21.550 units    Bolus Pattern:  CARB RATIO (units:grams) and times:  12 AM (midnight): 1:7.7  6 AM:  1:6.5  7 AM:  1:5.9  11 AM: 1:6.3  12 PM (noon):  1:6.0  5 PM:  1:4.6  6 PM:  1:4.3  9 PM:  1:5.2    Corection Factor (Sensitivity) and times:  12 AM (midnight): 1:50 mg/dL  7 AM: 1:35 mg/dL  12 PM (noon): 1:30 mg/dL  5 PM: 1:35 mg/dL  6 PM: 1:30 mg/dL  9 PM: 1:50 mg/dL    BLOOD GLUCOSE TARGET and times:  12 AM (midnight)-12 AM (midnight): 110    Insulin duration: 3 hours    Bailey Castaneda, KristiD, BCPS

## 2024-03-20 NOTE — ANESTHESIA POSTPROCEDURE EVALUATION
Patient: Linda Chavez    Procedure: Procedure(s):   section       Anesthesia Type:  Spinal    Note:  Disposition: Admission   Postop Pain Control: Uneventful            Sign Out: Well controlled pain   PONV: No   Neuro/Psych: Uneventful            Sign Out: Acceptable/Baseline neuro status   Airway/Respiratory: Uneventful            Sign Out: Acceptable/Baseline resp. status   CV/Hemodynamics: Uneventful            Sign Out: Acceptable CV status; No obvious hypovolemia; No obvious fluid overload   Other NRE: NONE   DID A NON-ROUTINE EVENT OCCUR?      Epidural-to- Updated ASA: 2      Last vitals:  Vitals Value Taken Time   /74 24   Temp 37  C (98.6  F) 24   Pulse 107 24   Resp 55 24   SpO2     Vitals shown include unfiled device data.    Electronically Signed By: Blaise Majano MD  2024  8:35 PM

## 2024-03-20 NOTE — L&D DELIVERY NOTE
Procedure Date: 3/19/2024     OPERATIVE REPORT     PREOPERATIVE DIAGNOSES:  1.  37+4 weeks gestation  2.  Gestational hypertension  3.  Type 1 diabetes mellitus  4.  Failure to descend  5.  Nonworking epidural with significant maternal pain     POSTOPERATIVE DIAGNOSES:    Same  Cephalopelvic disproportion     PROCEDURE:  Primary Low Transverse  section     SURGEON:  Clarissa Mcfadden MD     ASSISTANT:  None     ANESTHESIA:  epidural followed by spinal     QUANTITATIVE BLOOD LOSS:  1115mL    COMPLICATIOS: none     FINDINGS:  A female infant in the left occiput posterior position with a weight of 8 pounds 10 ounces and Apgars of 5 and 7 and 9 at 1 and 5 and 10 minutes respectively.  Normal-appearing uterus, tubes, and bilateral ovaries. Nuchal cord x 1 reduced on delivery.     INDICATIONS FOR PROCEDURE:  The patient is a 33-year-old,  G2 Para 0010   who was brought to Labor and Delivery at 37+2 weeks' gestation for gestational hypertension.  Linda is also a type I diabetic who is followed closely and managed by Dr. Bautista with endocrinology using insulin pump and sensor.  Linda had good glucose control throughout her pregnancy.  Linda received Cervidil followed by vaginal Cytotec followed by Cook catheter with Pitocin and eventually artificial rupture of membranes and progressed to approximately 8 to 9 cm.  With minimal change over the previous 3 hours and significant maternal pain and rectal pressure Linda requested primary  section.  Linda had tried several different positions and epidural boluses without relief.  Risk benefits and alternatives were discussed in detail and appropriate teams were notified.     DESCRIPTION OF PROCEDURE: Linda was taken the operating room where epidural was removed and spinal was placed by Dr. Majano of anesthesia without difficulty.  There was then prepared and draped in the normal sterile fashion in dorsal supine position with left lateral tilt.  Timeout was  performed to identify correct patient and procedure.  Pfannenstiel skin incision was made using the scalpel and was carried down to the underlying layer of fascia.  Fascia was incised in the midline and this incision was extended laterally using the Quevedo scissors.  Superior aspect of the fascial incision was then grasped with a Bono clamps x 2, elevated and the rectus muscles dissected.  Attention was turned similarly to the inferior border.  At this time the rectus muscles were  in the midline and the peritoneum was identified tented and entered sharply.  This incision was extended using gentle traction.  At this time self-retaining Jose retractor was placed and secured.  Bladder blade was placed and the vesicouterine peritoneum was identified tented and bladder flap created.  The bladder blade was replaced and lower uterine segment was incised in a transverse fashion with a large amount of clear fluid noted above baby's head.  Infant was delivered from left occiput posterior position without difficulty.  1 loose nuchal cord was reduced and remainder of infant was delivered and placed on maternal abdomen.  Cord clamping was delayed by 1 minute.  Placenta was delivered manually and intact.  The uterus was exteriorized and cleared of all clot and debris.  Uterine incision was reapproximated using an 0 Vicryl in a running locked fashion.  A second imbricating layer was placed using a 0 Monocryl suture.  At this time uterus was replaced into the pelvic cavity and gutters were cleared of all clot and debris.  Uterine incision was inspected and found to be hemostatic.  Surgicel powder was applied to uterine incision.  Peritoneum was reapproximated using a 3-0 Vicryl in a running fashion.  Fascia was reapproximated using an oh looped PDS in a running fashion.  Irrigation was performed and all superficial bleeders were controlled.  3 subcutaneous sutures were placed of 3-0 plain gut.  Skin was closed using a  4-0 Monocryl in a subcuticular stitch.  Bandages were placed since there was taken the recovery room in stable condition     Clarissa Mcfadden MD

## 2024-03-20 NOTE — PROGRESS NOTES
Hospitalist consult received this evening for assistance with postoperative blood sugar monitoring.  History of diabetes mellitus, recently on insulin pump, hyperlipidemia, hypothyroidism, and anxiety.  Now s/p .  Recent blood sugars reviewed, see below.  Blood sugar 160 at 1930.  Pharmacy reports insulin pump use at home prior to admission and this will need to be reviewed in AM 3/20/2024 by rounding provider prior to anticipated future discharge.    Of note, note from Dr. Mcfadden of OB/Gyn references pump setting change recommendations as follows, taken from note of Dr. Jimenez from 2024  After childbirth:  - change pump settings from Profile 2 (pregnancy) to Profile 1 (pre-pregnancy)  - continue Control IQ auto mode  Pharmacy consulted this evening to follow above recommendations post .  Close blood sugar monitoring overnight with 3 AM blood sugar check also ordered.  Full hospitalist consult to follow tomorrow, 3/20/2024.  Please contact on-call hospitalist overnight with any questions or concerns or significant fluctuations in blood sugars.  Thank you.  Above plan discussed with patient's primary nurse and she will follow-up with patient as discussed and recommended    Recent Labs   Lab 24  1714 24  1614 24  1508 24  1358 24  1300 24  1159   GLC 79 84 88 86 96 100*     Glucose 160 at 1930 on 3/19/24

## 2024-03-20 NOTE — L&D DELIVERY NOTE
Obstetrics Brief Operative Note    Pre-operative diagnosis: Failure to descend  Maternal pain/rectal pressure/non-working epidural   Post-operative diagnosis: Same  Cephalopelvic disproportion   Procedure: Primary low transverse  section   Surgeon: Clarissa Mcfadden MD   Assistant(s): None   Anesthesia: Spinal anesthesia   Estimated blood loss: 1115ml   Complications: None   Findings: Live   Female  Cephalic presentation:  left occiput posterior  APGARS: 1 minute: 5   5 minute: 7   10 minute:  9  Weight: 8lbs 10oz  Placenta: normal  Tubes: normal  Uterus: normal  Ovaries: normal     Primary OB: Masters

## 2024-03-20 NOTE — PROVIDER NOTIFICATION
03/20/24 0548   Provider Notification   Provider Name/Title Dr. Gasca   Method of Notification Electronic Page   Request Evaluate-Remote   Notification Reason Other  (Blood glucose)     Dr. Gasca notified of Pt's  at 223 & 182 at 5am, pt. managing ambulatory pump herself. No new orders placed & MD is aware pt. is self managing ambulatory pump.

## 2024-03-20 NOTE — LACTATION NOTE
Initial visit with Mother and Father.  Baby girl is in the NICU for hypoglycemia.    Mother is pumping every three hours.  LC reviewed importance of a strict pumping schedule to mimic baby eating and for stimulation to the breasts for milk supply.  Encouraged Mother to look at pictures of baby or watch baby on the I-pad when she is pumping.  LC also encouraged Mother to pump downstairs in the NICU next to baby.  Physiology of milk supply and milk production explained to Mother and Father.  Mother is pumping.  Encouraged Mother to give baby everything she pumps.  Breast feeding labels at bedside.    Breast feeding general information reviewed.    LC encouraged Mother that when baby is able and starting to breast feed that a lactation consultant can come down and assist with a feeding.    Appreciative of visit.  No further questions at this time. Will follow as needed.   Mother has a brand new Spectra breast pump for home use.  Sangita Armendariz RN, IBCLC

## 2024-03-20 NOTE — PLAN OF CARE
Vital signs stable. BP remain stable denies headache ,blurred vision epigastric pain +2 reflex no clonus Postpartum assessment WDL.Blood sugar  well maintained   Incision dressing on planning to take shower later . Pain controlled with tylenol and Toradol denies oxycodone now. Patient up ambulating voiding adequate . Patient passing gas. No BM yet visiting baby in NICU and pumping very 3 hrs  Patient and infant bonding well. Will continue with current plan of care.

## 2024-03-20 NOTE — PLAN OF CARE
Data: Linda Chavez transferred to 421 via cart at 2125. Baby transferred via parent's arms.  Action: Receiving unit notified of transfer: Yes. Patient and family notified of room change. Report given to Marie VALERA RN at bedside. Belongings sent to receiving unit. Accompanied by Registered Nurse. Oriented patient to surroundings. Call light within reach. ID bands double-checked with receiving RN.  Response: Patient tolerated transfer and is stable.

## 2024-03-20 NOTE — PROGRESS NOTES
"[facility]    Medicine Progress Note - Hospitalist Service    Date of Admission:  3/17/2024    Assessment & Plan   Principal Problem:    Type 1 diabetes mellitus during pregnancy, antepartum  See instructions in note from Dr. Jimenez 2/12/2024  After childbirth:  change pump settings from Profile 2 (pregnancy) to Profile 1 (pre-pregnancy)  continue Control IQ auto mode  Pharmacy consult requested to assist with changing pump settings to \"Profile 1, pre-pregnancy,\" as instructed by endocrinologist, Dr. Jimenez.  See settings in orders:   Rate #1 = 0.9 units/hr from 0000 to 0700.   Rate #2 = 0.8 units/hr from 0700 to 1200.   Rate #3 = 0.9 units/hr from 1200 to 1800.   Rate #4 = 0.95 units/hr from 1800 to  2100   Rate #5 = 1 units/hr from 2100 to 2400.     Close blood sugar monitoring including 3 AM blood sugar check   Blood sugar readings are at goal          Diet: Room Service  Regular Diet Adult    DVT Prophylaxis: Pneumatic Compression Devices  Kelly Catheter: Not present  Lines: None     Cardiac Monitoring: None  Code Status: Full Code      Clinically Significant Risk Factors              # Hypoalbuminemia: Lowest albumin = 2.9 g/dL at 3/17/2024  8:32 PM, will monitor as appropriate            # Obesity: Estimated body mass index is 39.94 kg/m  as calculated from the following:    Height as of this encounter: 1.651 m (5' 5\").    Weight as of this encounter: 108.9 kg (240 lb)., PRESENT ON ADMISSION            Disposition Plan      Expected Discharge Date: 03/22/2024                    Silvia Bradford MD  Hospitalist Service  [facility]  Securely message with Josh (more info)  Text page via [employer] Paging/Directory   ______________________________________________________________________    Interval History   \"I am checking my blood sugars really closely.\"  Patient says she has been using her continuous glucose monitor to monitor blood sugar readings closely.  She " is feeling upbeat and happy about her delivery.  Her appetite is good.  She has no new respiratory complaints.    Physical Exam   Vital Signs: Temp: 97.6  F (36.4  C) Temp src: Oral BP: 122/73 Pulse: 93   Resp: 18 SpO2: 96 % O2 Device: None (Room air) Oxygen Delivery: 2 LPM  Weight: 240 lbs 0 oz    Constitutional: awake, alert, cooperative, no apparent distress  Respiratory: Clear to auscultation bilaterally, no crackles or wheezing  Cardiovascular: Regular rate and rhythm, normal S1 and S2, and no murmur noted  GI: Normal bowel sounds, soft, non-distended, non-tender  Skin/Integumen: No rash on exposed skin.  No lower extremity edema  Other: Mood is very pleasant      Medical Decision Making       35 MINUTES SPENT BY ME on the date of service doing chart review, history, exam, documentation & further activities per the note.      Data     I have personally reviewed the following data over the past 24 hrs:    N/A  \   11.0 (L)   / N/A     N/A N/A N/A /  79   N/A N/A N/A \       Imaging results reviewed over the past 24 hrs:   No results found for this or any previous visit (from the past 24 hour(s)).

## 2024-03-20 NOTE — ANESTHESIA CARE TRANSFER NOTE
Patient: Linda Chavez    Procedure: Procedure(s):   section       Diagnosis: * No pre-op diagnosis entered *  Diagnosis Additional Information: No value filed.    Anesthesia Type:   Epidural     Note:    Oropharynx: oropharynx clear of all foreign objects and spontaneously breathing  Level of Consciousness: awake  Oxygen Supplementation: room air    Independent Airway: airway patency satisfactory and stable  Dentition: dentition unchanged  Vital Signs Stable: post-procedure vital signs reviewed and stable  Report to RN Given: handoff report given  Patient transferred to: PACU (OB)    Handoff Report: Identifed the Patient, Identified the Reponsible Provider, Reviewed the pertinent medical history, Discussed the surgical course, Reviewed Intra-OP anesthesia mangement and issues during anesthesia, Set expectations for post-procedure period and Allowed opportunity for questions and acknowledgement of understanding      Vitals:  Vitals Value Taken Time   /67    Temp 36.6 C    Pulse 108 BPM    Resp 15    SpO2 95 %        Electronically Signed By: AMA Watson CRNA  2024  7:16 PM

## 2024-03-20 NOTE — ANESTHESIA POSTPROCEDURE EVALUATION
Patient: Linda Chavez    Procedure: Procedure(s):   section       Anesthesia Type:  Spinal    Note:  Disposition: Admission   Postop Pain Control: Uneventful            Sign Out: Well controlled pain   PONV: No   Neuro/Psych: Uneventful            Sign Out: Acceptable/Baseline neuro status   Airway/Respiratory: Uneventful            Sign Out: Acceptable/Baseline resp. status   CV/Hemodynamics: Uneventful            Sign Out: Acceptable CV status; No obvious hypovolemia; No obvious fluid overload   Other NRE: NONE   DID A NON-ROUTINE EVENT OCCUR?      Epidural-to- Updated ASA: 2      Last vitals:  Vitals Value Taken Time   /73 24   Temp 37  C (98.6  F) 24 194   Pulse 102 24   Resp 10 24   SpO2     Vitals shown include unfiled device data.    Electronically Signed By: Blaise Majano MD  2024  4:37 AM

## 2024-03-20 NOTE — PLAN OF CARE
Goal Outcome Evaluation:      Plan of Care Reviewed With: patient, spouse    Overall Patient Progress: improving  Pt's pain controlled with Duramorph/Toradol/Tylenol. Up and about in room and encouraged to walk in the hallways. Voiding without difficulty. Abdominal binder on. Pumping every three hours.Denies headache/blurred vision/and epigastric pain. Visiting  in NICU.  Will continue to monitor.

## 2024-03-20 NOTE — PROVIDER NOTIFICATION
Pt continues to have increased pain since the epidural rebolus.  Pt and spouse are requesting to proceed with a  section.  Dr Mcfadden called at 1717 and updated on pt pain and cervical exam, states she will be coming from the clinic shortly.  Dr Mcfadden arrived at 1727 and began prepping pt for  section.  To the OR at 1756.

## 2024-03-21 PROCEDURE — 250N000013 HC RX MED GY IP 250 OP 250 PS 637

## 2024-03-21 PROCEDURE — 99232 SBSQ HOSP IP/OBS MODERATE 35: CPT | Performed by: INTERNAL MEDICINE

## 2024-03-21 PROCEDURE — 250N000013 HC RX MED GY IP 250 OP 250 PS 637: Performed by: OBSTETRICS & GYNECOLOGY

## 2024-03-21 PROCEDURE — 120N000012 HC R&B POSTPARTUM

## 2024-03-21 RX ADMIN — IBUPROFEN 800 MG: 400 TABLET ORAL at 05:01

## 2024-03-21 RX ADMIN — IBUPROFEN 800 MG: 400 TABLET ORAL at 17:36

## 2024-03-21 RX ADMIN — OXYCODONE HYDROCHLORIDE 5 MG: 5 TABLET ORAL at 11:22

## 2024-03-21 RX ADMIN — SERTRALINE HYDROCHLORIDE 50 MG: 50 TABLET ORAL at 11:21

## 2024-03-21 RX ADMIN — DOCUSATE SODIUM 50 MG AND SENNOSIDES 8.6 MG 1 TABLET: 8.6; 5 TABLET, FILM COATED ORAL at 08:48

## 2024-03-21 RX ADMIN — ACETAMINOPHEN 975 MG: 325 TABLET, FILM COATED ORAL at 19:08

## 2024-03-21 RX ADMIN — ACETAMINOPHEN 975 MG: 325 TABLET, FILM COATED ORAL at 07:22

## 2024-03-21 RX ADMIN — IBUPROFEN 800 MG: 400 TABLET ORAL at 23:08

## 2024-03-21 RX ADMIN — ACETAMINOPHEN 975 MG: 325 TABLET, FILM COATED ORAL at 02:07

## 2024-03-21 RX ADMIN — DOCUSATE SODIUM 50 MG AND SENNOSIDES 8.6 MG 1 TABLET: 8.6; 5 TABLET, FILM COATED ORAL at 20:14

## 2024-03-21 RX ADMIN — IBUPROFEN 800 MG: 400 TABLET ORAL at 11:11

## 2024-03-21 RX ADMIN — LEVOTHYROXINE SODIUM 137 MCG: 25 TABLET ORAL at 07:18

## 2024-03-21 RX ADMIN — OXYCODONE HYDROCHLORIDE 5 MG: 5 TABLET ORAL at 23:08

## 2024-03-21 RX ADMIN — ACETAMINOPHEN 975 MG: 325 TABLET, FILM COATED ORAL at 13:22

## 2024-03-21 ASSESSMENT — ACTIVITIES OF DAILY LIVING (ADL)
ADLS_ACUITY_SCORE: 20

## 2024-03-21 NOTE — PROGRESS NOTES
"[facility]  Obstetrics Postpartum Rounding Note, [id number]        Interval History:  Doing well. Pain well controlled with tylenol and ibuprofen. Pumping currently as baby is in the NICU. Minimal lochia.   Denies nausea/vomiting. Tolerating diet well. Is passing gas. Ambulating to the NICU. Voiding without difficulty. LE edema.   No other concerns.       Physical Exam:  Temp: 98.3  F (36.8  C) Temp src: Oral BP: 116/72 Pulse: 86   Resp: 16 SpO2: 98 % O2 Device: None (Room air) Oxygen Delivery: 2 LPM Height: 165.1 cm (5' 5\") Weight: 110.7 kg (244 lb)  Estimated body mass index is 40.6 kg/m  as calculated from the following:    Height as of this encounter: 1.651 m (5' 5\").    Weight as of this encounter: 110.7 kg (244 lb).    Gen: AOx3, NAD  Abd: soft, ND, mildly tender to palpation, Fundus Firm @  below umbilicus. Incision C/D/I  Ext: LE edema, no calf pain    Labs:         Hemoglobin   Date Value Ref Range Status   2024 11.0 (L) 11.7 - 15.7 g/dL Final   2024 12.2 11.7 - 15.7 g/dL Final   2021 14.7 11.7 - 15.7 g/dL Final   2020 14.6 11.7 - 15.7 g/dL Final          No results found for: \"RH\"    Meds:  Current Facility-Administered Medications   Medication    acetaminophen (TYLENOL) tablet 975 mg    bisacodyl (DULCOLAX) suppository 10 mg    carboprost (HEMABATE) injection 250 mcg    And    loperamide (IMODIUM) capsule 4 mg    dextrose 5% in lactated ringers infusion    glucose gel 15-30 g    Or    dextrose 50 % injection 25-50 mL    Or    glucagon injection 1 mg    hydrALAZINE (APRESOLINE) injection 10 mg    hydrocortisone (Perianal) (ANUSOL-HC) 2.5 % cream    ibuprofen (ADVIL/MOTRIN) tablet 800 mg    insulin aspart (NovoLOG/FIASP) 100 UNIT/ML VIAL FOR FILLING PUMP RESERVOIR    insulin basal rate from AMBULATORY PUMP    insulin bolus from AMBULATORY PUMP    labetalol (NORMODYNE/TRANDATE) injection 20-80 mg    lactated ringers infusion    lanolin cream    " levothyroxine (SYNTHROID/LEVOTHROID) tablet 137 mcg    lidocaine (LMX4) cream    lidocaine 1 % 0.1-1 mL    loperamide (IMODIUM) capsule 2 mg    Medication Instructions - cervical ripening and induction medications    methylergonovine (METHERGINE) injection 200 mcg    metoclopramide (REGLAN) injection 10 mg    Or    metoclopramide (REGLAN) tablet 10 mg    misoprostol (CYTOTEC) tablet 400 mcg    Or    misoprostol (CYTOTEC) tablet 800 mcg    naloxone (NARCAN) injection 0.2 mg    Or    naloxone (NARCAN) injection 0.4 mg    Or    naloxone (NARCAN) injection 0.2 mg    Or    naloxone (NARCAN) injection 0.4 mg    No MMR Needed -  Assessment: Patient does not need MMR vaccine    No Tdap Needed - Assessment: Patient does not need Tdap vaccine    ondansetron (ZOFRAN ODT) ODT tab 4 mg    Or    ondansetron (ZOFRAN) injection 4 mg    oxyCODONE (ROXICODONE) tablet 5 mg    oxytocin (PITOCIN) 30 units in 500 mL 0.9% NaCl infusion    oxytocin (PITOCIN) 30 units in 500 mL 0.9% NaCl infusion    oxytocin (PITOCIN) injection 10 Units    prochlorperazine (COMPAZINE) injection 10 mg    Or    prochlorperazine (COMPAZINE) tablet 10 mg    Or    prochlorperazine (COMPAZINE) suppository 25 mg    senna-docusate (SENOKOT-S/PERICOLACE) 8.6-50 MG per tablet 1 tablet    Or    senna-docusate (SENOKOT-S/PERICOLACE) 8.6-50 MG per tablet 2 tablet    sertraline (ZOLOFT) tablet 50 mg    simethicone (MYLICON) chewable tablet 80 mg    sodium chloride (PF) 0.9% PF flush 3 mL    sodium chloride (PF) 0.9% PF flush 3 mL    sodium phosphate (FLEET ENEMA) 1 enema    terbutaline (BRETHINE) injection 0.25 mg    tranexamic acid 1 g in 100 mL NS IV bag (premix)       Assessment/Plan:  Assessment:  1. Breast feeding status of mother    2. Type 1 diabetes mellitus during pregnancy, antepartum    3. Gestational hypertension, third trimester      D#2 s/p C/S d/t failure to descend and nonworking epidural with significant maternal pain    -Patient status: Doing well and  pain well controlled  -normal diet  -hgb 11.0 3/20/24  -Complications: No immediate surgical complications identified.  -Continue routine cares  -Ambulation in room and hallways encouraged.  -Monitor wound for signs of infection, discussed wound maintenance and infectious signs with patient.  -Reportable signs and symptoms dicussed with the patient.  -Anticipate discharge tomorrow 3/22/24    Gestational HTN  -blood pressures have been well controlled, patient continues to be normotensive  -if BP becomes out of range will then order preeclampsia orders for more frequent checks and strict I/O monitoring     Type 1 diabetes mellitus:  -overnight B, 223, 182 at 5:00am 3/20/24, patient managed through ambulatory pump  -followed by hospitalist for glucose monitoring   -patient has ambulatory insulin pump  -Of note, note from Dr. Mcfadden of OB/Gyn references pump setting change recommendations as follows, taken from note of Dr. Jimenez from 2024  After childbirth:  - change pump settings from Profile 2 (pregnancy) to Profile 1 (pre-pregnancy)  - continue Control IQ auto mode     Hypothyroidism:  -preadmission diagnosis, ordered home medication  -levothyroxine 137 mcg     Anxiety  -preadmission diagnosis, ordered home medication  -ordered zoloft 50mg     Mona Bolden PA-C  2024  8:14 AM    Pager #: [phone]

## 2024-03-21 NOTE — PLAN OF CARE
Goal Outcome Evaluation:      Plan of Care Reviewed With: patient, spouse    Overall Patient Progress: improvingOverall Patient Progress: improving  Vital signs stable. Postpartum assessment WDL. Pt. denies headache, blurry vision, epigastric pain. +2 relexes, absent clonus.  Incision with steri-strips & ERNESTINE. Pain controlled with Tylenol & Ibuprofen. Patient ambulating independently, tolerating regular diet well. Pt. emptying bladder w/o difficulty, BM x1.  HS , 2am 172,  5am 110.  Pt. self manages ambulatory insulin pump. Patient had BM overnight.   Pt. pumping.  Patient and spouse visited infant in NICU last evening. Will continue with current plan of care.

## 2024-03-21 NOTE — PROGRESS NOTES
"[facility]    Medicine Progress Note - Hospitalist Service    Date of Admission:  3/17/2024    Assessment & Plan   Principal Problem:   Type 1 diabetes mellitus during pregnancy, antepartum  See instructions in note from Dr. Jimenez 2/12/2024  After childbirth:  change pump settings from Profile 2 (pregnancy) to Profile 1 (pre-pregnancy)  continue Control IQ auto mode  Pharmacy consult requested to assist with changing pump settings to \"Profile 1, pre-pregnancy,\" as instructed by endocrinologist, Dr. Jimenez.  See settings in orders:   Rate #1 = 0.9 units/hr from 0000 to 0700.   Rate #2 = 0.8 units/hr from 0700 to 1200.   Rate #3 = 0.9 units/hr from 1200 to 1800.   Rate #4 = 0.95 units/hr from 1800 to  2100   Rate #5 = 1 units/hr from 2100 to 2400.     Close blood sugar monitoring including 3 AM blood sugar check   Some blood sugar readings were above goal 3/20 (260, 223, 182).  Patient says she thinks is because her body is adjusting to \"pre-pregnancy\" pump settings, as above.  Blood sugar readings are closer to goal today.     Active Problems:    Other specified hypothyroidism  Resumed PTA thyroid hormone      Anxiety  Timing for resuming Sertraline per Ob/Gyn          Diet: Room Service  Regular Diet Adult    DVT Prophylaxis: Pneumatic Compression Devices  Kelly Catheter: Not present  Lines: None     Cardiac Monitoring: None  Code Status: Full Code      Clinically Significant Risk Factors              # Hypoalbuminemia: Lowest albumin = 2.9 g/dL at 3/17/2024  8:32 PM, will monitor as appropriate            # Severe Obesity: Estimated body mass index is 40.6 kg/m  as calculated from the following:    Height as of this encounter: 1.651 m (5' 5\").    Weight as of this encounter: 110.7 kg (244 lb)., PRESENT ON ADMISSION            Disposition Plan     Expected Discharge Date: 03/22/2024                    Silvia Bradford MD  Hospitalist Service  [facility] " "Hospital  Securely message with Josh (more info)  Text page via [facility] Paging/Directory   ______________________________________________________________________    Interval History   \"I was having some more pain today.\"  Patient says she was given oxycodone for increased abdominal and incisional pain.  She has been trying to pump her breastmilk but has not had much milk in yet.  She has no new respiratory complaints.  Swelling legs is about the same.    Physical Exam   Vital Signs: Temp: 98.3  F (36.8  C) Temp src: Oral BP: 116/72 Pulse: 86   Resp: 16 SpO2: 98 % O2 Device: None (Room air)    Weight: 244 lbs 0 oz    Constitutional: awake, alert, cooperative, no apparent distress  Respiratory: Clear to auscultation bilaterally, no crackles or wheezing  Cardiovascular: Regular rate and rhythm, normal S1 and S2, and no murmur noted  GI: Normal bowel sounds, soft, non-distended, non-tender  Skin/Integumen: No rash on exposed skin.  Trace bilateral lower extremity edema  Other: Mood is optimistic, confident      Medical Decision Making       35 MINUTES SPENT BY ME on the date of service doing chart review, history, exam, documentation & further activities per the note.      Data         Imaging results reviewed over the past 24 hrs:   No results found for this or any previous visit (from the past 24 hour(s)).  "

## 2024-03-21 NOTE — PLAN OF CARE
Goal Outcome Evaluation:      Plan of Care Reviewed With: patient, spouse    Overall Patient Progress: improving  Pt's pain controlled with Ibuprofen and Tylenol and an occasional Oxycodone. Up and about in room and walking down to NICU to visit . Abdominal binder on. Pumping every three hours. Will continue to monitor.

## 2024-03-22 VITALS
TEMPERATURE: 98.3 F | HEIGHT: 65 IN | DIASTOLIC BLOOD PRESSURE: 72 MMHG | RESPIRATION RATE: 16 BRPM | BODY MASS INDEX: 40.19 KG/M2 | HEART RATE: 81 BPM | OXYGEN SATURATION: 98 % | SYSTOLIC BLOOD PRESSURE: 114 MMHG | WEIGHT: 241.2 LBS

## 2024-03-22 PROCEDURE — 250N000013 HC RX MED GY IP 250 OP 250 PS 637

## 2024-03-22 PROCEDURE — 250N000013 HC RX MED GY IP 250 OP 250 PS 637: Performed by: OBSTETRICS & GYNECOLOGY

## 2024-03-22 RX ORDER — ACETAMINOPHEN 500 MG
500-1000 TABLET ORAL EVERY 6 HOURS PRN
Qty: 60 TABLET | Refills: 1 | Status: SHIPPED | OUTPATIENT
Start: 2024-03-22

## 2024-03-22 RX ORDER — POLYETHYLENE GLYCOL 3350 17 G/17G
1 POWDER, FOR SOLUTION ORAL DAILY
Qty: 527 G | Refills: 1 | Status: SHIPPED | OUTPATIENT
Start: 2024-03-22

## 2024-03-22 RX ORDER — OXYCODONE HYDROCHLORIDE 5 MG/1
5 TABLET ORAL EVERY 6 HOURS PRN
Qty: 12 TABLET | Refills: 0 | Status: SHIPPED | OUTPATIENT
Start: 2024-03-22 | End: 2024-03-25

## 2024-03-22 RX ORDER — IBUPROFEN 600 MG/1
600 TABLET, FILM COATED ORAL EVERY 6 HOURS PRN
Qty: 60 TABLET | Refills: 1 | Status: SHIPPED | OUTPATIENT
Start: 2024-03-22

## 2024-03-22 RX ADMIN — SERTRALINE HYDROCHLORIDE 50 MG: 50 TABLET ORAL at 09:07

## 2024-03-22 RX ADMIN — LEVOTHYROXINE SODIUM 137 MCG: 25 TABLET ORAL at 07:12

## 2024-03-22 RX ADMIN — ACETAMINOPHEN 975 MG: 325 TABLET, FILM COATED ORAL at 07:12

## 2024-03-22 RX ADMIN — IBUPROFEN 800 MG: 400 TABLET ORAL at 12:05

## 2024-03-22 RX ADMIN — ACETAMINOPHEN 975 MG: 325 TABLET, FILM COATED ORAL at 01:34

## 2024-03-22 RX ADMIN — DOCUSATE SODIUM 50 MG AND SENNOSIDES 8.6 MG 1 TABLET: 8.6; 5 TABLET, FILM COATED ORAL at 09:07

## 2024-03-22 RX ADMIN — IBUPROFEN 800 MG: 400 TABLET ORAL at 06:07

## 2024-03-22 ASSESSMENT — ACTIVITIES OF DAILY LIVING (ADL)
ADLS_ACUITY_SCORE: 20

## 2024-03-22 NOTE — PROGRESS NOTES
"[facility]  Obstetrics Postpartum Rounding Note, [id number]        Interval History:  Doing well. Pain well controlled with tylenol and ibuprofen. Patient started using PRN Oxycodone due to soreness with starting to be more mobile and active.  Baby being fed with formula supplementation and bottle feedings while in NICU, mom is pumping Q 3 hours. Minimal lochia. Baby Bogdan is now in room with mom!  Denies nausea/vomiting. Tolerating diet well. Is passing gas and had BM. Ambulating well. Voiding without difficulty.   No other concerns.       Physical Exam:  Temp: 98.6  F (37  C) Temp src: Oral BP: 114/64 Pulse: 89   Resp: 16     Oxygen Delivery: 2 LPM Height: 165.1 cm (5' 5\") Weight: 110.7 kg (244 lb)  Estimated body mass index is 40.6 kg/m  as calculated from the following:    Height as of this encounter: 1.651 m (5' 5\").    Weight as of this encounter: 110.7 kg (244 lb).    Gen: AOx3, NAD  Abd: soft, ND, mildly tender to palpation, Fundus Firm @ below umbilicus. Incision C/D/I  Ext: no calf ttp, LE edema-expected    Labs:         Hemoglobin   Date Value Ref Range Status   2024 11.0 (L) 11.7 - 15.7 g/dL Final   2024 12.2 11.7 - 15.7 g/dL Final   2021 14.7 11.7 - 15.7 g/dL Final   2020 14.6 11.7 - 15.7 g/dL Final          No results found for: \"RH\"    Meds:  Current Facility-Administered Medications   Medication    acetaminophen (TYLENOL) tablet 975 mg    bisacodyl (DULCOLAX) suppository 10 mg    carboprost (HEMABATE) injection 250 mcg    And    loperamide (IMODIUM) capsule 4 mg    dextrose 5% in lactated ringers infusion    glucose gel 15-30 g    Or    dextrose 50 % injection 25-50 mL    Or    glucagon injection 1 mg    hydrocortisone (Perianal) (ANUSOL-HC) 2.5 % cream    ibuprofen (ADVIL/MOTRIN) tablet 800 mg    insulin aspart (NovoLOG/FIASP) 100 UNIT/ML VIAL FOR FILLING PUMP RESERVOIR    insulin basal rate from AMBULATORY PUMP    insulin bolus from " AMBULATORY PUMP    lactated ringers infusion    lanolin cream    levothyroxine (SYNTHROID/LEVOTHROID) tablet 137 mcg    lidocaine (LMX4) cream    lidocaine 1 % 0.1-1 mL    loperamide (IMODIUM) capsule 2 mg    Medication Instructions - cervical ripening and induction medications    methylergonovine (METHERGINE) injection 200 mcg    metoclopramide (REGLAN) injection 10 mg    Or    metoclopramide (REGLAN) tablet 10 mg    misoprostol (CYTOTEC) tablet 400 mcg    Or    misoprostol (CYTOTEC) tablet 800 mcg    naloxone (NARCAN) injection 0.2 mg    Or    naloxone (NARCAN) injection 0.4 mg    Or    naloxone (NARCAN) injection 0.2 mg    Or    naloxone (NARCAN) injection 0.4 mg    No MMR Needed -  Assessment: Patient does not need MMR vaccine    No Tdap Needed - Assessment: Patient does not need Tdap vaccine    ondansetron (ZOFRAN ODT) ODT tab 4 mg    Or    ondansetron (ZOFRAN) injection 4 mg    oxyCODONE (ROXICODONE) tablet 5 mg    oxytocin (PITOCIN) 30 units in 500 mL 0.9% NaCl infusion    oxytocin (PITOCIN) 30 units in 500 mL 0.9% NaCl infusion    oxytocin (PITOCIN) injection 10 Units    prochlorperazine (COMPAZINE) injection 10 mg    Or    prochlorperazine (COMPAZINE) tablet 10 mg    Or    prochlorperazine (COMPAZINE) suppository 25 mg    senna-docusate (SENOKOT-S/PERICOLACE) 8.6-50 MG per tablet 1 tablet    Or    senna-docusate (SENOKOT-S/PERICOLACE) 8.6-50 MG per tablet 2 tablet    sertraline (ZOLOFT) tablet 50 mg    simethicone (MYLICON) chewable tablet 80 mg    sodium phosphate (FLEET ENEMA) 1 enema    terbutaline (BRETHINE) injection 0.25 mg    tranexamic acid 1 g in 100 mL NS IV bag (premix)       Assessment/Plan:  Assessment:  1. Breast feeding status of mother    2. Type 1 diabetes mellitus during pregnancy, antepartum    3. Gestational hypertension, third trimester      D#3 s/p C/S d/t failure to descend and nonworking epidural with significant maternal pain    -Patient status: Doing well and pain well  "controlled  -normal diet  -hgb 11.0 3/20/24  -Complications: No immediate surgical complications identified.  -Continue routine cares  -Ambulation in room and hallways encouraged.  -Monitor wound for signs of infection, discussed wound maintenance and infectious signs with patient.  -Reportable signs and symptoms dicussed with the patient.  -Anticipate discharge 3/22/24 late afternoon/evening     Gestational HTN  -Patient remains normotensive without BP medications  -if BP becomes out of range will then order preeclampsia orders for more frequent checks and strict I/O monitoring     Type 1 diabetes mellitus:  -Some blood sugar readings were above goal 3/20 (260, 223, 182).  Patient says she thinks is because her body is adjusting to \"pre-pregnancy\" pump settings, as above.  Blood sugar readings are closer to goal today.  -followed by hospitalist for glucose monitoring   -patient has ambulatory insulin pump       Hypothyroidism:  -preadmission diagnosis, ordered home medication  -levothyroxine 137 mcg     Anxiety  -preadmission diagnosis, ordered home medication  -ordered zoloft 50mg     Mona Bolden PA-C  March 22, 2024  7:30 AM    Pager #: [phone]    "

## 2024-03-22 NOTE — DISCHARGE INSTRUCTIONS
Warning Signs after Having a Baby    Keep this paper on your fridge or somewhere else where you can see it.    Call your provider if you have any of these symptoms up to 12 weeks after having your baby.    Thoughts of hurting yourself or your baby  Pain in your chest or trouble breathing  Severe headache not helped by pain medicine  Eyesight concerns (blurry vision, seeing spots or flashes of light, other changes to eyesight)  Fainting, shaking or other signs of a seizure    Call 9-1-1 if you feel that it is an emergency.     The symptoms below can happen to anyone after giving birth. They can be very serious. Call your provider if you have any of these warning signs.    My provider s phone number: _______________________    Losing too much blood (hemorrhage)    Call your provider if you soak through a pad in less than an hour or pass blood clots bigger than a golf ball. These may be signs that you are bleeding too much.    Blood clots in the legs or lungs    After you give birth, your body naturally clots its blood to help prevent blood loss. Sometimes this increased clotting can happen in other areas of the body, like the legs or lungs. This can block your blood flow and be very dangerous.     Call your provider if you:  Have a red, swollen spot on the back of your leg that is warm or painful when you touch it.   Are coughing up blood.     Infection    Call your provider if you have any of these symptoms:  Fever of 100.4 F (38 C) or higher.  Pain or redness around your stitches if you had an incision.   Any yellow, white, or green fluid coming from places where you had stitches or surgery.    Mood Problems (postpartum depression)    Many people feel sad or have mood changes after having a baby. But for some people, these mood swings are worse.     Call your provider right away if you feel so anxious or nervous that you can't care for yourself or your baby.    Preeclampsia (high blood pressure)    Even if you  didn't have high blood pressure when you were pregnant, you are at risk for the high blood pressure disease called preeclampsia. This risk can last up to 12 weeks after giving birth.     Call your provider if you have:   Pain on your right side under your rib cage  Sudden swelling in the hands and face    Remember: You know your body. If something doesn't feel right, get medical help.     For informational purposes only. Not to replace the advice of your health care provider. Copyright 2020 [employer] [facility]. All rights reserved. Clinically reviewed by Nancy Rebollar, RNC-OB, MSN. [employer] [id number] - Rev 02/23.    Learning About Preeclampsia After Childbirth  What is preeclampsia?     Preeclampsia is high blood pressure and signs of organ damage, such as protein in the urine, usually after 20 weeks of pregnancy. If it's not treated, preeclampsia can harm you or your baby.  Severe preeclampsia can lead to dangerous seizures (eclampsia). When preeclampsia affects the liver, it can cause HELLP syndrome, a blood-clotting and bleeding problem. HELLP can come on quickly and can be dangerous. This is why your doctor checks you and your baby often.  Preeclampsia usually goes away after the baby is born. But symptoms may last or get worse after delivery. In rare cases, symptoms may not show up until days or even weeks after childbirth.  What are the symptoms?  Mild preeclampsia usually doesn't cause symptoms. But it may cause rapid weight gain and sudden swelling of the hands and face. Severe preeclampsia can cause symptoms such as a severe headache, vision problems, and trouble breathing. It also can cause belly pain. And you may urinate less than usual.  What can you expect after you've had preeclampsia?  In the hospital  After the baby and the placenta are delivered, preeclampsia usually starts to get better. Most people get better in the first few days after childbirth.  After having preeclampsia, you still have  a risk of seizures for a day or more after childbirth. (In very rare cases, seizures happen later on.) So your doctor may have you take magnesium sulfate for a day or more to prevent seizures. You may also take medicine to lower your blood pressure.  When you go home  Your blood pressure will most likely return to normal a few days after delivery. Your doctor will want to check your blood pressure sometime in the first week after you leave the hospital.  High blood pressure sometimes continues after childbirth. But it usually returns to normal levels with time.  Take and record your blood pressure at home if your doctor tells you to.  Ask your doctor to check your blood pressure monitor to be sure that it is accurate and that the cuff fits you. Also ask your doctor to watch you use it, to make sure that you are using it right.  Don't eat, use tobacco products, or use medicine known to raise blood pressure (such as some nasal decongestant sprays) before you take your blood pressure.  Avoid taking your blood pressure if you have just exercised or if you're nervous or upset. Rest at least 15 minutes before you take your blood pressure.  Take your medicines exactly as prescribed. Call your doctor if you think you are having a problem with your medicine.  If you smoke, try to quit. Talk to your doctor if you need help quitting.  Eat a variety of healthy foods. Include plenty of foods high in calcium, such as dairy products, almonds, and dark leafy greens.  Long-term health  After you've had preeclampsia, you have an increased risk of high blood pressure, heart disease, stroke, and kidney disease. This may be because the same things that cause preeclampsia also cause heart and kidney disease.  To protect your health, work with your doctor on living a heart-healthy lifestyle and getting the checkups you need. Your doctor may also want you to check your blood pressure at home.  Follow-up care is a key part of your treatment  "and safety. Be sure to make and go to all appointments, and call your doctor if you are having problems. It's also a good idea to know your test results and keep a list of the medicines you take.  When should you call for help?  Share this information with your partner or a friend. They can help you watch for warning signs.  Call 911  anytime you think you may need emergency care. For example, call if:    You passed out (lost consciousness).     You have a seizure.     You have trouble breathing.     You have chest pain.   Call your doctor now or seek immediate medical care if:    You have symptoms of preeclampsia, such as:  Sudden swelling of your face, hands, or feet.  New vision problems (such as dimness, blurring, or seeing spots).  A severe headache.     Your blood pressure is very high, such as 160/110 or higher.     Your blood pressure is higher than your doctor told you it should be, or it rises quickly.     You have new nausea or vomiting.     You have pain in your belly or pelvis.     You gain weight rapidly.   Where can you learn more?  Go to https://www.[employer].net/patiented  Enter Q718 in the search box to learn more about \"Learning About Preeclampsia After Childbirth.\"  Current as of: July 10, 2023               Content Version: 14.0    1583-2630 [employer].   Care instructions adapted under license by your healthcare professional. If you have questions about a medical condition or this instruction, always ask your healthcare professional. [employer] disclaims any warranty or liability for your use of this information.     Section: What to Expect at Home  Your Recovery     A  section, or , is surgery to deliver your baby through a cut that the doctor makes in your lower belly and uterus. The cut is called an incision.  You may have some pain in your lower belly and need pain medicine for 1 to 2 weeks. You can expect some vaginal bleeding for " several weeks. You will probably need about 6 weeks to fully recover.  It's important to take it easy while the incision heals. Avoid heavy lifting, strenuous activities, and exercises that strain the belly muscles while you recover. Ask a family member or friend for help with housework, cooking, and shopping.  This care sheet gives you a general idea about how long it will take for you to recover. But each person recovers at a different pace. Follow the steps below to get better as quickly as possible.  How can you care for yourself at home?  Activity    Rest when you feel tired. Getting enough sleep will help you recover.     Try to walk each day. Start by walking a little more than you did the day before. Bit by bit, increase the amount you walk. Walking boosts blood flow and helps prevent pneumonia, constipation, and blood clots.     Avoid strenuous activities, such as bicycle riding, jogging, weightlifting, and aerobic exercise, for 6 weeks or until your doctor says it is okay.     Until your doctor says it is okay, do not lift anything heavier than your baby.     Do not do sit-ups or other exercises that strain the belly muscles for 6 weeks or until your doctor says it is okay.     Hold a pillow over your incision when you cough or take deep breaths. This will support your belly and decrease your pain.     You may shower as usual. Pat the incision dry when you are done.     You will have some vaginal bleeding. Wear sanitary pads. Do not douche or use tampons until your doctor says it is okay.     Ask your doctor when you can drive again.     You will probably need to take at least 6 weeks off work. It depends on the type of work you do and how you feel.     Ask your doctor when it is okay for you to have sex.   Diet    You can eat your normal diet. If your stomach is upset, try bland, low-fat foods like plain rice, broiled chicken, toast, and yogurt.     Drink plenty of fluids (unless your doctor tells you not  to).     You may notice that your bowel movements are not regular right after your surgery. This is common. Try to avoid constipation and straining with bowel movements. You may want to take a fiber supplement every day. If you have not had a bowel movement after a couple of days, ask your doctor about taking a mild laxative.     If you are breastfeeding, limit alcohol. Alcohol can cause a lack of energy and other health problems for the baby when a breastfeeding woman drinks heavily. It can also get in the way of a mom's ability to feed her baby or to care for the child in other ways. There isn't a lot of research about exactly how much alcohol can harm a baby. Having no alcohol is the safest choice for your baby. If you choose to have a drink now and then, have only one drink, and limit the number of occasions that you have a drink. Wait to breastfeed at least 2 hours after you have a drink to reduce the amount of alcohol the baby may get in the milk.   Medicines    Your doctor will tell you if and when you can restart your medicines. You will also get instructions about taking any new medicines.     If you stopped taking aspirin or some other blood thinner, your doctor will tell you when to start taking it again.     Take pain medicines exactly as directed.  If the doctor gave you a prescription medicine for pain, take it as prescribed.  If you are not taking a prescription pain medicine, ask your doctor if you can take an over-the-counter medicine.     If you think your pain medicine is making you sick to your stomach:  Take your medicine after meals (unless your doctor has told you not to).  Ask your doctor for a different pain medicine.     If your doctor prescribed antibiotics, take them as directed. Do not stop taking them just because you feel better. You need to take the full course of antibiotics.   Incision care    If you have strips of tape on the incision, leave the tape on for a week or until it falls  off.     Wash the area daily with warm, soapy water, and pat it dry. Don't use hydrogen peroxide or alcohol, which can slow healing. You may cover the area with a gauze bandage if it weeps or rubs against clothing. Change the bandage every day.     Keep the area clean and dry.   Other instructions    If you breastfeed your baby, you may be more comfortable while you are healing if you don't rest your baby on your belly. Try tucking your baby under your arm, with your baby's body along the side you will be feeding on. Support your baby's upper body with your arm. With that hand you can control your baby's head to bring your baby's mouth to your breast. This is sometimes called the "[employer] hold.   Follow-up care is a key part of your treatment and safety. Be sure to make and go to all appointments, and call your doctor if you are having problems. It's also a good idea to know your test results and keep a list of the medicines you take.  When should you call for help?  Share this information with your partner, family, or a friend. They can help you watch for warning signs.  Call 911  anytime you think you may need emergency care. For example, call if:    You feel you cannot stop from hurting yourself, your baby, or someone else.     You passed out (lost consciousness).     You have chest pain, are short of breath, or cough up blood.     You have a seizure.   Where to get help 24 hours a day, 7 days a week   If you or someone you know talks about suicide, self-harm, a mental health crisis, a substance use crisis, or any other kind of emotional distress, get help right away. You can:    Call the Suicide and Crisis Lifeline at 988.     Call 2-486-747-TALK (1-717.437.1913).     Text HOME to 718008 to access the Crisis Text Line.   Consider saving these numbers in your phone.  Go to [facility].org for more information or to chat online.  Call your doctor or midwife now or seek immediate medical care if:    You have loose  stitches, or your incision comes open.     You have signs of hemorrhage (too much bleeding), such as:  Heavy vaginal bleeding. This means that you are soaking through one or more pads in an hour. Or you pass blood clots bigger than an egg.  Feeling dizzy or lightheaded, or you feel like you may faint.  Feeling so tired or weak that you cannot do your usual activities.  A fast or irregular heartbeat.  New or worse belly pain.     You have symptoms of infection, such as:  Increased pain, swelling, warmth, or redness.  Red streaks leading from the incision.  Pus draining from the incision.  A fever.  Frequent or painful urination or blood in your urine.  Vaginal discharge that smells bad.  New or worse belly pain.     You have symptoms of a blood clot in your leg (called a deep vein thrombosis), such as:  Pain in the calf, back of the knee, thigh, or groin.  Swelling in the leg or groin.  A color change on the leg or groin. The skin may be reddish or purplish, depending on your usual skin color.     You have signs of preeclampsia, such as:  Sudden swelling of your face, hands, or feet.  New vision problems (such as dimness, blurring, or seeing spots).  A severe headache.     You have signs of heart failure, such as:  New or increased shortness of breath.  New or worse swelling in your legs, ankles, or feet.  Sudden weight gain, such as more than 2 to 3 pounds in a day or 5 pounds in a week.  Feeling so tired or weak that you cannot do your usual activities.     You had spinal or epidural pain relief and have:  New or worse back pain.  Increased pain, swelling, warmth, or redness at the injection site.  Tingling, weakness, or numbness in your legs or groin.   Watch closely for changes in your health, and be sure to contact your doctor or midwife if:    Your vaginal bleeding isn't decreasing.     You feel sad, anxious, or hopeless for more than a few days.     You are having problems with your breasts or breastfeeding.  "  Where can you learn more?  Go to https://www.[employer].net/patiented  Enter M806 in the search box to learn more about \" Section: What to Expect at Home.\"  Current as of: July 10, 2023               Content Version: 14.0    2454-0053 [employer].   Care instructions adapted under license by your healthcare professional. If you have questions about a medical condition or this instruction, always ask your healthcare professional. [employer] disclaims any warranty or liability for your use of this information.      "

## 2024-03-22 NOTE — PLAN OF CARE
Goal Outcome Evaluation:    Vital signs stable. Patient denied feeling any headache, nausea, vision changes, and/or epigastric pain. Postpartum assessment WDL. Incision clean, dry, and intact. Pain controlled with tylenol and ibuprofen. Patient passing gas. Baby bottle feed. Encouraged Patient to pump every 3 hours. Patient and infant bonding well. Patient is looking forward to discharge to home today. Will continue with current plan of care.

## 2024-03-22 NOTE — PLAN OF CARE
Goal Outcome Evaluation:      Plan of Care Reviewed With: patient, spouse    Overall Patient Progress: improvingOverall Patient Progress: improving  Vital signs stable. Postpartum assessment WDL. Incision approximated with steri-strips. Pain controlled with Tylenol, Ibuprofen & prn Oxy.  Abdominal binder on. Patient independent with infant's cares. Infant fed formula w/ bottle & pumping q 3 hours.  Per hospitalist note, check BG at 3am, B.  Pt. self manages ambulatory insulin pump. Patient and spouse bonding well with infant. Will continue with current plan of care.

## 2024-03-23 NOTE — DISCHARGE SUMMARY
DISCHARGE SUMMARY     DATE OF ADMISSION: 3/17/2024  DATE OF DISCHARGE: 3/22/2024     Hospital course:  Linda is a 33-year-old -0-1-0 was admitted to labor and delivery at 37+2 weeks gestation for induction of labor secondary to gestational hypertension.  Linda received Cervidil followed by vaginal Cytotec with minimal cervical change.  Linda underwent a prolonged cervical ripening/induction process involving cervidil, vaginal cytotec, cook cervical balloon, pitocin and AROM.  At time of artificial rupture membranes. Linda was 4-5cm dilated and eventually progressed to 8-9cm.   Due to significant rectal pressure/pain in spite of epidural reboluses and minimal cervical change in the final hours of her attemted induction, Linda elected to undergo primary  section.  Linda delivered a healthy female infant with a weight of 8 pounds 10 ounces and Apgars of 5 7 and 9 at 1 5 and 10 minutes respectively.  Linda's postoperative hospital course was overall uncomplicated.  Pain was well-controlled using combination of IV as well as oral medications.  Kelly catheter was discontinued on postoperative day #1 and she was able to void without issues.  Linda is allowed to advance her diet with return of bowel function and was tolerating regular diet prior to discharge home.  Serum showed no sign or symptoms of infection throughout postoperative hospital stay and remained hemodynamically stable with a postoperative hemoglobin of 11.0 g/dL.  There is a type I diabetic and blood sugars following delivery were well-controlled using previously determined insulin pump regimen by endocrinology.  Linda's infant was in the NICU until postoperative day #2 at which time she was transferred to normal  nursery.  Linda was discharged to home on postoperative day #3 after meeting all requirements for discharge.  She verbalized understanding of discharge instructions and was given a list of contact numbers should any issues or  problems arise.  S[facility]ile follow-up with Dr. Santoyo for routine postpartum visit in 6 weeks time.      Clarissa Mcfadden MD

## 2024-03-24 ENCOUNTER — PATIENT OUTREACH (OUTPATIENT)
Dept: CARE COORDINATION | Facility: CLINIC | Age: 34
End: 2024-03-24
Payer: COMMERCIAL

## 2024-03-24 NOTE — PROGRESS NOTES
Connected Care Resource Center:   [facility] Contact  [facility]     Clinical Data: Post-Discharge Outreach     Outreach attempted x 2.  Left message on patient's voicemail, providing [facility]'s central phone number of [phone] ([phone]) for questions/concerns and/or to schedule an appt with an [facility] provider, if they do not have a PCP.      Plan:  [facility] will do no further outreaches at this time.       SANKET Marquez  Connected Care Resource [place], [facility]    *Connected Care Resource Team does NOT follow patient ongoing. Referrals are identified based on internal discharge reports and the outreach is to ensure patient has an understanding of their discharge instructions.

## 2024-03-25 ENCOUNTER — MYC MEDICAL ADVICE (OUTPATIENT)
Dept: OBGYN | Facility: CLINIC | Age: 34
End: 2024-03-25

## 2024-03-26 ENCOUNTER — MYC MEDICAL ADVICE (OUTPATIENT)
Dept: EDUCATION SERVICES | Facility: CLINIC | Age: 34
End: 2024-03-26

## 2024-03-26 ENCOUNTER — ALLIED HEALTH/NURSE VISIT (OUTPATIENT)
Dept: OBGYN | Facility: CLINIC | Age: 34
End: 2024-03-26
Payer: COMMERCIAL

## 2024-03-26 VITALS — SYSTOLIC BLOOD PRESSURE: 136 MMHG | DIASTOLIC BLOOD PRESSURE: 81 MMHG | HEART RATE: 85 BPM

## 2024-03-26 DIAGNOSIS — O13.9 GESTATIONAL HYPERTENSION: Primary | ICD-10-CM

## 2024-03-26 PROCEDURE — 99207 PR NO CHARGE NURSE ONLY: CPT

## 2024-03-26 NOTE — TELEPHONE ENCOUNTER
Diabetes Education Follow-up    Subjective/Objective:    Linda Chavez sent in pump report for review. Last date of communication was: 3/13/24.    Diabetes is being managed with Lifestyle (diet/activity), Diabetes Medications   Diabetes Medication(s)       Diabetic Other       Glucagon (BAQSIMI TWO PACK) 3 MG/DOSE POWD Spray 1 Device in nostril once as needed (for severe hypoglycemia)       Insulin       insulin aspart (NOVOLOG PEN) 100 UNIT/ML pen Inject 3 to 10 units subcutaneous at mealtimes as directed, total daily dose approx 30 units     insulin aspart (NOVOLOG VIAL) 100 UNITS/ML vial Use with insulin pump, total daily dose approx 150 units     insulin glargine (LANTUS PEN) 100 UNIT/ML pen Inject 22 Units Subcutaneous At Bedtime            BG/Food Log:                 Assessment:    Meeting ADA TIR target  mg/dL >70% x 2 weeks but post-partum and using older pump settings profile, she reports seeing high blood sugars. Significant post-prandial rise seen throughout the day. Recommend small increase in basal rates x 24hrs plus ICR increase during the day.     Plan/Response:  Recommend increase to insulin -   Time Basal rate (u/hr) Correction Factor (u:mg/dL) Carb Ratio (u:grams) Target BG   12:00AM 0.9 --> 1.0 1:25 1:7.7 110   6:00AM 0.9 --> 1.0 1:28 1:6.5 --> 1u:5.5 110   7:00AM 0.8 *DELETE* 1:35 *DELETE* 1:5.9 *DELETE* 110   11:00AM 0.8 --> 0.9 1:35 1:6.3 --> 1u:6.0 110   12:00PM 0.9 *DELETE* 1:30 *DELETE* 1:6.0 *DELETE* 110   5:00PM  0.9 --> 1.0 1:35 1:4.6 --> 1u:4.0 110   6:00PM 0.95 *DELETE* 1:30 *DELETE* 1:4.3 *DELETE* 110   9:00PM 1.0 1:50 --> 1:45 1:5.2 --> 1u:5.0 110     See [employer] message for patient communications.     Kat Lima RD, TABITHA, [facility]     Any diabetes medication dose changes were made via the CDE Protocol and Collaborative Practice Agreement with the patient's endocrinology provider. A copy of this encounter was shared with the provider.

## 2024-03-26 NOTE — Clinical Note
Do you want pt to come back for another nurse visit for BP check prior to 4/29 appt with you? Monitor Bps at home and send mychart message/call with readings?

## 2024-03-26 NOTE — PROGRESS NOTES
Pt here for nurse BP check due to gestational hypertension. She is not on any medications    Pt delivered 3/19 via c/s, POD#7 today    Pt states she has only checked her BP once at home since delivery, her BP was 120s/60s per pt report. Denies s/s, feeling good.    Initial BP in clinic was 141/84. Pt sat and rested in sitting position for 10 minutes, recheck 136/81    Objective:  /81 (BP Location: Right arm, Patient Position: Sitting, Cuff Size: Adult Large)   Pulse 85   LMP 06/21/2023  Blood pressure taken on Right arm     Signs and symptoms of hypertension reviewed with patient.     Pt has PP visit with Dr. Santoyo on 4/29    Routing to provider to advise    Nydia Olvera RN on 3/26/2024 at 11:26 AM

## 2024-03-27 NOTE — PROCEDURES
NST Sign/documentation:    Date of Service: 3/11/24    33 year old at 36+3 weeks presented to [facility] with elevated blood pressure at home, rule out preeclampsia.     NST showed  reactive. [place]: q 3 min with irritability.    Patient was discharged home following triage by physician on call.     Sierra Higginss, DO

## 2024-04-06 DIAGNOSIS — F41.1 GAD (GENERALIZED ANXIETY DISORDER): ICD-10-CM

## 2024-04-06 DIAGNOSIS — E10.9 TYPE 1 DIABETES, HBA1C GOAL < 7% (H): ICD-10-CM

## 2024-04-08 RX ORDER — PROCHLORPERAZINE 25 MG/1
SUPPOSITORY RECTAL
Qty: 1 EACH | Refills: 0 | Status: SHIPPED | OUTPATIENT
Start: 2024-04-08 | End: 2024-07-05

## 2024-04-10 ENCOUNTER — MYC MEDICAL ADVICE (OUTPATIENT)
Dept: EDUCATION SERVICES | Facility: CLINIC | Age: 34
End: 2024-04-10
Payer: COMMERCIAL

## 2024-04-11 NOTE — TELEPHONE ENCOUNTER
Diabetes Education Follow-up    Subjective/Objective:    Linda Chavez sent in blood glucose log for review. Last date of communication was: 3/26/24.    Diabetes is being managed with Lifestyle (diet/activity), Diabetes Medications   Diabetes Medication(s)       Diabetic Other       Glucagon (BAQSIMI TWO PACK) 3 MG/DOSE POWD Spray 1 Device in nostril once as needed (for severe hypoglycemia)       Insulin       insulin aspart (NOVOLOG PEN) 100 UNIT/ML pen Inject 3 to 10 units subcutaneous at mealtimes as directed, total daily dose approx 30 units     insulin aspart (NOVOLOG VIAL) 100 UNITS/ML vial Use with insulin pump, total daily dose approx 150 units     insulin glargine (LANTUS PEN) 100 UNIT/ML pen Inject 22 Units Subcutaneous At Bedtime            BG/Food Log:                       Assessment:    Meeting TIR target >70% of the time. Ratio for basal-bolus insulin favors bolus. Will recommend increase to basal rates, avoidance of override corrections that have caused subsequent hypoglycemia, reassurance that everything is looking generally ok! Will also recommend returning to use of Sleep Mode only overnight, while sleeping.     Plan/Response:  Recommend increase to insulin -   Time Basal rate (u/hr) Correction Factor (u:mg/dL) Carb Ratio (u:grams) Target BG   12:00AM 1.0 --> 1.1 1:25 1:7.7 110   6:00AM 1.0 --> 1.1 1:28 1u:5.5 110   11:00AM 0.9 --> 1.0 1:35 1u:6.0 110   5:00PM  1.0 --> 1.1 1:35 1u:4.0 110   9:00PM 1.0 --> 1.1 1:45 1u:5.0 110     Return to Sleep Mode x ~8 hours overnight, use only when sleeping and set schedule so it automatically enters at night     Don't override correction boluses as they are causing hypoglycemia. Continue to use Correction Factor - we can update in the future.     Schedule follow up appointment in ~1 month. Follow-up orders placed today.     See StatusPage message for patient communications.       Kat Lima, RD, LD, CDCES     Any diabetes medication dose changes were made via  the CDE Protocol and Collaborative Practice Agreement with the patient's endocrinology provider. A copy of this encounter was shared with the provider.

## 2024-04-29 ENCOUNTER — PRENATAL OFFICE VISIT (OUTPATIENT)
Dept: OBGYN | Facility: CLINIC | Age: 34
End: 2024-04-29
Payer: COMMERCIAL

## 2024-04-29 VITALS
WEIGHT: 216 LBS | HEIGHT: 65 IN | BODY MASS INDEX: 35.99 KG/M2 | SYSTOLIC BLOOD PRESSURE: 122 MMHG | DIASTOLIC BLOOD PRESSURE: 74 MMHG

## 2024-04-29 PROCEDURE — G0145 SCR C/V CYTO,THINLAYER,RESCR: HCPCS | Performed by: OBSTETRICS & GYNECOLOGY

## 2024-04-29 PROCEDURE — G0124 SCREEN C/V THIN LAYER BY MD: HCPCS | Performed by: PATHOLOGY

## 2024-04-29 PROCEDURE — 87624 HPV HI-RISK TYP POOLED RSLT: CPT | Performed by: OBSTETRICS & GYNECOLOGY

## 2024-04-29 PROCEDURE — 99207 PR POST PARTUM EXAM: CPT | Performed by: OBSTETRICS & GYNECOLOGY

## 2024-04-29 ASSESSMENT — ANXIETY QUESTIONNAIRES
3. WORRYING TOO MUCH ABOUT DIFFERENT THINGS: NOT AT ALL
6. BECOMING EASILY ANNOYED OR IRRITABLE: NOT AT ALL
1. FEELING NERVOUS, ANXIOUS, OR ON EDGE: SEVERAL DAYS
GAD7 TOTAL SCORE: 2
2. NOT BEING ABLE TO STOP OR CONTROL WORRYING: NOT AT ALL
7. FEELING AFRAID AS IF SOMETHING AWFUL MIGHT HAPPEN: SEVERAL DAYS
5. BEING SO RESTLESS THAT IT IS HARD TO SIT STILL: NOT AT ALL
IF YOU CHECKED OFF ANY PROBLEMS ON THIS QUESTIONNAIRE, HOW DIFFICULT HAVE THESE PROBLEMS MADE IT FOR YOU TO DO YOUR WORK, TAKE CARE OF THINGS AT HOME, OR GET ALONG WITH OTHER PEOPLE: NOT DIFFICULT AT ALL
GAD7 TOTAL SCORE: 2

## 2024-04-29 ASSESSMENT — PATIENT HEALTH QUESTIONNAIRE - PHQ9
5. POOR APPETITE OR OVEREATING: NOT AT ALL
SUM OF ALL RESPONSES TO PHQ QUESTIONS 1-9: 1

## 2024-04-29 NOTE — PROGRESS NOTES
"SUBJECTIVE:  Linda Chavez,  is here for a postpartum visit.  She had a  Section  on  delivering a healthy baby girl, named Bogdan weighing 8 lbs 10.3 oz at term.      HPI:  Doing well overall.    Last PHQ-9 score on record=       2023    12:51 PM   PHQ-9 SCORE   PHQ-9 Total Score MyChart 2 (Minimal depression)   PHQ-9 Total Score 2         2023     1:03 PM 2023    10:38 AM 2023    12:51 PM   TRESA-7 SCORE   Total Score 2 (minimal anxiety)    2 (minimal anxiety) 2 (minimal anxiety) 4 (minimal anxiety)   Total Score 2 2 4       Pap:   Lab Results   Component Value Date    PAP NIL 2021    PAP NIL 2017    PAP NIL 2014       Delivery complications:  No  Breast feeding:  No  Bladder problems:  No  Bowel problems/hemorrhoids:  No  Episiotomy/laceration/incision healed? yes  Vaginal flow:  None  Wallace:  No  Contraception: none  Emotional adjustment:  doing well and happy  Back to work:  2024    12 point review of systems negative other than symptoms noted below or in the HPI.    OBJECTIVE:  Vitals: /74   Ht 1.651 m (5' 5\")   Wt 98 kg (216 lb)   LMP 2023   Breastfeeding No   BMI 35.94 kg/m    BMI= Body mass index is 35.94 kg/m .  General - pleasant female in no acute distress.  Breast -  symmetric, no skin changes, and no puckering  Abdomen - Incision well-healed and soft, NT, ND  Pelvic - EG: normal adult female, BUS: within normal limits, Vagina: well rugated, no discharge, Cervix: no lesions or CMT  Rectovaginal - deferred.    ASSESSMENT:    ICD-10-CM    1. Postpartum care and examination immediately after delivery  Z39.0           PLAN:  May resume normal activities without restrictions.  Pap smear was done today.  Cont PNV  Full counseling was provided, and all questions were answered.   Return to clinic in one year for an annual visit.   Discussed contraception, ability to conceive despite regular or irregular period. Recommend " condoms. Recommend at least 1yr before conceiving again, ideal interpregnancy interval 18-23 mo.    Sierra Treviño Masters, DO

## 2024-05-03 LAB
BKR LAB AP GYN ADEQUACY: ABNORMAL
BKR LAB AP GYN INTERPRETATION: ABNORMAL
BKR LAB AP HPV REFLEX: ABNORMAL
BKR LAB AP PREVIOUS ABNORMAL: ABNORMAL
PATH REPORT.COMMENTS IMP SPEC: ABNORMAL
PATH REPORT.COMMENTS IMP SPEC: ABNORMAL
PATH REPORT.RELEVANT HX SPEC: ABNORMAL

## 2024-05-07 ENCOUNTER — PATIENT OUTREACH (OUTPATIENT)
Dept: OBGYN | Facility: CLINIC | Age: 34
End: 2024-05-07
Payer: COMMERCIAL

## 2024-05-07 PROBLEM — R87.612 LOW GRADE SQUAMOUS INTRAEPITHELIAL LESION (LGSIL) ON CERVICAL PAP SMEAR: Status: ACTIVE | Noted: 2024-04-29

## 2024-05-07 LAB
HUMAN PAPILLOMA VIRUS 16 DNA: NEGATIVE
HUMAN PAPILLOMA VIRUS 18 DNA: NEGATIVE
HUMAN PAPILLOMA VIRUS FINAL DIAGNOSIS: ABNORMAL
HUMAN PAPILLOMA VIRUS OTHER HR: POSITIVE

## 2024-05-14 DIAGNOSIS — Z96.41 INSULIN PUMP STATUS: ICD-10-CM

## 2024-05-14 DIAGNOSIS — E10.9 TYPE 1 DIABETES MELLITUS WITHOUT COMPLICATION (H): Primary | ICD-10-CM

## 2024-05-15 ENCOUNTER — VIRTUAL VISIT (OUTPATIENT)
Dept: EDUCATION SERVICES | Facility: CLINIC | Age: 34
End: 2024-05-15
Attending: INTERNAL MEDICINE
Payer: COMMERCIAL

## 2024-05-15 DIAGNOSIS — E10.9 TYPE 1 DIABETES MELLITUS WITHOUT COMPLICATION (H): ICD-10-CM

## 2024-05-15 DIAGNOSIS — E10.9 TYPE 1 DIABETES MELLITUS WITHOUT COMPLICATION (H): Primary | ICD-10-CM

## 2024-05-15 DIAGNOSIS — Z96.41 INSULIN PUMP STATUS: ICD-10-CM

## 2024-05-15 DIAGNOSIS — E06.3 HYPOTHYROIDISM DUE TO HASHIMOTO'S THYROIDITIS: ICD-10-CM

## 2024-05-15 PROCEDURE — 98967 PH1 ASSMT&MGMT NQHP 11-20: CPT | Mod: 93 | Performed by: DIETITIAN, REGISTERED

## 2024-05-15 NOTE — LETTER
5/15/2024         RE: Linda Chavez  [address]  [facility] [address]        Dear Colleague,    Thank you for referring your patient, Linda Chavez, to the [facility]. Please see a copy of my visit note below.    Diabetes Self-Management Education & Support    Presents for: Insulin Pump and CGM Review    Type of Service: Telephone Visit    Originating Location (Patient Location): Home  Distant Location (Provider Location): [facility]  Mode of Communication:  Telephone    Telephone Visit Start Time:  9:01a  Telephone Visit End Time (telephone visit stop time): 9:20a    How would patient like to obtain AVS? MyChart      REPORTS:  Tandem Source issues prevented review of pump reports, only CGM data available            Insulin Pump Information  Insulin Pump Brand: Tandem  Infusion Set: Tandem  Tandem Infusion Set: Auto Soft 90  Does patient have an insulin multiple daily injection back-up plan?: Yes    Education specific to insulin pump provided today on:   importance of accurate carbohydrate counting, importance of bolusing before meals, and pregnancy    Opportunities for ongoing education and support in diabetes-self management were discussed.    Pt verbalized understanding of concepts discussed and recommendations provided today.       Continue education with the following diabetes management concepts: Reducing Risks and Healthy Coping    Pump Education Materials: none    ASSESSMENT  Linda is 8 weeks post-partum. She had a baby girl, after long labor and . Baby was in NICU x 2 days due to hypoglycemia but then discharged together with mom. They are adjusting to life at home. Linda had difficulty with breastmilk supply, has recently stopped pumping and baby is formula-fed. She feels her blood sugars are not as tightly controlled as when she was pregnant and we discussed appropriateness of less aggressive targets. We reviewed continued healthy  habits for blood sugar control. Her and her  may consider a 2nd baby and we discussed pre-conception A1C goal of 6.5%. No further questions or concerns today.     Glucose Patterns & Trends:  Time in range of  mg/dL, 80% of the time.    Patient would benefit from bolusing before meals and counting carbohydrates accurately.    Changes made to pump settings:  none    Changes made to sensor settings:   No changes to sensor settings recommended at this time.    PLAN    No changes in treatment plan   Continue tight control of blood sugars, with possible future conception planning, would recommend A1C </=6.5%  Follow-up with Endocrinology next - patient will schedule via [employer]; discussed with Dr. Jimenez patient's levothyroxine dose/need for labs at patient's request and he will place orders as needed     Topics to cover at upcoming visits: Reducing Risks and Healthy Coping    Follow-up: ~3 months or as needed, follow up order placed     See Care Plan for co-developed, patient-state behavior change goals.  AVS provided for patient today.    Education Materials Provided:  No new materials provided today      SUBJECTIVE/OBJECTIVE:  Presents for: Insulin Pump and CGM Review  Accompanied by: Self  Diabetes education in the past 24mo: Yes  Focus of Visit: Insulin Pump, CGM  Type of Pump visit: Pump Review  Type of CGM visit: Personal CGM Follow-up  Diabetes type: Type 1  Disease course: Stable  How confident are you filling out medical forms by yourself:: Extremely  Diabetes management related comments/concerns: Going well, baby is 8 weeks!  Transportation concerns: No  Difficulty affording diabetes medication?: No  Difficulty affording diabetes testing supplies?: No  Other concerns:: None  Cultural Influences/Ethnic Background:  Not  or       Diabetes Symptoms & Complications:  Diabetes Related Symptoms: None  Weight trend: Stable  Symptom course: Stable  Disease course: Stable  Complications  "assessed today?: No    Patient Problem List and Family Medical History reviewed for relevant medical history, current medical status, and diabetes risk factors.    Vitals:  LMP 06/21/2023   Estimated body mass index is 35.94 kg/m  as calculated from the following:    Height as of 4/29/24: 1.651 m (5' 5\").    Weight as of 4/29/24: 98 kg (216 lb).   Last 3 BP:   BP Readings from Last 3 Encounters:   04/29/24 122/74   03/26/24 136/81   03/22/24 114/72       History   Smoking Status     Never   Smokeless Tobacco     Never       Labs:  Lab Results   Component Value Date    A1C 6.2 02/22/2024    A1C 8.3 04/07/2021    HEMOGLOBINA1 8.3 09/29/2011     Lab Results   Component Value Date    GLC 79 03/19/2024     10/06/2022     04/13/2021     Lab Results   Component Value Date     10/06/2022     04/07/2021     HDL Cholesterol   Date Value Ref Range Status   04/07/2021 59 >49 mg/dL Final     Direct Measure HDL   Date Value Ref Range Status   10/06/2022 67 >=50 mg/dL Final   ]  GFR Estimate   Date Value Ref Range Status   03/17/2024 >90 >60 mL/min/1.73m2 Final   04/13/2021 >90 >60 mL/min/[1.73_m2] Final     Comment:     Non  GFR Calc  Starting 12/18/2018, serum creatinine based estimated GFR (eGFR) will be   calculated using the Chronic Kidney Disease Epidemiology Collaboration   (CKD-EPI) equation.       GFR Estimate If Black   Date Value Ref Range Status   04/13/2021 >90 >60 mL/min/[1.73_m2] Final     Comment:      GFR Calc  Starting 12/18/2018, serum creatinine based estimated GFR (eGFR) will be   calculated using the Chronic Kidney Disease Epidemiology Collaboration   (CKD-EPI) equation.       Lab Results   Component Value Date    CR 0.69 03/17/2024    CR 0.60 04/13/2021     No results found for: \"MICROALBUMIN\"    Healthy Eating:  Healthy Eating Assessed Today: No  Cultural/Jew diet restrictions?: No  Meal planning/habits: Carb counting  How many times a week " on average do you eat food made away from home (restaurant/take-out)?: 1  Meals include: Breakfast, Lunch, Dinner, Afternoon Snack  Beverages: Water, Coffee, Diet soda    Being Active:  Being Active Assessed Today: No  Barrier to exercise: None    Monitoring:  Monitoring Assessed Today: Yes  Did patient bring glucose meter to appointment? : No  Blood Glucose Meter: ContourNext, CGM  Times checking blood sugar at home (number): Other (see Comments)  Please elaborate:: Using CGM  Blood glucose trend: Fluctuating    See above.     Taking Medications:  Diabetes Medication(s)       Diabetic Other       Glucagon (BAQSIMI TWO PACK) 3 MG/DOSE POWD Spray 1 Device in nostril once as needed (for severe hypoglycemia)       Insulin       insulin aspart (NOVOLOG PEN) 100 UNIT/ML pen Inject 3 to 10 units subcutaneous at mealtimes as directed, total daily dose approx 30 units     insulin aspart (NOVOLOG VIAL) 100 UNITS/ML vial Use with insulin pump, total daily dose approx 150 units     insulin glargine (LANTUS PEN) 100 UNIT/ML pen Inject 22 Units Subcutaneous At Bedtime            Taking Medication Assessed Today: Yes  Current Treatments: Insulin Pump  Problems taking diabetes medications regularly?: No  Diabetes medication side effects?: No    Problem Solving:  Problem Solving Assessed Today: Yes  Is the patient at risk for hypoglycemia?: Yes  Hypoglycemia Frequency: Rarely  Medical ID: No  Does patient have glucagon emergency kit?: Yes  Is the patient at risk for DKA?: Yes  Does patient have ketone test strips?: No  Does patient have DKA prevention plan?: No  Does patient have severe weather/disaster plan for diabetes management?: No  Does patient have sick day plan for diabetes management?: No              Reducing Risks:  Reducing Risks Assessed Today: No  Has dilated eye exam at least once a year?: Yes  Sees dentist every 6 months?: Yes  Feet checked by healthcare provider in the last year?: Yes    Healthy Coping:  Healthy  Coping Assessed Today: Yes  Emotional response to diabetes: Concern for health and well-being, Confidence diabetes can be controlled  Informal Support system:: Family, Friends, Spouse  Stage of change: MAINTENANCE (Working to maintain change, with risk of relapse)  Patient Activation Measure Survey Score:      8/7/2012     3:00 PM   GEOVANNA Score (Last Two)   GEOVANNA Raw Score 52   Activation Score 100   GEOVANNA Level 4         Care Plan and Education Provided:  Monitoring: Individual glucose targets and Log and interpret results and Reducing Risks: Goal for A1c, how it relates to glucose and how often to check    Kat Lima RD, LD, [facility]     Time Spent: 19 minutes  Encounter Type: Individual    Any diabetes medication dose changes were made via the CDE Protocol per the patient's referring provider. A copy of this encounter was shared with the provider.

## 2024-05-15 NOTE — PROGRESS NOTES
Diabetes Self-Management Education & Support    Presents for: Insulin Pump and CGM Review    Type of Service: Telephone Visit    Originating Location (Patient Location): Home  Distant Location (Provider Location): [facility] [place]  Mode of Communication:  Telephone    Telephone Visit Start Time:  9:01a  Telephone Visit End Time (telephone visit stop time): 9:20a    How would patient like to obtain AVS? MyChart      REPORTS:  Tandem Source issues prevented review of pump reports, only CGM data available            Insulin Pump Information  Insulin Pump Brand: Tandem  Infusion Set: Tandem  Tandem Infusion Set: Auto Soft 90  Does patient have an insulin multiple daily injection back-up plan?: Yes    Education specific to insulin pump provided today on:   importance of accurate carbohydrate counting, importance of bolusing before meals, and pregnancy    Opportunities for ongoing education and support in diabetes-self management were discussed.    Pt verbalized understanding of concepts discussed and recommendations provided today.       Continue education with the following diabetes management concepts: Reducing Risks and Healthy Coping    Pump Education Materials: none    ASSESSMENT  Linda is 8 weeks post-partum. She had a baby girl, after long labor and . Baby was in NICU x 2 days due to hypoglycemia but then discharged together with mom. They are adjusting to life at home. Linda had difficulty with breastmilk supply, has recently stopped pumping and baby is formula-fed. She feels her blood sugars are not as tightly controlled as when she was pregnant and we discussed appropriateness of less aggressive targets. We reviewed continued healthy habits for blood sugar control. Her and her  may consider a 2nd baby and we discussed pre-conception A1C goal of 6.5%. No further questions or concerns today.     Glucose Patterns & Trends:  Time in range of  mg/dL, 80% of the  time.    Patient would benefit from bolusing before meals and counting carbohydrates accurately.    Changes made to pump settings:  none    Changes made to sensor settings:   No changes to sensor settings recommended at this time.    PLAN    No changes in treatment plan   Continue tight control of blood sugars, with possible future conception planning, would recommend A1C </=6.5%  Follow-up with Endocrinology next - patient will schedule via Robert Applebaum MD; discussed with Dr. Jimenez patient's levothyroxine dose/need for labs at patient's request and he will place orders as needed     Topics to cover at upcoming visits: Reducing Risks and Healthy Coping    Follow-up: ~3 months or as needed, follow up order placed     See Care Plan for co-developed, patient-state behavior change goals.  AVS provided for patient today.    Education Materials Provided:  No new materials provided today      SUBJECTIVE/OBJECTIVE:  Presents for: Insulin Pump and CGM Review  Accompanied by: Self  Diabetes education in the past 24mo: Yes  Focus of Visit: Insulin Pump, CGM  Type of Pump visit: Pump Review  Type of CGM visit: Personal CGM Follow-up  Diabetes type: Type 1  Disease course: Stable  How confident are you filling out medical forms by yourself:: Extremely  Diabetes management related comments/concerns: Going well, baby is 8 weeks!  Transportation concerns: No  Difficulty affording diabetes medication?: No  Difficulty affording diabetes testing supplies?: No  Other concerns:: None  Cultural Influences/Ethnic Background:  Not  or       Diabetes Symptoms & Complications:  Diabetes Related Symptoms: None  Weight trend: Stable  Symptom course: Stable  Disease course: Stable  Complications assessed today?: No    Patient Problem List and Family Medical History reviewed for relevant medical history, current medical status, and diabetes risk factors.    Vitals:  LMP 06/21/2023   Estimated body mass index is 35.94 kg/m  as calculated  "from the following:    Height as of 4/29/24: 1.651 m (5' 5\").    Weight as of 4/29/24: 98 kg (216 lb).   Last 3 BP:   BP Readings from Last 3 Encounters:   04/29/24 122/74   03/26/24 136/81   03/22/24 114/72       History   Smoking Status    Never   Smokeless Tobacco    Never       Labs:  Lab Results   Component Value Date    A1C 6.2 02/22/2024    A1C 8.3 04/07/2021    HEMOGLOBINA1 8.3 09/29/2011     Lab Results   Component Value Date    GLC 79 03/19/2024     10/06/2022     04/13/2021     Lab Results   Component Value Date     10/06/2022     04/07/2021     HDL Cholesterol   Date Value Ref Range Status   04/07/2021 59 >49 mg/dL Final     Direct Measure HDL   Date Value Ref Range Status   10/06/2022 67 >=50 mg/dL Final   ]  GFR Estimate   Date Value Ref Range Status   03/17/2024 >90 >60 mL/min/1.73m2 Final   04/13/2021 >90 >60 mL/min/[1.73_m2] Final     Comment:     Non  GFR Calc  Starting 12/18/2018, serum creatinine based estimated GFR (eGFR) will be   calculated using the Chronic Kidney Disease Epidemiology Collaboration   (CKD-EPI) equation.       GFR Estimate If Black   Date Value Ref Range Status   04/13/2021 >90 >60 mL/min/[1.73_m2] Final     Comment:      GFR Calc  Starting 12/18/2018, serum creatinine based estimated GFR (eGFR) will be   calculated using the Chronic Kidney Disease Epidemiology Collaboration   (CKD-EPI) equation.       Lab Results   Component Value Date    CR 0.69 03/17/2024    CR 0.60 04/13/2021     No results found for: \"MICROALBUMIN\"    Healthy Eating:  Healthy Eating Assessed Today: No  Cultural/Jain diet restrictions?: No  Meal planning/habits: Carb counting  How many times a week on average do you eat food made away from home (restaurant/take-out)?: 1  Meals include: Breakfast, Lunch, Dinner, Afternoon Snack  Beverages: Water, Coffee, Diet soda    Being Active:  Being Active Assessed Today: No  Barrier to exercise: " None    Monitoring:  Monitoring Assessed Today: Yes  Did patient bring glucose meter to appointment? : No  Blood Glucose Meter: ContourNext, CGM  Times checking blood sugar at home (number): Other (see Comments)  Please elaborate:: Using CGM  Blood glucose trend: Fluctuating    See above.     Taking Medications:  Diabetes Medication(s)       Diabetic Other       Glucagon (BAQSIMI TWO PACK) 3 MG/DOSE POWD Spray 1 Device in nostril once as needed (for severe hypoglycemia)       Insulin       insulin aspart (NOVOLOG PEN) 100 UNIT/ML pen Inject 3 to 10 units subcutaneous at mealtimes as directed, total daily dose approx 30 units     insulin aspart (NOVOLOG VIAL) 100 UNITS/ML vial Use with insulin pump, total daily dose approx 150 units     insulin glargine (LANTUS PEN) 100 UNIT/ML pen Inject 22 Units Subcutaneous At Bedtime            Taking Medication Assessed Today: Yes  Current Treatments: Insulin Pump  Problems taking diabetes medications regularly?: No  Diabetes medication side effects?: No    Problem Solving:  Problem Solving Assessed Today: Yes  Is the patient at risk for hypoglycemia?: Yes  Hypoglycemia Frequency: Rarely  Medical ID: No  Does patient have glucagon emergency kit?: Yes  Is the patient at risk for DKA?: Yes  Does patient have ketone test strips?: No  Does patient have DKA prevention plan?: No  Does patient have severe weather/disaster plan for diabetes management?: No  Does patient have sick day plan for diabetes management?: No              Reducing Risks:  Reducing Risks Assessed Today: No  Has dilated eye exam at least once a year?: Yes  Sees dentist every 6 months?: Yes  Feet checked by healthcare provider in the last year?: Yes    Healthy Coping:  Healthy Coping Assessed Today: Yes  Emotional response to diabetes: Concern for health and well-being, Confidence diabetes can be controlled  Informal Support system:: Family, Friends, Spouse  Stage of change: MAINTENANCE (Working to maintain change,  with risk of relapse)  Patient Activation Measure Survey Score:      8/7/2012     3:00 PM   GEOVANNA Score (Last Two)   GEOVANNA Raw Score 52   Activation Score 100   GEOVANNA Level 4         Care Plan and Education Provided:  Monitoring: Individual glucose targets and Log and interpret results and Reducing Risks: Goal for A1c, how it relates to glucose and how often to check    Kat Lima RD, LD, [facility]     Time Spent: 19 minutes  Encounter Type: Individual    Any diabetes medication dose changes were made via the CDE Protocol per the patient's referring provider. A copy of this encounter was shared with the provider.

## 2024-05-23 DIAGNOSIS — Z01.812 PRE-PROCEDURAL LABORATORY EXAMINATION: Primary | ICD-10-CM

## 2024-06-02 ENCOUNTER — HEALTH MAINTENANCE LETTER (OUTPATIENT)
Age: 34
End: 2024-06-02

## 2024-06-04 ENCOUNTER — MYC MEDICAL ADVICE (OUTPATIENT)
Dept: OBGYN | Facility: CLINIC | Age: 34
End: 2024-06-04
Payer: COMMERCIAL

## 2024-06-05 NOTE — TELEPHONE ENCOUNTER
Routing pt Jackrabbithart message to provider to advise.    Shania Schulz RN on 6/5/2024 at 8:57 AM

## 2024-06-13 ENCOUNTER — OFFICE VISIT (OUTPATIENT)
Dept: OBGYN | Facility: CLINIC | Age: 34
End: 2024-06-13
Payer: COMMERCIAL

## 2024-06-13 ENCOUNTER — LAB (OUTPATIENT)
Dept: LAB | Facility: CLINIC | Age: 34
End: 2024-06-13
Payer: COMMERCIAL

## 2024-06-13 VITALS — BODY MASS INDEX: 36.94 KG/M2 | DIASTOLIC BLOOD PRESSURE: 68 MMHG | SYSTOLIC BLOOD PRESSURE: 122 MMHG | WEIGHT: 222 LBS

## 2024-06-13 DIAGNOSIS — Z01.812 PRE-PROCEDURAL LABORATORY EXAMINATION: ICD-10-CM

## 2024-06-13 DIAGNOSIS — R87.810 CERVICAL HIGH RISK HPV (HUMAN PAPILLOMAVIRUS) TEST POSITIVE: Primary | ICD-10-CM

## 2024-06-13 DIAGNOSIS — R87.612 LGSIL ON PAP SMEAR OF CERVIX: ICD-10-CM

## 2024-06-13 LAB — HCG UR QL: NEGATIVE

## 2024-06-13 PROCEDURE — 88305 TISSUE EXAM BY PATHOLOGIST: CPT | Performed by: PATHOLOGY

## 2024-06-13 PROCEDURE — 88342 IMHCHEM/IMCYTCHM 1ST ANTB: CPT | Performed by: PATHOLOGY

## 2024-06-13 PROCEDURE — 81025 URINE PREGNANCY TEST: CPT

## 2024-06-13 PROCEDURE — 57456 ENDOCERV CURETTAGE W/SCOPE: CPT | Performed by: OBSTETRICS & GYNECOLOGY

## 2024-06-13 NOTE — PROGRESS NOTES
INDICATIONS:                                                    Is a pregnancy test required: Yes.  Was it positive or negative?  Negative  Was a consent obtained?  Yes    Today's PHQ-2 Score:       4/29/2024    11:49 AM   PHQ-2 ( 1999 [employer])   Q1: Little interest or pleasure in doing things 0   Q2: Feeling down, depressed or hopeless 0   PHQ-2 Score 0     Today's PHQ-9 Score:        4/29/2024    11:49 AM   PHQ-9 SCORE   PHQ-9 Total Score 1         Linda Chavez, is a 34 year old female, who had a recent LSIL pap.  HPV Other positive Yes prior history of abnormal pap. Here today for colposcopy. Discussed indication, risks of infection and bleeding.    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  4/29/24 LSIL pap, + HR HPV (not 16/18). Plan colp bef 7/29/24     PROCEDURE:                                                      Speculum placed. Cervix is stained with acetic acid and viewed colposcopically. Squamocolumnar junction is visualized in it's entirety. no visible lesions . Two 0.5 second sprays with Hurricaine spray applied to cervix. Biopsy done No. Endocervical curretage Done. Cervix hemostatic. Speculum removed         POST PROCEDURE:                                                      IMPRESSION: Patient tolerated procedure well, colposcopy adequate, and Normal cervix    PLAN : Await the results of the biopsies.  She tolerated the procedure well. There were no complications. Patient was discharged in stable condition.    Patient advised to call the clinic if excessive bleeding, pelvic pain, or fever.     Follow-up based on pathology results.    Sierra Treviño Masters, DO  
"I don't take it"

## 2024-06-20 LAB
PATH REPORT.COMMENTS IMP SPEC: NORMAL
PATH REPORT.FINAL DX SPEC: NORMAL
PATH REPORT.GROSS SPEC: NORMAL
PATH REPORT.MICROSCOPIC SPEC OTHER STN: NORMAL
PATH REPORT.RELEVANT HX SPEC: NORMAL
PHOTO IMAGE: NORMAL

## 2024-06-23 ENCOUNTER — LAB (OUTPATIENT)
Dept: LAB | Facility: CLINIC | Age: 34
End: 2024-06-23
Payer: COMMERCIAL

## 2024-06-23 DIAGNOSIS — E10.9 TYPE 1 DIABETES MELLITUS WITHOUT COMPLICATION (H): ICD-10-CM

## 2024-06-23 DIAGNOSIS — E06.3 HYPOTHYROIDISM DUE TO HASHIMOTO'S THYROIDITIS: ICD-10-CM

## 2024-06-23 LAB
HBA1C MFR BLD: 6.9 % (ref 0–5.6)
T4 FREE SERPL-MCNC: 1.66 NG/DL (ref 0.9–1.7)
TSH SERPL DL<=0.005 MIU/L-ACNC: 0.35 UIU/ML (ref 0.3–4.2)

## 2024-06-23 PROCEDURE — 84439 ASSAY OF FREE THYROXINE: CPT

## 2024-06-23 PROCEDURE — 84443 ASSAY THYROID STIM HORMONE: CPT

## 2024-06-23 PROCEDURE — 36415 COLL VENOUS BLD VENIPUNCTURE: CPT

## 2024-06-23 PROCEDURE — 83036 HEMOGLOBIN GLYCOSYLATED A1C: CPT

## 2024-06-24 ENCOUNTER — PATIENT OUTREACH (OUTPATIENT)
Dept: OBGYN | Facility: CLINIC | Age: 34
End: 2024-06-24
Payer: COMMERCIAL

## 2024-07-05 DIAGNOSIS — E10.9 TYPE 1 DIABETES, HBA1C GOAL < 7% (H): ICD-10-CM

## 2024-07-05 RX ORDER — PROCHLORPERAZINE 25 MG/1
SUPPOSITORY RECTAL
Qty: 1 EACH | Refills: 0 | Status: SHIPPED | OUTPATIENT
Start: 2024-07-05

## 2024-07-05 NOTE — TELEPHONE ENCOUNTER
Last Written Prescription Date:  4/8/24  Last Fill Quantity: 1,  # refills: 0   Last office visit: Visit date not found ; last virtual visit: 2/12/2024 with prescribing provider:  Barbara   Future Office Visit:  7/22/24    Requested Prescriptions   Pending Prescriptions Disp Refills    Continuous Glucose Transmitter (DEXCOM G6 TRANSMITTER) MISC [Pharmacy Med Name: DEXCOM G6 TRANSMITTER  MISC] 1 each 0     Sig: CHANGE EVERY 90 DAYS       Diabetic Supplies Protocol Passed - 7/5/2024 11:08 AM        Passed - Medication is active on med list        Passed - Recent (12 month) or future (90 days) visit with authorizing provider s specialty     The patient must have completed an in-person or virtual visit within the past 12 months or has a future visit scheduled within the next 90 days with the authorizing provider s specialty.  Urgent care and e-visits do not quality as an office visit for this protocol.          Passed - Medication indicated for associated diagnosis        Passed - Patient is 18 years of age or older           Alma Delia Garcia RN

## 2024-07-16 DIAGNOSIS — F41.1 GAD (GENERALIZED ANXIETY DISORDER): ICD-10-CM

## 2024-07-21 NOTE — PROGRESS NOTES
Virtual Visit Details    Type of service:  Video Visit     Originating Location (pt. Location): Home  Distant Location (provider location):  Off-site  Platform used for Video Visit: Edwin      Recent issues:  Diabetes and thyroid follow-up evaluation, last appt with me 2/12/24.  She delivered daughter 3/19/24 by C/S at 37 1/2 wks gestation, birthweight 8# 10oz, saw Dr. Sierra Santoyo during Pg  Patient had elevated BP levels prior to delivery  Using the Tandem pump (Profile 1) + DexcomG6   Feeling well overall, no new health issues            2003. Diagnosis of diabetes mellitus, age 13, living in [place]  Had acute illness requiring hospitalization at [facility]  Began insulin injections, using Novolog and Lantus insulin  [place] carb counting method, gram estimates  On MDI treatment plan for approx 2 years, then changed to pump use  Previous medical evaluations with Dr. Yash Barcenas/[facility] Andreia Callahan  2005. Began use of [place] pump  2012. Changed to Medtronic pump  Subsequently using Medtronic 723 Paradigm pump  2012. Tried wearing CGMS sensor, but uncomfortable     12/8/14. Initial consult with me at my former clinic ([facility])  Continued pump management  General medicine appointments with Dr. Ellen Burdick/[facility] [place]  Previous FV hgbA1c trends include:   Lab Test 02/05/18 10/10/17 06/21/17 02/01/17 11/16/16  0815   A1C 7.4* 7.8* 8.1* 7.3* 8.2*      ~12/2017. Upgraded to Medtronic 670G pump  Tried Medtronic Guardian sensor, recalls frequent low glucose alarms              Wore sensor in manual mode              Didn't like it, discontinued use     ~11/2018. Wore diagnostic LibrePro CGM  Subsequently obtained LibrePersonal CGM, not wearing past year  3/2022. Changed to Tandem TSlim insulin pump    Novolog in pump    Previous Tandem pump settings:            Recent Tandem pump data:  no info available today  Recent DexcomG6 data:            Previous [facility] hgbA1c trends include:  Lab Results   Component Value Date     A1C 6.9 (H) 06/23/2024    A1C 6.2 (H) 02/22/2024    A1C 6.2 (H) 01/22/2024    A1C 5.8 (H) 12/19/2023    A1C 6.0 (H) 11/16/2023      Recent [facility] labs include:  Lab Results   Component Value Date    A1C 6.9 (H) 06/23/2024     03/17/2024    POTASSIUM 3.8 03/17/2024    CHLORIDE 106 03/17/2024    CO2 17 (L) 03/17/2024    ANIONGAP 13 03/17/2024    GLC 79 03/19/2024    BUN 9.9 03/17/2024    CR 0.69 03/17/2024    GFRESTIMATED >90 03/17/2024    GFRESTBLACK >90 04/13/2021    MARCIA 9.0 03/17/2024    CHOL 241 (H) 10/06/2022    TRIG 75 10/06/2022    HDL 67 10/06/2022     (H) 10/06/2022    NHDL 174 (H) 10/06/2022    UCRR 123.4 03/14/2024    MICROL 5 06/08/2022    UMALCR 7.81 06/08/2022     Last eye exam at [facility] in [place] 10/19/23, no DR  Hyperlipidemia:  Takes simvastatin medication  DM Complications:  None known        Thyroid:  2013. 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 Belén Mustafa/[facility] for general medicine appointments  Also sees Dr. Sierra Santoyo/[facility]      PMH/PSH:  Past Medical History:   Diagnosis Date    Adjustment disorder with anxious mood     Allergic rhinitis, cause unspecified     h/o AIT    Anxiety     Chest discomfort     Common migraine     No visual aura.     Gestational thrombocytopenia (H24)     History of colposcopy 06/13/2024    Hyperlipidemia LDL goal < 70     Hypothyroidism     Infectious mononucleosis  1995    Insulin pump in place     Dexcom continuous glucose monitoring    Obesity     PIH (pregnancy induced hypertension)     Tuberculosis     Type 1 diabetes, HbA1c goal < 7% (H) 2004     followed [facility] - now Dr Jimenez     Past Surgical History:   Procedure Laterality Date    APPENDECTOMY  2007    dr deshpande     SECTION N/A 3/19/2024    Procedure:  section;  Surgeon: Clarissa Mcfadden MD;  Location: [facility] L+D    DILATION AND CURETTAGE SUCTION N/A 2023    Procedure: DILATION AND CURETTAGE OF UTERUS USING SUCTION;  Surgeon: Sierra Santoyo DO;  Location: [facility] OR    PE TUBES Bilateral 1996       Family Hx:  Family History   Problem Relation Age of Onset    Neurologic Disorder Maternal Grandmother         dementia    Genetic Disorder Paternal Grandfather         kidney    Family History Negative No family hx of          Social Hx:  Social History     Socioeconomic History    Marital status:      Spouse name: Not on file    Number of children: Not on file    Years of education: Not on file    Highest education level: Not on file   Occupational History    Occupation: Works in supply chain   Tobacco Use    Smoking status: Never    Smokeless tobacco: Never   Vaping Use    Vaping status: Never Used   Substance and Sexual Activity    Alcohol use: Not Currently     Alcohol/week: 2.0 standard drinks of alcohol     Types: 2 Standard drinks or equivalent per week     Comment: 2-5 drinks per week    Drug use: No    Sexual activity: Yes     Partners: Male   Other Topics Concern     Service Not Asked    Blood Transfusions Not Asked    Caffeine Concern Yes     Comment: rare    Occupational Exposure Not Asked    Hobby Hazards Not Asked    Sleep Concern No    Stress Concern No    Weight Concern Not Asked    Special Diet Not Asked    Back Care Not Asked    Exercise Yes    Bike Helmet Not Asked    Seat Belt Yes    Self-Exams No     Comment: encouraged     Parent/sibling w/ CABG, MI or angioplasty before 65F 55M? No   Social History Narrative    -Working -       Social Determinants of Health     Financial Resource Strain: Low Risk  (1/25/2024)    Financial Resource Strain     Within the past 12 months, have you or your family members you live with been unable to get utilities (heat, electricity) when it was really needed?: No   Food Insecurity: Low Risk  (1/25/2024)    Food Insecurity     Within the past 12 months, did you worry that your food would run out before you got money to buy more?: No     Within the past 12 months, did the food you bought just not last and you didn t have money to get more?: No   Transportation Needs: Low Risk  (1/25/2024)    Transportation Needs     Within the past 12 months, has lack of transportation kept you from medical appointments, getting your medicines, non-medical meetings or appointments, work, or from getting things that you need?: No   Physical Activity: Insufficiently Active (1/25/2024)    Exercise Vital Sign     Days of Exercise per Week: 2 days     Minutes of Exercise per Session: 20 min   Stress: No Stress Concern Present (1/25/2024)    [place] citizen [employer] - Occupational Stress Questionnaire     Feeling of Stress : Only a little   Social Connections: Moderately Isolated (1/25/2024)    Social Connection and Isolation Panel [NHANES]     Frequency of Communication with Friends and Family: Three times a week     Frequency of Social Gatherings with Friends and Family: Twice a week     Attends Taoism Services: Never     Active Member of Clubs or Organizations: No     Attends Club or Organization Meetings: Never     Marital Status:    Interpersonal Safety: Not on file   Housing Stability: Low Risk  (1/25/2024)    Housing Stability     Do you have housing? : Yes     Are you worried about losing your housing?: No          MEDICATIONS:  has a current medication list which includes the following  prescription(s): acetaminophen, aspirin, dexcom g6 sensor, dexcom g6 transmitter, flaxseed (linseed), insulin aspart, insulin glargine, levothyroxine, naratriptan, omeprazole, polyethylene glycol, prenatal w/o a vit-fe fum-fa, sertraline, contour next test, baqsimi two pack, ibuprofen, insulin aspart, insulin pen needle, microlet lancets, and triamcinolone.      ROS: 10 point ROS neg other than the symptoms noted above in the HPI.     GENERAL: some fatigue, wt gain with Pg; denies fevers, chills, night sweats.   HEENT: no dysphagia, odonophagia, diplopia, neck pain  THYROID:  no apparent hyper or hypothyroid symptoms  CV: no chest pain, pressure, palpitations  LUNGS: no SOB, LANG, cough, wheezing   ABDOMEN: rare nausea; no diarrhea, constipation, abdominal pain  EXTREMITIES: no rashes, ulcers, edema  NEUROLOGY: no headaches, denies changes in vision, tingling, extremitiy numbness   MSK: no muscle aches or pains, weakness  SKIN: no rashes or lesions  : no menses during Pg  PSYCH:  stable mood, no significant anxiety or depression  ENDOCRINE: no heat or cold intolerance     Physical Exam (visual exam)  VS:  no vital signs taken for video visit  CONSTITUTIONAL: healthy, alert and NAD, well dressed, answering questions appropriately  ENT: no nose swelling or nasal discharge, mouth redness or gum changes.  EYES: eyes grossly normal to inspection, conjunctivae and sclerae normal, no exophthalmos or proptosis  THYROID:  no apparent nodules or goiter  LUNGS: no audible wheeze, cough or visible cyanosis, no visible retractions or increased work of breathing  ABDOMEN: abdomen not evaluated  EXTREMITIES: no hand tremors, limited exam  NEUROLOGY: CN grossly intact, mentation intact and speech normal   SKIN:  no apparent skin lesions, rash, or edema with visualized skin appearance  PSYCH: mentation appears normal, affect normal/bright, judgement and insight intact,   normal speech and appearance well groomed       LABS:     All  pertinent notes, labs, and images personally reviewed by me.     A/P:  Encounter Diagnoses   Name Primary?    Type 1 diabetes mellitus without complication (H) Yes    Insulin pump status     Hypothyroidism due to Hashimoto's thyroiditis     Routine postpartum follow-up        Comments:  Reviewed health history, diabetes and thyroid issues  Recent CGM glucose trends with some postprandial hyperglycemia, more frequently after lunch  Reviewed and interpreted tests that I previously ordered.   Ordered appropriate tests for the endocrinology disease management.    Management options discussed and implemented after shared medical decision making with the patient.  T1DM and hypothyroidism problems are chronic-stable    Plan:  Reviewed the overall T1DM management and insulin pump use.  Discussed optimal BG testing to assess glucose trends.  We reviewed insulin pump settings, basal rate and bolus dosing  Use of automated pump bolus dosing for meal/snack carb & correction dosing  Reviewed recent Tandem pump report information  Reviewed recent DexcomG6 CGM glucose trends, in detail    Reviewed general issues with the hypothyroidism diagnosis   Discussed lab tests used to assess patient thyroid hormone levels  Reviewed treatment options including levothyroxine medication, ideal dosing    Recommend:  Continue the Tandem insulin pump and diabetes management plan  Pump setting changes (current Profile 1):    Bolus ICR 11a 6.0 to 5.7    Continue to use Sleep Mode during sleep time each evening  We have reviewed use of the Sleep Mode vs Exercise Mode pump settings:  Sleep Mode changes sensor glucose target from 112 to 120 mgdl, allows for increased basal rate delivery but no auto correction boluses.   Exercise Mode to raise sensor glucose target from 112 to 140 mg/dl, suspend target from 70 to 80 mg/dl.  Cannot set time duration in advance.          Continue the mealtime boluses premeal  Continue use of the Dexcom CGM sensor  Would  be helpful to have her pump linked to [employer] website for data access  Discussed plan for upgrading Tandem pump software to enable use of the DexcomG7 CGM sensors, message me when ready for G7 Rx  Continue the current levothyroxine 0.137 mg every day  Plan repeat fasting labs in 10/2024    Testing at [facility]    Lab orders placed  Need to reassess fasting lipid levels now that she's postpartum  Arrange annual dilated eye exam, fasting lipid panel testing.  Contact me if questions/concerns about diabetes and thyroid management    Addressed patient's questions today.    The longitudinal plan of care for the endocrine problem(s) were addressed during this visit.  Due to added complexity of care,   we will continue to support the patient and the subsequent management of this condition with ongoing continuity of care.    There are no Patient Instructions on file for this visit.    Future labs ordered today:   Orders Placed This Encounter   Procedures    GLUCOSE MONITOR, 72 HOUR, PHYS INTERP    Hemoglobin A1c    Basic metabolic panel    Lipid panel reflex to direct LDL Fasting    TSH    T4 free     Radiology/Consults ordered today: None    Total time spent on day of encounter:  *    Follow-up:  10/28/24 at 3pm, VVAm,     1/6/25 at 8:30 am, Return    ANTHONY Jimenez MD, MS  Endocrinology  [facility]    CC:  RUBY Mustafa, Masters, A, and JYOTI Lima

## 2024-07-22 ENCOUNTER — VIRTUAL VISIT (OUTPATIENT)
Dept: ENDOCRINOLOGY | Facility: CLINIC | Age: 34
End: 2024-07-22
Payer: COMMERCIAL

## 2024-07-22 DIAGNOSIS — Z96.41 INSULIN PUMP STATUS: ICD-10-CM

## 2024-07-22 DIAGNOSIS — E10.9 TYPE 1 DIABETES MELLITUS WITHOUT COMPLICATION (H): Primary | ICD-10-CM

## 2024-07-22 DIAGNOSIS — E06.3 HYPOTHYROIDISM DUE TO HASHIMOTO'S THYROIDITIS: ICD-10-CM

## 2024-07-22 PROCEDURE — 95251 CONT GLUC MNTR ANALYSIS I&R: CPT | Performed by: INTERNAL MEDICINE

## 2024-07-22 PROCEDURE — 99214 OFFICE O/P EST MOD 30 MIN: CPT | Mod: 95 | Performed by: INTERNAL MEDICINE

## 2024-07-22 PROCEDURE — G2211 COMPLEX E/M VISIT ADD ON: HCPCS | Mod: 95 | Performed by: INTERNAL MEDICINE

## 2024-08-07 DIAGNOSIS — E10.9 TYPE 1 DIABETES MELLITUS WITHOUT COMPLICATION (H): ICD-10-CM

## 2024-08-07 RX ORDER — PROCHLORPERAZINE 25 MG/1
SUPPOSITORY RECTAL
Qty: 3 EACH | Refills: 5 | Status: SHIPPED | OUTPATIENT
Start: 2024-08-07

## 2024-08-07 NOTE — TELEPHONE ENCOUNTER
Last Written Prescription Date:  2/13/24  Last Fill Quantity: 3,  # refills: 5   Last office visit: Visit date not found ; last virtual visit: 7/22/2024 with prescribing provider:  Dr. Jimenez   Future Office Visit: 10/28/24    Aug 26, 2024 8:00 AM  (Arrive by 7:40 AM)  Adult Preventative Visit with Belén Mustafa MD  [facility] ([facility] [address] [place] [id number]  [phone]     Oct 25, 2024 7:45 AM  Lab visit with [facility]  [facility] ([facility] ) [address] [place] [id number]  [phone]           Requested Prescriptions   Pending Prescriptions Disp Refills    Continuous Glucose Sensor (DEXCOM G6 SENSOR) MISC [Pharmacy Med Name: DEXCOM G6 SENSOR  MISC]  5     Sig: CHANGE SENSOR EVERY 10 DAYS       There is no refill protocol information for this order        Refills sent  Tana Olivares RN

## 2024-08-21 SDOH — HEALTH STABILITY: PHYSICAL HEALTH: ON AVERAGE, HOW MANY MINUTES DO YOU ENGAGE IN EXERCISE AT THIS LEVEL?: 40 MIN

## 2024-08-21 SDOH — HEALTH STABILITY: PHYSICAL HEALTH: ON AVERAGE, HOW MANY DAYS PER WEEK DO YOU ENGAGE IN MODERATE TO STRENUOUS EXERCISE (LIKE A BRISK WALK)?: 2 DAYS

## 2024-08-21 ASSESSMENT — SOCIAL DETERMINANTS OF HEALTH (SDOH): HOW OFTEN DO YOU GET TOGETHER WITH FRIENDS OR RELATIVES?: ONCE A WEEK

## 2024-08-26 ENCOUNTER — OFFICE VISIT (OUTPATIENT)
Dept: FAMILY MEDICINE | Facility: CLINIC | Age: 34
End: 2024-08-26
Payer: COMMERCIAL

## 2024-08-26 VITALS
WEIGHT: 227.9 LBS | OXYGEN SATURATION: 97 % | DIASTOLIC BLOOD PRESSURE: 88 MMHG | RESPIRATION RATE: 18 BRPM | BODY MASS INDEX: 36.62 KG/M2 | SYSTOLIC BLOOD PRESSURE: 123 MMHG | TEMPERATURE: 97.1 F | HEART RATE: 77 BPM | HEIGHT: 66 IN

## 2024-08-26 DIAGNOSIS — Z23 NEED FOR VACCINATION: ICD-10-CM

## 2024-08-26 DIAGNOSIS — E78.5 HYPERLIPIDEMIA LDL GOAL <100: ICD-10-CM

## 2024-08-26 DIAGNOSIS — E10.3292 TYPE 1 DIABETES MELLITUS WITH MILD NONPROLIFERATIVE RETINOPATHY OF LEFT EYE WITHOUT MACULAR EDEMA (H): ICD-10-CM

## 2024-08-26 DIAGNOSIS — F32.A DEPRESSIVE DISORDER: ICD-10-CM

## 2024-08-26 DIAGNOSIS — Z00.00 ANNUAL PHYSICAL EXAM: Primary | ICD-10-CM

## 2024-08-26 LAB
ERYTHROCYTE [DISTWIDTH] IN BLOOD BY AUTOMATED COUNT: 12 % (ref 10–15)
HCT VFR BLD AUTO: 42.6 % (ref 35–47)
HGB BLD-MCNC: 14 G/DL (ref 11.7–15.7)
MCH RBC QN AUTO: 29.9 PG (ref 26.5–33)
MCHC RBC AUTO-ENTMCNC: 32.9 G/DL (ref 31.5–36.5)
MCV RBC AUTO: 91 FL (ref 78–100)
PLATELET # BLD AUTO: 261 10E3/UL (ref 150–450)
RBC # BLD AUTO: 4.69 10E6/UL (ref 3.8–5.2)
WBC # BLD AUTO: 6.1 10E3/UL (ref 4–11)

## 2024-08-26 PROCEDURE — 99395 PREV VISIT EST AGE 18-39: CPT | Mod: 25 | Performed by: INTERNAL MEDICINE

## 2024-08-26 PROCEDURE — 90471 IMMUNIZATION ADMIN: CPT | Performed by: INTERNAL MEDICINE

## 2024-08-26 PROCEDURE — 90677 PCV20 VACCINE IM: CPT | Performed by: INTERNAL MEDICINE

## 2024-08-26 PROCEDURE — 36415 COLL VENOUS BLD VENIPUNCTURE: CPT | Performed by: INTERNAL MEDICINE

## 2024-08-26 PROCEDURE — 85027 COMPLETE CBC AUTOMATED: CPT | Performed by: INTERNAL MEDICINE

## 2024-08-26 ASSESSMENT — PAIN SCALES - GENERAL: PAINLEVEL: NO PAIN (0)

## 2024-08-26 NOTE — PROGRESS NOTES
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     Anxiety  Stable. Continue medication.      Need for vaccination  pcv20    Patient has been advised of split billing requirements and indicates understanding: Yes        BMI  Estimated body mass index is 36.63 kg/m  as calculated from the following:    Height as of this encounter: 1.68 m (5' 6.14\").    Weight as of this encounter: 103.4 kg (227 lb 14.4 oz).   Weight management plan: Discussed healthy diet and exercise guidelines    Counseling  Appropriate preventive services were addressed with this patient via screening, questionnaire, or discussion as appropriate for fall prevention, nutrition, physical activity, Tobacco-use cessation, social engagement, weight loss and cognition.  Checklist reviewing preventive services available has been given to the patient.  Reviewed patient's diet, addressing concerns and/or questions.   She is at risk for lack of exercise and has been provided with information to increase physical activity for the benefit of her well-being.   She is at risk for psychosocial distress and has been provided with information to reduce risk.       See Patient Instructions    Compa Mckeon is a 34 year old, presenting for the following:  Physical        8/26/2024     7:31 AM   Additional Questions   Roomed by [facility] Directive  Patient does not have a Health Care Directive or Living Will: Discussed advance care planning with patient; information given to patient to review.    HPI  Linda Chavze is here for APE  No family hx of B/O/C cancer  She has no concerns today.   She is uptodate with pap - " Gyn - recent colposcopy was negative.  Discussed self breast exam.       8/21/2024   General Health   How would you rate your overall physical health? Good   Feel stress (tense, anxious, or unable to sleep) Only a little      (!) STRESS CONCERN      8/21/2024   Nutrition   Three or more servings of calcium each day? Yes   Diet: Regular (no restrictions)   How many servings of fruit and vegetables per day? (!) 2-3   How many sweetened beverages each day? 0-1            8/21/2024   Exercise   Days per week of moderate/strenous exercise 2 days   Average minutes spent exercising at this level 40 min      (!) EXERCISE CONCERN      8/21/2024   Social Factors   Frequency of gathering with friends or relatives Once a week   Worry food won't last until get money to buy more No   Food not last or not have enough money for food? No   Do you have housing? (Housing is defined as stable permanent housing and does not include staying ouside in a car, in a tent, in an abandoned building, in an overnight shelter, or couch-surfing.) Yes   Are you worried about losing your housing? No   Lack of transportation? No   Unable to get utilities (heat,electricity)? No            8/21/2024   Dental   Dentist two times every year? Yes            8/21/2024   TB Screening   Were you born outside of the [place]? No        Today's PHQ-2 Score:       4/29/2024    11:49 AM   PHQ-2 ( 1999 Pfizer)   Q1: Little interest or pleasure in doing things 0   Q2: Feeling down, depressed or hopeless 0   PHQ-2 Score 0         8/21/2024   Substance Use   Alcohol more than 3/day or more than 7/wk No   Do you use any other substances recreationally? No        Social History     Tobacco Use    Smoking status: Never    Smokeless tobacco: Never   Vaping Use    Vaping status: Never Used   Substance Use Topics    Alcohol use: Yes     Alcohol/week: 2.0 standard drinks of alcohol     Types: 2 Standard drinks or equivalent per week     Comment: Very light drinker but will  "during social occasions    Drug use: No       Mammogram Screening - Patient under 40 years of age: Routine Mammogram Screening not recommended.         8/21/2024   STI Screening   New sexual partner(s) since last STI/HIV test? No        History of abnormal Pap smear: No - age 30- 64 PAP with HPV every 5 years recommended        Latest Ref Rng & Units 4/29/2024    12:27 PM 1/25/2021     4:50 PM 12/18/2017     4:38 PM   PAP / HPV   PAP  Low-grade squamous intraepithelial lesion (LSIL) encompassing HPV/mild dysplasia/CIN1      PAP (Historical)   NIL  NIL    HPV 16 DNA Negative Negative      HPV 18 DNA Negative Negative      Other HR HPV Negative Positive              8/21/2024   Contraception/Family Planning   Questions about contraception or family planning No        Reviewed and updated as needed this visit by Provider                     Objective    Exam  /88 (BP Location: Left arm, Patient Position: Sitting, Cuff Size: Adult Large)   Pulse 77   Temp 97.1  F (36.2  C) (Temporal)   Resp 18   Ht 1.68 m (5' 6.14\")   Wt 103.4 kg (227 lb 14.4 oz)   LMP 08/16/2024 (Exact Date)   SpO2 97%   BMI 36.63 kg/m     Estimated body mass index is 36.63 kg/m  as calculated from the following:    Height as of this encounter: 1.68 m (5' 6.14\").    Weight as of this encounter: 103.4 kg (227 lb 14.4 oz).    Physical Exam  Vitals reviewed.   Constitutional:       Appearance: Normal appearance.   HENT:      Right Ear: Tympanic membrane normal. There is no impacted cerumen.      Left Ear: Tympanic membrane normal. There is no impacted cerumen.      Mouth/Throat:      Mouth: Mucous membranes are moist.      Pharynx: Oropharynx is clear. No oropharyngeal exudate or posterior oropharyngeal erythema.   Cardiovascular:      Rate and Rhythm: Normal rate and regular rhythm.      Heart sounds: Normal heart sounds. No murmur heard.     No gallop.   Pulmonary:      Effort: Pulmonary effort is normal. No respiratory distress.      " Breath sounds: Normal breath sounds. No stridor. No wheezing, rhonchi or rales.   Chest:   Breasts:     Right: No swelling, bleeding, inverted nipple, mass, nipple discharge, skin change or tenderness.      Left: No swelling, bleeding, inverted nipple, mass, nipple discharge, skin change or tenderness.   Abdominal:      General: Abdomen is flat. There is no distension.      Palpations: Abdomen is soft. There is no mass.      Tenderness: There is no abdominal tenderness. There is no guarding.      Hernia: No hernia is present.   Musculoskeletal:         General: Normal range of motion.      Cervical back: Normal range of motion and neck supple. No rigidity or tenderness.      Right lower leg: No edema.      Left lower leg: No edema.   Lymphadenopathy:      Cervical: No cervical adenopathy.   Skin:     General: Skin is warm and dry.   Neurological:      General: No focal deficit present.      Mental Status: She is alert.             Signed Electronically by: Belén Mustafa MD

## 2024-09-05 ENCOUNTER — E-VISIT (OUTPATIENT)
Dept: FAMILY MEDICINE | Facility: CLINIC | Age: 34
End: 2024-09-05
Payer: COMMERCIAL

## 2024-09-05 DIAGNOSIS — M25.539 PAIN IN WRIST, UNSPECIFIED LATERALITY: Primary | ICD-10-CM

## 2024-09-05 PROCEDURE — 99421 OL DIG E/M SVC 5-10 MIN: CPT | Performed by: INTERNAL MEDICINE

## 2024-09-11 ENCOUNTER — MYC REFILL (OUTPATIENT)
Dept: ENDOCRINOLOGY | Facility: CLINIC | Age: 34
End: 2024-09-11
Payer: COMMERCIAL

## 2024-09-11 DIAGNOSIS — E06.3 HYPOTHYROIDISM DUE TO HASHIMOTO'S THYROIDITIS: ICD-10-CM

## 2024-09-11 RX ORDER — LEVOTHYROXINE SODIUM 137 UG/1
TABLET ORAL
Qty: 90 TABLET | Refills: 0 | Status: SHIPPED | OUTPATIENT
Start: 2024-09-11

## 2024-09-11 NOTE — TELEPHONE ENCOUNTER
Last Written Prescription Date:  9/2/23  Last Fill Quantity: 90,  # refills: 3   Last office visit: Visit date not found ; last virtual visit: 7/22/2024 with prescribing provider:  Dr. Jimenez   Future Office Visit: 10/28/24  Next 5 appointments (look out 90 days)      Oct 25, 2024 7:45 AM  Lab visit with CS LAB  [facility] ([facility] ) [address] [address] [phone]           Requested Prescriptions   Pending Prescriptions Disp Refills    levothyroxine (SYNTHROID/LEVOTHROID) 137 MCG tablet 90 tablet 3     Sig: Take 1-tablet by mouth every day, as directed       Thyroid Protocol Passed - 9/11/2024  9:41 AM        Passed - Patient is 12 years or older        Passed - Recent (12 mo) or future (30 days) visit within the authorizing provider's specialty     The patient must have completed an in-person or virtual visit within the past 12 months or has a future visit scheduled within the next 90 days with the authorizing provider s specialty.  Urgent care and e-visits do not quality as an office visit for this protocol.          Passed - Medication is active on med list        Passed - Medication indicated for associated diagnosis     Medication is associated with one or more of the following diagnoses:  Hypothyroidism  Thyroid stimulating hormone suppression therapy  Thyroid cancer  Acquired atrophy of thyroid          Passed - Normal TSH on file in past 12 months     Recent Labs   Lab Test 06/23/24  1105   TSH 0.35              Passed - No active pregnancy on record     If patient is pregnant or has had a positive pregnancy test, please check TSH.          Passed - No positive pregnancy test in past 12 months     If patient is pregnant or has had a positive pregnancy test, please check TSH.             Refills sent  Tana Olivares RN

## 2024-09-15 ENCOUNTER — MYC REFILL (OUTPATIENT)
Dept: FAMILY MEDICINE | Facility: CLINIC | Age: 34
End: 2024-09-15
Payer: COMMERCIAL

## 2024-09-15 DIAGNOSIS — F41.1 GAD (GENERALIZED ANXIETY DISORDER): ICD-10-CM

## 2024-09-16 ENCOUNTER — VIRTUAL VISIT (OUTPATIENT)
Dept: EDUCATION SERVICES | Facility: CLINIC | Age: 34
End: 2024-09-16
Attending: INTERNAL MEDICINE
Payer: COMMERCIAL

## 2024-09-16 DIAGNOSIS — E10.9 TYPE 1 DIABETES MELLITUS WITHOUT COMPLICATION (H): Primary | ICD-10-CM

## 2024-09-16 PROCEDURE — 98966 PH1 ASSMT&MGMT NQHP 5-10: CPT | Mod: 93 | Performed by: DIETITIAN, REGISTERED

## 2024-09-16 NOTE — TELEPHONE ENCOUNTER
Prescription approved per [facility] Refill Protocol.  Anabela Bermudez RN  [facility]

## 2024-09-16 NOTE — LETTER
9/16/2024         RE: Linda Chavez  [address] [address]        Dear Colleague,    Thank you for referring your patient, Linda Chavez, to the [facility] [place]. Please see a copy of my visit note below.    Called patient to review pump reports. Unfortunately, she had not yet uploaded her pump. She plans to do this today as she's had issues with T:connect junior on her phone. She will look into software update to make pump compatible with Dexcom G7 as well when she is on the [employer]andem [employer] site. She will follow up via [employer] once pump has been uploaded so I can review.     No further questions or concerns today.    Kat Lima RD, LD, CDCES     Time spent: 7 minutes

## 2024-09-16 NOTE — PROGRESS NOTES
Called patient to review pump reports. Unfortunately, she had not yet uploaded her pump. She plans to do this today as she's had issues with T:connect ujnior on her phone. She will look into software update to make pump compatible with Dexcom G7 as well when she is on the [employer] site. [facility] once pump has been uploaded so I can review.     No further questions or concerns today.    Kat Lima RD, LD, [facility]     Time spent: 7 minutes

## 2024-09-17 ENCOUNTER — MYC REFILL (OUTPATIENT)
Dept: EDUCATION SERVICES | Facility: CLINIC | Age: 34
End: 2024-09-17
Payer: COMMERCIAL

## 2024-09-17 DIAGNOSIS — E10.9 TYPE 1 DIABETES MELLITUS WITHOUT COMPLICATION (H): ICD-10-CM

## 2024-09-18 DIAGNOSIS — E10.9 TYPE 1 DIABETES MELLITUS WITHOUT COMPLICATION (H): ICD-10-CM

## 2024-09-18 RX ORDER — INSULIN ASPART 100 [IU]/ML
INJECTION, SOLUTION INTRAVENOUS; SUBCUTANEOUS
Qty: 50 ML | Refills: 5 | Status: SHIPPED | OUTPATIENT
Start: 2024-09-18 | End: 2024-09-19

## 2024-09-18 NOTE — TELEPHONE ENCOUNTER
Last Written Prescription Date:  3/11/24  Last Fill Quantity: 50,  # refills: 11   Last office visit: Visit date not found ; last virtual visit: 7/22/24 with prescribing provider:  Dr. Jimenez   Future Office Visit: 10/28/24  Next 5 appointments (look out 90 days)      Oct 25, 2024 7:45 AM  Lab visit with CS LAB  [facility] ([facility] ) [address] [address] [phone]           Requested Prescriptions   Pending Prescriptions Disp Refills    insulin aspart (NOVOLOG VIAL) 100 UNITS/ML vial 50 mL 11     Sig: Use with insulin pump, total daily dose approx 150 units       Insulin Protocol Failed - 9/17/2024 10:21 AM        Failed - Medication indicated for associated diagnosis     Medication is associated with one or more of the following diagnoses:   - Type 1 diabetes mellitus  - Type 2 diabetes mellitus  - Diabetic nephropathy; Prophylaxis  - Neuropathy due to diabetes mellitus; Prophylaxis  - Retinopathy due to diabetes mellitus; Prophylaxis  - Diabetes mellitus associated with cystic fibrosis  - Disorder of cardiovascular system; Prophylaxis - Type 1 diabetes mellitus   - Disorder of cardiovascular system; Prophylaxis - Type 2 diabetes mellitus            Failed - Chart Review Required     Review Chart.    Do not approve if insulin is used in a pump.  Instead, direct refill request to the patient's endocrinologist.  If the patient doesn't have an endocrinologist, then send the refill to the patient's PCP for review            Passed - Medication is active on med list        Passed - Has GFR on file in past 12 months and most recent value is normal        Passed - Recent (6 mo) or future (90 days) visit within the authorizing provider's specialty     The patient must have completed an in-person or virtual visit within the past 6 months or has a future visit scheduled within the next 90 days with the authorizing provider s  specialty.  Urgent care and e-visits do not quality as an office visit for this protocol.          Passed - Patient is 18 years of age or older           Sent remaining refills to new pharmacy. Tana Olivares RN

## 2024-09-19 RX ORDER — INSULIN ASPART 100 [IU]/ML
INJECTION, SOLUTION INTRAVENOUS; SUBCUTANEOUS
Qty: 90 ML | Refills: 1 | Status: SHIPPED | OUTPATIENT
Start: 2024-09-19

## 2024-10-08 PROBLEM — M25.531 RIGHT WRIST PAIN: Status: ACTIVE | Noted: 2024-10-08

## 2024-10-20 ENCOUNTER — HEALTH MAINTENANCE LETTER (OUTPATIENT)
Age: 34
End: 2024-10-20

## 2024-10-21 DIAGNOSIS — E10.9 TYPE 1 DIABETES, HBA1C GOAL < 7% (H): ICD-10-CM

## 2024-10-21 RX ORDER — PROCHLORPERAZINE 25 MG/1
SUPPOSITORY RECTAL
Qty: 1 EACH | Refills: 0 | Status: SHIPPED | OUTPATIENT
Start: 2024-10-21

## 2024-10-21 NOTE — TELEPHONE ENCOUNTER
Last Written Prescription Date:  7/5/24  Last Fill Quantity: 1,  # refills: 0   Last office visit: Visit date not found ; last virtual visit: 7/22/2024 with prescribing provider:  Dr. Jimenez   Future Office Visit: 10/28/24    Next 5 appointments (look out 90 days)      Oct 25, 2024 7:45 AM  Lab visit with CS LAB  [facility] ([facility] ) [address] [address] [phone]           Requested Prescriptions   Pending Prescriptions Disp Refills    Continuous Glucose Transmitter (DEXCOM G6 TRANSMITTER) MISC [Pharmacy Med Name: DEXCOM G6 TRANSMITTER  MISC] 1 each 0     Sig: CHANGE EVERY 90 DAYS       Diabetic Supplies Protocol Passed - 10/21/2024  8:49 AM        Passed - Medication is active on med list        Passed - Recent (12 month) or future (90 days) visit with authorizing provider s specialty     The patient must have completed an in-person or virtual visit within the past 12 months or has a future visit scheduled within the next 90 days with the authorizing provider s specialty.  Urgent care and e-visits do not quality as an office visit for this protocol.          Passed - Medication indicated for associated diagnosis        Passed - Patient is 18 years of age or older           Refill sent  Tana Olivares RN

## 2024-11-01 ENCOUNTER — TRANSFERRED RECORDS (OUTPATIENT)
Dept: HEALTH INFORMATION MANAGEMENT | Facility: CLINIC | Age: 34
End: 2024-11-01
Payer: COMMERCIAL

## 2024-11-01 LAB — RETINOPATHY: NEGATIVE

## 2024-11-29 ENCOUNTER — APPOINTMENT (OUTPATIENT)
Dept: LAB | Facility: CLINIC | Age: 34
End: 2024-11-29
Payer: COMMERCIAL

## 2024-12-12 DIAGNOSIS — F41.1 GAD (GENERALIZED ANXIETY DISORDER): ICD-10-CM

## 2024-12-12 DIAGNOSIS — E06.3 HYPOTHYROIDISM DUE TO HASHIMOTO'S THYROIDITIS: ICD-10-CM

## 2024-12-12 RX ORDER — LEVOTHYROXINE SODIUM 137 UG/1
TABLET ORAL
Qty: 90 TABLET | Refills: 0 | Status: SHIPPED | OUTPATIENT
Start: 2024-12-12

## 2024-12-12 NOTE — TELEPHONE ENCOUNTER
Last Written Prescription Date:  9/11/24  Last Fill Quantity: 90,  # refills: 0   Last office visit: Visit date not found ; last virtual visit: 7/22/2024 with prescribing provider:  Dr. Jimenez   Future Office Visit: 1/6/25     Requested Prescriptions   Pending Prescriptions Disp Refills    levothyroxine (SYNTHROID/LEVOTHROID) 137 MCG tablet [Pharmacy Med Name: LEVOTHYROXINE 137 MCG TABLET] 90 tablet 0     Sig: TAKE 1-TABLET BY MOUTH EVERY DAY, AS DIRECTED       Thyroid Protocol Passed - 12/12/2024 10:28 AM        Passed - Patient is 12 years or older        Passed - Medication is active on med list        Passed - Recent (12 mo) or future (90 days) visit within the authorizing provider's specialty     The patient must have completed an in-person or virtual visit within the past 12 months or has a future visit scheduled within the next 90 days with the authorizing provider s specialty.  Urgent care and e-visits do not qualify as an office visit for this protocol.          Passed - Medication indicated for associated diagnosis     Medication is associated with one or more of the following diagnoses:  Hypothyroidism  Thyroid stimulating hormone suppression therapy  Thyroid cancer  Acquired atrophy of thyroid          Passed - Normal TSH on file in past 12 months     Recent Labs   Lab Test 06/23/24  1105   TSH 0.35              Passed - No active pregnancy on record     If patient is pregnant or has had a positive pregnancy test, please check TSH.          Passed - No positive pregnancy test in past 12 months     If patient is pregnant or has had a positive pregnancy test, please check TSH.             Refill sent  Tana Olivares RN

## 2025-01-06 ENCOUNTER — OFFICE VISIT (OUTPATIENT)
Dept: ENDOCRINOLOGY | Facility: CLINIC | Age: 35
End: 2025-01-06
Payer: COMMERCIAL

## 2025-01-06 VITALS
HEART RATE: 79 BPM | BODY MASS INDEX: 36.16 KG/M2 | SYSTOLIC BLOOD PRESSURE: 126 MMHG | DIASTOLIC BLOOD PRESSURE: 84 MMHG | WEIGHT: 225 LBS

## 2025-01-06 DIAGNOSIS — E66.812 CLASS 2 SEVERE OBESITY DUE TO EXCESS CALORIES WITH SERIOUS COMORBIDITY AND BODY MASS INDEX (BMI) OF 36.0 TO 36.9 IN ADULT (H): ICD-10-CM

## 2025-01-06 DIAGNOSIS — Z96.41 INSULIN PUMP STATUS: ICD-10-CM

## 2025-01-06 DIAGNOSIS — E06.3 HYPOTHYROIDISM DUE TO HASHIMOTO'S THYROIDITIS: ICD-10-CM

## 2025-01-06 DIAGNOSIS — E10.9 TYPE 1 DIABETES MELLITUS WITHOUT COMPLICATION (H): ICD-10-CM

## 2025-01-06 DIAGNOSIS — E66.01 CLASS 2 SEVERE OBESITY DUE TO EXCESS CALORIES WITH SERIOUS COMORBIDITY AND BODY MASS INDEX (BMI) OF 36.0 TO 36.9 IN ADULT (H): ICD-10-CM

## 2025-01-06 DIAGNOSIS — E10.9 TYPE 1 DIABETES MELLITUS WITHOUT COMPLICATION (H): Primary | ICD-10-CM

## 2025-01-06 DIAGNOSIS — E10.9 TYPE 1 DIABETES, HBA1C GOAL < 7% (H): ICD-10-CM

## 2025-01-06 PROCEDURE — 99214 OFFICE O/P EST MOD 30 MIN: CPT | Performed by: INTERNAL MEDICINE

## 2025-01-06 PROCEDURE — 95251 CONT GLUC MNTR ANALYSIS I&R: CPT | Performed by: INTERNAL MEDICINE

## 2025-01-06 PROCEDURE — G2211 COMPLEX E/M VISIT ADD ON: HCPCS | Performed by: INTERNAL MEDICINE

## 2025-01-06 RX ORDER — PROCHLORPERAZINE 25 MG/1
SUPPOSITORY RECTAL
Qty: 1 EACH | Refills: 0 | Status: SHIPPED | OUTPATIENT
Start: 2025-01-06

## 2025-01-06 RX ORDER — LEVOTHYROXINE SODIUM 150 UG/1
TABLET ORAL
Qty: 90 TABLET | Refills: 1 | Status: SHIPPED | OUTPATIENT
Start: 2025-01-06

## 2025-01-06 RX ORDER — PROCHLORPERAZINE 25 MG/1
SUPPOSITORY RECTAL
Qty: 3 EACH | Refills: 2 | Status: SHIPPED | OUTPATIENT
Start: 2025-01-06

## 2025-01-06 NOTE — PROGRESS NOTES
Recent issues:  Diabetes and thyroid follow-up evaluation, last appt with me 7/22/24  Previous childbirth of daughter 3/19/24 by C/S at 37 1/2 wks gestation, birthweight 8# 10oz, saw Dr. Sierra Santoyo during Pg  Using the Tandem TSlimX2 pump with DexcomG6, hasn't been able to download new Tandem software  Feeling well overall, no new health issues but her postpartum weight plateau           2003. Diagnosis of diabetes mellitus, age 13, living in [place]  Had acute illness requiring hospitalization at [facility]  Began insulin injections, using Novolog and Lantus insulin  [place] carb counting method, gram estimates  On MDI treatment plan for approx 2 years, then changed to pump use  Previous medical evaluations with Dr. Yash Barcenas/[facility] Andreia Callahan  2005. Began use of Center pump  2012. Changed to Medtronic pump  Subsequently using Medtronic 723 Paradigm pump  2012. Tried wearing CGMS sensor, but uncomfortable     12/8/14. Initial consult with me at my former clinic ([facility])  Continued pump management  General medicine appointments with Dr. Ellen Burdick/[facility]  Previous [facility] hgbA1c trends include:   Lab Test 02/05/18 10/10/17 06/21/17 02/01/17 11/16/16  0815   A1C 7.4* 7.8* 8.1* 7.3* 8.2*      ~12/2017. Upgraded to Medtronic 670G pump  Tried Medtronic Guardian sensor, recalls frequent low glucose alarms              Wore sensor in manual mode              Didn't like it, discontinued use     ~11/2018. Wore diagnostic LibrePro CGM  Subsequently obtained LibrePersonal CGM, not wearing past year  3/2022. Changed to Tandem TSlim insulin pump  3/19/24 Childbirth of daughter by C/S at 37 1/2 wks gestation, birthweight 8# 10oz, saw Dr. Sierra Santoyo during Pg  Using Tandem TSlimX2 pump    Novolog in pump    Current Tandem pump settings:          Recent Tandem pump data:             Recent DexcomG6 data:        Previous FV hgbA1c trends include:  Lab Results   Component Value Date    A1C 7.6 (H) 01/03/2025    A1C  6.9 (H) 06/23/2024    A1C 6.2 (H) 02/22/2024    A1C 6.2 (H) 01/22/2024    A1C 5.8 (H) 12/19/2023      Recent [facility] labs include:  Lab Results   Component Value Date    A1C 7.6 (H) 01/03/2025     01/03/2025    POTASSIUM 4.6 01/03/2025    CHLORIDE 104 01/03/2025    CO2 25 01/03/2025    ANIONGAP 8 01/03/2025     (H) 01/03/2025    BUN 17.5 01/03/2025    CR 0.87 01/03/2025    GFRESTIMATED 90 01/03/2025    GFRESTBLACK >90 04/13/2021    MARCIA 9.1 01/03/2025    CHOL 206 (H) 01/03/2025    TRIG 142 01/03/2025    HDL 52 01/03/2025     (H) 01/03/2025    NHDL 154 (H) 01/03/2025    UCRR 123.4 03/14/2024    MICROL 5 06/08/2022    UMALCR 7.81 06/08/2022     Last eye exam at [facility] in [place] 10/19/23, no DR  Hyperlipidemia:  Takes simvastatin medication  DM Complications:  None known        Thyroid:  2013. 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 Belén Mustafa/[facility] for general medicine appointments  Also sees Dr. Esquivel Masters/[facility]      PMH/PSH:  Past Medical History:   Diagnosis Date    Adjustment disorder with anxious mood     Allergic rhinitis, cause unspecified     h/o AIT    Anxiety     Chest discomfort     Common migraine     No visual aura.     Gestational thrombocytopenia (H)     History of colposcopy 06/13/2024    Hyperlipidemia LDL goal < 70     Hypothyroidism     Infectious mononucleosis 01/01/1995    Insulin pump in  place     Dexcom continuous glucose monitoring    Obesity     PIH (pregnancy induced hypertension)     Tuberculosis     Type 1 diabetes, HbA1c goal < 7% (H) 2004     followed [facility] - peds endocrinology - now Dr Jimenez     Past Surgical History:   Procedure Laterality Date    APPENDECTOMY  2007    dr deshpande     SECTION N/A 3/19/2024    Procedure:  section;  Surgeon: Clarissa Mcfadden MD;  Location: [facility] L+D    DILATION AND CURETTAGE SUCTION N/A 2023    Procedure: DILATION AND CURETTAGE OF UTERUS USING SUCTION;  Surgeon: Sierra Santoyo DO;  Location: [facility] OR    PE TUBES Bilateral 1996       Family Hx:  Family History   Problem Relation Age of Onset    Prostate Cancer Father     Neurologic Disorder Maternal Grandmother         dementia    Genetic Disorder Paternal Grandfather         kidney    Family History Negative No family hx of          Social Hx:  Social History     Socioeconomic History    Marital status:      Spouse name: Not on file    Number of children: Not on file    Years of education: Not on file    Highest education level: Not on file   Occupational History    Occupation: Works in supply chain   Tobacco Use    Smoking status: Never    Smokeless tobacco: Never   Vaping Use    Vaping status: Never Used   Substance and Sexual Activity    Alcohol use: Yes     Alcohol/week: 2.0 standard drinks of alcohol     Types: 2 Standard drinks or equivalent per week     Comment: Very light drinker but will during social occasions    Drug use: No    Sexual activity: Yes     Partners: Male     Birth control/protection: Pull-out method, Condom   Other Topics Concern     Service Not Asked    Blood Transfusions Not Asked    Caffeine Concern Yes     Comment: rare    Occupational Exposure Not Asked    Hobby Hazards Not Asked    Sleep Concern No    Stress Concern No    Weight Concern Not Asked    Special Diet Not Asked    Back Care Not Asked    Exercise Yes    Bike  Helmet Not Asked    Seat Belt Yes    Self-Exams No     Comment: encouraged    Parent/sibling w/ CABG, MI or angioplasty before 65F 55M? No   Social History Narrative    -Working -       Social Drivers of Health     Financial Resource Strain: Low Risk  (8/21/2024)    Financial Resource Strain     Within the past 12 months, have you or your family members you live with been unable to get utilities (heat, electricity) when it was really needed?: No   Food Insecurity: Low Risk  (8/21/2024)    Food Insecurity     Within the past 12 months, did you worry that your food would run out before you got money to buy more?: No     Within the past 12 months, did the food you bought just not last and you didn t have money to get more?: No   Transportation Needs: Low Risk  (8/21/2024)    Transportation Needs     Within the past 12 months, has lack of transportation kept you from medical appointments, getting your medicines, non-medical meetings or appointments, work, or from getting things that you need?: No   Physical Activity: Insufficiently Active (8/21/2024)    Exercise Vital Sign     Days of Exercise per Week: 2 days     Minutes of Exercise per Session: 40 min   Stress: No Stress Concern Present (8/21/2024)    [place] [employer] - Occupational Stress Questionnaire     Feeling of Stress : Only a little   Social Connections: Moderately Isolated (8/21/2024)    Social Connection and Isolation Panel [NHANES]     Frequency of Communication with Friends and Family: Three times a week     Frequency of Social Gatherings with Friends and Family: Once a week     Attends Sabianism Services: Never     Active Member of Clubs or Organizations: No     Attends Club or Organization Meetings: Never     Marital Status:    Interpersonal Safety: Low Risk  (8/26/2024)    Interpersonal Safety     Do you feel physically and emotionally safe where you currently live?: Yes     Within the past 12 months, have you been hit,  slapped, kicked or otherwise physically hurt by someone?: No     Within the past 12 months, have you been humiliated or emotionally abused in other ways by your partner or ex-partner?: No   Housing Stability: Low Risk  (8/21/2024)    Housing Stability     Do you have housing? : Yes     Are you worried about losing your housing?: No          MEDICATIONS:  has a current medication list which includes the following prescription(s): acetaminophen, dexcom g6 sensor, dexcom g6 transmitter, flaxseed (linseed), ibuprofen, insulin aspart, levothyroxine, naratriptan, prenatal w/o a vit-fe fum-fa, sertraline, contour next test, baqsimi two pack, insulin aspart, insulin glargine, insulin pen needle, and microlet lancets.      ROS: 10 point ROS neg other than the symptoms noted above in the HPI.     GENERAL: some fatigue, wt stable; denies fevers, chills, night sweats.   HEENT: no dysphagia, odonophagia, diplopia, neck pain  THYROID:  no apparent hyper or hypothyroid symptoms  CV: no chest pain, pressure, palpitations  LUNGS: no SOB, LANG, cough, wheezing   ABDOMEN: rare nausea; no diarrhea, constipation, abdominal pain  EXTREMITIES: no rashes, ulcers, edema  NEUROLOGY: no headaches, denies changes in vision, tingling, extremitiy numbness   MSK: no muscle aches or pains, weakness  SKIN: no rashes or lesions  : regular menses  PSYCH:  stable mood, no significant anxiety or depression  ENDOCRINE: no heat or cold intolerance       Physical Exam   VS: /84   Pulse 79   Wt 102.1 kg (225 lb)   LMP 11/18/2024   BMI 36.16 kg/m    GENERAL: AXOX3, NAD, well dressed, answering questions appropriately, appears stated age.  ENT: no nose swelling or nasal discharge, mouth redness or gum changes.  EYES: eyes grossly normal to inspection, conjunctivae and sclerae normal, no exophthalmos or proptosis  THYROID:  no apparent nodules or goiter  LUNGS: no audible wheeze, cough or visible cyanosis, or increased work of breathing  ABDOMEN:  abdomen obese size, nontender to palpation  EXTREMITIES: normal appearance of feet, pulses R/L DP 4/4; no pedal edema noted  NEUROLOGY: CN grossly intact, no tremors  MSK: grossly intact  SKIN:  no apparent skin lesions, rash, or edema with visualized skin appearance  PSYCH: mentation appears normal, affect normal/bright, judgement and insight intact,   normal speech and appearance well groomed       LABS:     All pertinent notes, labs, and images personally reviewed by me.     A/P:  Encounter Diagnoses   Name Primary?    Type 1 diabetes mellitus without complication (H) Yes    Insulin pump status     Hypothyroidism due to Hashimoto's thyroiditis     Class 2 severe obesity due to excess calories with serious comorbidity and body mass index (BMI) of 36.0 to 36.9 in adult (H)        Comments:  Reviewed health history, diabetes and thyroid issues  Recent CGM glucose trends with some postprandial hyperglycemia, more frequently after lunch  Reviewed and interpreted tests that I previously ordered.   Ordered appropriate tests for the endocrinology disease management.    Management options discussed and implemented after shared medical decision making with the patient.  T1DM and hypothyroidism problems are chronic-stable    Plan:  Reviewed the overall T1DM management and insulin pump use.  Discussed optimal BG testing to assess glucose trends.  We reviewed insulin pump settings, basal rate and bolus dosing  Use of automated pump bolus dosing for meal/snack carb & correction dosing  Reviewed the recent Tandem pump report information  Reviewed recent DexcomG6 CGM glucose trends, in detail    Reviewed general issues with the hypothyroidism diagnosis   Discussed lab tests used to assess patient thyroid hormone levels  Reviewed treatment options including levothyroxine medication, ideal dosing    Recommend:  Continue the Tandem insulin pump and diabetes management plan  Pump setting changes (current Profile 1):    Bolus ICR 11a  5.7 to 5.5, 5p 4.0 to 3.8    Continue to use Sleep Mode during sleep time each evening  We have reviewed use of the Sleep Mode vs Exercise Mode pump settings:  Sleep Mode changes sensor glucose target from 112 to 120 mgdl, allows for increased basal rate delivery but no auto correction boluses.   Exercise Mode to raise sensor glucose target from 112 to 140 mg/dl, suspend target from 70 to 80 mg/dl.            Continue the mealtime boluses premeal  Continue use of the DexcomG6 CGM sensor, consider downloading new Tandem software on a family member's computer  Would be helpful to have her pump linked to [employer] website for data access  Message me when ready for G7 Rx  Increase thyroid medication as levothyroxine 0.15 mg every day  Keep focus on diet, exercise, and weight loss management    Consider follow-up dietician (DM Ed) appt also  Plan repeat fasting labs 4/2025    Testing at [facility]    Lab orders placed  Would not start statin med now, since future Pg planning  Need to reassess future fasting lipid levels  Arrange annual dilated eye exam, fasting lipid panel testing.  Contact me if questions/concerns about diabetes and thyroid management    Addressed patient's questions today.    The longitudinal plan of care for the endocrine problem(s) were addressed during this visit.  Due to added complexity of care,   we will continue to support the patient and the subsequent management of this condition with ongoing continuity of care.    There are no Patient Instructions on file for this visit.    Future labs ordered today:   Orders Placed This Encounter   Procedures    GLUCOSE MONITOR, 72 HOUR, PHYS INTERP    TSH    T4 free    Hemoglobin A1c     Radiology/Consults ordered today: None    Total time spent on day of encounter:  27 min    Follow-up:  4/2025 VVAm, 7/2025 Return    ANTHONY Jimenez MD, MS  Endocrinology  [facility]    CC:  RUBY Mustafa, Masters, A

## 2025-01-27 ENCOUNTER — OFFICE VISIT (OUTPATIENT)
Dept: INTERNAL MEDICINE | Facility: CLINIC | Age: 35
End: 2025-01-27
Payer: COMMERCIAL

## 2025-01-27 VITALS
TEMPERATURE: 97 F | WEIGHT: 224.5 LBS | HEART RATE: 82 BPM | DIASTOLIC BLOOD PRESSURE: 86 MMHG | SYSTOLIC BLOOD PRESSURE: 124 MMHG | BODY MASS INDEX: 36.08 KG/M2 | OXYGEN SATURATION: 98 %

## 2025-01-27 DIAGNOSIS — E66.812 CLASS 2 SEVERE OBESITY DUE TO EXCESS CALORIES WITH SERIOUS COMORBIDITY AND BODY MASS INDEX (BMI) OF 36.0 TO 36.9 IN ADULT (H): ICD-10-CM

## 2025-01-27 DIAGNOSIS — E66.01 CLASS 2 SEVERE OBESITY DUE TO EXCESS CALORIES WITH SERIOUS COMORBIDITY AND BODY MASS INDEX (BMI) OF 36.0 TO 36.9 IN ADULT (H): ICD-10-CM

## 2025-01-27 DIAGNOSIS — L98.9 HAND LESION: Primary | ICD-10-CM

## 2025-01-27 DIAGNOSIS — E10.3292 TYPE 1 DIABETES MELLITUS WITH MILD NONPROLIFERATIVE RETINOPATHY OF LEFT EYE WITHOUT MACULAR EDEMA (H): ICD-10-CM

## 2025-01-27 PROCEDURE — 99213 OFFICE O/P EST LOW 20 MIN: CPT | Performed by: NURSE PRACTITIONER

## 2025-01-27 NOTE — PROGRESS NOTES
Assessment & Plan     (L98.9) Hand lesion  (primary encounter diagnosis)  Comment: Cyst-like lesion just proximal and medial to right DIP joint. US to further characterize given progressive symptoms.  Discussed follow-up steps, depending on US result and symptoms, to include watchful waiting, MRI, and/or referral to hand specialist.  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 Most recent A1c 7.6% 1/3/2025, up from 6.9% 6/23/2024, which she attributes to holiday excess.    (E66.812,  E66.01,  Z68.36) Class 2 severe obesity due to excess calories with serious comorbidity and body mass index (BMI) of 36.0 to 36.9 in adult (H)  Comment: Gradual weight loss over the last 6 months.  Followed by endocrinology.     See Patient Instructions      Subjective   Linda is a 34 year old, presenting for the following health issues:  Mass    History of Present Illness       Reason for visit:  Bump on finger  Symptom onset:  More than a month  Symptoms include:  Large bump on middle finger  Symptom intensity:  Mild  Symptom progression:  Worsening  Had these symptoms before:  No  What makes it worse:  No  What makes it better:  No   She is taking medications regularly.     Gradually enlarging.  Can be painful if her hand is cold, but otherwise is asymptomatic.  Color can change to greenish-white, typically when it is cold.  No erythema, induration, or drainage.  No similar lesions anywhere else.      Review of Systems  Constitutional, HEENT, cardiovascular, pulmonary, GI, , musculoskeletal, neuro, skin, endocrine and psych systems are negative, except as otherwise noted.      Objective    /86   Pulse 82   Temp 97  F (36.1  C)   Wt 101.8 kg (224 lb 8 oz)   LMP 01/20/2025 (Exact Date)   SpO2 98%   BMI 36.08 kg/m    Body mass index is 36.08 kg/m .  Physical Exam  Constitutional:        General: She is not in acute distress.     Appearance: She is obese.   Skin:     Findings: Lesion (Approximately 1 cm, nodular, moveable, spongy lesion just proximal and medial to right 3rd DIP joint. Margins palpable. Non-tender to palpation) present.      Comments: CMS right hand & fingers is intact   Neurological:      Mental Status: She is alert.   Psychiatric:         Mood and Affect: Mood normal.         Behavior: Behavior normal.         Thought Content: Thought content normal.         Judgment: Judgment normal.              Signed Electronically by: AMA Piña CNP

## 2025-02-11 ENCOUNTER — MYC MEDICAL ADVICE (OUTPATIENT)
Dept: EDUCATION SERVICES | Facility: CLINIC | Age: 35
End: 2025-02-11
Payer: COMMERCIAL

## 2025-02-11 DIAGNOSIS — E10.9 TYPE 1 DIABETES MELLITUS WITHOUT COMPLICATION (H): Primary | ICD-10-CM

## 2025-02-11 RX ORDER — ACYCLOVIR 400 MG/1
TABLET ORAL
Qty: 9 EACH | Refills: 5 | Status: SHIPPED | OUTPATIENT
Start: 2025-02-11

## 2025-03-09 ENCOUNTER — VIRTUAL VISIT (OUTPATIENT)
Dept: URGENT CARE | Facility: CLINIC | Age: 35
End: 2025-03-09
Payer: COMMERCIAL

## 2025-03-09 DIAGNOSIS — J01.00 ACUTE NON-RECURRENT MAXILLARY SINUSITIS: Primary | ICD-10-CM

## 2025-03-09 DIAGNOSIS — R05.1 ACUTE COUGH: ICD-10-CM

## 2025-03-09 PROCEDURE — 98001 SYNCH AUDIO-VIDEO NEW LOW 30: CPT

## 2025-03-09 RX ORDER — DOXYCYCLINE 100 MG/1
100 CAPSULE ORAL 2 TIMES DAILY
Qty: 14 CAPSULE | Refills: 0 | Status: SHIPPED | OUTPATIENT
Start: 2025-03-09 | End: 2025-03-16

## 2025-03-09 RX ORDER — BENZONATATE 200 MG/1
200 CAPSULE ORAL 3 TIMES DAILY PRN
Qty: 21 CAPSULE | Refills: 0 | Status: SHIPPED | OUTPATIENT
Start: 2025-03-09 | End: 2025-03-16

## 2025-03-09 NOTE — PROGRESS NOTES
No chief complaint on file.      Assessment & Plan  Assessment  - Likely bacterial sinusitis with associated productive cough and facial pressure.    Plan  - Prescribe doxycycline for the treatment of prolonged cough and congestion.  - Prescribe Tessalon Perles for cough relief, to be taken up to three times a day as needed, especially before bedtime.  - Monitor symptoms and expect improvement within 48 hours of starting antibiotics; reassess if not feeling better by March 12, 2025.    Prescription  - Doxycycline, prescribed for cough and congestion.  - Tessalon Perles, up to three times a day as needed for cough relief, especially before bed.     ICD-10-CM    1. Acute non-recurrent maxillary sinusitis  J01.00 benzonatate (TESSALON) 200 MG capsule      2. Acute cough  R05.1 doxycycline hyclate (VIBRAMYCIN) 100 MG capsule        Patient instructions:  Take antibiotics as prescribed with food. Increase probiotics either through OTC medications or yogurts. Take antibiotics with food to decrease chance of GI upset. If no improvement or worsening symptoms please follow up with PCP or higher level of care.      Medical decision making:  Pt presents today with sinus complaints and cough x 18 days. No improvement with over the counter medications. Ddx include viral sinus sinusitis, URI, bacterial sinusitis, pneumonia AOM, tension headache among others. Based on symptoms will tx for bacterial sinusitis with doxycyline. In the mean time instructed patient to use Flonase, zyrtec, and tylenol/ibuprofen for symptoms. If no improvement in 2-3 days return or follow up with PCP. ED for worsening symptoms.       SUBJECTIVE:  History of Present Illness-  Linda Chavez, 34 years old, female    - Cough persisting for approximately 2 1/2 weeks  - Recurring congestion with facial pressure  - Cough producing yellow mucus  - No fever reported  - No shortness of breath  - No known exposure to pertussis  - Has an almost one-year-old child  attending , who frequently brings home illnesses     ROS: Pertinent ROS neg other than the symptoms noted above in the HPI.     OBJECTIVE:  LMP 01/20/2025 (Exact Date)        GENERAL: alert and no distress  EYES: Eyes grossly normal to inspection.  No discharge or erythema, or obvious scleral/conjunctival abnormalities.  RESP: No audible wheeze, or visible cyanosis.  Cough noted  SKIN: Visible skin clear. No significant rash, abnormal pigmentation or lesions.  NEURO: Cranial nerves grossly intact.  Mentation and speech appropriate for age.  PSYCH: Appropriate affect, tone, and pace of words     DIAGNOSTICS       No results found for any visits on 03/09/25.     Mia Hinton FNP    Video-Visit Details     Type of service:  Video Visit  Video Start Time: 1320  Video End Time: 1326    Originating Location: Home     Distant Location:  [facility]      Platform used for Video Visit: AMA Stephenson CNP        Consent was obtained from the patient to use an AI documentation tool in the creation of this note.  Any grammatical or spelling errors are non-intentional. Please contact the author of this note directly if you are in need of any clarification.     Current Outpatient Medications   Medication Sig Dispense Refill    benzonatate (TESSALON) 200 MG capsule Take 1 capsule (200 mg) by mouth 3 times daily as needed for cough. 21 capsule 0    doxycycline hyclate (VIBRAMYCIN) 100 MG capsule Take 1 capsule (100 mg) by mouth 2 times daily for 7 days. 14 capsule 0    acetaminophen (TYLENOL) 500 MG tablet Take 1-2 tablets (500-1,000 mg) by mouth every 6 hours as needed for mild pain 60 tablet 1    Continuous Glucose Sensor (DEXCOM G6 SENSOR) MISC Change every 10 days. 3 each 2    Continuous Glucose Sensor (DEXCOM G7 SENSOR) MISC Change every 10 days. 9 each 5    Continuous Glucose Transmitter (DEXCOM G6 TRANSMITTER) MISC CHANGE EVERY 90 DAYS 1 each 0    CONTOUR NEXT TEST  test strip Use to test blood sugar 10 times daily. 300 strip 11    Flaxseed, Linseed, (FLAX SEED OIL PO) Take 1,400 mg by mouth      Glucagon (BAQSIMI TWO PACK) 3 MG/DOSE POWD Spray 1 Device in nostril once as needed (for severe hypoglycemia) 1 each 0    ibuprofen (ADVIL/MOTRIN) 600 MG tablet Take 1 tablet (600 mg) by mouth every 6 hours as needed for moderate pain 60 tablet 1    insulin aspart (NOVOLOG PEN) 100 UNIT/ML pen Inject 3 to 10 units subcutaneous at mealtimes as directed, total daily dose approx 30 units 15 mL 1    insulin aspart (NOVOLOG VIAL) 100 UNITS/ML vial Use with insulin pump, total daily dose approx 100 units 90 mL 1    insulin glargine (LANTUS PEN) 100 UNIT/ML pen Inject 22 Units Subcutaneous At Bedtime 15 mL 1    insulin pen needle (BD CLEOPATRA U/F) 32G X 4 MM miscellaneous Use 4 pen needles daily or as directed. 100 each 1    levothyroxine (SYNTHROID/LEVOTHROID) 150 MCG tablet Take 1-tablet by mouth every day, as directed 90 tablet 1    Microlet Lancets MISC Use to test blood sugar 10 times daily. 100 each 4    naratriptan (AMERGE) 1 MG tablet as needed.      Prenatal w/o A Vit-Fe Fum-FA (PRENATA PO)       sertraline (ZOLOFT) 50 MG tablet TAKE 1 TABLET BY MOUTH EVERY DAY 90 tablet 3     No current facility-administered medications for this visit.      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)    Common migraine    Morbid obesity (H)    Supervision of high-risk pregnancy    Type 1 diabetes mellitus during pregnancy, antepartum    Insulin pump status    Hypothyroid in pregnancy, antepartum    Encounter for triage in pregnant patient    Anxiety    Low grade squamous intraepithelial lesion (LGSIL) on cervical Pap smear    Annual physical exam     Need for vaccination    Right wrist pain      Past Medical History:   Diagnosis Date    Adjustment disorder with anxious mood     Allergic rhinitis, cause unspecified     h/o AIT    Anxiety     Chest discomfort     Common migraine     No visual aura.     Gestational thrombocytopenia     History of colposcopy 2024    Hyperlipidemia LDL goal < 70     Hypothyroidism     Infectious mononucleosis 1995    Insulin pump in place     Dexcom continuous glucose monitoring    Obesity     PIH (pregnancy induced hypertension)     Tuberculosis     Type 1 diabetes, HbA1c goal < 7% (H) 2004     followed [facility] endocrinology - now Dr Jimenez     Past Surgical History:   Procedure Laterality Date    APPENDECTOMY  2007    dr deshpande     SECTION N/A 3/19/2024    Procedure:  section;  Surgeon: Clarissa Mcfadden MD;  Location: [facility] L[facility]    DILATION AND CURETTAGE SUCTION N/A 2023    Procedure: DILATION AND CURETTAGE OF UTERUS USING SUCTION;  Surgeon: Sierra Santoyo DO;  Location: [facility] OR    PE TUBES Bilateral 1996     Family History   Problem Relation Age of Onset    Prostate Cancer Father     Neurologic Disorder Maternal Grandmother         dementia    Genetic Disorder Paternal Grandfather         kidney    Family History Negative No family hx of      Social History     Tobacco Use    Smoking status: Never    Smokeless tobacco: Never   Substance Use Topics    Alcohol use: Yes     Alcohol/week: 2.0 standard drinks of alcohol     Types: 2 Standard drinks or equivalent per week     Comment: Very light drinker but will during social occasions

## 2025-03-10 ENCOUNTER — LAB (OUTPATIENT)
Dept: LAB | Facility: CLINIC | Age: 35
End: 2025-03-10
Payer: COMMERCIAL

## 2025-03-10 DIAGNOSIS — Z96.41 INSULIN PUMP STATUS: ICD-10-CM

## 2025-03-10 DIAGNOSIS — E06.3 HYPOTHYROIDISM DUE TO HASHIMOTO'S THYROIDITIS: ICD-10-CM

## 2025-03-10 DIAGNOSIS — E10.9 TYPE 1 DIABETES MELLITUS WITHOUT COMPLICATION (H): ICD-10-CM

## 2025-03-10 LAB
EST. AVERAGE GLUCOSE BLD GHB EST-MCNC: 171 MG/DL
HBA1C MFR BLD: 7.6 % (ref 0–5.6)
T4 FREE SERPL-MCNC: 1.41 NG/DL (ref 0.9–1.7)
TSH SERPL DL<=0.005 MIU/L-ACNC: 3.95 UIU/ML (ref 0.3–4.2)

## 2025-03-10 PROCEDURE — 36415 COLL VENOUS BLD VENIPUNCTURE: CPT

## 2025-03-10 PROCEDURE — 83036 HEMOGLOBIN GLYCOSYLATED A1C: CPT

## 2025-03-10 PROCEDURE — 84439 ASSAY OF FREE THYROXINE: CPT

## 2025-03-10 PROCEDURE — 84443 ASSAY THYROID STIM HORMONE: CPT

## 2025-03-13 ENCOUNTER — ANCILLARY PROCEDURE (OUTPATIENT)
Dept: ULTRASOUND IMAGING | Facility: CLINIC | Age: 35
End: 2025-03-13
Attending: NURSE PRACTITIONER
Payer: COMMERCIAL

## 2025-03-13 DIAGNOSIS — L98.9 HAND LESION: ICD-10-CM

## 2025-03-13 PROCEDURE — 76882 US LMTD JT/FCL EVL NVASC XTR: CPT | Performed by: RADIOLOGY

## 2025-03-17 ENCOUNTER — PATIENT OUTREACH (OUTPATIENT)
Dept: CARE COORDINATION | Facility: CLINIC | Age: 35
End: 2025-03-17
Payer: COMMERCIAL

## 2025-03-25 ENCOUNTER — OFFICE VISIT (OUTPATIENT)
Dept: ORTHOPEDICS | Facility: CLINIC | Age: 35
End: 2025-03-25
Attending: NURSE PRACTITIONER
Payer: COMMERCIAL

## 2025-03-25 DIAGNOSIS — M67.449 GANGLION CYST OF FINGER: ICD-10-CM

## 2025-03-25 PROCEDURE — 99204 OFFICE O/P NEW MOD 45 MIN: CPT | Performed by: STUDENT IN AN ORGANIZED HEALTH CARE EDUCATION/TRAINING PROGRAM

## 2025-03-25 NOTE — PROGRESS NOTES
[facility] H&P Note:  Date: Mar 25, 2025     Patient Name: Linda Chavez  MRN: [id number]    Consult requested by: Estee Anderson    CHIEF COMPLAINT: nodule on right middle finger, seems to be getting larger    Dominant Hand:right  Occupation: , supply chain      HPI:  Ms. Linda Chavez is a 34 year old  female right hand dominant who presents with a  mass on right middle finger, volar aspect, just proximal to the DIP joint.    Symptom Onset: 6 months ago  Trauma/Inciting Event: none  Characteristics of pain (sharp, dull, etc): Bothersome with pressure and use  Aggravating factors: typing at work, cold weather    No treatments thus far.    PMH  Diabetes: DM 1  Thyroid Problems: hypothyroidism   Smoking Y/N: N      PAST MEDICAL HISTORY:  Past Medical History:   Diagnosis Date    Adjustment disorder with anxious mood     Allergic rhinitis, cause unspecified     h/o AIT    Anxiety     Chest discomfort     Common migraine     No visual aura.     Gestational thrombocytopenia     History of colposcopy 2024    Hyperlipidemia LDL goal < 70     Hypothyroidism     Infectious mononucleosis 1995    Insulin pump in place     Dexcom continuous glucose monitoring    Obesity     PIH (pregnancy induced hypertension)     Tuberculosis     Type 1 diabetes, HbA1c goal < 7% (H) 2004     followed N - peds endocrinology - now Dr Jimenez       PAST SURGICAL HISTORY:  Past Surgical History:   Procedure Laterality Date    APPENDECTOMY  2007    dr deshpande     SECTION N/A 3/19/2024    Procedure:  section;  Surgeon: Clarissa Mcfadden MD;  Location:  L+D    DILATION AND CURETTAGE SUCTION N/A 2023    Procedure: DILATION AND CURETTAGE OF UTERUS USING SUCTION;  Surgeon: Sierra Santoyo DO;  Location: [facility] OR    PE TUBES Bilateral 1996       MEDICATIONS:  Current Outpatient Medications   Medication Sig Dispense Refill     acetaminophen (TYLENOL) 500 MG tablet Take 1-2 tablets (500-1,000 mg) by mouth every 6 hours as needed for mild pain 60 tablet 1    Continuous Glucose Sensor (DEXCOM G6 SENSOR) MISC Change every 10 days. 3 each 2    Continuous Glucose Sensor (DEXCOM G7 SENSOR) MISC Change every 10 days. 9 each 5    Continuous Glucose Transmitter (DEXCOM G6 TRANSMITTER) MISC CHANGE EVERY 90 DAYS 1 each 0    CONTOUR NEXT TEST test strip Use to test blood sugar 10 times daily. 300 strip 11    Flaxseed, Linseed, (FLAX SEED OIL PO) Take 1,400 mg by mouth      Glucagon (BAQSIMI TWO PACK) 3 MG/DOSE POWD Spray 1 Device in nostril once as needed (for severe hypoglycemia) 1 each 0    ibuprofen (ADVIL/MOTRIN) 600 MG tablet Take 1 tablet (600 mg) by mouth every 6 hours as needed for moderate pain 60 tablet 1    insulin aspart (NOVOLOG PEN) 100 UNIT/ML pen Inject 3 to 10 units subcutaneous at mealtimes as directed, total daily dose approx 30 units 15 mL 1    insulin aspart (NOVOLOG VIAL) 100 UNITS/ML vial Use with insulin pump, total daily dose approx 100 units 90 mL 1    insulin glargine (LANTUS PEN) 100 UNIT/ML pen Inject 22 Units Subcutaneous At Bedtime 15 mL 1    insulin pen needle (BD CLEOPATRA U/F) 32G X 4 MM miscellaneous Use 4 pen needles daily or as directed. 100 each 1    levothyroxine (SYNTHROID/LEVOTHROID) 150 MCG tablet Take 1-tablet by mouth every day, as directed 90 tablet 1    Microlet Lancets MISC Use to test blood sugar 10 times daily. 100 each 4    naratriptan (AMERGE) 1 MG tablet as needed.      Prenatal w/o A Vit-Fe Fum-FA (PRENATA PO)       sertraline (ZOLOFT) 50 MG tablet TAKE 1 TABLET BY MOUTH EVERY DAY 90 tablet 3     No current facility-administered medications for this visit.       ALLERGIES:     Allergies   Allergen Reactions    Sulfa Antibiotics Hives    Augmentin [Amoxicillin-Pot Clavulanate] Hives    Penicillins Hives       FAMILY HISTORY:  No pertinent family history    SOCIAL HISTORY:  Social History     Tobacco Use     Smoking status: Never    Smokeless tobacco: Never   Vaping Use    Vaping status: Never Used   Substance Use Topics    Alcohol use: Yes     Alcohol/week: 2.0 standard drinks of alcohol     Types: 2 Standard drinks or equivalent per week     Comment: Very light drinker but will during social occasions    Drug use: No       The patient's past medical, family, and social history was reviewed and confirmed.    REVIEW OF SYMPTOMS:      General: Negative   Eyes: Negative   Ear, Nose and Throat: Negative   Respiratory: Negative   Cardiovascular: Negative   Gastrointestinal: Negative   Genito-urinary: Negative   Musculoskeletal: see HPI  Neurological: Negative   Psychological: Negative  HEME: Negative   ENDO: Negative   SKIN: Negative    VITALS:  There were no vitals filed for this visit.    EXAM:  General: NAD, A&Ox3  HEENT: NC/AT  CV: RRR by peripheral pulse  Pulmonary: Non-labored breathing on RA  Soft, fleshy mass over the volar aspect of the right middle finger just proximal to the DIP joint  Palpation consistent with retinacular cyst  Intact flexion and extension at the MCP, PIP, DIP joints  Sensation intact to light touch median, radial, ulnar nerve distributions  WWP, cap refill less than 2 seconds    LABS:  Hemoglobin A1c 7.6 3/10/25    IMAGING:    Impression:      Lesion adjacent to the third middle phalanx measuring up to 10 mm most  compatible with a complex ganglion cyst, possibly communicating with  the underlying flexor tendon sheath.     I have personally reviewed the above images and labs.         IMPRESSION AND RECOMMENDATIONS:  Ms. Linda Chavez is a 34 year old female right hand dominant with right middle finger mass    Exam most consistent with retinacular cyst.  Discussed the diagnosis, prognosis, and treatment options.  Discussed that the majority resolve spontaneously within the year.  Discussed continued observation versus surgical excision.  The patient wishes to have it excised.    The  indications for surgery were discussed with the patient. The benefits, risks, and alternatives of operative management were discussed in detail with the patient. The patient understands that the risks of surgery include, but are not limited to: infection, bleeding, injury to nearby structures (such as nerves, blood vessels, and tendons), recurrence, need for additional surgery, pain, stiffness, scarring, need for rehabilitation, and anesthetic complications.  Patient expressed understanding and elected to proceed with surgery. All questions were answered to the patient's satisfaction.    Case request was placed, local only.    Johnny Chavez MD    Hand, Upper Extremity & Microvascular Surgery  Department of Orthopaedic Surgery  [facility]

## 2025-03-25 NOTE — PATIENT INSTRUCTIONS
Thank you for choosing [facility]    Dr. Chavez Locations:    [facility] [address], [address]  [address] [address]   Appointments: [phone] Appointments: [phone]   Fax: [phone] Fax: [phone]       Please call [phone] to schedule your follow up appointment.

## 2025-03-25 NOTE — LETTER
3/25/2025      Linda Chavez  [address] [facility] [address]      Dear Colleague,    Thank you for referring your patient, Linda Chavez, to the [facility] [place]. Please see a copy of my visit note below.    Orthopaedic Surgery Hand and Upper Extremity Clinic H&P Note:  Date: Mar 25, 2025     Patient Name: Linda Chavez  MRN: [id number]    Consult requested by: Estee Anderson    CHIEF COMPLAINT: nodule on right middle finger, seems to be getting larger    Dominant Hand:right  Occupation: , supply chain      HPI:  Ms. Linda Chavez is a 34 year old  female right hand dominant who presents with a  mass on right middle finger, volar aspect, just proximal to the DIP joint.    Symptom Onset: 6 months ago  Trauma/Inciting Event: none  Characteristics of pain (sharp, dull, etc): Bothersome with pressure and use  Aggravating factors: typing at work, cold weather    No treatments thus far.    PMH  Diabetes: DM 1  Thyroid Problems: hypothyroidism   Smoking Y/N: N      PAST MEDICAL HISTORY:  Past Medical History:   Diagnosis Date     Adjustment disorder with anxious mood      Allergic rhinitis, cause unspecified     h/o AIT     Anxiety      Chest discomfort      Common migraine     No visual aura.      Gestational thrombocytopenia      History of colposcopy 2024     Hyperlipidemia LDL goal < 70      Hypothyroidism      Infectious mononucleosis 1995     Insulin pump in place     Dexcom continuous glucose monitoring     Obesity      PIH (pregnancy induced hypertension)      Tuberculosis      Type 1 diabetes, HbA1c goal < 7% (H) 2004     followed [facility] - peds endocrinology - now Dr Jimenez       PAST SURGICAL HISTORY:  Past Surgical History:   Procedure Laterality Date     APPENDECTOMY  2007    dr deshpande      SECTION N/A 3/19/2024    Procedure:  section;  Surgeon: Clarissa Mcfadden MD;  Location:  [facility]     DILATION AND CURETTAGE  SUCTION N/A 5/9/2023    Procedure: DILATION AND CURETTAGE OF UTERUS USING SUCTION;  Surgeon: Sierra Santoyo DO;  Location: [facility] OR     PE TUBES Bilateral 01/01/1996       MEDICATIONS:  Current Outpatient Medications   Medication Sig Dispense Refill     acetaminophen (TYLENOL) 500 MG tablet Take 1-2 tablets (500-1,000 mg) by mouth every 6 hours as needed for mild pain 60 tablet 1     Continuous Glucose Sensor (DEXCOM G6 SENSOR) MISC Change every 10 days. 3 each 2     Continuous Glucose Sensor (DEXCOM G7 SENSOR) MISC Change every 10 days. 9 each 5     Continuous Glucose Transmitter (DEXCOM G6 TRANSMITTER) MISC CHANGE EVERY 90 DAYS 1 each 0     CONTOUR NEXT TEST test strip Use to test blood sugar 10 times daily. 300 strip 11     Flaxseed, Linseed, (FLAX SEED OIL PO) Take 1,400 mg by mouth       Glucagon (BAQSIMI TWO PACK) 3 MG/DOSE POWD Spray 1 Device in nostril once as needed (for severe hypoglycemia) 1 each 0     ibuprofen (ADVIL/MOTRIN) 600 MG tablet Take 1 tablet (600 mg) by mouth every 6 hours as needed for moderate pain 60 tablet 1     insulin aspart (NOVOLOG PEN) 100 UNIT/ML pen Inject 3 to 10 units subcutaneous at mealtimes as directed, total daily dose approx 30 units 15 mL 1     insulin aspart (NOVOLOG VIAL) 100 UNITS/ML vial Use with insulin pump, total daily dose approx 100 units 90 mL 1     insulin glargine (LANTUS PEN) 100 UNIT/ML pen Inject 22 Units Subcutaneous At Bedtime 15 mL 1     insulin pen needle (BD CLEOPATRA U/F) 32G X 4 MM miscellaneous Use 4 pen needles daily or as directed. 100 each 1     levothyroxine (SYNTHROID/LEVOTHROID) 150 MCG tablet Take 1-tablet by mouth every day, as directed 90 tablet 1     Microlet Lancets MISC Use to test blood sugar 10 times daily. 100 each 4     naratriptan (AMERGE) 1 MG tablet as needed.       Prenatal w/o A Vit-Fe Fum-FA (PRENATA PO)        sertraline (ZOLOFT) 50 MG tablet TAKE 1 TABLET BY MOUTH EVERY DAY 90 tablet 3     No current facility-administered  medications for this visit.       ALLERGIES:     Allergies   Allergen Reactions     Sulfa Antibiotics Hives     Augmentin [Amoxicillin-Pot Clavulanate] Hives     Penicillins Hives       FAMILY HISTORY:  No pertinent family history    SOCIAL HISTORY:  Social History     Tobacco Use     Smoking status: Never     Smokeless tobacco: Never   Vaping Use     Vaping status: Never Used   Substance Use Topics     Alcohol use: Yes     Alcohol/week: 2.0 standard drinks of alcohol     Types: 2 Standard drinks or equivalent per week     Comment: Very light drinker but will during social occasions     Drug use: No       The patient's past medical, family, and social history was reviewed and confirmed.    REVIEW OF SYMPTOMS:      General: Negative   Eyes: Negative   Ear, Nose and Throat: Negative   Respiratory: Negative   Cardiovascular: Negative   Gastrointestinal: Negative   Genito-urinary: Negative   Musculoskeletal: see HPI  Neurological: Negative   Psychological: Negative  HEME: Negative   ENDO: Negative   SKIN: Negative    VITALS:  There were no vitals filed for this visit.    EXAM:  General: NAD, A&Ox3  HEENT: NC/AT  CV: RRR by peripheral pulse  Pulmonary: Non-labored breathing on RA  Soft, fleshy mass over the volar aspect of the right middle finger just proximal to the DIP joint  Palpation consistent with retinacular cyst  Intact flexion and extension at the MCP, PIP, DIP joints  Sensation intact to light touch median, radial, ulnar nerve distributions  WWP, cap refill less than 2 seconds    LABS:  Hemoglobin A1c 7.6 3/10/25    IMAGING:    Impression:      Lesion adjacent to the third middle phalanx measuring up to 10 mm most  compatible with a complex ganglion cyst, possibly communicating with  the underlying flexor tendon sheath.     I have personally reviewed the above images and labs.         IMPRESSION AND RECOMMENDATIONS:  Ms. Linda Chavez is a 34 year old female right hand dominant with right middle finger  mass    Exam most consistent with retinacular cyst.  Discussed the diagnosis, prognosis, and treatment options.  Discussed that the majority resolve spontaneously within the year.  Discussed continued observation versus surgical excision.  The patient wishes to have it excised.    The indications for surgery were discussed with the patient. The benefits, risks, and alternatives of operative management were discussed in detail with the patient. The patient understands that the risks of surgery include, but are not limited to: infection, bleeding, injury to nearby structures (such as nerves, blood vessels, and tendons), recurrence, need for additional surgery, pain, stiffness, scarring, need for rehabilitation, and anesthetic complications.  Patient expressed understanding and elected to proceed with surgery. All questions were answered to the patient's satisfaction.    Case request was placed, local only.    Johnny Chavez MD    Hand, Upper Extremity & Microvascular Surgery  Department of Orthopaedic Surgery  [facility]        Again, thank you for allowing me to participate in the care of your patient.        Sincerely,        Johnny Chavez MD    Electronically signed

## 2025-04-09 DIAGNOSIS — E10.9 TYPE 1 DIABETES MELLITUS WITHOUT COMPLICATION (H): ICD-10-CM

## 2025-04-09 RX ORDER — INSULIN ASPART 100 [IU]/ML
INJECTION, SOLUTION INTRAVENOUS; SUBCUTANEOUS
Qty: 90 ML | Refills: 1 | Status: SHIPPED | OUTPATIENT
Start: 2025-04-09

## 2025-04-09 NOTE — TELEPHONE ENCOUNTER
Last Written Prescription Date:  9/19/24  Last Fill Quantity: 90,  # refills: 1   Last office visit: Visit date not found ; last virtual visit: 9/16/2024 with prescribing provider:  Dr. Jimenez   Future Office Visit:  4/14/25    Requested Prescriptions   Pending Prescriptions Disp Refills    NOVOLOG VIAL 100 UNIT/ML soln [Pharmacy Med Name: NOVOLOG 100 UNIT/ML VIAL] 90 mL 1     Sig: USE WITH INSULIN PUMP, TOTAL DAILY DOSE APPROX 100 UNITS       Insulin Protocol Failed - 4/9/2025 10:15 AM        Failed - Chart review required     Review Chart.    Do not approve if insulin is used in a pump.  Instead, direct refill request to the patient's endocrinologist.  If the patient doesn't have an endocrinologist, then send the refill to the patient's PCP for review            Passed - HgbA1C in past 3 or 6 months     If HgbA1C is 8 or greater, it needs to be on file within the past 3 months.  If less than 8, must be on file within the past 6 months.     Recent Labs   Lab Test 03/10/25  0805   A1C 7.6*             Passed - Medication is active on med list and the sig matches. RN to manually verify dose and sig if red X/fail.     If the protocol passes (green check), you do not need to verify med dose and sig.    A prescription matches if they are the same clinical intention.    For Example: once daily and every morning are the same.    The protocol can not identify upper and lower case letters as matching and will fail.     For Example: Take 1 tablet (50 mg) by mouth daily     TAKE 1 TABLET (50 MG) BY MOUTH DAILY    For all fails (red x), verify dose and sig.    If the refill does match what is on file, the RN can still proceed to approve the refill request.       If they do not match, route to the appropriate provider.             Passed - Recent (6 mo) or future (90 days) visit within the authorizing provider's specialty     The patient must have completed an in-person or virtual visit within the past 6 months or has a future  visit scheduled within the next 90 days with the authorizing provider s specialty.  Urgent care and e-visits do not quality as an office visit for this protocol.          Passed - Medication indicated for associated diagnosis     Medication is associated with one or more of the following diagnoses:   - Type 1 diabetes mellitus  - Type 2 diabetes mellitus  - Diabetic nephropathy; Prophylaxis  - Neuropathy due to diabetes mellitus; Prophylaxis  - Retinopathy due to diabetes mellitus; Prophylaxis  - Diabetes mellitus associated with cystic fibrosis  - Disorder of cardiovascular system; Prophylaxis - Type 1 diabetes mellitus   - Disorder of cardiovascular system; Prophylaxis - Type 2 diabetes mellitus            Passed - Patient is 18 years of age or older           Refills sent  Tana Olivares RN

## 2025-04-13 NOTE — PROGRESS NOTES
Virtual Visit Details    Type of service:  Video Visit     Originating Location (pt. Location): Home  Distant Location (provider location):  Off-site  Platform used for Video Visit: Edwin      Recent issues:  Diabetes and thyroid follow-up evaluation  Previous childbirth of daughter 3/19/24 by C/S at 37 1/2 wks gestation, birthweight 8# 10oz, saw Dr. Sierra Santoyo during Pg  Considering Pg planning soon  Using the Tandem TSlimX2 pump, now using the DexcomG7 CGM  Feeling well overall, but small right middle finger 2nd phalynx cyst area, considering surgery excision           2003. Diagnosis of diabetes mellitus, age 13, living in [place]  Had acute illness requiring hospitalization at [facility]  Began insulin injections, using Novolog and Lantus insulin  [place] carb counting method, gram estimates  On MDI treatment plan for approx 2 years, then changed to pump use  Previous medical evaluations with Dr. Yash Barcenas/[facility] Andreia Callahan  2005. Began use of [place] pump  2012. Changed to Medtronic pump  Subsequently using Medtronic 723 Paradigm pump  2012. Tried wearing CGMS sensor, but uncomfortable     12/8/14. Initial consult with me at my former clinic ([facility])  Continued pump management  General medicine appointments with Dr. Ellen Burdick/[facility] [place]  Previous [facility] hgbA1c trends include:   Lab Test 02/05/18 10/10/17 06/21/17 02/01/17 11/16/16  0815   A1C 7.4* 7.8* 8.1* 7.3* 8.2*      ~12/2017. Upgraded to Medtronic 670G pump  Tried Medtronic Guardian sensor, recalls frequent low glucose alarms              Wore sensor in manual mode              Didn't like it, discontinued use     ~11/2018. Wore diagnostic LibrePro CGM  Subsequently obtained LibrePersonal CGM, not wearing past year  3/2022. Changed to Tandem TSlim insulin pump  3/19/24 Childbirth of daughter by C/S at 37 1/2 wks gestation, birthweight 8# 10oz, saw Dr. Sierra Santoyo during Pg  Using Tandem TSlimX2 pump    Novolog in pump    Current Tandem pump  settings:          Recent Tandem pump data:            Recent DexcomG7 data:        Previous FV hgbA1c trends include:  Lab Results   Component Value Date    A1C 7.6 (H) 03/10/2025    A1C 7.6 (H) 01/03/2025    A1C 6.9 (H) 06/23/2024    A1C 6.2 (H) 02/22/2024    A1C 6.2 (H) 01/22/2024      Recent [facility] labs include:  Lab Results   Component Value Date    A1C 7.6 (H) 03/10/2025     01/03/2025    POTASSIUM 4.6 01/03/2025    CHLORIDE 104 01/03/2025    CO2 25 01/03/2025    ANIONGAP 8 01/03/2025     (H) 01/03/2025    BUN 17.5 01/03/2025    CR 0.87 01/03/2025    GFRESTIMATED 90 01/03/2025    GFRESTBLACK >90 04/13/2021    MARCIA 9.1 01/03/2025    CHOL 206 (H) 01/03/2025    TRIG 142 01/03/2025    HDL 52 01/03/2025     (H) 01/03/2025    NHDL 154 (H) 01/03/2025    UCRR 123.4 03/14/2024    MICROL 5 06/08/2022    UMALCR 7.81 06/08/2022     Last eye exam at [facility] in [place] 10/19/23, no DR  Hyperlipidemia:  Takes simvastatin medication  DM Complications:  None known        Thyroid:  2013. 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 Belén Mustafa/[facility] for general medicine appointments  Also sees Dr. Sierra Higginss/[facility]      PMH/PSH:  Past Medical History:   Diagnosis Date    Adjustment disorder with anxious mood     Allergic rhinitis, cause unspecified     h/o AIT    Anxiety     Chest discomfort     Common migraine      No visual aura.     Gestational thrombocytopenia     History of colposcopy 2024    Hyperlipidemia LDL goal < 70     Hypothyroidism     Infectious mononucleosis 1995    Insulin pump in place     Dexcom continuous glucose monitoring    Obesity     PIH (pregnancy induced hypertension)     Tuberculosis     Type 1 diabetes, HbA1c goal < 7% (H) 2004     followed [facility]ology - now Dr Jimenez     Past Surgical History:   Procedure Laterality Date    APPENDECTOMY  2007    dr deshpande     SECTION N/A 3/19/2024    Procedure:  section;  Surgeon: Clarissa Mcfadden MD;  Location: [facility] L+D    DILATION AND CURETTAGE SUCTION N/A 2023    Procedure: DILATION AND CURETTAGE OF UTERUS USING SUCTION;  Surgeon: Sierra Santoyo DO;  Location: [facility] OR    PE TUBES Bilateral 1996       Family Hx:  Family History   Problem Relation Age of Onset    Prostate Cancer Father     Neurologic Disorder Maternal Grandmother         dementia    Genetic Disorder Paternal Grandfather         kidney    Family History Negative No family hx of          Social Hx:  Social History     Socioeconomic History    Marital status:      Spouse name: Not on file    Number of children: Not on file    Years of education: Not on file    Highest education level: Not on file   Occupational History    Occupation: Works in supply chain   Tobacco Use    Smoking status: Never    Smokeless tobacco: Never   Vaping Use    Vaping status: Never Used   Substance and Sexual Activity    Alcohol use: Yes     Alcohol/week: 2.0 standard drinks of alcohol     Types: 2 Standard drinks or equivalent per week     Comment: Very light drinker but will during social occasions    Drug use: No    Sexual activity: Yes     Partners: Male     Birth control/protection: Pull-out method, Condom   Other Topics Concern     Service Not Asked    Blood Transfusions Not Asked    Caffeine Concern Yes     Comment: rare     Occupational Exposure Not Asked    Hobby Hazards Not Asked    Sleep Concern No    Stress Concern No    Weight Concern Not Asked    Special Diet Not Asked    Back Care Not Asked    Exercise Yes    Bike Helmet Not Asked    Seat Belt Yes    Self-Exams No     Comment: encouraged    Parent/sibling w/ CABG, MI or angioplasty before 65F 55M? No   Social History Narrative    -Working -       Social Drivers of Health     Financial Resource Strain: Low Risk  (8/21/2024)    Financial Resource Strain     Within the past 12 months, have you or your family members you live with been unable to get utilities (heat, electricity) when it was really needed?: No   Food Insecurity: Low Risk  (8/21/2024)    Food Insecurity     Within the past 12 months, did you worry that your food would run out before you got money to buy more?: No     Within the past 12 months, did the food you bought just not last and you didn t have money to get more?: No   Transportation Needs: Low Risk  (8/21/2024)    Transportation Needs     Within the past 12 months, has lack of transportation kept you from medical appointments, getting your medicines, non-medical meetings or appointments, work, or from getting things that you need?: No   Physical Activity: Insufficiently Active (8/21/2024)    Exercise Vital Sign     Days of Exercise per Week: 2 days     Minutes of Exercise per Session: 40 min   Stress: No Stress Concern Present (8/21/2024)    [place] [employer] - Occupational Stress Questionnaire     Feeling of Stress : Only a little   Social Connections: Unknown (8/21/2024)    Social Connection and Isolation Panel [NHANES]     Frequency of Communication with Friends and Family: Not on file     Frequency of Social Gatherings with Friends and Family: Once a week     Attends Sabianism Services: Not on file     Active Member of Clubs or Organizations: Not on file     Attends Club or Organization Meetings: Not on file     Marital Status: Not on  file   Recent Concern: Social Connections - Moderately Isolated (8/21/2024)    Social Connection and Isolation Panel [NHANES]     Frequency of Communication with Friends and Family: Three times a week     Frequency of Social Gatherings with Friends and Family: Once a week     Attends Christian Services: Never     Active Member of Clubs or Organizations: No     Attends Club or Organization Meetings: Never     Marital Status:    Interpersonal Safety: Low Risk  (8/26/2024)    Interpersonal Safety     Do you feel physically and emotionally safe where you currently live?: Yes     Within the past 12 months, have you been hit, slapped, kicked or otherwise physically hurt by someone?: No     Within the past 12 months, have you been humiliated or emotionally abused in other ways by your partner or ex-partner?: No   Housing Stability: Low Risk  (8/21/2024)    Housing Stability     Do you have housing? : Yes     Are you worried about losing your housing?: No          MEDICATIONS:  has a current medication list which includes the following prescription(s): acetaminophen, dexcom g7 sensor, flaxseed (linseed), levothyroxine, levothyroxine, naratriptan, novolog vial, prenatal w/o a vit-fe fum-fa, sertraline, contour next test, baqsimi two pack, ibuprofen, insulin aspart, insulin glargine, insulin pen needle, and microlet lancets.      ROS: 10 point ROS neg other than the symptoms noted above in the HPI.     GENERAL: energy good, wt stable; denies fevers, chills, night sweats.   HEENT: no dysphagia, odonophagia, diplopia, neck pain  THYROID:  no apparent hyper or hypothyroid symptoms  CV: no chest pain, pressure, palpitations  LUNGS: no SOB, LANG, cough, wheezing   ABDOMEN: rare nausea; no diarrhea, constipation, abdominal pain  EXTREMITIES: right middle finger 2nd pharynx cyst; no rashes, ulcers, edema  NEUROLOGY: no headaches, denies changes in vision, tingling, extremitiy numbness   MSK: no muscle aches or pains,  weakness  SKIN: no rashes or lesions  : regular menses every 30-32 days  PSYCH:  stable mood, no significant anxiety or depression  ENDOCRINE: no heat or cold intolerance       Physical Exam (visual exam)  VS:  no vital signs taken for video visit  CONSTITUTIONAL: healthy, alert and NAD, well dressed, answering questions appropriately  ENT: no nose swelling or nasal discharge, mouth redness or gum changes.  EYES: eyes grossly normal to inspection, conjunctivae and sclerae normal, no exophthalmos or proptosis  THYROID:  no apparent nodules or goiter  LUNGS: no audible wheeze, cough or visible cyanosis, no visible retractions or increased work of breathing  ABDOMEN: abdomen not evaluated  EXTREMITIES: no hand tremors, limited exam  NEUROLOGY: CN grossly intact, mentation intact and speech normal   SKIN:  no apparent skin lesions, rash, or edema with visualized skin appearance  PSYCH: mentation appears normal, affect normal/bright, judgement and insight intact,   normal speech and appearance well groomed         LABS:     All pertinent notes, labs, and images personally reviewed by me.     A/P:  Encounter Diagnoses   Name Primary?    Type 1 diabetes mellitus without complication (H) Yes    Insulin pump status     Hypothyroidism due to Hashimoto's thyroiditis        Comments:  Reviewed health history, diabetes and thyroid issues  Recent CGM glucose trends with some postprandial hyperglycemia  Thyroid tests improved but recent TSH high-normal range  Reviewed and interpreted tests that I previously ordered.   Ordered appropriate tests for the endocrinology disease management.    Management options discussed and implemented after shared medical decision making with the patient.  T1DM and hypothyroidism problems are chronic-stable    Plan:  Reviewed the overall T1DM management and insulin pump use.  Discussed optimal BG testing to assess glucose trends.  We reviewed insulin pump settings, basal rate and bolus dosing  Use  of automated pump bolus dosing for meal/snack carb & correction dosing  Reviewed the recent Tandem pump report information  Reviewed recent DexcomG6 CGM glucose trends, in detail    Reviewed general issues with the hypothyroidism diagnosis   Discussed lab tests used to assess patient thyroid hormone levels  Reviewed treatment options including levothyroxine medication, ideal dosing    Recommend:  Continue the Tandem insulin pump and diabetes management plan  Pump setting changes (current Profile 1):    Bolus ICR 11a 5.7 to 5.4, 5p 4.0 to 3.8    Continue to use Sleep Mode during sleep time each evening  We have reviewed use of the Sleep Mode vs Exercise Mode pump settings:  Sleep Mode changes sensor glucose target from 112 to 120 mgdl, allows for increased basal rate delivery but no auto correction boluses.   Exercise Mode to raise sensor glucose target from 112 to 140 mg/dl, suspend target from 70 to 80 mg/dl.            Continue the mealtime boluses premeal  Continue use of the DexcomG7 CGM sensor  Increase thyroid medication as alternating levothyroxine 0.15 mg (even days) and 0.175 mg (odd days)  Keep focus on diet, exercise, and weight loss management  Would keep the scheduled [facility] Diab Ed (EG) appt 5/14/25  Plan repeat non-fasting labs 7/2025    Testing at [facility] [place] clinic    Lab orders placed  Would not start statin med now, since future Pg planning  Need to reassess future fasting lipid levels  Arrange annual dilated eye exam, fasting lipid panel testing.  Contact me if questions/concerns about diabetes and thyroid management    Addressed patient's questions today.    The longitudinal plan of care for the endocrine problem(s) were addressed during this visit.  Due to added complexity of care,   we will continue to support the patient and the subsequent management of this condition with ongoing continuity of care.    There are no Patient Instructions on file for this visit.    Future labs ordered today:    Orders Placed This Encounter   Procedures    GLUCOSE MONITOR, 72 HOUR, PHYS INTERP    Hemoglobin A1c    Basic metabolic panel    TSH    T4 free     Radiology/Consults ordered today: None    Total time spent on day of encounter:  35 min    Follow-up:  7/31/25 Return    ANTHONY Jimenez MD, MS  Endocrinology  [facility]    CC:  RUBY Mustafa, Masters, A, JYOTI Lima

## 2025-04-14 ENCOUNTER — VIRTUAL VISIT (OUTPATIENT)
Dept: ENDOCRINOLOGY | Facility: CLINIC | Age: 35
End: 2025-04-14
Payer: COMMERCIAL

## 2025-04-14 DIAGNOSIS — E10.9 TYPE 1 DIABETES MELLITUS WITHOUT COMPLICATION (H): Primary | ICD-10-CM

## 2025-04-14 DIAGNOSIS — Z96.41 INSULIN PUMP STATUS: ICD-10-CM

## 2025-04-14 DIAGNOSIS — E06.3 HYPOTHYROIDISM DUE TO HASHIMOTO'S THYROIDITIS: ICD-10-CM

## 2025-04-14 PROCEDURE — G2211 COMPLEX E/M VISIT ADD ON: HCPCS | Performed by: INTERNAL MEDICINE

## 2025-04-14 PROCEDURE — 95251 CONT GLUC MNTR ANALYSIS I&R: CPT | Performed by: INTERNAL MEDICINE

## 2025-04-14 PROCEDURE — 98006 SYNCH AUDIO-VIDEO EST MOD 30: CPT | Performed by: INTERNAL MEDICINE

## 2025-04-14 RX ORDER — LEVOTHYROXINE SODIUM 175 UG/1
TABLET ORAL
Qty: 45 TABLET | Refills: 1 | Status: SHIPPED | OUTPATIENT
Start: 2025-04-14

## 2025-04-14 RX ORDER — LEVOTHYROXINE SODIUM 150 UG/1
TABLET ORAL
Qty: 45 TABLET | Refills: 1 | Status: SHIPPED | OUTPATIENT
Start: 2025-04-14

## 2025-04-29 ENCOUNTER — MYC MEDICAL ADVICE (OUTPATIENT)
Dept: OBGYN | Facility: CLINIC | Age: 35
End: 2025-04-29
Payer: COMMERCIAL

## 2025-04-29 ENCOUNTER — MYC MEDICAL ADVICE (OUTPATIENT)
Dept: ENDOCRINOLOGY | Facility: CLINIC | Age: 35
End: 2025-04-29
Payer: COMMERCIAL

## 2025-04-29 DIAGNOSIS — E10.9 TYPE 1 DIABETES MELLITUS WITHOUT COMPLICATION (H): Primary | ICD-10-CM

## 2025-04-29 DIAGNOSIS — Z96.41 INSULIN PUMP STATUS: ICD-10-CM

## 2025-04-29 DIAGNOSIS — Z33.1 PREGNANCY, INCIDENTAL: ICD-10-CM

## 2025-04-30 ENCOUNTER — MYC MEDICAL ADVICE (OUTPATIENT)
Dept: EDUCATION SERVICES | Facility: CLINIC | Age: 35
End: 2025-04-30
Payer: COMMERCIAL

## 2025-04-30 NOTE — TELEPHONE ENCOUNTER
Phone visit scheduled for quick check in in 1 week. Visit was already scheduled for 5/14 for preconception counseling.     Kat Lima RD, LD, [facility]

## 2025-05-07 ENCOUNTER — VIRTUAL VISIT (OUTPATIENT)
Dept: EDUCATION SERVICES | Facility: CLINIC | Age: 35
End: 2025-05-07
Payer: COMMERCIAL

## 2025-05-07 DIAGNOSIS — E10.9 TYPE 1 DIABETES MELLITUS WITHOUT COMPLICATION (H): Primary | ICD-10-CM

## 2025-05-07 PROCEDURE — 98967 PH1 ASSMT&MGMT NQHP 11-20: CPT | Mod: 93 | Performed by: DIETITIAN, REGISTERED

## 2025-05-07 NOTE — PROGRESS NOTES
Diabetes and Pregnancy Follow-up  Type of Service: Telephone Visit    How would patient like to obtain AVS? Not needed    Subjective/Objective:    Linda Chavez was called for a scheduled BG review. Last date of communication was: 4/30/25.    Pregestational diabetes is being managed with diet, activity, and medications    Taking diabetes medications: yes:     Diabetes Medication(s)       Diabetic Other       Glucagon (BAQSIMI TWO PACK) 3 MG/DOSE POWD Spray 1 Device in nostril once as needed (for severe hypoglycemia)       Insulin       insulin aspart (NOVOLOG PEN) 100 UNIT/ML pen Inject 3 to 10 units subcutaneous at mealtimes as directed, total daily dose approx 30 units     insulin glargine (LANTUS PEN) 100 UNIT/ML pen Inject 22 Units Subcutaneous At Bedtime     NOVOLOG VIAL 100 UNIT/ML soln USE WITH INSULIN PUMP, TOTAL DAILY DOSE APPROX 100 UNITS            Estimated Date of Delivery: Data Unavailable    Blood Glucose/Ketone Log:                  Assessment:    Patient is currently very early in pregnancy, not even 5 weeks yet, she thought.     NOT meeting ADA TIR targets >70%     Patient reports feeling more insulin resistance, blood sugars running baseline higher than before. She wonders if this is hormone related. She is putting in override boluses semi-regularly to get blood sugars down. We adjusted pump settings x 24 hrs to help overall blood sugars and work to get TIR  mg/dL >70% for pregnancy.     Plan/Response:  Recommend increase to insulin -   Time Basal Rate (unit(s)/hr) Carb Ratio (g/unit(s)) Correction Factor (unit(s)/mg/dL) Target BG   12AM 1.2 --> 1.5 7.7 25 110   6AM 1.1 --> 1.3 5.5 --> 5 25 110   11AM 1.1 --> 1.3 5.4 --> 5 30 110   5PM 1.2 --> 1.5 3.8 30 110   9PM 1.2 --> 1.5 5.0 40 --> 36 110     Follow-up in 1 week.  Needs more frequent Endocrinology follow up, will discuss with Dr. Barbara Lima, RD, LD, [facility]   Time Spent: 15 minutes    Any diabetes medication dose changes were  made via the CDE Protocol and Collaborative Practice Agreement with the patient's endocrinology provider. A copy of this encounter was shared with the provider.

## 2025-05-07 NOTE — LETTER
5/7/2025         RE: Linda Chavez  [address]  [place] [place] [address]        Dear Colleague,    Thank you for referring your patient, Linda Chavez, to the [facility] [place]. Please see a copy of my visit note below.    Diabetes and Pregnancy Follow-up  Type of Service: Telephone Visit    How would patient like to obtain AVS? Not needed    Subjective/Objective:    Linda Chavez was called for a scheduled BG review. Last date of communication was: 4/30/25.    Pregestational diabetes is being managed with diet, activity, and medications    Taking diabetes medications: yes:     Diabetes Medication(s)       Diabetic Other       Glucagon (BAQSIMI TWO PACK) 3 MG/DOSE POWD Spray 1 Device in nostril once as needed (for severe hypoglycemia)       Insulin       insulin aspart (NOVOLOG PEN) 100 UNIT/ML pen Inject 3 to 10 units subcutaneous at mealtimes as directed, total daily dose approx 30 units     insulin glargine (LANTUS PEN) 100 UNIT/ML pen Inject 22 Units Subcutaneous At Bedtime     NOVOLOG VIAL 100 UNIT/ML soln USE WITH INSULIN PUMP, TOTAL DAILY DOSE APPROX 100 UNITS            Estimated Date of Delivery: Data Unavailable    Blood Glucose/Ketone Log:                  Assessment:    NOT meeting ADA TIR targets >70%     Patient reports feeling more insulin resistance, blood sugars running baseline higher than before. She wonders if this is hormone related. She is putting in override boluses semi-regularly to get blood sugars down. We adjusted pump settings x 24 hrs to help overall blood sugars and work to get TIR  mg/dL >70% for pregnancy.     Plan/Response:  Recommend increase to insulin -   Time Basal Rate (unit(s)/hr) Carb Ratio (g/unit(s)) Correction Factor (unit(s)/mg/dL) Target BG   12AM 1.2 --> 1.5 7.7 25 110   6AM 1.1 --> 1.3 5.5 --> 5 25 110   11AM 1.1 --> 1.3 5.4 --> 5 30 110   5PM 1.2 --> 1.5 3.8 30 110   9PM 1.2 --> 1.5 5.0 40 --> 36 110     Follow-up in 1 week.  Needs more  frequent Endocrinology follow up, will discuss with Dr. Barbara Lima, RD, LD, [facility]   Time Spent: 15 minutes    Any diabetes medication dose changes were made via the CDE Protocol and Collaborative Practice Agreement with the patient's endocrinology provider. A copy of this encounter was shared with the provider.

## 2025-05-08 ENCOUNTER — NURSE TRIAGE (OUTPATIENT)
Dept: NURSING | Facility: CLINIC | Age: 35
End: 2025-05-08
Payer: COMMERCIAL

## 2025-05-08 ENCOUNTER — MYC MEDICAL ADVICE (OUTPATIENT)
Dept: ENDOCRINOLOGY | Facility: CLINIC | Age: 35
End: 2025-05-08
Payer: COMMERCIAL

## 2025-05-08 NOTE — TELEPHONE ENCOUNTER
Pt called again as she is worried about her high levels of insulin she has been using. States her BG is 191 now, but she still gets up to 260s after several straight boluses of insulin.   Denied any blurred vision, headache, vomitting, diarrhea, shortness of breath. States she is approximately 5 weeks pregnant. No diet changes.     Advised Dr. Melendrez responded via text that he can look and respond in about 20 minutes.   Silvia Monreal RN on 5/8/2025 at 4:53 PM

## 2025-05-08 NOTE — TELEPHONE ENCOUNTER
Nurse Triage SBAR    Is this a 2nd Level Triage? YES, LICENSED PRACTITIONER REVIEW IS REQUIRED    Situation: Patient calling for advise managing hyperglycemia during pregnancy.    Background: Linda is calling to discuss managing her blood sugar levels. She is a type 1 diabetic with insulin pump who is in her first trimester of pregnancy.  She increased her basal insulin levels yesterday by 20% at the direction of Kat Lima and her glucose levels have not lowered significantly.     Assessment: Linda reports a current blood glucose of 256 per her continuous monitor. She changed the needle this morning to assure that her numbers were correct. Linda denies feeling lightheaded, though is very concerned about protecting her pregnancy and how elevated glucose levels may affect this.     Protocol Recommended Disposition:   Discuss With PCP And Callback By Nurse Within 1 Hour    Recommendation: Linda needs to speak with a member of the endo team to discuss management of diabetes in pregnancy.     Routed to provider          Reason for Disposition   Symptoms of high blood sugar (e.g., abnormally thirsty, frequent urination, weight loss) and not able to test blood glucose, AND pregnant    Additional Information   Negative: Unconscious or difficult to awaken   Negative: Acting confused (e.g., disoriented, slurred speech)   Negative: Very weak (can't stand)   Negative: Sounds like a life-threatening emergency to the triager   Negative: Vomiting and signs of dehydration (e.g., very dry mouth, lightheaded, dark urine)   Negative: Blood glucose > 240 mg/dL (13.3 mmol/L) and rapid breathing   Negative: Blood glucose > 500 mg/dL (27.8 mmol/L)   Negative: Blood glucose > 240 mg/dL (13.3 mmol/L) AND urine ketones moderate-large (or more than 1+)   Negative: Blood glucose > 240 mg/dL (13.3 mmol/L) and blood ketones > 1.4 mmol/L   Negative: Blood glucose > 240 mg/dL (13.3 mmol/L) AND vomiting AND unable to check for ketones (in  "blood or urine)   Negative: Vomiting lasting > 4 hours   Negative: Patient sounds very sick or weak to the triager   Negative: Caller has URGENT medication or insulin pump question and triager unable to answer question   Negative: Fever > 100.4 F (38.0 C)   Negative: Urine ketones moderate - large (or blood ketones > 1.4 mmol/L)   Negative: Blood glucose > 300 mg/dL (16.7 mmol/L) AND two or more times in a row   Negative: Blood glucose > 400 mg/dL (22.2 mmol/L)    Answer Assessment - Initial Assessment Questions  1. BLOOD GLUCOSE: \"What is your blood glucose level?\"       256  2. ONSET: \"When did you check the blood glucose?\"      Now, 1520  3. USUAL RANGE: \"What is your glucose level usually?\" (e.g., usual fasting morning value, usual evening value)        4. KETONES: \"Do you check for ketones (urine or blood test strips)?\" If Yes, ask: \"What does the test show now?\"       N/A  5. TYPE 1 or 2:  \"Do you know what type of diabetes you have?\"  (e.g., Type 1, Type 2, Gestational; doesn't know)       1  6. INSULIN: \"Do you take insulin?\" \"What type of insulin(s) do you use? What is the mode of delivery? (syringe, pen; injection or pump)?\"       Yes, pump  7. DIABETES PILLS: \"Do you take any pills for your diabetes?\" If Yes, ask: \"Have you missed taking any pills recently?\"      N/A  8. OTHER SYMPTOMS: \"Do you have any symptoms?\" (e.g., fever, frequent urination, difficulty breathing, dizziness, weakness, vomiting)      Patient is early in pregnancy  9. PREGNANCY: \"Is there any chance you are pregnant?\" \"When was your last menstrual period?\"      Patient is early in pregnancy    Protocols used: Diabetes - High Blood Sugar-A-OH    "

## 2025-05-14 ENCOUNTER — VIRTUAL VISIT (OUTPATIENT)
Dept: EDUCATION SERVICES | Facility: CLINIC | Age: 35
End: 2025-05-14
Payer: COMMERCIAL

## 2025-05-14 DIAGNOSIS — E10.9 TYPE 1 DIABETES MELLITUS WITHOUT COMPLICATION (H): Primary | ICD-10-CM

## 2025-05-14 PROCEDURE — G0108 DIAB MANAGE TRN  PER INDIV: HCPCS | Mod: 95 | Performed by: DIETITIAN, REGISTERED

## 2025-05-14 RX ORDER — GLUCAGON 3 MG/1
POWDER NASAL
Qty: 1 EACH | Refills: 3 | Status: SHIPPED | OUTPATIENT
Start: 2025-05-14

## 2025-05-14 NOTE — LETTER
5/14/2025         RE: iLnda Chavez  [address]  [facility] [address]        Dear Colleague,    Thank you for referring your patient, Linda Chavez, to the [facility]. Please see a copy of my visit note below.    Diabetes Self-Management Education & Support    Presents for: Pre-Existing Diabetes and Pregnancy    Type of service:  Video Visit    If the video visit is dropped, the video visit invitation should be resent by: Text to cell phone: [phone]    Originating Location (pt. Location): Home  Distant Location (provider location): [facility]  Mode of Communication:  Video Conference via [employer]    Video Start Time: 8:03a  Video End Time (time video stopped): 8:36a    How would patient like to obtain AVS? MyChart      Reports                  Insulin Pump Information  Education specific to insulin pump provided today on:   Pump Hardware & Software:   -- Automated insulin delivery functions/features  Bolusing:  -- Importance of accurate carbohydrate counting  -- How to use bolus calculator  -- Importance of bolusing before meals  -- Benefits of post-meal glucose checks  Activity & Lifestyle:  -- Sleep/overnight glucose targets  -- Pregnancy       Assessment  Linda is between 5 & 6 weeks pregnant now, she thinks. She has seen higher blood sugars this past week, despite adjustment in ICRs & ISFs 5 days ago. We reviewed strategies to help bring blood sugars down and also adjusted pump settings. I requested she duplicate her pump profile so we can use one as reference after pregnancy. We also reviewed carb contents of foods, healthy balanced eating for pregnancy, increased activity as able. Encouraged 10-20 minute pre-meal bolusing, even now. We did discuss 24 hour sleep mode in Tandem pump as possibility in coming weeks but will hold off for now. Did discuss with Endocrinology who is agreeable. We did briefly discuss hypoglycemia treatment and  DKA prevention. She does not have non- glucagon or ketone test strips at home.     Glucose Patterns & Trends:  Time in range of  mg/dL, 42% of the time. Patient not meeting ADA Time in Range Targets. (For pregnancy)      Changes made to pump settings:  Time Basal Rate (unit(s)/hr) Carb Ratio (g/unit(s)) Correction Factor (unit(s)/mg/dL) Target BG   12AM 1.5 --> 1.8 7.7 25 110   6AM 1.3 --> 1.6 5 --> 4.5 25 --> 21 110   11AM 1.3 --> 1.6 4.8 --> 4.5 25 --> 21 110   5PM 1.5 --> 1.8 3.6 25 --> 21 110   9PM 1.5 --> 1.8 5.0 30 --> 25 110        Changes made to sensor settings:   No changes to sensor settings recommended at this time.    Opportunities for ongoing education and support in diabetes-self management were discussed.    PLAN:  Meal Plan Adjustments:  -- Take mealtime insulin 15 minutes before starting a meal  Exercise / activity plan: activity as able  Medication Adjustments:  -- Recommend increase to insulin - per pump settings above.   -- Will order Baqsimi nasal glucagon and ketone test strips for home use, as needed   Reducing Risks & Complications:  -- Hypoglycemia treatment reviewed for low blood glucose <65 mg/dL and importance of checking blood glucose when feeling symptoms and carrying source of rapid-acting glucose  -- Recommend referral to Endocrinology.  Follow Up:  -- Follow-up on Monday.   -- Frequency of diabetes education follow up recommended: weekly via [employer], will assess for virtual visit timing after follow ups with Endocrinology planned      Topics to cover at upcoming visits: Healthy Coping    See Care Plan for co-developed, patient-stated behavior change goals.    Education Materials Provided:  No new materials provided today    Subjective/Objective  Presents for: Insulin Pump and CGM Review  Accompanied by: Self  Diabetes education in the past 24mo: (Patient-Rptd) Yes  Focus of Visit: CGM, Insulin Pump  Type of Pump visit: Pump Review  Type of CGM visit: Personal CGM  Follow-up  Diabetes type: (Patient-Rptd) Type 1  Disease course: (Patient-Rptd) Getting harder to manage  How confident are you filling out medical forms by yourself:: (Patient-Rptd) Extremely  Diabetes management related comments/concerns: Numbers are running higher over the past week  Transportation concerns: No  Difficulty affording diabetes medication?: No  Difficulty affording diabetes testing supplies?: No  Other concerns: None  Cultural Influences/Ethnic Background:  Not  or     Diabetes Symptoms & Complications  Diabetes Related Symptoms: (Patient-Rptd) None  Weight trend: (Patient-Rptd) Other (see Comments)  Please elaborate:: (Patient-Rptd) Will be changing due to pregnancy  Symptom course: (Patient-Rptd) Stable  Disease course: (Patient-Rptd) Getting harder to manage  Complications assessed today?: No  Patient Problem List and Family Medical History reviewed for relevant medical history, current medical status, and diabetes risk factors.    Vitals:  There were no vitals taken for this visit.    Pre pregnancy weight: not assessed    Weight gain not assessed.    Estimated Date of Delivery: Data Unavailable    Labs:  Lab Results   Component Value Date    A1C 6.6 05/09/2025    A1C 8.3 04/07/2021    HEMOGLOBINA1 8.3 09/29/2011     Lab Results   Component Value Date     05/09/2025    GLC 79 03/19/2024     10/06/2022     04/13/2021     Lab Results   Component Value Date     01/03/2025     04/07/2021     HDL Cholesterol   Date Value Ref Range Status   04/07/2021 59 >49 mg/dL Final     Direct Measure HDL   Date Value Ref Range Status   01/03/2025 52 >=50 mg/dL Final     GFR Estimate   Date Value Ref Range Status   05/09/2025 >90 >60 mL/min/1.73m2 Final     Comment:     eGFR calculated using 2021 CKD-EPI equation.   04/13/2021 >90 >60 mL/min/[1.73_m2] Final     Comment:     Non  GFR Calc  Starting 12/18/2018, serum creatinine based estimated GFR  (eGFR) will be   calculated using the Chronic Kidney Disease Epidemiology Collaboration   (CKD-EPI) equation.       GFR Estimate If Black   Date Value Ref Range Status   04/13/2021 >90 >60 mL/min/[1.73_m2] Final     Comment:      GFR Calc  Starting 12/18/2018, serum creatinine based estimated GFR (eGFR) will be   calculated using the Chronic Kidney Disease Epidemiology Collaboration   (CKD-EPI) equation.       Lab Results   Component Value Date    CR 0.69 05/09/2025    CR 0.60 04/13/2021     Lab Results   Component Value Date    MICROL 5 06/08/2022    UMALCR 7.81 06/08/2022    UCRR 123.4 03/14/2024       Last 3 BP:   BP Readings from Last 3 Encounters:   01/27/25 124/86   01/06/25 126/84   08/26/24 123/88       History   Smoking Status     Never   Smokeless Tobacco     Never         5/9/2025   Healthy Eating   Healthy Eating Assessed Today Yes   Cultural/Confucianist diet restrictions? No   Do you have any food allergies or intolerances? No   Meal planning/habits Carb counting   How many times a week on average do you eat food made away from home (restaurant/take-out)? 2   Meals include Breakfast;Lunch;Dinner   Beverages Water;Diet soda;Coffee drinks         5/9/2025   Monitoring   Monitoring Assessed Today Yes   Blood Glucose Meter ContourNext;CGM   Times checking blood sugar at home (number) Other (see Comments)   Please elaborate: Have cgm ans checking very frequently   Times checking blood sugar at home (per) Day   Blood glucose trend Fluctuating     See above.     Diabetes Medication(s)       Diabetic Other       Glucagon (BAQSIMI TWO PACK) 3 MG/DOSE POWD Spray 1 Device in nostril once as needed (for severe hypoglycemia)       Insulin       insulin aspart (NOVOLOG PEN) 100 UNIT/ML pen Inject 3 to 10 units subcutaneous at mealtimes as directed, total daily dose approx 30 units     insulin glargine (LANTUS PEN) 100 UNIT/ML pen Inject 22 Units Subcutaneous At Bedtime     NOVOLOG VIAL 100 UNIT/ML soln  USE WITH INSULIN PUMP, TOTAL DAILY DOSE APPROX 100 UNITS              5/9/2025   Taking Medications   Taking Medication Assessed Today Yes   Current Treatments Insulin Pump   Dose schedule Pre-breakfast;Pre-lunch;Pre-dinner;At bedtime   Given by Patient   Problems taking diabetes medications regularly? No   Diabetes medication side effects? No         5/9/2025   Problem Solving   Problem Solving Assessed Today Yes   Is the patient at risk for hypoglycemia? Yes   Hypoglycemia Frequency Rarely   Medical ID No   Does patient have glucagon emergency kit? No   Is the patient at risk for DKA? Yes   Does patient have ketone test strips? No   Does patient have DKA prevention plan? Yes   Does patient have severe weather/disaster plan for diabetes management? No   Does patient have sick day plan for diabetes management? No       Any changes to above medications since becoming pregnant: NOT ASSESSED     Kat Lima RD, LD, [facility]   Time Spent: 30 minutes  Encounter Type: Individual    Diabetes medication dose changes were made via the [facility] Standing Orders under the patient's referring provider.

## 2025-05-14 NOTE — PROGRESS NOTES
Diabetes Self-Management Education & Support    Presents for: Pre-Existing Diabetes and Pregnancy    Type of service:  Video Visit    If the video visit is dropped, the video visit invitation should be resent by: Text to cell phone: [phone]    Originating Location (pt. Location): Home  Distant Location (provider location): [facility]  Mode of Communication:  Video Conference via [employer]    Video Start Time: 8:03a  Video End Time (time video stopped): 8:36a    How would patient like to obtain AVS? Nia      Reports                  Insulin Pump Information  Education specific to insulin pump provided today on:   Pump Hardware & Software:   -- Automated insulin delivery functions/features  Bolusing:  -- Importance of accurate carbohydrate counting  -- How to use bolus calculator  -- Importance of bolusing before meals  -- Benefits of post-meal glucose checks  Activity & Lifestyle:  -- Sleep/overnight glucose targets  -- Pregnancy       Assessment  Linda is between 5 & 6 weeks pregnant now, she thinks. She has seen higher blood sugars this past week, despite adjustment in ICRs & ISFs 5 days ago. We reviewed strategies to help bring blood sugars down and also adjusted pump settings. I requested she duplicate her pump profile so we can use one as reference after pregnancy. We also reviewed carb contents of foods, healthy balanced eating for pregnancy, increased activity as able. Encouraged 10-20 minute pre-meal bolusing, even now. We did discuss 24 hour sleep mode in Tandem pump as possibility in coming weeks but will hold off for now. Did discuss with Endocrinology who is agreeable. We did briefly discuss hypoglycemia treatment and DKA prevention. She does not have non- glucagon or ketone test strips at home.     Glucose Patterns & Trends:  Time in range of  mg/dL, 42% of the time. Patient not meeting ADA Time in Range Targets. (For pregnancy)      Changes made to  pump settings:  Time Basal Rate (unit(s)/hr) Carb Ratio (g/unit(s)) Correction Factor (unit(s)/mg/dL) Target BG   12AM 1.5 --> 1.8 7.7 25 110   6AM 1.3 --> 1.6 5 --> 4.5 25 --> 21 110   11AM 1.3 --> 1.6 4.8 --> 4.5 25 --> 21 110   5PM 1.5 --> 1.8 3.6 25 --> 21 110   9PM 1.5 --> 1.8 5.0 30 --> 25 110        Changes made to sensor settings:   No changes to sensor settings recommended at this time.    Opportunities for ongoing education and support in diabetes-self management were discussed.    PLAN:  Meal Plan Adjustments:  -- Take mealtime insulin 15 minutes before starting a meal  Exercise / activity plan: activity as able  Medication Adjustments:  -- Recommend increase to insulin - per pump settings above.   -- Will order Baqsimi nasal glucagon and ketone test strips for home use, as needed   Reducing Risks & Complications:  -- Hypoglycemia treatment reviewed for low blood glucose <65 mg/dL and importance of checking blood glucose when feeling symptoms and carrying source of rapid-acting glucose  -- Recommend referral to Endocrinology.  Follow Up:  -- Follow-up on Monday.   -- Frequency of diabetes education follow up recommended: weekly via [employer], will assess for virtual visit timing after follow ups with Endocrinology planned      Topics to cover at upcoming visits: Healthy Coping    See Care Plan for co-developed, patient-stated behavior change goals.    Education Materials Provided:  No new materials provided today    Subjective/Objective  Presents for: Insulin Pump and CGM Review  Accompanied by: Self  Diabetes education in the past 24mo: (Patient-Rptd) Yes  Focus of Visit: CGM, Insulin Pump  Type of Pump visit: Pump Review  Type of CGM visit: Personal CGM Follow-up  Diabetes type: (Patient-Rptd) Type 1  Disease course: (Patient-Rptd) Getting harder to manage  How confident are you filling out medical forms by yourself:: (Patient-Rptd) Extremely  Diabetes management related comments/concerns: Numbers are  running higher over the past week  Transportation concerns: No  Difficulty affording diabetes medication?: No  Difficulty affording diabetes testing supplies?: No  Other concerns: None  Cultural Influences/Ethnic Background:  Not  or     Diabetes Symptoms & Complications  Diabetes Related Symptoms: (Patient-Rptd) None  Weight trend: (Patient-Rptd) Other (see Comments)  Please elaborate:: (Patient-Rptd) Will be changing due to pregnancy  Symptom course: (Patient-Rptd) Stable  Disease course: (Patient-Rptd) Getting harder to manage  Complications assessed today?: No  Patient Problem List and Family Medical History reviewed for relevant medical history, current medical status, and diabetes risk factors.    Vitals:  There were no vitals taken for this visit.    Pre pregnancy weight: not assessed    Weight gain not assessed.    Estimated Date of Delivery: Data Unavailable    Labs:  Lab Results   Component Value Date    A1C 6.6 05/09/2025    A1C 8.3 04/07/2021    HEMOGLOBINA1 8.3 09/29/2011     Lab Results   Component Value Date     05/09/2025    GLC 79 03/19/2024     10/06/2022     04/13/2021     Lab Results   Component Value Date     01/03/2025     04/07/2021     HDL Cholesterol   Date Value Ref Range Status   04/07/2021 59 >49 mg/dL Final     Direct Measure HDL   Date Value Ref Range Status   01/03/2025 52 >=50 mg/dL Final     GFR Estimate   Date Value Ref Range Status   05/09/2025 >90 >60 mL/min/1.73m2 Final     Comment:     eGFR calculated using 2021 CKD-EPI equation.   04/13/2021 >90 >60 mL/min/[1.73_m2] Final     Comment:     Non  GFR Calc  Starting 12/18/2018, serum creatinine based estimated GFR (eGFR) will be   calculated using the Chronic Kidney Disease Epidemiology Collaboration   (CKD-EPI) equation.       GFR Estimate If Black   Date Value Ref Range Status   04/13/2021 >90 >60 mL/min/[1.73_m2] Final     Comment:      GFR  Calc  Starting 12/18/2018, serum creatinine based estimated GFR (eGFR) will be   calculated using the Chronic Kidney Disease Epidemiology Collaboration   (CKD-EPI) equation.       Lab Results   Component Value Date    CR 0.69 05/09/2025    CR 0.60 04/13/2021     Lab Results   Component Value Date    MICROL 5 06/08/2022    UMALCR 7.81 06/08/2022    UCRR 123.4 03/14/2024       Last 3 BP:   BP Readings from Last 3 Encounters:   01/27/25 124/86   01/06/25 126/84   08/26/24 123/88       History   Smoking Status    Never   Smokeless Tobacco    Never         5/9/2025   Healthy Eating   Healthy Eating Assessed Today Yes   Cultural/Synagogue diet restrictions? No   Do you have any food allergies or intolerances? No   Meal planning/habits Carb counting   How many times a week on average do you eat food made away from home (restaurant/take-out)? 2   Meals include Breakfast;Lunch;Dinner   Beverages Water;Diet soda;Coffee drinks         5/9/2025   Monitoring   Monitoring Assessed Today Yes   Blood Glucose Meter ContourNext;CGM   Times checking blood sugar at home (number) Other (see Comments)   Please elaborate: Have cgm ans checking very frequently   Times checking blood sugar at home (per) Day   Blood glucose trend Fluctuating     See above.     Diabetes Medication(s)       Diabetic Other       Glucagon (BAQSIMI TWO PACK) 3 MG/DOSE POWD Spray 1 Device in nostril once as needed (for severe hypoglycemia)       Insulin       insulin aspart (NOVOLOG PEN) 100 UNIT/ML pen Inject 3 to 10 units subcutaneous at mealtimes as directed, total daily dose approx 30 units     insulin glargine (LANTUS PEN) 100 UNIT/ML pen Inject 22 Units Subcutaneous At Bedtime     NOVOLOG VIAL 100 UNIT/ML soln USE WITH INSULIN PUMP, TOTAL DAILY DOSE APPROX 100 UNITS              5/9/2025   Taking Medications   Taking Medication Assessed Today Yes   Current Treatments Insulin Pump   Dose schedule Pre-breakfast;Pre-lunch;Pre-dinner;At bedtime   Given by  Patient   Problems taking diabetes medications regularly? No   Diabetes medication side effects? No         5/9/2025   Problem Solving   Problem Solving Assessed Today Yes   Is the patient at risk for hypoglycemia? Yes   Hypoglycemia Frequency Rarely   Medical ID No   Does patient have glucagon emergency kit? No   Is the patient at risk for DKA? Yes   Does patient have ketone test strips? No   Does patient have DKA prevention plan? Yes   Does patient have severe weather/disaster plan for diabetes management? No   Does patient have sick day plan for diabetes management? No       Any changes to above medications since becoming pregnant: NOT ASSESSED     Kat Lima RD, LD, [facility]   Time Spent: 30 minutes  Encounter Type: Individual    Diabetes medication dose changes were made via the [facility] Standing Orders under the patient's referring provider.

## 2025-05-16 PROBLEM — Z36.89 ENCOUNTER FOR TRIAGE IN PREGNANT PATIENT: Status: RESOLVED | Noted: 2024-03-11 | Resolved: 2025-05-16

## 2025-05-16 PROBLEM — O09.91 SUPERVISION OF HIGH RISK PREGNANCY IN FIRST TRIMESTER: Status: ACTIVE | Noted: 2025-05-16

## 2025-05-16 PROBLEM — O09.90 SUPERVISION OF HIGH-RISK PREGNANCY: Status: RESOLVED | Noted: 2023-08-08 | Resolved: 2025-05-16

## 2025-05-19 ENCOUNTER — MYC MEDICAL ADVICE (OUTPATIENT)
Dept: EDUCATION SERVICES | Facility: CLINIC | Age: 35
End: 2025-05-19
Payer: COMMERCIAL

## 2025-05-19 NOTE — TELEPHONE ENCOUNTER
PreGestational Diabetes Follow-up    Subjective/Objective:    Linda Chavez sent in blood glucose log for review. Last date of communication was: 5/14/25.    PreGestational diabetes is being managed with diet, activity, and medications    Taking diabetes medications: yes:     Diabetes Medication(s)       Diabetic Other       Glucagon (BAQSIMI TWO PACK) 3 MG/DOSE nasal powder Spray 1 spray (3 mg) in nostril as needed for severe hypoglycemia. May repeat dose if no response after 15 minutes.     Glucagon (BAQSIMI TWO PACK) 3 MG/DOSE POWD Spray 1 Device in nostril once as needed (for severe hypoglycemia)       Insulin       insulin aspart (NOVOLOG PEN) 100 UNIT/ML pen Inject 3 to 10 units subcutaneous at mealtimes as directed, total daily dose approx 30 units     insulin glargine (LANTUS PEN) 100 UNIT/ML pen Inject 22 Units Subcutaneous At Bedtime     NOVOLOG VIAL 100 UNIT/ML soln USE WITH INSULIN PUMP, TOTAL DAILY DOSE APPROX 100 UNITS            Estimated Date of Delivery: Jan 5, 2026    BG/Food Log:               Assessment:    NOT meeting ADA TIR targets for pregnancy at 48% TIR but an improvement from last week's check in    Overnight & fasting blood sugars have improved but post-prandial blood sugars still quite elevated.     Plan/Response:  Recommend increase to insulin -   Time Basal Rate (unit(s)/hr) Carb Ratio (g/unit(s)) Correction Factor (unit(s)/mg/dL) Target BG   12AM 1.8 7.7 25 110   6AM 1.6  4.5 21 --> 19 110   11AM 1.6  4.5 21 --> 19 110   5PM 1.8 3.6 21 --> 19 110   9PM 1.8 --> 2.0 5.0 25 --> 22 110   Work on 15 minute premeal bolusing without adjustment of ICR at this time, given relatively aggressive ICR in place   See [facility] message for patient communications.   Follow-up in 1 week.    Kat Lima RD, LD, [facility]     Any diabetes medication dose changes were made via the [facility] Protocol and Collaborative Practice Agreement with the patient's endocrinology provider. A copy of this encounter was shared  with the provider.

## 2025-05-21 ENCOUNTER — NURSE TRIAGE (OUTPATIENT)
Dept: NURSING | Facility: CLINIC | Age: 35
End: 2025-05-21
Payer: COMMERCIAL

## 2025-05-21 ENCOUNTER — MYC MEDICAL ADVICE (OUTPATIENT)
Dept: OBGYN | Facility: CLINIC | Age: 35
End: 2025-05-21
Payer: COMMERCIAL

## 2025-05-21 ENCOUNTER — TELEPHONE (OUTPATIENT)
Dept: OBGYN | Facility: CLINIC | Age: 35
End: 2025-05-21
Payer: COMMERCIAL

## 2025-05-21 ENCOUNTER — TELEPHONE (OUTPATIENT)
Dept: ENDOCRINOLOGY | Facility: CLINIC | Age: 35
End: 2025-05-21
Payer: COMMERCIAL

## 2025-05-21 DIAGNOSIS — O36.80X0 PREGNANCY WITH INCONCLUSIVE FETAL VIABILITY: Primary | ICD-10-CM

## 2025-05-21 NOTE — TELEPHONE ENCOUNTER
Spoke with Linda over the phone. She has done a set change and took an override bolus through the pump. Her blood sugar was as high as 395 but is now down to 317 and improving. She does not have any s/sx of nausea, vomiting, difficulty breathing but we did review that if she were to have any of these symptoms, that she needs to go to ED for monitoring for DKA. She is understanding. She is most worried about baby at this time. I reassured her that numbers were not so high for a very prolonged period of time, hopefully all will be well. She does have syringes at home if she does need to give an injection. She has urine ketone test strips on her med list if needed as well.         Requested patient message tomorrow with update and she is agreeable.     Kat Lima RD, LD, [facility]

## 2025-05-21 NOTE — TELEPHONE ENCOUNTER
Nurse Triage SBAR    Is this a 2nd Level Triage? YES, LICENSED PRACTITIONER REVIEW IS REQUIRED    Situation:  Patient calling with blood sugar 328    Background: Linda is a type 1 diabetic patient who is 7w2d pregnant with her second child. She is having difficulty controlling her glucose levels and has had many recent med changes related to this.     Assessment: Linda has a current blood glucose level of 328 per her CGM. She has no reason to believe that there is a mechanical error and all parts are functioning as designed. Linda denies fever, denies other alarming s/s of hyperglycemic crisis.     Protocol Recommended Disposition:   Discuss With PCP And Callback By Nurse Within 1 Hour    Recommendation: Linda needs to speak with endo today for continuity of care during her pregnancy. I have provided the on-call endocrinologist # to use for diabetic questions outside of clinic hours.    Routed to provider      Reason for Disposition   Symptoms of high blood sugar (e.g., abnormally thirsty, frequent urination, weight loss) and not able to test blood glucose, AND pregnant    Additional Information   Negative: Unconscious or difficult to awaken   Negative: Acting confused (e.g., disoriented, slurred speech)   Negative: Very weak (can't stand)   Negative: Sounds like a life-threatening emergency to the triager   Negative: Vomiting and signs of dehydration (e.g., very dry mouth, lightheaded, dark urine)   Negative: Blood glucose > 240 mg/dL (13.3 mmol/L) and rapid breathing   Negative: Blood glucose > 500 mg/dL (27.8 mmol/L)   Negative: Blood glucose > 240 mg/dL (13.3 mmol/L) AND urine ketones moderate-large (or more than 1+)   Negative: Blood glucose > 240 mg/dL (13.3 mmol/L) and blood ketones > 1.4 mmol/L   Negative: Blood glucose > 240 mg/dL (13.3 mmol/L) AND vomiting AND unable to check for ketones (in blood or urine)   Negative: Vomiting lasting > 4 hours   Negative: Patient sounds very sick or weak to the triager    "Negative: Fever > 100.4 F (38.0 C)   Negative: Caller has URGENT medication or insulin pump question and triager unable to answer question   Negative: Urine ketones moderate - large (or blood ketones > 1.4 mmol/L)   Negative: Blood glucose > 300 mg/dL (16.7 mmol/L) AND two or more times in a row   Negative: Blood glucose > 400 mg/dL (22.2 mmol/L)    Answer Assessment - Initial Assessment Questions  1. BLOOD GLUCOSE: \"What is your blood glucose level?\"       328  2. ONSET: \"When did you check the blood glucose?\"      During call  3. USUAL RANGE: \"What is your glucose level usually?\" (e.g., usual fasting morning value, usual evening value)      Sub 200  4. KETONES: \"Do you check for ketones (urine or blood test strips)?\" If Yes, ask: \"What does the test show now?\"       N/A  5. TYPE 1 or 2:  \"Do you know what type of diabetes you have?\"  (e.g., Type 1, Type 2, Gestational; doesn't know)       1  6. INSULIN: \"Do you take insulin?\" \"What type of insulin(s) do you use? What is the mode of delivery? (syringe, pen; injection or pump)?\"       yes  7. DIABETES PILLS: \"Do you take any pills for your diabetes?\" If Yes, ask: \"Have you missed taking any pills recently?\"      no  8. OTHER SYMPTOMS: \"Do you have any symptoms?\" (e.g., fever, frequent urination, difficulty breathing, dizziness, weakness, vomiting)      no  9. PREGNANCY: \"Is there any chance you are pregnant?\" \"When was your last menstrual period?\"      Yes, currently pregnant    Protocols used: Diabetes - High Blood Sugar-A-OH    "

## 2025-05-21 NOTE — TELEPHONE ENCOUNTER
[facility] Call [facility]    Phone Message    May a detailed message be left on voicemail: yes   Patient called reporting red-flag symptom:  high blood sugar- 361-   side effect: thirsty         Sent to triage nurses         Reason for Call: Other: see above     Action Taken: Message routed to:  [facility] ENDO    Travel Screening: Not Applicable     Date of Service:

## 2025-05-26 ENCOUNTER — MYC MEDICAL ADVICE (OUTPATIENT)
Dept: EDUCATION SERVICES | Facility: CLINIC | Age: 35
End: 2025-05-26
Payer: COMMERCIAL

## 2025-05-27 NOTE — TELEPHONE ENCOUNTER
PreGestational Diabetes Follow-up    Subjective/Objective:    Linda Chavez sent in blood glucose log for review. Last date of communication was: 5/21/25.    PreGestational diabetes is being managed with diet, activity, and medications    Taking diabetes medications: yes:     Diabetes Medication(s)       Diabetic Other       Glucagon (BAQSIMI TWO PACK) 3 MG/DOSE nasal powder Spray 1 spray (3 mg) in nostril as needed for severe hypoglycemia. May repeat dose if no response after 15 minutes.     Glucagon (BAQSIMI TWO PACK) 3 MG/DOSE POWD Spray 1 Device in nostril once as needed (for severe hypoglycemia)       Insulin       insulin aspart (NOVOLOG PEN) 100 UNIT/ML pen Inject 3 to 10 units subcutaneous at mealtimes as directed, total daily dose approx 30 units     insulin glargine (LANTUS PEN) 100 UNIT/ML pen Inject 22 Units Subcutaneous At Bedtime     NOVOLOG VIAL 100 UNIT/ML soln USE WITH INSULIN PUMP, TOTAL DAILY DOSE APPROX 100 UNITS            Estimated Date of Delivery: Jan 5, 2026    BG/Food Log:               Assessment:    51% TIR  mg/dL; NOT meeting ADA TIR targets for pregnant >70% but seeing improvements since last check in     At this time, I'd recommend entering Sleep Mode x 24 hrs and have discussed with Endocrinology. I will recommend this to help AID system meet tighter target range 112.5-120 mg/dL. I will remind patient to work on premeal bolusing and more frequent corrections since this will suspend auto-correcitons in Control IQ. Patient and I have discussed this previously.     She has not scheduled follow up with Dr. Jimenez -- I will remind her to schedule these ASAP.     Plan/Response:  Recommend that patient begin Sleep Mode in Tandem t:slim x 24 hours  Will await initiation of Sleep Mode before further adjusting pump settings   Follow-up in 1 week   See [employer] message for patient communications.     Kat Lima, RD, LD, [facility]     Any diabetes medication dose changes were made via the CDE  Protocol and Collaborative Practice Agreement with the patient's endocrinology provider. A copy of this encounter was shared with the provider.

## 2025-05-28 ENCOUNTER — ANCILLARY PROCEDURE (OUTPATIENT)
Dept: ULTRASOUND IMAGING | Facility: CLINIC | Age: 35
End: 2025-05-28
Payer: COMMERCIAL

## 2025-05-28 ENCOUNTER — OFFICE VISIT (OUTPATIENT)
Dept: OBGYN | Facility: CLINIC | Age: 35
End: 2025-05-28
Payer: COMMERCIAL

## 2025-05-28 VITALS — BODY MASS INDEX: 37.29 KG/M2 | WEIGHT: 232 LBS | DIASTOLIC BLOOD PRESSURE: 70 MMHG | SYSTOLIC BLOOD PRESSURE: 118 MMHG

## 2025-05-28 DIAGNOSIS — O36.80X0 CONFIRM FETAL VIABILITY WITH HISTORY OF MISCARRIAGE, ULTRASOUND: Primary | ICD-10-CM

## 2025-05-28 DIAGNOSIS — O36.80X0 PREGNANCY WITH INCONCLUSIVE FETAL VIABILITY: ICD-10-CM

## 2025-05-28 DIAGNOSIS — N92.6 IRREGULAR MENSES: ICD-10-CM

## 2025-05-28 DIAGNOSIS — O21.9 NAUSEA/VOMITING IN PREGNANCY: ICD-10-CM

## 2025-05-28 DIAGNOSIS — Z87.59 CONFIRM FETAL VIABILITY WITH HISTORY OF MISCARRIAGE, ULTRASOUND: Primary | ICD-10-CM

## 2025-05-28 PROCEDURE — 76817 TRANSVAGINAL US OBSTETRIC: CPT | Performed by: OBSTETRICS & GYNECOLOGY

## 2025-05-28 PROCEDURE — 99213 OFFICE O/P EST LOW 20 MIN: CPT | Performed by: OBSTETRICS & GYNECOLOGY

## 2025-05-28 PROCEDURE — 3074F SYST BP LT 130 MM HG: CPT | Performed by: OBSTETRICS & GYNECOLOGY

## 2025-05-28 PROCEDURE — 3078F DIAST BP <80 MM HG: CPT | Performed by: OBSTETRICS & GYNECOLOGY

## 2025-05-28 NOTE — PROGRESS NOTES
SUBJECTIVE:                                                   Linda Chavez is a 35 year old female who presents to clinic today for the following health issue(s):  Patient presents with:  Ultrasound  Follow Up      Additional information: us result     History of Present Illness-    Linda is experiencing waves of nausea, primarily in the mornings, which improve after eating. She reports feeling very tired, which she finds challenging, especially with another child to care for.    Her periods were regular before pregnancy, with cycles typically lasting 33-34 days. An ultrasound indicated the baby is measuring at 7 weeks and 4 days, while her period dating suggested she was 8 weeks and 2 days. The due date is estimated to be .    Linda is working closely with her endocrinologist, meeting weekly with her DM RN educator and monthly with Dr. Jimenez    Diabetes Management  Linda has experienced a few episodes of high blood sugar that were short-lived but concerning enough to prompt her to contact her healthcare provider. These episodes have caused anxiety, but her blood sugar levels have been more stable in recent days. She is actively managing her diabetes with regular consultations with her endocrinologist.      Patient's last menstrual period was 2025..     Patient is sexually active, .  Using none for contraception.    reports that she has never smoked. She has never used smokeless tobacco.    STD testing offered?  Declined    Health maintenance updated:  yes    Today's PHQ-2 Score:       2025     7:08 AM   PHQ-2 (  [employer])   Q1: Little interest or pleasure in doing things 0   Q2: Feeling down, depressed or hopeless 0   PHQ-2 Score 0    Q1: Little interest or pleasure in doing things Not at all   Q2: Feeling down, depressed or hopeless Not at all   PHQ-2 Score 0       Patient-reported     Today's PHQ-9 Score:       5/15/2025    11:48 AM   PHQ-9 SCORE   PHQ-9 Total Score MyChart 1  (Minimal depression)   PHQ-9 Total Score 1        Patient-reported     Today's TRESA-7 Score:       5/15/2025    11:47 AM   TRESA-7 SCORE   Total Score 4 (minimal anxiety)   Total Score 4        Patient-reported       Problem list and histories reviewed & adjusted, as indicated.  Additional history: as documented.    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)    Common migraine    Morbid obesity (H)    Type 1 diabetes mellitus during pregnancy, antepartum    Insulin pump status    Hypothyroid in pregnancy, antepartum    Anxiety    Low grade squamous intraepithelial lesion (LGSIL) on cervical Pap smear    Annual physical exam    Need for vaccination    Right wrist pain    Supervision of high risk pregnancy in first trimester     Past Surgical History:   Procedure Laterality Date    APPENDECTOMY  2007    dr deshpande     SECTION N/A 3/19/2024    Procedure:  section;  Surgeon: Clarissa Mcfadden MD;  Location: [facility]    DILATION AND CURETTAGE SUCTION N/A 2023    Procedure: DILATION AND CURETTAGE OF UTERUS USING SUCTION;  Surgeon: Sierra Santoyo DO;  Location: [facility] OR    PE TUBES Bilateral 1996      Social History     Tobacco Use    Smoking status: Never    Smokeless tobacco: Never   Substance Use Topics    Alcohol use: Not Currently     Alcohol/week: 2.0 standard drinks of alcohol     Types: 2 Standard drinks or equivalent per week      Problem (# of Occurrences) Relation (Name,Age of Onset)    Genetic Disorder (1) Paternal Grandfather: kidney    Neurologic Disorder (1) Maternal Grandmother: dementia    Prostate Cancer (1) Father (Shamir Agosto)           Negative family history of: Family History Negative              Current  Outpatient Medications   Medication Sig Dispense Refill    acetaminophen (TYLENOL) 500 MG tablet Take 1-2 tablets (500-1,000 mg) by mouth every 6 hours as needed for mild pain 60 tablet 1    acetone urine (KETOSTIX) test strip Use to test for urine ketones when blood sugar >240 mg/dL x 4 hours, for DKA prevention 50 strip 1    Continuous Glucose Sensor (DEXCOM G7 SENSOR) MISC Change every 10 days. 9 each 5    CONTOUR NEXT TEST test strip Use to test blood sugar 10 times daily. 300 strip 11    Flaxseed, Linseed, (FLAX SEED OIL PO) Take 1,400 mg by mouth      Glucagon (BAQSIMI TWO PACK) 3 MG/DOSE nasal powder Spray 1 spray (3 mg) in nostril as needed for severe hypoglycemia. May repeat dose if no response after 15 minutes. 1 each 3    Glucagon (BAQSIMI TWO PACK) 3 MG/DOSE POWD Spray 1 Device in nostril once as needed (for severe hypoglycemia) 1 each 0    insulin aspart (NOVOLOG PEN) 100 UNIT/ML pen Inject 3 to 10 units subcutaneous at mealtimes as directed, total daily dose approx 30 units 15 mL 1    insulin glargine (LANTUS PEN) 100 UNIT/ML pen Inject 22 Units Subcutaneous At Bedtime 15 mL 1    insulin pen needle (BD CLEOPATRA U/F) 32G X 4 MM miscellaneous Use 4 pen needles daily or as directed. 100 each 1    levothyroxine (SYNTHROID/LEVOTHROID) 150 MCG tablet Take 1-tablet by mouth every other day (alternating with levothyroxine 175 mcg tablet) as directed 45 tablet 1    levothyroxine (SYNTHROID/LEVOTHROID) 175 MCG tablet Take 1-tablet by mouth every other day (alternating with levothyroxine 150 mcg tablet) as directed 45 tablet 1    Microlet Lancets MISC Use to test blood sugar 10 times daily. 100 each 4    naratriptan (AMERGE) 1 MG tablet as needed. (Patient not taking: Reported on 5/16/2025)      NOVOLOG VIAL 100 UNIT/ML soln USE WITH INSULIN PUMP, TOTAL DAILY DOSE APPROX 100 UNITS 90 mL 1    Prenatal w/o A Vit-Fe Fum-FA (PRENATA PO)       sertraline (ZOLOFT) 50 MG tablet TAKE 1 TABLET BY MOUTH EVERY DAY 90 tablet 3      No current facility-administered medications for this visit.     Allergies   Allergen Reactions    Sulfa Antibiotics Hives    Augmentin [Amoxicillin-Pot Clavulanate] Hives    Penicillins Hives       ROS:  12 point review of systems negative other than symptoms noted below or in the HPI.  No urinary frequency or dysuria, bladder or kidney problems      OBJECTIVE:     /70   Wt 105.2 kg (232 lb)   LMP 03/31/2025   BMI 37.29 kg/m    Body mass index is 37.29 kg/m .    Exam:  Constitutional:  Appearance: Well nourished, well developed alert, in no acute distress  Psychiatric:  Mentation appears normal and affect normal/bright.     In-Clinic Test Results:  Results for orders placed or performed in visit on 05/28/25   US OB <14 Weeks w Transvaginal Single    Narrative    Obstetrical Ultrasound Report  OB U/S  < 14 Weeks -  Transvaginal  [facility]  Referring Provider: Dr. Sierra Higginss  Sonographer: Brittany Baltazar, Registered Diagnostic Medical Sonographer,   [facility]  Indication:  Viability check      Dating (mm/dd/yyyy):   LMP: 03/31/25                 EDC:  01/05/26  GA by LMP:         8w2d     Current Scan On:  5/28/2025          EDC:  01/10/26  GA by Current Scan:        7w4d  The calculation of the gestational age by current scan was based on CRL.  Anatomy Scan:  Zhu gestation.  Biometry:  Gest Sac MSD    2.49cm  CRL                       1.25 cm                7w4d                      Yolk Sac                              2.6 mm                                      Cardiac activity:  Rate and rhythm is within normal limits.   150 bpm  Findings: IUP with cardiac activity     Maternal Structures:  Cervix: The cervix appears long and closed.  Right Ovary: Wnl  Left Ovary: Wnl  Technique:Transvaginal Imaging performed    Impression:   Viable zhu gestation at 7 weeks 4 days. Ultrasound today differs   with LMP dating by 5 days, with history of irregular menses, giving final    EDC of 1/10/26. Normal adnexa.   Recommend referral for level II ultrasound in second trimester.    Sierra Treviño Masters, DO     *Note: Due to a large number of results and/or encounters for the requested time period, some results have not been displayed. A complete set of results can be found in Results Review.         ASSESSMENT/PLAN:                                                        ICD-10-CM    1. Confirm fetal viability with history of miscarriage, ultrasound  O36.80X0     Z87.59       2. Nausea/vomiting in pregnancy  O21.9       3. Irregular menses  N92.6           Patient Instructions      Unisom/B6 is always going to be recommended as first line due to large amount of safety data in pregnancy.   I recommend always starting at the first line and working from there each pregnancy, despite any prior pregnancy history. These are listed based on most safe in pregnancy and go up from there.      Ginger capsules 250mg by mouth 4 times daily.   If taking prenatal vitamins she should stop (can take just folic acid 400mcg daily supplement).  Add the acupressure wrist bands (Sea bands, motion sickness bands).     The above may be combined with unisom/vitamin B6 (see below).   Take vitamin B6 25mg twice daily and unisom (doxylamine) nightly at bedtime. Do this regularly/every single day. Even once you are feeling well (do not stop). You can continue this until 17 weeks.   If after two days, still having nausea, add 1/2 unisom in morning. If after 2 days, still having bothersome symptoms, add an additional 1/2 tab unisom at lunch.      If after two days still having bothersome symptoms, would add one of the other therapies below.  Dimenhydrinate 25-50mg by mouth every 4-6 hrs as needed, do not exceed 200mg per day if also taking doxylamine  OR  Diphenhydramine 25-50mg by mouth every 4-6hr     If these aren't working, will need to make phone, in person, or e-visit with a provider to discuss prescription  medications.           Assessment & Plan  Nausea and Vomiting  - Waves of nausea, mainly in the mornings, not debilitating. Nausea and vomiting are common in pregnancy and typically resolve by 16-17 weeks.  - Provided information on nausea and vomiting management. Recommended vitamin B6 and Unisom for nausea if needed. Unisom at night, vitamin B6 twice daily. Option to increase Unisom dosage if necessary.    Diabetes Management  - Working closely with endocrinologist and diabetes educator.    Pregnancy Monitoring  -Reviewed US with Linda.  Baby measuring at 7 weeks and 4 days, aligning with menstrual cycle length. Due date set for January 10, 2026.  - Next appointment scheduled for June 16, 2025, including an ultrasound.      Patient was informed of AI scribe and agreed to its use.      (27 minutes was spent on the date of the encounter doing chart review, review and interpretation of pertinent test results, history and/or exam, documentation, patient counseling.)      Sierra Treviño Masters, [facility] [place]

## 2025-05-28 NOTE — PATIENT INSTRUCTIONS
Unisom/B6 is always going to be recommended as first line due to large amount of safety data in pregnancy.   I recommend always starting at the first line and working from there each pregnancy, despite any prior pregnancy history. These are listed based on most safe in pregnancy and go up from there.      Ginger capsules 250mg by mouth 4 times daily.   If taking prenatal vitamins she should stop (can take just folic acid 400mcg daily supplement).  Add the acupressure wrist bands (Sea bands, motion sickness bands).     The above may be combined with unisom/vitamin B6 (see below).   Take vitamin B6 25mg twice daily and unisom (doxylamine) nightly at bedtime. Do this regularly/every single day. Even once you are feeling well (do not stop). You can continue this until 17 weeks.   If after two days, still having nausea, add 1/2 unisom in morning. If after 2 days, still having bothersome symptoms, add an additional 1/2 tab unisom at lunch.      If after two days still having bothersome symptoms, would add one of the other therapies below.  Dimenhydrinate 25-50mg by mouth every 4-6 hrs as needed, do not exceed 200mg per day if also taking doxylamine  OR  Diphenhydramine 25-50mg by mouth every 4-6hr     If these aren't working, will need to make phone, in person, or e-visit with a provider to discuss prescription medications.

## 2025-06-02 ENCOUNTER — MYC MEDICAL ADVICE (OUTPATIENT)
Dept: EDUCATION SERVICES | Facility: CLINIC | Age: 35
End: 2025-06-02
Payer: COMMERCIAL

## 2025-06-02 ENCOUNTER — MYC MEDICAL ADVICE (OUTPATIENT)
Dept: ENDOCRINOLOGY | Facility: CLINIC | Age: 35
End: 2025-06-02
Payer: COMMERCIAL

## 2025-06-02 NOTE — TELEPHONE ENCOUNTER
Diabetes in Pregnancy Follow-up    Subjective/Objective:    Linda Chavez sent in blood glucose log for review. Last date of communication was: 5/27/25.    Type 1 Diabetes in Pregnancy is being managed with diet, activity, and medications    Taking diabetes medications: yes:     Diabetes Medication(s)       Diabetic Other       Glucagon (BAQSIMI TWO PACK) 3 MG/DOSE nasal powder Spray 1 spray (3 mg) in nostril as needed for severe hypoglycemia. May repeat dose if no response after 15 minutes.     Glucagon (BAQSIMI TWO PACK) 3 MG/DOSE POWD Spray 1 Device in nostril once as needed (for severe hypoglycemia)       Insulin       insulin aspart (NOVOLOG PEN) 100 UNIT/ML pen Inject 3 to 10 units subcutaneous at mealtimes as directed, total daily dose approx 30 units     insulin glargine (LANTUS PEN) 100 UNIT/ML pen Inject 22 Units Subcutaneous At Bedtime     NOVOLOG VIAL 100 UNIT/ML soln USE WITH INSULIN PUMP, TOTAL DAILY DOSE APPROX 100 UNITS            Estimated Date of Delivery: Tutu 10, 2026    BG/Food Log:                 Assessment:    NOT meeting ADA TIR targets at 62% though significant improvements since last week    53-47% Basal to bolus ratio, will increase ICR today to rebalance and also will encourage premeal bolusing     Plan/Response:  Recommend increase to insulin -   Time Basal Rate (unit(s)/hr) Carb Ratio (g/unit(s)) Correction Factor (unit(s)/mg/dL) Target BG   12AM 1.8 7.7 25 110   6AM 1.6  4.5 --> 4.1 19 110   11AM 1.6  4.5 --> 4.1 19 110   5PM 1.8 3.6 --> 3.3 19 110   9PM 2.0 5.0 --> 4.5 22 110     Follow-up in 1 week.  See [employer] message for patient communications.     Kat Lima RD, LD, [facility]     Any diabetes medication dose changes were [facility] the [facility] under the patient's referring provider.

## 2025-06-03 NOTE — TELEPHONE ENCOUNTER
7w2d  IDDM type I  Patient calling to speak to nurse regarding elevated glucose level  Currently in the high 300's and asking OB guidance.  Asymptomatic besides thirst    RN encouraged pt to reach out to her endocrinologist ASAP to speak to someone urgently or head to ER if more symptomatic.  Unfortunately not an OB situation at this time and cannot give more guidance to patient other than Endo.    Pt verbalized understanding, in agreement with plan, and voiced no further questions.  She will call her endo now    Shania Schulz RN on 5/21/2025 at 3:50 PM    
left arm pain after fall in the backyard.  Happened 2 hours prior to arrival.  Pain to left arm with some mild swelling to the left elbow.  No head injury no LOC

## 2025-06-10 ENCOUNTER — MYC MEDICAL ADVICE (OUTPATIENT)
Dept: EDUCATION SERVICES | Facility: CLINIC | Age: 35
End: 2025-06-10
Payer: COMMERCIAL

## 2025-06-10 ENCOUNTER — PATIENT OUTREACH (OUTPATIENT)
Dept: OBGYN | Facility: CLINIC | Age: 35
End: 2025-06-10
Payer: COMMERCIAL

## 2025-06-10 NOTE — TELEPHONE ENCOUNTER
Patient due for Pap and HPV.    Reminder done today via [employer].    6/16/25 prenatal visit scheduled - notes added

## 2025-06-15 LAB
ABO + RH BLD: NORMAL
BLD GP AB SCN SERPL QL: NEGATIVE
SPECIMEN EXP DATE BLD: NORMAL

## 2025-06-16 ENCOUNTER — PRENATAL OFFICE VISIT (OUTPATIENT)
Dept: OBGYN | Facility: CLINIC | Age: 35
End: 2025-06-16
Payer: COMMERCIAL

## 2025-06-16 ENCOUNTER — TELEPHONE (OUTPATIENT)
Dept: EDUCATION SERVICES | Facility: CLINIC | Age: 35
End: 2025-06-16

## 2025-06-16 ENCOUNTER — ANCILLARY PROCEDURE (OUTPATIENT)
Dept: ULTRASOUND IMAGING | Facility: CLINIC | Age: 35
End: 2025-06-16
Payer: COMMERCIAL

## 2025-06-16 VITALS
BODY MASS INDEX: 36.96 KG/M2 | SYSTOLIC BLOOD PRESSURE: 122 MMHG | DIASTOLIC BLOOD PRESSURE: 78 MMHG | HEIGHT: 66 IN | WEIGHT: 230 LBS

## 2025-06-16 DIAGNOSIS — F41.9 ANXIETY IN PREGNANCY IN FIRST TRIMESTER, ANTEPARTUM: ICD-10-CM

## 2025-06-16 DIAGNOSIS — Z13.79 GENETIC SCREENING: ICD-10-CM

## 2025-06-16 DIAGNOSIS — O99.280 HYPOTHYROID IN PREGNANCY, ANTEPARTUM: ICD-10-CM

## 2025-06-16 DIAGNOSIS — R87.612 LGSIL ON PAP SMEAR OF CERVIX: ICD-10-CM

## 2025-06-16 DIAGNOSIS — G89.29 CHRONIC NONINTRACTABLE HEADACHE, UNSPECIFIED HEADACHE TYPE: ICD-10-CM

## 2025-06-16 DIAGNOSIS — O99.341 ANXIETY IN PREGNANCY IN FIRST TRIMESTER, ANTEPARTUM: ICD-10-CM

## 2025-06-16 DIAGNOSIS — O09.91 SUPERVISION OF HIGH RISK PREGNANCY IN FIRST TRIMESTER: ICD-10-CM

## 2025-06-16 DIAGNOSIS — O24.019 PRE-EXISTING TYPE 1 DIABETES MELLITUS DURING PREGNANCY, ANTEPARTUM: ICD-10-CM

## 2025-06-16 DIAGNOSIS — E66.01 MORBID OBESITY (H): ICD-10-CM

## 2025-06-16 DIAGNOSIS — O09.91 SUPERVISION OF HIGH RISK PREGNANCY IN FIRST TRIMESTER: Primary | ICD-10-CM

## 2025-06-16 DIAGNOSIS — O09.891 SHORT INTERVAL BETWEEN PREGNANCIES AFFECTING PREGNANCY IN FIRST TRIMESTER, ANTEPARTUM: ICD-10-CM

## 2025-06-16 DIAGNOSIS — O36.80X0 PREGNANCY WITH INCONCLUSIVE FETAL VIABILITY: ICD-10-CM

## 2025-06-16 DIAGNOSIS — O34.211 MATERNAL CARE DUE TO LOW TRANSVERSE UTERINE SCAR FROM PREVIOUS CESAREAN DELIVERY: ICD-10-CM

## 2025-06-16 DIAGNOSIS — Z96.41 INSULIN PUMP STATUS: ICD-10-CM

## 2025-06-16 DIAGNOSIS — R51.9 CHRONIC NONINTRACTABLE HEADACHE, UNSPECIFIED HEADACHE TYPE: ICD-10-CM

## 2025-06-16 DIAGNOSIS — E03.9 HYPOTHYROID IN PREGNANCY, ANTEPARTUM: ICD-10-CM

## 2025-06-16 LAB
ALBUMIN UR-MCNC: NEGATIVE MG/DL
APPEARANCE UR: CLEAR
BILIRUB UR QL STRIP: NEGATIVE
COLOR UR AUTO: YELLOW
ERYTHROCYTE [DISTWIDTH] IN BLOOD BY AUTOMATED COUNT: 13.1 % (ref 10–15)
GLUCOSE UR STRIP-MCNC: NEGATIVE MG/DL
HBV SURFACE AG SERPL QL IA: NONREACTIVE
HCT VFR BLD AUTO: 42.9 % (ref 35–47)
HCV AB SERPL QL IA: NONREACTIVE
HGB BLD-MCNC: 14 G/DL (ref 11.7–15.7)
HGB UR QL STRIP: NEGATIVE
HIV 1+2 AB+HIV1 P24 AG SERPL QL IA: NONREACTIVE
KETONES UR STRIP-MCNC: NEGATIVE MG/DL
LEUKOCYTE ESTERASE UR QL STRIP: NEGATIVE
MCH RBC QN AUTO: 29.9 PG (ref 26.5–33)
MCHC RBC AUTO-ENTMCNC: 32.6 G/DL (ref 31.5–36.5)
MCV RBC AUTO: 92 FL (ref 78–100)
NITRATE UR QL: NEGATIVE
PH UR STRIP: 6 [PH] (ref 5–7)
PLATELET # BLD AUTO: 233 10E3/UL (ref 150–450)
RBC # BLD AUTO: 4.68 10E6/UL (ref 3.8–5.2)
RUBV IGG SERPL QL IA: 1.22 INDEX
RUBV IGG SERPL QL IA: POSITIVE
SP GR UR STRIP: 1.02 (ref 1–1.03)
T PALLIDUM AB SER QL: NONREACTIVE
UROBILINOGEN UR STRIP-ACNC: 0.2 E.U./DL
WBC # BLD AUTO: 9.5 10E3/UL (ref 4–11)

## 2025-06-16 PROCEDURE — 87086 URINE CULTURE/COLONY COUNT: CPT | Performed by: OBSTETRICS & GYNECOLOGY

## 2025-06-16 PROCEDURE — 86900 BLOOD TYPING SEROLOGIC ABO: CPT | Performed by: OBSTETRICS & GYNECOLOGY

## 2025-06-16 PROCEDURE — 87624 HPV HI-RISK TYP POOLED RSLT: CPT | Performed by: OBSTETRICS & GYNECOLOGY

## 2025-06-16 PROCEDURE — 99459 PELVIC EXAMINATION: CPT | Performed by: OBSTETRICS & GYNECOLOGY

## 2025-06-16 PROCEDURE — 3074F SYST BP LT 130 MM HG: CPT | Performed by: OBSTETRICS & GYNECOLOGY

## 2025-06-16 PROCEDURE — G2211 COMPLEX E/M VISIT ADD ON: HCPCS | Performed by: OBSTETRICS & GYNECOLOGY

## 2025-06-16 PROCEDURE — 87340 HEPATITIS B SURFACE AG IA: CPT | Performed by: OBSTETRICS & GYNECOLOGY

## 2025-06-16 PROCEDURE — 86803 HEPATITIS C AB TEST: CPT | Performed by: OBSTETRICS & GYNECOLOGY

## 2025-06-16 PROCEDURE — 99214 OFFICE O/P EST MOD 30 MIN: CPT | Performed by: OBSTETRICS & GYNECOLOGY

## 2025-06-16 PROCEDURE — 86780 TREPONEMA PALLIDUM: CPT | Performed by: OBSTETRICS & GYNECOLOGY

## 2025-06-16 PROCEDURE — 86850 RBC ANTIBODY SCREEN: CPT | Performed by: OBSTETRICS & GYNECOLOGY

## 2025-06-16 PROCEDURE — 81003 URINALYSIS AUTO W/O SCOPE: CPT | Performed by: OBSTETRICS & GYNECOLOGY

## 2025-06-16 PROCEDURE — 76801 OB US < 14 WKS SINGLE FETUS: CPT | Performed by: OBSTETRICS & GYNECOLOGY

## 2025-06-16 PROCEDURE — 86901 BLOOD TYPING SEROLOGIC RH(D): CPT | Performed by: OBSTETRICS & GYNECOLOGY

## 2025-06-16 PROCEDURE — 3078F DIAST BP <80 MM HG: CPT | Performed by: OBSTETRICS & GYNECOLOGY

## 2025-06-16 PROCEDURE — 36415 COLL VENOUS BLD VENIPUNCTURE: CPT | Performed by: OBSTETRICS & GYNECOLOGY

## 2025-06-16 PROCEDURE — 86762 RUBELLA ANTIBODY: CPT | Performed by: OBSTETRICS & GYNECOLOGY

## 2025-06-16 PROCEDURE — 87389 HIV-1 AG W/HIV-1&-2 AB AG IA: CPT | Performed by: OBSTETRICS & GYNECOLOGY

## 2025-06-16 PROCEDURE — 0500F INITIAL PRENATAL CARE VISIT: CPT | Performed by: OBSTETRICS & GYNECOLOGY

## 2025-06-16 PROCEDURE — 85027 COMPLETE CBC AUTOMATED: CPT | Performed by: OBSTETRICS & GYNECOLOGY

## 2025-06-16 RX ORDER — METOCLOPRAMIDE 10 MG/1
10 TABLET ORAL 4 TIMES DAILY PRN
Qty: 30 TABLET | Refills: 2 | Status: SHIPPED | OUTPATIENT
Start: 2025-06-16

## 2025-06-16 ASSESSMENT — ANXIETY QUESTIONNAIRES
1. FEELING NERVOUS, ANXIOUS, OR ON EDGE: NOT AT ALL
5. BEING SO RESTLESS THAT IT IS HARD TO SIT STILL: NOT AT ALL
2. NOT BEING ABLE TO STOP OR CONTROL WORRYING: NOT AT ALL
7. FEELING AFRAID AS IF SOMETHING AWFUL MIGHT HAPPEN: NOT AT ALL
GAD7 TOTAL SCORE: 0
6. BECOMING EASILY ANNOYED OR IRRITABLE: NOT AT ALL
IF YOU CHECKED OFF ANY PROBLEMS ON THIS QUESTIONNAIRE, HOW DIFFICULT HAVE THESE PROBLEMS MADE IT FOR YOU TO DO YOUR WORK, TAKE CARE OF THINGS AT HOME, OR GET ALONG WITH OTHER PEOPLE: NOT DIFFICULT AT ALL
GAD7 TOTAL SCORE: 0
3. WORRYING TOO MUCH ABOUT DIFFERENT THINGS: NOT AT ALL

## 2025-06-16 ASSESSMENT — PATIENT HEALTH QUESTIONNAIRE - PHQ9
5. POOR APPETITE OR OVEREATING: NOT AT ALL
SUM OF ALL RESPONSES TO PHQ QUESTIONS 1-9: 1

## 2025-06-16 NOTE — TELEPHONE ENCOUNTER
Diabetes in Pregnancy Follow-up    Subjective/Objective:    Linda Chavez sent in blood glucose log for review. Last date of communication was: 6/11/25.    Type 1 Diabetes in Pregnancy is being managed with diet, activity, and medications    Taking diabetes medications: yes:     Diabetes Medication(s)       Diabetic Other       Glucagon (BAQSIMI TWO PACK) 3 MG/DOSE nasal powder Spray 1 spray (3 mg) in nostril as needed for severe hypoglycemia. May repeat dose if no response after 15 minutes.     Glucagon (BAQSIMI TWO PACK) 3 MG/DOSE POWD Spray 1 Device in nostril once as needed (for severe hypoglycemia)       Insulin       insulin aspart (NOVOLOG PEN) 100 UNIT/ML pen Inject 3 to 10 units subcutaneous at mealtimes as directed, total daily dose approx 30 units     insulin glargine (LANTUS PEN) 100 UNIT/ML pen Inject 22 Units Subcutaneous At Bedtime     NOVOLOG VIAL 100 UNIT/ML soln USE WITH INSULIN PUMP, TOTAL DAILY DOSE APPROX 100 UNITS            Estimated Date of Delivery: Tutu 10, 2026    BG/Food Log:                 Assessment:    Patient has Not Met goal of >70% time in target range of  mg/dL     Still regularly seeing post-prandial hyperglycemia, possibly related to carb ratio and/or bolus timing. Will recommend stronger carb ratio during the day, focus on 15 minute premeal bolus. Follow-up scheduled with Endocrinology next week.     Plan/Response:  Recommend increase to insulin -   Time Basal Rate (unit(s)/hr) Carb Ratio (g/unit(s)) Correction Factor (unit(s)/mg/dL) Target BG   12AM 1.8 7.7 25 110   6AM 1.6  4.1 --> 3.7 19 110   11AM 1.6  4.1 --> 3.7 19 110   5PM 1.8 3.3 --> 3.0 19 110   9PM 2.0 4.5 22 110     Follow-up in 1 week.  See [employer] message for patient communications.       Kat Lima RD, LD, [facility]     Any diabetes medication dose changes were [facility] under the patient's referring provider.

## 2025-06-16 NOTE — PATIENT INSTRUCTIONS
For headaches, in addition to Reglan as directed, can take Benadryl 25-50mg every 6 hours as needed.   Can also try caffeine (daily maxium 150mg) and tylenol 1000mg every 6hrs as needed

## 2025-06-16 NOTE — PROGRESS NOTES
"     SUBJECTIVE:      HPI:     This is a 34 year old female patient,  who presents for her first obstetrical visit.     MYKE: 1/10/2026, by Last Menstrual Period.  She is 10w2d weeks.  Her cycles are regular.  Her last menstrual period was normal.   Since her LMP, she has experienced  nausea and fatigue).   She denies emesis, abdominal pain, headache, loss of appetite, vaginal discharge, dysuria, pelvic pain, urinary urgency, lightheadedness, urinary frequency, vaginal bleeding, hemorrhoids, and constipation.     Additional History:   - 2nd pregnancy    - Hx c/s 3/19/24 baby girl \"Bogdan\" - prolonged cervical ripening/IOL with cervidal, vag cytotec, cook cath, pit and AROM. C/s for failure to descend, cephalopelvic disproportion and maternal pain after several epidural boluses, proceeded with c/s. Bogdan was in NICU for 2 days for low blood sugars. Linda wants repeat c/s.     - Type 1 DM - on insulin pump, follows endocrinology - already working with endocrinology and she is sending in her blood sugars weekly    - Hypothyroidism, on synthroid- managed by endo. Last TSH/T4 labs completed 25  - Elevated hgbA1c 6.6 on 25 with endo    - gHTN/gestational thrombocytopenia in last pregnancy    - short interval preg    - SAB 2023 with D&C at 11wks      History of Present Illness-    Experiencing nausea and vomiting primarily around meal times, especially if eating too quickly or waiting too long between meals. These symptoms are manageable and not persistent throughout the day. Previously experienced similar symptoms during past pregnancies, with improvement noted after 16 weeks.    Migraines:  Has experienced a couple of migraines during the current pregnancy. Typically uses migraine medication when not pregnant, but has found managing migraines more challenging during pregnancy. Migraines are more debilitating than nausea when they occur.    Diabetes Management:  Having low BS today, taken gummies to " raise it. Feeling better now.             Have you travelled during the pregnancy?No  Have your sexual partner(s) travelled during the pregnancy?No        HISTORY:   Planned Pregnancy: Yes  Marital Status:  - Gamal  Occupation: [employer]  Living in Household: Spouse and Children     Past History:  Her past medical history   Past Medical History        Past Medical History:   Diagnosis Date    Adjustment disorder with anxious mood      Allergic rhinitis, cause unspecified       h/o AIT    Anxiety      Chest discomfort      Common migraine       No visual aura.     Encounter for triage in pregnant patient 2024    Gestational thrombocytopenia      History of colposcopy 2024    Hyperlipidemia LDL goal < 70      Hypothyroidism      Infectious mononucleosis 1995    Insulin pump in place       Dexcom continuous glucose monitoring    Obesity      PIH (pregnancy induced hypertension)      Supervision of high-risk pregnancy 2023    Tuberculosis      Type 1 diabetes, HbA1c goal < 7% (H) 2004      followed [facility] - peds endocrinology - now Dr Jimenez      .       She has a history of  pregnancy induced hypertension, prior  section, and SAB 2023 at 11wks with D&C     Since her last LMP she denies use of alcohol, tobacco and street drugs.     Past medical, surgical, social and family history were reviewed and updated in EPIC.     Confirmed patient desires delivery at [facility] with the [facility] provider.     Nurse phone visit completed. Prenatal book and folder (containing standard educational hand-outs and brochures) will be given at the next visit to patient. Information in folder reviewed over the phone. Questions answered. Brochure given on optional screening available to assess chromosomal anomalies. Pt desires NIPS - ordered. Pt advised to call the clinic if she has any questions or concerns related to her pregnancy. Prenatal labs future  "ordered. New prenatal visit scheduled on 6/16 with Dr. Santoyo.     Nydia Olvera, RN on 5/16/2025 at 1:49 PM  [facility] Triage      PAP 4/29/24 - LSIL/+HR HPV, colpo 6/2024 Bx neg, plan for repeat test 1 year           Lab Results   Component Value Date     PAP NIL 01/25/2021         PHQ-9 score:         5/15/2025    11:48 AM   PHQ   PHQ-9 Total Score 1    Q9: Thoughts of better off dead/self-harm past 2 weeks Not at all       Patient-reported              8/7/2023    12:51 PM 4/29/2024    11:49 AM 5/15/2025    11:47 AM   TRESA-7 SCORE   Total Score 4 (minimal anxiety)   4 (minimal anxiety)   Total Score 4 2 4        Patient-reported         Patient supplied answers from flow sheet for:  Prenatal OB Questionnaire.  Past Medical History  Have you ever recieved care for your mental health? : (!) Yes (Anxiety - zoloft 50mg, no current therapy)  Have you ever been in a major accident or suffered serious trauma?: No  Within the last year, has anyone hit, slapped, kicked or otherwise hurt you?: No  In the last year, has anyone forced you to have sex when you didn't want to?: No     Past Medical History 2   Have you ever received a blood transfusion?: No  Would you accept a blood transfusion if was medically recommended?: Yes  Does anyone in your home smoke?: No   Is your blood type Rh negative?: No  Have you ever ?: (!) Yes  Have you been hospitalized for a nonsurgical reason excluding normal delivery?: No  Have you ever had an abnormal pap smear?: (!) Yes (Hx abnormal PAP 2024, colpo 6/2024 Bx neg, repeat 1 year)     Past Medical History (Continued)  Do you have a history of abnormalities of the uterus?: No  Did your mother take NICO or any other hormones when she was pregnant with you?: No  Do you have any other problems we have not asked about which you feel may be important to this pregnancy?: No      OBJECTIVE:     EXAM:  /78   Ht 1.676 m (5' 6\")   Wt 104.3 kg (230 lb)   LMP 03/31/2025   BMI 37.12 " kg/m   Body mass index is 37.12 kg/m .    GENERAL: alert and no distress  EYES: Eyes grossly normal to inspection, PERRL and conjunctivae and sclerae normal  HENT: ear canals and TM's normal, nose and mouth without ulcers or lesions  NECK: no adenopathy, no asymmetry, masses, or scars  RESP: lungs clear to auscultation - no rales, rhonchi or wheezes  BREAST: normal without masses, tenderness or nipple discharge and no palpable axillary masses or adenopathy  CV: regular rate and rhythm, normal S1 S2, no S3 or S4, no murmur, click or rub, no peripheral edema  ABDOMEN: soft, nontender, no hepatosplenomegaly, no masses and bowel sounds normal   (female): normal female external genitalia, normal urethral meatus, normal vaginal mucosa  MS: no gross musculoskeletal defects noted, no edema  SKIN: no suspicious lesions or rashes  NEURO: Normal strength and tone, mentation intact and speech normal  PSYCH: mentation appears normal, affect normal/bright    ASSESSMENT/PLAN:       ICD-10-CM    1. Supervision of high risk pregnancy in first trimester  O09.91 Urine Culture Aerobic Bacterial     *UA reflex to Microscopic     ABO/Rh type and screen     Hepatitis B surface antigen     CBC with platelets     HIV Antigen Antibody Combo     Rubella Antibody IgG     Treponema Abs w Reflex to RPR and Titer     Hepatitis C antibody     [facility] Non-Invasive Prenatal Screening-Prequel      2. Chronic nonintractable headache, unspecified headache type  R51.9 metoclopramide (REGLAN) 10 MG tablet    G89.29       3. Maternal care due to low transverse uterine scar from previous  delivery  O34.211       4. LGSIL on Pap smear of cervix  R87.612 HPV and Gynecologic Cytology Panel - Recommended Age 30 - 65 Years      5. Pre-existing type 1 diabetes mellitus during pregnancy, antepartum  O24.019 [facility] Referral - Pregnancy      6. Genetic screening  Z13.79 [facility] Non-Invasive Prenatal Screening-Prequel      7. Morbid obesity (H)   E66.01       8. Hypothyroid in pregnancy, antepartum  O99.280     E03.9       9. Insulin pump status  Z96.41 [facility] Fetal [facility]tr Referral - Pregnancy      10. Anxiety in pregnancy in first trimester, antepartum  O99.341     F41.9       11. Short interval between pregnancies affecting pregnancy in first trimester, antepartum  O09.891           35 year old , On 2025 patient is 10w2d weeks of pregnancy with MYKE of 1/10/2026, by Ultrasound        Assessment & Plan  Hx C/s  - Desires repeat. Schedule  at 39 weeks unless medical indications arise for earlier delivery.  -Schedule at a future visit    Hx HTN, DM, obesity  -Start baby aspirin at 12 weeks for prevention.  -Referral for level II, fetal echo  -Serial growth US, AP testing at 32wk    LGSIL on Pap smear of cervix:  - Previous pathology was benign to very questionably slightly abnormal. No immediate concern.  - Defer any follow-up colposcopy until after pregnancy.  -Pap/hpv obtained    Migraines during pregnancy:  - Recently had more migraine headaches, likely due to early pregnancy as well as irregular eating due to n/v.  -Consider a combination of reglan and benadryl, with caffeine and tylenol as options for headache management. Rx reglan. can also use for nausea if deisred.  - Risks and side effects: Medication may cause sleepiness. Caffeine might help offset some of the benadryl action.    Hypothyroidism, Type 1DM  -Managed by Endocrinology. Has pump  -Will have level II US with MFM and fetal echo.  -Plan serial growth US and twice weekly AP testing 32wk    Anxiety in preg  -Stable on zoloft 50mg    Follow up in 4wk      Patient was informed of AI scribe and agreed to its use.    -Genetic screening obatined. Will find out sex via Novel.   -Reviewed ultrasound from today. Stable interval growth. EDC based on 7wk ultrasound 26       Sierra Treviño Masters, DO

## 2025-06-17 ENCOUNTER — TRANSCRIBE ORDERS (OUTPATIENT)
Dept: MATERNAL FETAL MEDICINE | Facility: CLINIC | Age: 35
End: 2025-06-17
Payer: COMMERCIAL

## 2025-06-17 DIAGNOSIS — O26.90 PREGNANCY RELATED CONDITION, ANTEPARTUM: Primary | ICD-10-CM

## 2025-06-17 LAB — BACTERIA UR CULT: NORMAL

## 2025-06-18 ENCOUNTER — RESULTS FOLLOW-UP (OUTPATIENT)
Dept: OBGYN | Facility: CLINIC | Age: 35
End: 2025-06-18

## 2025-06-18 LAB
HPV HR 12 DNA CVX QL NAA+PROBE: POSITIVE
HPV16 DNA CVX QL NAA+PROBE: NEGATIVE
HPV18 DNA CVX QL NAA+PROBE: NEGATIVE
HUMAN PAPILLOMA VIRUS FINAL DIAGNOSIS: ABNORMAL

## 2025-06-19 ENCOUNTER — TELEPHONE (OUTPATIENT)
Dept: CARDIOLOGY | Facility: CLINIC | Age: 35
End: 2025-06-19
Payer: COMMERCIAL

## 2025-06-24 ENCOUNTER — PATIENT OUTREACH (OUTPATIENT)
Dept: OBGYN | Facility: CLINIC | Age: 35
End: 2025-06-24
Payer: COMMERCIAL

## 2025-06-24 LAB — SCANNED LAB RESULT: NORMAL

## 2025-06-24 NOTE — TELEPHONE ENCOUNTER
6/16/25 NIL pap, + HR HPV (not 16/18). Pt currently pregnant. Plan cotesting post partum / Estimated Date of Delivery: Tutu 10, 2026

## 2025-06-25 ENCOUNTER — MYC MEDICAL ADVICE (OUTPATIENT)
Dept: ENDOCRINOLOGY | Facility: CLINIC | Age: 35
End: 2025-06-25

## 2025-06-25 ENCOUNTER — VIRTUAL VISIT (OUTPATIENT)
Dept: ENDOCRINOLOGY | Facility: CLINIC | Age: 35
End: 2025-06-25
Payer: COMMERCIAL

## 2025-06-25 DIAGNOSIS — Z96.41 INSULIN PUMP STATUS: ICD-10-CM

## 2025-06-25 DIAGNOSIS — E06.3 HYPOTHYROIDISM DUE TO HASHIMOTO'S THYROIDITIS: ICD-10-CM

## 2025-06-25 DIAGNOSIS — Z33.1 PREGNANCY, INCIDENTAL: ICD-10-CM

## 2025-06-25 DIAGNOSIS — E10.9 TYPE 1 DIABETES MELLITUS WITHOUT COMPLICATION (H): Primary | ICD-10-CM

## 2025-06-25 PROCEDURE — 98006 SYNCH AUDIO-VIDEO EST MOD 30: CPT | Performed by: INTERNAL MEDICINE

## 2025-06-25 NOTE — PROGRESS NOTES
Virtual Visit Details    Type of service:  Video Visit     Originating Location (pt. Location):  Home  Distant Location (provider location):  Off-site  Platform used for Video Visit: Edwin      Recent issues:  Diabetes and thyroid follow-up evaluation  Previous childbirth of daughter 3/19/24 by C/S at 37 1/2 wks gestation, birthweight 8# 10oz, saw Dr. Sierra Santoyo during Pg  Currently 11 weeks gestation, with MYKE 1/10/26 at [facility]  Using the Tandem TSlimX2 pump, using the DexcomG7 CGM  Feeling well overall, no new health issues reported.           2003. Diagnosis of diabetes mellitus, age 13, living in [place]  Had acute illness requiring hospitalization at [facility]  Began insulin injections, using Novolog and Lantus insulin  [place] carb counting method, gram estimates  On MDI treatment plan for approx 2 years, then changed to pump use  Previous medical evaluations with Dr. Yash Barcenas/[facility] Andreia Callahan  2005. Began use of [place] pump  2012. Changed to Medtronic pump  Subsequently using Medtronic 723 Paradigm pump  2012. Tried wearing CGMS sensor, but uncomfortable     12/8/14. Initial consult with me at my former clinic ([facility])  Continued pump management  General medicine appointments with Dr. Ellen Burdick/[facility] hgbA1c trends include:   Lab Test 02/05/18 10/10/17 06/21/17 02/01/17 11/16/16  0815   A1C 7.4* 7.8* 8.1* 7.3* 8.2*      ~12/2017. Upgraded to Medtronic 670G pump  Tried Medtronic Guardian sensor, recalls frequent low glucose alarms              Wore sensor in manual mode              Didn't like it, discontinued use     ~11/2018. Wore diagnostic LibrePro CGM  Subsequently obtained LibrePersonal CGM, not wearing past year  3/2022. Changed to Tandem TSlim insulin pump  3/19/24 Childbirth of daughter by C/S at 37 1/2 wks gestation, birthweight 8# 10oz, saw Dr. Sierra Santoyo during Pg  Using Tandem TSlimX2 pump    Novolog in pump    Current Tandem pump  settings:          Recent Tandem pump data:                Recent DexcomG7 data:        Previous [facility] hgbA1c trends include:  Lab Results   Component Value Date    A1C 6.6 (H) 05/09/2025    A1C 7.6 (H) 03/10/2025    A1C 7.6 (H) 01/03/2025    A1C 6.9 (H) 06/23/2024    A1C 6.2 (H) 02/22/2024      Recent [facility] labs include:  Lab Results   Component Value Date    A1C 6.6 (H) 05/09/2025     05/09/2025    POTASSIUM 4.3 05/09/2025    CHLORIDE 106 05/09/2025    CO2 22 05/09/2025    ANIONGAP 10 05/09/2025     (H) 05/09/2025    BUN 10.4 05/09/2025    CR 0.69 05/09/2025    GFRESTIMATED >90 05/09/2025    GFRESTBLACK >90 04/13/2021    MARCIA 9.3 05/09/2025    CHOL 206 (H) 01/03/2025    TRIG 142 01/03/2025    HDL 52 01/03/2025     (H) 01/03/2025    NHDL 154 (H) 01/03/2025    UCRR 123.4 03/14/2024    MICROL 5 06/08/2022    UMALCR 7.81 06/08/2022     Last eye exam at [facility] in [place] 11/1/24, no DR  Hyperlipidemia:  Takes simvastatin medication  DM Complications:  None known        Thyroid:  2013. 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 Belén Mustafa/[facility] for general medicine appointments  Also sees Dr. Esquivel Masters/[facility]      PMH/PSH:  Past Medical History:   Diagnosis Date    Adjustment disorder with anxious mood     Allergic rhinitis, cause unspecified     h/o AIT    Anxiety      Chest discomfort     Common migraine     No visual aura.     Encounter for triage in pregnant patient 2024    Gestational thrombocytopenia     History of colposcopy 2024    Hyperlipidemia LDL goal < 70     Hypothyroidism     Infectious mononucleosis 1995    Insulin pump in place     Dexcom continuous glucose monitoring    Obesity     PIH (pregnancy induced hypertension)     Supervision of high-risk pregnancy 2023    Tuberculosis     Type 1 diabetes, HbA1c goal < 7% (H) 2004     followed [facility] endocrinology - now Dr Jimenez     Past Surgical History:   Procedure Laterality Date    APPENDECTOMY  2007    dr deshpande     SECTION N/A 3/19/2024    Procedure:  section;  Surgeon: Clarissa Mcfadden MD;  Location: [facility] L[facility]D    DILATION AND CURETTAGE SUCTION N/A 2023    Procedure: DILATION AND CURETTAGE OF UTERUS USING SUCTION;  Surgeon: Sierra Santoyo DO;  Location: [facility] OR    PE TUBES Bilateral 1996       Family Hx:  Family History   Problem Relation Age of Onset    Prostate Cancer Father     Neurologic Disorder Maternal Grandmother         dementia    Genetic Disorder Paternal Grandfather         kidney    Family History Negative No family hx of          Social Hx:  Social History     Socioeconomic History    Marital status:      Spouse name: Not on file    Number of children: Not on file    Years of education: Not on file    Highest education level: Not on file   Occupational History    Occupation: Works in supply chain   Tobacco Use    Smoking status: Never    Smokeless tobacco: Never   Vaping Use    Vaping status: Never Used   Substance and Sexual Activity    Alcohol use: Not Currently     Alcohol/week: 2.0 standard drinks of alcohol     Types: 2 Standard drinks or equivalent per week    Drug use: No    Sexual activity: Yes     Partners: Male     Birth control/protection: Pull-out method, Condom   Other Topics Concern     Service  Not Asked    Blood Transfusions Not Asked    Caffeine Concern Yes     Comment: rare    Occupational Exposure Not Asked    Hobby Hazards Not Asked    Sleep Concern No    Stress Concern No    Weight Concern Not Asked    Special Diet Not Asked    Back Care Not Asked    Exercise Yes    Bike Helmet Not Asked    Seat Belt Yes    Self-Exams No     Comment: encouraged    Parent/sibling w/ CABG, MI or angioplasty before 65F 55M? No   Social History Narrative    -Working -       Social Drivers of Health     Financial Resource Strain: Low Risk  (8/21/2024)    Financial Resource Strain     Within the past 12 months, have you or your family members you live with been unable to get utilities (heat, electricity) when it was really needed?: No   Food Insecurity: Low Risk  (8/21/2024)    Food Insecurity     Within the past 12 months, did you worry that your food would run out before you got money to buy more?: No     Within the past 12 months, did the food you bought just not last and you didn t have money to get more?: No   Transportation Needs: Low Risk  (8/21/2024)    Transportation Needs     Within the past 12 months, has lack of transportation kept you from medical appointments, getting your medicines, non-medical meetings or appointments, work, or from getting things that you need?: No   Physical Activity: Insufficiently Active (8/21/2024)    Exercise Vital Sign     Days of Exercise per Week: 2 days     Minutes of Exercise per Session: 40 min   Stress: No Stress Concern Present (8/21/2024)    [employer] - Occupational Stress Questionnaire     Feeling of Stress : Only a little   Social Connections: Unknown (8/21/2024)    Social Connection and Isolation Panel [NHANES]     Frequency of Communication with Friends and Family: Not on file     Frequency of Social Gatherings with Friends and Family: Once a week     Attends Pentecostalism Services: Not on file     Active Member of Clubs or Organizations: Not on  file     Attends Club or Organization Meetings: Not on file     Marital Status: Not on file   Recent Concern: Social Connections - Moderately Isolated (8/21/2024)    Social Connection and Isolation Panel [NHANES]     Frequency of Communication with Friends and Family: Three times a week     Frequency of Social Gatherings with Friends and Family: Once a week     Attends Protestant Services: Never     Active Member of Clubs or Organizations: No     Attends Club or Organization Meetings: Never     Marital Status:    Interpersonal Safety: Low Risk  (8/26/2024)    Interpersonal Safety     Do you feel physically and emotionally safe where you currently live?: Yes     Within the past 12 months, have you been hit, slapped, kicked or otherwise physically hurt by someone?: No     Within the past 12 months, have you been humiliated or emotionally abused in other ways by your partner or ex-partner?: No   Housing Stability: Low Risk  (8/21/2024)    Housing Stability     Do you have housing? : Yes     Are you worried about losing your housing?: No          MEDICATIONS:  has a current medication list which includes the following prescription(s): dexcom g7 sensor, flaxseed (linseed), levothyroxine, levothyroxine, novolog vial, prenatal w/o a vit-fe fum-fa, sertraline, acetaminophen, acetone urine, contour next test, baqsimi two pack, baqsimi two pack, insulin aspart, insulin glargine, insulin pen needle, metoclopramide, microlet lancets, and naratriptan.      ROS: 10 point ROS neg other than the symptoms noted above in the HPI.     GENERAL: energy good, wt stable?; denies fevers, chills, night sweats.   HEENT: no dysphagia, odonophagia, diplopia, neck pain  THYROID:  no apparent hyper or hypothyroid symptoms  CV: no chest pain, pressure, palpitations  LUNGS: no SOB, LANG, cough, wheezing   ABDOMEN: rare nausea; no diarrhea, constipation, abdominal pain  EXTREMITIES: right middle finger 2nd pharynx cyst; no rashes, ulcers,  edema  NEUROLOGY: no headaches, denies changes in vision, tingling, extremitiy numbness   MSK: no muscle aches or pains, weakness  SKIN: no rashes or lesions  : no menses with Pg  PSYCH:  stable mood, no significant anxiety or depression  ENDOCRINE: no heat or cold intolerance       Physical Exam (visual exam)  VS:  no vital signs taken for video visit  CONSTITUTIONAL: healthy, alert and NAD, well dressed, answering questions appropriately  ENT: no nose swelling or nasal discharge, mouth redness or gum changes.  EYES: eyes grossly normal to inspection, conjunctivae and sclerae normal, no exophthalmos or proptosis  THYROID:  no apparent nodules or goiter  LUNGS: no audible wheeze, cough or visible cyanosis, no visible retractions or increased work of breathing  ABDOMEN: abdomen not evaluated  EXTREMITIES: no hand tremors, limited exam  NEUROLOGY: CN grossly intact, mentation intact and speech normal   SKIN:  no apparent skin lesions, rash, or edema with visualized skin appearance  PSYCH: mentation appears normal, affect normal/bright, judgement and insight intact,   normal speech and appearance well groomed         LABS:     All pertinent notes, labs, and images personally reviewed by me.     A/P:  Encounter Diagnoses   Name Primary?    Type 1 diabetes mellitus without complication (H) Yes    Insulin pump status     Hypothyroidism due to Hashimoto's thyroiditis     Pregnancy, incidental        Comments:  Reviewed health history, diabetes and thyroid issues  Recent CGM glucose trends with some postprandial hyperglycemia  Reviewed and interpreted tests that I previously ordered.   Ordered appropriate tests for the endocrinology disease management.    Management options discussed and implemented after shared medical decision making with the patient.  T1DM and hypothyroidism problems are chronic-stable    Plan:  Reviewed the overall T1DM management and insulin pump use.  Discussed optimal BG testing to assess glucose  trends.  We reviewed insulin pump settings, basal rate and bolus dosing  Use of automated pump bolus dosing for meal/snack carb & correction dosing  Reviewed the recent Tandem pump report information  Reviewed recent DexcomG7 CGM glucose trends, in detail    Reviewed general issues with the hypothyroidism diagnosis   Discussed lab tests used to assess patient thyroid hormone levels  Reviewed treatment options including levothyroxine medication, ideal dosing    Recommend:  Continue the Tandem insulin pump and diabetes management plan  Pump setting changes (current Profile 2):    [place] 1.6 to 1.5 unit(s)/hr  6a   1.6 to 1.5  11a 1.6 to 1.5  5p 1.8 to 1.7  9p 1.8 to 1.7    Continue to use Sleep Mode for 24 hrs during Pg  Sleep Mode changes sensor glucose target from 112 to 120 mgdl, allows for increased basal rate delivery but no auto correction boluses.           Continue the mealtime boluses premeal  Continue use of the DexcomG7 CGM sensor  Would continue the current alternating levothyroxine 0.15 mg (even days) and 0.175 mg (odd days) at this time  Keep focus on diet, exercise, and weight management as tolerated during Pg  Keep the scheduled [facility] Diab Ed (EG) appointments  Plan repeat non-fasting labs 7/2025    Testing at [facility] clinic    Lab orders placed  Would not start statin med now, since future Pg planning  Plan future fasting lipid levels  Arrange annual dilated eye exam, fasting lipid panel testing.  Contact me if questions/concerns about diabetes and thyroid management    Addressed patient's questions today.    The longitudinal plan of care for the endocrine problem(s) were addressed during this visit.  Due to added complexity of care,   we will continue to support the patient and the subsequent management of this condition with ongoing continuity of care.    There are no Patient Instructions on file for this visit.    Future labs ordered today:   Orders Placed This Encounter   Procedures    GLUCOSE  MONITOR, 72 HOUR, PHYS INTERP    Hemoglobin A1c    TSH with free T4 reflex     Radiology/Consults ordered today: None    Total time spent on day of encounter:  38 min    Follow-up:  7/31/25 at 9:30 am, Return    ANTHONY Jimenez MD, MS  Endocrinology  [facility]    CC:  RUBY Mustafa, Masters, A, JYOTI Lima

## 2025-06-30 ENCOUNTER — VIRTUAL VISIT (OUTPATIENT)
Dept: EDUCATION SERVICES | Facility: CLINIC | Age: 35
End: 2025-06-30
Payer: COMMERCIAL

## 2025-06-30 DIAGNOSIS — E10.9 TYPE 1 DIABETES MELLITUS WITHOUT COMPLICATION (H): Primary | ICD-10-CM

## 2025-06-30 PROCEDURE — 98966 PH1 ASSMT&MGMT NQHP 5-10: CPT | Mod: 93 | Performed by: DIETITIAN, REGISTERED

## 2025-06-30 NOTE — LETTER
6/30/2025         RE: Linda Chavez  [address]  [place] [facility] [place] [address]        Dear Colleague,    Thank you for referring your patient, Linda Chavez, to the [facility] [place]. Please see a copy of my visit note below.    Diabetes and Pregnancy Follow-up  Type of Service: Telephone Visit    How would patient like to obtain AVS? Not needed    Subjective/Objective:    Linda Chavez was called for a scheduled BG review. Last date of communication was: 6/16/25, endocrinology visit 6/25.    Diabetes is being managed with diet, activity, and medications    Taking diabetes medications: yes:     Diabetes Medication(s)       Diabetic Other       Glucagon (BAQSIMI TWO PACK) 3 MG/DOSE nasal powder Spray 1 spray (3 mg) in nostril as needed for severe hypoglycemia. May repeat dose if no response after 15 minutes.     Glucagon (BAQSIMI TWO PACK) 3 MG/DOSE POWD Spray 1 Device in nostril once as needed (for severe hypoglycemia)       Insulin       insulin aspart (NOVOLOG PEN) 100 UNIT/ML pen Inject 3 to 10 units subcutaneous at mealtimes as directed, total daily dose approx 30 units     insulin glargine (LANTUS PEN) 100 UNIT/ML pen Inject 22 Units Subcutaneous At Bedtime     NOVOLOG VIAL 100 UNIT/ML soln USE WITH INSULIN PUMP, TOTAL DAILY DOSE APPROX 100 UNITS            Estimated Date of Delivery: Tutu 10, 2026    Blood Glucose/Ketone Log:                    Assessment:    Patient NOT meeting ADA TIR targets ( mg/dL) at 58%    She has concerns about hypoglycemia overnight but thinks she still needs more basal during the day. Her current sensor isn't linking with her pump. We discussed ways to troubleshoot and/or call [employer] customer support. She is agreeable. I will send pump settings changes to her via [employer] - she is currently traveling and will be back home later today. I also don't want her to adjust until she has a sensor and is back in [employer]. She is understanding of this.      Plan/Response:  Recommend increase to insulin -   Time Basal Rate (unit(s)/hr) Carb Ratio (g/unit(s)) Correction Factor (unit(s)/mg/dL) Target BG   12AM 1.5 7.7 25 110   6AM 1.5 --> 1.6 3.7 19 110   11AM 1.5 --> 1.6 3.7 19 110   5PM 1.7 --> 1.8 3.3 --> 3.7 19 --> 22 110   9PM 1.7 4.5 22 110     [facility]    Kat Lima RD, LD, [facility]   Time Spent: 10 minutes    Any diabetes medication dose changes were made via the CDE Protocol and Collaborative Practice Agreement with the patient's endocrinology provider. A copy of this encounter was shared with the provider.

## 2025-06-30 NOTE — PROGRESS NOTES
Diabetes and Pregnancy Follow-up  Type of Service: Telephone Visit    How would patient like to obtain AVS? Not needed    Subjective/Objective:    Linda Chavez was called for a scheduled BG review. Last date of communication was: 6/16/25, endocrinology visit 6/25.    Diabetes is being managed with diet, activity, and medications    Taking diabetes medications: yes:     Diabetes Medication(s)       Diabetic Other       Glucagon (BAQSIMI TWO PACK) 3 MG/DOSE nasal powder Spray 1 spray (3 mg) in nostril as needed for severe hypoglycemia. May repeat dose if no response after 15 minutes.     Glucagon (BAQSIMI TWO PACK) 3 MG/DOSE POWD Spray 1 Device in nostril once as needed (for severe hypoglycemia)       Insulin       insulin aspart (NOVOLOG PEN) 100 UNIT/ML pen Inject 3 to 10 units subcutaneous at mealtimes as directed, total daily dose approx 30 units     insulin glargine (LANTUS PEN) 100 UNIT/ML pen Inject 22 Units Subcutaneous At Bedtime     NOVOLOG VIAL 100 UNIT/ML soln USE WITH INSULIN PUMP, TOTAL DAILY DOSE APPROX 100 UNITS            Estimated Date of Delivery: Tutu 10, 2026    Blood Glucose/Ketone Log:                    Assessment:    Patient NOT meeting ADA TIR targets ( mg/dL) at 58%    She has concerns about hypoglycemia overnight but thinks she still needs more basal during the day. Her current sensor isn't linking with her pump. We discussed ways to troubleshoot and/or call [employer] customer support. She is agreeable. I will send pump settings changes to her via [employer] - she is currently traveling and will be back home later today. I also don't want her to adjust until she has a sensor and is back in [employer]. She is understanding of this.     Plan/Response:  Recommend increase to insulin -   Time Basal Rate (unit(s)/hr) Carb Ratio (g/unit(s)) Correction Factor (unit(s)/mg/dL) Target BG   12AM 1.5 7.7 25 110   6AM 1.5 --> 1.6 3.7 19 110   11AM 1.5 --> 1.6 3.7 19 110   5PM 1.7 --> 1.8 3.3 --> 3.7 19  --> 22 110   9PM 1.7 4.5 22 110     [facility]    Kat Lima RD, LD, [facility]   Time Spent: 10 minutes    Any diabetes medication dose changes were made via the CDE Protocol and Collaborative Practice Agreement with the patient's endocrinology provider. A copy of this encounter was shared with the provider.

## 2025-07-07 ENCOUNTER — TELEPHONE (OUTPATIENT)
Dept: EDUCATION SERVICES | Facility: CLINIC | Age: 35
End: 2025-07-07
Payer: COMMERCIAL

## 2025-07-07 NOTE — TELEPHONE ENCOUNTER
Diabetes in Pregnancy Follow-up    Subjective/Objective:    Linda Chavez sent in blood glucose log for review. Last date of communication was: 6/30/25.    Type 1 Diabetes in Pregnancy is being managed with diet, activity, and medications    Taking diabetes medications: yes:     Diabetes Medication(s)       Diabetic Other       Glucagon (BAQSIMI TWO PACK) 3 MG/DOSE nasal powder Spray 1 spray (3 mg) in nostril as needed for severe hypoglycemia. May repeat dose if no response after 15 minutes.     Glucagon (BAQSIMI TWO PACK) 3 MG/DOSE POWD Spray 1 Device in nostril once as needed (for severe hypoglycemia)       Insulin       insulin aspart (NOVOLOG PEN) 100 UNIT/ML pen Inject 3 to 10 units subcutaneous at mealtimes as directed, total daily dose approx 30 units     insulin glargine (LANTUS PEN) 100 UNIT/ML pen Inject 22 Units Subcutaneous At Bedtime     NOVOLOG VIAL 100 UNIT/ML soln USE WITH INSULIN PUMP, TOTAL DAILY DOSE APPROX 100 UNITS            Estimated Date of Delivery: Tutu 10, 2026    BG/Food Log:                 Assessment:    Patient has Not Met goal of >70% time in target range of  mg/dL     Overall, appears post-prandial hyperglycemia is related to bolus timing. Current basal-bolus ratio is 60-40, favoring more basal insulin. Will hold of on setting changes and encourage pre-meal bolusing for this week to help bring TIR closer to target.     Plan/Response:  No changes in the patient's current treatment plan.  Follow-up in 1 week.  See Loaded Pocket message for patient communications.     Kat Lima RD, LD, [facility]     Any diabetes medication dose changes were made via the [facility] Standing [facility] under the patient's referring provider.

## 2025-07-15 ENCOUNTER — MYC MEDICAL ADVICE (OUTPATIENT)
Dept: EDUCATION SERVICES | Facility: CLINIC | Age: 35
End: 2025-07-15

## 2025-07-15 ENCOUNTER — PRENATAL OFFICE VISIT (OUTPATIENT)
Dept: OBGYN | Facility: CLINIC | Age: 35
End: 2025-07-15
Payer: COMMERCIAL

## 2025-07-15 VITALS — WEIGHT: 233.2 LBS | BODY MASS INDEX: 37.64 KG/M2 | SYSTOLIC BLOOD PRESSURE: 116 MMHG | DIASTOLIC BLOOD PRESSURE: 72 MMHG

## 2025-07-15 DIAGNOSIS — E10.9 TYPE 1 DIABETES, HBA1C GOAL < 7% (H): ICD-10-CM

## 2025-07-15 DIAGNOSIS — O09.92 SUPERVISION OF HIGH RISK PREGNANCY IN SECOND TRIMESTER: Primary | ICD-10-CM

## 2025-07-15 DIAGNOSIS — F41.9 ANXIETY: ICD-10-CM

## 2025-07-15 DIAGNOSIS — E03.8 OTHER SPECIFIED HYPOTHYROIDISM: ICD-10-CM

## 2025-07-15 DIAGNOSIS — O34.219 HISTORY OF CESAREAN SECTION COMPLICATING PREGNANCY: ICD-10-CM

## 2025-07-15 DIAGNOSIS — Z96.41 INSULIN PUMP STATUS: ICD-10-CM

## 2025-07-15 PROCEDURE — 3074F SYST BP LT 130 MM HG: CPT | Performed by: OBSTETRICS & GYNECOLOGY

## 2025-07-15 PROCEDURE — 99207 PR PRENATAL VISIT: CPT | Performed by: OBSTETRICS & GYNECOLOGY

## 2025-07-15 PROCEDURE — 0502F SUBSEQUENT PRENATAL CARE: CPT | Performed by: OBSTETRICS & GYNECOLOGY

## 2025-07-15 PROCEDURE — 3078F DIAST BP <80 MM HG: CPT | Performed by: OBSTETRICS & GYNECOLOGY

## 2025-07-15 NOTE — PROGRESS NOTES
Routine OB visit    Linda Chavez is a 35 year old  at 14w3d by 7 week US.     Doing well with no concerns. Some mild round ligament pains. Continues to work with Endocrinology. No LOF, VB, abdominal pain. No FM yet.      See prenatal flowsheet for vitals.     Encounter Diagnoses   Name Primary?    Supervision of high risk pregnancy in second trimester Yes    Type 1 diabetes, HbA1c goal < 7% (H)     Other specified hypothyroidism     Anxiety     Insulin pump status     History of  section complicating pregnancy       Assessment & Plan  Hx C/s  - Desires repeat. Schedule  at 39 weeks unless medical indications arise for earlier delivery.  -Schedule at a future visit     Hx HTN, DM, obesity  -Start baby aspirin at 12 weeks for prevention.  -Scheduled for level II, fetal echo  -Serial growth US, AP testing at 32wk     LGSIL on Pap smear of cervix:  - Previous pathology was benign to very questionably slightly abnormal. No immediate concern.  - Defer any follow-up colposcopy until after pregnancy.     Migraines during pregnancy:  - Previously discussed reglan and benadryl, with caffeine and tylenol as options for headache management. Rx reglan. can also use for nausea if deisred.  - Risks and side effects: Medication may cause sleepiness. Caffeine might help offset some of the benadryl action.     Hypothyroidism, Type 1DM  -Managed by Endocrinology. Has pump  -Will have level II US with MFM and fetal echo.  -Plan serial growth US and twice weekly AP testing 32wk     Anxiety in preg  -Stable on zoloft 50mg    Routine PNC  - NIPS neg; Desires AFP (ordered) to be done 15 weeks or after     Follow up in 4wk    Karen Ibarra MD

## 2025-07-15 NOTE — TELEPHONE ENCOUNTER
Diabetes in Pregnancy Follow-up    Subjective/Objective:    Linda Chavez sent in blood glucose log for review. Last date of communication was: 7/7/25.    Type 1 Diabetes in Pregnancy is being managed with diet, activity, and medications    Taking diabetes medications: yes:     Diabetes Medication(s)       Diabetic Other       Glucagon (BAQSIMI TWO PACK) 3 MG/DOSE nasal powder Spray 1 spray (3 mg) in nostril as needed for severe hypoglycemia. May repeat dose if no response after 15 minutes.     Glucagon (BAQSIMI TWO PACK) 3 MG/DOSE POWD Spray 1 Device in nostril once as needed (for severe hypoglycemia)       Insulin       insulin aspart (NOVOLOG PEN) 100 UNIT/ML pen Inject 3 to 10 units subcutaneous at mealtimes as directed, total daily dose approx 30 units     insulin glargine (LANTUS PEN) 100 UNIT/ML pen Inject 22 Units Subcutaneous At Bedtime     NOVOLOG VIAL 100 UNIT/ML soln USE WITH INSULIN PUMP, TOTAL DAILY DOSE APPROX 100 UNITS            Estimated Date of Delivery: Tutu 10, 2026    BG/Food Log:                 Assessment:    Patient has Not Met goal of >70% time in target range of  mg/dL     Patient suggesting more basal increases which I agree with, except between MN and 6AM. She has both aggressive carb coverage and correction factor. I still expect some of this elevations are related to either missed or late boluses. Will just focus on basal rates today and review again in 1 week.     Plan/Response:  Recommend increase to insulin -   Time Basal Rate (unit(s)/hr) Carb Ratio (g/unit(s)) Correction Factor (unit(s)/mg/dL) Target BG   12AM 1.5 7.7 25 110   6AM 1.6 --> 1.8 3.7 19 110   11AM 1.6 --> 1.8 3.7 19 110   5PM 1.8 --> 2.0 3.7 22 110   9PM 1.7 --> 1.9 4.5 22 110    .  Follow-up in 1 week.  See SocialProof message for patient communications.     Kat Lima RD, LD, [facility]     Any diabetes medication dose changes were made via the [facility] Standing Orders under the patient's referring provider.

## 2025-07-21 ENCOUNTER — TELEPHONE (OUTPATIENT)
Dept: EDUCATION SERVICES | Facility: CLINIC | Age: 35
End: 2025-07-21

## 2025-07-21 ENCOUNTER — LAB (OUTPATIENT)
Dept: LAB | Facility: CLINIC | Age: 35
End: 2025-07-21
Payer: COMMERCIAL

## 2025-07-21 DIAGNOSIS — E10.9 TYPE 1 DIABETES MELLITUS WITHOUT COMPLICATION (H): ICD-10-CM

## 2025-07-21 DIAGNOSIS — Z96.41 INSULIN PUMP STATUS: ICD-10-CM

## 2025-07-21 DIAGNOSIS — O09.92 SUPERVISION OF HIGH RISK PREGNANCY IN SECOND TRIMESTER: ICD-10-CM

## 2025-07-21 DIAGNOSIS — E06.3 HYPOTHYROIDISM DUE TO HASHIMOTO'S THYROIDITIS: ICD-10-CM

## 2025-07-21 LAB
EST. AVERAGE GLUCOSE BLD GHB EST-MCNC: 123 MG/DL
HBA1C MFR BLD: 5.9 % (ref 0–5.6)
TSH SERPL DL<=0.005 MIU/L-ACNC: 2.33 UIU/ML (ref 0.3–4.2)

## 2025-07-21 PROCEDURE — 99000 SPECIMEN HANDLING OFFICE-LAB: CPT

## 2025-07-21 PROCEDURE — 82105 ALPHA-FETOPROTEIN SERUM: CPT | Mod: 90

## 2025-07-21 PROCEDURE — 84443 ASSAY THYROID STIM HORMONE: CPT

## 2025-07-21 PROCEDURE — 36415 COLL VENOUS BLD VENIPUNCTURE: CPT

## 2025-07-21 PROCEDURE — 83036 HEMOGLOBIN GLYCOSYLATED A1C: CPT

## 2025-07-21 NOTE — TELEPHONE ENCOUNTER
Diabetes in Pregnancy Follow-up    Subjective/Objective:    Linda Chavez sent in blood glucose log for review. Last date of communication was: 7/15/25.    Type 1 Diabetes in Pregnancy is being managed with diet, activity, and medications    Taking diabetes medications: yes:     Diabetes Medication(s)       Diabetic Other       Glucagon (BAQSIMI TWO PACK) 3 MG/DOSE nasal powder Spray 1 spray (3 mg) in nostril as needed for severe hypoglycemia. May repeat dose if no response after 15 minutes.     Glucagon (BAQSIMI TWO PACK) 3 MG/DOSE POWD Spray 1 Device in nostril once as needed (for severe hypoglycemia)       Insulin       insulin aspart (NOVOLOG PEN) 100 UNIT/ML pen Inject 3 to 10 units subcutaneous at mealtimes as directed, total daily dose approx 30 units     insulin glargine (LANTUS PEN) 100 UNIT/ML pen Inject 22 Units Subcutaneous At Bedtime     NOVOLOG VIAL 100 UNIT/ML soln USE WITH INSULIN PUMP, TOTAL DAILY DOSE APPROX 100 UNITS            Estimated Date of Delivery: Tutu 10, 2026    BG/Food Log:                 Assessment:    Patient has Not Met goal of >70% time in target range of  mg/dL     Nearly at target but appears to need a bit more carb coverage and correction. Basal heavy ratio.     Plan/Response:  Recommend increase to insulin -   Time Basal Rate (unit(s)/hr) Carb Ratio (g/unit(s)) Correction Factor (unit(s)/mg/dL) Target BG   12AM 1.5 7.7 25 110   6AM 1.8 3.7 19 --> 17 110   11AM 1.8 3.7--> 3.4 19 --> 17 110   5PM 2.0 3.7 --> 3.4 22 --> 20 110   9PM 1.9 4.5 --> 4.0 22 --> 20 110     Follow-up in 1 week.    Kat Lima RD, LD, [facility]     Any diabetes medication dose changes were [facility] under the patient's referring provider.

## 2025-07-22 LAB
# FETUSES US: NORMAL
AFP MOM SERPL: 0.77
AFP SERPL-MCNC: 14 NG/ML
AGE - REPORTED: 33.6 YR
CURRENT SMOKER: NO
FAMILY MEMBER DISEASES HX: NO
GA METHOD: NORMAL
GA: NORMAL WK
IDDM PATIENT QL: YES
INTEGRATED SCN PATIENT-IMP: NORMAL
SPECIMEN DRAWN SERPL: NORMAL

## 2025-07-31 ENCOUNTER — VIRTUAL VISIT (OUTPATIENT)
Dept: ENDOCRINOLOGY | Facility: CLINIC | Age: 35
End: 2025-07-31
Payer: COMMERCIAL

## 2025-07-31 DIAGNOSIS — Z96.41 INSULIN PUMP STATUS: ICD-10-CM

## 2025-07-31 DIAGNOSIS — O09.90 HIGH-RISK PREGNANCY, UNSPECIFIED TRIMESTER: ICD-10-CM

## 2025-07-31 DIAGNOSIS — E10.9 TYPE 1 DIABETES MELLITUS WITHOUT COMPLICATION (H): Primary | ICD-10-CM

## 2025-07-31 DIAGNOSIS — E06.3 HYPOTHYROIDISM DUE TO HASHIMOTO'S THYROIDITIS: ICD-10-CM

## 2025-07-31 NOTE — PROGRESS NOTES
Virtual Visit Details    Type of service:  Video Visit     Originating Location (pt. Location): Home  Distant Location (provider location):  On site  Platform used for Video Visit: Edwin        Recent issues:  Diabetes and thyroid follow-up evaluation  Previous childbirth of daughter 3/19/24 by C/S at 37 1/2 wks gestation, birthweight 8# 10oz, sees Dr. Sierra Santoyo during Pg  Currently 16 weeks gestation, with [facility] 1/10/26 at [facility]  Using the Tandem TSlimX2 pump, using the DexcomG7 CGM  Feeling well overall, no new health issues reported.           2003. Diagnosis of diabetes mellitus, age 13, living in [place]  Had acute illness requiring hospitalization at [facility]  Began insulin injections, using Novolog and Lantus insulin  [place] carb counting method, gram estimates  On MDI treatment plan for approx 2 years, then changed to pump use  Previous medical evaluations with Dr. Yash Barcenas/[facility] Andreia Callahan  2005. Began use of [place] pump  2012. Changed to Medtronic pump  Subsequently using Medtronic 723 Paradigm pump  2012. Tried wearing CGMS sensor, but uncomfortable     12/8/14. Initial consult with me at my former clinic ([facility])  Continued pump management  [facility] hgbA1c trends include:   Lab Test 02/05/18 10/10/17 06/21/17 02/01/17 11/16/16  0815   A1C 7.4* 7.8* 8.1* 7.3* 8.2*      ~12/2017. Upgraded to Medtronic 670G pump  Tried Medtronic Guardian sensor, recalls frequent low glucose alarms              Wore sensor in manual mode              Didn't like it, discontinued use     ~11/2018. Wore diagnostic LibrePro CGM  Subsequently obtained LibrePersonal CGM, not wearing past year  3/2022. Changed to Tandem TSlim insulin pump  3/19/24 Childbirth of daughter by C/S at 37 1/2 wks gestation, birthweight 8# 10oz, saw Dr. Sierra Santoyo during Pg  Using Tandem TSlimX2 pump    Novolog in pump    Current Tandem pump  settings:        Recent Tandem pump data:            Recent DexcomG7 data:        Previous [facility] hgbA1c trends include:  Lab Results   Component Value Date    A1C 5.9 (H) 07/21/2025    A1C 6.6 (H) 05/09/2025    A1C 7.6 (H) 03/10/2025    A1C 7.6 (H) 01/03/2025    A1C 6.9 (H) 06/23/2024      Recent [facility] labs include:  Lab Results   Component Value Date    A1C 5.9 (H) 07/21/2025     05/09/2025    POTASSIUM 4.3 05/09/2025    CHLORIDE 106 05/09/2025    CO2 22 05/09/2025    ANIONGAP 10 05/09/2025     (H) 05/09/2025    BUN 10.4 05/09/2025    CR 0.69 05/09/2025    GFRESTIMATED >90 05/09/2025    GFRESTBLACK >90 04/13/2021    MARCIA 9.3 05/09/2025    CHOL 206 (H) 01/03/2025    TRIG 142 01/03/2025    HDL 52 01/03/2025     (H) 01/03/2025    NHDL 154 (H) 01/03/2025    UCRR 123.4 03/14/2024    MICROL 5 06/08/2022    UMALCR 7.81 06/08/2022     Last eye exam at [facility] in [place] 11/1/24, no DR  Hyperlipidemia:  Takes simvastatin medication  DM Complications:  None known        Thyroid:  2013. 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 Belén Mustafa/[facility] for general medicine appointments  Also sees Dr. Esquivel Masters/[facility]      PMH/PSH:  Past Medical History:   Diagnosis Date    Adjustment disorder with anxious mood     Allergic rhinitis, cause unspecified     h/o AIT    Anxiety      Chest discomfort     Common migraine     No visual aura.     Encounter for triage in pregnant patient 2024    Gestational thrombocytopenia     History of colposcopy 2024    Hyperlipidemia LDL goal < 70     Hypothyroidism     Infectious mononucleosis 1995    Insulin pump in place     Dexcom continuous glucose monitoring    Obesity     PIH (pregnancy induced hypertension)     Supervision of high-risk pregnancy 2023    Tuberculosis     Type 1 diabetes mellitus (H)      Past Surgical History:   Procedure Laterality Date    APPENDECTOMY  2007    dr deshpande     SECTION N/A 3/19/2024    Procedure:  section;  Surgeon: Clarissa Mcfadden MD;  Location: [facility] L+D    DILATION AND CURETTAGE SUCTION N/A 2023    Procedure: DILATION AND CURETTAGE OF UTERUS USING SUCTION;  Surgeon: Sierra Santoyo DO;  Location: [facility] OR    PE TUBES Bilateral 1996       Family Hx:  Family History   Problem Relation Age of Onset    Prostate Cancer Father     Neurologic Disorder Maternal Grandmother         dementia    Genetic Disorder Paternal Grandfather         kidney    Family History Negative No family hx of          Social Hx:  Social History     Socioeconomic History    Marital status:      Spouse name: Not on file    Number of children: Not on file    Years of education: Not on file    Highest education level: Not on file   Occupational History    Occupation: Works in supply chain   Tobacco Use    Smoking status: Never    Smokeless tobacco: Never   Vaping Use    Vaping status: Never Used   Substance and Sexual Activity    Alcohol use: Not Currently     Alcohol/week: 2.0 standard drinks of alcohol     Types: 2 Standard drinks or equivalent per week    Drug use: No    Sexual activity: Yes     Partners: Male     Birth control/protection: Pull-out method, Condom   Other Topics Concern     Service Not Asked    Blood Transfusions Not Asked    Caffeine Concern Yes      Comment: rare    Occupational Exposure Not Asked    Hobby Hazards Not Asked    Sleep Concern No    Stress Concern No    Weight Concern Not Asked    Special Diet Not Asked    Back Care Not Asked    Exercise Yes    Bike Helmet Not Asked    Seat Belt Yes    Self-Exams No     Comment: encouraged    Parent/sibling w/ CABG, MI or angioplasty before 65F 55M? No   Social History Narrative    -Working -       Social Drivers of Health     Financial Resource Strain: Low Risk  (8/21/2024)    Financial Resource Strain     Within the past 12 months, have you or your family members you live with been unable to get utilities (heat, electricity) when it was really needed?: No   Food Insecurity: Low Risk  (8/21/2024)    Food Insecurity     Within the past 12 months, did you worry that your food would run out before you got money to buy more?: No     Within the past 12 months, did the food you bought just not last and you didn t have money to get more?: No   Transportation Needs: Low Risk  (8/21/2024)    Transportation Needs     Within the past 12 months, has lack of transportation kept you from medical appointments, getting your medicines, non-medical meetings or appointments, work, or from getting things that you need?: No   Physical Activity: Insufficiently Active (8/21/2024)    Exercise Vital Sign     Days of Exercise per Week: 2 days     Minutes of Exercise per Session: 40 min   Stress: No Stress Concern Present (8/21/2024)    [employer] [place] [employer] - Occupational Stress Questionnaire     Feeling of Stress : Only a little   Social Connections: Unknown (8/21/2024)    Social Connection and Isolation Panel [NHANES]     Frequency of Communication with Friends and Family: Not on file     Frequency of Social Gatherings with Friends and Family: Once a week     Attends Yazidism Services: Not on file     Active Member of Clubs or Organizations: Not on file     Attends Club or Organization Meetings: Not on file      Marital Status: Not on file   Recent Concern: Social Connections - Moderately Isolated (8/21/2024)    Social Connection and Isolation Panel [NHANES]     Frequency of Communication with Friends and Family: Three times a week     Frequency of Social Gatherings with Friends and Family: Once a week     Attends Evangelical Services: Never     Active Member of Clubs or Organizations: No     Attends Club or Organization Meetings: Never     Marital Status:    Interpersonal Safety: Low Risk  (8/26/2024)    Interpersonal Safety     Do you feel physically and emotionally safe where you currently live?: Yes     Within the past 12 months, have you been hit, slapped, kicked or otherwise physically hurt by someone?: No     Within the past 12 months, have you been humiliated or emotionally abused in other ways by your partner or ex-partner?: No   Housing Stability: Low Risk  (8/21/2024)    Housing Stability     Do you have housing? : Yes     Are you worried about losing your housing?: No          MEDICATIONS:  has a current medication list which includes the following prescription(s): dexcom g7 sensor, levothyroxine, levothyroxine, novolog vial, acetaminophen, acetone urine, contour next test, flaxseed (linseed), baqsimi two pack, baqsimi two pack, insulin aspart, insulin glargine, insulin pen needle, metoclopramide, microlet lancets, naratriptan, prenatal w/o a vit-fe fum-fa, and sertraline.      ROS: 10 point ROS neg other than the symptoms noted above in the HPI.     GENERAL: energy good, wt gain with Pg; denies fevers, chills, night sweats.   HEENT: no dysphagia, odonophagia, diplopia, neck pain  THYROID:  no apparent hyper or hypothyroid symptoms  CV: no chest pain, pressure, palpitations  LUNGS: no SOB, LANG, cough, wheezing   ABDOMEN: rare nausea; no diarrhea, constipation, abdominal pain  EXTREMITIES: right middle finger 2nd pharynx cyst; no rashes, ulcers, edema  NEUROLOGY: no headaches, denies changes in vision,  tingling, extremitiy numbness   MSK: no muscle aches or pains, weakness  SKIN: no rashes or lesions  : no menses with Pg  PSYCH:  stable mood, no significant anxiety or depression  ENDOCRINE: no heat or cold intolerance       Physical Exam (visual exam)  VS:  no vital signs taken for video visit  CONSTITUTIONAL: healthy, alert and NAD, well dressed, answering questions appropriately  ENT: no nose swelling or nasal discharge, mouth redness or gum changes.  EYES: eyes grossly normal to inspection, conjunctivae and sclerae normal, no exophthalmos or proptosis  THYROID:  no apparent nodules or goiter  LUNGS: no audible wheeze, cough or visible cyanosis, no visible retractions or increased work of breathing  ABDOMEN: abdomen not evaluated  EXTREMITIES: no hand tremors, limited exam  NEUROLOGY: CN grossly intact, mentation intact and speech normal   SKIN:  no apparent skin lesions, rash, or edema with visualized skin appearance  PSYCH: mentation appears normal, affect normal/bright, judgement and insight intact,   normal speech and appearance well groomed       LABS:     All pertinent notes, labs, and images personally reviewed by me.     A/P:  Encounter Diagnoses   Name Primary?    Type 1 diabetes mellitus without complication (H) Yes    Insulin pump status     Hypothyroidism due to Hashimoto's thyroiditis     High-risk pregnancy, unspecified trimester        Comments:  Reviewed health history, diabetes and thyroid issues  Recent CGM glucose trends with some nocturnal and afternoon hypoglycemia trends  Reviewed and interpreted tests that I previously ordered.   Ordered appropriate tests for the endocrinology disease management.    Management options discussed and implemented after shared medical decision making with the patient.  T1DM and hypothyroidism problems are chronic-stable    Plan:  Reviewed the overall T1DM management and insulin pump use.  Discussed optimal BG testing to assess glucose trends.  We reviewed  insulin pump settings, basal rate and bolus dosing  Use of automated pump bolus dosing for meal/snack carb & correction dosing  Reviewed the recent Tandem pump report information  Reviewed recent DexcomG7 CGM glucose trends, in detail    Reviewed general issues with the hypothyroidism diagnosis   Discussed lab tests used to assess patient thyroid hormone levels  Reviewed treatment options including levothyroxine medication, ideal dosing    Recommend:  Continue the Tandem TSlimX2 insulin pump and diabetes management plan  Pump setting changes (current Profile 2):    Basals: [place] 1.5 to 1.4, 11a 1.8 to 1.7, 5p 2.0 to 1.9, 9p 1.9 to 1.8 unit(s)/hr    Continue to use Sleep Mode for 24 hrs during Pg  Sleep Mode changes sensor glucose target from 112 to 120 mgdl,   allows for increased basal rate delivery but no auto correction boluses.           Continue the mealtime boluses premeal  Continue use of the DexcomG7 CGM sensor, Control IQ auto mode  Would continue the current alternating levothyroxine 0.15 mg (even days) and 0.175 mg (odd days) at this time  Keep focus on diet, exercise, and weight management as tolerated during Pg  Review the pump & sensor data with the [facility] (EG) every 7-10 days  Plan repeat non-fasting labs 9/15/25    Testing at [facility] appt    Lab orders placed  Would not start statin med now, since future Pg planning  Reviewed future plan for pump/sensor use during L&D, postpartum  Arrange annual dilated eye exam, fasting lipid panel testing.  Contact me if questions/concerns about diabetes and thyroid management    Addressed patient's questions today.    The longitudinal plan of care for the endocrine problem(s) were addressed during this visit.  Due to added complexity of care,   we will continue to support the patient and the subsequent management of this condition with ongoing continuity of care.    There are no Patient Instructions on file for this visit.    Future labs  ordered today:   Orders Placed This Encounter   Procedures    GLUCOSE MONITOR, 72 HOUR, PHYS INTERP    Hemoglobin A1c    Basic metabolic panel    TSH    T4 free     Radiology/Consults ordered today: None    Total time spent on day of encounter:  33 min    Follow-up:  8/25/25 at 7:45am VVAm,     9/29/25 at 11:30am, Return    ANTHONY Jimenez MD, MS  Endocrinology  [facility]    CC:  RUBY Mustafa, Masters, A, Clarence E

## 2025-08-04 ENCOUNTER — PRE VISIT (OUTPATIENT)
Dept: MATERNAL FETAL MEDICINE | Facility: CLINIC | Age: 35
End: 2025-08-04
Payer: COMMERCIAL

## 2025-08-04 DIAGNOSIS — O09.91 SUPERVISION OF HIGH RISK PREGNANCY IN FIRST TRIMESTER: Primary | ICD-10-CM

## 2025-08-05 ENCOUNTER — MYC MEDICAL ADVICE (OUTPATIENT)
Dept: EDUCATION SERVICES | Facility: CLINIC | Age: 35
End: 2025-08-05
Payer: COMMERCIAL

## 2025-08-11 ENCOUNTER — PRENATAL OFFICE VISIT (OUTPATIENT)
Dept: OBGYN | Facility: CLINIC | Age: 35
End: 2025-08-11
Payer: COMMERCIAL

## 2025-08-11 ENCOUNTER — MYC MEDICAL ADVICE (OUTPATIENT)
Dept: EDUCATION SERVICES | Facility: CLINIC | Age: 35
End: 2025-08-11

## 2025-08-11 ENCOUNTER — HOSPITAL ENCOUNTER (OUTPATIENT)
Dept: ULTRASOUND IMAGING | Facility: CLINIC | Age: 35
Discharge: HOME | End: 2025-08-11
Attending: OBSTETRICS & GYNECOLOGY
Payer: COMMERCIAL

## 2025-08-11 ENCOUNTER — OFFICE VISIT (OUTPATIENT)
Dept: MATERNAL FETAL MEDICINE | Facility: CLINIC | Age: 35
End: 2025-08-11
Attending: OBSTETRICS & GYNECOLOGY
Payer: COMMERCIAL

## 2025-08-11 VITALS — BODY MASS INDEX: 37.61 KG/M2 | SYSTOLIC BLOOD PRESSURE: 126 MMHG | DIASTOLIC BLOOD PRESSURE: 76 MMHG | WEIGHT: 233 LBS

## 2025-08-11 DIAGNOSIS — E10.3292 TYPE 1 DIABETES MELLITUS WITH MILD NONPROLIFERATIVE RETINOPATHY OF LEFT EYE WITHOUT MACULAR EDEMA (H): ICD-10-CM

## 2025-08-11 DIAGNOSIS — O34.211 MATERNAL CARE DUE TO LOW TRANSVERSE UTERINE SCAR FROM PREVIOUS CESAREAN DELIVERY: ICD-10-CM

## 2025-08-11 DIAGNOSIS — O26.90 PREGNANCY RELATED CONDITION, ANTEPARTUM: ICD-10-CM

## 2025-08-11 DIAGNOSIS — F41.9 ANXIETY IN PREGNANCY IN FIRST TRIMESTER, ANTEPARTUM: ICD-10-CM

## 2025-08-11 DIAGNOSIS — O09.92 SUPERVISION OF HIGH RISK PREGNANCY IN SECOND TRIMESTER: Primary | ICD-10-CM

## 2025-08-11 DIAGNOSIS — E03.9 HYPOTHYROID IN PREGNANCY, ANTEPARTUM: ICD-10-CM

## 2025-08-11 DIAGNOSIS — O24.012 TYPE 1 DIABETES MELLITUS DURING PREGNANCY IN SECOND TRIMESTER: Primary | ICD-10-CM

## 2025-08-11 DIAGNOSIS — O99.280 HYPOTHYROID IN PREGNANCY, ANTEPARTUM: ICD-10-CM

## 2025-08-11 DIAGNOSIS — O99.341 ANXIETY IN PREGNANCY IN FIRST TRIMESTER, ANTEPARTUM: ICD-10-CM

## 2025-08-11 PROCEDURE — 99207 PR PRENATAL VISIT: CPT | Performed by: OBSTETRICS & GYNECOLOGY

## 2025-08-11 PROCEDURE — 99213 OFFICE O/P EST LOW 20 MIN: CPT | Mod: 25 | Performed by: OBSTETRICS & GYNECOLOGY

## 2025-08-11 PROCEDURE — 0502F SUBSEQUENT PRENATAL CARE: CPT | Performed by: OBSTETRICS & GYNECOLOGY

## 2025-08-11 PROCEDURE — 3078F DIAST BP <80 MM HG: CPT | Performed by: OBSTETRICS & GYNECOLOGY

## 2025-08-11 PROCEDURE — 76811 OB US DETAILED SNGL FETUS: CPT

## 2025-08-11 PROCEDURE — 99212 OFFICE O/P EST SF 10 MIN: CPT | Performed by: OBSTETRICS & GYNECOLOGY

## 2025-08-11 PROCEDURE — 3074F SYST BP LT 130 MM HG: CPT | Performed by: OBSTETRICS & GYNECOLOGY

## 2025-08-18 ENCOUNTER — VIRTUAL VISIT (OUTPATIENT)
Dept: EDUCATION SERVICES | Facility: CLINIC | Age: 35
End: 2025-08-18
Attending: INTERNAL MEDICINE
Payer: COMMERCIAL

## 2025-08-18 DIAGNOSIS — E10.9 TYPE 1 DIABETES MELLITUS WITHOUT COMPLICATION (H): Primary | ICD-10-CM

## 2025-08-18 PROCEDURE — 98967 PH1 ASSMT&MGMT NQHP 11-20: CPT | Mod: 93 | Performed by: DIETITIAN, REGISTERED

## 2025-08-25 ENCOUNTER — VIRTUAL VISIT (OUTPATIENT)
Dept: ENDOCRINOLOGY | Facility: CLINIC | Age: 35
End: 2025-08-25
Payer: COMMERCIAL

## 2025-08-25 DIAGNOSIS — E10.9 TYPE 1 DIABETES MELLITUS WITHOUT COMPLICATION (H): Primary | ICD-10-CM

## 2025-08-25 DIAGNOSIS — Z96.41 INSULIN PUMP STATUS: ICD-10-CM

## 2025-08-25 DIAGNOSIS — E06.3 HYPOTHYROIDISM DUE TO HASHIMOTO'S THYROIDITIS: ICD-10-CM

## 2025-08-25 PROCEDURE — 98006 SYNCH AUDIO-VIDEO EST MOD 30: CPT | Performed by: INTERNAL MEDICINE

## 2025-08-25 PROCEDURE — 95251 CONT GLUC MNTR ANALYSIS I&R: CPT | Performed by: INTERNAL MEDICINE

## 2025-09-02 ENCOUNTER — MYC MEDICAL ADVICE (OUTPATIENT)
Dept: EDUCATION SERVICES | Facility: CLINIC | Age: 35
End: 2025-09-02
Payer: COMMERCIAL

## 2025-09-02 DIAGNOSIS — E10.9 TYPE 1 DIABETES MELLITUS WITHOUT COMPLICATION (H): ICD-10-CM

## 2025-09-03 ENCOUNTER — OFFICE VISIT (OUTPATIENT)
Dept: MATERNAL FETAL MEDICINE | Facility: CLINIC | Age: 35
End: 2025-09-03
Attending: STUDENT IN AN ORGANIZED HEALTH CARE EDUCATION/TRAINING PROGRAM
Payer: COMMERCIAL

## 2025-09-03 ENCOUNTER — HOSPITAL ENCOUNTER (OUTPATIENT)
Dept: ULTRASOUND IMAGING | Facility: CLINIC | Age: 35
Discharge: HOME | End: 2025-09-03
Attending: STUDENT IN AN ORGANIZED HEALTH CARE EDUCATION/TRAINING PROGRAM
Payer: COMMERCIAL

## 2025-09-03 DIAGNOSIS — Z98.891 HISTORY OF CESAREAN SECTION: ICD-10-CM

## 2025-09-03 DIAGNOSIS — O99.210 OBESITY DURING PREGNANCY: ICD-10-CM

## 2025-09-03 DIAGNOSIS — O99.280 THYROID DISEASE AFFECTING PREGNANCY: ICD-10-CM

## 2025-09-03 DIAGNOSIS — E07.9 THYROID DISEASE AFFECTING PREGNANCY: ICD-10-CM

## 2025-09-03 DIAGNOSIS — O24.012 TYPE 1 DIABETES MELLITUS DURING PREGNANCY IN SECOND TRIMESTER: Primary | ICD-10-CM

## 2025-09-03 DIAGNOSIS — O24.012 TYPE 1 DIABETES MELLITUS DURING PREGNANCY IN SECOND TRIMESTER: ICD-10-CM

## 2025-09-03 DIAGNOSIS — O09.299 HISTORY OF PRE-ECLAMPSIA IN PRIOR PREGNANCY, CURRENTLY PREGNANT: ICD-10-CM

## 2025-09-03 PROCEDURE — 76816 OB US FOLLOW-UP PER FETUS: CPT

## 2025-09-03 PROCEDURE — 99212 OFFICE O/P EST SF 10 MIN: CPT | Performed by: STUDENT IN AN ORGANIZED HEALTH CARE EDUCATION/TRAINING PROGRAM

## 2025-09-03 RX ORDER — INSULIN ASPART 100 [IU]/ML
INJECTION, SOLUTION INTRAVENOUS; SUBCUTANEOUS
Qty: 120 ML | Refills: 3 | Status: SHIPPED | OUTPATIENT
Start: 2025-09-03

## 2025-09-04 ENCOUNTER — MYC MEDICAL ADVICE (OUTPATIENT)
Dept: OBGYN | Facility: CLINIC | Age: 35
End: 2025-09-04
Payer: COMMERCIAL

## (undated) DEVICE — DRSG GAUZE 4X4" 3033

## (undated) DEVICE — SU VICRYL 0 CT-2 27" J334H

## (undated) DEVICE — SUCTION CANNULA UTERINE 08MM CVD 022108-10

## (undated) DEVICE — GLOVE BIOGEL PI ULTRATOUCH SZ 6.5 41165

## (undated) DEVICE — SOL WATER IRRIG 1000ML BOTTLE 2F7114

## (undated) DEVICE — PACK MINOR SBA15MIFSE

## (undated) DEVICE — DRAPE MINOR PROCEDURE LAP 29496

## (undated) DEVICE — DRSG STERI STRIP 1/2X4" R1547

## (undated) DEVICE — DRSG BANDAID 3/4X3"

## (undated) DEVICE — DRAPE POUCH IRR 1016

## (undated) DEVICE — DRSG TELFA 3X8" 1238

## (undated) DEVICE — SU PROLENE 2-0 SHDA 48" 8533H

## (undated) DEVICE — PREP DURAPREP 06ML APL 8635

## (undated) DEVICE — SOL NACL 0.9% IRRIG 1000ML BOTTLE 2F7124

## (undated) DEVICE — Device

## (undated) DEVICE — LINEN TOWEL PACK X5 5464

## (undated) DEVICE — SURGICEL POWDER ABSORBABLE HEMOSTAT 3GM 3013SP

## (undated) DEVICE — ESU PENCIL W/SMOKE EVAC NEPTUNE STRYKER 0703-046-000

## (undated) DEVICE — NDL SPINAL 22GA 3.5" QUINCKE 405181

## (undated) DEVICE — DECANTER BAG 2002S

## (undated) DEVICE — SU PLAIN 0 FN-2 27" N864H

## (undated) DEVICE — ESU GROUND PAD UNIVERSAL W/O CORD

## (undated) DEVICE — TUBING VACUUM COLLECTION 6FT 23116

## (undated) DEVICE — SOL ADH LIQUID BENZOIN SWAB 0.6ML C1544

## (undated) DEVICE — PREP SKIN SCRUB TRAY 4461A

## (undated) DEVICE — DRSG TELFA ISLAND 4X8" 7541

## (undated) DEVICE — PACK TVT HYSTEROSCOPY SMA15HYFSE

## (undated) DEVICE — SU VICRYL 4-0 PS-2 18" UND J496H

## (undated) DEVICE — GLOVE BIOGEL PI ULTRATOUCH SZ 7.0 41170

## (undated) RX ORDER — PROPOFOL 10 MG/ML
INJECTION, EMULSION INTRAVENOUS
Status: DISPENSED
Start: 2023-05-09

## (undated) RX ORDER — ONDANSETRON 2 MG/ML
INJECTION INTRAMUSCULAR; INTRAVENOUS
Status: DISPENSED
Start: 2024-03-19

## (undated) RX ORDER — MORPHINE SULFATE 1 MG/ML
INJECTION, SOLUTION EPIDURAL; INTRATHECAL; INTRAVENOUS
Status: DISPENSED
Start: 2024-03-19

## (undated) RX ORDER — BUPIVACAINE HYDROCHLORIDE 2.5 MG/ML
INJECTION, SOLUTION EPIDURAL; INFILTRATION; INTRACAUDAL
Status: DISPENSED
Start: 2023-05-09

## (undated) RX ORDER — OXYTOCIN/0.9 % SODIUM CHLORIDE 30/500 ML
PLASTIC BAG, INJECTION (ML) INTRAVENOUS
Status: DISPENSED
Start: 2024-03-19

## (undated) RX ORDER — LIDOCAINE HCL/EPINEPHRINE/PF 2%-1:200K
VIAL (ML) INJECTION
Status: DISPENSED
Start: 2024-03-19

## (undated) RX ORDER — KETOROLAC TROMETHAMINE 30 MG/ML
INJECTION, SOLUTION INTRAMUSCULAR; INTRAVENOUS
Status: DISPENSED
Start: 2024-03-19

## (undated) RX ORDER — KETOROLAC TROMETHAMINE 30 MG/ML
INJECTION, SOLUTION INTRAMUSCULAR; INTRAVENOUS
Status: DISPENSED
Start: 2023-05-09

## (undated) RX ORDER — DOXYCYCLINE 100 MG/1
CAPSULE ORAL
Status: DISPENSED
Start: 2023-05-09

## (undated) RX ORDER — FENTANYL CITRATE 50 UG/ML
INJECTION, SOLUTION INTRAMUSCULAR; INTRAVENOUS
Status: DISPENSED
Start: 2023-05-09

## (undated) RX ORDER — DOXYCYCLINE 100 MG/10ML
INJECTION, POWDER, LYOPHILIZED, FOR SOLUTION INTRAVENOUS
Status: DISPENSED
Start: 2023-05-09

## (undated) RX ORDER — ONDANSETRON 2 MG/ML
INJECTION INTRAMUSCULAR; INTRAVENOUS
Status: DISPENSED
Start: 2023-05-09

## (undated) RX ORDER — ACETAMINOPHEN 325 MG/1
TABLET ORAL
Status: DISPENSED
Start: 2023-05-09